# Patient Record
Sex: FEMALE | Race: WHITE | NOT HISPANIC OR LATINO | Employment: FULL TIME | URBAN - METROPOLITAN AREA
[De-identification: names, ages, dates, MRNs, and addresses within clinical notes are randomized per-mention and may not be internally consistent; named-entity substitution may affect disease eponyms.]

---

## 2017-01-05 ENCOUNTER — ALLSCRIPTS OFFICE VISIT (OUTPATIENT)
Dept: OTHER | Facility: OTHER | Age: 42
End: 2017-01-05

## 2017-01-23 ENCOUNTER — TRANSCRIBE ORDERS (OUTPATIENT)
Dept: ADMINISTRATIVE | Facility: HOSPITAL | Age: 42
End: 2017-01-23

## 2017-01-23 DIAGNOSIS — Z09 FOLLOW-UP EXAM, 3-6 MONTHS SINCE PREVIOUS EXAM: Primary | ICD-10-CM

## 2017-02-15 ENCOUNTER — ALLSCRIPTS OFFICE VISIT (OUTPATIENT)
Dept: OTHER | Facility: OTHER | Age: 42
End: 2017-02-15

## 2017-02-23 ENCOUNTER — ALLSCRIPTS OFFICE VISIT (OUTPATIENT)
Dept: OTHER | Facility: OTHER | Age: 42
End: 2017-02-23

## 2017-03-01 ENCOUNTER — ALLSCRIPTS OFFICE VISIT (OUTPATIENT)
Dept: OTHER | Facility: OTHER | Age: 42
End: 2017-03-01

## 2017-03-03 DIAGNOSIS — R92.8 OTHER ABNORMAL AND INCONCLUSIVE FINDINGS ON DIAGNOSTIC IMAGING OF BREAST: ICD-10-CM

## 2017-03-06 ENCOUNTER — HOSPITAL ENCOUNTER (OUTPATIENT)
Dept: MAMMOGRAPHY | Facility: CLINIC | Age: 42
Discharge: HOME | End: 2017-03-06
Payer: COMMERCIAL

## 2017-03-06 DIAGNOSIS — R92.8 OTHER ABNORMAL AND INCONCLUSIVE FINDINGS ON DIAGNOSTIC IMAGING OF BREAST: ICD-10-CM

## 2017-03-06 PROCEDURE — G0204 DX MAMMO INCL CAD BI: HCPCS

## 2017-03-07 ENCOUNTER — GENERIC CONVERSION - ENCOUNTER (OUTPATIENT)
Dept: OTHER | Facility: OTHER | Age: 42
End: 2017-03-07

## 2017-04-07 DIAGNOSIS — D64.9 ANEMIA: ICD-10-CM

## 2017-06-14 ENCOUNTER — TRANSCRIBE ORDERS (OUTPATIENT)
Dept: ADMINISTRATIVE | Facility: HOSPITAL | Age: 42
End: 2017-06-14

## 2017-06-14 ENCOUNTER — APPOINTMENT (OUTPATIENT)
Dept: LAB | Facility: HOSPITAL | Age: 42
End: 2017-06-14
Attending: INTERNAL MEDICINE
Payer: COMMERCIAL

## 2017-06-14 DIAGNOSIS — D64.9 ANEMIA: ICD-10-CM

## 2017-06-14 LAB
ALBUMIN SERPL BCP-MCNC: 2.9 G/DL (ref 3.5–5)
ALP SERPL-CCNC: 82 U/L (ref 46–116)
ALT SERPL W P-5'-P-CCNC: 23 U/L (ref 12–78)
ANION GAP SERPL CALCULATED.3IONS-SCNC: 9 MMOL/L (ref 4–13)
AST SERPL W P-5'-P-CCNC: 17 U/L (ref 5–45)
BASOPHILS # BLD AUTO: 0.05 THOUSANDS/ΜL (ref 0–0.1)
BASOPHILS NFR BLD AUTO: 1 % (ref 0–1)
BILIRUB SERPL-MCNC: 0.2 MG/DL (ref 0.2–1)
BUN SERPL-MCNC: 20 MG/DL (ref 5–25)
CALCIUM SERPL-MCNC: 8.8 MG/DL (ref 8.3–10.1)
CHLORIDE SERPL-SCNC: 100 MMOL/L (ref 100–108)
CO2 SERPL-SCNC: 25 MMOL/L (ref 21–32)
CREAT SERPL-MCNC: 0.73 MG/DL (ref 0.6–1.3)
EOSINOPHIL # BLD AUTO: 0.22 THOUSAND/ΜL (ref 0–0.61)
EOSINOPHIL NFR BLD AUTO: 2 % (ref 0–6)
ERYTHROCYTE [DISTWIDTH] IN BLOOD BY AUTOMATED COUNT: 13 % (ref 11.6–15.1)
FERRITIN SERPL-MCNC: 51 NG/ML (ref 8–388)
GFR SERPL CREATININE-BSD FRML MDRD: >60 ML/MIN/1.73SQ M
GLUCOSE SERPL-MCNC: 227 MG/DL (ref 65–140)
HCT VFR BLD AUTO: 37.4 % (ref 34.8–46.1)
HGB BLD-MCNC: 12.5 G/DL (ref 11.5–15.4)
IRON SATN MFR SERPL: 17 %
IRON SERPL-MCNC: 79 UG/DL (ref 50–170)
LYMPHOCYTES # BLD AUTO: 2.9 THOUSANDS/ΜL (ref 0.6–4.47)
LYMPHOCYTES NFR BLD AUTO: 31 % (ref 14–44)
MCH RBC QN AUTO: 29.8 PG (ref 26.8–34.3)
MCHC RBC AUTO-ENTMCNC: 33.4 G/DL (ref 31.4–37.4)
MCV RBC AUTO: 89 FL (ref 82–98)
MONOCYTES # BLD AUTO: 0.65 THOUSAND/ΜL (ref 0.17–1.22)
MONOCYTES NFR BLD AUTO: 7 % (ref 4–12)
NEUTROPHILS # BLD AUTO: 5.65 THOUSANDS/ΜL (ref 1.85–7.62)
NEUTS SEG NFR BLD AUTO: 59 % (ref 43–75)
PLATELET # BLD AUTO: 426 THOUSANDS/UL (ref 149–390)
PMV BLD AUTO: 9.7 FL (ref 8.9–12.7)
POTASSIUM SERPL-SCNC: 4.3 MMOL/L (ref 3.5–5.3)
PROT SERPL-MCNC: 6.5 G/DL (ref 6.4–8.2)
RBC # BLD AUTO: 4.19 MILLION/UL (ref 3.81–5.12)
SODIUM SERPL-SCNC: 134 MMOL/L (ref 136–145)
TIBC SERPL-MCNC: 478 UG/DL (ref 250–450)
WBC # BLD AUTO: 9.47 THOUSAND/UL (ref 4.31–10.16)

## 2017-06-14 PROCEDURE — 82728 ASSAY OF FERRITIN: CPT

## 2017-06-14 PROCEDURE — 83550 IRON BINDING TEST: CPT

## 2017-06-14 PROCEDURE — 80053 COMPREHEN METABOLIC PANEL: CPT

## 2017-06-14 PROCEDURE — 83918 ORGANIC ACIDS TOTAL QUANT: CPT

## 2017-06-14 PROCEDURE — 85025 COMPLETE CBC W/AUTO DIFF WBC: CPT

## 2017-06-14 PROCEDURE — 36415 COLL VENOUS BLD VENIPUNCTURE: CPT

## 2017-06-14 PROCEDURE — 83540 ASSAY OF IRON: CPT

## 2017-06-20 LAB — METHYLMALONATE SERPL-SCNC: 110 NMOL/L (ref 0–378)

## 2017-07-03 ENCOUNTER — ALLSCRIPTS OFFICE VISIT (OUTPATIENT)
Dept: OTHER | Facility: OTHER | Age: 42
End: 2017-07-03

## 2017-07-06 ENCOUNTER — ALLSCRIPTS OFFICE VISIT (OUTPATIENT)
Dept: OTHER | Facility: OTHER | Age: 42
End: 2017-07-06

## 2017-07-07 ENCOUNTER — LAB REQUISITION (OUTPATIENT)
Dept: LAB | Facility: HOSPITAL | Age: 42
End: 2017-07-07
Payer: COMMERCIAL

## 2017-07-07 DIAGNOSIS — Z11.51 ENCOUNTER FOR SCREENING FOR HUMAN PAPILLOMAVIRUS (HPV): ICD-10-CM

## 2017-07-07 DIAGNOSIS — Z12.4 ENCOUNTER FOR SCREENING FOR MALIGNANT NEOPLASM OF CERVIX: ICD-10-CM

## 2017-07-07 PROCEDURE — G0145 SCR C/V CYTO,THINLAYER,RESCR: HCPCS | Performed by: OBSTETRICS & GYNECOLOGY

## 2017-07-07 PROCEDURE — 87624 HPV HI-RISK TYP POOLED RSLT: CPT | Performed by: OBSTETRICS & GYNECOLOGY

## 2017-07-07 RX ORDER — SODIUM CHLORIDE 9 MG/ML
20 INJECTION, SOLUTION INTRAVENOUS CONTINUOUS
Status: DISCONTINUED | OUTPATIENT
Start: 2017-07-10 | End: 2017-07-13 | Stop reason: HOSPADM

## 2017-07-10 ENCOUNTER — HOSPITAL ENCOUNTER (OUTPATIENT)
Dept: INFUSION CENTER | Facility: HOSPITAL | Age: 42
Discharge: HOME | End: 2017-07-10
Payer: COMMERCIAL

## 2017-07-10 VITALS
RESPIRATION RATE: 18 BRPM | DIASTOLIC BLOOD PRESSURE: 87 MMHG | TEMPERATURE: 96.9 F | SYSTOLIC BLOOD PRESSURE: 150 MMHG | HEART RATE: 94 BPM

## 2017-07-10 PROCEDURE — 96365 THER/PROPH/DIAG IV INF INIT: CPT

## 2017-07-10 RX ADMIN — IRON SUCROSE 200 MG: 20 INJECTION, SOLUTION INTRAVENOUS at 14:55

## 2017-07-11 LAB — HPV RRNA GENITAL QL NAA+PROBE: NORMAL

## 2017-07-14 LAB
LAB AP GYN PRIMARY INTERPRETATION: NORMAL
Lab: NORMAL

## 2017-07-20 RX ORDER — SODIUM CHLORIDE 9 MG/ML
20 INJECTION, SOLUTION INTRAVENOUS ONCE
Status: COMPLETED | OUTPATIENT
Start: 2017-07-22 | End: 2017-07-22

## 2017-07-22 ENCOUNTER — HOSPITAL ENCOUNTER (OUTPATIENT)
Dept: INFUSION CENTER | Facility: HOSPITAL | Age: 42
Discharge: HOME | End: 2017-07-22
Payer: COMMERCIAL

## 2017-07-22 VITALS
HEART RATE: 86 BPM | TEMPERATURE: 96 F | SYSTOLIC BLOOD PRESSURE: 122 MMHG | RESPIRATION RATE: 18 BRPM | DIASTOLIC BLOOD PRESSURE: 68 MMHG

## 2017-07-22 PROCEDURE — 96365 THER/PROPH/DIAG IV INF INIT: CPT

## 2017-07-22 RX ADMIN — SODIUM CHLORIDE 20 ML/HR: 0.9 INJECTION, SOLUTION INTRAVENOUS at 09:32

## 2017-07-22 RX ADMIN — IRON SUCROSE 200 MG: 20 INJECTION, SOLUTION INTRAVENOUS at 09:30

## 2017-07-27 RX ORDER — SODIUM CHLORIDE 9 MG/ML
20 INJECTION, SOLUTION INTRAVENOUS ONCE
Status: COMPLETED | OUTPATIENT
Start: 2017-07-29 | End: 2017-07-29

## 2017-07-29 ENCOUNTER — HOSPITAL ENCOUNTER (OUTPATIENT)
Dept: INFUSION CENTER | Facility: HOSPITAL | Age: 42
Discharge: HOME | End: 2017-07-29
Payer: COMMERCIAL

## 2017-07-29 VITALS
RESPIRATION RATE: 16 BRPM | TEMPERATURE: 97.8 F | HEART RATE: 90 BPM | DIASTOLIC BLOOD PRESSURE: 73 MMHG | SYSTOLIC BLOOD PRESSURE: 143 MMHG

## 2017-07-29 PROCEDURE — 96365 THER/PROPH/DIAG IV INF INIT: CPT

## 2017-07-29 RX ADMIN — SODIUM CHLORIDE 20 ML/HR: 0.9 INJECTION, SOLUTION INTRAVENOUS at 09:19

## 2017-07-29 RX ADMIN — IRON SUCROSE 200 MG: 20 INJECTION, SOLUTION INTRAVENOUS at 09:50

## 2017-07-31 ENCOUNTER — GENERIC CONVERSION - ENCOUNTER (OUTPATIENT)
Dept: OTHER | Facility: OTHER | Age: 42
End: 2017-07-31

## 2017-08-10 RX ORDER — SODIUM CHLORIDE 9 MG/ML
20 INJECTION, SOLUTION INTRAVENOUS ONCE
Status: COMPLETED | OUTPATIENT
Start: 2017-08-12 | End: 2017-08-12

## 2017-08-12 ENCOUNTER — HOSPITAL ENCOUNTER (OUTPATIENT)
Dept: INFUSION CENTER | Facility: HOSPITAL | Age: 42
Discharge: HOME | End: 2017-08-12
Payer: COMMERCIAL

## 2017-08-12 VITALS
DIASTOLIC BLOOD PRESSURE: 68 MMHG | RESPIRATION RATE: 18 BRPM | TEMPERATURE: 98.7 F | HEART RATE: 100 BPM | SYSTOLIC BLOOD PRESSURE: 121 MMHG

## 2017-08-12 PROCEDURE — 96365 THER/PROPH/DIAG IV INF INIT: CPT

## 2017-08-12 RX ADMIN — IRON SUCROSE 200 MG: 20 INJECTION, SOLUTION INTRAVENOUS at 09:32

## 2017-08-12 RX ADMIN — SODIUM CHLORIDE 20 ML/HR: 0.9 INJECTION, SOLUTION INTRAVENOUS at 09:32

## 2017-08-12 NOTE — PLAN OF CARE
Problem: SAFETY ADULT  Goal: Patient will remain free of falls  INTERVENTIONS:  - Assess patient frequently for physical needs  -  Identify cognitive and physical deficits and behaviors that affect risk of falls    -  [place] fall precautions as indicated by assessment   - Educate patient/family on patient safety including physical limitations  - Instruct patient to call for assistance with activity based on assessment  - Modify environment to reduce risk of injury  - Consider OT/PT consult to assist with strengthening/mobility  Outcome: Progressing

## 2017-08-17 RX ORDER — SODIUM CHLORIDE 9 MG/ML
20 INJECTION, SOLUTION INTRAVENOUS ONCE
Status: COMPLETED | OUTPATIENT
Start: 2017-08-19 | End: 2017-08-19

## 2017-08-19 ENCOUNTER — HOSPITAL ENCOUNTER (OUTPATIENT)
Dept: INFUSION CENTER | Facility: HOSPITAL | Age: 42
Discharge: HOME | End: 2017-08-19
Payer: COMMERCIAL

## 2017-08-19 VITALS
RESPIRATION RATE: 18 BRPM | HEART RATE: 72 BPM | SYSTOLIC BLOOD PRESSURE: 132 MMHG | DIASTOLIC BLOOD PRESSURE: 62 MMHG | TEMPERATURE: 98.4 F

## 2017-08-19 PROCEDURE — 96365 THER/PROPH/DIAG IV INF INIT: CPT

## 2017-08-19 RX ADMIN — SODIUM CHLORIDE 20 ML/HR: 0.9 INJECTION, SOLUTION INTRAVENOUS at 09:28

## 2017-08-19 RX ADMIN — IRON SUCROSE 200 MG: 20 INJECTION, SOLUTION INTRAVENOUS at 09:28

## 2017-08-19 NOTE — PLAN OF CARE
Problem: Potential for Falls  Goal: Patient will remain free of falls  INTERVENTIONS:  - Assess patient frequently for physical needs  -  Identify cognitive and physical deficits and behaviors that affect risk of falls    -  [place] fall precautions as indicated by assessment   - Educate patient/family on patient safety including physical limitations  - Instruct patient to call for assistance with activity based on assessment  - Modify environment to reduce risk of injury  - Consider OT/PT consult to assist with strengthening/mobility   Outcome: Progressing

## 2017-08-23 RX ORDER — SODIUM CHLORIDE 9 MG/ML
20 INJECTION, SOLUTION INTRAVENOUS ONCE
Status: COMPLETED | OUTPATIENT
Start: 2017-08-26 | End: 2017-08-26

## 2017-08-24 ENCOUNTER — GENERIC CONVERSION - ENCOUNTER (OUTPATIENT)
Dept: OTHER | Facility: OTHER | Age: 42
End: 2017-08-24

## 2017-08-26 ENCOUNTER — HOSPITAL ENCOUNTER (OUTPATIENT)
Dept: INFUSION CENTER | Facility: HOSPITAL | Age: 42
Discharge: HOME | End: 2017-08-26
Payer: COMMERCIAL

## 2017-08-26 VITALS
DIASTOLIC BLOOD PRESSURE: 64 MMHG | HEART RATE: 99 BPM | TEMPERATURE: 96.9 F | RESPIRATION RATE: 18 BRPM | SYSTOLIC BLOOD PRESSURE: 138 MMHG

## 2017-08-26 PROCEDURE — 96365 THER/PROPH/DIAG IV INF INIT: CPT

## 2017-08-26 RX ADMIN — IRON SUCROSE 200 MG: 20 INJECTION, SOLUTION INTRAVENOUS at 09:41

## 2017-08-26 RX ADMIN — SODIUM CHLORIDE 20 ML/HR: 0.9 INJECTION, SOLUTION INTRAVENOUS at 09:35

## 2017-08-26 NOTE — PLAN OF CARE
Problem: Potential for Falls  Goal: Patient will remain free of falls  INTERVENTIONS:  - Assess patient frequently for physical needs  -  Identify cognitive and physical deficits and behaviors that affect risk of falls    -  [place] fall precautions as indicated by assessment   - Educate patient/family on patient safety including physical limitations  - Instruct patient to call for assistance with activity based on assessment  - Modify environment to reduce risk of injury  - Consider OT/PT consult to assist with strengthening/mobility   Outcome: Progressing

## 2017-09-07 DIAGNOSIS — R92.8 OTHER ABNORMAL AND INCONCLUSIVE FINDINGS ON DIAGNOSTIC IMAGING OF BREAST: ICD-10-CM

## 2017-10-25 ENCOUNTER — ALLSCRIPTS OFFICE VISIT (OUTPATIENT)
Dept: OTHER | Facility: OTHER | Age: 42
End: 2017-10-25

## 2017-10-26 NOTE — PROGRESS NOTES
Assessment  1  Former smoker (V15 82) (M00 174)   · Quit in 1994   2  Acute gastroenteritis (558 9) (K52 9)   3  Type 1 diabetes (250 01) (E10 9)    Plan  Acute gastroenteritis    · After you have tolerated bland foods for 3 days, you may gradually go back to your  normal diet ; Status:Complete;   Done: 45MCP8265   · Avoid dairy products for 5 days ; Status:Complete;   Done: 94HOS0790   · Avoid foods and beverages that contain caffeine ; Status:Complete;   Done: 10VEJ1238   · Drink plenty of fluids ; Status:Complete;   Done: 69ILX8562    Discussion/Summary    1  Acute gastroenteritis - discussed fluids and BRAT diet for 2-3 days, then advance to regular diet as tolerated  DM type 1- check sugars 4 times a day and call if sugars < 70 or > 300, f/u with Endocrine Dr Leah Colon  in the office if symptoms persist or worsen  The treatment plan was reviewed with the patient/guardian  The patient/guardian understands and agrees with the treatment plan      Chief Complaint  Pt presents in the office today with c/o vomiting, diarrhea and sore all over times 4 days;  due 03/06/2018due 07/06/2019         History of Present Illness  HPI: Pt presents today with c/o nausea, feeling achy onset on Saturday, then on Sunday am she had an episode of vomiting and also developed watery diarrhea  She has not had any further episodes of vomiting since Monday, but still having diarrhea and decreased appetite  Pt denies fever, chills, abdominal pain, blood in her stool or feeling dizzy  She is now tolerating fluids and light diet, her sugars have been running in 200's and she reports no hypoglycemic episodes  Review of Systems    Constitutional: feeling tired, but-- no fever,-- no chills-- and-- no recent weight loss  ENT: no earache,-- no sore throat-- and-- no nasal discharge  Cardiovascular: no complaints of slow or fast heart rate, no chest pain, no palpitations, no leg claudication or lower extremity edema     Respiratory: no complaints of shortness of breath, no wheezing, no dyspnea on exertion, no orthopnea or PND  Gastrointestinal: as noted in HPI  Genitourinary: no dysuria-- and-- no pelvic pain  Neurological: no headache,-- no dizziness-- and-- no fainting  Active Problems  1  Abnormal finding on mammography (793 80) (R92 8)   2  Acute gastroenteritis (558 9) (K52 9)   3  Amenorrhea (626 0) (N91 2)   4  Anemia (285 9) (D64 9)   5  Asthma, mild persistent (493 90) (J45 30)   6  Both eyes affected 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,   associated with type 1 diabetes mellitus (250 51,362 04,362 07) (O89 8482)   7  Cervical cancer screening (V76 2) (Z12 4)   8  Diabetic retinopathy (250 50,362 01) (E11 319)   9  Elevated platelet count (381 89) (D47 3)   10  Hyperlipidemia (272 4) (E78 5)   11  Hypertension (401 9) (I10)   12  Need for prophylactic vaccination and inoculation against influenza (V04 81) (Z23)   13  Nervousness (799 21) (R45 0)   14  Normal routine physical examination (V70 0) (Z00 00)   15  Oligomenorrhea (626 1) (N91 5)   16  Polycystic ovarian syndrome (256 4) (E28 2)   17  Retinal hemorrhage (362 81) (H35 60)   18  Screening for HPV (human papillomavirus) (V73 81) (Z11 51)   19  Screening for STDs (sexually transmitted diseases) (V74 5) (Z11 3)   20  Thrombocytosis (238 71) (D47 3)   21  Type 1 diabetes (250 01) (E10 9)   22  Visit for pre-operative examination (V72 84) (Z01 818)   23  Visit for routine gyn exam (V72 31) (Z01 419)   24  Visit for screening mammogram (V76 12) (Z12 31)    Past Medical History  1  History of Asthma with acute exacerbation (493 92) (J45 901)   2  Denied: History of Birth History   3  History of  0 (V49 89)   4  History of bronchitis (V12 69) (Z87 09)   5  History of candidiasis of mouth (V12 09) (Z86 19)   6  History of cataract (V12 49) (Z86 69)   7  History of gastroenteritis (V12 79) (Z87 19)   8   History of menorrhagia (V13 29) (Z87 42)   9  History of self breast exam   10  History of sinusitis (V12 69) (Z87 09)   11  History of tonsillitis (V12 69) (Z87 09)   12  History of upper respiratory infection (V12 09) (Z87 09)   13  History of viral gastroenteritis (V12 09) (Z86 19)   14  History of Irregular menstrual cycle (626 4) (N92 6)   15  Need for prophylactic vaccination and inoculation against influenza (V04 81) (Z23)   16  History of Otitis media, unspecified laterality   17  History of Social phobia (300 23) (F40 10)   18  History of Sore throat (462) (J02 9)   19  History of Well adult on routine health check (V70 0) (Z00 00)    Family History  Mother    1  Family history of Diabetes Mellitus (V18 0)   2  Family history of Thyroid Disorder (V18 19)  Father    3  Family history of    4  Family history of Diabetes Mellitus (V18 0)   5  Family history of Hypertension (V17 49)  Paternal Grandmother    10  Family history of Breast Cancer (V16 3)  Maternal Grandfather    7  Family history of Diabetes Mellitus (V18 0)  Maternal Aunt    8  Family history of Diabetes Mellitus (V18 0)   9  Family history of Diabetes Mellitus (V18 0)  Maternal Uncle    10  Family history of Diabetes Mellitus (V18 0)   11  Family history of Diabetes Mellitus (V18 0)  Family History Reviewed: The family history was reviewed and updated today  Social History   · Alcohol Use (History)   · 1 mixed drink twice a month if that  · Always uses seat belt   · Caffeine Use   · 1-2 cups coffee daily   ·    · Denied: History of Drug Use   · Former smoker (V15 82) (A89 101)   · Quit in  [address]   · Has smoke detectors   · Oral contraceptives   · [employer] Affiliation [employer]   · Uses Safety Equipment - Seatbelts  The social history was reviewed and updated today  Surgical History  1  History of Cataract Surgery   2  History of Eye Surgery   3  History of Hand Surgery    Current Meds   1   Advair Diskus 250-50 MCG/DOSE Inhalation Aerosol Powder Breath Activated; USE 1   PUFF TWICE A DAY; Therapy: 91KKF4040 to (Evaluate:80Amw5827)  Requested for: 68WZA2926; Last   Rx:55Uhp2581 Ordered   2  Albuterol Sulfate (2 5 MG/3ML) 0 083% Inhalation Nebulization Solution; USE 1 UNIT   DOSE EVERY 4-6 HOURS AS NEEDED FOR WHEEZING   Requested for: 35Hrp3829;   Last Rx:24Fuc9586 Ordered   3  Align Oral Capsule; USE AS DIRECTED; Therapy: 76ZRG0930 to (Last Jeffrie Laser)  Requested for: 47SRU5272 Ordered   4  Biotin TABS; Take 1 tablet daily; Therapy: (Recorded:56Jlr5373) to Recorded   5  Lantus 100 UNIT/ML Subcutaneous Solution; INJECT 65 UNIT Bedtime; Therapy: (Recorded:22Nnv6325) to Recorded   6  Lisinopril 20 MG Oral Tablet; TAKE 2 TABLETS BY MOUTH EVERY DAY; Therapy: 50BPT2732 to (Evaluate:93Atm0239)  Requested for: 98Hqf0817; Last   Rx:76Khx8860 Ordered   7  Montelukast Sodium 10 MG Oral Tablet; TAKE 1 TABLET AT BEDTIME; Therapy: 61LYL4590 to (Evaluate:18Nov2017)  Requested for: 30Ysn6108; Last   Rx:70Oyo9950 Ordered   8  Norethindrone 0 35 MG Oral Tablet; Take 1 tablet daily; Therapy: 79FYK3537 to ([phone])  Requested for: 31ERX3337; Last   Rx:27Vce2497 Ordered   9  NovoLOG 100 UNIT/ML Subcutaneous Solution; INJECT SUBCUTANEOUSLY AS   DIRECTED; Therapy: (Tony Perales) to Recorded   10  Omeprazole 20 MG Oral Capsule Delayed Release; TAKE 1 CAPSULE DAILY; Therapy: 00KVD6146 to (Hermelinda Rodríguez)  Requested for: 29GCS5053; Last    Rx:47Dob2272 Ordered   11  PARoxetine HCl - 20 MG Oral Tablet; take 1 tablet every day; Therapy: 34TDO2243 to (Evaluate:17Lsc1846)  Requested for: 00Blx6281; Last    Rx:07Xsu7154 Ordered   12  Pravastatin Sodium 20 MG Oral Tablet; TAKE ONE TABLET BY MOUTH AT BEDTIME; Therapy: 46NPL2284 to (Evaluate:40Jdj7209)  Requested for: 08MZW9748; Last    Rx:40Yga3712 Ordered   13   ProAir  (90 Base) MCG/ACT Inhalation Aerosol Solution; TAKE 2 PUFFS 4    TIMES A DAY AS NEEDED FOR SHORTNESS OF BREATH; Therapy: 14HII1773 to (Evaluate:39Jvd3534)  Requested for: 06HBS8171; Last    Rx:41Hyg1033 Ordered   14  SymlinPen 120 SOLN; INJECT MG 3 times daily; Therapy: (Radha Matthews) to Recorded   15  Vitamin B-6 50 MG Oral Tablet; TAKE 1 TABLET DAILY; Therapy: (Radha Matthews) to Recorded   16  Vitamin C CAPS; TAKE 2 CAPSULE Daily; Therapy: (Recorded:27Mar2013) to Recorded    Allergies  1  No Known Drug Allergies  2  No Known Environmental Allergies   3  No Known Food Allergies    Vitals   Recorded: 25Oct2017 10:36AM   Temperature 98 5 F   Heart Rate 88   Respiration 16   Systolic 605   Diastolic 62   Height 4 ft 8 in   Weight 148 lb 1 6 oz   BMI Calculated 33 2   BSA Calculated 1 56     Physical Exam    Constitutional   General appearance: No acute distress, well appearing and well nourished  Ears, Nose, Mouth, and Throat   Otoscopic examination: Tympanic membranes translucent with normal light reflex  Canals patent without erythema  Oropharynx: Normal with no erythema, edema, exudate or lesions  Pulmonary   Respiratory effort: No increased work of breathing or signs of respiratory distress  Auscultation of lungs: Clear to auscultation  Cardiovascular   Auscultation of heart: Normal rate and rhythm, normal S1 and S2, without murmurs  Examination of extremities for edema and/or varicosities: Normal     Abdomen   Abdomen: Non-tender, no masses  Liver and spleen: No hepatomegaly or splenomegaly  Lymphatic   Palpation of lymph nodes in neck: No lymphadenopathy  Psychiatric   Orientation to person, place, and time: Normal     Mood and affect: Normal          Results/Data  PHQ-2 Adult Depression Screening 25Oct2017 10:38AM User, s     Test Name Result Flag Reference   PHQ-2 Adult Depression Score 0     Over the last two weeks, how often have you been bothered by any of the following problems?   Little interest or pleasure in doing things: Not at all - 0  Feeling down, depressed, or hopeless: Not at all - 0   PHQ-2 Adult Depression Screening Negative         Future Appointments    Date/Time Provider Specialty Site   11/15/2017 04:00 PM REENA Crenshaw   Hematology Oncology [facility]     Signatures   Electronically signed by : REENA Gustafson ; Oct 26 2017 12:24AM EST                       (Author)

## 2017-11-03 DIAGNOSIS — D64.9 ANEMIA: ICD-10-CM

## 2017-11-14 ENCOUNTER — APPOINTMENT (OUTPATIENT)
Dept: LAB | Facility: CLINIC | Age: 42
End: 2017-11-14
Payer: COMMERCIAL

## 2017-11-14 DIAGNOSIS — D64.9 ANEMIA: ICD-10-CM

## 2017-11-14 LAB
ALBUMIN SERPL BCP-MCNC: 3.4 G/DL (ref 3.5–5)
ALP SERPL-CCNC: 117 U/L (ref 46–116)
ALT SERPL W P-5'-P-CCNC: 28 U/L (ref 12–78)
ANION GAP SERPL CALCULATED.3IONS-SCNC: 6 MMOL/L (ref 4–13)
AST SERPL W P-5'-P-CCNC: 15 U/L (ref 5–45)
BASOPHILS # BLD AUTO: 0.04 THOUSANDS/ΜL (ref 0–0.1)
BASOPHILS NFR BLD AUTO: 1 % (ref 0–1)
BILIRUB SERPL-MCNC: 0.3 MG/DL (ref 0.2–1)
BUN SERPL-MCNC: 18 MG/DL (ref 5–25)
CALCIUM SERPL-MCNC: 9.2 MG/DL (ref 8.3–10.1)
CHLORIDE SERPL-SCNC: 97 MMOL/L (ref 100–108)
CO2 SERPL-SCNC: 29 MMOL/L (ref 21–32)
CREAT SERPL-MCNC: 0.55 MG/DL (ref 0.6–1.3)
EOSINOPHIL # BLD AUTO: 0.27 THOUSAND/ΜL (ref 0–0.61)
EOSINOPHIL NFR BLD AUTO: 3 % (ref 0–6)
ERYTHROCYTE [DISTWIDTH] IN BLOOD BY AUTOMATED COUNT: 12.6 % (ref 11.6–15.1)
FERRITIN SERPL-MCNC: 280 NG/ML (ref 8–388)
GFR SERPL CREATININE-BSD FRML MDRD: 116 ML/MIN/1.73SQ M
GLUCOSE P FAST SERPL-MCNC: 184 MG/DL (ref 65–99)
HCT VFR BLD AUTO: 38 % (ref 34.8–46.1)
HGB BLD-MCNC: 13.1 G/DL (ref 11.5–15.4)
IRON SATN MFR SERPL: 13 %
IRON SERPL-MCNC: 47 UG/DL (ref 50–170)
LYMPHOCYTES # BLD AUTO: 2.58 THOUSANDS/ΜL (ref 0.6–4.47)
LYMPHOCYTES NFR BLD AUTO: 30 % (ref 14–44)
MCH RBC QN AUTO: 31.6 PG (ref 26.8–34.3)
MCHC RBC AUTO-ENTMCNC: 34.5 G/DL (ref 31.4–37.4)
MCV RBC AUTO: 92 FL (ref 82–98)
MONOCYTES # BLD AUTO: 0.67 THOUSAND/ΜL (ref 0.17–1.22)
MONOCYTES NFR BLD AUTO: 8 % (ref 4–12)
NEUTROPHILS # BLD AUTO: 4.9 THOUSANDS/ΜL (ref 1.85–7.62)
NEUTS SEG NFR BLD AUTO: 58 % (ref 43–75)
NRBC BLD AUTO-RTO: 0 /100 WBCS
PLATELET # BLD AUTO: 431 THOUSANDS/UL (ref 149–390)
PMV BLD AUTO: 9.9 FL (ref 8.9–12.7)
POTASSIUM SERPL-SCNC: 4.5 MMOL/L (ref 3.5–5.3)
PROT SERPL-MCNC: 6.9 G/DL (ref 6.4–8.2)
RBC # BLD AUTO: 4.14 MILLION/UL (ref 3.81–5.12)
SODIUM SERPL-SCNC: 132 MMOL/L (ref 136–145)
TIBC SERPL-MCNC: 358 UG/DL (ref 250–450)
WBC # BLD AUTO: 8.48 THOUSAND/UL (ref 4.31–10.16)

## 2017-11-14 PROCEDURE — 85025 COMPLETE CBC W/AUTO DIFF WBC: CPT

## 2017-11-14 PROCEDURE — 83918 ORGANIC ACIDS TOTAL QUANT: CPT

## 2017-11-14 PROCEDURE — 83540 ASSAY OF IRON: CPT

## 2017-11-14 PROCEDURE — 82728 ASSAY OF FERRITIN: CPT

## 2017-11-14 PROCEDURE — 36415 COLL VENOUS BLD VENIPUNCTURE: CPT

## 2017-11-14 PROCEDURE — 83550 IRON BINDING TEST: CPT

## 2017-11-14 PROCEDURE — 80053 COMPREHEN METABOLIC PANEL: CPT

## 2017-11-15 ENCOUNTER — ALLSCRIPTS OFFICE VISIT (OUTPATIENT)
Dept: OTHER | Facility: OTHER | Age: 42
End: 2017-11-15

## 2017-11-16 NOTE — PROGRESS NOTES
Assessment  1  Thrombocytosis (238 71) (D47 3)   2  Anemia (285 9) (D64 9)   3  Chronic myeloproliferative disorder (238 79) (D47 1)    Plan  Anemia    · (1) FERRITIN; Status:Active; Requested GNM:71VXR7616; Perform:[facility]; PRX:20AAA5887;YDIXMMG; Randi Millies; Ordered By:Umer Schmitt;   · (1) IRON SATURATION %, TIBC; Status:Active; Requested ONW:06DLV8231; Perform:[facility]; DONG:47WPG7158;EDBHJDL; Randi MYTRNDs; Ordered By:Umer Schmitt;   · (1) IRON; Status:Active; Requested XAL:59LVI6118; Perform:[facility]; PFQ:14NAD5421;WNQMLQB; Randi Castillo; Ordered By:Umer Schmitt;   · (1) METHYLMALONIC ACID,BLOOD; Status:Active; Requested MMA:20IJC6721; Perform:[facility]; BKZ:80GEJ0170;SYOIWHS; Randi Castillo; Ordered By:Umer Schmitt;   · (1) TIBC; Status:Active; Requested OWZ:12TUA3399; Perform:[facility]; UCN:06JKW4377;EUBMXWQ;NVKXLEE By:Umer Schmitt;   · Follow-up visit in 4 Months Evaluation and Treatment  Follow-up  Status: Hold For -Scheduling  Requested for: 49LHB6015   Ordered; For: Anemia; Ordered By: Leeroy Patel Performed:  Due: 63CEF3664  Chronic myeloproliferative disorder    · (1) CBC/PLT/DIFF; Status:Active; Requested HXX:11QZE7275; Perform:[facility]; AOQ:43BXA6226;CECXDGS; For:Chronic myeloproliferative disorder; Ordered By:Umer Schmitt;   · (1) COMPREHENSIVE METABOLIC PANEL; Status:Active; Requested BJB:60BYX4669; Perform:[facility]; JVI:56USJ1128;EPEOSQL; For:Chronic myeloproliferative disorder; Ordered By:Umer Schmitt;   · (Q) JAK2 V617F MUTATION, QUANTITATIVE; Status:Active; Requested CVH:68ZKK8030; Perform:[facility]; RPB:53BJD8317;VIVCGLW;FTFRWGSFIKSWRARMGW disorder; Ordered By:Umer Schmitt; Discussion/Summary  Discussion Summary: This is a pleasant young female with a past medical history of diabetes hypertension hypercholesterolemia was noticed to have an elevated platelet count  She was also anemic   I put her on a therapeutic trial of iron  Her numbers are better but her platelets were still elevated  I gave her 6 doses of Venofer  Her iron is still slightly low Her hemoglobin has normalized  Her ferritin is in the 200 range  At this point I will keep her on observation  Her iron saturation was 13%  I will do a myeloproliferative workup and see her back in 4 months  Counseling Documentation With Imm: The patient was counseled regarding diagnostic results,-- instructions for management,-- prognosis,-- patient and family education  Goals and Barriers: The patient has the current Goals: Workup of thrombocytosis  The patent has the current Barriers: None  Patient's Capacity to Self-Care: Patient is able to Self-Care  Chief Complaint  Chief Complaint Free Text Note Form: Elevated platelet count over the last year  History of Present Illness  Previous Therapy:   Workup    Current Therapy: Oral iron    Interval History: Patient returns for follow-up visit  Her iron studies reveal an elevated TIBC  Her hemoglobin has improved and is now normal  She states her energy levels are slightly better  She is supposed to have a myeloproliferative workup done but never had this done  I suspect her elevated platelet count which has improved slightly is reactive to the iron deficiency  It is still however elevated  As far as symptoms are concerned she denies any nausea denies any vomiting denies any diarrhea denies any constipation  The rest of her 14 point review of systems today was negative  Review of Systems  Complete-Female: As stated in the history of present illness otherwise her 14 point review of systems was negative  Active Problems    1  Abnormal finding on mammography (793 80) (R92 8)   2  Acute gastroenteritis (558 9) (K52 9)   3  Amenorrhea (626 0) (N91 2)   4  Anemia (285 9) (D64 9)   5  Asthma, mild persistent (493 90) (J45 30)   6   Both eyes affected 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, associated with type 1 diabetes mellitus (250 51,362 04,362 07) (W47 5128)   7  Cervical cancer screening (V76 2) (Z12 4)   8  Diabetic retinopathy (250 50,362 01) (E11 319)   9  Elevated platelet count (163 21) (D47 3)   10  Hyperlipidemia (272 4) (E78 5)   11  Hypertension (401 9) (I10)   12  Need for prophylactic vaccination and inoculation against influenza (V04 81) (Z23)   13  Nervousness (799 21) (R45 0)   14  Normal routine physical examination (V70 0) (Z00 00)   15  Oligomenorrhea (626 1) (N91 5)   16  Polycystic ovarian syndrome (256 4) (E28 2)   17  Retinal hemorrhage (362 81) (H35 60)   18  Screening for HPV (human papillomavirus) (V73 81) (Z11 51)   19  Screening for STDs (sexually transmitted diseases) (V74 5) (Z11 3)   20  Thrombocytosis (238 71) (D47 3)   21  Type 1 diabetes (250 01) (E10 9)   22  Visit for pre-operative examination (V72 84) (Z01 818)   23  Visit for routine gyn exam (V72 31) (Z01 419)   24  Visit for screening mammogram (V76 12) (Z12 31)    Past Medical History  1  History of Asthma with acute exacerbation (493 92) (J45 901)   2  Denied: History of Birth History   3  History of  0 (V49 89)   4  History of bronchitis (V12 69) (Z87 09)   5  History of candidiasis of mouth (V12 09) (Z86 19)   6  History of cataract (V12 49) (Z86 69)   7  History of gastroenteritis (V12 79) (Z87 19)   8  History of menorrhagia (V13 29) (Z87 42)   9  History of self breast exam   10  History of sinusitis (V12 69) (Z87 09)   11  History of tonsillitis (V12 69) (Z87 09)   12  History of upper respiratory infection (V12 09) (Z87 09)   13  History of viral gastroenteritis (V12 09) (Z86 19)   14  History of Irregular menstrual cycle (626 4) (N92 6)   15  Need for prophylactic vaccination and inoculation against influenza (V04 81) (Z23)   16  History of Otitis media, unspecified laterality   17  History of Social phobia (300 23) (F40 10)   18  History of Sore throat (462) (J02 9)   19   History of Well adult on routine health check (V70 0) (Z00 00)  Active Problems And Past Medical History Reviewed: The active problems and past medical history were reviewed and updated today  Polycystic ovaries, hypercholesterolemia,  diabetes, hypertension      Surgical History  1  History of Cataract Surgery   2  History of Eye Surgery   3  History of Hand Surgery  Surgical History Reviewed: The surgical history was reviewed and updated today  Family History  Mother    1  Family history of Diabetes Mellitus (V18 0)   2  Family history of Thyroid Disorder (V18 19)  Father    3  Family history of    4  Family history of Diabetes Mellitus (V18 0)   5  Family history of Hypertension (V17 49)  Paternal Grandmother    10  Family history of Breast Cancer (V16 3)  Maternal Grandfather    7  Family history of Diabetes Mellitus (V18 0)  Maternal Aunt    8  Family history of Diabetes Mellitus (V18 0)   9  Family history of Diabetes Mellitus (V18 0)  Maternal Uncle    10  Family history of Diabetes Mellitus (V18 0)   11  Family history of Diabetes Mellitus (V18 0)  Family History Reviewed: The family history was reviewed and updated today  Grandmother had breast cancer      Social History     · Alcohol Use (History)   · Always uses seat belt   · Caffeine Use   ·    · Denied: History of Drug Use   · Former smoker (V15 82) (G17 091)   · Has smoke detectors   · Oral contraceptives   · [employer] Affiliation [employer]   · Uses Safety Equipment - Seatbelts  Social History Reviewed: The social history was reviewed and updated today  Current Meds   1  Advair Diskus 250-50 MCG/DOSE Inhalation Aerosol Powder Breath Activated; USE 1 PUFF TWICE A DAY; Therapy: 97IIW0798 to (Evaluate:11Brz3226)  Requested for: 58SVS4296; Last Rx:38Dzt4181 Ordered   2   Albuterol Sulfate (2 5 MG/3ML) 0 083% Inhalation Nebulization Solution; USE 1 UNIT DOSE EVERY 4-6 HOURS AS NEEDED FOR WHEEZING   Requested for: 26Clb9728; Last Rx:45Lip0866 Ordered   3  Align Oral Capsule; USE AS DIRECTED; Therapy: 54XQW4566 to (Willie Mcelroy)  Requested for: 06YYQ8548 Ordered   4  Biotin TABS; Take 1 tablet daily; Therapy: (Recorded:77Bjn2705) to Recorded   5  Lantus 100 UNIT/ML Subcutaneous Solution; INJECT 65 UNIT Bedtime; Therapy: (Recorded:84Atb3601) to Recorded   6  Lisinopril 20 MG Oral Tablet; TAKE 2 TABLETS BY MOUTH EVERY DAY; Therapy: 10USS2548 to (Evaluate:18Nov2017)  Requested for: 44Skl2889; Last Rx:50Wzv0007 Ordered   7  Montelukast Sodium 10 MG Oral Tablet; TAKE 1 TABLET AT BEDTIME; Therapy: 88TTM9355 to (Evaluate:18Nov2017)  Requested for: 23Gll2559; Last Rx:92Poi9371 Ordered   8  Norethindrone 0 35 MG Oral Tablet; Take 1 tablet daily; Therapy: 39YGK7625 to ([phone])  Requested for: 63DVT4417; Last Rx:90Pdr4547 Ordered   9  NovoLOG 100 UNIT/ML Subcutaneous Solution; INJECT SUBCUTANEOUSLY AS DIRECTED; Therapy: (Consuello Mater) to Recorded   10  Omeprazole 20 MG Oral Capsule Delayed Release; TAKE 1 CAPSULE DAILY; Therapy: 30CND4722 to (Cristino Weston)  Requested for: 70FRG8478; Last  Rx:29Jun2015 Ordered   11  PARoxetine HCl - 20 MG Oral Tablet; take 1 tablet every day; Therapy: 30TIO4918 to (Evaluate:94Ply0598)  Requested for: 44Xtp0401; Last  Rx:16Jsd9626 Ordered   12  Pravastatin Sodium 20 MG Oral Tablet; TAKE ONE TABLET BY MOUTH AT BEDTIME; Therapy: 02UNC5834 to (Evaluate:07Zko6126)  Requested for: 07MMY4631; Last  Rx:22Jgp3477 Ordered   13  ProAir  (90 Base) MCG/ACT Inhalation Aerosol Solution; TAKE 2 PUFFS 4 TIMES  A DAY AS NEEDED FOR SHORTNESS OF BREATH; Therapy: 96BAS9210 to (Evaluate:34Lsz7155)  Requested for: 68BEF1071; Last  Rx:67Xaw4212 Ordered   14  SymlinPen 120 SOLN; INJECT MG 3 times daily; Therapy: (Shabbir Mccracken) to Recorded   15  Vitamin B-6 50 MG Oral Tablet; TAKE 1 TABLET DAILY; Therapy: (Shabbir Mccracken) to Recorded   16  Vitamin C CAPS; TAKE 2 CAPSULE Daily;   Therapy: (Gerardo Flowers) to Recorded  Medication List Reviewed: The medication list was reviewed and updated today  Allergies  1  No Known Drug Allergies  2  No Known Environmental Allergies   3  No Known Food Allergies    Vitals  Vital Signs    Recorded: 61OYA2141 03:50PM   Temperature 97 1 F   Heart Rate 101   Respiration 16   Systolic 828   Diastolic 74   Height 4 ft 8 in   Weight 152 lb    BMI Calculated 34 08   BSA Calculated 1 58   O2 Saturation 98   Pain Scale 0       Physical Exam   Constitutional  General appearance: No acute distress, well appearing and well nourished  Eyes  Conjunctiva and lids: No swelling, erythema or discharge  Pupils and irises: Equal, round and reactive to light  Ears, Nose, Mouth, and Throat  External inspection of ears and nose: Normal    Nasal mucosa, septum, and turbinates: Normal without edema or erythema  Oropharynx: Normal with no erythema, edema, exudate or lesions  Pulmonary  Respiratory effort: No increased work of breathing or signs of respiratory distress  Auscultation of lungs: Clear to auscultation  Cardiovascular  Palpation of heart: Normal PMI, no thrills  Auscultation of heart: Normal rate and rhythm, normal S1 and S2, without murmurs  Examination of extremities for edema and/or varicosities: Normal    Carotid pulses: Normal    Abdomen  Abdomen: Non-tender, no masses  Liver and spleen: No hepatomegaly or splenomegaly  Lymphatic  Palpation of lymph nodes in neck: No lymphadenopathy  Musculoskeletal  Gait and station: Normal    Digits and nails: Normal without clubbing or cyanosis  Inspection/palpation of joints, bones, and muscles: Normal    Skin  Skin and subcutaneous tissue: Normal without rashes or lesions  Neurologic  Cranial nerves: Cranial nerves 2-12 intact  Sensation: No sensory loss     Psychiatric  Orientation to person, place, and time: Normal    Mood and affect: Normal         ECOG 0       Results/Data  Results Form:   Results  Lab Results Most recent blood work shows an iron of 47, TIBC 358, iron saturation 13%, sodium 132, potassium 4 5, creatinine 0 55, AST was 15, ALT 28, total bili 0 3, alkaline phosphatase 117, white count 10 1, hemoglobin 15 4, platelet count was 263  Health Management  History of Encounter for screening for malignant neoplasm of cervix   (every) THINPREP PAP RFX HR HPV DNA AND C  TRACHOMATIS AND N  GONORRHOEAE; every 2years; Last 21TFL8405; Next Due: 93TUT4295; Overdue  Screening for STDs (sexually transmitted diseases)   (every) THINPREP PAP RFX HR HPV DNA AND C  TRACHOMATIS AND N  GONORRHOEAE; every 2years; Last 87ZYV0988; Next Due: 31TPS3784;  Overdue    Signatures   Electronically signed by : REENA Medel ; Nov 15 2017  4:37PM EST                       (Author)

## 2017-11-22 LAB — METHYLMALONATE SERPL-SCNC: 112 NMOL/L (ref 0–378)

## 2018-01-10 NOTE — MISCELLANEOUS
Provider Comments  Patient a no show for 08/24/17 OV; letter sent  C Chavez        Signatures   Electronically signed by : LELAND Merlos;  Aug 25 2017  4:01PM EST                       (Author)

## 2018-01-10 NOTE — MISCELLANEOUS
Message   Date: 22 Jul 2016 1:59 PM EST, Recorded By: [facility]   Calling For: Stephania Goodell: Blossom Anthony, Self   Phone: [phone] (Home), [phone] (Work)   Patient called for BCP refill  Appt in September  Escript sent  Active Problems    1  Abnormal mammogram (793 80) (R92 8)   2  Amenorrhea (626 0) (N91 2)   3  Asthma (493 90) (J45 909)   4  Blister of finger, initial encounter (915 2) (S60 429A)   5  Diabetic retinopathy (250 50,362 01) (E11 319)   6  Encounter for routine gynecological examination (V72 31) (Z01 419)   7  Encounter for screening for malignant neoplasm of cervix (V76 2) (Z12 4)   8  Gastroenteritis (558 9) (K52 9)   9  History of self breast exam   10  Hyperlipidemia (272 4) (E78 5)   11  Hypertension (401 9) (I10)   12  Need for prophylactic vaccination and inoculation against influenza (V04 81) (Z23)   13  Nervousness (799 21) (R45 0)   14  Normal routine physical examination (V70 0) (Z00 00)   15  Oligomenorrhea (626 1) (N91 5)   16  Polycystic ovarian syndrome (256 4) (E28 2)   17  Retinal hemorrhage (362 81) (H35 60)   18  Routine Gynecological Exam With Cervical Pap Smear (V72 31)   19  Screening for STDs (sexually transmitted diseases) (V74 5) (Z11 3)   20  Type 1 diabetes (250 01) (E10 9)   21  Type 1 diabetes mellitus without complication (558 96) (F44 0)   22  Type 1 Diabetes With Ophthalmic Manifestations (250 51)   23  Visit for pre-operative examination (V72 84) (Z01 818)   24  Visit for screening mammogram (V76 12) (Z12 31)    Current Meds   1  Advair Diskus 250-50 MCG/DOSE Inhalation Aerosol Powder Breath Activated; INHALE   1 PUFF TWICE DAILY  Requested for: 11CGS7316; Last Rx:59Yjg2547 Ordered   2  Albuterol Sulfate (2 5 MG/3ML) 0 083% Inhalation Nebulization Solution; USE 1 UNIT   DOSE EVERY 4-6 HOURS AS NEEDED FOR WHEEZING   Requested for: 59Ogl0461;   Last Rx:20Apr2013 Ordered   3  Align Oral Capsule; USE AS DIRECTED;    Therapy: 70NSL6505 to (Last QE:59MDA1122)  Requested for: 71VIV1871 Ordered   4  Biotin TABS; Take 1 tablet daily; Therapy: (Recorded:63Btk9338) to Recorded   5  Lantus 100 UNIT/ML Subcutaneous Solution; INJECT 65 UNIT Bedtime; Therapy: (Recorded:76Kkc7672) to Recorded   6  Lisinopril 20 MG Oral Tablet; TAKE 2 TABLETS EVERY DAY; Therapy: 20NCV2570 to (Evaluate:05Xdg6750)  Requested for: 22Rrb9726; Last   Rx:93Eps1683 Ordered   7  Montelukast Sodium 10 MG Oral Tablet (Singulair); TAKE 1 TABLET AT BEDTIME; Therapy: 36CLI1379 to ([phone])  Requested for: 23Joe3463; Last   Rx:67Vwt0487 Ordered   8  NovoLOG 100 UNIT/ML Subcutaneous Solution; INJECT SUBCUTANEOUSLY AS   DIRECTED; Therapy: (Wendi Chaudhary) to Recorded   9  Omeprazole 20 MG Oral Capsule Delayed Release; TAKE 1 CAPSULE DAILY; Therapy: 21YVF1269 to (Hiral Morin)  Requested for: 50RQQ3195; Last   Rx:29Jun2015 Ordered   10  PARoxetine HCl - 20 MG Oral Tablet; take 1 tablet every day; Therapy: 36SZN8973 to (Evaluate:51Nfe8659)  Requested for: 0676 543 19 15; Last    Rx:71Wyd8287 Ordered   11  Pravastatin Sodium 10 MG Oral Tablet; Take 1 tablet by mouth at bedtime; Therapy: 58IUY6880 to (Evaluate:18Vrb5401)  Requested for: 60UYB9540; Last    Rx:14Mar2016 Ordered   12  ProAir  (90 Base) MCG/ACT Inhalation Aerosol Solution; TAKE 2 PUFFS 4    TIMES A DAY AS NEEDED FOR SHORTNESS OF BREATH; Therapy: 34CXH7484 to (Kinjal Garcia)  Requested for: 77RLV9296; Last    Rx:33Bkj2296 Ordered   13  Sprintec 28 0 25-35 MG-MCG Oral Tablet; TAKE 1 TABLET DAILY; Therapy: 07QBQ7459 to (Last Rx:26Jun2015)  Requested for: 35Bxp4558; Status: ACTIVE    - Renewal Denied Ordered   14  SymlinPen 120 SOLN; INJECT MG 3 times daily; Therapy: (Roseline Sales) to Recorded   15  Vitamin B-6 50 MG Oral Tablet; TAKE 1 TABLET DAILY; Therapy: (Roseline Sales) to Recorded   16  Vitamin C CAPS; TAKE 2 CAPSULE Daily;     Therapy: (Recorded:27Mar2013) to Recorded    Allergies    1  No Known Drug Allergies    2  No Known Environmental Allergies   3   No Known Food Allergies    Plan  Polycystic ovarian syndrome    · Sprintec 28 0 25-35 MG-MCG Oral Tablet; TAKE 1 TABLET DAILY    Signatures   Electronically signed by : Meg Garcia, ; Jul 22 2016  2:00PM EST                       (Author)

## 2018-01-11 NOTE — MISCELLANEOUS
Message   Recorded as Task   Date: 07/21/2017 08:20 AM, Created By: Glenn Chowdhury   Task Name: Review Document   Assigned To: Glenn Chowdhury   Regarding Patient: Simona Rater, Status: In Progress   Comment:    Glenn Chowdhury - 21 Jul 2017 8:20 AM     TASK CREATED  we received a letter from her insurance company suggesting a different type of contraception due to her diabetic eye issues  When we met, it seemed her DM was under good control and she had lost weight  Does she see an eye dr regularly? She could consider a progesterone only pill, it would still be effective but potentially better for her DM  any questions, see me   Lawson Irwin - 21 Jul 2017 10:17 AM     TASK IN PROGRESS   Angelica Carbajal - 31 Jul 2017 9:15 AM     TASK REPLIED TO: Previously Assigned To Chavo Arellano  I tasked you her new script    she does see an eye dr regularly, but she agreed to switch her pill           Active Problems    1  Abnormal finding on mammography (793 80) (R92 8)   2  Acute gastroenteritis (558 9) (K52 9)   3  Amenorrhea (626 0) (N91 2)   4  Anemia (285 9) (D64 9)   5  Asthma (493 90) (J45 909)   6  Both eyes affected 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,   associated with type 1 diabetes mellitus (250 51,362 04,362 07) (J66 6156)   7  Cervical cancer screening (V76 2) (Z12 4)   8  Diabetic retinopathy (250 50,362 01) (E11 319)   9  Elevated platelet count (355 66) (D47 3)   10  Hyperlipidemia (272 4) (E78 5)   11  Hypertension (401 9) (I10)   12  Need for prophylactic vaccination and inoculation against influenza (V04 81) (Z23)   13  Nervousness (799 21) (R45 0)   14  Normal routine physical examination (V70 0) (Z00 00)   15  Oligomenorrhea (626 1) (N91 5)   16  Polycystic ovarian syndrome (256 4) (E28 2)   17  Retinal hemorrhage (362 81) (H35 60)   18  Screening for HPV (human papillomavirus) (V73 81) (Z11 51)   19   Screening for STDs (sexually transmitted diseases) (V74 5) (Z11 3) 20  Thrombocytosis (238 71) (D47 3)   21  Type 1 diabetes (250 01) (E10 9)   22  Visit for pre-operative examination (V72 84) (Z01 818)   23  Visit for routine gyn exam (V72 31) (Z01 419)   24  Visit for screening mammogram (V76 12) (Z12 31)    Current Meds   1  Advair Diskus 250-50 MCG/DOSE Inhalation Aerosol Powder Breath Activated; USE 1   PUFF TWICE A DAY; Therapy: 28EWG2973 to (Evaluate:86Wdk1257)  Requested for: 62CDP0061; Last   Rx:48Oyu4884 Ordered   2  Albuterol Sulfate (2 5 MG/3ML) 0 083% Inhalation Nebulization Solution; USE 1 UNIT   DOSE EVERY 4-6 HOURS AS NEEDED FOR WHEEZING   Requested for: 20Apr2013;   Last Rx:19Zrj1687 Ordered   3  Align Oral Capsule; USE AS DIRECTED; Therapy: 47FGW6833 to (Willie Mcelroy)  Requested for: 25PIT5284 Ordered   4  Biotin TABS; Take 1 tablet daily; Therapy: (Recorded:44Suq2880) to Recorded   5  Iron 65 MG TABS; Therapy: (Recorded:14Oyc2225) to Recorded   6  Lantus 100 UNIT/ML Subcutaneous Solution; INJECT 65 UNIT Bedtime; Therapy: (Recorded:86Owq5097) to Recorded   7  Lisinopril 20 MG Oral Tablet; TAKE 2 TABLETS BY MOUTH EVERY DAY; Therapy: 58UYE1970 to (Evaluate:18Nov2017)  Requested for: 13Chl7195; Last   Rx:01Eqk7227 Ordered   8  Montelukast Sodium 10 MG Oral Tablet (Singulair); TAKE 1 TABLET AT BEDTIME; Therapy: 74FXB5259 to (Evaluate:18Nov2017)  Requested for: 73Qxc9813; Last   Rx:11Lst7862 Ordered   9  NovoLOG 100 UNIT/ML Subcutaneous Solution; INJECT SUBCUTANEOUSLY AS   DIRECTED; Therapy: (dL Elder) to Recorded   10  Omeprazole 20 MG Oral Capsule Delayed Release; TAKE 1 CAPSULE DAILY; Therapy: 11DQF3832 to (Eliceo Swartz)  Requested for: 60VGV8840; Last    Rx:25Xyy3195 Ordered   11  PARoxetine HCl - 20 MG Oral Tablet; take 1 tablet every day; Therapy: 59FSR3534 to (Evaluate:78Pkg0650)  Requested for: 04RNZ9859; Last    Rx:01Mar2017 Ordered   12   Pravastatin Sodium 20 MG Oral Tablet; TAKE ONE TABLET BY MOUTH AT BEDTIME; Therapy: 89FWJ5040 to (Evaluate:18Feb2017)  Requested for: 35TDK1073; Last    Rx:21Oct2016 Ordered   13  ProAir  (90 Base) MCG/ACT Inhalation Aerosol Solution; TAKE 2 PUFFS 4    TIMES A DAY AS NEEDED FOR SHORTNESS OF BREATH; Therapy: 95WJL3387 to (Evaluate:17Oiu8215)  Requested for: 66KIZ7528; Last    Rx:98Bfk3183 Ordered   14  SymlinPen 120 SOLN; INJECT MG 3 times daily; Therapy: [facility] to Recorded   15  Vitamin B-6 50 MG Oral Tablet; TAKE 1 TABLET DAILY; Therapy: [facility] to Recorded   16  Vitamin C CAPS; TAKE 2 CAPSULE Daily; Therapy: (Recorded:27Mar2013) to Recorded    Allergies    1  No Known Drug Allergies    2  No Known Environmental Allergies   3  No Known Food Allergies    Plan  Amenorrhea    · Norethindrone 0 35 MG Oral Tablet;  Take 1 tablet daily    Signatures   Electronically signed by : Bret Ybarra, ; Jul 31 2017  9:15AM EST                       (Author)

## 2018-01-12 NOTE — MISCELLANEOUS
Message   Recorded as Task   Date: 03/06/2017 09:36 PM, Created By: Esme Van   Task Name: Go to Result   Assigned To: Graciela Yu   Regarding Patient: Carmelita Riedel, Status: Active   Comment:    Esme Van - 06 Mar 2017 9:36 PM     TASK CREATED  b/l dx mammogram in 6 months   Angelica Carbajal - 07 Mar 2017 10:15 AM     TASK EDITED  mailed script to pt        Active Problems    1  Abnormal finding on mammography (793 80) (R92 8)   2  Acute gastroenteritis (558 9) (K52 9)   3  Amenorrhea (626 0) (N91 2)   4  Anemia (285 9) (D64 9)   5  Asthma (493 90) (J45 909)   6  Both eyes affected 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,   associated with type 1 diabetes mellitus (250 51,362 04,362 07) (K29 3943)   7  Diabetic retinopathy (250 50,362 01) (E11 319)   8  Elevated platelet count (041 46) (D47 3)   9  Hyperlipidemia (272 4) (E78 5)   10  Hypertension (401 9) (I10)   11  Need for prophylactic vaccination and inoculation against influenza (V04 81) (Z23)   12  Nervousness (799 21) (R45 0)   13  Normal routine physical examination (V70 0) (Z00 00)   14  Oligomenorrhea (626 1) (N91 5)   15  Polycystic ovarian syndrome (256 4) (E28 2)   16  Retinal hemorrhage (362 81) (H35 60)   17  Routine Gynecological Exam With Cervical Pap Smear (V72 31)   18  Screening for STDs (sexually transmitted diseases) (V74 5) (Z11 3)   19  Thrombocytosis (238 71) (D47 3)   20  Type 1 diabetes (250 01) (E10 9)   21  Visit for pre-operative examination (V72 84) (Z01 818)   22  Visit for screening mammogram (V76 12) (Z12 31)    Current Meds   1  Advair Diskus 250-50 MCG/DOSE Inhalation Aerosol Powder Breath Activated; INHALE   1 PUFF TWICE DAILY  Requested for: 38MJG6581; Last Rx:21Oct2016 Ordered   2  Albuterol Sulfate (2 5 MG/3ML) 0 083% Inhalation Nebulization Solution; USE 1 UNIT   DOSE EVERY 4-6 HOURS AS NEEDED FOR WHEEZING   Requested for: 20Apr2013;   Last Rx:20Apr2013 Ordered   3   Align Oral Capsule; USE AS DIRECTED; Therapy: 57YBQ9854 to (Last Londwayne Poe)  Requested for: 40MBO2196 Ordered   4  Biotin TABS; Take 1 tablet daily; Therapy: (Recorded:42Hvc3293) to Recorded   5  Iron 65 MG TABS; Therapy: (Recorded:66Ttu3431) to Recorded   6  Lantus 100 UNIT/ML Subcutaneous Solution; INJECT 65 UNIT Bedtime; Therapy: (Recorded:84Xvb2404) to Recorded   7  Lisinopril 20 MG Oral Tablet; TAKE 2 TABLETS BY MOUTH EVERY DAY; Therapy: 20ZTP6711 to (Evaluate:29Jun2017)  Requested for: 80MPI2990; Last   Rx:01Mar2017 Ordered   8  Montelukast Sodium 10 MG Oral Tablet (Singulair); TAKE 1 TABLET AT BEDTIME; Therapy: 21VAX1644 to (Evaluate:29Jun2017)  Requested for: 66JRX9379; Last   Rx:01Mar2017 Ordered   9  NovoLOG 100 UNIT/ML Subcutaneous Solution; INJECT SUBCUTANEOUSLY AS   DIRECTED; Therapy: (Pamela Rucker) to Recorded   10  Omeprazole 20 MG Oral Capsule Delayed Release; TAKE 1 CAPSULE DAILY; Therapy: 73FND4834 to (Hilda Blank)  Requested for: 49XZP4830; Last    Rx:29Jun2015 Ordered   11  PARoxetine HCl - 20 MG Oral Tablet; take 1 tablet every day; Therapy: 27AWB2532 to (Evaluate:94Nmy8729)  Requested for: 36YUD2836; Last    Rx:01Mar2017 Ordered   12  Pravastatin Sodium 20 MG Oral Tablet; TAKE ONE TABLET BY MOUTH AT BEDTIME; Therapy: 87NOU6678 to (Evaluate:18Feb2017)  Requested for: 83KYY4303; Last    Rx:21Oct2016 Ordered   13  ProAir  (90 Base) MCG/ACT Inhalation Aerosol Solution; TAKE 2 PUFFS 4    TIMES A DAY AS NEEDED FOR SHORTNESS OF BREATH; Therapy: 97DYS0379 to (Evaluate:20Apr2017)  Requested for: 10GLL8192; Last    Rx:01Mar2017 Ordered   14  Sprintec 28 0 25-35 MG-MCG Oral Tablet; TAKE 1 TABLET DAILY; Therapy: 50ZBW7128 to (Last Rx:12Jan2017)  Requested for: 12Jan2017 Ordered   15  SymlinPen 120 SOLN; INJECT MG 3 times daily; Therapy: (Evern Marinelli) to Recorded   16  Vitamin B-6 50 MG Oral Tablet; TAKE 1 TABLET DAILY;     Therapy: (Evern Marinelli) to Recorded   17  Vitamin C CAPS; TAKE 2 CAPSULE Daily; Therapy: (Recorded:27Mar2013) to Recorded    Allergies    1  No Known Drug Allergies    2  No Known Environmental Allergies   3  No Known Food Allergies    Plan  Abnormal finding on mammography    · MAMMO DIAGNOSTIC BILATERAL W CAD; Status:Hold For - Scheduling,Retrospective  Authorization;  Requested IGV:23KOD6361;     Signatures   Electronically signed by : Marjorie July, ; Mar  7 2017 10:15AM EST                       (Author)

## 2018-01-13 VITALS
HEART RATE: 88 BPM | BODY MASS INDEX: 34.24 KG/M2 | RESPIRATION RATE: 16 BRPM | HEIGHT: 56 IN | WEIGHT: 152.2 LBS | SYSTOLIC BLOOD PRESSURE: 120 MMHG | DIASTOLIC BLOOD PRESSURE: 60 MMHG | TEMPERATURE: 97.4 F

## 2018-01-13 VITALS
OXYGEN SATURATION: 98 % | WEIGHT: 152 LBS | BODY MASS INDEX: 34.19 KG/M2 | RESPIRATION RATE: 16 BRPM | HEART RATE: 101 BPM | HEIGHT: 56 IN | SYSTOLIC BLOOD PRESSURE: 134 MMHG | DIASTOLIC BLOOD PRESSURE: 74 MMHG | TEMPERATURE: 97.1 F

## 2018-01-13 VITALS
WEIGHT: 153 LBS | HEART RATE: 72 BPM | DIASTOLIC BLOOD PRESSURE: 72 MMHG | SYSTOLIC BLOOD PRESSURE: 112 MMHG | HEIGHT: 56 IN | BODY MASS INDEX: 34.42 KG/M2 | RESPIRATION RATE: 16 BRPM

## 2018-01-13 VITALS
WEIGHT: 145.25 LBS | HEIGHT: 56 IN | SYSTOLIC BLOOD PRESSURE: 118 MMHG | DIASTOLIC BLOOD PRESSURE: 78 MMHG | BODY MASS INDEX: 32.67 KG/M2

## 2018-01-13 VITALS
OXYGEN SATURATION: 97 % | RESPIRATION RATE: 16 BRPM | HEART RATE: 106 BPM | TEMPERATURE: 98.8 F | HEIGHT: 55 IN | WEIGHT: 151 LBS | SYSTOLIC BLOOD PRESSURE: 132 MMHG | DIASTOLIC BLOOD PRESSURE: 82 MMHG | BODY MASS INDEX: 34.94 KG/M2

## 2018-01-13 NOTE — MISCELLANEOUS
Message  Return to work or school:   Flex Ramirez is under my professional care  She was seen in my office on 10/25/2017   She is able to return to work on  10/26/2017      PATIENT WAS OUT OF WORK ON 1/023/2017, 10/24/2017, 10/25/2017  Jorge Payer        Signatures   Electronically signed by : Anaid Sher, ; Oct 25 2017 11:17AM EST                       (Author)

## 2018-01-14 VITALS
BODY MASS INDEX: 31.8 KG/M2 | TEMPERATURE: 97.6 F | OXYGEN SATURATION: 96 % | WEIGHT: 141.38 LBS | DIASTOLIC BLOOD PRESSURE: 65 MMHG | RESPIRATION RATE: 16 BRPM | HEART RATE: 107 BPM | SYSTOLIC BLOOD PRESSURE: 126 MMHG | HEIGHT: 56 IN

## 2018-01-14 VITALS
HEART RATE: 88 BPM | SYSTOLIC BLOOD PRESSURE: 130 MMHG | DIASTOLIC BLOOD PRESSURE: 62 MMHG | HEIGHT: 56 IN | BODY MASS INDEX: 33.32 KG/M2 | TEMPERATURE: 98.5 F | RESPIRATION RATE: 16 BRPM | WEIGHT: 148.1 LBS

## 2018-01-14 VITALS
HEIGHT: 56 IN | SYSTOLIC BLOOD PRESSURE: 122 MMHG | RESPIRATION RATE: 16 BRPM | WEIGHT: 149.8 LBS | HEART RATE: 80 BPM | BODY MASS INDEX: 33.7 KG/M2 | TEMPERATURE: 98.2 F | DIASTOLIC BLOOD PRESSURE: 88 MMHG

## 2018-01-22 ENCOUNTER — ALLSCRIPTS OFFICE VISIT (OUTPATIENT)
Dept: OTHER | Facility: OTHER | Age: 43
End: 2018-01-22

## 2018-01-24 NOTE — PROGRESS NOTES
Assessment    1  Hypertension (401 9) (I10)   2  Hyperlipidemia (272 4) (E78 5)   3  Asthma, mild persistent (493 90) (J45 30)   4  Diabetic retinopathy (250 50,362 01) (E11 319)   5  Both eyes affected 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,   associated with type 1 diabetes mellitus (250 51,362 04,362 07) (U06 0333)   6  Nervousness (799 21) (R45 0)   7  Thrombocytosis (238 71) (D47 3)   8  Type 1 diabetes (250 01) (E10 9)    Plan  PMH: History of thrombocytosis    · (1) COMPREHENSIVE METABOLIC PANEL; Status:Active; Requested for:06Vfb9297;   PMH: History of thrombocytosis, Hyperlipidemia    · (1) TSH; Status:Active; Requested for:76Jts3498;   PMH: History of thrombocytosis, Hyperlipidemia, Hypertension    · (1) LIPID PANEL, FASTING; Status:Active; Requested for:42Laf2805;                    A/P  1  HTN: your bp is good with the lisinopril    2  hyperlipidemia: you are on the pravastatin and did not get blood work for today     3  asthma: you are using your advair am and pm  you are using the rescue MDI only rarely        4  DM: you follow with Dr Nafisa Birmingham for this and are on Lantus, novolog and symilin        5  Retinal hemorrhage: needs to get a new eye specialist as her insurance has changed  6  nervousness:  to wean this please cut it in half and take 1/2 pill a day for 2 weeks  then 1/2 pill every other day for 2 weeks then stop   call me if you have any questions  7 / elevated platelets: we will have you evaluated by Hematology for this  follows in april        we will see you back in 6 months with labs prior    david due - 03/06/2018  pap due - 07/06/2019              Chief Complaint  pt  presents in the office today due to HTN recheck     david due - 03/06/2018  pap due - 07/06/2019  ekg due - 08/2017      History of Present Illness  Here today to [place] on several chronic issues  did not get labs done for today /   Does not check her BP floyd she is not here     takes lisinopril  is eating well but is not exercising  plans to start exercising at work where there is a gym  continues to follow with Dr Rachelle Kelsey for her DM  Is checking her BS at home every day and got 114 this am fasting  want to stop the paxil as she is feeling really well with this after her divorce  Review of Systems    Constitutional: No fever, no chills, feels well, no tiredness, no recent weight gain or weight loss  ENT: no complaints of earache, no loss of hearing, no nose bleeds, no nasal discharge, no sore throat, no hoarseness  Cardiovascular: No complaints of slow heart rate, no fast heart rate, no chest pain, no palpitations, no leg claudication, no lower extremity edema  Respiratory: No complaints of shortness of breath, no wheezing, no cough, no SOB on exertion, no orthopnea, no PND  Musculoskeletal: No complaints of arthralgias, no myalgias, no joint swelling or stiffness, no limb pain or swelling  Integumentary: No complaints of skin rash or lesions, no itching, no skin wounds, no breast pain or lump  Neurological: No complaints of headache, no confusion, no convulsions, no numbness, no dizziness or fainting, no tingling, no limb weakness, no difficulty walking  Psychiatric: Not suicidal, no sleep disturbance, no anxiety or depression, no change in personality, no emotional problems  Active Problems    1  Anemia (285 9) (D64 9)   2  Asthma, mild persistent (493 90) (J45 30)   3  Both eyes affected 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,   associated with type 1 diabetes mellitus (250 51,362 04,362 07) (N96 7389)   4  Chronic myeloproliferative disorder (238 79) (D47 1)   5  Diabetic retinopathy (250 50,362 01) (E11 319)   6  Hyperlipidemia (272 4) (E78 5)   7  Hypertension (401 9) (I10)   8  Nervousness (799 21) (R45 0)   9  Polycystic ovarian syndrome (256 4) (E28 2)   10  Retinal hemorrhage (362 81) (H35 60)   11   Thrombocytosis (238 71) (D47 3)   12  Type 1 diabetes (250 01) (E10 9)    Past Medical History    1  History of Asthma with acute exacerbation (493 92) (J45 901)   2  Denied: History of Birth History   3  History of  0 (V49 89)   4  History of bronchitis (V12 69) (Z87 09)   5  History of candidiasis of mouth (V12 09) (Z86 19)   6  History of cataract (V12 49) (Z86 69)   7  History of gastroenteritis (V12 79) (Z87 19)   8  History of menorrhagia (V13 29) (Z87 42)   9  History of self breast exam   10  History of sinusitis (V12 69) (Z87 09)   11  History of tonsillitis (V12 69) (Z87 09)   12  History of upper respiratory infection (V12 09) (Z87 09)   13  History of viral gastroenteritis (V12 09) (Z86 19)   14  History of Irregular menstrual cycle (626 4) (N92 6)   15  History of Otitis media, unspecified laterality   16  History of Social phobia (300 23) (F40 10)   17  History of Sore throat (462) (J02 9)   18  History of Well adult on routine health check (V70 0) (Z00 00)    Surgical History    1  History of Cataract Surgery   2  History of Eye Surgery   3  History of Hand Surgery    Family History  Mother    1  Family history of Diabetes Mellitus (V18 0)   2  Denied: Family history of drug abuse   3  Denied: Family history of Mental health problem   4  Family history of Thyroid Disorder (V18 19)  Father    5  Family history of    6  Family history of Diabetes Mellitus (V18 0)   7  Denied: Family history of drug abuse   8  Family history of Hypertension (V17 49)   9  Denied: Family history of Mental health problem  Paternal Grandmother    8  Family history of Breast Cancer (V16 3)  Maternal Grandfather    6  Family history of Diabetes Mellitus (V18 0)  Maternal Aunt    12  Family history of Diabetes Mellitus (V18 0)   13  Family history of Diabetes Mellitus (V18 0)  Maternal Uncle    14  Family history of Diabetes Mellitus (V18 0)   15   Family history of Diabetes Mellitus (V18 0)    The family history was reviewed and updated today  Social History    · Alcohol Use (History)   · Always uses seat belt   · Caffeine Use   ·    · Denied: History of Drug Use   · Former smoker (V15 82) (T01 075)   · Has smoke detectors   · Oral contraceptives   · Orthodoxy Affiliation Confucianist   · Uses Safety Equipment - Seatbelts  The social history was reviewed and updated today  The social history was reviewed and is unchanged  Current Meds   1  Advair Diskus 250-50 MCG/DOSE Inhalation Aerosol Powder Breath Activated; USE 1   PUFF TWICE A DAY; Therapy: 53OWD8706 to (Evaluate:17Zjk8954)  Requested for: 88RVP6993; Last   Rx:60Lwm0696 Ordered   2  Albuterol Sulfate (2 5 MG/3ML) 0 083% Inhalation Nebulization Solution; USE 1 UNIT   DOSE EVERY 4-6 HOURS AS NEEDED FOR WHEEZING   Requested for: 20Apr2013;   Last Rx:20Apr2013 Ordered   3  Align Oral Capsule; USE AS DIRECTED; Therapy: 52VUU0867 to (Last Jesse Stagers)  Requested for: 59PCU1714 Ordered   4  Biotin TABS; Take 1 tablet daily; Therapy: (Recorded:22Pun8935) to Recorded   5  Lantus 100 UNIT/ML Subcutaneous Solution; INJECT 65 UNIT Bedtime; Therapy: (Recorded:36Qyc2377) to Recorded   6  Lisinopril 20 MG Oral Tablet; TAKE 2 TABLETS BY MOUTH EVERY DAY; Therapy: 69YDO2647 to (Evaluate:26Jan2018)  Requested for: 48Are2963; Last   Rx:59Tmv1482 Ordered   7  Montelukast Sodium 10 MG Oral Tablet; TAKE 1 TABLET AT BEDTIME; Therapy: 21KZE2718 to (Thee Mckeon)  Requested for: 23UCR3289; Last   Rx:15Jan2018 Ordered   8  Norethindrone 0 35 MG Oral Tablet; Take 1 tablet daily; Therapy: 99DPQ9810 to (Wynema Part)  Requested for: 99ESD4740; Last   Rx:50Ano8897 Ordered   9  NovoLOG 100 UNIT/ML Subcutaneous Solution; INJECT SUBCUTANEOUSLY AS   DIRECTED; Therapy: (Viktoriya Parody) to Recorded   10  Omeprazole 20 MG Oral Capsule Delayed Release; TAKE 1 CAPSULE DAILY; Therapy: 77NSG1662 to (Kaveh Raspberry)  Requested for: 38GUV3176;  Last    Rx:29Jun2015 Ordered 11  PARoxetine HCl - 20 MG Oral Tablet; take 1 tablet every day; Therapy: 81NIV6033 to (Evaluate:21Aga7850)  Requested for: 51Evl0302; Last    Rx:83Sbe7583 Ordered   12  Pravastatin Sodium 20 MG Oral Tablet; TAKE ONE TABLET BY MOUTH AT BEDTIME; Therapy: 17BEB3809 to (Evaluate:18Feb2017)  Requested for: 33CTF6815; Last    Rx:10Hml8015 Ordered   13  ProAir  (90 Base) MCG/ACT Inhalation Aerosol Solution; USE 2 PUFFS 4 TIMES A    DAY AS NEEDED FOR SHORTNESS OF BREATH; Therapy: 48QRQ4031 to 96 546504)  Requested for: 41Nxq8477; Last    Rx:03Lue2026 Ordered   14  SymlinPen 120 SOLN; INJECT MG 3 times daily; Therapy: (Shakira Max) to Recorded   15  Vitamin B-6 50 MG Oral Tablet; TAKE 1 TABLET DAILY; Therapy: (Shakira Max) to Recorded   16  Vitamin C CAPS; TAKE 2 CAPSULE Daily; Therapy: (Recorded:27Mar2013) to Recorded    Allergies    1  No Known Drug Allergies    2  No Known Environmental Allergies   3  No Known Food Allergies    Vitals  Vital Signs    Recorded: 69EBL6261 03:49PM   Heart Rate 60   Respiration 16   Systolic 487   Diastolic 82   Height 4 ft 8 in   Weight 155 lb    BMI Calculated 34 75   BSA Calculated 1 59     Physical Exam    Constitutional   General appearance: No acute distress, well appearing and well nourished  Pulmonary   Auscultation of lungs: Clear to auscultation  Cardiovascular   Auscultation of heart: Normal rate and rhythm, normal S1 and S2, without murmurs  Carotid pulses: Normal     Lymphatic   Palpation of lymph nodes in neck: No lymphadenopathy  Skin   Skin and subcutaneous tissue: Normal without rashes or lesions  Psychiatric   Orientation to person, place, and time: Normal     Mood and affect: Normal         Socks and shoes removed, Right Foot Findings: normal foot, no swelling, no erythema  The right toes were normal    The sensory exam showed normal vibratory sensation at the level of the toes on the right   Normal tactile sensation with monofilament testing throughout the right foot  Socks and shoes removed, Left Foot Findings: normal foot, no swelling, no erythema  The left toes were normal    The sensory exam showed normal vibratory sensation at the level of the toes on the left  Normal tactile sensation with monofilament testing throughout the left foot  Pulses:   2+ in the posterior tibialis on the right   2+ in the dorsalis pedis on the right  Pulses:   2+ in the posterior tibialis on the left   2+ in the dorsalis pedis on the left  Assign Risk Category: 0: No loss of protective sensation, no deformity  No present risk  Health Management  History of Encounter for screening for malignant neoplasm of cervix   (every) THINPREP PAP RFX HR HPV DNA AND C  TRACHOMATIS AND N  GONORRHOEAE; every 2  years; Last 99DSV6921; Next Due: 91KIO5055; Overdue  History of Screening for STDs (sexually transmitted diseases)   (every) THINPREP PAP RFX HR HPV DNA AND C  TRACHOMATIS AND N  GONORRHOEAE; every 2  years; Last 51WMD0537; Next Due: 66RGE6424; Overdue    Future Appointments    Date/Time Provider Specialty Site   04/04/2018 04:00 PM REENA Batres   Hematology Oncology [facility] MEDICAL ONCOLOGY     Signatures   Electronically signed by : Anthony [facility] ; Jan 22 2018  5:01PM EST                       (Author)    Electronically signed by : Myriam Pandya DO; Jan 23 2018  9:11AM EST

## 2018-02-11 RX ORDER — PAROXETINE HYDROCHLORIDE 20 MG/1
TABLET, FILM COATED ORAL
Qty: 30 TABLET | Refills: 3 | OUTPATIENT
Start: 2018-02-11

## 2018-02-12 DIAGNOSIS — I10 ESSENTIAL HYPERTENSION: Primary | ICD-10-CM

## 2018-02-13 ENCOUNTER — TELEPHONE (OUTPATIENT)
Dept: FAMILY MEDICINE CLINIC | Facility: CLINIC | Age: 43
End: 2018-02-13

## 2018-02-13 DIAGNOSIS — F32.A DEPRESSION, UNSPECIFIED DEPRESSION TYPE: Primary | ICD-10-CM

## 2018-02-13 DIAGNOSIS — I10 ESSENTIAL HYPERTENSION: ICD-10-CM

## 2018-02-13 RX ORDER — PAROXETINE HYDROCHLORIDE 20 MG/1
20 TABLET, FILM COATED ORAL DAILY
Qty: 90 TABLET | Refills: 1 | Status: SHIPPED | OUTPATIENT
Start: 2018-02-13 | End: 2018-05-31 | Stop reason: SDUPTHER

## 2018-02-13 RX ORDER — LISINOPRIL 20 MG/1
20 TABLET ORAL DAILY
Qty: 180 TABLET | Refills: 3 | Status: SHIPPED | OUTPATIENT
Start: 2018-02-13 | End: 2018-02-13 | Stop reason: SDUPTHER

## 2018-02-13 RX ORDER — PAROXETINE HYDROCHLORIDE 20 MG/1
1 TABLET, FILM COATED ORAL DAILY
COMMUNITY
Start: 2012-11-23 | End: 2018-02-13 | Stop reason: SDUPTHER

## 2018-02-13 RX ORDER — LISINOPRIL 20 MG/1
20 TABLET ORAL DAILY
Qty: 180 TABLET | Refills: 3 | Status: SHIPPED | OUTPATIENT
Start: 2018-02-13 | End: 2018-02-15 | Stop reason: SDUPTHER

## 2018-02-13 NOTE — TELEPHONE ENCOUNTER
Pt states her lisinopril never made it to [facility]  It was printed by mistake I think   Can you please chris

## 2018-02-14 DIAGNOSIS — I10 ESSENTIAL HYPERTENSION: ICD-10-CM

## 2018-02-14 RX ORDER — LISINOPRIL 20 MG/1
TABLET ORAL
Qty: 60 TABLET | Refills: 0 | Status: CANCELLED | OUTPATIENT
Start: 2018-02-14

## 2018-02-14 NOTE — TELEPHONE ENCOUNTER
[facility] pharmacy called wondering if the Lisinopril 20 MG was to be taken once daily or twice daily  I looked in the medications shes taking but it said 1 tab for 30 days and 2 tabs once a day      [facility] number: [phone]

## 2018-02-15 DIAGNOSIS — I10 ESSENTIAL HYPERTENSION: ICD-10-CM

## 2018-02-15 RX ORDER — LISINOPRIL 20 MG/1
40 TABLET ORAL DAILY
Qty: 180 TABLET | Refills: 3 | Status: SHIPPED | OUTPATIENT
Start: 2018-02-15 | End: 2018-02-19 | Stop reason: SDUPTHER

## 2018-02-17 DIAGNOSIS — E11.40 TYPE 2 DIABETES MELLITUS WITH DIABETIC NEUROPATHY, WITH LONG-TERM CURRENT USE OF INSULIN (HCC): Primary | ICD-10-CM

## 2018-02-17 DIAGNOSIS — Z79.4 TYPE 2 DIABETES MELLITUS WITH DIABETIC NEUROPATHY, WITH LONG-TERM CURRENT USE OF INSULIN (HCC): Primary | ICD-10-CM

## 2018-02-18 RX ORDER — INSULIN GLARGINE 100 [IU]/ML
65 INJECTION, SOLUTION SUBCUTANEOUS
Qty: 19.5 ML | Refills: 0 | Status: SHIPPED | OUTPATIENT
Start: 2018-02-18 | End: 2018-02-20 | Stop reason: CLARIF

## 2018-02-19 ENCOUNTER — OFFICE VISIT (OUTPATIENT)
Dept: FAMILY MEDICINE CLINIC | Facility: CLINIC | Age: 43
End: 2018-02-19
Payer: COMMERCIAL

## 2018-02-19 ENCOUNTER — TELEPHONE (OUTPATIENT)
Dept: FAMILY MEDICINE CLINIC | Facility: CLINIC | Age: 43
End: 2018-02-19

## 2018-02-19 VITALS
DIASTOLIC BLOOD PRESSURE: 68 MMHG | RESPIRATION RATE: 16 BRPM | HEART RATE: 88 BPM | WEIGHT: 150 LBS | HEIGHT: 57 IN | SYSTOLIC BLOOD PRESSURE: 118 MMHG | BODY MASS INDEX: 32.36 KG/M2

## 2018-02-19 DIAGNOSIS — E10.3299 TYPE 1 DIABETES MELLITUS WITH MILD NONPROLIFERATIVE RETINOPATHY WITHOUT MACULAR EDEMA, UNSPECIFIED LATERALITY (HCC): Primary | Chronic | ICD-10-CM

## 2018-02-19 DIAGNOSIS — E11.3299 TYPE 2 DIABETES MELLITUS WITH MILD NONPROLIFERATIVE RETINOPATHY, WITHOUT LONG-TERM CURRENT USE OF INSULIN, MACULAR EDEMA PRESENCE UNSPECIFIED, UNSPECIFIED LATERALITY (HCC): ICD-10-CM

## 2018-02-19 PROBLEM — Z00.00 HEALTHCARE MAINTENANCE: Status: ACTIVE | Noted: 2018-02-19

## 2018-02-19 PROBLEM — D75.839 THROMBOCYTOSIS: Status: ACTIVE | Noted: 2017-02-15

## 2018-02-19 PROBLEM — J45.30 ASTHMA, MILD PERSISTENT: Status: ACTIVE | Noted: 2017-10-25

## 2018-02-19 PROBLEM — D64.9 ANEMIA: Status: ACTIVE | Noted: 2017-02-15

## 2018-02-19 PROBLEM — D47.1 CHRONIC MYELOPROLIFERATIVE DISORDER (HCC): Status: ACTIVE | Noted: 2017-11-15

## 2018-02-19 PROCEDURE — 99213 OFFICE O/P EST LOW 20 MIN: CPT | Performed by: NURSE PRACTITIONER

## 2018-02-19 RX ORDER — LISINOPRIL 10 MG/1
10 TABLET ORAL
COMMUNITY
End: 2018-02-19 | Stop reason: ALTCHOICE

## 2018-02-19 RX ORDER — BIOTIN 10 MG
1 TABLET ORAL DAILY
COMMUNITY

## 2018-02-19 RX ORDER — MULTIVIT-MIN/IRON/FOLIC ACID/K 18-600-40
2 CAPSULE ORAL DAILY
COMMUNITY

## 2018-02-19 RX ORDER — PRAVASTATIN SODIUM 20 MG
1 TABLET ORAL
COMMUNITY
Start: 2014-07-05 | End: 2018-10-06 | Stop reason: SDUPTHER

## 2018-02-19 RX ORDER — INSULIN GLARGINE 100 [IU]/ML
INJECTION, SOLUTION SUBCUTANEOUS
COMMUNITY
End: 2018-02-19 | Stop reason: ALTCHOICE

## 2018-02-19 RX ORDER — ALBUTEROL SULFATE 90 UG/1
AEROSOL, METERED RESPIRATORY (INHALATION)
COMMUNITY
Start: 2014-10-20 | End: 2018-02-19 | Stop reason: ALTCHOICE

## 2018-02-19 RX ORDER — OMEPRAZOLE 20 MG/1
1 CAPSULE, DELAYED RELEASE ORAL DAILY
COMMUNITY
Start: 2015-06-29 | End: 2018-02-19 | Stop reason: ALTCHOICE

## 2018-02-19 RX ORDER — ALBUTEROL SULFATE 2.5 MG/3ML
1 SOLUTION RESPIRATORY (INHALATION)
COMMUNITY
End: 2018-02-19 | Stop reason: ALTCHOICE

## 2018-02-19 RX ORDER — MONTELUKAST SODIUM 10 MG/1
1 TABLET ORAL
COMMUNITY
Start: 2012-04-06 | End: 2018-02-19 | Stop reason: ALTCHOICE

## 2018-02-19 RX ORDER — LANOLIN ALCOHOL/MO/W.PET/CERES
1 CREAM (GRAM) TOPICAL DAILY
COMMUNITY
End: 2018-02-19 | Stop reason: ALTCHOICE

## 2018-02-19 RX ORDER — LISINOPRIL 20 MG/1
2 TABLET ORAL DAILY
COMMUNITY
Start: 2016-03-17 | End: 2019-03-30 | Stop reason: SDUPTHER

## 2018-02-19 NOTE — TELEPHONE ENCOUNTER
Pt states the pharmacy will not fill her humolog script because there is not specific dosing instructions  Must be more precise  Other times when pt had this filled she said it read up to 120 units per day

## 2018-02-20 DIAGNOSIS — E11.9 TYPE 2 DIABETES MELLITUS WITHOUT COMPLICATION, WITHOUT LONG-TERM CURRENT USE OF INSULIN (HCC): Primary | ICD-10-CM

## 2018-02-20 DIAGNOSIS — E11.3299 TYPE 2 DIABETES MELLITUS WITH MILD NONPROLIFERATIVE RETINOPATHY, WITHOUT LONG-TERM CURRENT USE OF INSULIN, MACULAR EDEMA PRESENCE UNSPECIFIED, UNSPECIFIED LATERALITY (HCC): ICD-10-CM

## 2018-03-01 ENCOUNTER — OFFICE VISIT (OUTPATIENT)
Dept: FAMILY MEDICINE CLINIC | Facility: CLINIC | Age: 43
End: 2018-03-01
Payer: COMMERCIAL

## 2018-03-01 VITALS
SYSTOLIC BLOOD PRESSURE: 116 MMHG | BODY MASS INDEX: 33.44 KG/M2 | HEART RATE: 88 BPM | RESPIRATION RATE: 14 BRPM | TEMPERATURE: 98.3 F | WEIGHT: 155 LBS | HEIGHT: 57 IN | DIASTOLIC BLOOD PRESSURE: 70 MMHG

## 2018-03-01 DIAGNOSIS — R05.9 COUGH: ICD-10-CM

## 2018-03-01 DIAGNOSIS — J45.901 MODERATE ASTHMA WITH ACUTE EXACERBATION, UNSPECIFIED WHETHER PERSISTENT: Primary | ICD-10-CM

## 2018-03-01 PROCEDURE — 99213 OFFICE O/P EST LOW 20 MIN: CPT | Performed by: NURSE PRACTITIONER

## 2018-03-01 RX ORDER — BUDESONIDE 1 MG/2ML
1 INHALANT ORAL DAILY
Qty: 60 ML | Refills: 0 | Status: SHIPPED | OUTPATIENT
Start: 2018-03-01 | End: 2018-05-31 | Stop reason: SDUPTHER

## 2018-03-01 NOTE — PROGRESS NOTES
Chief Complaint   Patient presents with    Cough     Assessment/Plan:    1  Moderate asthma with acute exacerbation, unspecified whether persistent  Please increase fluid and the mucinex  Continue to use the albuterol nebs but add the budesinide 2 times a day  I am using this instead of the oral prednisone due to your Dm    - budesonide (PULMICORT) 1 MG/2ML nebulizer solution; Take 1 mL (1 mg total) by nebulization daily  Dispense: 60 mL; Refill: 0    Call if you are not getting better /       Subjective:      Patient ID: Robin Celeste is a 43 y o  female  Here to with complaint of  chest congestion  Started with headaches all day 5 days ago  The next day started with a lot of sinus congestion  started to take otc cold meds  yesterday started to go into her chest  Started with a lot of sob,  Is using  her nebs but is not getting relief  Is wheezing  And feels tight in her chest  Is using her neb prior to work and then again at the end of the day  No fevers   BS have been 'OK"         The following portions of the patient's history were reviewed and updated as appropriate: allergies, current medications, past family history, past medical history, past social history, past surgical history and problem list     Past Medical History:   Diagnosis Date    Anemia     Asthma     Chest pain 2/3/2016    Depression     Diabetes mellitus ([address] )     Diabetic retinopathy ([address] ) 2/3/2016    Gastroenteritis 2/3/2016    HTN (hypertension)     Hyperlipidemia 2/3/2016    Oligomenorrhea     Polycystic ovaries 2/3/2016    Retinal hemorrhage 2/3/2016    Retinopathy     Thrombocytosis ([address] )     Type 1 diabetes mellitus with ophthalmic manifestation ([address] ) 2/3/2016     Past Surgical History:   Procedure Laterality Date    RETINAL LASER PROCEDURE       Family History   Problem Relation Age of Onset    Heart murmur Mother     No Known Problems Father     Substance Abuse Neg Hx     Mental illness Neg Hx      Social History Social History     Social History    Marital status:      Spouse name: N/A    Number of children: N/A    Years of education: N/A     Occupational History    Not on file  Social History Main Topics    Smoking status: Never Smoker    Smokeless tobacco: Never Used    Alcohol use Yes      Comment: Occasional    Drug use: No    Sexual activity: Not on file     Other Topics Concern    Not on file     Social History Narrative    No narrative on file     Review of Systems   Constitutional: Negative  HENT: Negative  Respiratory: Positive for cough, chest tightness, shortness of breath and wheezing  Musculoskeletal: Negative  Skin: Negative  Psychiatric/Behavioral: Negative            Vitals:    03/01/18 1050   BP: 116/70   BP Location: Left arm   Patient Position: Sitting   Pulse: 88   Resp: 14   Temp: 98 3 °F (36 8 °C)   TempSrc: Oral   Weight: 70 3 kg (155 lb)   Height: 4' 9" (1 448 m)       Objective:  Office Visit on 03/01/2018   Component Date Value Ref Range Status    OTHER 03/01/2018    Final    280,280,270   Appointment on 11/14/2017   Component Date Value Ref Range Status    WBC 11/14/2017 8 48  4 31 - 10 16 Thousand/uL Final    RBC 11/14/2017 4 14  3 81 - 5 12 Million/uL Final    Hemoglobin 11/14/2017 13 1  11 5 - 15 4 g/dL Final    Hematocrit 11/14/2017 38 0  34 8 - 46 1 % Final    MCV 11/14/2017 92  82 - 98 fL Final    MCH 11/14/2017 31 6  26 8 - 34 3 pg Final    MCHC 11/14/2017 34 5  31 4 - 37 4 g/dL Final    RDW 11/14/2017 12 6  11 6 - 15 1 % Final    MPV 11/14/2017 9 9  8 9 - 12 7 fL Final    Platelets 61/21/0313 431* 149 - 390 Thousands/uL Final    nRBC 11/14/2017 0  /100 WBCs Final    Neutrophils Relative 11/14/2017 58  43 - 75 % Final    Lymphocytes Relative 11/14/2017 30  14 - 44 % Final    Monocytes Relative 11/14/2017 8  4 - 12 % Final    Eosinophils Relative 11/14/2017 3  0 - 6 % Final    Basophils Relative 11/14/2017 1  0 - 1 % Final    Neutrophils Absolute 11/14/2017 4 90  1 85 - 7 62 Thousands/µL Final    Lymphocytes Absolute 11/14/2017 2 58  0 60 - 4 47 Thousands/µL Final    Monocytes Absolute 11/14/2017 0 67  0 17 - 1 22 Thousand/µL Final    Eosinophils Absolute 11/14/2017 0 27  0 00 - 0 61 Thousand/µL Final    Basophils Absolute 11/14/2017 0 04  0 00 - 0 10 Thousands/µL Final    Sodium 11/14/2017 132* 136 - 145 mmol/L Final    Potassium 11/14/2017 4 5  3 5 - 5 3 mmol/L Final    Chloride 11/14/2017 97* 100 - 108 mmol/L Final    CO2 11/14/2017 29  21 - 32 mmol/L Final    Anion Gap 11/14/2017 6  4 - 13 mmol/L Final    BUN 11/14/2017 18  5 - 25 mg/dL Final    Creatinine 11/14/2017 0 55* 0 60 - 1 30 mg/dL Final    Glucose, Fasting 11/14/2017 184* 65 - 99 mg/dL Final    Calcium 11/14/2017 9 2  8 3 - 10 1 mg/dL Final    AST 11/14/2017 15  5 - 45 U/L Final    ALT 11/14/2017 28  12 - 78 U/L Final    Alkaline Phosphatase 11/14/2017 117* 46 - 116 U/L Final    Total Protein 11/14/2017 6 9  6 4 - 8 2 g/dL Final    Albumin 11/14/2017 3 4* 3 5 - 5 0 g/dL Final    Total Bilirubin 11/14/2017 0 30  0 20 - 1 00 mg/dL Final    eGFR 11/14/2017 116  ml/min/1 73sq m Final    Ferritin 11/14/2017 280  8 - 388 ng/mL Final    Iron Saturation 11/14/2017 13  % Final    TIBC 11/14/2017 358  250 - 450 ug/dL Final    Iron 11/14/2017 47* 50 - 170 ug/dL Final    Methylmalonic Acid, S 11/14/2017 112  0 - 378 nmol/L Final   Lab Requisition on 07/07/2017   Component Date Value Ref Range Status    Case Report 07/07/2017    Final                    Value:Gynecologic Cytology Report                       Case: [id number]                                  Authorizing Provider:  Alicia Harman DO            Collected:           07/07/2017                   First Screen:          BASHIR Thomas  Received:            07/10/2017 1228              Specimen:    LIQUID-BASED PAP, SCREENING, Endocervical                                                  High Risk HPV Result 07/07/2017    Final                    Value:HPV, High Risk: HPV NEG, HPV16 NEG, HPV18 NEG      Other High Risk HPV Negative, HPV 16 Negative, HPV 18 Negative  HPV types: 16,18,31,33,35,39,45,51,52,56,58,59,66 and 68 DNA are undetectable or below the pre-set threshold  [employer]s FDA approved Robin 4800 is utilized with strict adherence to the s instruction  manual to test for the presence of High-Risk HPV DNA, as well as HPV 16 and HPV 18  This instrument  has been validated by our laboratory and/or by the   A negative result does not preclude the presence of HPV infection because results depend on adequate  specimen collection, absence of inhibitors and sufficient DNA to be detected  Additionally, HPV negative  results are not intended to prevent women from proceeding to colposcopy if clinically warranted  Positive HPV test results indicate the presence of any one or more of the high risk types, but since patients  are often co-infected with low-risk types it does not rule out the presence of low-risk                           types in patients  with mixed infections   Primary Interpretation 07/07/2017 Negative for intraepithelial lesion or malignancy   Final    Specimen Adequacy 07/07/2017 Satisfactory for evaluation  Endocervical/transformation zone component present  Final    Additional Information 07/07/2017    Final                    Value: This result contains rich text formatting which cannot be displayed here      HPV, High Risk 07/07/2017 HPV NEG, HPV16 NEG, HPV18 NEG  HPV NEG, HPV16 NEG, HPV18 NEG Final   Appointment on 06/14/2017   Component Date Value Ref Range Status    WBC 06/14/2017 9 47  4 31 - 10 16 Thousand/uL Final    RBC 06/14/2017 4 19  3 81 - 5 12 Million/uL Final    Hemoglobin 06/14/2017 12 5  11 5 - 15 4 g/dL Final    Hematocrit 06/14/2017 37 4  34 8 - 46 1 % Final    MCV 06/14/2017 89  82 - 98 fL Final    MCH 06/14/2017 29 8  26 8 - 34 3 pg Final    MCHC 06/14/2017 33 4  31 4 - 37 4 g/dL Final    RDW 06/14/2017 13 0  11 6 - 15 1 % Final    MPV 06/14/2017 9 7  8 9 - 12 7 fL Final    Platelets 64/06/0905 426* 149 - 390 Thousands/uL Final    Neutrophils Relative 06/14/2017 59  43 - 75 % Final    Lymphocytes Relative 06/14/2017 31  14 - 44 % Final    Monocytes Relative 06/14/2017 7  4 - 12 % Final    Eosinophils Relative 06/14/2017 2  0 - 6 % Final    Basophils Relative 06/14/2017 1  0 - 1 % Final    Neutrophils Absolute 06/14/2017 5 65  1 85 - 7 62 Thousands/µL Final    Lymphocytes Absolute 06/14/2017 2 90  0 60 - 4 47 Thousands/µL Final    Monocytes Absolute 06/14/2017 0 65  0 17 - 1 22 Thousand/µL Final    Eosinophils Absolute 06/14/2017 0 22  0 00 - 0 61 Thousand/µL Final    Basophils Absolute 06/14/2017 0 05  0 00 - 0 10 Thousands/µL Final    Sodium 06/14/2017 134* 136 - 145 mmol/L Final    Potassium 06/14/2017 4 3  3 5 - 5 3 mmol/L Final    Chloride 06/14/2017 100  100 - 108 mmol/L Final    CO2 06/14/2017 25  21 - 32 mmol/L Final    Anion Gap 06/14/2017 9  4 - 13 mmol/L Final    BUN 06/14/2017 20  5 - 25 mg/dL Final    Creatinine 06/14/2017 0 73  0 60 - 1 30 mg/dL Final    Glucose 06/14/2017 227* 65 - 140 mg/dL Final    Calcium 06/14/2017 8 8  8 3 - 10 1 mg/dL Final    AST 06/14/2017 17  5 - 45 U/L Final    ALT 06/14/2017 23  12 - 78 U/L Final    Alkaline Phosphatase 06/14/2017 82  46 - 116 U/L Final    Total Protein 06/14/2017 6 5  6 4 - 8 2 g/dL Final    Albumin 06/14/2017 2 9* 3 5 - 5 0 g/dL Final    Total Bilirubin 06/14/2017 0 20  0 20 - 1 00 mg/dL Final    eGFR 06/14/2017 >60 0  ml/min/1 73sq m Final    Ferritin 06/14/2017 51  8 - 388 ng/mL Final    Iron Saturation 06/14/2017 17  % Final    TIBC 06/14/2017 478* 250 - 450 ug/dL Final    Iron 06/14/2017 79  50 - 170 ug/dL Final    Methylmalonic Acid, S 06/14/2017 110  0 - 378 nmol/L Final   Allscripts Office Visit on 10/21/2016   Component Date Value Ref Range Status    Glucose 01/28/2017 202* 65 - 99 mg/dL Final    BUN 01/28/2017 19  7 - 25 mg/dL Final    Creatinine 01/28/2017 0 57  0 50 - 1 10 mg/dL Final    EGFR-AMERICAN CALC 01/28/2017 115  > OR = 60 mL/min/1 73m2 Final    eGFR  01/28/2017 133  > OR = 60 mL/min/1 73m2 Final    BUN/CREA Ratio 39/85/9124 NOT APPLICABLE  6 - 22 (calc) Final    Sodium 01/28/2017 135  135 - 146 mmol/L Final    Potassium 01/28/2017 4 7  3 5 - 5 3 mmol/L Final    Chloride 01/28/2017 100  98 - 110 mmol/L Final    CO2 01/28/2017 27  20 - 31 mmol/L Final    Calcium 01/28/2017 8 9  8 6 - 10 2 mg/dL Final    Total Protein 01/28/2017 5 9* 6 1 - 8 1 g/dL Final    Albumin 01/28/2017 3 3* 3 6 - 5 1 g/dL Final    GAMMA GLOBULIN 01/28/2017 2 6  1 9 - 3 7 g/dL (calc) Final    A/G RATIO 01/28/2017 1 3  1 0 - 2 5 (calc) Final    Total Bilirubin 01/28/2017 0 3  0 2 - 1 2 mg/dL Final    Alkaline Phosphatase 01/28/2017 91  33 - 115 U/L Final    AST 01/28/2017 14  10 - 30 U/L Final    ALT 01/28/2017 14  6 - 29 U/L Final    Comment: Performed at: [id number] [facility],[address] [facility], [place], [facility]  [address] [place], [place] [address]      WBC 01/28/2017 6 4  3 8 - 10 8 Thousand/uL Final    RBC 01/28/2017 3 84  3 80 - 5 10 Million/uL Final    Hemoglobin 01/28/2017 11 3* 11 7 - 15 5 g/dL Final    Hematocrit 01/28/2017 34 8* 35 0 - 45 0 % Final    MCV 01/28/2017 90 6  80 0 - 100 0 fL Final    MCH 01/28/2017 29 3  27 0 - 33 0 pg Final    MCHC 01/28/2017 32 4  32 0 - 36 0 g/dL Final    Platelets 63/34/2696 431* 140 - 400 Thousand/uL Final    RDW 01/28/2017 14 1  11 0 - 15 0 % Final    Neutrophils Absolute 01/28/2017 3782  1500 - 7800 cells/uL Final    Lymphocytes Absolute 01/28/2017 1837  850 - 3900 cells/uL Final    Monocytes Absolute 01/28/2017 448  200 - 950 cells/uL Final    Eosinophils Absolute 01/28/2017 288  15 - 500 cells/uL Final    Basophils Absolute 01/28/2017 45  0 - 200 cells/uL Final  Neutrophils Absolute 01/28/2017 59 1  % Final    Lymphocytes Absolute 01/28/2017 28 7  % Final    MONOCYTES 01/28/2017 7 0  % Final    Eosinophils Absolute 01/28/2017 4 5  % Final    Basophils Relative 01/28/2017 0 7  % Final    MPV 01/28/2017 8 1  7 5 - 12 5 fL Final    Comment: Performed at: [id number] [facility],[address], [place], [facility]  [address] [facility] [address]  [address]       Cholesterol 01/28/2017 192  125 - 200 mg/dL Final    HDL 01/28/2017 64  > OR = 46 mg/dL Final    Triglycerides 01/28/2017 195* <150 mg/dL Final    LDL CHOLESTEROL 01/28/2017 89  <130 mg/dL (calc) Final    Comment: Result Comment: Desirable range <100 mg/dL for patients with CHD or  diabetes and <70 mg/dL for diabetic patients with  known heart disease   Chol/HDL Ratio 01/28/2017 3 0  < OR = 5 0 (calc) Final    NON-HDL-CHOL (CHOL-HDL) 01/28/2017 128  mg/dL (calc) Final    Comment: Result Comment: Target for non-HDL cholesterol is 30 mg/dL higher than   LDL cholesterol target    Performed at: [id number] [facility],[address], [place], [facility]  [address] [place], [place] [address]     Transcribe Orders on 10/19/2016   Component Date Value Ref Range Status    Cholesterol 10/19/2016 264* 50 - 200 mg/dL Final    Triglycerides 10/19/2016 312* <=150 mg/dL Final    HDL, Direct 10/19/2016 51  40 - 60 mg/dL Final    LDL Calculated 10/19/2016 151* 0 - 100 mg/dL Final    WBC 10/19/2016 7 05  4 31 - 10 16 Thousand/uL Final    RBC 10/19/2016 4 40  3 81 - 5 12 Million/uL Final    Hemoglobin 10/19/2016 12 8  11 5 - 15 4 g/dL Final    Hematocrit 10/19/2016 38 8  34 8 - 46 1 % Final    MCV 10/19/2016 88  82 - 98 fL Final    MCH 10/19/2016 29 1  26 8 - 34 3 pg Final    MCHC 10/19/2016 33 0  31 4 - 37 4 g/dL Final    RDW 10/19/2016 12 8  11 6 - 15 1 % Final    MPV 10/19/2016 10 0  8 9 - 12 7 fL Final    Platelets 36/17/3827 447* 149 - 390 Thousands/uL Final    nRBC 10/19/2016 0  /100 WBCs Final  Neutrophils Relative 10/19/2016 47  43 - 75 % Final    Lymphocytes Relative 10/19/2016 38  14 - 44 % Final    Monocytes Relative 10/19/2016 8  4 - 12 % Final    Eosinophils Relative 10/19/2016 6  0 - 6 % Final    Basophils Relative 10/19/2016 1  0 - 1 % Final    Neutrophils Absolute 10/19/2016 3 35  1 85 - 7 62 Thousands/µL Final    Lymphocytes Absolute 10/19/2016 2 66  0 60 - 4 47 Thousands/µL Final    Monocytes Absolute 10/19/2016 0 55  0 17 - 1 22 Thousand/µL Final    Eosinophils Absolute 10/19/2016 0 40  0 00 - 0 61 Thousand/µL Final    Basophils Absolute 10/19/2016 0 08  0 00 - 0 10 Thousands/µL Final    Sodium 10/19/2016 136  136 - 145 mmol/L Final    Potassium 10/19/2016 4 8  3 5 - 5 3 mmol/L Final    Chloride 10/19/2016 101  100 - 108 mmol/L Final    CO2 10/19/2016 29  21 - 32 mmol/L Final    Anion Gap 10/19/2016 6  4 - 13 mmol/L Final    BUN 10/19/2016 17  5 - 25 mg/dL Final    Creatinine 10/19/2016 0 58* 0 60 - 1 30 mg/dL Final    Glucose 10/19/2016 76  65 - 140 mg/dL Final    Calcium 10/19/2016 8 7  8 3 - 10 1 mg/dL Final    AST 10/19/2016 18  5 - 45 U/L Final    ALT 10/19/2016 25  12 - 78 U/L Final    Alkaline Phosphatase 10/19/2016 91  46 - 116 U/L Final    Total Protein 10/19/2016 6 7  6 4 - 8 2 g/dL Final    Albumin 10/19/2016 3 2* 3 5 - 5 0 g/dL Final    Total Bilirubin 10/19/2016 0 26  0 20 - 1 00 mg/dL Final    eGFR 10/19/2016 >60 0  ml/min/1 73sq m Final   Lab Conversion - Encounter on 08/08/2016   Component Date Value Ref Range Status    CREATININE, RANDOM URINE 08/05/2016 56  20 - 320 mg/dL Final    MAGNESIUM, UR 08/05/2016 2 4  mg/dL Final    Comment: Result Comment: Reference Range  Not established      MICROALBUMIN/CREATININE RATIO 08/05/2016 43* <30 mcg/mg creat Final    Comment: Result Comment: The ADA defines abnormalities in albumin  excretion as follows:     Category         Result (mcg/mg creatinine)     Normal                    <30  Microalbuminuria    Clinical albuminuria   > OR = 300     The ADA recommends that at least two of three  specimens collected within a 3-6 month period be  abnormal before considering a patient to be  within a diagnostic category  Performed at: [id number] [facility],[address], [place], [address] [place], [place] [address]      TSH 3RD [facility] 08/05/2016 3 21  mIU/L Final    Comment: Result Comment: Reference Range                         > or = 20 Years  0 40-4 50                              Pregnancy Ranges            First trimester    0 26-2 66            Second trimester   0 55-2 73            Third trimester    0 43-2 91    Performed at: [id number] [facility],[address], [place], [address] [facility] [address]      Allscripts Office Visit on 08/02/2016   Component Date Value Ref Range Status    POC Glucose 08/02/2016 287   Final    Comment:   Performed at:  In Office  ,     Generic Conversion - Encounter on 07/22/2016   Component Date Value Ref Range Status    WBC 08/05/2016 8 5  3 8 - 10 8 Thousand/uL Final    RBC 08/05/2016 4 19  3 80 - 5 10 Million/uL Final    Hemoglobin 08/05/2016 12 6  11 7 - 15 5 g/dL Final    Hematocrit 08/05/2016 38 3  35 0 - 45 0 % Final    MCV 08/05/2016 91 4  80 0 - 100 0 fL Final    MCH 08/05/2016 30 1  27 0 - 33 0 pg Final    MCHC 08/05/2016 32 9  32 0 - 36 0 g/dL Final    RDW 08/05/2016 13 7  11 0 - 15 0 % Final    Platelets 07/37/4640 412* 140 - 400 Thousand/uL Final    Neutrophils Absolute 08/05/2016 6103  1500 - 7800 cells/uL Final    Lymphocytes Absolute 08/05/2016 1471  850 - 3900 cells/uL Final    Monocytes Absolute 08/05/2016 468  200 - 950 cells/uL Final    Eosinophils Absolute 08/05/2016 434  15 - 500 cells/uL Final    Basophils Absolute 08/05/2016 26  0 - 200 cells/uL Final    Neutrophils Absolute 08/05/2016 71 8  % Final    Lymphocytes Absolute 08/05/2016 17 3  % Final    MONOCYTES 08/05/2016 5 5  % Final    Eosinophils Absolute 08/05/2016 5 1  % Final    Basophils Relative 08/05/2016 0 3  % Final    MPV 08/05/2016 8 5  7 5 - 11 5 fL Final    Comment: Performed at: [id number] [facility],[address], [place], [address] [place] [id number]            Physical Exam   Constitutional: She appears well-developed and well-nourished  HENT:   Head: Normocephalic and atraumatic  Right Ear: External ear normal    Left Ear: External ear normal    Nose: Nose normal    Mouth/Throat: Oropharynx is clear and moist    Neck: Neck supple  Cardiovascular: Normal rate, regular rhythm and normal heart sounds  Pulmonary/Chest: She has wheezes  She has no rales  Hypoaerated   Musculoskeletal: Normal range of motion  Skin: Skin is warm and dry  Psychiatric: She has a normal mood and affect   Her behavior is normal  Judgment and thought content normal

## 2018-03-05 ENCOUNTER — TELEPHONE (OUTPATIENT)
Dept: FAMILY MEDICINE CLINIC | Facility: CLINIC | Age: 43
End: 2018-03-05

## 2018-03-05 NOTE — TELEPHONE ENCOUNTER
Patient is calling stating that she is still experiencing tightiness and chest congestion  Patient states that Dr Tylor Connelly prescribed her Prednisone @ 2am this morning  She has only had 2 doses so far  She has not had the med in her system for 2 days yet  She was wondering if you would like for her to come in and be seen today  Pleases advise @ [phone]

## 2018-03-05 NOTE — TELEPHONE ENCOUNTER
Patient called back, she said she was on prednisone last week along with a nebulizer and that did not work  She is "ok" with waiting until tomorrow to be seen , however she said being on Prednisone raises her blood sugar  Do you think she should she shouls be seen today?  Also, she is asking for a work note if you do not want her in

## 2018-03-05 NOTE — TELEPHONE ENCOUNTER
She has insulin to cover he BS and she really needs to give is a day or so to work    Please give her a work note

## 2018-03-06 ENCOUNTER — OFFICE VISIT (OUTPATIENT)
Dept: FAMILY MEDICINE CLINIC | Facility: CLINIC | Age: 43
End: 2018-03-06
Payer: COMMERCIAL

## 2018-03-06 VITALS
HEIGHT: 57 IN | HEART RATE: 88 BPM | WEIGHT: 157 LBS | SYSTOLIC BLOOD PRESSURE: 120 MMHG | TEMPERATURE: 98.3 F | DIASTOLIC BLOOD PRESSURE: 68 MMHG | RESPIRATION RATE: 14 BRPM | BODY MASS INDEX: 33.87 KG/M2

## 2018-03-06 DIAGNOSIS — R05.9 COUGH: Primary | ICD-10-CM

## 2018-03-06 DIAGNOSIS — J45.901 ACUTE EXACERBATION OF ASTHMA WITH ALLERGIC RHINITIS: ICD-10-CM

## 2018-03-06 PROCEDURE — 99213 OFFICE O/P EST LOW 20 MIN: CPT | Performed by: NURSE PRACTITIONER

## 2018-03-06 RX ORDER — BENZONATATE 200 MG/1
200 CAPSULE ORAL 3 TIMES DAILY PRN
Qty: 30 CAPSULE | Refills: 0 | Status: SHIPPED | OUTPATIENT
Start: 2018-03-06 | End: 2018-07-18 | Stop reason: ALTCHOICE

## 2018-03-06 RX ORDER — PREDNISONE 10 MG/1
TABLET ORAL
COMMUNITY
Start: 2018-03-05 | End: 2018-07-18 | Stop reason: ALTCHOICE

## 2018-03-06 NOTE — PROGRESS NOTES
Chief Complaint   Patient presents with    Follow-up     still not feeling well      Assessment/Plan:          1  Acute exacerbation of asthma with allergic rhinitis  Do the nebs every 4 hours  Add the Pulmicort neb back in am and pm after th albuterol  Use the tessalon Perls for the cough  continue the pred taper and mucinex with lots of water  Call if you are not getting better  - benzonatate (TESSALON) 200 MG capsule; Take 1 capsule (200 mg total) by mouth 3 (three) times a day as needed for cough  Dispense: 30 capsule; Refill: 0    Subjective:      Patient ID: Suzy Rajan is a 43 y o  female  Here today with concern regarding asthma  Started to get tight in her chest after a head cold went to her chest   Was here last week and was started on pulmicort nebs ( has Dm) but this did not help much  Was using her nebs QID and would get only a little relief  Then was started on PO prednisone ( 4,4,3,3,2,2,1,1 ) and had 2 doses of this and and nebs and mucinex  Is coughing nad wheezing and feels tight in her chest  No fevers,  No sinus congestion  Las neb treatment was 4 hours ago   Has stopped the pulmicort nebs        The following portions of the patient's history were reviewed and updated as appropriate: allergies, current medications, past family history, past medical history, past social history, past surgical history and problem list     Past Medical History:   Diagnosis Date    Anemia     Asthma     Chest pain 2/3/2016    Depression     Diabetes mellitus ([address] )     Diabetic retinopathy ([facility] ) 2/3/2016    Gastroenteritis 2/3/2016    HTN (hypertension)     Hyperlipidemia 2/3/2016    Oligomenorrhea     Polycystic ovaries 2/3/2016    Retinal hemorrhage 2/3/2016    Retinopathy     Thrombocytosis ([address] )     Type 1 diabetes mellitus with ophthalmic manifestation ([address] ) 2/3/2016     Past Surgical History:   Procedure Laterality Date    RETINAL LASER PROCEDURE       Family History   Problem Relation Age of Onset    Heart murmur Mother     No Known Problems Father     Substance Abuse Neg Hx     Mental illness Neg Hx      Social History   Social History     Social History    Marital status:      Spouse name: N/A    Number of children: N/A    Years of education: N/A     Occupational History    Not on file  Social History Main Topics    Smoking status: Never Smoker    Smokeless tobacco: Never Used    Alcohol use Yes      Comment: Occasional    Drug use: No    Sexual activity: Not on file     Other Topics Concern    Not on file     Social History Narrative    No narrative on file     Review of Systems   Constitutional: Negative  HENT: Negative  Respiratory: Positive for cough, chest tightness and wheezing  Cardiovascular: Negative  Musculoskeletal: Negative  Skin: Negative  Psychiatric/Behavioral: Negative            Vitals:    03/06/18 0908   BP: 120/68   BP Location: Left arm   Patient Position: Sitting   Pulse: 88   Resp: 14   Temp: 98 3 °F (36 8 °C)   TempSrc: Oral   Weight: 71 2 kg (157 lb)   Height: 4' 9" (1 448 m)       Objective:  Office Visit on 03/06/2018   Component Date Value Ref Range Status    OTHER 03/06/2018    Final    259,816,401   Office Visit on 03/01/2018   Component Date Value Ref Range Status    OTHER 03/01/2018    Final    280,280,270   Appointment on 11/14/2017   Component Date Value Ref Range Status    WBC 11/14/2017 8 48  4 31 - 10 16 Thousand/uL Final    RBC 11/14/2017 4 14  3 81 - 5 12 Million/uL Final    Hemoglobin 11/14/2017 13 1  11 5 - 15 4 g/dL Final    Hematocrit 11/14/2017 38 0  34 8 - 46 1 % Final    MCV 11/14/2017 92  82 - 98 fL Final    MCH 11/14/2017 31 6  26 8 - 34 3 pg Final    MCHC 11/14/2017 34 5  31 4 - 37 4 g/dL Final    RDW 11/14/2017 12 6  11 6 - 15 1 % Final    MPV 11/14/2017 9 9  8 9 - 12 7 fL Final    Platelets 57/83/9412 431* 149 - 390 Thousands/uL Final    nRBC 11/14/2017 0  /100 WBCs Final    Neutrophils Relative 11/14/2017 58  43 - 75 % Final    Lymphocytes Relative 11/14/2017 30  14 - 44 % Final    Monocytes Relative 11/14/2017 8  4 - 12 % Final    Eosinophils Relative 11/14/2017 3  0 - 6 % Final    Basophils Relative 11/14/2017 1  0 - 1 % Final    Neutrophils Absolute 11/14/2017 4 90  1 85 - 7 62 Thousands/µL Final    Lymphocytes Absolute 11/14/2017 2 58  0 60 - 4 47 Thousands/µL Final    Monocytes Absolute 11/14/2017 0 67  0 17 - 1 22 Thousand/µL Final    Eosinophils Absolute 11/14/2017 0 27  0 00 - 0 61 Thousand/µL Final    Basophils Absolute 11/14/2017 0 04  0 00 - 0 10 Thousands/µL Final    Sodium 11/14/2017 132* 136 - 145 mmol/L Final    Potassium 11/14/2017 4 5  3 5 - 5 3 mmol/L Final    Chloride 11/14/2017 97* 100 - 108 mmol/L Final    CO2 11/14/2017 29  21 - 32 mmol/L Final    Anion Gap 11/14/2017 6  4 - 13 mmol/L Final    BUN 11/14/2017 18  5 - 25 mg/dL Final    Creatinine 11/14/2017 0 55* 0 60 - 1 30 mg/dL Final    Glucose, Fasting 11/14/2017 184* 65 - 99 mg/dL Final    Calcium 11/14/2017 9 2  8 3 - 10 1 mg/dL Final    AST 11/14/2017 15  5 - 45 U/L Final    ALT 11/14/2017 28  12 - 78 U/L Final    Alkaline Phosphatase 11/14/2017 117* 46 - 116 U/L Final    Total Protein 11/14/2017 6 9  6 4 - 8 2 g/dL Final    Albumin 11/14/2017 3 4* 3 5 - 5 0 g/dL Final    Total Bilirubin 11/14/2017 0 30  0 20 - 1 00 mg/dL Final    eGFR 11/14/2017 116  ml/min/1 73sq m Final    Ferritin 11/14/2017 280  8 - 388 ng/mL Final    Iron Saturation 11/14/2017 13  % Final    TIBC 11/14/2017 358  250 - 450 ug/dL Final    Iron 11/14/2017 47* 50 - 170 ug/dL Final    Methylmalonic Acid, S 11/14/2017 112  0 - 378 nmol/L Final   Lab Requisition on 07/07/2017   Component Date Value Ref Range Status    Case Report 07/07/2017    Final                    Value:Gynecologic Cytology Report                       Case: [id number]                                  Authorizing Provider:  Beverley Gold DO Collected:           07/07/2017                   First Screen:          Heriberto Lomeli, CT  Received:            07/10/2017 1228              Specimen:    LIQUID-BASED PAP, SCREENING, Endocervical                                                  High Risk HPV Result 07/07/2017    Final                    Value:HPV, High Risk: HPV NEG, HPV16 NEG, HPV18 NEG      Other High Risk HPV Negative, HPV 16 Negative, HPV 18 Negative  HPV types: 16,18,31,33,35,39,45,51,52,56,58,59,66 and 68 DNA are undetectable or below the pre-set threshold  [employer]s FDA approved Robin 4800 is utilized with strict adherence to the s instruction  manual to test for the presence of High-Risk HPV DNA, as well as HPV 16 and HPV 18  This instrument  has been validated by our laboratory and/or by the   A negative result does not preclude the presence of HPV infection because results depend on adequate  specimen collection, absence of inhibitors and sufficient DNA to be detected  Additionally, HPV negative  results are not intended to prevent women from proceeding to colposcopy if clinically warranted  Positive HPV test results indicate the presence of any one or more of the high risk types, but since patients  are often co-infected with low-risk types it does not rule out the presence of low-risk                           types in patients  with mixed infections   Primary Interpretation 07/07/2017 Negative for intraepithelial lesion or malignancy   Final    Specimen Adequacy 07/07/2017 Satisfactory for evaluation  Endocervical/transformation zone component present  Final    Additional Information 07/07/2017    Final                    Value: This result contains rich text formatting which cannot be displayed here      HPV, High Risk 07/07/2017 HPV NEG, HPV16 NEG, HPV18 NEG  HPV NEG, HPV16 NEG, HPV18 NEG Final   Appointment on 06/14/2017   Component Date Value Ref Range Status    WBC 06/14/2017 9 47  4 31 - 10 16 Thousand/uL Final    RBC 06/14/2017 4 19  3 81 - 5 12 Million/uL Final    Hemoglobin 06/14/2017 12 5  11 5 - 15 4 g/dL Final    Hematocrit 06/14/2017 37 4  34 8 - 46 1 % Final    MCV 06/14/2017 89  82 - 98 fL Final    MCH 06/14/2017 29 8  26 8 - 34 3 pg Final    MCHC 06/14/2017 33 4  31 4 - 37 4 g/dL Final    RDW 06/14/2017 13 0  11 6 - 15 1 % Final    MPV 06/14/2017 9 7  8 9 - 12 7 fL Final    Platelets 51/55/2632 426* 149 - 390 Thousands/uL Final    Neutrophils Relative 06/14/2017 59  43 - 75 % Final    Lymphocytes Relative 06/14/2017 31  14 - 44 % Final    Monocytes Relative 06/14/2017 7  4 - 12 % Final    Eosinophils Relative 06/14/2017 2  0 - 6 % Final    Basophils Relative 06/14/2017 1  0 - 1 % Final    Neutrophils Absolute 06/14/2017 5 65  1 85 - 7 62 Thousands/µL Final    Lymphocytes Absolute 06/14/2017 2 90  0 60 - 4 47 Thousands/µL Final    Monocytes Absolute 06/14/2017 0 65  0 17 - 1 22 Thousand/µL Final    Eosinophils Absolute 06/14/2017 0 22  0 00 - 0 61 Thousand/µL Final    Basophils Absolute 06/14/2017 0 05  0 00 - 0 10 Thousands/µL Final    Sodium 06/14/2017 134* 136 - 145 mmol/L Final    Potassium 06/14/2017 4 3  3 5 - 5 3 mmol/L Final    Chloride 06/14/2017 100  100 - 108 mmol/L Final    CO2 06/14/2017 25  21 - 32 mmol/L Final    Anion Gap 06/14/2017 9  4 - 13 mmol/L Final    BUN 06/14/2017 20  5 - 25 mg/dL Final    Creatinine 06/14/2017 0 73  0 60 - 1 30 mg/dL Final    Glucose 06/14/2017 227* 65 - 140 mg/dL Final    Calcium 06/14/2017 8 8  8 3 - 10 1 mg/dL Final    AST 06/14/2017 17  5 - 45 U/L Final    ALT 06/14/2017 23  12 - 78 U/L Final    Alkaline Phosphatase 06/14/2017 82  46 - 116 U/L Final    Total Protein 06/14/2017 6 5  6 4 - 8 2 g/dL Final    Albumin 06/14/2017 2 9* 3 5 - 5 0 g/dL Final    Total Bilirubin 06/14/2017 0 20  0 20 - 1 00 mg/dL Final    eGFR 06/14/2017 >60 0  ml/min/1 73sq m Final    Ferritin 06/14/2017 51  8 - 388 ng/mL Final    Iron Saturation 06/14/2017 17  % Final    TIBC 06/14/2017 478* 250 - 450 ug/dL Final    Iron 06/14/2017 79  50 - 170 ug/dL Final    Methylmalonic Acid, S 06/14/2017 110  0 - 378 nmol/L Final   Allscripts Office Visit on 10/21/2016   Component Date Value Ref Range Status    Glucose 01/28/2017 202* 65 - 99 mg/dL Final    BUN 01/28/2017 19  7 - 25 mg/dL Final    Creatinine 01/28/2017 0 57  0 50 - 1 10 mg/dL Final    EGFR-AMERICAN CALC 01/28/2017 115  > OR = 60 mL/min/1 73m2 Final    eGFR  01/28/2017 133  > OR = 60 mL/min/1 73m2 Final    BUN/CREA Ratio 84/81/5620 NOT APPLICABLE  6 - 22 (calc) Final    Sodium 01/28/2017 135  135 - 146 mmol/L Final    Potassium 01/28/2017 4 7  3 5 - 5 3 mmol/L Final    Chloride 01/28/2017 100  98 - 110 mmol/L Final    CO2 01/28/2017 27  20 - 31 mmol/L Final    Calcium 01/28/2017 8 9  8 6 - 10 2 mg/dL Final    Total Protein 01/28/2017 5 9* 6 1 - 8 1 g/dL Final    Albumin 01/28/2017 3 3* 3 6 - 5 1 g/dL Final    GAMMA GLOBULIN 01/28/2017 2 6  1 9 - 3 7 g/dL (calc) Final    A/G RATIO 01/28/2017 1 3  1 0 - 2 5 (calc) Final    Total Bilirubin 01/28/2017 0 3  0 2 - 1 2 mg/dL Final    Alkaline Phosphatase 01/28/2017 91  33 - 115 U/L Final    AST 01/28/2017 14  10 - 30 U/L Final    ALT 01/28/2017 14  6 - 29 U/L Final    Comment: Performed at: [id number] [facility],[address], [place], [facility]  [address] [place], [place] [address]      WBC 01/28/2017 6 4  3 8 - 10 8 Thousand/uL Final    RBC 01/28/2017 3 84  3 80 - 5 10 Million/uL Final    Hemoglobin 01/28/2017 11 3* 11 7 - 15 5 g/dL Final    Hematocrit 01/28/2017 34 8* 35 0 - 45 0 % Final    MCV 01/28/2017 90 6  80 0 - 100 0 fL Final    MCH 01/28/2017 29 3  27 0 - 33 0 pg Final    MCHC 01/28/2017 32 4  32 0 - 36 0 g/dL Final    Platelets 53/31/4369 431* 140 - 400 Thousand/uL Final    RDW 01/28/2017 14 1  11 0 - 15 0 % Final    Neutrophils Absolute 01/28/2017 3782  1500 - 7800 cells/uL Final    Lymphocytes Absolute 01/28/2017 1837  850 - 3900 cells/uL Final    Monocytes Absolute 01/28/2017 448  200 - 950 cells/uL Final    Eosinophils Absolute 01/28/2017 288  15 - 500 cells/uL Final    Basophils Absolute 01/28/2017 45  0 - 200 cells/uL Final    Neutrophils Absolute 01/28/2017 59 1  % Final    Lymphocytes Absolute 01/28/2017 28 7  % Final    MONOCYTES 01/28/2017 7 0  % Final    Eosinophils Absolute 01/28/2017 4 5  % Final    Basophils Relative 01/28/2017 0 7  % Final    MPV 01/28/2017 8 1  7 5 - 12 5 fL Final    Comment: Performed at: [id number] [place] [facility],[address], [place], [address] [facility], [facility]  16       Cholesterol 01/28/2017 192  125 - 200 mg/dL Final    HDL 01/28/2017 64  > OR = 46 mg/dL Final    Triglycerides 01/28/2017 195* <150 mg/dL Final    LDL CHOLESTEROL 01/28/2017 89  <130 mg/dL (calc) Final    Comment: Result Comment: Desirable range <100 mg/dL for patients with CHD or  diabetes and <70 mg/dL for diabetic patients with  known heart disease   Chol/HDL Ratio 01/28/2017 3 0  < OR = 5 0 (calc) Final    NON-HDL-CHOL (CHOL-HDL) 01/28/2017 128  mg/dL (calc) Final    Comment: Result Comment: Target for non-HDL cholesterol is 30 mg/dL higher than   LDL cholesterol target    Performed at: [id number] [place] [facility],[address], [place], [facility]  [address] [place], [place] [address]     Transcribe Orders on 10/19/2016   Component Date Value Ref Range Status    Cholesterol 10/19/2016 264* 50 - 200 mg/dL Final    Triglycerides 10/19/2016 312* <=150 mg/dL Final    HDL, Direct 10/19/2016 51  40 - 60 mg/dL Final    LDL Calculated 10/19/2016 151* 0 - 100 mg/dL Final    WBC 10/19/2016 7 05  4 31 - 10 16 Thousand/uL Final    RBC 10/19/2016 4 40  3 81 - 5 12 Million/uL Final    Hemoglobin 10/19/2016 12 8  11 5 - 15 4 g/dL Final    Hematocrit 10/19/2016 38 8  34 8 - 46 1 % Final    MCV 10/19/2016 88  82 - 98 fL Final    [facility] 10/19/2016 29 1  26 8 - 34 3 pg Final    MCHC 10/19/2016 33 0  31 4 - 37 4 g/dL Final    RDW 10/19/2016 12 8  11 6 - 15 1 % Final    MPV 10/19/2016 10 0  8 9 - 12 7 fL Final    Platelets 75/87/2565 447* 149 - 390 Thousands/uL Final    nRBC 10/19/2016 0  /100 WBCs Final    Neutrophils Relative 10/19/2016 47  43 - 75 % Final    Lymphocytes Relative 10/19/2016 38  14 - 44 % Final    Monocytes Relative 10/19/2016 8  4 - 12 % Final    Eosinophils Relative 10/19/2016 6  0 - 6 % Final    Basophils Relative 10/19/2016 1  0 - 1 % Final    Neutrophils Absolute 10/19/2016 3 35  1 85 - 7 62 Thousands/µL Final    Lymphocytes Absolute 10/19/2016 2 66  0 60 - 4 47 Thousands/µL Final    Monocytes Absolute 10/19/2016 0 55  0 17 - 1 22 Thousand/µL Final    Eosinophils Absolute 10/19/2016 0 40  0 00 - 0 61 Thousand/µL Final    Basophils Absolute 10/19/2016 0 08  0 00 - 0 10 Thousands/µL Final    Sodium 10/19/2016 136  136 - 145 mmol/L Final    Potassium 10/19/2016 4 8  3 5 - 5 3 mmol/L Final    Chloride 10/19/2016 101  100 - 108 mmol/L Final    CO2 10/19/2016 29  21 - 32 mmol/L Final    Anion Gap 10/19/2016 6  4 - 13 mmol/L Final    BUN 10/19/2016 17  5 - 25 mg/dL Final    Creatinine 10/19/2016 0 58* 0 60 - 1 30 mg/dL Final    Glucose 10/19/2016 76  65 - 140 mg/dL Final    Calcium 10/19/2016 8 7  8 3 - 10 1 mg/dL Final    AST 10/19/2016 18  5 - 45 U/L Final    ALT 10/19/2016 25  12 - 78 U/L Final    Alkaline Phosphatase 10/19/2016 91  46 - 116 U/L Final    Total Protein 10/19/2016 6 7  6 4 - 8 2 g/dL Final    Albumin 10/19/2016 3 2* 3 5 - 5 0 g/dL Final    Total Bilirubin 10/19/2016 0 26  0 20 - 1 00 mg/dL Final    eGFR 10/19/2016 >60 0  ml/min/1 73sq m Final   Lab Conversion - Encounter on 08/08/2016   Component Date Value Ref Range Status    CREATININE, RANDOM URINE 08/05/2016 56  20 - 320 mg/dL Final    MAGNESIUM, UR 08/05/2016 2 4  mg/dL Final    Comment: Result Comment: Reference Range  Not established      MICROALBUMIN/CREATININE RATIO 08/05/2016 43* <30 mcg/mg creat Final    Comment: Result Comment: The ADA defines abnormalities in albumin  excretion as follows:     Category         Result (mcg/mg creatinine)     Normal                    <30  Microalbuminuria            Clinical albuminuria   > OR = 300     The ADA recommends that at least two of three  specimens collected within a 3-6 month period be  abnormal before considering a patient to be  within a diagnostic category  Performed at: [id number] [facility],[address] [facility], [place], [address] [place] [id number]      TSH [address] [place] [address] 08/05/2016 3 21  mIU/L Final    Comment: Result Comment: Reference Range                         > or = 20 Years  0 40-4 50                              Pregnancy Ranges            First trimester    0 26-2 66            Second trimester   0 55-2 73            Third trimester    0 43-2 91    Performed at: [id number] [facility],[address] [place] [address], [place], [address] [place] [address] U  16      [employer] Office Visit on 08/02/2016   Component Date Value Ref Range Status    POC Glucose 08/02/2016 287   Final    Comment:   Performed at:  In Office  ,     Generic Conversion - Encounter on 07/22/2016   Component Date Value Ref Range Status    WBC 08/05/2016 8 5  3 8 - 10 8 Thousand/uL Final    RBC 08/05/2016 4 19  3 80 - 5 10 Million/uL Final    Hemoglobin 08/05/2016 12 6  11 7 - 15 5 g/dL Final    Hematocrit 08/05/2016 38 3  35 0 - 45 0 % Final    MCV 08/05/2016 91 4  80 0 - 100 0 fL Final    MCH 08/05/2016 30 1  27 0 - 33 0 pg Final    MCHC 08/05/2016 32 9  32 0 - 36 0 g/dL Final    RDW 08/05/2016 13 7  11 0 - 15 0 % Final    Platelets 62/04/8242 412* 140 - 400 Thousand/uL Final    Neutrophils Absolute 08/05/2016 6103  1500 - 7800 cells/uL Final    Lymphocytes Absolute 08/05/2016 1471  850 - 3900 cells/uL Final    Monocytes Absolute 08/05/2016 468  200 - 950 cells/uL Final    Eosinophils Absolute 08/05/2016 434  15 - 500 cells/uL Final    Basophils Absolute 08/05/2016 26  0 - 200 cells/uL Final    Neutrophils Absolute 08/05/2016 71 8  % Final    Lymphocytes Absolute 08/05/2016 17 3  % Final    MONOCYTES 08/05/2016 5 5  % Final    Eosinophils Absolute 08/05/2016 5 1  % Final    Basophils Relative 08/05/2016 0 3  % Final    MPV 08/05/2016 8 5  7 5 - 11 5 fL Final    Comment: Performed at: [id number] [facility],[address], [place], [address] [facility], [facility]  16      There may be more visits with results that are not included  Physical Exam   Constitutional: She is oriented to person, place, and time  She appears well-developed and well-nourished  HENT:   Head: Normocephalic and atraumatic  Right Ear: External ear normal    Left Ear: External ear normal    Nose: Nose normal    Mouth/Throat: Oropharynx is clear and moist    Neck: Neck supple  Cardiovascular: Normal rate, regular rhythm and normal heart sounds  Pulmonary/Chest:   hypoaeration but CTA  PFT are just a little lower than last week at 250 vs 280   Neurological: She is alert and oriented to person, place, and time  Skin: Skin is warm and dry  Psychiatric: She has a normal mood and affect   Her behavior is normal  Judgment and thought content normal

## 2018-03-06 NOTE — LETTER
March 6, 2018     Patient: Maco Ortega   YOB: 1975   Date of Visit: 3/6/2018       To Whom it May Concern:    Maco Ortega is under my professional care  She was seen in my office on 3/6/2018  She may return to work on 3/9/2018  If you have any questions or concerns, please don't hesitate to call           Sincerely,          LELAND Patton        CC: No Recipients

## 2018-03-09 DIAGNOSIS — D47.1 CHRONIC MYELOPROLIFERATIVE DISEASE (HCC): ICD-10-CM

## 2018-03-09 DIAGNOSIS — D64.9 ANEMIA: ICD-10-CM

## 2018-03-12 ENCOUNTER — TELEPHONE (OUTPATIENT)
Dept: FAMILY MEDICINE CLINIC | Facility: CLINIC | Age: 43
End: 2018-03-12

## 2018-03-12 DIAGNOSIS — J45.20 MILD INTERMITTENT ASTHMA WITHOUT COMPLICATION: Primary | ICD-10-CM

## 2018-03-12 RX ORDER — ALBUTEROL SULFATE 90 UG/1
2 AEROSOL, METERED RESPIRATORY (INHALATION) EVERY 4 HOURS PRN
Qty: 3 INHALER | Refills: 0 | Status: SHIPPED | OUTPATIENT
Start: 2018-03-12 | End: 2019-08-10 | Stop reason: SDUPTHER

## 2018-03-24 DIAGNOSIS — J45.20 MILD INTERMITTENT ASTHMA WITHOUT COMPLICATION: Primary | ICD-10-CM

## 2018-04-03 ENCOUNTER — TELEPHONE (OUTPATIENT)
Dept: HEMATOLOGY ONCOLOGY | Facility: CLINIC | Age: 43
End: 2018-04-03

## 2018-04-03 NOTE — TELEPHONE ENCOUNTER
Pt has an appt tomorrow for a 6 mth f/u and didn't have labs done and wondering if she should rs her appt

## 2018-04-12 DIAGNOSIS — Z79.4 TYPE 2 DIABETES MELLITUS WITHOUT COMPLICATION, WITH LONG-TERM CURRENT USE OF INSULIN (HCC): Primary | ICD-10-CM

## 2018-04-12 DIAGNOSIS — E11.9 TYPE 2 DIABETES MELLITUS WITHOUT COMPLICATION, WITH LONG-TERM CURRENT USE OF INSULIN (HCC): Primary | ICD-10-CM

## 2018-04-21 DIAGNOSIS — J45.901 MODERATE ASTHMA WITH ACUTE EXACERBATION, UNSPECIFIED WHETHER PERSISTENT: ICD-10-CM

## 2018-04-22 ENCOUNTER — TELEPHONE (OUTPATIENT)
Dept: HEMATOLOGY ONCOLOGY | Facility: CLINIC | Age: 43
End: 2018-04-22

## 2018-04-23 ENCOUNTER — TELEPHONE (OUTPATIENT)
Dept: HEMATOLOGY ONCOLOGY | Facility: CLINIC | Age: 43
End: 2018-04-23

## 2018-04-23 RX ORDER — BUDESONIDE 1 MG/2ML
INHALANT ORAL
Qty: 60 ML | Refills: 0 | OUTPATIENT
Start: 2018-04-23

## 2018-04-25 LAB
ALBUMIN SERPL-MCNC: 4.2 G/DL (ref 3.6–5.1)
ALBUMIN/GLOB SERPL: 1.7 (CALC) (ref 1–2.5)
ALP SERPL-CCNC: 153 U/L (ref 33–115)
ALT SERPL-CCNC: 20 U/L (ref 6–29)
AST SERPL-CCNC: 13 U/L (ref 10–30)
BASOPHILS # BLD AUTO: 86 CELLS/UL (ref 0–200)
BASOPHILS NFR BLD AUTO: 1.2 %
BILIRUB SERPL-MCNC: 0.4 MG/DL (ref 0.2–1.2)
BUN SERPL-MCNC: 24 MG/DL (ref 7–25)
BUN/CREAT SERPL: ABNORMAL (CALC) (ref 6–22)
CALCIUM SERPL-MCNC: 9.8 MG/DL (ref 8.6–10.2)
CHLORIDE SERPL-SCNC: 94 MMOL/L (ref 98–110)
CO2 SERPL-SCNC: 28 MMOL/L (ref 20–31)
CREAT SERPL-MCNC: 0.8 MG/DL (ref 0.5–1.1)
EOSINOPHIL # BLD AUTO: 259 CELLS/UL (ref 15–500)
EOSINOPHIL NFR BLD AUTO: 3.6 %
ERYTHROCYTE [DISTWIDTH] IN BLOOD BY AUTOMATED COUNT: 12.1 % (ref 11–15)
FERRITIN SERPL-MCNC: 300 NG/ML (ref 10–232)
GLOBULIN SER CALC-MCNC: 2.5 G/DL (CALC) (ref 1.9–3.7)
GLUCOSE SERPL-MCNC: 548 MG/DL (ref 65–99)
HCT VFR BLD AUTO: 41.6 % (ref 35–45)
HGB BLD-MCNC: 13.5 G/DL (ref 11.7–15.5)
IRON SATN MFR SERPL: 23 % (CALC) (ref 11–50)
IRON SERPL-MCNC: 84 MCG/DL (ref 40–190)
LYMPHOCYTES # BLD AUTO: 1886 CELLS/UL (ref 850–3900)
LYMPHOCYTES NFR BLD AUTO: 26.2 %
MCH RBC QN AUTO: 30.5 PG (ref 27–33)
MCHC RBC AUTO-ENTMCNC: 32.5 G/DL (ref 32–36)
MCV RBC AUTO: 94.1 FL (ref 80–100)
METHYLMALONATE SERPL-SCNC: 150 NMOL/L (ref 87–318)
MONOCYTES # BLD AUTO: 468 CELLS/UL (ref 200–950)
MONOCYTES NFR BLD AUTO: 6.5 %
NEUTROPHILS # BLD AUTO: 4500 CELLS/UL (ref 1500–7800)
NEUTROPHILS NFR BLD AUTO: 62.5 %
PLATELET # BLD AUTO: 471 THOUSAND/UL (ref 140–400)
PMV BLD REES-ECKER: 9.9 FL (ref 7.5–12.5)
POTASSIUM SERPL-SCNC: 5.1 MMOL/L (ref 3.5–5.3)
PROT SERPL-MCNC: 6.7 G/DL (ref 6.1–8.1)
RBC # BLD AUTO: 4.42 MILLION/UL (ref 3.8–5.1)
SL AMB EGFR AFRICAN AMERICAN: 105 ML/MIN/1.73M2
SL AMB EGFR NON AFRICAN AMERICAN: 91 ML/MIN/1.73M2
SODIUM SERPL-SCNC: 132 MMOL/L (ref 135–146)
TIBC SERPL-MCNC: 358 MCG/DL (CALC) (ref 250–450)
WBC # BLD AUTO: 7.2 THOUSAND/UL (ref 3.8–10.8)

## 2018-04-30 ENCOUNTER — OFFICE VISIT (OUTPATIENT)
Dept: HEMATOLOGY ONCOLOGY | Facility: HOSPITAL | Age: 43
End: 2018-04-30
Payer: COMMERCIAL

## 2018-04-30 VITALS
HEIGHT: 57 IN | HEART RATE: 102 BPM | BODY MASS INDEX: 34.53 KG/M2 | WEIGHT: 160.05 LBS | TEMPERATURE: 98.5 F | RESPIRATION RATE: 16 BRPM | DIASTOLIC BLOOD PRESSURE: 86 MMHG | SYSTOLIC BLOOD PRESSURE: 120 MMHG | OXYGEN SATURATION: 98 %

## 2018-04-30 DIAGNOSIS — D50.9 IRON DEFICIENCY ANEMIA, UNSPECIFIED: ICD-10-CM

## 2018-04-30 DIAGNOSIS — D75.839 THROMBOCYTOSIS: Primary | ICD-10-CM

## 2018-04-30 PROCEDURE — 99214 OFFICE O/P EST MOD 30 MIN: CPT | Performed by: INTERNAL MEDICINE

## 2018-04-30 RX ORDER — UBIDECARENONE 75 MG
CAPSULE ORAL DAILY
COMMUNITY

## 2018-04-30 NOTE — PROGRESS NOTES
Hematology/Oncology Outpatient Follow- up Note  Livier German 43 y o  female MRN: @ Encounter: [id number]        Date:  4/30/2018    Presenting Complaint/Diagnosis : Elevated platelet count over the last 2 years  Previous Hematologic/ Oncologic History:    Venofer  Oral iron    Current Hematologic/ Oncologic Treatment:    Observation    Interval History:    Patient returns for follow-up visit  Her ferritin is slightly elevated at 300  Hemoglobin is now normal  Platelet count is still slightly elevated At 471  Iron studies are normal with a saturation of 23%  MMA is normal As far as symptoms are concerned she denies any nausea denies any vomiting denies any diarrhea denies any constipation  Denies any shortness of breath  The rest of her 14 point review of systems today was negative  Test Results:    Imaging: No results found  Labs:   Lab Results   Component Value Date    WBC 8 48 11/14/2017    HGB 13 5 04/21/2018    HCT 41 6 04/21/2018    MCV 94 1 04/21/2018     (H) 04/21/2018     Lab Results   Component Value Date     (L) 11/14/2017    K 4 5 11/14/2017    CL 97 (L) 11/14/2017    CO2 29 11/14/2017    ANIONGAP 6 11/14/2017    BUN 24 04/21/2018    CREATININE 0 80 04/21/2018    GLUCOSE 227 (H) 06/14/2017    GLUF 184 (H) 11/14/2017    CALCIUM 9 8 04/21/2018    AST 15 11/14/2017    ALT 28 11/14/2017    ALKPHOS 117 (H) 11/14/2017    PROT 6 9 11/14/2017    BILITOT 0 30 11/14/2017    EGFR 116 11/14/2017           Lab Results   Component Value Date    IRON 84 04/21/2018    TIBC 358 04/21/2018    FERRITIN 300 (H) 04/21/2018         ROS: As stated in the history of present illness otherwise his 14 point review of systems today was negative        Active Problems:   Patient Active Problem List   Diagnosis    Type [facility] diabetes [facility]    Asthma    Hypertension    Depression    Diabetic retinopathy [facility]    Hyperlipidemia    Polycystic ovarian syndrome    Retinal hemorrhage    Anemia    Asthma, mild persistent    Both eyes affected 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, associated with type 1 diabetes mellitus (HCC)    Chronic myeloproliferative disorder (HCC)    Elevated platelet count    Nervousness    Thrombocytosis ([facility]    Healthcare maintenance       Past Medical History:   Past Medical History:   Diagnosis Date    Anemia     Asthma     Chest pain 2/3/2016    Depression     [facility]     Diabetic retinopathy ([facility] ) 2/3/2016    Gastroenteritis 2/3/2016    HTN (hypertension)     Hyperlipidemia 2/3/2016    Oligomenorrhea     Polycystic ovaries 2/3/2016    Retinal hemorrhage 2/3/2016    Retinopathy     Thrombocytosis (HCC)     Type 1 diabetes mellitus with ophthalmic manifestation ([facility] ) 2/3/2016       Surgical History:   Past Surgical History:   Procedure Laterality Date    RETINAL LASER PROCEDURE         Family History:    Family History   Problem Relation Age of Onset    Heart murmur Mother     No Known Problems Father     Substance Abuse Neg Hx     Mental illness Neg Hx        Cancer-related family history is not on file  Social History:   Social History     Social History    Marital status:      Spouse name: N/A    Number of children: N/A    Years of education: N/A     Occupational History    Not on file       Social History Main Topics    Smoking status: Never Smoker    Smokeless tobacco: Never Used    Alcohol use Yes      Comment: Occasional    Drug use: No    Sexual activity: Not on file     Other Topics Concern    Not on file     Social History Narrative    No narrative on file       Current Medications:   Current Outpatient Prescriptions   Medication Sig Dispense Refill    ADVAIR DISKUS 250-50 MCG/DOSE inhaler INHALE 1 PUFF TWICE A DAY 1 Inhaler 6    albuterol (PROAIR HFA) 90 mcg/act inhaler Inhale 2 puffs every 4 (four) hours as needed for wheezing or shortness of breath 3 Inhaler 0    Ascorbic Acid (VITAMIN C) 500 MG CAPS Take 2 capsules by mouth daily      Biotin 10 MG TABS Take 1 tablet by mouth daily      budesonide (PULMICORT) 1 MG/2ML nebulizer solution Take 1 mL (1 mg total) by nebulization daily 60 mL 0    cyanocobalamin (VITAMIN B-12) 100 mcg tablet Take by mouth daily      glucose blood (ONE TOUCH ULTRA TEST) test strip 1 each by Other route 3 (three) times a day before meals Use as instructed 100 each 0    HUMALOG 100 UNIT/ML injection Inject 40 Units under the skin 3 (three) times a day before meals 3600 mL 0    insulin glargine (BASAGLAR KWIKPEN) injection pen 100 units/mL Inject 0 65 mL (65 Units total) under the skin daily 19 5 mL 0    lisinopril (ZESTRIL) 20 mg tablet Take 2 tablets by mouth daily      montelukast (SINGULAIR) 10 mg tablet Take 10 mg by mouth daily at bedtime   Omega-3 Fatty Acids (OMEGA-3 FISH OIL) 500 MG CAPS Take 1,000 mg by mouth 2 (two) times a day   omeprazole (PriLOSEC) 20 mg delayed release capsule Take 20 mg by mouth daily   PARoxetine (PAXIL) 20 mg tablet Take 1 tablet (20 mg total) by mouth daily 90 tablet 1    pravastatin (PRAVACHOL) 20 mg tablet Take 1 tablet by mouth      Probiotic Product (ALIGN PO) Take by mouth      benzonatate (TESSALON) 200 MG capsule Take 1 capsule (200 mg total) by mouth 3 (three) times a day as needed for cough 30 capsule 0    predniSONE 10 mg tablet       pyridoxine (VITAMIN B6) 100 mg tablet Take 100 mg by mouth 2 (two) times a day  No current facility-administered medications for this visit  Allergies: No Known Allergies    Physical Exam:    Body surface area is 1 64 meters squared      Wt Readings from Last 3 Encounters:   04/30/18 72 6 kg (160 lb 0 9 oz)   03/06/18 71 2 kg (157 lb)   03/01/18 70 3 kg (155 lb)        Temp Readings from Last 3 Encounters:   04/30/18 98 5 °F (36 9 °C) (Tympanic)   03/06/18 98 3 °F (36 8 °C) (Oral)   03/01/18 98 3 °F (36 8 °C) (Oral)        BP Readings from Last 3 Encounters:   04/30/18 120/86   03/06/18 120/68   03/01/18 116/70         Pulse Readings from Last 3 Encounters:   04/30/18 102   03/06/18 88   03/01/18 88     @LASTSAO2(3)@      Physical Exam     Constitutional   General appearance: No acute distress, well appearing and well nourished  Eyes   Conjunctiva and lids: No swelling, erythema or discharge  Pupils and irises: Equal, round and reactive to light  Ears, Nose, Mouth, and Throat   External inspection of ears and nose: Normal     Nasal mucosa, septum, and turbinates: Normal without edema or erythema  Oropharynx: Normal with no erythema, edema, exudate or lesions  Pulmonary   Respiratory effort: No increased work of breathing or signs of respiratory distress  Auscultation of lungs: Clear to auscultation  Cardiovascular   Palpation of heart: Normal PMI, no thrills  Auscultation of heart: Normal rate and rhythm, normal S1 and S2, without murmurs  Examination of extremities for edema and/or varicosities: Normal     Carotid pulses: Normal     Abdomen   Abdomen: Non-tender, no masses  Liver and spleen: No hepatomegaly or splenomegaly  Lymphatic   Palpation of lymph nodes in neck: No lymphadenopathy  Musculoskeletal   Gait and station: Normal     Digits and nails: Normal without clubbing or cyanosis  Inspection/palpation of joints, bones, and muscles: Normal     Skin   Skin and subcutaneous tissue: Normal without rashes or lesions  Neurologic   Cranial nerves: Cranial nerves 2-12 intact  Sensation: No sensory loss  Psychiatric   Orientation to person, place, and time: Normal     Mood and affect: Normal         Assessment / Plan: This is a pleasant young female with a past medical history of diabetes hypertension hypercholesterolemia was noticed to have an elevated platelet count  She was also anemic  I put her on a therapeutic trial of iron Followed by Venofer  Her hemoglobin is normalized   Iron studies are normal  Platelets are still slightly elevated  She has not had her myeloproliferative testing but at this point her platelet count is running below 500 units twice think observation is appropriate  If her platelet count goes higher I will do the myeloproliferative testing  The patient is in agreement with the plan  I'll see her back in 6 months with repeat blood work  I have advised her to take a baby aspirin daily  Goals and Barriers:  Current Goal:  Prolong Survival from Elevated platelet count  Barriers: None  Patient's Capacity to Self Care:  Patient able to self care  Portions of the record may have been created with voice recognition software   Occasional wrong word or "sound a like" substitutions may have occurred due to the inherent limitations of voice recognition software   Read the chart carefully and recognize, using context, where substitutions have occurred

## 2018-05-06 DIAGNOSIS — J45.20 MILD INTERMITTENT ASTHMA WITHOUT COMPLICATION: Primary | ICD-10-CM

## 2018-05-07 RX ORDER — MONTELUKAST SODIUM 10 MG/1
TABLET ORAL
Qty: 30 TABLET | Refills: 3 | Status: SHIPPED | OUTPATIENT
Start: 2018-05-07 | End: 2018-07-13 | Stop reason: SDUPTHER

## 2018-05-22 DIAGNOSIS — I10 ESSENTIAL (PRIMARY) HYPERTENSION: ICD-10-CM

## 2018-05-22 DIAGNOSIS — D47.3 ESSENTIAL HEMORRHAGIC THROMBOCYTHEMIA (HCC): ICD-10-CM

## 2018-05-22 DIAGNOSIS — E78.5 HYPERLIPIDEMIA: ICD-10-CM

## 2018-05-31 DIAGNOSIS — J45.901 MODERATE ASTHMA WITH ACUTE EXACERBATION, UNSPECIFIED WHETHER PERSISTENT: ICD-10-CM

## 2018-05-31 DIAGNOSIS — F32.A DEPRESSION, UNSPECIFIED DEPRESSION TYPE: ICD-10-CM

## 2018-05-31 RX ORDER — BUDESONIDE 1 MG/2ML
1 INHALANT ORAL DAILY
Qty: 60 ML | Refills: 2 | Status: SHIPPED | OUTPATIENT
Start: 2018-05-31 | End: 2020-08-12 | Stop reason: ALTCHOICE

## 2018-05-31 RX ORDER — PAROXETINE HYDROCHLORIDE 20 MG/1
20 TABLET, FILM COATED ORAL DAILY
Qty: 90 TABLET | Refills: 3 | Status: SHIPPED | OUTPATIENT
Start: 2018-05-31 | End: 2018-07-18 | Stop reason: ALTCHOICE

## 2018-06-01 DIAGNOSIS — E11.9 TYPE 2 DIABETES MELLITUS WITHOUT COMPLICATION, WITHOUT LONG-TERM CURRENT USE OF INSULIN (HCC): ICD-10-CM

## 2018-06-01 RX ORDER — INSULIN GLARGINE 100 [IU]/ML
65 INJECTION, SOLUTION SUBCUTANEOUS DAILY
Qty: 15 PEN | Refills: 0 | Status: SHIPPED | OUTPATIENT
Start: 2018-06-01 | End: 2019-09-24 | Stop reason: SDUPTHER

## 2018-07-13 DIAGNOSIS — J45.20 MILD INTERMITTENT ASTHMA WITHOUT COMPLICATION: ICD-10-CM

## 2018-07-13 RX ORDER — MONTELUKAST SODIUM 10 MG/1
10 TABLET ORAL
Qty: 90 TABLET | Refills: 3 | Status: SHIPPED | OUTPATIENT
Start: 2018-07-13 | End: 2019-08-10 | Stop reason: SDUPTHER

## 2018-07-18 ENCOUNTER — OFFICE VISIT (OUTPATIENT)
Dept: FAMILY MEDICINE CLINIC | Facility: CLINIC | Age: 43
End: 2018-07-18
Payer: COMMERCIAL

## 2018-07-18 VITALS
HEIGHT: 57 IN | DIASTOLIC BLOOD PRESSURE: 82 MMHG | BODY MASS INDEX: 33.44 KG/M2 | TEMPERATURE: 98.1 F | SYSTOLIC BLOOD PRESSURE: 132 MMHG | WEIGHT: 155 LBS | HEART RATE: 80 BPM | RESPIRATION RATE: 16 BRPM

## 2018-07-18 DIAGNOSIS — F32.A DEPRESSION, UNSPECIFIED DEPRESSION TYPE: ICD-10-CM

## 2018-07-18 DIAGNOSIS — E10.3299 TYPE 1 DIABETES MELLITUS WITH MILD NONPROLIFERATIVE RETINOPATHY WITHOUT MACULAR EDEMA, UNSPECIFIED LATERALITY (HCC): Chronic | ICD-10-CM

## 2018-07-18 DIAGNOSIS — K52.9 ACUTE GASTROENTERITIS: Primary | ICD-10-CM

## 2018-07-18 PROCEDURE — 99213 OFFICE O/P EST LOW 20 MIN: CPT | Performed by: FAMILY MEDICINE

## 2018-07-18 RX ORDER — PAROXETINE HYDROCHLORIDE 20 MG/1
20 TABLET, FILM COATED ORAL DAILY
Qty: 90 TABLET | Refills: 0 | COMMUNITY
Start: 2018-07-18 | End: 2019-08-10 | Stop reason: SDUPTHER

## 2018-07-19 NOTE — PROGRESS NOTES
Assessment/Plan:  1  Acute gastroenteritis  - discussed fluids, BRAT diet, gradually advance to regular diet as tolerated in next 48 - 72 hours, take Omeprazole 20 mg twice a day before meals for 3 days, then continue 1 tablet daily, f/u in the office if symptoms persist or go to ER if symptoms worsen    2  Type 1 diabetes mellitus with mild nonproliferative retinopathy without macular edema, unspecified laterality (HCC)  - Hemoglobin A1C; Future  - schedule f/u with Endocrine Dr Emmanuel Louis         The treatment plan was reviewed with the patient  The patient understands and agrees with the treatment plan      Subjective:   Chief Complaint   Patient presents with    Vomiting    Diarrhea      Patient ID: Josh Fletcher is a 43 y o  female who is here today with c/o nausea, vomiting and diarrhea onset on Saturday night  She went to a family picnic Saturday afternoon, then woke up Saturday night with nausea, vomiting and then developed multiple episodes of watery diarrhea on Sunday morning  Patient denies fever, chills, abdominal pain, melena or hematochezia  She started to feel better on Tuesday, she had no further episodes of vomiting and had chicken soup for lunch and scrambled eggs for dinner  Then this am woke up with nausea and vomiting again, she had soft stool this am and now tolerating fluids and had a few crackers 2 hours ago and kept it down  Her sugars have been ranging in 200's, she is due for lab work and visit with her Endocrine Dr Emmanuel Louis  HPI      Past Medical History:   Diagnosis Date    Anemia     Asthma     w/acute exacerbation   Last assessed: 10/26/12    Chest pain 2/3/2016    Depression     Diabetes [facility]     Diabetic retinopathy ([facility] ) 2/3/2016    Gastroenteritis 02/03/2016    HTN (hypertension)     Hyperlipidemia 2/3/2016    Oligomenorrhea     Polycystic ovaries 2/3/2016    Retinal hemorrhage 2/3/2016    Retinopathy     Thrombocytosis (HCC)     [facility] 2/3/2016     Past Surgical History:   Procedure Laterality Date    CATARACT EXTRACTION      HAND SURGERY      RETINAL LASER PROCEDURE       Family History   Problem Relation Age of Onset    Heart murmur Mother     Diabetes Mother         Mellitus    Thyroid disease Mother         Disorder    Diabetes Father         Mellitus    Hypertension Father     Diabetes Maternal Grandfather         Mellitus    Breast cancer Paternal Grandmother     Diabetes Maternal Aunt         Mellitus    Diabetes Maternal Uncle         Mellitus    Substance Abuse Neg Hx     Mental illness Neg Hx      Social History     Social History    Marital status:      Spouse name: N/A    Number of children: N/A    Years of education: N/A     Occupational History    Not on file  Social History Main Topics    Smoking status: Never Smoker    Smokeless tobacco: Never Used      Comment: Per [employer]: Former smoker   Quite in 1994    Alcohol use Yes      Comment: Occasional/1 mixed drink twice a month if that    Drug use: No    Sexual activity: Not on file     Other Topics Concern    Not on file     Social History Narrative    Always uses seatbelt    Caffeine use: 1-2 cups coffee daily    Has smoke detectors    [employer] Aff[employer]ations: [employer]           Current Outpatient Prescriptions:     ADVAIR DISKUS 250-50 MCG/DOSE inhaler, INHALE 1 PUFF TWICE A DAY, Disp: 1 Inhaler, Rfl: 6    albuterol (PROAIR HFA) 90 mcg/act inhaler, Inhale 2 puffs every 4 (four) hours as needed for wheezing or shortness of breath, Disp: 3 Inhaler, Rfl: 0    Ascorbic Acid (VITAMIN C) 500 MG CAPS, Take 2 capsules by mouth daily, Disp: , Rfl:     BASAGLAR KWIKPEN 100 units/mL injection pen, Inject 65 Units under the skin daily, Disp: 15 pen, Rfl: 0    Biotin 10 MG TABS, Take 1 tablet by mouth daily, Disp: , Rfl:     budesonide (PULMICORT) 1 MG/2ML nebulizer solution, Take 1 mL (1 mg total) by nebulization daily, Disp: 60 mL, Rfl: 2    cyanocobalamin (VITAMIN B-12) 100 mcg tablet, Take by mouth daily, Disp: , Rfl:     glucose blood (ONE TOUCH ULTRA TEST) test strip, 1 each by Other route 3 (three) times a day before meals Use as instructed, Disp: 100 each, Rfl: 0    HUMALOG 100 UNIT/ML injection, Inject 40 Units under the skin 3 (three) times a day before meals, Disp: 3600 mL, Rfl: 0    lisinopril (ZESTRIL) 20 mg tablet, Take 2 tablets by mouth daily, Disp: , Rfl:     montelukast (SINGULAIR) 10 mg tablet, Take 1 tablet (10 mg total) by mouth daily at bedtime, Disp: 90 tablet, Rfl: 3    Omega-3 Fatty Acids (OMEGA-3 FISH OIL) 500 MG CAPS, Take 1,000 mg by mouth 2 (two) times a day , Disp: , Rfl:     omeprazole (PriLOSEC) 20 mg delayed release capsule, Take 20 mg by mouth daily  , Disp: , Rfl:     pravastatin (PRAVACHOL) 20 mg tablet, Take 1 tablet by mouth, Disp: , Rfl:     Probiotic Product (ALIGN PO), Take by mouth, Disp: , Rfl:     PARoxetine (PAXIL) 20 mg tablet, Take 1 tablet (20 mg total) by mouth daily, Disp: 90 tablet, Rfl: 0    Review of Systems   Constitutional: Negative for chills, fatigue, fever and unexpected weight change  HENT: Negative for congestion, ear pain, sore throat and trouble swallowing  Respiratory: Negative for cough, shortness of breath and wheezing  Cardiovascular: Negative for chest pain, palpitations and leg swelling  Gastrointestinal: Positive for diarrhea, nausea and vomiting  Negative for abdominal pain, blood in stool and constipation  Genitourinary: Negative for difficulty urinating, dysuria, flank pain, frequency, hematuria, pelvic pain and urgency  Musculoskeletal: Negative for arthralgias and myalgias  Neurological: Negative for dizziness, syncope and headaches               Objective:    Vitals:    07/18/18 0929   BP: 132/82   BP Location: Left arm   Patient Position: Sitting   Cuff Size: Standard   Pulse: 80   Resp: 16   Temp: 98 1 °F (36 7 °C)   Weight: 70 3 kg (155 lb)   Height: 4' 9" (1 448 m)        Physical Exam   Constitutional: She is oriented to person, place, and time  She appears well-developed and well-nourished  No distress  HENT:   Mouth/Throat: Oropharynx is clear and moist    Cardiovascular: Normal rate, regular rhythm and normal heart sounds  No murmur heard  Pulmonary/Chest: Effort normal  No respiratory distress  She has no wheezes  She has no rhonchi  She has no rales  Abdominal: Soft  She exhibits no distension and no mass  There is no tenderness  There is no rebound and no guarding  Musculoskeletal: She exhibits no edema  Neurological: She is alert and oriented to person, place, and time

## 2018-07-26 ENCOUNTER — OFFICE VISIT (OUTPATIENT)
Dept: ENDOCRINOLOGY | Facility: HOSPITAL | Age: 43
End: 2018-07-26
Payer: COMMERCIAL

## 2018-07-26 VITALS
SYSTOLIC BLOOD PRESSURE: 108 MMHG | HEIGHT: 57 IN | HEART RATE: 80 BPM | BODY MASS INDEX: 34.73 KG/M2 | DIASTOLIC BLOOD PRESSURE: 64 MMHG | WEIGHT: 161 LBS

## 2018-07-26 DIAGNOSIS — E10.65 TYPE 1 DIABETES MELLITUS WITH HYPERGLYCEMIA (HCC): Chronic | ICD-10-CM

## 2018-07-26 DIAGNOSIS — E28.2 POLYCYSTIC OVARIAN SYNDROME: Primary | Chronic | ICD-10-CM

## 2018-07-26 DIAGNOSIS — E11.3299 TYPE 2 DIABETES MELLITUS WITH MILD NONPROLIFERATIVE RETINOPATHY, WITHOUT LONG-TERM CURRENT USE OF INSULIN, MACULAR EDEMA PRESENCE UNSPECIFIED, UNSPECIFIED LATERALITY (HCC): ICD-10-CM

## 2018-07-26 PROCEDURE — 99204 OFFICE O/P NEW MOD 45 MIN: CPT | Performed by: INTERNAL MEDICINE

## 2018-07-26 NOTE — LETTER
July 26, 2018     Hesham Veliz [address] [address]    Patient: Swetha Pablo   YOB: 1975   Date of Visit: 7/26/2018       Dear Dr Miriam Field: Thank you for referring Swetha Pablo to me for evaluation  Below are my notes for this consultation  If you have questions, please do not hesitate to call me  I look forward to following your patient along with you  Sincerely,        Tanisha Morales MD        CC: No Recipients  Tanisha Morales MD  7/26/2018  5:00 PM  Sign at close encounter  7/26/2018    Assessment/Plan      Diagnoses and all orders for this visit:    Polycystic ovarian syndrome  -     HEMOGLOBIN A1C W/ EAG ESTIMATION Lab Collect  -     Comprehensive metabolic panel Lab Collect  -     TSH, 3rd generation Lab Collect; Future  -     Microalbumin / creatinine urine ratio Lab Collect; Future  -     Comprehensive metabolic panel Lab Collect; Future  -     HEMOGLOBIN A1C W/ EAG ESTIMATION Lab Collect; Future  -     TSH, 3rd generation Lab Collect  -     Comprehensive metabolic panel Lab Collect    Type 1 diabetes mellitus with hyperglycemia (HCC)  -     HEMOGLOBIN A1C W/ EAG ESTIMATION Lab Collect  -     Comprehensive metabolic panel Lab Collect  -     TSH, 3rd generation Lab Collect; Future  -     Microalbumin / creatinine urine ratio Lab Collect; Future  -     Comprehensive metabolic panel Lab Collect; Future  -     HEMOGLOBIN A1C W/ EAG ESTIMATION Lab Collect; Future  -     TSH, 3rd generation Lab Collect  -     Comprehensive metabolic panel Lab Collect    Type 2 diabetes mellitus with mild nonproliferative retinopathy, without long-term current use of insulin, macular edema presence unspecified, unspecified laterality (HCC)  -     HUMALOG 100 UNIT/ML injection; Inject 40 Units under the skin 3 (three) times a day before meals        Assessment/Plan:  1  Type 1 diabetes    I suspect her diabetes is uncontrolled as it  has been in the past   I have no recent hemoglobin A1c, so I have asked her to get a hemoglobin A1c and CMP now  For now, she will not adjust her Basaglar insulin or Humalog insulin  I will adjust her medications based on her hemoglobin A1c  I may consider adding metformin for the insulin resistance due to polycystic ovary syndrome hoping that will help her blood sugars  I could we at her Symlin back again  She will continue to check her blood sugars at least 2 to 4 times a day  2   Diabetic retinopathy  She is following with the ophthalmologist, but has not seen 1 in a year  I have asked her to follow up as soon as possible since it has been that long  3   Diabetic neuropathy  She has no symptoms of diabetic neuropathy, just decreased vibration sensation on exam   I will follow this over time  4   Polycystic ovary syndrome  Since the hallmark of this is insulin resistance, metformin may help improve her blood sugars by decreasing the insulin resistance and perhaps even decrease some of her insulin dosages  We will determine whether to add this based on her blood work  I have asked her to get a hemoglobin A1c and CMP now  I have also asked her to follow up in 3 months with preceding hemoglobin A1c, CMP, TSH, and urine microalbumin to creatinine ratio  CC: Diabetes Consult    History of Present Illness     HPI: Vanessa Bravo is a 43y o  year old female with type 1 diabetes for 25 years  She is on insulin at home and takes basaglar  Insulin 65 units at hs and humalog insulin about 40 units with each meal since off symlin for almost 1 year  She denies any polyuria, polydipsia, nocturia and blurry vision  She denies polyphagia  She denies numbness or tingling of the feet  She denies chest pain or shortness of breath  She denies neuropathy, nephropathy, retinopathy, heart attack, stroke and claudication but does admit to none  Hypoglycemic episodes: No never    H/o of hypoglycemia causing hospitalization or intervention such as glucagon injection  or ambulance call  No   Hypoglycemia symptoms: confusion, jitteriness, sweating and heart racing  Treatment of hypoglycemia: would eat food  Glucagon:No   Medic alert tag: recommended,Yes  The patient's last eye exam was in about 1 year ago with retinopathy  The patient's last foot exam was in not seen  Last A1C was   Lab Results   Component Value Date    HGBA1C 12 6 (H) 02/04/2016     Blood Sugar/Glucometer/Pump/CGM review: check blood sugars at least twice a day  No records brought with her today  Before breakfast: around 130  Before lunch:   Before dinner: upper 100s to low 200s  Bedtime:     Review of Systems   Constitutional: Positive for fatigue  Negative for unexpected weight change  Fatigue at times, has a very physical job  HENT: Negative for hearing loss and tinnitus  Eyes: Negative for visual disturbance  Respiratory: Negative for chest tightness and shortness of breath  Cardiovascular: Negative for chest pain, palpitations and leg swelling  Gastrointestinal: Negative for abdominal pain, constipation, diarrhea and nausea  Endocrine: Negative for polydipsia, polyphagia and polyuria  No nocturia  Genitourinary:        Menses regular on a monthly basis and only last a few days  Musculoskeletal: Negative for arthralgias and back pain  Skin: Negative for rash and wound  Neurological: Negative for dizziness, tremors, light-headedness, numbness and headaches  Psychiatric/Behavioral: Negative for dysphoric mood and sleep disturbance  The patient is not nervous/anxious  Historical Information   Past Medical History:   Diagnosis Date    Anemia     Asthma     w/acute exacerbation   Last assessed: 10/26/12    Chest pain 2/3/2016    Depression     Diabetes mellitus [facility] )     [facility] 2/3/2016    Gastroenteritis 02/03/2016    GERD (gastroesophageal reflux disease)     HTN (hypertension)     Hyperlipidemia 2/3/2016    Oligomenorrhea     Polycystic ovaries 2/3/2016    Retinal hemorrhage 2/3/2016    Retinopathy     Thrombocytosis (HCC)     Type 1 diabetes mellitus with ophthalmic manifestation ([facility] ) 2/3/2016     Past Surgical History:   Procedure Laterality Date    CATARACT EXTRACTION Bilateral     RETINAL LASER PROCEDURE       Social History   History   Alcohol Use    Yes     Comment: Occasional/1 mixed drink twice a month if that     History   Drug Use No     History   Smoking Status    Never Smoker   Smokeless Tobacco    Never Used     Comment: Per [employer]: Former smoker   Quite in 1994     Family History:   Family History   Problem Relation Age of Onset    Heart murmur Mother     Diabetes Mother         Mellitus    Thyroid disease Mother         Disorder    Diabetes Father         Mellitus    Hypertension Father     Diabetes Maternal Grandfather         Mellitus    Breast cancer Paternal Grandmother     Diabetes Maternal Aunt         Mellitus    Diabetes Maternal Uncle         Mellitus    Substance Abuse Neg Hx     Mental illness Neg Hx        Meds/Allergies   Current Outpatient Prescriptions   Medication Sig Dispense Refill    ADVAIR DISKUS 250-50 MCG/DOSE inhaler INHALE 1 PUFF TWICE A DAY 1 Inhaler 6    albuterol (PROAIR HFA) 90 mcg/act inhaler Inhale 2 puffs every 4 (four) hours as needed for wheezing or shortness of breath 3 Inhaler 0    Ascorbic Acid (VITAMIN C) 500 MG CAPS Take 2 capsules by mouth daily      BASAGLAR KWIKPEN 100 units/mL injection pen Inject 65 Units under the skin daily 15 pen 0    Biotin 10 MG TABS Take 1 tablet by mouth daily      budesonide (PULMICORT) 1 MG/2ML nebulizer solution Take 1 mL (1 mg total) by nebulization daily (Patient taking differently: Take 1 mg by nebulization daily prn ) 60 mL 2    cyanocobalamin (VITAMIN B-12) 100 mcg tablet Take by mouth daily      glucose blood (ONE TOUCH ULTRA TEST) test strip 1 each by Other route 3 (three) times a day before meals Use as instructed 100 each 0    HUMALOG 100 UNIT/ML injection Inject 40 Units under the skin 3 (three) times a day before meals 40 mL 6    lisinopril (ZESTRIL) 20 mg tablet Take 2 tablets by mouth daily      montelukast (SINGULAIR) 10 mg tablet Take 1 tablet (10 mg total) by mouth daily at bedtime 90 tablet 3    Omega-3 Fatty Acids (OMEGA-3 FISH OIL) 500 MG CAPS Take 1,000 mg by mouth 2 (two) times a day   omeprazole (PriLOSEC) 20 mg delayed release capsule Take 20 mg by mouth daily   PARoxetine (PAXIL) 20 mg tablet Take 1 tablet (20 mg total) by mouth daily 90 tablet 0    pravastatin (PRAVACHOL) 20 mg tablet Take 1 tablet by mouth      Probiotic Product (ALIGN PO) Take by mouth       No current facility-administered medications for this visit  No Known Allergies    Objective   Vitals: Blood pressure 108/64, pulse 80, height 4' 9" (1 448 m), weight 73 kg (161 lb)  Invasive Devices          No matching active lines, drains, or airways          Physical Exam   Constitutional: She is oriented to person, place, and time  She appears well-developed and well-nourished  HENT:   Head: Normocephalic and atraumatic  Eyes: Conjunctivae and EOM are normal  Pupils are equal, round, and reactive to light  No lid lag, stare, proptosis, or periorbital edema  Neck: Normal range of motion  Neck supple  No thyromegaly present  No carotid bruits  Cardiovascular: Normal rate, regular rhythm, normal heart sounds and intact distal pulses  Pulses are no weak pulses  No murmur heard  Pulses:       Dorsalis pedis pulses are 2+ on the right side, and 2+ on the left side  Posterior tibial pulses are 2+ on the right side, and 2+ on the left side  Pulmonary/Chest: Effort normal and breath sounds normal  She has no wheezes  Abdominal: Soft  Bowel sounds are normal  There is no tenderness  Musculoskeletal: Normal range of motion  She exhibits no edema or deformity     Thick skin on the heels  No ulceration of the feet  No tremor of the outstretched hands  No spinous process tenderness  No CVA tenderness  Feet:   Right Foot:   Skin Integrity: Negative for ulcer, skin breakdown, erythema, warmth, callus or dry skin  Left Foot:   Skin Integrity: Negative for ulcer, skin breakdown, erythema, warmth, callus or dry skin  Lymphadenopathy:     She has no cervical adenopathy  Neurological: She is alert and oriented to person, place, and time  She has normal reflexes  Vibration sensation slightly  diminshed to the bilateral 1st toe DIP joints  Microfilament sensation intact to the bilateral lower extremities  Achilles tendon reflex intact bilaterally  Skin: Skin is warm and dry  No rash noted  Vitals reviewed  Patient's shoes and socks removed  Right Foot/Ankle   Right Foot Inspection  Skin Exam: skin normal and skin intact no dry skin, no warmth, no callus, no erythema, no maceration, no abnormal color, no pre-ulcer, no ulcer and no callus                          Toe Exam: ROM and strength within normal limits  Sensory   Vibration: diminished  Proprioception: intact   Monofilament testing: intact  Vascular  Capillary refills: < 3 seconds  The right DP pulse is 2+  The right PT pulse is 2+  Left Foot/Ankle  Left Foot Inspection  Skin Exam: skin normal and skin intactno dry skin, no warmth, no erythema, no maceration, normal color, no pre-ulcer, no ulcer and no callus                         Toe Exam: ROM and strength within normal limits                   Sensory   Vibration: diminished  Proprioception: intact  Monofilament: intact  Vascular  Capillary refills: < 3 seconds  The left DP pulse is 2+  The left PT pulse is 2+  Assign Risk Category:  No deformity present; No loss of protective sensation; No weak pulses       Risk: 0      The history was obtained from the review of the chart and from the patient      Lab Results:    Most recent Alc is  Lab Results   Component Value Date    HGBA1C 12 6 (H) 02/04/2016               Lab Results   Component Value Date    CREATININE 0 80 04/21/2018    CREATININE 0 55 (L) 11/14/2017    CREATININE 0 73 06/14/2017    BUN 24 04/21/2018     (L) 11/14/2017    K 4 5 11/14/2017    CL 97 (L) 11/14/2017    CO2 29 11/14/2017     eGFR   Date Value Ref Range Status   11/14/2017 116 ml/min/1 73sq m Final         Lab Results   Component Value Date    CHOL 192 01/28/2017    HDL 64 01/28/2017    TRIG 195 (H) 01/28/2017       Lab Results   Component Value Date    ALT 28 11/14/2017    AST 15 11/14/2017    ALKPHOS 117 (H) 11/14/2017    BILITOT 0 30 11/14/2017       No results found for: TSH, FREET4, TSI    Recent Results (from the past 89550 hour(s))   CBC and differential    Collection Time: 06/14/17  4:06 PM   Result Value Ref Range    WBC 9 47 4 31 - 10 16 Thousand/uL    RBC 4 19 3 81 - 5 12 Million/uL    Hemoglobin 12 5 11 5 - 15 4 g/dL    Hematocrit 37 4 34 8 - 46 1 %    MCV 89 82 - 98 fL    MCH 29 8 26 8 - 34 3 pg    MCHC 33 4 31 4 - 37 4 g/dL    RDW 13 0 11 6 - 15 1 %    MPV 9 7 8 9 - 12 7 fL    Platelets 580 (H) 439 - 390 Thousands/uL    Neutrophils Relative 59 43 - 75 %    Lymphocytes Relative 31 14 - 44 %    Monocytes Relative 7 4 - 12 %    Eosinophils Relative 2 0 - 6 %    Basophils Relative 1 0 - 1 %    Neutrophils Absolute 5 65 1 85 - 7 62 Thousands/µL    Lymphocytes Absolute 2 90 0 60 - 4 47 Thousands/µL    Monocytes Absolute 0 65 0 17 - 1 22 Thousand/µL    Eosinophils Absolute 0 22 0 00 - 0 61 Thousand/µL    Basophils Absolute 0 05 0 00 - 0 10 Thousands/µL   Comprehensive metabolic panel    Collection Time: 06/14/17  4:06 PM   Result Value Ref Range    Sodium 134 (L) 136 - 145 mmol/L    Potassium 4 3 3 5 - 5 3 mmol/L    Chloride 100 100 - 108 mmol/L    CO2 25 21 - 32 mmol/L    Anion Gap 9 4 - 13 mmol/L    BUN 20 5 - 25 mg/dL    Creatinine 0 73 0 60 - 1 30 mg/dL    Glucose 227 (H) 65 - 140 mg/dL    Calcium 8 8 8 3 - 10 1 mg/dL    AST 17 5 - 45 U/L ALT 23 12 - 78 U/L    Alkaline Phosphatase 82 46 - 116 U/L    Total Protein 6 5 6 4 - 8 2 g/dL    Albumin 2 9 (L) 3 5 - 5 0 g/dL    Total Bilirubin 0 20 0 20 - 1 00 mg/dL    eGFR >60 0 ml/min/1 73sq m   Ferritin    Collection Time: 06/14/17  4:06 PM   Result Value Ref Range    Ferritin 51 8 - 388 ng/mL   Iron Saturation %    Collection Time: 06/14/17  4:06 PM   Result Value Ref Range    Iron Saturation 17 %    TIBC 478 (H) 250 - 450 ug/dL    Iron 79 50 - 170 ug/dL   Methylmalonic acid, serum    Collection Time: 06/14/17  4:06 PM   Result Value Ref Range    Methylmalonic Acid, S 110 0 - 378 nmol/L   Liquid-based pap, screening    Collection Time: 07/07/17 12:00 AM   Result Value Ref Range    Case Report       Gynecologic Cytology Report                       Case: [id number]                                  Authorizing Provider:  Clyde Sears DO            Collected:           07/07/2017                   First Screen:          BASHIR [place]  Received:            07/10/2017 1228              Specimen:    LIQUID-BASED PAP, SCREENING, Endocervical                                                  High Risk HPV Result       HPV, High Risk: HPV NEG, HPV16 NEG, HPV18 NEG      Other High Risk HPV Negative, HPV 16 Negative, HPV 18 Negative  HPV types: 16,18,31,33,35,39,45,51,52,56,58,59,66 and 68 DNA are undetectable or below the pre-set threshold  [employer]s FDA approved Robin 4800 is utilized with strict adherence to the s instruction  manual to test for the presence of High-Risk HPV DNA, as well as HPV 16 and HPV 18  This instrument  has been validated by our laboratory and/or by the   A negative result does not preclude the presence of HPV infection because results depend on adequate  specimen collection, absence of inhibitors and sufficient DNA to be detected   Additionally, HPV negative  results are not intended to prevent women from proceeding to colposcopy if clinically warranted  Positive HPV test results indicate the presence of any one or more of the high risk types, but since patients  are often co-infected with low-risk types it does not rule out the presence of low-risk  types in patients  with mixed infections  Primary Interpretation Negative for intraepithelial lesion or malignancy     Specimen Adequacy       Satisfactory for evaluation  Endocervical/transformation zone component present  Additional Information       [employer]'s FDA approved ,  and ThinPrep Imaging System are utilized with strict adherence to the 's instruction manual to prepare gynecologic and non-gynecologic cytology specimens for the production of ThinPrep slides as well as for gynecologic ThinPrep imaging  These processes have been validated by our laboratory and/or by the   The Pap test is not a diagnostic procedure and should not be used as the sole means to detect cervical cancer  It is only a screening procedure to aid in the detection of cervical cancer and its precursors  Both false-negative and false-positive results have been experienced  Your patient's test result should be interpreted in this context together with the history and clinical findings       HPV High Risk    Collection Time: 07/07/17 12:00 AM   Result Value Ref Range    HPV, High Risk HPV NEG, HPV16 NEG, HPV18 NEG HPV NEG, HPV16 NEG, HPV18 NEG   CBC and differential    Collection Time: 11/14/17 11:08 AM   Result Value Ref Range    WBC 8 48 4 31 - 10 16 Thousand/uL    RBC 4 14 3 81 - 5 12 Million/uL    Hemoglobin 13 1 11 5 - 15 4 g/dL    Hematocrit 38 0 34 8 - 46 1 %    MCV 92 82 - 98 fL    MCH 31 6 26 8 - 34 3 pg    MCHC 34 5 31 4 - 37 4 g/dL    RDW 12 6 11 6 - 15 1 %    MPV 9 9 8 9 - 12 7 fL    Platelets 509 (H) 529 - 390 Thousands/uL    nRBC 0 /100 WBCs    Neutrophils Relative 58 43 - 75 %    Lymphocytes Relative 30 14 - 44 %    Monocytes Relative 8 4 - 12 %    Eosinophils Relative 3 0 - 6 % Basophils Relative 1 0 - 1 %    Neutrophils Absolute 4 90 1 85 - 7 62 Thousands/µL    Lymphocytes Absolute 2 58 0 60 - 4 47 Thousands/µL    Monocytes Absolute 0 67 0 17 - 1 22 Thousand/µL    Eosinophils Absolute 0 27 0 00 - 0 61 Thousand/µL    Basophils Absolute 0 04 0 00 - 0 10 Thousands/µL   Comprehensive metabolic panel    Collection Time: 11/14/17 11:08 AM   Result Value Ref Range    Sodium 132 (L) 136 - 145 mmol/L    Potassium 4 5 3 5 - 5 3 mmol/L    Chloride 97 (L) 100 - 108 mmol/L    CO2 29 21 - 32 mmol/L    Anion Gap 6 4 - 13 mmol/L    BUN 18 5 - 25 mg/dL    Creatinine 0 55 (L) 0 60 - 1 30 mg/dL    Glucose, Fasting 184 (H) 65 - 99 mg/dL    Calcium 9 2 8 3 - 10 1 mg/dL    AST 15 5 - 45 U/L    ALT 28 12 - 78 U/L    Alkaline Phosphatase 117 (H) 46 - 116 U/L    Total Protein 6 9 6 4 - 8 2 g/dL    Albumin 3 4 (L) 3 5 - 5 0 g/dL    Total Bilirubin 0 30 0 20 - 1 00 mg/dL    eGFR 116 ml/min/1 73sq m   Ferritin    Collection Time: 11/14/17 11:08 AM   Result Value Ref Range    Ferritin 280 8 - 388 ng/mL   Iron Saturation %    Collection Time: 11/14/17 11:08 AM   Result Value Ref Range    Iron Saturation 13 %    TIBC 358 250 - 450 ug/dL    Iron 47 (L) 50 - 170 ug/dL   Methylmalonic acid, serum    Collection Time: 11/14/17 11:08 AM   Result Value Ref Range    Methylmalonic Acid, S 112 0 - 378 nmol/L   Peak flow    Collection Time: 03/01/18 10:52 AM   Result Value Ref Range    OTHER     POCT peak flow    Collection Time: 03/06/18  9:13 AM   Result Value Ref Range    OTHER     TIBC    Collection Time: 04/21/18  8:55 AM   Result Value Ref Range    Iron, Serum 84 40 - 190 mcg/dL    Total Iron Binding Capacity (TIBC) 358 250 - 450 mcg/dL (calc)    Iron Saturation 23 11 - 50 % (calc)   Comprehensive metabolic panel    Collection Time: 04/21/18  8:55 AM   Result Value Ref Range    SL AMB GLUCOSE 548 (H) 65 - 99 mg/dL    BUN 24 7 - 25 mg/dL    Creatinine, Serum 0 80 0 50 - 1 10 mg/dL    eGFR Non  91 > OR = 60 mL/min/1 73m2    SL AMB EGFR  105 > OR = 60 mL/min/1 73m2    SL AMB BUN/CREATININE RATIO NOT APPLICABLE 6 - 22 (calc)    SL AMB SODIUM 132 (L) 135 - 146 mmol/L    SL AMB POTASSIUM 5 1 3 5 - 5 3 mmol/L    SL AMB CHLORIDE 94 (L) 98 - 110 mmol/L    SL AMB CARBON DIOXIDE 28 20 - 31 mmol/L    SL AMB CALCIUM 9 8 8 6 - 10 2 mg/dL    SL AMB PROTEIN, TOTAL 6 7 6 1 - 8 1 g/dL    Serum Albumin 4 2 3 6 - 5 1 g/dL    SL AMB GLOBULIN 2 5 1 9 - 3 7 g/dL (calc)    SL AMB ALBUMIN/GLOBULIN RATIO 1 7 1 0 - 2 5 (calc)    SL AMB BILIRUBIN, TOTAL 0 4 0 2 - 1 2 mg/dL    SL AMB ALKALINE PHOSPHATASE 153 (H) 33 - 115 U/L    SL AMB AST 13 10 - 30 U/L    SL AMB ALT 20 6 - 29 U/L   Methylmalonic acid, serum    Collection Time: 04/21/18  8:55 AM   Result Value Ref Range    Methylmalonic Acid, S 150 87 - 318 nmol/L   CBC and differential    Collection Time: 04/21/18  8:55 AM   Result Value Ref Range    SL AMB LAB WHITE BLOOD CELL COUNT 7 2 3 8 - 10 8 Thousand/uL    SL AMB LAB RED BLOOD CELLS 4 42 3 80 - 5 10 Million/uL    Hemoglobin 13 5 11 7 - 15 5 g/dL    Hematocrit 41 6 35 0 - 45 0 %    MCV 94 1 80 0 - 100 0 fL    MCH 30 5 27 0 - 33 0 pg    MCHC 32 5 32 0 - 36 0 g/dL    RDW 12 1 11 0 - 15 0 %    Platelet Count 091 (H) 140 - 400 Thousand/uL    SL AMB MPV 9 9 7 5 - 12 5 fL    Neutrophils (Absolute) 4,500 1,500 - 7,800 cells/uL    Lymphocytes (Absolute) 1,886 850 - 3,900 cells/uL    Monocytes (Absolute) 468 200 - 950 cells/uL    Eosinophils (Absolute) 259 15 - 500 cells/uL    Basophils (Absolute) 86 0 - 200 cells/uL    Neutrophils 62 5 %    Lymphocytes 26 2 %    Monocytes 6 5 %    Eosinophils 3 6 %    Basophils 1 2 %   Ferritin    Collection Time: 04/21/18  8:55 AM   Result Value Ref Range    Ferritin 300 (H) 10 - 232 ng/mL         Future Appointments  Date Time Provider Kennedy Momin   11/5/2018 3:40 PM Umer Hair MD [facility]   11/6/2018 3:45 PM LELAND Delgado [facility]   2/6/2019 4:00 PM Stacia Deepika Garces, [address] [facility]

## 2018-07-26 NOTE — PATIENT INSTRUCTIONS
Let's do diabetes blood work now  Then we can consider trying metformin for the insulin resistance to see if it helps your blood sugars instead of symlin  Continue the current insulin dosages until we get the new blood work  Follow up in 3 months with blood work

## 2018-07-26 NOTE — PROGRESS NOTES
7/26/2018    Assessment/Plan      Diagnoses and all orders for this visit:    Polycystic ovarian syndrome  -     HEMOGLOBIN A1C W/ EAG ESTIMATION Lab Collect  -     Comprehensive metabolic panel Lab Collect  -     TSH, 3rd generation Lab Collect; Future  -     Microalbumin / creatinine urine ratio Lab Collect; Future  -     Comprehensive metabolic panel Lab Collect; Future  -     HEMOGLOBIN A1C W/ EAG ESTIMATION Lab Collect; Future  -     TSH, 3rd generation Lab Collect  -     Comprehensive metabolic panel Lab Collect    Type 1 diabetes mellitus with hyperglycemia (HCC)  -     HEMOGLOBIN A1C W/ EAG ESTIMATION Lab Collect  -     Comprehensive metabolic panel Lab Collect  -     TSH, 3rd generation Lab Collect; Future  -     Microalbumin / creatinine urine ratio Lab Collect; Future  -     Comprehensive metabolic panel Lab Collect; Future  -     HEMOGLOBIN A1C W/ EAG ESTIMATION Lab Collect; Future  -     TSH, 3rd generation Lab Collect  -     Comprehensive metabolic panel Lab Collect    Type 2 diabetes mellitus with mild nonproliferative retinopathy, without long-term current use of insulin, macular edema presence unspecified, unspecified laterality (HCC)  -     HUMALOG 100 UNIT/ML injection; Inject 40 Units under the skin 3 (three) times a day before meals        Assessment/Plan:  1  Type 1 diabetes  I suspect her diabetes is uncontrolled as it  has been in the past   I have no recent hemoglobin A1c, so I have asked her to get a hemoglobin A1c and CMP now  For now, she will not adjust her Basaglar insulin or Humalog insulin  I will adjust her medications based on her hemoglobin A1c  I may consider adding metformin for the insulin resistance due to polycystic ovary syndrome hoping that will help her blood sugars  I could we at her Symlin back again  She will continue to check her blood sugars at least 2 to 4 times a day  2   Diabetic retinopathy    She is following with the ophthalmologist, but has not seen 1 in a year  I have asked her to follow up as soon as possible since it has been that long  3   Diabetic neuropathy  She has no symptoms of diabetic neuropathy, just decreased vibration sensation on exam   I will follow this over time  4   Polycystic ovary syndrome  Since the hallmark of this is insulin resistance, metformin may help improve her blood sugars by decreasing the insulin resistance and perhaps even decrease some of her insulin dosages  We will determine whether to add this based on her blood work  I have asked her to get a hemoglobin A1c and CMP now  I have also asked her to follow up in 3 months with preceding hemoglobin A1c, CMP, TSH, and urine microalbumin to creatinine ratio  CC: Diabetes Consult    History of Present Illness     HPI: Myah Phillips is a 43y o  year old female with type 1 diabetes for 25 years  She is on insulin at home and takes basaglar  Insulin 65 units at hs and humalog insulin about 40 units with each meal since off symlin for almost 1 year  She denies any polyuria, polydipsia, nocturia and blurry vision  She denies polyphagia  She denies numbness or tingling of the feet  She denies chest pain or shortness of breath  She denies neuropathy, nephropathy, retinopathy, heart attack, stroke and claudication but does admit to none  Hypoglycemic episodes: No never  H/o of hypoglycemia causing hospitalization or intervention such as glucagon injection  or ambulance call  No   Hypoglycemia symptoms: confusion, jitteriness, sweating and heart racing  Treatment of hypoglycemia: would eat food  Glucagon:No   Medic alert tag: recommended,Yes  The patient's last eye exam was in about 1 year ago with retinopathy  The patient's last foot exam was in not seen  Last A1C was   Lab Results   Component Value Date    HGBA1C 12 6 (H) 02/04/2016     Blood Sugar/Glucometer/Pump/CGM review: check blood sugars at least twice a day  No records brought with her today     Before breakfast: around 130  Before lunch:   Before dinner: upper 100s to low 200s  Bedtime:     Review of Systems   Constitutional: Positive for fatigue  Negative for unexpected weight change  Fatigue at times, has a very physical job  HENT: Negative for hearing loss and tinnitus  Eyes: Negative for visual disturbance  Respiratory: Negative for chest tightness and shortness of breath  Cardiovascular: Negative for chest pain, palpitations and leg swelling  Gastrointestinal: Negative for abdominal pain, constipation, diarrhea and nausea  Endocrine: Negative for polydipsia, polyphagia and polyuria  No nocturia  Genitourinary:        Menses regular on a monthly basis and only last a few days  Musculoskeletal: Negative for arthralgias and back pain  Skin: Negative for rash and wound  Neurological: Negative for dizziness, tremors, light-headedness, numbness and headaches  Psychiatric/Behavioral: Negative for dysphoric mood and sleep disturbance  The patient is not nervous/anxious  Historical Information   Past Medical History:   Diagnosis Date    Anemia     Asthma     w/acute exacerbation   Last assessed: 10/26/12    Chest pain 2/3/2016    Depression     Diabetes mellitus ([facility] )     [facility] 2/3/2016    Gastroenteritis 02/03/2016    GERD (gastroesophageal reflux disease)     HTN (hypertension)     Hyperlipidemia 2/3/2016    Oligomenorrhea     Polycystic ovaries 2/3/2016    Retinal hemorrhage 2/3/2016    Retinopathy     Thrombocytosis (HCC)     Type 1 diabetes mellitus with ophthalmic manifestation ([facility] ) 2/3/2016     Past Surgical History:   Procedure Laterality Date    CATARACT EXTRACTION Bilateral     RETINAL LASER PROCEDURE       Social History   History   Alcohol Use    Yes     Comment: Occasional/1 mixed drink twice a month if that     History   Drug Use No     History   Smoking Status    Never Smoker   Smokeless Tobacco    Never Used Comment: Per [employer]: Former smoker  Quite in 1994     Family History:   Family History   Problem Relation Age of Onset    Heart murmur Mother     Diabetes Mother         Mellitus    Thyroid disease Mother         Disorder    Diabetes Father         Mellitus    Hypertension Father     Diabetes Maternal Grandfather         Mellitus    Breast cancer Paternal Grandmother     Diabetes Maternal Aunt         Mellitus    Diabetes Maternal Uncle         Mellitus    Substance Abuse Neg Hx     Mental illness Neg Hx        Meds/Allergies   Current Outpatient Prescriptions   Medication Sig Dispense Refill    ADVAIR DISKUS 250-50 MCG/DOSE inhaler INHALE 1 PUFF TWICE A DAY 1 Inhaler 6    albuterol (PROAIR HFA) 90 mcg/act inhaler Inhale 2 puffs every 4 (four) hours as needed for wheezing or shortness of breath 3 Inhaler 0    Ascorbic Acid (VITAMIN C) 500 MG CAPS Take 2 capsules by mouth daily      BASAGLAR KWIKPEN 100 units/mL injection pen Inject 65 Units under the skin daily 15 pen 0    Biotin 10 MG TABS Take 1 tablet by mouth daily      budesonide (PULMICORT) 1 MG/2ML nebulizer solution Take 1 mL (1 mg total) by nebulization daily (Patient taking differently: Take 1 mg by nebulization daily prn ) 60 mL 2    cyanocobalamin (VITAMIN B-12) 100 mcg tablet Take by mouth daily      glucose blood (ONE TOUCH ULTRA TEST) test strip 1 each by Other route 3 (three) times a day before meals Use as instructed 100 each 0    HUMALOG 100 UNIT/ML injection Inject 40 Units under the skin 3 (three) times a day before meals 40 mL 6    lisinopril (ZESTRIL) 20 mg tablet Take 2 tablets by mouth daily      montelukast (SINGULAIR) 10 mg tablet Take 1 tablet (10 mg total) by mouth daily at bedtime 90 tablet 3    Omega-3 Fatty Acids (OMEGA-3 FISH OIL) 500 MG CAPS Take 1,000 mg by mouth 2 (two) times a day   omeprazole (PriLOSEC) 20 mg delayed release capsule Take 20 mg by mouth daily        PARoxetine (PAXIL) 20 mg tablet Take 1 tablet (20 mg total) by mouth daily 90 tablet 0    pravastatin (PRAVACHOL) 20 mg tablet Take 1 tablet by mouth      Probiotic Product (ALIGN PO) Take by mouth       No current facility-administered medications for this visit  No Known Allergies    Objective   Vitals: Blood pressure 108/64, pulse 80, height 4' 9" (1 448 m), weight 73 kg (161 lb)  Invasive Devices          No matching active lines, drains, or airways          Physical Exam   Constitutional: She is oriented to person, place, and time  She appears well-developed and well-nourished  HENT:   Head: Normocephalic and atraumatic  Eyes: Conjunctivae and EOM are normal  Pupils are equal, round, and reactive to light  No lid lag, stare, proptosis, or periorbital edema  Neck: Normal range of motion  Neck supple  No thyromegaly present  No carotid bruits  Cardiovascular: Normal rate, regular rhythm, normal heart sounds and intact distal pulses  Pulses are no weak pulses  No murmur heard  Pulses:       Dorsalis pedis pulses are 2+ on the right side, and 2+ on the left side  Posterior tibial pulses are 2+ on the right side, and 2+ on the left side  Pulmonary/Chest: Effort normal and breath sounds normal  She has no wheezes  Abdominal: Soft  Bowel sounds are normal  There is no tenderness  Musculoskeletal: Normal range of motion  She exhibits no edema or deformity  Thick skin on the heels  No ulceration of the feet  No tremor of the outstretched hands  No spinous process tenderness  No CVA tenderness  Feet:   Right Foot:   Skin Integrity: Negative for ulcer, skin breakdown, erythema, warmth, callus or dry skin  Left Foot:   Skin Integrity: Negative for ulcer, skin breakdown, erythema, warmth, callus or dry skin  Lymphadenopathy:     She has no cervical adenopathy  Neurological: She is alert and oriented to person, place, and time  She has normal reflexes     Vibration sensation slightly  diminshed to the bilateral 1st toe DIP joints  Microfilament sensation intact to the bilateral lower extremities  Achilles tendon reflex intact bilaterally  Skin: Skin is warm and dry  No rash noted  Vitals reviewed  Patient's shoes and socks removed  Right Foot/Ankle   Right Foot Inspection  Skin Exam: skin normal and skin intact no dry skin, no warmth, no callus, no erythema, no maceration, no abnormal color, no pre-ulcer, no ulcer and no callus                          Toe Exam: ROM and strength within normal limits  Sensory   Vibration: diminished  Proprioception: intact   Monofilament testing: intact  Vascular  Capillary refills: < 3 seconds  The right DP pulse is 2+  The right PT pulse is 2+  Left Foot/Ankle  Left Foot Inspection  Skin Exam: skin normal and skin intactno dry skin, no warmth, no erythema, no maceration, normal color, no pre-ulcer, no ulcer and no callus                         Toe Exam: ROM and strength within normal limits                   Sensory   Vibration: diminished  Proprioception: intact  Monofilament: intact  Vascular  Capillary refills: < 3 seconds  The left DP pulse is 2+  The left PT pulse is 2+  Assign Risk Category:  No deformity present; No loss of protective sensation; No weak pulses       Risk: 0      The history was obtained from the review of the chart and from the patient      Lab Results:    Most recent Alc is  Lab Results   Component Value Date    HGBA1C 12 6 (H) 02/04/2016               Lab Results   Component Value Date    CREATININE 0 80 04/21/2018    CREATININE 0 55 (L) 11/14/2017    CREATININE 0 73 06/14/2017    BUN 24 04/21/2018     (L) 11/14/2017    K 4 5 11/14/2017    CL 97 (L) 11/14/2017    CO2 29 11/14/2017     eGFR   Date Value Ref Range Status   11/14/2017 116 ml/min/1 73sq m Final         Lab Results   Component Value Date    CHOL 192 01/28/2017    HDL 64 01/28/2017    TRIG 195 (H) 01/28/2017       Lab Results   Component Value Date    ALT 28 11/14/2017    AST 15 11/14/2017    ALKPHOS 117 (H) 11/14/2017    BILITOT 0 30 11/14/2017       No results found for: TSH, FREET4, TSI    Recent Results (from the past 68446 hour(s))   CBC and differential    Collection Time: 06/14/17  4:06 PM   Result Value Ref Range    WBC 9 47 4 31 - 10 16 Thousand/uL    RBC 4 19 3 81 - 5 12 Million/uL    Hemoglobin 12 5 11 5 - 15 4 g/dL    Hematocrit 37 4 34 8 - 46 1 %    MCV 89 82 - 98 fL    MCH 29 8 26 8 - 34 3 pg    MCHC 33 4 31 4 - 37 4 g/dL    RDW 13 0 11 6 - 15 1 %    MPV 9 7 8 9 - 12 7 fL    Platelets 161 (H) 877 - 390 Thousands/uL    Neutrophils Relative 59 43 - 75 %    Lymphocytes Relative 31 14 - 44 %    Monocytes Relative 7 4 - 12 %    Eosinophils Relative 2 0 - 6 %    Basophils Relative 1 0 - 1 %    Neutrophils Absolute 5 65 1 85 - 7 62 Thousands/µL    Lymphocytes Absolute 2 90 0 60 - 4 47 Thousands/µL    Monocytes Absolute 0 65 0 17 - 1 22 Thousand/µL    Eosinophils Absolute 0 22 0 00 - 0 61 Thousand/µL    Basophils Absolute 0 05 0 00 - 0 10 Thousands/µL   Comprehensive metabolic panel    Collection Time: 06/14/17  4:06 PM   Result Value Ref Range    Sodium 134 (L) 136 - 145 mmol/L    Potassium 4 3 3 5 - 5 3 mmol/L    Chloride 100 100 - 108 mmol/L    CO2 25 21 - 32 mmol/L    Anion Gap 9 4 - 13 mmol/L    BUN 20 5 - 25 mg/dL    Creatinine 0 73 0 60 - 1 30 mg/dL    Glucose 227 (H) 65 - 140 mg/dL    Calcium 8 8 8 3 - 10 1 mg/dL    AST 17 5 - 45 U/L    ALT 23 12 - 78 U/L    Alkaline Phosphatase 82 46 - 116 U/L    Total Protein 6 5 6 4 - 8 2 g/dL    Albumin 2 9 (L) 3 5 - 5 0 g/dL    Total Bilirubin 0 20 0 20 - 1 00 mg/dL    eGFR >60 0 ml/min/1 73sq m   Ferritin    Collection Time: 06/14/17  4:06 PM   Result Value Ref Range    Ferritin 51 8 - 388 ng/mL   Iron Saturation %    Collection Time: 06/14/17  4:06 PM   Result Value Ref Range    Iron Saturation 17 %    TIBC 478 (H) 250 - 450 ug/dL    Iron 79 50 - 170 ug/dL   Methylmalonic acid, serum    Collection Time: 06/14/17  4:06 PM   Result Value Ref Range    Methylmalonic Acid, S 110 0 - 378 nmol/L   Liquid-based pap, screening    Collection Time: 07/07/17 12:00 AM   Result Value Ref Range    Case Report       Gynecologic Cytology Report                       Case: [id number]                                  Authorizing Provider:  Sendy Beatty DO            Collected:           07/07/2017                   First Screen:          Shashi Bolanos, CT  Received:            07/10/2017 1228              Specimen:    LIQUID-BASED PAP, SCREENING, Endocervical                                                  High Risk HPV Result       HPV, High Risk: HPV NEG, HPV16 NEG, HPV18 NEG      Other High Risk HPV Negative, HPV 16 Negative, HPV 18 Negative  HPV types: 16,18,31,33,35,39,45,51,52,56,58,59,66 and 68 DNA are undetectable or below the pre-set threshold  [employer]s FDA approved Robin 4800 is utilized with strict adherence to the s instruction  manual to test for the presence of High-Risk HPV DNA, as well as HPV 16 and HPV 18  This instrument  has been validated by our laboratory and/or by the   A negative result does not preclude the presence of HPV infection because results depend on adequate  specimen collection, absence of inhibitors and sufficient DNA to be detected  Additionally, HPV negative  results are not intended to prevent women from proceeding to colposcopy if clinically warranted  Positive HPV test results indicate the presence of any one or more of the high risk types, but since patients  are often co-infected with low-risk types it does not rule out the presence of low-risk  types in patients  with mixed infections  Primary Interpretation Negative for intraepithelial lesion or malignancy     Specimen Adequacy       Satisfactory for evaluation  Endocervical/transformation zone component present      Additional Information       [employer]'s FDA approved ,  and ThinPrep Imaging System are utilized with strict adherence to the 's instruction manual to prepare gynecologic and non-gynecologic cytology specimens for the production of ThinPrep slides as well as for gynecologic ThinPrep imaging  These processes have been validated by our laboratory and/or by the   The Pap test is not a diagnostic procedure and should not be used as the sole means to detect cervical cancer  It is only a screening procedure to aid in the detection of cervical cancer and its precursors  Both false-negative and false-positive results have been experienced  Your patient's test result should be interpreted in this context together with the history and clinical findings       HPV High Risk    Collection Time: 07/07/17 12:00 AM   Result Value Ref Range    HPV, High Risk HPV NEG, HPV16 NEG, HPV18 NEG HPV NEG, HPV16 NEG, HPV18 NEG   CBC and differential    Collection Time: 11/14/17 11:08 AM   Result Value Ref Range    WBC 8 48 4 31 - 10 16 Thousand/uL    RBC 4 14 3 81 - 5 12 Million/uL    Hemoglobin 13 1 11 5 - 15 4 g/dL    Hematocrit 38 0 34 8 - 46 1 %    MCV 92 82 - 98 fL    MCH 31 6 26 8 - 34 3 pg    MCHC 34 5 31 4 - 37 4 g/dL    RDW 12 6 11 6 - 15 1 %    MPV 9 9 8 9 - 12 7 fL    Platelets 960 (H) 131 - 390 Thousands/uL    nRBC 0 /100 WBCs    Neutrophils Relative 58 43 - 75 %    Lymphocytes Relative 30 14 - 44 %    Monocytes Relative 8 4 - 12 %    Eosinophils Relative 3 0 - 6 %    Basophils Relative 1 0 - 1 %    Neutrophils Absolute 4 90 1 85 - 7 62 Thousands/µL    Lymphocytes Absolute 2 58 0 60 - 4 47 Thousands/µL    Monocytes Absolute 0 67 0 17 - 1 22 Thousand/µL    Eosinophils Absolute 0 27 0 00 - 0 61 Thousand/µL    Basophils Absolute 0 04 0 00 - 0 10 Thousands/µL   Comprehensive metabolic panel    Collection Time: 11/14/17 11:08 AM   Result Value Ref Range    Sodium 132 (L) 136 - 145 mmol/L    Potassium 4 5 3 5 - 5 3 mmol/L    Chloride 97 (L) 100 - 108 mmol/L    CO2 29 21 - 32 mmol/L    Anion Gap 6 4 - 13 mmol/L BUN 18 5 - 25 mg/dL    Creatinine 0 55 (L) 0 60 - 1 30 mg/dL    Glucose, Fasting 184 (H) 65 - 99 mg/dL    Calcium 9 2 8 3 - 10 1 mg/dL    AST 15 5 - 45 U/L    ALT 28 12 - 78 U/L    Alkaline Phosphatase 117 (H) 46 - 116 U/L    Total Protein 6 9 6 4 - 8 2 g/dL    Albumin 3 4 (L) 3 5 - 5 0 g/dL    Total Bilirubin 0 30 0 20 - 1 00 mg/dL    eGFR 116 ml/min/1 73sq m   Ferritin    Collection Time: 11/14/17 11:08 AM   Result Value Ref Range    Ferritin 280 8 - 388 ng/mL   Iron Saturation %    Collection Time: 11/14/17 11:08 AM   Result Value Ref Range    Iron Saturation 13 %    TIBC 358 250 - 450 ug/dL    Iron 47 (L) 50 - 170 ug/dL   Methylmalonic acid, serum    Collection Time: 11/14/17 11:08 AM   Result Value Ref Range    Methylmalonic Acid, S 112 0 - 378 nmol/L   Peak flow    Collection Time: 03/01/18 10:52 AM   Result Value Ref Range    OTHER     POCT peak flow    Collection Time: 03/06/18  9:13 AM   Result Value Ref Range    OTHER     TIBC    Collection Time: 04/21/18  8:55 AM   Result Value Ref Range    Iron, Serum 84 40 - 190 mcg/dL    Total Iron Binding Capacity (TIBC) 358 250 - 450 mcg/dL (calc)    Iron Saturation 23 11 - 50 % (calc)   Comprehensive metabolic panel    Collection Time: 04/21/18  8:55 AM   Result Value Ref Range    SL AMB GLUCOSE 548 (H) 65 - 99 mg/dL    BUN 24 7 - 25 mg/dL    Creatinine, Serum 0 80 0 50 - 1 10 mg/dL    eGFR Non  91 > OR = 60 mL/min/1 73m2    SL AMB EGFR  105 > OR = 60 mL/min/1 73m2    SL AMB BUN/CREATININE RATIO NOT APPLICABLE 6 - 22 (calc)    SL AMB SODIUM 132 (L) 135 - 146 mmol/L    SL AMB POTASSIUM 5 1 3 5 - 5 3 mmol/L    SL AMB CHLORIDE 94 (L) 98 - 110 mmol/L    SL AMB CARBON DIOXIDE 28 20 - 31 mmol/L    SL AMB CALCIUM 9 8 8 6 - 10 2 mg/dL    SL AMB PROTEIN, TOTAL 6 7 6 1 - 8 1 g/dL    Serum Albumin 4 2 3 6 - 5 1 g/dL    SL AMB GLOBULIN 2 5 1 9 - 3 7 g/dL (calc)    SL AMB ALBUMIN/GLOBULIN RATIO 1 7 1 0 - 2 5 (calc)    SL AMB BILIRUBIN, TOTAL 0 4 0 2 - 1 2 mg/dL    SL AMB ALKALINE PHOSPHATASE 153 (H) 33 - 115 U/L    SL AMB AST 13 10 - 30 U/L    SL AMB ALT 20 6 - 29 U/L   Methylmalonic acid, serum    Collection Time: 04/21/18  8:55 AM   Result Value Ref Range    Methylmalonic Acid, S 150 87 - 318 nmol/L   CBC and differential    Collection Time: 04/21/18  8:55 AM   Result Value Ref Range    SL AMB LAB WHITE BLOOD CELL COUNT 7 2 3 8 - 10 8 Thousand/uL    SL AMB LAB RED BLOOD CELLS 4 42 3 80 - 5 10 Million/uL    Hemoglobin 13 5 11 7 - 15 5 g/dL    Hematocrit 41 6 35 0 - 45 0 %    MCV 94 1 80 0 - 100 0 fL    MCH 30 5 27 0 - 33 0 pg    MCHC 32 5 32 0 - 36 0 g/dL    RDW 12 1 11 0 - 15 0 %    Platelet Count 234 (H) 140 - 400 Thousand/uL    SL AMB MPV 9 9 7 5 - 12 5 fL    Neutrophils (Absolute) 4,500 1,500 - 7,800 cells/uL    Lymphocytes (Absolute) 1,886 850 - 3,900 cells/uL    Monocytes (Absolute) 468 200 - 950 cells/uL    Eosinophils (Absolute) 259 15 - 500 cells/uL    Basophils (Absolute) 86 0 - 200 cells/uL    Neutrophils 62 5 %    Lymphocytes 26 2 %    Monocytes 6 5 %    Eosinophils 3 6 %    Basophils 1 2 %   Ferritin    Collection Time: 04/21/18  8:55 AM   Result Value Ref Range    Ferritin 300 (H) 10 - 232 ng/mL         Future Appointments  Date Time Provider [facility]   11/5/2018 3:40 PM Umer Orozco MD [facility]   11/6/2018 3:45 PM LELAND Dorado [facility]   2/6/2019 4:00 PM Ky Bailey MD [facility]

## 2018-08-09 DIAGNOSIS — E11.9 TYPE 2 DIABETES MELLITUS WITHOUT COMPLICATION, WITH LONG-TERM CURRENT USE OF INSULIN (HCC): ICD-10-CM

## 2018-08-09 DIAGNOSIS — Z79.4 TYPE 2 DIABETES MELLITUS WITHOUT COMPLICATION, WITH LONG-TERM CURRENT USE OF INSULIN (HCC): ICD-10-CM

## 2018-09-12 ENCOUNTER — OFFICE VISIT (OUTPATIENT)
Dept: FAMILY MEDICINE CLINIC | Facility: CLINIC | Age: 43
End: 2018-09-12
Payer: COMMERCIAL

## 2018-09-12 VITALS
BODY MASS INDEX: 35.5 KG/M2 | DIASTOLIC BLOOD PRESSURE: 72 MMHG | WEIGHT: 157.8 LBS | TEMPERATURE: 98.4 F | SYSTOLIC BLOOD PRESSURE: 110 MMHG | HEART RATE: 78 BPM | RESPIRATION RATE: 16 BRPM | HEIGHT: 56 IN

## 2018-09-12 DIAGNOSIS — A08.4 VIRAL GASTROENTERITIS: Primary | ICD-10-CM

## 2018-09-12 PROCEDURE — 99213 OFFICE O/P EST LOW 20 MIN: CPT | Performed by: PHYSICIAN ASSISTANT

## 2018-09-12 PROCEDURE — 3008F BODY MASS INDEX DOCD: CPT | Performed by: PHYSICIAN ASSISTANT

## 2018-09-12 RX ORDER — ONDANSETRON 4 MG/1
4 TABLET, ORALLY DISINTEGRATING ORAL EVERY 8 HOURS PRN
Qty: 10 TABLET | Refills: 0 | Status: SHIPPED | OUTPATIENT
Start: 2018-09-12 | End: 2019-06-05 | Stop reason: ALTCHOICE

## 2018-09-12 NOTE — LETTER
September 12, 2018     Patient: Cheng Pires   YOB: 1975   Date of Visit: 9/12/2018       To Whom it May Concern:    Cheng Pires is under my professional care  She was seen in my office on 9/12/2018  She was unable to work from 9/10 to 9/13/2018  Can return Friday 9/14/2018  If you have any questions or concerns, please don't hesitate to call           Sincerely,          Regan Maier PA-C        CC: No Recipients

## 2018-09-12 NOTE — PROGRESS NOTES
Assessment/Plan:    1  Viral gastroenteritis    - viral in nature, encouraged BRAT diet, hydration, fluids, rest, try Zofran 1 tab 3 x a day as needed doubtful she will need more than 1-2 doses  Call if no improvement  - ondansetron (ZOFRAN-ODT) 4 mg disintegrating tablet; Take 1 tablet (4 mg total) by mouth every 8 (eight) hours as needed for nausea or vomiting  Dispense: 10 tablet; Refill: 0    F/u as scheduled  F/u as needed    Subjective:   Chief Complaint   Patient presents with    Diarrhea    Fatigue    Vomiting      Patient ID: Trae Calles is a 37 y o  female  Saturday woke up feeling achy and tired  Sunday tired and nauseated  Sunday vomited late evening, Monday woke up nauseated and vomited at work so left and went home and the symptoms continued  Vomited a few more time, previously ingested food  Nauseated  Yesterday started with diarrhea  Still nauseated and wants to vomit but nothing to come up  Has moved bowels 4-5 times yesterday, brown and watery  This morning had diarrhea again but only once  No blood in diarrhea  Denies fever, chills  Urinating normally  Able to drink, but not eating  Denies recent travel, antibiotics  The following portions of the patient's history were reviewed and updated as appropriate: allergies, current medications, past family history, past medical history, past social history, past surgical history and problem list     Past Medical History:   Diagnosis Date    Anemia     Asthma     w/acute exacerbation   Last assessed: 10/26/12    Chest pain 2/3/2016    Depression     [facility]     [facility] 2/3/2016    Gastroenteritis 02/03/2016    GERD (gastroesophageal reflux disease)     HTN (hypertension)     Hyperlipidemia 2/3/2016    Oligomenorrhea     Polycystic ovaries 2/3/2016    Retinal hemorrhage 2/3/2016    Retinopathy     Thrombocytosis (HCC)     Type 1 diabetes mellitus with ophthalmic manifestation ([facility] ) 2/3/2016     Past Surgical History:   Procedure Laterality Date    CATARACT EXTRACTION Bilateral     RETINAL LASER PROCEDURE       Family History   Problem Relation Age of Onset    Heart murmur Mother     Diabetes Mother         Mellitus    Thyroid disease Mother         Disorder    Diabetes Father         Mellitus    Hypertension Father     Diabetes Maternal Grandfather         Mellitus    Breast cancer Paternal Grandmother     Diabetes Maternal Aunt         Mellitus    Diabetes Maternal Uncle         Mellitus    Substance Abuse Neg Hx     Mental illness Neg Hx      Social History     Social History    Marital status:      Spouse name: N/A    Number of children: N/A    Years of education: N/A     Occupational History    Not on file  Social History Main Topics    Smoking status: Never Smoker    Smokeless tobacco: Never Used      Comment: Per Allscripts: Former smoker   Quite in 1994    Alcohol use Yes      Comment: Occasional/1 mixed drink twice a month if that    Drug use: No    Sexual activity: Not on file     Other Topics Concern    Not on file     Social History Narrative    Always uses seatbelt    Caffeine use: 1-2 cups coffee daily    Has smoke detectors    [employer] Affiliations: [employer]           Current Outpatient Prescriptions:     ADVAIR DISKUS 250-50 MCG/DOSE inhaler, INHALE 1 PUFF TWICE A DAY, Disp: 1 Inhaler, Rfl: 6    albuterol (PROAIR HFA) 90 mcg/act inhaler, Inhale 2 puffs every 4 (four) hours as needed for wheezing or shortness of breath, Disp: 3 Inhaler, Rfl: 0    Ascorbic Acid (VITAMIN C) 500 MG CAPS, Take 2 capsules by mouth daily, Disp: , Rfl:     BASAGLAR KWIKPEN 100 units/mL injection pen, Inject 65 Units under the skin daily, Disp: 15 pen, Rfl: 0    Biotin 10 MG TABS, Take 1 tablet by mouth daily, Disp: , Rfl:     budesonide (PULMICORT) 1 MG/2ML nebulizer solution, Take 1 mL (1 mg total) by nebulization daily (Patient taking differently: Take 1 mg by nebulization daily prn ), Disp: 60 mL, Rfl: 2    cyanocobalamin (VITAMIN B-12) 100 mcg tablet, Take by mouth daily, Disp: , Rfl:     HUMALOG 100 UNIT/ML injection, Inject 40 Units under the skin 3 (three) times a day before meals, Disp: 40 mL, Rfl: 6    lisinopril (ZESTRIL) 20 mg tablet, Take 2 tablets by mouth daily, Disp: , Rfl:     montelukast (SINGULAIR) 10 mg tablet, Take 1 tablet (10 mg total) by mouth daily at bedtime, Disp: 90 tablet, Rfl: 3    Omega-3 Fatty Acids (OMEGA-3 FISH OIL) 500 MG CAPS, Take 1,000 mg by mouth 2 (two) times a day , Disp: , Rfl:     omeprazole (PriLOSEC) 20 mg delayed release capsule, Take 20 mg by mouth daily  , Disp: , Rfl:     ONE TOUCH ULTRA TEST test strip, 1 each by Other route 3 (three) times a day before meals Use as instructed, Disp: 300 each, Rfl: 3    PARoxetine (PAXIL) 20 mg tablet, Take 1 tablet (20 mg total) by mouth daily, Disp: 90 tablet, Rfl: 0    pravastatin (PRAVACHOL) 20 mg tablet, Take 1 tablet by mouth, Disp: , Rfl:     Probiotic Product (ALIGN PO), Take by mouth, Disp: , Rfl:     Review of Systems          Objective:    Vitals:    09/12/18 0938   BP: 110/72   BP Location: Left arm   Patient Position: Sitting   Cuff Size: Standard   Pulse: 78   Resp: 16   Temp: 98 4 °F (36 9 °C)   TempSrc: Oral   Weight: 71 6 kg (157 lb 12 8 oz)   Height: 4' 8" (1 422 m)        Physical Exam   Constitutional: She is oriented to person, place, and time  She appears well-developed and well-nourished  HENT:   Mouth/Throat: Oropharynx is clear and moist    Cardiovascular: Normal rate, regular rhythm and normal heart sounds  Pulmonary/Chest: Effort normal and breath sounds normal    Abdominal: Soft  Bowel sounds are normal  She exhibits no distension and no mass  There is no tenderness  There is no rebound and no guarding  Neurological: She is alert and oriented to person, place, and time  Skin: Skin is warm  Psychiatric: She has a normal mood and affect

## 2018-10-06 DIAGNOSIS — E78.5 HYPERLIPIDEMIA, UNSPECIFIED HYPERLIPIDEMIA TYPE: Primary | ICD-10-CM

## 2018-10-08 RX ORDER — PRAVASTATIN SODIUM 20 MG
TABLET ORAL
Qty: 30 TABLET | Refills: 5 | Status: SHIPPED | OUTPATIENT
Start: 2018-10-08 | End: 2019-08-10 | Stop reason: SDUPTHER

## 2018-11-02 ENCOUNTER — TELEPHONE (OUTPATIENT)
Dept: HEMATOLOGY ONCOLOGY | Facility: CLINIC | Age: 43
End: 2018-11-02

## 2018-11-02 NOTE — TELEPHONE ENCOUNTER
Called pt and LMOM  Pt is to have labs drawn prior to her appt with Dr Debbie Mcfarland on Monday 11/5 or she will have to be rescheduled

## 2018-11-29 ENCOUNTER — OFFICE VISIT (OUTPATIENT)
Dept: FAMILY MEDICINE CLINIC | Facility: CLINIC | Age: 43
End: 2018-11-29
Payer: COMMERCIAL

## 2018-11-29 VITALS
RESPIRATION RATE: 12 BRPM | HEART RATE: 74 BPM | HEIGHT: 57 IN | SYSTOLIC BLOOD PRESSURE: 120 MMHG | DIASTOLIC BLOOD PRESSURE: 60 MMHG | BODY MASS INDEX: 35.17 KG/M2 | WEIGHT: 163 LBS | TEMPERATURE: 98.9 F

## 2018-11-29 DIAGNOSIS — R19.7 DIARRHEA, UNSPECIFIED TYPE: Primary | ICD-10-CM

## 2018-11-29 PROCEDURE — 99213 OFFICE O/P EST LOW 20 MIN: CPT | Performed by: NURSE PRACTITIONER

## 2018-11-29 PROCEDURE — 3008F BODY MASS INDEX DOCD: CPT | Performed by: NURSE PRACTITIONER

## 2018-11-29 PROCEDURE — 1036F TOBACCO NON-USER: CPT | Performed by: NURSE PRACTITIONER

## 2018-11-29 NOTE — LETTER
November 29, 2018     Patient: Andres Horvath   YOB: 1975   Date of Visit: 11/29/2018       To Whom it May Concern:    Andres Horvath is under my professional care  She was seen in my office on 11/29/2018  She may return to work on 11/30/2018   Pt was out of work 11/27,11/28 and 11/29  If you have any questions or concerns, please don't hesitate to call           Sincerely,          Luis Goodell, CRNP        CC: No Recipients

## 2018-11-29 NOTE — PROGRESS NOTES
Assessment/Plan:    1  Diarrhea, unspecified type  You are currently starting to feel better and are drinking plenty of water  Please continue to drink water and eat bland foods as tolerated  You can go back to work tomorrow  We have written a note for you regarding your absence from work for three days  Let us know if this does not resolve  RTO/PRN        Subjective:      Patient ID: Cristine Wood is a 37 y o  female  Patient presents to the office today with a three day history of diarrhea and vomiting  She reports starting these episodes three nights ago and was worse in the morning two days ago  She states that her stools were liquid  Reports stomach pain and discomfort as well as nausea two days ago during her episodes of diarrhea  She reports her vomiting has stopped and her stools have become more formed but still "soft" and that she is starting to feel better  She states she had two BMs this morning  Currently denies pain  Reports eating a bland diet of jello, chicken broth, rice  Has been drinking plenty of water  She has been out of work for three days so far  Denies fever or chills  OBJECTIVE  Past Medical History:   Diagnosis Date    Anemia     Asthma     w/acute exacerbation  Last assessed: 10/26/12    Chest pain 2/3/2016    Depression     Diabetes mellitus ([facility]     Diabetic retinopathy ([facility] ) 2/3/2016    Gastroenteritis 02/03/2016    GERD (gastroesophageal reflux disease)     HTN (hypertension)     Hyperlipidemia 2/3/2016    Oligomenorrhea     Polycystic ovaries 2/3/2016    Retinal hemorrhage 2/3/2016    Retinopathy     Thrombocytosis (HCC)     Type 1 diabetes mellitus with ophthalmic manifestation ([facility] ) 2/3/2016     @[facility]      Wt Readings from Last 3 Encounters:   11/29/18 73 9 kg (163 lb)   09/12/18 71 6 kg (157 lb 12 8 oz)   07/26/18 73 kg (161 lb)     BP Readings from Last 3 Encounters:   11/29/18 120/60   09/12/18 110/72   07/26/18 108/64     Pulse Readings from Last 3 Encounters:   11/29/18 74   09/12/18 78   07/26/18 80     BMI Readings from Last 3 Encounters:   11/29/18 35 27 kg/m²   09/12/18 35 38 kg/m²   07/26/18 34 84 kg/m²        Physical Exam   Constitutional: She is oriented to person, place, and time  She appears well-developed and well-nourished  HENT:   Head: Normocephalic and atraumatic  Right Ear: External ear normal    Left Ear: External ear normal    Nose: Nose normal    Mouth/Throat: Oropharynx is clear and moist    Cardiovascular: Normal rate, regular rhythm and normal heart sounds  Pulmonary/Chest: Effort normal and breath sounds normal    Abdominal: Soft  Bowel sounds are normal  She exhibits no distension  There is no tenderness  There is no rebound and no guarding  Bowel sounds WNL in all quadrants  No tenderness to palpation (light or deep)   Neurological: She is alert and oriented to person, place, and time

## 2018-12-05 DIAGNOSIS — J45.20 MILD INTERMITTENT ASTHMA WITHOUT COMPLICATION: ICD-10-CM

## 2019-03-30 DIAGNOSIS — I10 ESSENTIAL HYPERTENSION: Primary | Chronic | ICD-10-CM

## 2019-04-01 DIAGNOSIS — I10 ESSENTIAL HYPERTENSION: Primary | Chronic | ICD-10-CM

## 2019-04-01 DIAGNOSIS — E78.2 MIXED HYPERLIPIDEMIA: Chronic | ICD-10-CM

## 2019-04-01 RX ORDER — LISINOPRIL 20 MG/1
TABLET ORAL
Qty: 180 TABLET | Refills: 0 | Status: SHIPPED | OUTPATIENT
Start: 2019-04-01 | End: 2019-08-02 | Stop reason: SDUPTHER

## 2019-06-05 ENCOUNTER — OFFICE VISIT (OUTPATIENT)
Dept: FAMILY MEDICINE CLINIC | Facility: CLINIC | Age: 44
End: 2019-06-05
Payer: COMMERCIAL

## 2019-06-05 VITALS
BODY MASS INDEX: 36.67 KG/M2 | DIASTOLIC BLOOD PRESSURE: 82 MMHG | SYSTOLIC BLOOD PRESSURE: 130 MMHG | HEIGHT: 56 IN | TEMPERATURE: 98.4 F | WEIGHT: 163 LBS | RESPIRATION RATE: 14 BRPM | HEART RATE: 80 BPM

## 2019-06-05 DIAGNOSIS — Z12.39 SCREENING FOR MALIGNANT NEOPLASM OF BREAST: ICD-10-CM

## 2019-06-05 DIAGNOSIS — E10.3299 TYPE 1 DIABETES MELLITUS WITH MILD NONPROLIFERATIVE RETINOPATHY WITHOUT MACULAR EDEMA, UNSPECIFIED LATERALITY (HCC): ICD-10-CM

## 2019-06-05 DIAGNOSIS — K52.9 ACUTE GASTROENTERITIS: Primary | ICD-10-CM

## 2019-06-05 PROCEDURE — 1036F TOBACCO NON-USER: CPT | Performed by: FAMILY MEDICINE

## 2019-06-05 PROCEDURE — 99213 OFFICE O/P EST LOW 20 MIN: CPT | Performed by: FAMILY MEDICINE

## 2019-06-05 PROCEDURE — 3008F BODY MASS INDEX DOCD: CPT | Performed by: FAMILY MEDICINE

## 2019-08-02 ENCOUNTER — TELEPHONE (OUTPATIENT)
Dept: FAMILY MEDICINE CLINIC | Facility: CLINIC | Age: 44
End: 2019-08-02

## 2019-08-02 DIAGNOSIS — I10 ESSENTIAL HYPERTENSION: Chronic | ICD-10-CM

## 2019-08-02 RX ORDER — LISINOPRIL 20 MG/1
40 TABLET ORAL DAILY
Qty: 20 TABLET | Refills: 0 | Status: SHIPPED | OUTPATIENT
Start: 2019-08-02 | End: 2019-08-10 | Stop reason: SDUPTHER

## 2019-08-02 NOTE — TELEPHONE ENCOUNTER
Talked to patient's   He made an appointment for his wife 7/7   was instructed to have wife get lab work done  LB sent in medication for 10 days

## 2019-08-02 NOTE — TELEPHONE ENCOUNTER
We did not fill it because she is over due to lab work and an ov  If she makes an appt and gets the blodd work we will send in enough to get her to the ov

## 2019-08-02 NOTE — TELEPHONE ENCOUNTER
According to patient's , [facility] sent over a request for a refill for LISINOPRIL for his wife  However, request was sent for patient with  name Claudia Key)  We did not fill as we did not recognize name  Patient  5/19/19  Patient is out of medication  [facility]

## 2019-08-04 ENCOUNTER — TELEPHONE (OUTPATIENT)
Dept: ENDOCRINOLOGY | Facility: CLINIC | Age: 44
End: 2019-08-04

## 2019-08-04 DIAGNOSIS — E11.3299 TYPE 2 DIABETES MELLITUS WITH MILD NONPROLIFERATIVE RETINOPATHY, WITHOUT LONG-TERM CURRENT USE OF INSULIN, MACULAR EDEMA PRESENCE UNSPECIFIED, UNSPECIFIED LATERALITY (HCC): ICD-10-CM

## 2019-08-04 NOTE — TELEPHONE ENCOUNTER
Patient called, needed refill of humalog  Sent to pharmacy  She has enough basaglar at home       Heriberto VICTORIA

## 2019-08-07 LAB
ALBUMIN SERPL-MCNC: 3.8 G/DL (ref 3.6–5.1)
ALBUMIN/GLOB SERPL: 1.4 (CALC) (ref 1–2.5)
ALP SERPL-CCNC: 105 U/L (ref 33–115)
ALT SERPL-CCNC: 15 U/L (ref 6–29)
AST SERPL-CCNC: 15 U/L (ref 10–30)
BASOPHILS # BLD AUTO: 91 CELLS/UL (ref 0–200)
BASOPHILS NFR BLD AUTO: 0.9 %
BILIRUB SERPL-MCNC: 0.3 MG/DL (ref 0.2–1.2)
BUN SERPL-MCNC: 24 MG/DL (ref 7–25)
BUN/CREAT SERPL: ABNORMAL (CALC) (ref 6–22)
CALCIUM SERPL-MCNC: 9.1 MG/DL (ref 8.6–10.2)
CHLORIDE SERPL-SCNC: 102 MMOL/L (ref 98–110)
CHOLEST SERPL-MCNC: 252 MG/DL
CHOLEST/HDLC SERPL: 5.3 (CALC)
CO2 SERPL-SCNC: 25 MMOL/L (ref 20–32)
CREAT SERPL-MCNC: 0.67 MG/DL (ref 0.5–1.1)
EOSINOPHIL # BLD AUTO: 455 CELLS/UL (ref 15–500)
EOSINOPHIL NFR BLD AUTO: 4.5 %
ERYTHROCYTE [DISTWIDTH] IN BLOOD BY AUTOMATED COUNT: 12.9 % (ref 11–15)
GLOBULIN SER CALC-MCNC: 2.7 G/DL (CALC) (ref 1.9–3.7)
GLUCOSE SERPL-MCNC: 58 MG/DL (ref 65–99)
HCT VFR BLD AUTO: 36.7 % (ref 35–45)
HDLC SERPL-MCNC: 48 MG/DL
HGB BLD-MCNC: 12.2 G/DL (ref 11.7–15.5)
LDLC SERPL CALC-MCNC: 159 MG/DL (CALC)
LYMPHOCYTES # BLD AUTO: 2485 CELLS/UL (ref 850–3900)
LYMPHOCYTES NFR BLD AUTO: 24.6 %
MCH RBC QN AUTO: 30.7 PG (ref 27–33)
MCHC RBC AUTO-ENTMCNC: 33.2 G/DL (ref 32–36)
MCV RBC AUTO: 92.4 FL (ref 80–100)
MONOCYTES # BLD AUTO: 747 CELLS/UL (ref 200–950)
MONOCYTES NFR BLD AUTO: 7.4 %
NEUTROPHILS # BLD AUTO: 6323 CELLS/UL (ref 1500–7800)
NEUTROPHILS NFR BLD AUTO: 62.6 %
NONHDLC SERPL-MCNC: 204 MG/DL (CALC)
PLATELET # BLD AUTO: 420 THOUSAND/UL (ref 140–400)
PMV BLD REES-ECKER: 9.8 FL (ref 7.5–12.5)
POTASSIUM SERPL-SCNC: 4.5 MMOL/L (ref 3.5–5.3)
PROT SERPL-MCNC: 6.5 G/DL (ref 6.1–8.1)
RBC # BLD AUTO: 3.97 MILLION/UL (ref 3.8–5.1)
SL AMB EGFR AFRICAN AMERICAN: 125 ML/MIN/1.73M2
SL AMB EGFR NON AFRICAN AMERICAN: 108 ML/MIN/1.73M2
SODIUM SERPL-SCNC: 134 MMOL/L (ref 135–146)
TRIGL SERPL-MCNC: 274 MG/DL
TSH SERPL-ACNC: 3.45 MIU/L
WBC # BLD AUTO: 10.1 THOUSAND/UL (ref 3.8–10.8)

## 2019-08-09 DIAGNOSIS — E10.65 TYPE 1 DIABETES MELLITUS WITH HYPERGLYCEMIA (HCC): Primary | Chronic | ICD-10-CM

## 2019-08-10 ENCOUNTER — OFFICE VISIT (OUTPATIENT)
Dept: FAMILY MEDICINE CLINIC | Facility: CLINIC | Age: 44
End: 2019-08-10
Payer: COMMERCIAL

## 2019-08-10 VITALS
BODY MASS INDEX: 37.66 KG/M2 | HEIGHT: 56 IN | DIASTOLIC BLOOD PRESSURE: 80 MMHG | WEIGHT: 167.4 LBS | HEART RATE: 76 BPM | RESPIRATION RATE: 16 BRPM | SYSTOLIC BLOOD PRESSURE: 124 MMHG

## 2019-08-10 DIAGNOSIS — D75.839 THROMBOCYTOSIS: ICD-10-CM

## 2019-08-10 DIAGNOSIS — E10.65 TYPE 1 DIABETES MELLITUS WITH HYPERGLYCEMIA (HCC): Primary | Chronic | ICD-10-CM

## 2019-08-10 DIAGNOSIS — E66.01 SEVERE OBESITY (BMI 35.0-39.9) WITH COMORBIDITY (HCC): ICD-10-CM

## 2019-08-10 DIAGNOSIS — F32.A DEPRESSION, UNSPECIFIED DEPRESSION TYPE: ICD-10-CM

## 2019-08-10 DIAGNOSIS — E10.3213 BOTH EYES AFFECTED BY MILD NONPROLIFERATIVE DIABETIC RETINOPATHY WITH MACULAR EDEMA, ASSOCIATED WITH TYPE 1 DIABETES MELLITUS (HCC): ICD-10-CM

## 2019-08-10 DIAGNOSIS — E78.5 HYPERLIPIDEMIA, UNSPECIFIED HYPERLIPIDEMIA TYPE: ICD-10-CM

## 2019-08-10 DIAGNOSIS — F32.0 CURRENT MILD EPISODE OF MAJOR DEPRESSIVE DISORDER, UNSPECIFIED WHETHER RECURRENT (HCC): ICD-10-CM

## 2019-08-10 DIAGNOSIS — J45.20 MILD INTERMITTENT ASTHMA WITHOUT COMPLICATION: ICD-10-CM

## 2019-08-10 DIAGNOSIS — I10 ESSENTIAL HYPERTENSION: Chronic | ICD-10-CM

## 2019-08-10 DIAGNOSIS — E10.319 DIABETIC RETINOPATHY ASSOCIATED WITH TYPE 1 DIABETES MELLITUS, MACULAR EDEMA PRESENCE UNSPECIFIED, UNSPECIFIED LATERALITY, UNSPECIFIED RETINOPATHY SEVERITY (HCC): Chronic | ICD-10-CM

## 2019-08-10 PROBLEM — E66.9 OBESITY (BMI 35.0-39.9 WITHOUT COMORBIDITY): Status: RESOLVED | Noted: 2019-08-10 | Resolved: 2019-08-10

## 2019-08-10 PROBLEM — E66.9 OBESITY (BMI 35.0-39.9 WITHOUT COMORBIDITY): Status: ACTIVE | Noted: 2019-08-10

## 2019-08-10 PROBLEM — K21.9 GASTROESOPHAGEAL REFLUX DISEASE WITHOUT ESOPHAGITIS: Status: ACTIVE | Noted: 2019-08-10

## 2019-08-10 PROBLEM — Z00.00 HEALTHCARE MAINTENANCE: Status: RESOLVED | Noted: 2018-02-19 | Resolved: 2019-08-10

## 2019-08-10 LAB — SL AMB POCT HEMOGLOBIN AIC: 10 (ref ?–6.5)

## 2019-08-10 PROCEDURE — 83036 HEMOGLOBIN GLYCOSYLATED A1C: CPT | Performed by: PHYSICIAN ASSISTANT

## 2019-08-10 PROCEDURE — 3046F HEMOGLOBIN A1C LEVEL >9.0%: CPT | Performed by: PHYSICIAN ASSISTANT

## 2019-08-10 PROCEDURE — 3008F BODY MASS INDEX DOCD: CPT | Performed by: PHYSICIAN ASSISTANT

## 2019-08-10 PROCEDURE — 3074F SYST BP LT 130 MM HG: CPT | Performed by: PHYSICIAN ASSISTANT

## 2019-08-10 PROCEDURE — 4010F ACE/ARB THERAPY RXD/TAKEN: CPT | Performed by: FAMILY MEDICINE

## 2019-08-10 PROCEDURE — 1036F TOBACCO NON-USER: CPT | Performed by: PHYSICIAN ASSISTANT

## 2019-08-10 PROCEDURE — 99214 OFFICE O/P EST MOD 30 MIN: CPT | Performed by: PHYSICIAN ASSISTANT

## 2019-08-10 RX ORDER — PRAVASTATIN SODIUM 40 MG
20 TABLET ORAL DAILY
Qty: 90 TABLET | Refills: 1 | Status: SHIPPED | OUTPATIENT
Start: 2019-08-10 | End: 2019-09-19 | Stop reason: SDUPTHER

## 2019-08-10 RX ORDER — MONTELUKAST SODIUM 10 MG/1
10 TABLET ORAL
Qty: 90 TABLET | Refills: 3 | Status: SHIPPED | OUTPATIENT
Start: 2019-08-10 | End: 2019-10-29 | Stop reason: SDUPTHER

## 2019-08-10 RX ORDER — PAROXETINE HYDROCHLORIDE 20 MG/1
20 TABLET, FILM COATED ORAL DAILY
Qty: 90 TABLET | Refills: 1 | Status: SHIPPED | OUTPATIENT
Start: 2019-08-10 | End: 2020-03-04 | Stop reason: SDUPTHER

## 2019-08-10 RX ORDER — LISINOPRIL 20 MG/1
40 TABLET ORAL DAILY
Qty: 180 TABLET | Refills: 1 | Status: SHIPPED | OUTPATIENT
Start: 2019-08-10 | End: 2020-03-02

## 2019-08-10 RX ORDER — ALBUTEROL SULFATE 90 UG/1
2 AEROSOL, METERED RESPIRATORY (INHALATION) EVERY 4 HOURS PRN
Qty: 3 INHALER | Refills: 0 | Status: SHIPPED | OUTPATIENT
Start: 2019-08-10 | End: 2019-11-04 | Stop reason: SDUPTHER

## 2019-08-10 NOTE — PROGRESS NOTES
Assessment/Plan:    1  Type 1 diabetes mellitus with hyperglycemia (HCC)    - was none compliant with [place] just restarted two weeks ago, still on Humalog tid  A1C 10 today but had been none compliant the entire 3 month period except 2 weeks  Has an appt with Dr Dc Harris on 9/2019  Advised to continue with BOTH insulins, watch diet closer, log FBS and PPBS for Dc Harris to review at appointment  Foot exam done today, opthal to be schedule per patient  Micoalbumin collected  Will leave further labs up to Dr Dc Harris   - POCT hemoglobin A1c  - Microalbumin, Random Urine (W/Creatinine)    2  Hyperlipidemia, unspecified hyperlipidemia type    -  goal <100, discussed with patient stressed diet, offered nutrition eval but refusing at this time, says "she knows what to do just not doing it" will increase Pravastatin to 40 mg once daily and repeat lipids and lft in 3 months  - pravastatin (PRAVACHOL) 40 mg tablet; Take 0 5 tablets (20 mg total) by mouth daily  Dispense: 90 tablet; Refill: 1  - Lipid panel; Future  - Hepatic function panel; Future  - Lipid panel  - Hepatic function panel    3  Current mild episode of major depressive disorder, unspecified whether recurrent ([address] )    - well controlled on Paxil once daily, recheck 1 year  - PARoxetine (PAXIL) 20 mg tablet; Take 1 tablet (20 mg total) by mouth daily  Dispense: 90 tablet; Refill: 1    4  Essential hypertension    - well controlled on Lisinopril 20 mg 2 tabs daily, was due for EKG but did not do today will do at follow up 12/2019, continue to stay active and watch diet better  - lisinopril (ZESTRIL) 20 mg tablet; Take 2 tablets (40 mg total) by mouth daily  Dispense: 180 tablet; Refill: 1    5  Mild intermittent asthma without complication    - well controlled on Advair 250/50 twice daily, Singulair at bed time and Proair as needed rare  - fluticasone-salmeterol (ADVAIR DISKUS) 250-50 mcg/dose inhaler; Inhale 1 puff 2 (two) times a day Rinse mouth after use  Dispense: 3 Inhaler; Refill: 3  - montelukast (SINGULAIR) 10 mg tablet; Take 1 tablet (10 mg total) by mouth daily at bedtime  Dispense: 90 tablet; Refill: 3  - albuterol (PROAIR HFA) 90 mcg/act inhaler; Inhale 2 puffs every 4 (four) hours as needed for wheezing or shortness of breath  Dispense: 3 Inhaler; Refill: 0    6  Diabetic retinopathy associated with type 1 diabetes mellitus, macular edema presence unspecified, unspecified laterality, unspecified retinopathy severity ([facility] )    - patient old Opthal back in her insurance will make an appt this month    7  Thrombocytosis ([facility] )    - improved from 471 to 420, never saw heme/onc, encouraged to do so, continue to montior here also    8  Morbid obesity ([facility] )    - encouraged patient to work on [employer], exercise  and adequate sleep for weight loss  Follow up if more help is needed    9  GERD    - discussed switch to Zantac 75 mg twice daily and attempting to get off Omeprazole as it should not be taken daily, patient will to try and if she cannot make the switch can consider GI eval    mammo - slip given  Pap - advised to schedule    F/u 3 months with EKG, recheck labs  F/u as needed    Subjective:   Chief Complaint   Patient presents with    Review labs    Medication Refill      Patient ID: Arabella Mora is a 37 y o  female  Patient here for follow up on diabetes  Was off [facility] and just restarted 2 weeks ago  Now since starting sugars   Random sugars 134  Running around at [employer] for work 50+ hours a week  Has an appointment with Dr Tiffany Lr  Making an appt to see Dr Paulina Tompkins for retinopathy, making an appointment ASAP  Compliant with all other oral medications  Depression well controlled on Paxil  Taking Omeprazole daily, when patient stops within 2 days heart burn returns  With it no heart burn  Moving bowels normally         The following portions of the patient's history were reviewed and updated as appropriate: allergies, current medications, past family history, past medical history, past social history, past surgical history and problem list     Past Medical History:   Diagnosis Date    Anemia     Asthma     w/acute exacerbation  Last assessed: 10/26/12    Chest pain 2/3/2016    Depression     Diabetes mellitus ([place] [address] )     Diabetic retinopathy ([place] [address] ) 2/3/2016    Gastroenteritis 02/03/2016    GERD (gastroesophageal reflux disease)     HTN (hypertension)     Hyperlipidemia 2/3/2016    Oligomenorrhea     Polycystic ovaries 2/3/2016    Retinal hemorrhage 2/3/2016    Retinopathy     Thrombocytosis (HCC)     Type 1 diabetes mellitus with ophthalmic manifestation ([place] [address] ) 2/3/2016     Past Surgical History:   Procedure Laterality Date    CATARACT EXTRACTION Bilateral     RETINAL LASER PROCEDURE       Family History   Problem Relation Age of Onset    Heart murmur Mother     Diabetes Mother         Mellitus    Thyroid disease Mother         Disorder    Diabetes Father         Mellitus    Hypertension Father     Diabetes Maternal Grandfather         Mellitus    Breast cancer Paternal Grandmother     Diabetes Maternal Aunt         Mellitus    Diabetes Maternal Uncle         Mellitus    Substance Abuse Neg Hx     Mental illness Neg Hx      Social History     Socioeconomic History    Marital status:      Spouse name: Not on file    Number of children: Not on file    Years of education: Not on file    Highest education level: Not on file   Occupational History    Not on file   Social Needs    Financial resource strain: Not on file    Food insecurity:     Worry: Not on file     Inability: Not on file    Transportation needs:     Medical: Not on file     Non-medical: Not on file   Tobacco Use    Smoking status: Never Smoker    Smokeless tobacco: Never Used    Tobacco comment: Per Allscripts: Former smoker   Quite in 1994   Substance and Sexual Activity    Alcohol use: Yes     Comment: Occasional/1 mixed drink twice a month if that    Drug use: No    Sexual activity: Not on file   Lifestyle    Physical activity:     Days per week: Not on file     Minutes per session: Not on file    Stress: Not on file   Relationships    Social connections:     Talks on phone: Not on file     Gets together: Not on file     Attends [employer] service: Not on file     Active member of club or organization: Not on file     Attends meetings of clubs or organizations: Not on file     Relationship status: Not on file    Intimate partner violence:     Fear of current or ex partner: Not on file     Emotionally abused: Not on file     Physically abused: Not on file     Forced sexual activity: Not on file   Other Topics Concern    Not on file   Social History Narrative    Always uses seatbelt    Caffeine use: 1-2 cups coffee daily    Has smoke detectors    [employer] Aff[employer]ations: [employer]       Current Outpatient Medications:     ADVAIR DISKUS 250-50 MCG/DOSE inhaler, INHALE 1 PUFF TWICE A DAY, Disp: 3 Inhaler, Rfl: 3    albuterol (PROAIR HFA) 90 mcg/act inhaler, Inhale 2 puffs every 4 (four) hours as needed for wheezing or shortness of breath, Disp: 3 Inhaler, Rfl: 0    Ascorbic Acid (VITAMIN C) 500 MG CAPS, Take 2 capsules by mouth daily, Disp: , Rfl:     BASAGLAR KWIKPEN 100 units/mL injection pen, Inject 65 Units under the skin daily, Disp: 15 pen, Rfl: 0    Biotin 10 MG TABS, Take 1 tablet by mouth daily, Disp: , Rfl:     budesonide (PULMICORT) 1 MG/2ML nebulizer solution, Take 1 mL (1 mg total) by nebulization daily (Patient taking differently: Take 1 mg by nebulization daily prn ), Disp: 60 mL, Rfl: 2    cyanocobalamin (VITAMIN B-12) 100 mcg tablet, Take by mouth daily, Disp: , Rfl:     HUMALOG 100 UNIT/ML injection, Inject 40 Units under the skin 3 (three) times a day before meals, Disp: 40 mL, Rfl: 6    lisinopril (ZESTRIL) 20 mg tablet, Take 2 tablets (40 mg total) by mouth daily, Disp: 20 tablet, Rfl: 0    montelukast (SINGULAIR) 10 mg tablet, Take 1 tablet (10 mg total) by mouth daily at bedtime, Disp: 90 tablet, Rfl: 3    omeprazole (PriLOSEC) 20 mg delayed release capsule, Take 20 mg by mouth daily  , Disp: , Rfl:     ONE TOUCH ULTRA TEST test strip, 1 each by Other route 3 (three) times a day before meals Use as instructed, Disp: 300 each, Rfl: 3    PARoxetine (PAXIL) 20 mg tablet, Take 1 tablet (20 mg total) by mouth daily, Disp: 90 tablet, Rfl: 0    pravastatin (PRAVACHOL) 20 mg tablet, TAKE 1 TABLET BY MOUTH DAILY, Disp: 30 tablet, Rfl: 5    Probiotic Product (ALIGN PO), Take by mouth, Disp: , Rfl:     Review of Systems   Constitutional: Negative  Eyes: Negative  Respiratory: Negative  Cardiovascular: Negative  Neurological: Negative  Objective:    Vitals:    08/10/19 1029   BP: 124/80   BP Location: Left arm   Patient Position: Sitting   Cuff Size: Standard   Pulse: 76   Resp: 16   Weight: 75 9 kg (167 lb 6 4 oz)   Height: 4' 7 5" (1 41 m)        Physical Exam   Constitutional: She is oriented to person, place, and time  She appears well-developed and well-nourished  Cardiovascular: Normal rate, regular rhythm and normal heart sounds  Pulses are no weak pulses  Pulses:       Dorsalis pedis pulses are 2+ on the right side, and 2+ on the left side  Posterior tibial pulses are 2+ on the right side, and 2+ on the left side  Pulmonary/Chest: Effort normal and breath sounds normal    Musculoskeletal: She exhibits no edema  Feet:   Right Foot:   Skin Integrity: Negative for ulcer, skin breakdown, erythema, warmth, callus or dry skin  Left Foot:   Skin Integrity: Negative for ulcer, skin breakdown, erythema, warmth, callus or dry skin  Neurological: She is alert and oriented to person, place, and time  Skin: Skin is warm  Psychiatric: She has a normal mood and affect  Judgment normal        Patient's shoes and socks removed  Right Foot/Ankle   Right Foot Inspection  Skin Exam: skin normal and skin intact no dry skin, no warmth, no callus, no erythema, no maceration, no abnormal color, no pre-ulcer, no ulcer and no callus                          Toe Exam: ROM and strength within normal limits  Sensory   Vibration: intact  Proprioception: intact   Monofilament testing: intact  Vascular  Capillary refills: < 3 seconds  The right DP pulse is 2+  The right PT pulse is 2+  Left Foot/Ankle  Left Foot Inspection  Skin Exam: skin normal and skin intactno dry skin, no warmth, no erythema, no maceration, normal color, no pre-ulcer, no ulcer and no callus                         Toe Exam: ROM and strength within normal limits                   Sensory   Vibration: intact  Proprioception: intact  Monofilament: intact  Vascular  Capillary refills: < 3 seconds  The left DP pulse is 2+  The left PT pulse is 2+  Assign Risk Category:  No deformity present; No loss of protective sensation; No weak pulses       Risk: 0      BMI Counseling: Body mass index is 38 21 kg/m²  Discussed the patient's BMI with her  The BMI is above average  BMI counseling and education was provided to the patient  Nutrition recommendations include reducing portion sizes, decreasing overall calorie intake and 3-5 servings of fruits/vegetables daily  Exercise recommendations include moderate aerobic physical activity for 150 minutes/week

## 2019-08-12 LAB
ALBUMIN/CREAT UR: 554 MCG/MG CREAT
CREAT UR-MCNC: 70 MG/DL (ref 20–275)
MICROALBUMIN UR-MCNC: 38.8 MG/DL

## 2019-08-12 RX ORDER — PAROXETINE HYDROCHLORIDE 20 MG/1
TABLET, FILM COATED ORAL
Qty: 90 TABLET | Refills: 3 | Status: SHIPPED | OUTPATIENT
Start: 2019-08-12 | End: 2020-04-13

## 2019-08-15 ENCOUNTER — CLINICAL SUPPORT (OUTPATIENT)
Dept: FAMILY MEDICINE CLINIC | Facility: CLINIC | Age: 44
End: 2019-08-15
Payer: COMMERCIAL

## 2019-08-15 ENCOUNTER — TELEPHONE (OUTPATIENT)
Dept: FAMILY MEDICINE CLINIC | Facility: CLINIC | Age: 44
End: 2019-08-15

## 2019-08-15 DIAGNOSIS — Z11.1 ENCOUNTER FOR PPD TEST: Primary | ICD-10-CM

## 2019-08-15 PROCEDURE — 86580 TB INTRADERMAL TEST: CPT

## 2019-08-15 NOTE — TELEPHONE ENCOUNTER
Libby Berrios called and needs a PPD for a job    Can I schedule her for this AM so she can come back on Saturday AM?

## 2019-08-17 ENCOUNTER — CLINICAL SUPPORT (OUTPATIENT)
Dept: FAMILY MEDICINE CLINIC | Facility: CLINIC | Age: 44
End: 2019-08-17

## 2019-08-17 DIAGNOSIS — Z11.1 ENCOUNTER FOR PPD SKIN TEST READING: Primary | ICD-10-CM

## 2019-08-17 LAB
INDURATION: 0 MM
TB SKIN TEST: NEGATIVE

## 2019-09-18 ENCOUNTER — TELEPHONE (OUTPATIENT)
Dept: FAMILY MEDICINE CLINIC | Facility: CLINIC | Age: 44
End: 2019-09-18

## 2019-09-18 NOTE — TELEPHONE ENCOUNTER
Patient thought Nicki Nuñez was going to increase her pravastatin to 40 mg but the prescription read take a half tablet which would only give patient 20 mg  What is correct? If a new script needs to be sent patient now has to use [facility]

## 2019-09-19 DIAGNOSIS — E78.5 HYPERLIPIDEMIA, UNSPECIFIED HYPERLIPIDEMIA TYPE: ICD-10-CM

## 2019-09-19 RX ORDER — PRAVASTATIN SODIUM 40 MG
40 TABLET ORAL DAILY
Qty: 90 TABLET | Refills: 1 | Status: SHIPPED | OUTPATIENT
Start: 2019-09-19 | End: 2020-03-14

## 2019-09-23 ENCOUNTER — TELEPHONE (OUTPATIENT)
Dept: ENDOCRINOLOGY | Facility: CLINIC | Age: 44
End: 2019-09-23

## 2019-09-23 NOTE — TELEPHONE ENCOUNTER
Patient called Humalog rejected at pharmacy  Waiting to find out which pharmacy so I can call and see what is covered

## 2019-09-24 ENCOUNTER — OFFICE VISIT (OUTPATIENT)
Dept: ENDOCRINOLOGY | Facility: HOSPITAL | Age: 44
End: 2019-09-24
Payer: COMMERCIAL

## 2019-09-24 VITALS
WEIGHT: 173.4 LBS | HEIGHT: 56 IN | HEART RATE: 92 BPM | DIASTOLIC BLOOD PRESSURE: 82 MMHG | SYSTOLIC BLOOD PRESSURE: 134 MMHG | BODY MASS INDEX: 39.01 KG/M2

## 2019-09-24 DIAGNOSIS — E11.9 TYPE 2 DIABETES MELLITUS WITHOUT COMPLICATION, WITHOUT LONG-TERM CURRENT USE OF INSULIN (HCC): ICD-10-CM

## 2019-09-24 DIAGNOSIS — E78.5 HYPERLIPIDEMIA, UNSPECIFIED HYPERLIPIDEMIA TYPE: ICD-10-CM

## 2019-09-24 DIAGNOSIS — E10.65 TYPE 1 DIABETES MELLITUS WITH HYPERGLYCEMIA (HCC): Primary | ICD-10-CM

## 2019-09-24 DIAGNOSIS — E10.3213 BOTH EYES AFFECTED BY MILD NONPROLIFERATIVE DIABETIC RETINOPATHY WITH MACULAR EDEMA, ASSOCIATED WITH TYPE 1 DIABETES MELLITUS (HCC): ICD-10-CM

## 2019-09-24 PROCEDURE — 99214 OFFICE O/P EST MOD 30 MIN: CPT | Performed by: NURSE PRACTITIONER

## 2019-09-24 RX ORDER — INSULIN GLARGINE 100 [IU]/ML
65 INJECTION, SOLUTION SUBCUTANEOUS DAILY
Qty: 15 PEN | Refills: 2 | Status: SHIPPED | OUTPATIENT
Start: 2019-09-24 | End: 2019-09-25 | Stop reason: CLARIF

## 2019-09-24 NOTE — PROGRESS NOTES
Darcy Quinteros 40 y o  female MRN: [id number]    Encounter: [id number]      Assessment/Plan     Assessment: This is a 40y o -year-old female with type 1 diabetes with diabetic retinopathy and neuropathy, PCOS and hyperlipidemia  Plan:  1  Type 1 diabetes  Her most recent hemoglobin A1c is 10 0  For now, I have asked her to restart Basaglar at 65 units at bedtime and adjust her NovoLog insulin to carbohydrate ratio to 1:3  She will check her blood sugars 3-4 times daily and send a record to the office in 1 week for review so that further adjustments can be made, if necessary  She was referred to medical nutrition therapy for help with her diet and diabetes education for review of carbohydrate counting  We had an introductory discussion regarding a dex com continuous glucose monitor  Supplementary educational materials provided at the time of the visit  Check hemoglobin A1c prior to next visit  2   Diabetic retinopathy  I have encouraged her to have a diabetic eye exam completed  3   Diabetic neuropathy  Diabetic foot exam performed in the office today reveals good monofilament sensation  4   Polycystic ovary syndrome  She states that her menstrual cycles are occurring regularly  5   Hypertension:  Continue lisinopril  Check comprehensive metabolic panel and urine microalbumin prior to next visit  6   Hyperlipidemia:  Continue pravastatin  Check fasting lipid panel prior to next visit  CC:  Type 1 Diabetes follow-up    History of Present Illness     HPI:  40y o  year old female with type 1 diabetes for approximately 26 years  She is on insulin at home and takes Novolog insulin with 1:1 5 insulin to carbohydrate ratio with each meal   Her most recent hemoglobin A1c from August 10, 2019 is 10 0  She has been without Basaglar for several months and treating with only short acting insulin  She denies any polyuria, polydipsia, nocturia and blurry vision  She denies polyphagia    She denies numbness or tingling of the feet  She denies chest pain or shortness of breath  She denies neuropathy, nephropathy, retinopathy, heart attack, stroke and claudication but does admit to none        Hypoglycemic episodes: None recently  Her most recent diabetic eye exam was in about 2 years ago with retinopathy  she has no complaints about her feet and does not follow Podiatry for regular diabetic foot care      Blood Sugar/Glucometer/Pump/CGM review:  She checks her blood sugars twice daily recently  She presents with no blood sugar records or meter for download in the office today  For her hyperlipidemia, she is treated with pravastatin 40 mg daily  For her hypertension/microalbuminuria, she is treated with lisinopril 40 mg daily  Review of Systems   Constitutional: Positive for fatigue  Negative for chills and fever  HENT: Negative  Negative for trouble swallowing and voice change  Eyes: Negative  Respiratory: Negative  Negative for chest tightness and shortness of breath  Cardiovascular: Negative  Negative for chest pain and palpitations  Gastrointestinal: Negative  Negative for abdominal pain, constipation, diarrhea and vomiting  Endocrine: Negative for cold intolerance, heat intolerance, polydipsia, polyphagia and polyuria  Genitourinary: Negative  Negative for menstrual problem (regulalrly occuring every 25-30 days)  Musculoskeletal: Negative  Skin: Negative  Allergic/Immunologic: Negative  Neurological: Negative  Negative for dizziness, syncope, light-headedness and headaches  Hematological: Negative  Psychiatric/Behavioral: Negative  All other systems reviewed and are negative  Historical Information   Past Medical History:   Diagnosis Date    Anemia     Asthma     w/acute exacerbation   Last assessed: 10/26/12    Chest pain 2/3/2016    Depression     [facility] ([facility] )     Diabetic retinopathy ([facility] ) 2/3/2016    Gastroenteritis 02/03/2016    GERD (gastroesophageal reflux disease)     HTN (hypertension)     Hyperlipidemia 2/3/2016    Oligomenorrhea     Polycystic ovaries 2/3/2016    Retinal hemorrhage 2/3/2016    Retinopathy     Thrombocytosis (HCC)     Type 1 diabetes mellitus with ophthalmic manifestation [facility] ) 2/3/2016     Past Surgical History:   Procedure Laterality Date    CATARACT EXTRACTION Bilateral     RETINAL LASER PROCEDURE       Social History   Social History     Substance and Sexual Activity   Alcohol Use Yes    Comment: Occasional/1 mixed drink twice a month if that     Social History     Substance and Sexual Activity   Drug Use No     Social History     Tobacco Use   Smoking Status Never Smoker   Smokeless Tobacco Never Used   Tobacco Comment    Per [employer]: Former smoker   Quite in 1994     Family History:   Family History   Problem Relation Age of Onset    Heart murmur Mother     Diabetes Mother         Mellitus    Thyroid disease Mother         Disorder    Diabetes Father         Mellitus    Hypertension Father     Diabetes Maternal Grandfather         Mellitus    Breast cancer Paternal Grandmother     Diabetes Maternal Aunt         Mellitus    Diabetes Maternal Uncle         Mellitus    Substance Abuse Neg Hx     Mental illness Neg Hx        Meds/Allergies   Current Outpatient Medications   Medication Sig Dispense Refill    ONE TOUCH ULTRA TEST test strip 1 each by Other route 3 (three) times a day before meals Use as instructed 300 each 3    albuterol (PROAIR HFA) 90 mcg/act inhaler Inhale 2 puffs every 4 (four) hours as needed for wheezing or shortness of breath 3 Inhaler 0    Ascorbic Acid (VITAMIN C) 500 MG CAPS Take 2 capsules by mouth daily      BASAGLAR KWIKPEN 100 units/mL injection pen Inject 65 Units under the skin daily 15 pen 0    Biotin 10 MG TABS Take 1 tablet by mouth daily      budesonide (PULMICORT) 1 MG/2ML nebulizer solution Take 1 mL (1 mg total) by nebulization daily (Patient taking differently: Take 1 mg by nebulization daily prn ) 60 mL 2    cyanocobalamin (VITAMIN B-12) 100 mcg tablet Take by mouth daily      fluticasone-salmeterol (ADVAIR DISKUS) 250-50 mcg/dose inhaler Inhale 1 puff 2 (two) times a day Rinse mouth after use  3 Inhaler 3    HUMALOG 100 UNIT/ML injection Inject 40 Units under the skin 3 (three) times a day before meals 40 mL 6    lisinopril (ZESTRIL) 20 mg tablet Take 2 tablets (40 mg total) by mouth daily 180 tablet 1    montelukast (SINGULAIR) 10 mg tablet Take 1 tablet (10 mg total) by mouth daily at bedtime 90 tablet 3    omeprazole (PriLOSEC) 20 mg delayed release capsule Take 20 mg by mouth daily   PARoxetine (PAXIL) 20 mg tablet TAKE 1 TABLET BY MOUTH EVERY DAY 90 tablet 3    PARoxetine (PAXIL) 20 mg tablet Take 1 tablet (20 mg total) by mouth daily 90 tablet 1    pravastatin (PRAVACHOL) 40 mg tablet Take 1 tablet (40 mg total) by mouth daily 90 tablet 1    Probiotic Product (ALIGN PO) Take by mouth       No current facility-administered medications for this visit  No Known Allergies    Objective   Vitals: Height 4' 7 5" (1 41 m), weight 78 7 kg (173 lb 6 4 oz)  Physical Exam   Constitutional: She is oriented to person, place, and time  She appears well-developed and well-nourished  Obese   HENT:   Head: Normocephalic and atraumatic  Mouth/Throat: Oropharynx is clear and moist    Eyes: Pupils are equal, round, and reactive to light  Conjunctivae and EOM are normal    Neck: Normal range of motion  Neck supple  Cardiovascular: Normal rate, regular rhythm, normal heart sounds and intact distal pulses  Pulses are no weak pulses  Pulses:       Dorsalis pedis pulses are 1+ on the right side, and 1+ on the left side  Posterior tibial pulses are 1+ on the right side, and 1+ on the left side  Pulmonary/Chest: Effort normal and breath sounds normal    Abdominal: Soft   Bowel sounds are normal    Musculoskeletal: Normal range of motion  Feet:   Right Foot:   Skin Integrity: Negative for ulcer, skin breakdown, erythema, warmth, callus or dry skin  Left Foot:   Skin Integrity: Negative for ulcer, skin breakdown, erythema, warmth, callus or dry skin  Neurological: She is alert and oriented to person, place, and time  Skin: Skin is warm and dry  Dry skin   Psychiatric: She has a normal mood and affect  Her behavior is normal  Judgment and thought content normal    Vitals reviewed  Patient's shoes and socks removed  Right Foot/Ankle   Right Foot Inspection  Skin Exam: skin normal and skin intact no dry skin, no warmth, no callus, no erythema, no maceration, no abnormal color, no pre-ulcer, no ulcer and no callus                          Toe Exam: ROM and strength within normal limits  Sensory       Monofilament testing: intact  Vascular  Capillary refills: < 3 seconds  The right DP pulse is 1+  The right PT pulse is 1+  Left Foot/Ankle  Left Foot Inspection  Skin Exam: skin normal and skin intactno dry skin, no warmth, no erythema, no maceration, normal color, no pre-ulcer, no ulcer and no callus                         Toe Exam: ROM and strength within normal limits                   Sensory       Monofilament: intact  Vascular  Capillary refills: < 3 seconds  The left DP pulse is 1+  The left PT pulse is 1+  Assign Risk Category:  No deformity present; No loss of protective sensation;  No weak pulses       Risk: 0        Lab Results:   Lab Results   Component Value Date/Time    Hemoglobin A1C 10 0 (A) 08/10/2019 10:35 AM    White Blood Cell Count 10 1 08/06/2019 07:02 AM    Hemoglobin 12 2 08/06/2019 07:02 AM    HCT 36 7 08/06/2019 07:02 AM    MCV 92 4 08/06/2019 07:02 AM    Platelet Count 572 (H) 08/06/2019 07:02 AM    BUN 24 08/06/2019 07:02 AM    Potassium 4 5 08/06/2019 07:02 AM    Chloride 102 08/06/2019 07:02 AM    CO2 25 08/06/2019 07:02 AM    Creatinine 0 67 08/06/2019 07:02 AM    AST 15 08/06/2019 07:02 AM    ALT 15 08/06/2019 07:02 AM    Albumin 3 8 08/06/2019 07:02 AM    Globulin 2 7 08/06/2019 07:02 AM    HDL 48 (L) 08/06/2019 07:02 AM    Triglycerides 274 (H) 08/06/2019 07:02 AM     Portions of the record may have been created with voice recognition software  Occasional wrong word or "sound a like" substitutions may have occurred due to the inherent limitations of voice recognition software  Read the chart carefully and recognize, using context, where substitutions have occurred

## 2019-09-24 NOTE — PATIENT INSTRUCTIONS
Be mindful of diet  Stay active and stay hydrated  Restart Basaglar at 65 units at bedtime  Adjust insulin to carbohydrate ratio to 1:3 at meals  Check your blood sugars regularly and send a record to the office in 2 weeks for review so that changes can be made to your insulin regimen  Continue lisinopril and pravastatin  Obtain lab work as prescribed  Obtain a diabetic eye exam     Follow up with medical nutrition therapy and diabetes education for a review of carbohydrate counting  Consider a DEX COM continues glucose monitor for personal use

## 2019-09-25 ENCOUNTER — TELEPHONE (OUTPATIENT)
Dept: ENDOCRINOLOGY | Facility: HOSPITAL | Age: 44
End: 2019-09-25

## 2019-09-25 DIAGNOSIS — E10.65 TYPE 1 DIABETES MELLITUS WITH HYPERGLYCEMIA (HCC): Primary | ICD-10-CM

## 2019-09-25 NOTE — TELEPHONE ENCOUNTER
I do believe that was part of her conversation    If it was not covered to find out what is    Let's do that

## 2019-09-25 NOTE — TELEPHONE ENCOUNTER
Pt also called back with same info, that Lantus and Toujeo are covered   She would prefer lantus which she was on previously

## 2019-09-25 NOTE — TELEPHONE ENCOUNTER
The Basaglar that you sent for pt yesterday is not covered by insurance  Do you want me to try the prior auth or advise pt to call and see what is covered?

## 2019-09-27 ENCOUNTER — OFFICE VISIT (OUTPATIENT)
Dept: FAMILY MEDICINE CLINIC | Facility: CLINIC | Age: 44
End: 2019-09-27
Payer: COMMERCIAL

## 2019-09-27 VITALS
HEIGHT: 56 IN | RESPIRATION RATE: 16 BRPM | SYSTOLIC BLOOD PRESSURE: 140 MMHG | WEIGHT: 173 LBS | HEART RATE: 84 BPM | DIASTOLIC BLOOD PRESSURE: 72 MMHG | BODY MASS INDEX: 38.92 KG/M2

## 2019-09-27 DIAGNOSIS — S46.811A TRAPEZIUS MUSCLE STRAIN, RIGHT, INITIAL ENCOUNTER: ICD-10-CM

## 2019-09-27 DIAGNOSIS — M54.2 NECK PAIN ON RIGHT SIDE: Primary | ICD-10-CM

## 2019-09-27 DIAGNOSIS — E10.65 TYPE 1 DIABETES MELLITUS WITH HYPERGLYCEMIA (HCC): ICD-10-CM

## 2019-09-27 PROCEDURE — 3008F BODY MASS INDEX DOCD: CPT | Performed by: FAMILY MEDICINE

## 2019-09-27 PROCEDURE — 99213 OFFICE O/P EST LOW 20 MIN: CPT | Performed by: FAMILY MEDICINE

## 2019-09-27 RX ORDER — ACETAMINOPHEN 500 MG
1000 TABLET ORAL EVERY 6 HOURS PRN
Qty: 30 TABLET | Refills: 0 | COMMUNITY
Start: 2019-09-27

## 2019-09-27 RX ORDER — CYCLOBENZAPRINE HCL 10 MG
10 TABLET ORAL
Qty: 15 TABLET | Refills: 0 | Status: SHIPPED | OUTPATIENT
Start: 2019-09-27 | End: 2020-03-04 | Stop reason: ALTCHOICE

## 2019-09-27 NOTE — PROGRESS NOTES
Assessment/Plan:     Diagnosis ICD-10-CM Associated Orders   1  Neck pain on right side M54 2 cyclobenzaprine (FLEXERIL) 10 mg tablet     acetaminophen (TYLENOL) 500 mg tablet     Ambulatory referral to Physical Therapy   2  Trapezius muscle strain, right, initial encounter S46 811A cyclobenzaprine (FLEXERIL) 10 mg tablet     acetaminophen (TYLENOL) 500 mg tablet     Ambulatory referral to Physical Therapy   Plan:  - discussed rest, importance of good posture especially when driving or sitting, use heat to right side neck 2 times a day, take Flexeril 10 mg at bedtime and Tylenol as needed, discussed stretching exercises and hand out given, discussed Physical therapy if symptoms not better by next week  Possible side effects of new medications were reviewed with the patient today  The treatment plan was reviewed with the patient  The patient understands and agrees with the treatment plan        Subjective:   Chief Complaint   Patient presents with    Neck Pain     R sides towards the back       Patient ID: Zandra Lopez is a 40 y o  female presents for evaluation of right sided neck pain onset about 2 weeks ago, her pain is now radiating to the back of her head and down her upper back  Patient denies any neck injury or previous problems with her neck, no radiation of pain into her right arm, no arm weakness or paresthesias  The following portions of the patient's history were reviewed and updated as appropriate: allergies, current medications, past family history, past medical history, past social history, past surgical history and problem list     Past Medical History:   Diagnosis Date    Anemia     Asthma     w/acute exacerbation   Last assessed: 10/26/12    Chest pain 2/3/2016    Depression     [facility] mellitus [facility]     Diabetic retinopathy ([facility] ) 2/3/2016    Gastroenteritis 02/03/2016    GERD (gastroesophageal reflux disease)     HTN (hypertension)     Hyperlipidemia 2/3/2016    Oligomenorrhea     Polycystic ovaries 2/3/2016    Retinal hemorrhage 2/3/2016    Retinopathy     Thrombocytosis (HCC)     Type 1 diabetes mellitus with ophthalmic manifestation ([facility] [address] ) 2/3/2016     Past Surgical History:   Procedure Laterality Date    CATARACT EXTRACTION Bilateral     RETINAL LASER PROCEDURE       Family History   Problem Relation Age of Onset    Heart murmur Mother     Diabetes Mother         Mellitus    Thyroid disease Mother         Disorder    Diabetes Father         Mellitus    Hypertension Father     Diabetes Maternal Grandfather         Mellitus    Breast cancer Paternal Grandmother     Diabetes Maternal Aunt         Mellitus    Diabetes Maternal Uncle         Mellitus    Substance Abuse Neg Hx     Mental illness Neg Hx      Social History     Socioeconomic History    Marital status: /Civil Union     Spouse name: Not on file    Number of children: Not on file    Years of education: Not on file    Highest education level: Not on file   Occupational History    Not on file   Social Needs    Financial resource strain: Not on file    Food insecurity:     Worry: Not on file     Inability: Not on file    Transportation needs:     Medical: Not on file     Non-medical: Not on file   Tobacco Use    Smoking status: Never Smoker    Smokeless tobacco: Never Used    Tobacco comment: Per [employer]: Former smoker   Quite in 1994   Substance and Sexual Activity    Alcohol use: Yes     Comment: Occasional/1 mixed drink twice a month if that    Drug use: No    Sexual activity: Not on file   Lifestyle    Physical activity:     Days per week: Not on file     Minutes per session: Not on file    Stress: Not on file   Relationships    Social connections:     Talks on phone: Not on file     Gets together: Not on file     Attends Yazidism service: Not on file     Active member of club or organization: Not on file     Attends meetings of clubs or organizations: Not on file Relationship status: Not on file    Intimate partner violence:     Fear of current or ex partner: Not on file     Emotionally abused: Not on file     Physically abused: Not on file     Forced sexual activity: Not on file   Other Topics Concern    Not on file   Social History Narrative    Always uses seatbelt    Caffeine use: 1-2 cups coffee daily    Has smoke detectors    Jainism Affiliations: [employer]       Current Outpatient Medications:     albuterol (PROAIR HFA) 90 mcg/act inhaler, Inhale 2 puffs every 4 (four) hours as needed for wheezing or shortness of breath, Disp: 3 Inhaler, Rfl: 0    Ascorbic Acid (VITAMIN C) 500 MG CAPS, Take 2 capsules by mouth daily, Disp: , Rfl:     Biotin 10 MG TABS, Take 1 tablet by mouth daily, Disp: , Rfl:     budesonide (PULMICORT) 1 MG/2ML nebulizer solution, Take 1 mL (1 mg total) by nebulization daily (Patient taking differently: Take 1 mg by nebulization daily prn ), Disp: 60 mL, Rfl: 2    cyanocobalamin (VITAMIN B-12) 100 mcg tablet, Take by mouth daily, Disp: , Rfl:     fluticasone-salmeterol (ADVAIR DISKUS) 250-50 mcg/dose inhaler, Inhale 1 puff 2 (two) times a day Rinse mouth after use , Disp: 3 Inhaler, Rfl: 3    HUMALOG 100 UNIT/ML injection, Inject 40 Units under the skin 3 (three) times a day before meals, Disp: 40 mL, Rfl: 6    insulin aspart (NovoLOG) 100 units/mL injection, Utilize 1:3 Insulin to Carb ratio, Disp: 30 mL, Rfl: 3    insulin glargine (LANTUS SOLOSTAR) 100 units/mL injection pen, Inject 65 Units under the skin daily, Disp: 10 pen, Rfl: 3    lisinopril (ZESTRIL) 20 mg tablet, Take 2 tablets (40 mg total) by mouth daily, Disp: 180 tablet, Rfl: 1    montelukast (SINGULAIR) 10 mg tablet, Take 1 tablet (10 mg total) by mouth daily at bedtime, Disp: 90 tablet, Rfl: 3    omeprazole (PriLOSEC) 20 mg delayed release capsule, Take 20 mg by mouth daily  , Disp: , Rfl:     ONE TOUCH ULTRA TEST test strip, 1 each by Other route 3 (three) times a day before meals Use as instructed, Disp: 300 each, Rfl: 3    PARoxetine (PAXIL) 20 mg tablet, TAKE 1 TABLET BY MOUTH EVERY DAY, Disp: 90 tablet, Rfl: 3    PARoxetine (PAXIL) 20 mg tablet, Take 1 tablet (20 mg total) by mouth daily, Disp: 90 tablet, Rfl: 1    pravastatin (PRAVACHOL) 40 mg tablet, Take 1 tablet (40 mg total) by mouth daily, Disp: 90 tablet, Rfl: 1    Probiotic Product (ALIGN PO), Take by mouth, Disp: , Rfl:     acetaminophen (TYLENOL) 500 mg tablet, Take 2 tablets (1,000 mg total) by mouth every 6 (six) hours as needed for mild pain or moderate pain, Disp: 30 tablet, Rfl: 0    cyclobenzaprine (FLEXERIL) 10 mg tablet, Take 1 tablet (10 mg total) by mouth daily at bedtime, Disp: 15 tablet, Rfl: 0    Review of Systems   Constitutional: Negative for chills, fatigue and fever  Respiratory: Negative for cough, shortness of breath and wheezing  Cardiovascular: Negative for chest pain and palpitations  Gastrointestinal: Negative for abdominal pain, blood in stool, diarrhea, nausea and vomiting  Musculoskeletal: Positive for neck pain  Neurological: Negative for dizziness, weakness, numbness and headaches  Hematological: Negative for adenopathy  Objective:    Vitals:    09/27/19 1058   BP: 140/72   BP Location: Left arm   Patient Position: Sitting   Cuff Size: Standard   Pulse: 84   Resp: 16   Weight: 78 5 kg (173 lb)   Height: 4' 8" (1 422 m)        Physical Exam   Constitutional: She is oriented to person, place, and time  She appears well-developed and well-nourished  No distress  Cardiovascular: Normal rate, regular rhythm and normal heart sounds  No murmur heard  Pulmonary/Chest: Effort normal  No respiratory distress  She has no wheezes  She has no rhonchi  She has no rales  Musculoskeletal: She exhibits no edema     Neck with good ROM with pain on right lateral rotation, no C-spine tenderness, + tenderness and tightness over right trapezius, right shoulder with good ROM without pain, muscle strength 5/5 in upper extremities   Neurological: She is alert and oriented to person, place, and time

## 2019-09-27 NOTE — TELEPHONE ENCOUNTER
Received call from pharmacy, they need a max daily dose for the patient's Novolog insulin prescription  Please advise with amount and we will send a new order

## 2019-09-27 NOTE — PATIENT INSTRUCTIONS
Neck Exercises   AMBULATORY CARE:   Neck exercises  help reduce neck pain, and improve neck movement and strength  Neck exercises also help prevent long-term neck problems  What you need to know about neck exercises:   · Do the exercises every day,  or as often as directed by your healthcare provider  · Move slowly, gently, and smoothly  Avoid fast or jerky motions  · Stand and sit the way your healthcare provider shows you  Good posture may reduce your neck pain  Check your posture often, even when you are not doing your neck exercises  How to perform neck exercises safely:   · Exercise position:  You may sit or stand while you do neck exercises  Face forward  Your shoulders should be straight and relaxed, with a good posture  · Head tilts, forward and back:  Gently bow your head and try to touch your chin to your chest  Your healthcare provider may tell you to push on the back of your neck to help bow your head  Raise your chin back to the starting position  Tilt your head back as far as possible so you are looking up at the ceiling  Your healthcare provider may tell you to lift your chin to help tilt your head back  Return your head to the starting position  · Head tilts, side to side:  Tilt your head, bringing your ear toward your shoulder  Then tilt your head toward the other shoulder  · Head turns:  Turn your head to look over your shoulder  Tilt your chin down and try to touch it to your shoulder  Do not raise your shoulder to your chin  Face forward again  Do the same on the other side  · Head rolls:  Slowly bring your chin toward your chest  Next, roll your head to the right  Your ear should be positioned over your shoulder  Hold this position for 5 seconds  Roll your head back toward your chest and to the left into the same position  Hold for 5 seconds  Gently roll your head back and around in a clockwise [place] 3 times   Next, move your head in the reverse direction (counterclockwise) in a [place] 3 times  Do not shrug your shoulders upwards while you do this exercise  Contact your healthcare provider if:   · Your pain does not get better, or gets worse  · You have questions or concerns about your condition, care, or exercise program   © 2017 [employer] Information is for End User's use only and may not be sold, redistributed or otherwise used for commercial purposes  All illustrations and images included in CareNotes® are the copyrighted property of [employer]  or [employer]  The above information is an  only  It is not intended as medical advice for individual conditions or treatments  Talk to your doctor, nurse or pharmacist before following any medical regimen to see if it is safe and effective for you

## 2019-10-29 ENCOUNTER — TELEPHONE (OUTPATIENT)
Dept: FAMILY MEDICINE CLINIC | Facility: CLINIC | Age: 44
End: 2019-10-29

## 2019-10-29 DIAGNOSIS — J45.20 MILD INTERMITTENT ASTHMA WITHOUT COMPLICATION: ICD-10-CM

## 2019-10-29 RX ORDER — MONTELUKAST SODIUM 10 MG/1
10 TABLET ORAL
Qty: 90 TABLET | Refills: 0 | Status: SHIPPED | OUTPATIENT
Start: 2019-10-29 | End: 2020-02-07

## 2019-10-30 ENCOUNTER — TELEPHONE (OUTPATIENT)
Dept: FAMILY MEDICINE CLINIC | Facility: CLINIC | Age: 44
End: 2019-10-30

## 2019-11-04 DIAGNOSIS — J45.20 MILD INTERMITTENT ASTHMA WITHOUT COMPLICATION: ICD-10-CM

## 2019-11-04 RX ORDER — ALBUTEROL SULFATE 90 UG/1
2 AEROSOL, METERED RESPIRATORY (INHALATION) EVERY 4 HOURS PRN
Qty: 25.5 INHALER | Refills: 0 | Status: SHIPPED | OUTPATIENT
Start: 2019-11-04 | End: 2019-12-31 | Stop reason: SDUPTHER

## 2019-12-02 ENCOUNTER — OFFICE VISIT (OUTPATIENT)
Dept: URGENT CARE | Facility: CLINIC | Age: 44
End: 2019-12-02
Payer: COMMERCIAL

## 2019-12-02 ENCOUNTER — APPOINTMENT (OUTPATIENT)
Dept: RADIOLOGY | Facility: CLINIC | Age: 44
End: 2019-12-02
Payer: COMMERCIAL

## 2019-12-02 VITALS
OXYGEN SATURATION: 94 % | SYSTOLIC BLOOD PRESSURE: 128 MMHG | HEART RATE: 95 BPM | DIASTOLIC BLOOD PRESSURE: 60 MMHG | HEIGHT: 58 IN | WEIGHT: 150 LBS | BODY MASS INDEX: 31.49 KG/M2 | TEMPERATURE: 98.2 F | RESPIRATION RATE: 19 BRPM

## 2019-12-02 DIAGNOSIS — R10.32 LEFT LOWER QUADRANT ABDOMINAL PAIN: ICD-10-CM

## 2019-12-02 DIAGNOSIS — M25.512 ACUTE PAIN OF LEFT SHOULDER: ICD-10-CM
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
## 2019-12-02 DIAGNOSIS — M25.571 ACUTE RIGHT ANKLE PAIN: ICD-10-CM

## 2019-12-02 DIAGNOSIS — S82.891A AVULSION FRACTURE OF ANKLE, RIGHT, CLOSED, INITIAL ENCOUNTER: ICD-10-CM

## 2019-12-02 PROCEDURE — 73030 X-RAY EXAM OF SHOULDER: CPT

## 2019-12-02 PROCEDURE — G0383 LEV 4 HOSP TYPE B ED VISIT: HCPCS | Performed by: PHYSICIAN ASSISTANT

## 2019-12-02 PROCEDURE — 73610 X-RAY EXAM OF ANKLE: CPT

## 2019-12-02 PROCEDURE — 29515 APPLICATION SHORT LEG SPLINT: CPT | Performed by: PHYSICIAN ASSISTANT
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
## 2019-12-02 NOTE — LETTER
December 2, 2019     Patient: Kelley Cota   YOB: 1975   Date of Visit: 12/2/2019       To Whom it May Concern:    Kelley Cota was seen in my clinic on 12/2/2019  She may return to work on 12/6/2019  If you have any questions or concerns, please don't hesitate to call           Sincerely,          Tamera Niño PA-C        CC: No Recipients

## 2019-12-06 ENCOUNTER — OFFICE VISIT (OUTPATIENT)
Dept: OBGYN CLINIC | Facility: CLINIC | Age: 44
End: 2019-12-06
Payer: COMMERCIAL

## 2019-12-06 ENCOUNTER — APPOINTMENT (OUTPATIENT)
Dept: RADIOLOGY | Facility: CLINIC | Age: 44
End: 2019-12-06
Payer: COMMERCIAL

## 2019-12-06 VITALS
WEIGHT: 150 LBS | SYSTOLIC BLOOD PRESSURE: 130 MMHG | HEART RATE: 80 BPM | HEIGHT: 58 IN | BODY MASS INDEX: 31.49 KG/M2 | DIASTOLIC BLOOD PRESSURE: 70 MMHG

## 2019-12-06 DIAGNOSIS — S92.024A CLOSED NONDISPLACED FRACTURE OF ANTERIOR PROCESS OF RIGHT CALCANEUS, INITIAL ENCOUNTER: Primary | ICD-10-CM

## 2019-12-06 DIAGNOSIS — M79.671 PAIN IN RIGHT FOOT: ICD-10-CM

## 2019-12-06 PROCEDURE — 73630 X-RAY EXAM OF FOOT: CPT

## 2019-12-06 PROCEDURE — 99203 OFFICE O/P NEW LOW 30 MIN: CPT | Performed by: ORTHOPAEDIC SURGERY
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
## 2019-12-06 NOTE — ASSESSMENT & PLAN NOTE
Findings consistent with right avulsion fracture of the anterior process of the calcaneus  Findings and treatment options were discussed with the patient  X-rays reviewed with her  Recommend use of postop shoe or Cam walker when ambulating  She was given prescriptions for both of them  Discussed importance of icing, elevating and NSAID use as needed  She was given a note to remain out of work at this time  Follow-up in 6 weeks for re-evaluation  All questions were answered to patient's satisfaction

## 2019-12-06 NOTE — LETTER
December 6, 2019     Patient: Ladarius Pereyra   YOB: 1975   Date of Visit: 12/6/2019       To Whom it May Concern:    Ladarius Pereyra is under my professional care  She was seen in my office on 12/6/2019  She cannot return to work at this time due to right foot injury  She will be reevaluated in 6 weeks  If you have any questions or concerns, please don't hesitate to call           Sincerely,          Juan Manuel Desai MD        CC: No Recipients

## 2019-12-31 ENCOUNTER — TELEPHONE (OUTPATIENT)
Dept: FAMILY MEDICINE CLINIC | Facility: CLINIC | Age: 44
End: 2019-12-31

## 2019-12-31 DIAGNOSIS — J45.20 MILD INTERMITTENT ASTHMA WITHOUT COMPLICATION: ICD-10-CM

## 2019-12-31 RX ORDER — ALBUTEROL SULFATE 90 UG/1
2 AEROSOL, METERED RESPIRATORY (INHALATION) EVERY 4 HOURS PRN
Qty: 25.5 INHALER | Refills: 0 | Status: SHIPPED | OUTPATIENT
Start: 2019-12-31 | End: 2020-06-05

## 2019-12-31 NOTE — TELEPHONE ENCOUNTER
Pt's pharmacy called stating the pt switched pharmacies and would need you to send over a new Rx for the proair       Call back number: [phone]

## 2020-01-17 ENCOUNTER — OFFICE VISIT (OUTPATIENT)
Dept: OBGYN CLINIC | Facility: CLINIC | Age: 45
End: 2020-01-17
Payer: COMMERCIAL

## 2020-01-17 VITALS
DIASTOLIC BLOOD PRESSURE: 82 MMHG | WEIGHT: 160 LBS | HEART RATE: 80 BPM | SYSTOLIC BLOOD PRESSURE: 126 MMHG | HEIGHT: 58 IN | BODY MASS INDEX: 33.58 KG/M2

## 2020-01-17 DIAGNOSIS — S92.024D CLOSED NONDISPLACED FRACTURE OF ANTERIOR PROCESS OF RIGHT CALCANEUS WITH ROUTINE HEALING, SUBSEQUENT ENCOUNTER: Primary | ICD-10-CM

## 2020-01-17 PROCEDURE — 99213 OFFICE O/P EST LOW 20 MIN: CPT | Performed by: ORTHOPAEDIC SURGERY

## 2020-01-17 NOTE — ASSESSMENT & PLAN NOTE
Findings consistent with findings consistent with right foot right calcaneus anterior process fracture, healing  Discussed findings and treatment options with the patient  We will allow patient to return to work as tolerated  Patient may ambulate wearing regular shoes  All patient's questions were answered to her satisfaction  This note is created using dictation transcription  It may contain typographical errors, grammatical errors, improperly dictated words, background noise and other errors

## 2020-01-17 NOTE — LETTER
January 17, 2020     Patient: Montana Lund   YOB: 1975   Date of Visit: 1/17/2020       To Whom it May Concern:    Montana Lund is under my professional care  She was seen in my office on 1/17/2020  She may return to work on 1/17/2020, no restrictions  If you have any questions or concerns, please don't hesitate to call           Sincerely,          Maryuri Salinas MD        CC: No Recipients

## 2020-01-17 NOTE — PROGRESS NOTES
Assessment:     1  Closed nondisplaced fracture of anterior process of right calcaneus with routine healing, subsequent encounter        Plan:     Problem List Items Addressed This Visit        Musculoskeletal and Integument    Closed nondisplaced fracture of anterior process of right calcaneus - Primary     Findings consistent with findings consistent with right foot right calcaneus anterior process fracture, healing  Discussed findings and treatment options with the patient  We will allow patient to return to work as tolerated  Patient may ambulate wearing regular shoes  All patient's questions were answered to her satisfaction  This note is created using dictation transcription  It may contain typographical errors, grammatical errors, improperly dictated words, background noise and other errors  Subjective:     Patient ID: Ranulfo Sanchez is a 40 y o  female  Chief Complaint:  40-year-old female follow-up right calcaneus anterior process fracture  Patient has been wearing regular shoes for the past couple weeks  She has minimal pain in her foot  She does have discomfort with hyper plantar flexion maneuver  She is ambulating without use of any assistive device  Allergy:  No Known Allergies  Medications:  all current active meds have been reviewed  Past Medical History:  Past Medical History:   Diagnosis Date    Anemia     Asthma     w/acute exacerbation   Last assessed: 10/26/12    Chest pain 2/3/2016    Depression     Diabetes mellitus ([address] )     Diabetic retinopathy ([address] ) 2/3/2016    Gastroenteritis 02/03/2016    GERD (gastroesophageal reflux disease)     HTN (hypertension)     Hyperlipidemia 2/3/2016    Oligomenorrhea     Polycystic ovaries 2/3/2016    Retinal hemorrhage 2/3/2016    Retinopathy     Thrombocytosis (HCC)     Type 1 diabetes mellitus with ophthalmic manifestation ([address] ) 2/3/2016     Past Surgical History:  Past Surgical History:   Procedure Laterality Date    CATARACT EXTRACTION Bilateral     RETINAL LASER PROCEDURE       Family History:  Family History   Problem Relation Age of Onset    Heart murmur Mother     Diabetes Mother         Mellitus    Thyroid disease Mother         Disorder    Diabetes Father         Mellitus    Hypertension Father     Diabetes Maternal Grandfather         Mellitus    Breast cancer Paternal Grandmother     Diabetes Maternal Aunt         Mellitus    Diabetes Maternal Uncle         Mellitus    Substance Abuse Neg Hx     Mental illness Neg Hx      Social History:  Social History     Substance and Sexual Activity   Alcohol Use Yes    Comment: Occasional/1 mixed drink twice a month if that     Social History     Substance and Sexual Activity   Drug Use No     Social History     Tobacco Use   Smoking Status Never Smoker   Smokeless Tobacco Never Used   Tobacco Comment    Per [employer]: Former smoker  Quite in 1994     Review of Systems   Constitutional: Negative  HENT: Negative  Eyes: Negative  Respiratory: Negative  Cardiovascular: Negative  Gastrointestinal: Negative  Endocrine: Negative  Genitourinary: Negative  Musculoskeletal: Negative for arthralgias, gait problem and joint swelling  Skin: Negative  Allergic/Immunologic: Negative  Neurological: Negative  Hematological: Negative  Psychiatric/Behavioral: Negative  Objective:  BP Readings from Last 1 Encounters:   01/17/20 126/82      Wt Readings from Last 1 Encounters:   01/17/20 72 6 kg (160 lb)      BMI:   Estimated body mass index is 33 44 kg/m² as calculated from the following:    Height as of this encounter: 4' 10" (1 473 m)  Weight as of this encounter: 72 6 kg (160 lb)  BSA:   Estimated body surface area is 1 66 meters squared as calculated from the following:    Height as of this encounter: 4' 10" (1 473 m)  Weight as of this encounter: 72 6 kg (160 lb)     Physical Exam   Constitutional: She is oriented to person, place, and time  She appears well-developed  HENT:   Head: Normocephalic and atraumatic  Eyes: Conjunctivae and EOM are normal    Neck: Neck supple  Pulmonary/Chest: Effort normal    Neurological: She is alert and oriented to person, place, and time  Skin: Skin is warm  Psychiatric: She has a normal mood and affect  Nursing note and vitals reviewed  Right Ankle Exam     Tenderness   The patient is experiencing no tenderness  Swelling: none    Range of Motion   The patient has normal right ankle ROM  Other   Erythema: absent  Scars: absent  Sensation: normal  Pulse: present     Comments:  No localized tenderness with palpation over right foot              No new images for review

## 2020-02-06 DIAGNOSIS — J45.20 MILD INTERMITTENT ASTHMA WITHOUT COMPLICATION: ICD-10-CM

## 2020-02-07 RX ORDER — MONTELUKAST SODIUM 10 MG/1
TABLET ORAL
Qty: 90 TABLET | Refills: 0 | Status: SHIPPED | OUTPATIENT
Start: 2020-02-07 | End: 2020-05-11

## 2020-03-02 ENCOUNTER — TELEPHONE (OUTPATIENT)
Dept: ENDOCRINOLOGY | Facility: HOSPITAL | Age: 45
End: 2020-03-02

## 2020-03-02 DIAGNOSIS — E10.65 TYPE 1 DIABETES MELLITUS WITH HYPERGLYCEMIA (HCC): ICD-10-CM

## 2020-03-02 DIAGNOSIS — I10 ESSENTIAL HYPERTENSION: Chronic | ICD-10-CM

## 2020-03-02 RX ORDER — LISINOPRIL 20 MG/1
TABLET ORAL
Qty: 60 TABLET | Refills: 0 | Status: SHIPPED | OUTPATIENT
Start: 2020-03-02 | End: 2020-04-06

## 2020-03-02 NOTE — TELEPHONE ENCOUNTER
Yasmine Ramos called for a refill of pt's Novolog  Pt has not been seen since September and she never got the blood work that was ordered at that time  I returned the pharmacy's call and advised them that the patient must contact our office before we can refill anything

## 2020-03-02 NOTE — TELEPHONE ENCOUNTER
Pt needs a refill of her Novolog please  She was last seen in September and has a follow up April 29th with you  She had an appointment with us in January and cancelled that  Are you ok with filling just enough to get her till her appointment with you next month? I also advised her she must get the labs done that you ordered in September before her appointment  Is there anything you want added to the previous script?

## 2020-03-04 ENCOUNTER — HOSPITAL ENCOUNTER (OUTPATIENT)
Dept: MAMMOGRAPHY | Facility: CLINIC | Age: 45
Discharge: HOME | End: 2020-03-04
Payer: COMMERCIAL

## 2020-03-04 ENCOUNTER — TRANSCRIBE ORDERS (OUTPATIENT)
Dept: MAMMOGRAPHY | Facility: CLINIC | Age: 45
End: 2020-03-04

## 2020-03-04 ENCOUNTER — OFFICE VISIT (OUTPATIENT)
Dept: FAMILY MEDICINE CLINIC | Facility: CLINIC | Age: 45
End: 2020-03-04
Payer: COMMERCIAL

## 2020-03-04 VITALS
DIASTOLIC BLOOD PRESSURE: 74 MMHG | HEART RATE: 100 BPM | RESPIRATION RATE: 16 BRPM | BODY MASS INDEX: 40.51 KG/M2 | WEIGHT: 180.1 LBS | SYSTOLIC BLOOD PRESSURE: 132 MMHG | HEIGHT: 56 IN

## 2020-03-04 VITALS — WEIGHT: 180 LBS | HEIGHT: 56 IN | BODY MASS INDEX: 40.49 KG/M2

## 2020-03-04 DIAGNOSIS — E10.65 TYPE 1 DIABETES MELLITUS WITH HYPERGLYCEMIA (HCC): Chronic | ICD-10-CM

## 2020-03-04 DIAGNOSIS — D75.839 THROMBOCYTOSIS: ICD-10-CM

## 2020-03-04 DIAGNOSIS — E78.5 HYPERLIPIDEMIA, UNSPECIFIED HYPERLIPIDEMIA TYPE: Chronic | ICD-10-CM

## 2020-03-04 DIAGNOSIS — Z12.31 ENCOUNTER FOR SCREENING MAMMOGRAM FOR BREAST CANCER: ICD-10-CM

## 2020-03-04 DIAGNOSIS — J45.30 MILD PERSISTENT ASTHMA, UNSPECIFIED WHETHER COMPLICATED: Primary | ICD-10-CM

## 2020-03-04 DIAGNOSIS — I10 ESSENTIAL HYPERTENSION: Chronic | ICD-10-CM

## 2020-03-04 DIAGNOSIS — F33.1 MODERATE EPISODE OF RECURRENT MAJOR DEPRESSIVE DISORDER (HCC): ICD-10-CM

## 2020-03-04 DIAGNOSIS — E66.01 SEVERE OBESITY (BMI 35.0-39.9) WITH COMORBIDITY (HCC): ICD-10-CM

## 2020-03-04 DIAGNOSIS — E10.3213 BOTH EYES AFFECTED BY MILD NONPROLIFERATIVE DIABETIC RETINOPATHY WITH MACULAR EDEMA, ASSOCIATED WITH TYPE 1 DIABETES MELLITUS (HCC): ICD-10-CM

## 2020-03-04 PROBLEM — S92.024A CLOSED NONDISPLACED FRACTURE OF ANTERIOR PROCESS OF RIGHT CALCANEUS: Status: RESOLVED | Noted: 2019-12-06 | Resolved: 2020-03-04

## 2020-03-04 LAB — SL AMB POCT HEMOGLOBIN AIC: 10.7 (ref ?–6.5)

## 2020-03-04 PROCEDURE — 3046F HEMOGLOBIN A1C LEVEL >9.0%: CPT | Performed by: PHYSICIAN ASSISTANT

## 2020-03-04 PROCEDURE — 3008F BODY MASS INDEX DOCD: CPT | Performed by: PHYSICIAN ASSISTANT

## 2020-03-04 PROCEDURE — 3078F DIAST BP <80 MM HG: CPT | Performed by: PHYSICIAN ASSISTANT

## 2020-03-04 PROCEDURE — 83036 HEMOGLOBIN GLYCOSYLATED A1C: CPT | Performed by: PHYSICIAN ASSISTANT

## 2020-03-04 PROCEDURE — 3075F SYST BP GE 130 - 139MM HG: CPT | Performed by: PHYSICIAN ASSISTANT

## 2020-03-04 PROCEDURE — 99214 OFFICE O/P EST MOD 30 MIN: CPT | Performed by: PHYSICIAN ASSISTANT

## 2020-03-04 PROCEDURE — 77063 BREAST TOMOSYNTHESIS BI: CPT

## 2020-03-04 PROCEDURE — 93000 ELECTROCARDIOGRAM COMPLETE: CPT | Performed by: PHYSICIAN ASSISTANT

## 2020-03-04 PROCEDURE — 1036F TOBACCO NON-USER: CPT | Performed by: PHYSICIAN ASSISTANT

## 2020-03-04 PROCEDURE — 77067 SCR MAMMO BI INCL CAD: CPT

## 2020-03-04 NOTE — PROGRESS NOTES
Assessment/Plan:  1  Type 1 diabetes mellitus with hyperglycemia (HCC)     - compliant with Balsalgar, still on Humalog tid  A1C 10 7 today but had been none compliant the entire 3 month period   Cancelled appt with Dr Farida Palacios on 9/2019, now has appt 4/29 with Nicholas [facility]  Advised to continue with BOTH insulins, watch diet closer, log FBS and PPBS for endo to review at appointment  Foot exam done today, opthal to be schedule per patient  Stressed importance of compliance, diet, exercise, weight loss, getting opthal exam done  - POCT hemoglobin A1c     2  Hyperlipidemia, unspecified hyperlipidemia type     -  goal <100, discussed with patient stressed diet, offered nutrition eval but refusing at this time, says "she knows what to do just not doing it" will increase Pravastatin to 40 mg once daily and repeat lipids and lft in 3 months, again stressed importance of following instructions and getting labs done  Lab slips repritned  - pravastatin (PRAVACHOL) 40 mg tablet; Take 0 5 tablets (20 mg total) by mouth daily  Dispense: 90 tablet; Refill: 1  - Lipid panel; Future  - Hepatic function panel; Future  - Lipid panel  - Hepatic function panel     3  Current mild episode of major depressive disorder, unspecified whether recurrent (HCC)     - well controlled on Paxil once daily, recheck 1 year  - PARoxetine (PAXIL) 20 mg tablet; Take 1 tablet (20 mg total) by mouth daily  Dispense: 90 tablet; Refill: 1     4  Essential hypertension     - well controlled on Lisinopril 20 mg 2 tabs daily, EKG - NSR, continue to stay active and watch diet better       5  Mild intermittent asthma without complication     - well controlled on Advair 250/50 twice daily, Singulair at bed time and Proair as needed rare  - fluticasone-salmeterol (ADVAIR DISKUS) 250-50 mcg/dose inhaler; Inhale 1 puff 2 (two) times a day Rinse mouth after use  Dispense: 3 Inhaler; Refill: 3  - montelukast (SINGULAIR) 10 mg tablet;  Take 1 tablet (10 mg total) by mouth daily at bedtime  Dispense: 90 tablet; Refill: 3  - albuterol (PROAIR HFA) 90 mcg/act inhaler; Inhale 2 puffs every 4 (four) hours as needed for wheezing or shortness of breath  Dispense: 3 Inhaler; Refill: 0     6  Diabetic retinopathy associated with type 1 diabetes mellitus, macular edema presence unspecified, unspecified laterality, unspecified retinopathy severity ([facility] )     - again stressed importance of seeing ophthalmology and getting A1C under control     7  Thrombocytosis (HCC)     - improved from 471 to 420, never saw heme/onc, encouraged to do so, continue to montior here also     8  Morbid obesity ([facility] )     - encouraged patient to work on [employer], exercise  and adequate sleep for weight loss  Follow up if more help is needed     9  GERD     - patietn switched to Zantac which didn't work, switched back to Omeprazole and now GERD resolved, no break through, will continue omeprazole for now but patient will need to see GI but need patient to focus on sugars first     mammo - slip given  Pap - advised to schedule     F/u 6 months with EKG, recheck labs  F/u as needed      Subjective:   Chief Complaint   Patient presents with    Diabetes    GERD    Asthma    Hyperlipidemia      Patient ID: Sugar Amaya is a 40 y o  female  Patient here for 6 month follow up  Continues two insulins, but cancelled 9/2019 appt with endo so never started the third medication as she was suppose to, not checking sugars, not exercising, not watching diet, gained weight  Did not see opthal as advised  Denies change in vision, states feet feel great  Compliant with lisinopril no complaints  Compliant with pravachol but never got labs done  No watching diet  Tried Zantac but did not control her heart burn at all, went back to Omeprazole with great control  Denies dysphagia, melena, hematochezia  Never saw heme        The following portions of the patient's history were reviewed and updated as appropriate: current medications, past family history, past medical history, past social history, past surgical history and problem list     Past Medical History:   Diagnosis Date    Anemia     Asthma     w/acute exacerbation  Last assessed: 10/26/12    Chest pain 2/3/2016    Depression     Diabetes mellitus ([place] [address] )     Diabetic retinopathy ([place] [address] ) 2/3/2016    Gastroenteritis 02/03/2016    GERD (gastroesophageal reflux disease)     HTN (hypertension)     Hyperlipidemia 2/3/2016    Oligomenorrhea     Polycystic ovaries 2/3/2016    Retinal hemorrhage 2/3/2016    Retinopathy     Thrombocytosis (HCC)     Type 1 diabetes mellitus with ophthalmic manifestation ([place] [address] ) 2/3/2016     Past Surgical History:   Procedure Laterality Date    CATARACT EXTRACTION Bilateral     RETINAL LASER PROCEDURE       Family History   Problem Relation Age of Onset    Heart murmur Mother     Diabetes Mother         Mellitus    Thyroid disease Mother         Disorder    Diabetes Father         Mellitus    Hypertension Father     Diabetes Maternal Grandfather         Mellitus    Breast cancer Paternal Grandmother     Diabetes Maternal Aunt         Mellitus    Diabetes Maternal Uncle         Mellitus    Substance Abuse Neg Hx     Mental illness Neg Hx     Alcohol abuse Neg Hx      Social History     Socioeconomic History    Marital status: /Civil Union     Spouse name: Not on file    Number of children: Not on file    Years of education: Not on file    Highest education level: Not on file   Occupational History    Not on file   Social Needs    Financial resource strain: Not on file    Food insecurity:     Worry: Not on file     Inability: Not on file    Transportation needs:     Medical: Not on file     Non-medical: Not on file   Tobacco Use    Smoking status: Never Smoker    Smokeless tobacco: Never Used    Tobacco comment: Per [employer]scripts: Former smoker   Quite in 1994   Substance and Sexual Activity    Alcohol use: Yes     Comment: Occasional/1 mixed drink twice a month if that    Drug use: No    Sexual activity: Yes     Partners: Male     Birth control/protection: Pill   Lifestyle    Physical activity:     Days per week: Not on file     Minutes per session: Not on file    Stress: Not on file   Relationships    Social connections:     Talks on phone: Not on file     Gets together: Not on file     Attends Temple service: Not on file     Active member of club or organization: Not on file     Attends meetings of clubs or organizations: Not on file     Relationship status: Not on file    Intimate partner violence:     Fear of current or ex partner: Not on file     Emotionally abused: Not on file     Physically abused: Not on file     Forced sexual activity: Not on file   Other Topics Concern    Not on file   Social History Narrative    Always uses seatbelt    Caffeine use: 1-2 cups coffee daily    Has smoke detectors    S[employer] Affiliations: Juan       Current Outpatient Medications:     acetaminophen (TYLENOL) 500 mg tablet, Take 2 tablets (1,000 mg total) by mouth every 6 (six) hours as needed for mild pain or moderate pain, Disp: 30 tablet, Rfl: 0    albuterol (PROVENTIL HFA,VENTOLIN HFA) 90 mcg/act inhaler, Inhale 2 puffs every 4 (four) hours as needed for wheezing or shortness of breath, Disp: 25 5 Inhaler, Rfl: 0    Ascorbic Acid (VITAMIN C) 500 MG CAPS, Take 2 capsules by mouth daily, Disp: , Rfl:     Biotin 10 MG TABS, Take 1 tablet by mouth daily, Disp: , Rfl:     budesonide (PULMICORT) 1 MG/2ML nebulizer solution, Take 1 mL (1 mg total) by nebulization daily (Patient taking differently: Take 1 mg by nebulization daily prn ), Disp: 60 mL, Rfl: 2    cyanocobalamin (VITAMIN B-12) 100 mcg tablet, Take by mouth daily, Disp: , Rfl:     fluticasone-salmeterol (ADVAIR DISKUS) 250-50 mcg/dose inhaler, Inhale 1 puff 2 (two) times a day Rinse mouth after use , Disp: 3 Inhaler, Rfl: 3    insulin aspart (NovoLOG) 100 units/mL injection, Use up to 100 units daily  , Disp: 30 mL, Rfl: 1    insulin glargine (LANTUS SOLOSTAR) 100 units/mL injection pen, Inject 65 Units under the skin daily, Disp: 10 pen, Rfl: 3    lisinopril (ZESTRIL) 20 mg tablet, TAKE 2 TABLETS BY MOUTH EVERY DAY, Disp: 60 tablet, Rfl: 0    montelukast (SINGULAIR) 10 mg tablet, TAKE 1 TABLET BY MOUTH AT BEDTIME, Disp: 90 tablet, Rfl: 0    omeprazole (PriLOSEC) 20 mg delayed release capsule, Take 20 mg by mouth daily  , Disp: , Rfl:     ONE TOUCH ULTRA TEST test strip, 1 each by Other route 3 (three) times a day before meals Use as instructed, Disp: 300 each, Rfl: 3    PARoxetine (PAXIL) 20 mg tablet, TAKE 1 TABLET BY MOUTH EVERY DAY, Disp: 90 tablet, Rfl: 3    pravastatin (PRAVACHOL) 40 mg tablet, Take 1 tablet (40 mg total) by mouth daily, Disp: 90 tablet, Rfl: 1    Probiotic Product (ALIGN PO), Take by mouth, Disp: , Rfl:     Review of Systems   Constitutional: Negative  Eyes: Negative  Respiratory: Negative  Cardiovascular: Negative  Neurological: Negative  Objective:    Vitals:    03/04/20 1118   BP: 138/70   BP Location: Left arm   Patient Position: Sitting   Cuff Size: Standard   Pulse: 100   Resp: 16   Weight: 81 7 kg (180 lb 1 6 oz)   Height: 4' 8" (1 422 m)        Physical Exam   Constitutional: She is oriented to person, place, and time  She appears well-developed and well-nourished  Cardiovascular: Normal rate, regular rhythm and normal heart sounds  Pulses are no weak pulses  Pulses:       Dorsalis pedis pulses are 2+ on the right side, and 2+ on the left side  Posterior tibial pulses are 2+ on the right side, and 2+ on the left side  Pulmonary/Chest: Effort normal and breath sounds normal    Feet:   Right Foot:   Skin Integrity: Negative for ulcer, skin breakdown, erythema, warmth, callus or dry skin     Left Foot:   Skin Integrity: Negative for ulcer, skin breakdown, erythema, warmth, callus or dry skin  Neurological: She is alert and oriented to person, place, and time  Skin: Skin is warm  Psychiatric: She has a normal mood and affect  Patient's shoes and socks removed  Right Foot/Ankle   Right Foot Inspection  Skin Exam: skin normal and skin intact no dry skin, no warmth, no callus, no erythema, no maceration, no abnormal color, no pre-ulcer, no ulcer and no callus                          Toe Exam: ROM and strength within normal limits  Sensory   Vibration: intact  Proprioception: intact   Monofilament testing: intact  Vascular  Capillary refills: < 3 seconds  The right DP pulse is 2+  The right PT pulse is 2+  Left Foot/Ankle  Left Foot Inspection  Skin Exam: skin normal and skin intactno dry skin, no warmth, no erythema, no maceration, normal color, no pre-ulcer, no ulcer and no callus                         Toe Exam: ROM and strength within normal limits                   Sensory   Vibration: intact  Proprioception: intact  Monofilament: intact  Vascular  Capillary refills: < 3 seconds  The left DP pulse is 2+  The left PT pulse is 2+  Assign Risk Category:  No deformity present; No loss of protective sensation; No weak pulses       Risk: 0      BMI Counseling: Body mass index is 40 38 kg/m²  The BMI is above normal  Nutrition recommendations include reducing portion sizes and decreasing overall calorie intake  Exercise recommendations include moderate aerobic physical activity for 150 minutes/week

## 2020-03-14 DIAGNOSIS — E78.5 HYPERLIPIDEMIA, UNSPECIFIED HYPERLIPIDEMIA TYPE: ICD-10-CM

## 2020-03-14 RX ORDER — PRAVASTATIN SODIUM 40 MG
TABLET ORAL
Qty: 90 TABLET | Refills: 1 | Status: SHIPPED | OUTPATIENT
Start: 2020-03-14 | End: 2020-08-18 | Stop reason: ALTCHOICE

## 2020-03-27 ENCOUNTER — TELEPHONE (OUTPATIENT)
Dept: FAMILY MEDICINE CLINIC | Facility: CLINIC | Age: 45
End: 2020-03-27

## 2020-03-27 DIAGNOSIS — J45.20 MILD INTERMITTENT ASTHMA WITHOUT COMPLICATION: ICD-10-CM

## 2020-03-27 NOTE — TELEPHONE ENCOUNTER
[facility]  is calling asking for a script for Advair to be sent to them  Any questions call [phone]

## 2020-04-05 DIAGNOSIS — I10 ESSENTIAL HYPERTENSION: Chronic | ICD-10-CM

## 2020-04-06 RX ORDER — LISINOPRIL 20 MG/1
TABLET ORAL
Qty: 60 TABLET | Refills: 0 | Status: SHIPPED | OUTPATIENT
Start: 2020-04-06 | End: 2020-05-11

## 2020-04-11 DIAGNOSIS — E10.65 TYPE 1 DIABETES MELLITUS WITH HYPERGLYCEMIA (HCC): ICD-10-CM

## 2020-04-13 DIAGNOSIS — F32.A DEPRESSION, UNSPECIFIED DEPRESSION TYPE: ICD-10-CM

## 2020-04-13 DIAGNOSIS — E10.65 TYPE 1 DIABETES MELLITUS WITH HYPERGLYCEMIA (HCC): ICD-10-CM

## 2020-04-13 RX ORDER — PAROXETINE HYDROCHLORIDE 20 MG/1
TABLET, FILM COATED ORAL
Qty: 90 TABLET | Refills: 0 | Status: SHIPPED | OUTPATIENT
Start: 2020-04-13 | End: 2020-07-21

## 2020-04-23 LAB
ALBUMIN SERPL-MCNC: 3.9 G/DL (ref 3.6–5.1)
ALBUMIN/CREAT UR: 1857 MCG/MG CREAT
ALBUMIN/GLOB SERPL: 1.5 (CALC) (ref 1–2.5)
ALP SERPL-CCNC: 116 U/L (ref 31–125)
ALT SERPL-CCNC: 15 U/L (ref 6–29)
AST SERPL-CCNC: 12 U/L (ref 10–30)
BILIRUB SERPL-MCNC: 0.3 MG/DL (ref 0.2–1.2)
BUN SERPL-MCNC: 23 MG/DL (ref 7–25)
BUN/CREAT SERPL: ABNORMAL (CALC) (ref 6–22)
CALCIUM SERPL-MCNC: 9.4 MG/DL (ref 8.6–10.2)
CHLORIDE SERPL-SCNC: 99 MMOL/L (ref 98–110)
CHOLEST SERPL-MCNC: 244 MG/DL
CHOLEST/HDLC SERPL: 5.3 (CALC)
CO2 SERPL-SCNC: 28 MMOL/L (ref 20–32)
CREAT SERPL-MCNC: 0.64 MG/DL (ref 0.5–1.1)
CREAT UR-MCNC: 35 MG/DL (ref 20–275)
EST. AVERAGE GLUCOSE BLD GHB EST-MCNC: 258 (CALC)
EST. AVERAGE GLUCOSE BLD GHB EST-SCNC: 14.3 (CALC)
GLOBULIN SER CALC-MCNC: 2.6 G/DL (CALC) (ref 1.9–3.7)
GLUCOSE SERPL-MCNC: 359 MG/DL (ref 65–99)
HBA1C MFR BLD: 10.6 % OF TOTAL HGB
HDLC SERPL-MCNC: 46 MG/DL
LDLC SERPL CALC-MCNC: 155 MG/DL (CALC)
MICROALBUMIN UR-MCNC: 65 MG/DL
NONHDLC SERPL-MCNC: 198 MG/DL (CALC)
POTASSIUM SERPL-SCNC: 5.1 MMOL/L (ref 3.5–5.3)
PROT SERPL-MCNC: 6.5 G/DL (ref 6.1–8.1)
SL AMB EGFR AFRICAN AMERICAN: 126 ML/MIN/1.73M2
SL AMB EGFR NON AFRICAN AMERICAN: 109 ML/MIN/1.73M2
SODIUM SERPL-SCNC: 136 MMOL/L (ref 135–146)
TRIGL SERPL-MCNC: 263 MG/DL
TSH SERPL-ACNC: 1.59 MIU/L

## 2020-04-23 PROCEDURE — 3062F POS MACROALBUMINURIA REV: CPT | Performed by: NURSE PRACTITIONER

## 2020-04-29 ENCOUNTER — TELEMEDICINE (OUTPATIENT)
Dept: ENDOCRINOLOGY | Facility: HOSPITAL | Age: 45
End: 2020-04-29
Payer: COMMERCIAL

## 2020-04-29 DIAGNOSIS — E10.3213 BOTH EYES AFFECTED BY MILD NONPROLIFERATIVE DIABETIC RETINOPATHY WITH MACULAR EDEMA, ASSOCIATED WITH TYPE 1 DIABETES MELLITUS (HCC): ICD-10-CM

## 2020-04-29 DIAGNOSIS — E78.5 HYPERLIPIDEMIA, UNSPECIFIED HYPERLIPIDEMIA TYPE: ICD-10-CM

## 2020-04-29 DIAGNOSIS — R80.9 MICROALBUMINURIA DUE TO TYPE 1 DIABETES MELLITUS (HCC): ICD-10-CM

## 2020-04-29 DIAGNOSIS — I10 ESSENTIAL HYPERTENSION: ICD-10-CM

## 2020-04-29 DIAGNOSIS — E10.29 MICROALBUMINURIA DUE TO TYPE 1 DIABETES MELLITUS (HCC): ICD-10-CM

## 2020-04-29 DIAGNOSIS — E10.65 TYPE 1 DIABETES MELLITUS WITH HYPERGLYCEMIA (HCC): Primary | ICD-10-CM

## 2020-04-29 PROCEDURE — 3066F NEPHROPATHY DOC TX: CPT | Performed by: NURSE PRACTITIONER

## 2020-04-29 PROCEDURE — 99214 OFFICE O/P EST MOD 30 MIN: CPT | Performed by: NURSE PRACTITIONER

## 2020-05-11 DIAGNOSIS — J45.20 MILD INTERMITTENT ASTHMA WITHOUT COMPLICATION: ICD-10-CM

## 2020-05-11 DIAGNOSIS — I10 ESSENTIAL HYPERTENSION: Chronic | ICD-10-CM

## 2020-05-11 RX ORDER — LISINOPRIL 20 MG/1
TABLET ORAL
Qty: 60 TABLET | Refills: 0 | Status: SHIPPED | OUTPATIENT
Start: 2020-05-11 | End: 2020-06-09

## 2020-05-11 RX ORDER — MONTELUKAST SODIUM 10 MG/1
TABLET ORAL
Qty: 90 TABLET | Refills: 0 | Status: ON HOLD | OUTPATIENT
Start: 2020-05-11 | End: 2020-08-07

## 2020-05-13 ENCOUNTER — TELEMEDICINE (OUTPATIENT)
Dept: DIABETES SERVICES | Facility: HOSPITAL | Age: 45
End: 2020-05-13
Payer: COMMERCIAL

## 2020-05-13 VITALS — BODY MASS INDEX: 40.49 KG/M2 | HEIGHT: 56 IN | WEIGHT: 180 LBS

## 2020-05-13 DIAGNOSIS — E10.65 TYPE 1 DIABETES MELLITUS WITH HYPERGLYCEMIA (HCC): Primary | ICD-10-CM

## 2020-05-13 PROCEDURE — 97802 MEDICAL NUTRITION INDIV IN: CPT | Performed by: DIETITIAN, REGISTERED

## 2020-05-24 DIAGNOSIS — E10.65 TYPE 1 DIABETES MELLITUS WITH HYPERGLYCEMIA (HCC): ICD-10-CM

## 2020-05-24 DIAGNOSIS — E10.3213 BOTH EYES AFFECTED BY MILD NONPROLIFERATIVE DIABETIC RETINOPATHY WITH MACULAR EDEMA, ASSOCIATED WITH TYPE 1 DIABETES MELLITUS (HCC): Primary | ICD-10-CM

## 2020-05-26 ENCOUNTER — TELEPHONE (OUTPATIENT)
Dept: ENDOCRINOLOGY | Facility: CLINIC | Age: 45
End: 2020-05-26

## 2020-05-28 ENCOUNTER — TELEMEDICINE (OUTPATIENT)
Dept: FAMILY MEDICINE CLINIC | Facility: CLINIC | Age: 45
End: 2020-05-28
Payer: COMMERCIAL

## 2020-05-28 VITALS — WEIGHT: 175 LBS | TEMPERATURE: 98 F | BODY MASS INDEX: 38.89 KG/M2

## 2020-05-28 DIAGNOSIS — Z20.828 EXPOSURE TO SARS-ASSOCIATED CORONAVIRUS: ICD-10-CM

## 2020-05-28 DIAGNOSIS — K13.0 ANGULAR CHEILITIS: Primary | ICD-10-CM

## 2020-05-28 PROCEDURE — U0003 INFECTIOUS AGENT DETECTION BY NUCLEIC ACID (DNA OR RNA); SEVERE ACUTE RESPIRATORY SYNDROME CORONAVIRUS 2 (SARS-COV-2) (CORONAVIRUS DISEASE [COVID-19]), AMPLIFIED PROBE TECHNIQUE, MAKING USE OF HIGH THROUGHPUT TECHNOLOGIES AS DESCRIBED BY CMS-2020-01-R: HCPCS

## 2020-05-28 PROCEDURE — 99213 OFFICE O/P EST LOW 20 MIN: CPT | Performed by: PHYSICIAN ASSISTANT

## 2020-05-30 LAB — SARS-COV-2 RNA SPEC QL NAA+PROBE: NOT DETECTED

## 2020-06-04 DIAGNOSIS — J45.20 MILD INTERMITTENT ASTHMA WITHOUT COMPLICATION: ICD-10-CM

## 2020-06-05 RX ORDER — ALBUTEROL SULFATE 90 UG/1
AEROSOL, METERED RESPIRATORY (INHALATION)
Qty: 25.5 G | Refills: 0 | Status: SHIPPED | OUTPATIENT
Start: 2020-06-05 | End: 2020-07-27

## 2020-06-09 DIAGNOSIS — I10 ESSENTIAL HYPERTENSION: Chronic | ICD-10-CM

## 2020-06-09 RX ORDER — LISINOPRIL 20 MG/1
TABLET ORAL
Qty: 60 TABLET | Refills: 0 | Status: SHIPPED | OUTPATIENT
Start: 2020-06-09 | End: 2020-07-14

## 2020-06-26 LAB
LEFT EYE DIABETIC RETINOPATHY: NORMAL
RIGHT EYE DIABETIC RETINOPATHY: NORMAL

## 2020-06-26 PROCEDURE — 2022F DILAT RTA XM EVC RTNOPTHY: CPT | Performed by: NURSE PRACTITIONER

## 2020-07-06 ENCOUNTER — OFFICE VISIT (OUTPATIENT)
Dept: FAMILY MEDICINE CLINIC | Facility: CLINIC | Age: 45
End: 2020-07-06
Payer: COMMERCIAL

## 2020-07-06 ENCOUNTER — TELEPHONE (OUTPATIENT)
Dept: FAMILY MEDICINE CLINIC | Facility: CLINIC | Age: 45
End: 2020-07-06

## 2020-07-06 VITALS
RESPIRATION RATE: 16 BRPM | HEART RATE: 72 BPM | BODY MASS INDEX: 40.26 KG/M2 | HEIGHT: 56 IN | TEMPERATURE: 98.7 F | SYSTOLIC BLOOD PRESSURE: 138 MMHG | DIASTOLIC BLOOD PRESSURE: 80 MMHG | WEIGHT: 179 LBS

## 2020-07-06 DIAGNOSIS — E10.65 TYPE 1 DIABETES MELLITUS WITH HYPERGLYCEMIA (HCC): Chronic | ICD-10-CM

## 2020-07-06 DIAGNOSIS — G43.909 MIGRAINE WITHOUT STATUS MIGRAINOSUS, NOT INTRACTABLE, UNSPECIFIED MIGRAINE TYPE: Primary | ICD-10-CM

## 2020-07-06 PROCEDURE — 3046F HEMOGLOBIN A1C LEVEL >9.0%: CPT | Performed by: PHYSICIAN ASSISTANT

## 2020-07-06 PROCEDURE — 99214 OFFICE O/P EST MOD 30 MIN: CPT | Performed by: PHYSICIAN ASSISTANT

## 2020-07-06 PROCEDURE — 3066F NEPHROPATHY DOC TX: CPT | Performed by: PHYSICIAN ASSISTANT

## 2020-07-06 PROCEDURE — 3079F DIAST BP 80-89 MM HG: CPT | Performed by: PHYSICIAN ASSISTANT

## 2020-07-06 PROCEDURE — 3008F BODY MASS INDEX DOCD: CPT | Performed by: PHYSICIAN ASSISTANT

## 2020-07-06 PROCEDURE — 1036F TOBACCO NON-USER: CPT | Performed by: PHYSICIAN ASSISTANT

## 2020-07-06 PROCEDURE — 3075F SYST BP GE 130 - 139MM HG: CPT | Performed by: PHYSICIAN ASSISTANT

## 2020-07-06 RX ORDER — BUTALBITAL, ACETAMINOPHEN AND CAFFEINE 300; 40; 50 MG/1; MG/1; MG/1
1 CAPSULE ORAL 4 TIMES DAILY PRN
Qty: 30 CAPSULE | Refills: 0 | Status: SHIPPED | OUTPATIENT
Start: 2020-07-06 | End: 2021-12-30 | Stop reason: SDUPTHER

## 2020-07-06 NOTE — TELEPHONE ENCOUNTER
Pt called from [facility] and is asking about her medication for migraines  Is it possible to put in the order ASAP so that she can pick it up while she's there?

## 2020-07-06 NOTE — PROGRESS NOTES
Assessment/Plan:    1  Migraine without status migrainosus, not intractable, unspecified migraine type    - try Fioricet 1-2 tabs every 4 hours as needed, rest, hydration  If no improvement can consider prednisone taper although avoiding this due to diabetes  Call if no better by end of week  Have a feeling the alcohol and dehydration from 7/4 played a large role in this also  - Butalbital-APAP-Caffeine (Fioricet) -40 MG CAPS; Take 1 tablet by mouth 4 (four) times a day as needed ([facility])  Dispense: 30 capsule; Refill: 0    2  Type 1 DM     - c/w insulin as prescribed and Endo  Compliance stressed!!!    F/u as needed  F/u as needed      Subjective:   Chief Complaint   Patient presents with    Headache     Migraine      Patient ID: Kari Mahmood is a 40 y o  female  Woke up Sunday morning with a migraine  Night before had a few alcoholic beverages, was outside  Woke up Sunday with pounding headache, in temples, slightly dizzy, slightly nauseated  Headache 9:10, constant  Tried ice, heat, massage of neck, advil without relief  Denies photophobia, phonophobia  Yesterday with waking sugar 300s, increased insulin yesterday and this morning 133  Gets migraines but not regularly  Usually down for 24 hours then resolves  The following portions of the patient's history were reviewed and updated as appropriate: allergies, current medications, past family history, past medical history, past social history, past surgical history and problem list     Past Medical History:   Diagnosis Date    Anemia     Asthma     w/acute exacerbation   Last assessed: 10/26/12    Chest pain 2/3/2016    Depression     Diabetes mellitus ([facility] )     Diabetic retinopathy ([facility] ) 2/3/2016    Gastroenteritis 02/03/2016    GERD (gastroesophageal reflux disease)     HTN (hypertension)     Hyperlipidemia 2/3/2016    Oligomenorrhea     Polycystic ovaries 2/3/2016    Retinal hemorrhage 2/3/2016    Retinopathy     Thrombocytosis ([facility] )     Type 1 diabetes mellitus with ophthalmic manifestation ([facility] ) 2/3/2016     Past Surgical History:   Procedure Laterality Date    CATARACT EXTRACTION Bilateral     RETINAL LASER PROCEDURE       Family History   Problem Relation Age of Onset    Heart murmur Mother     Diabetes Mother         Mellitus    Thyroid disease Mother         Disorder    Diabetes Father         Mellitus    Hypertension Father     Diabetes Maternal Grandfather         Mellitus    Breast cancer Paternal Grandmother     Diabetes Maternal Aunt         Mellitus    Diabetes Maternal Uncle         Mellitus    No Known Problems Maternal Grandmother     Leukemia Paternal Grandfather     Substance Abuse Neg Hx     Mental illness Neg Hx     Alcohol abuse Neg Hx      Social History     Socioeconomic History    Marital status: /Civil Union     Spouse name: Not on file    Number of children: Not on file    Years of education: Not on file    Highest education level: Not on file   Occupational History    Not on file   Social Needs    Financial resource strain: Not on file    Food insecurity:     Worry: Not on file     Inability: Not on file    Transportation needs:     Medical: Not on file     Non-medical: Not on file   Tobacco Use    Smoking status: Never Smoker    Smokeless tobacco: Never Used    Tobacco comment: Per [employer]scripts: Former smoker   Quite in 1994   Substance and Sexual Activity    Alcohol use: Yes     Comment: Occasional/1 mixed drink twice a month if that    Drug use: No    Sexual activity: Yes     Partners: Male     Birth control/protection: Pill   Lifestyle    Physical activity:     Days per week: Not on file     Minutes per session: Not on file    Stress: Not on file   Relationships    Social connections:     Talks on phone: Not on file     Gets together: Not on file     Attends Pentecostalism service: Not on file     Active member of club or organization: Not on file     Attends meetings of clubs or organizations: Not on file     Relationship status: Not on file    Intimate partner violence:     Fear of current or ex partner: Not on file     Emotionally abused: Not on file     Physically abused: Not on file     Forced sexual activity: Not on file   Other Topics Concern    Not on file   Social History Narrative    Always uses seatbelt    Caffeine use: 1-2 cups coffee daily    Has smoke detectors    [employer] Affiliations: Juan       Current Outpatient Medications:     acetaminophen (TYLENOL) 500 mg tablet, Take 2 tablets (1,000 mg total) by mouth every 6 (six) hours as needed for mild pain or moderate pain, Disp: 30 tablet, Rfl: 0    albuterol (PROVENTIL HFA,VENTOLIN HFA) 90 mcg/act inhaler, INHALE 2 PUFFS BY MOUTH EVERY 4 HOURS AS NEEDED FOR WHEEZING OR SHORTNESS OF BREATH, Disp: 25 5 g, Rfl: 0    Ascorbic Acid (VITAMIN C) 500 MG CAPS, Take 2 capsules by mouth daily, Disp: , Rfl:     Biotin 10 MG TABS, Take 1 tablet by mouth daily, Disp: , Rfl:     budesonide (PULMICORT) 1 MG/2ML nebulizer solution, Take 1 mL (1 mg total) by nebulization daily (Patient taking differently: Take 1 mg by nebulization daily prn ), Disp: 60 mL, Rfl: 2    cyanocobalamin (VITAMIN B-12) 100 mcg tablet, Take by mouth daily, Disp: , Rfl:     fluticasone-salmeterol (Advair Diskus) 250-50 mcg/dose inhaler, Inhale 1 puff 2 (two) times a day Rinse mouth after use , Disp: 3 Inhaler, Rfl: 3    insulin aspart (NovoLOG) 100 units/mL injection, Take 2 unit of novolog for every 1 g of carb, Disp: 30 mL, Rfl: 1    insulin glargine (Lantus SoloStar) 100 units/mL injection pen, Inject 65 Units under the skin daily (Patient taking differently: Inject 65 Units under the skin daily ), Disp: 10 pen, Rfl: 0    lisinopril (ZESTRIL) 20 mg tablet, TAKE 2 TABLETS BY MOUTH EVERY DAY, Disp: 60 tablet, Rfl: 0    montelukast (SINGULAIR) 10 mg tablet, TAKE 1 TABLET BY MOUTH EVERY DAY AT BEDTIME, Disp: 90 tablet, Rfl: 0   omeprazole (PriLOSEC) 20 mg delayed release capsule, Take 20 mg by mouth daily  , Disp: , Rfl:     ONE TOUCH ULTRA TEST test strip, 1 each by Other route 3 (three) times a day before meals Use as instructed, Disp: 300 each, Rfl: 3    PARoxetine (PAXIL) 20 mg tablet, TAKE 1 TABLET BY MOUTH EVERY DAY, Disp: 90 tablet, Rfl: 0    pravastatin (PRAVACHOL) 40 mg tablet, TAKE 1 TABLET BY MOUTH EVERY DAY, Disp: 90 tablet, Rfl: 1    Probiotic Product (ALIGN PO), Take by mouth, Disp: , Rfl:     insulin aspart (NovoLOG) 100 units/mL injection, Use up to 100 units daily  (Patient not taking: Reported on 4/29/2020), Disp: 30 mL, Rfl: 1    Review of Systems          Objective:    Vitals:    07/06/20 0944   BP: 138/80   BP Location: Left arm   Patient Position: Sitting   Cuff Size: Standard   Pulse: 72   Resp: 16   Temp: 98 7 °F (37 1 °C)   TempSrc: Temporal   Weight: 81 2 kg (179 lb)   Height: 4' 8" (1 422 m)        Physical Exam   Constitutional: She is oriented to person, place, and time  She appears well-developed and well-nourished  HENT:   Head: Normocephalic and atraumatic  Right Ear: Hearing, tympanic membrane, external ear and ear canal normal    Left Ear: Hearing, tympanic membrane, external ear and ear canal normal    Nose: Nose normal    Mouth/Throat: Uvula is midline, oropharynx is clear and moist and mucous membranes are normal    Eyes: Pupils are equal, round, and reactive to light  Conjunctivae and EOM are normal    Neck: Normal range of motion  Neck supple  No thyromegaly present  Cardiovascular: Normal rate, regular rhythm and normal heart sounds  Pulmonary/Chest: Effort normal and breath sounds normal    Neurological: She is alert and oriented to person, place, and time  She displays normal reflexes  No cranial nerve deficit or sensory deficit  She exhibits normal muscle tone  Coordination normal    Skin: Skin is warm  Psychiatric: She has a normal mood and affect

## 2020-07-07 ENCOUNTER — TELEPHONE (OUTPATIENT)
Dept: FAMILY MEDICINE CLINIC | Facility: CLINIC | Age: 45
End: 2020-07-07

## 2020-07-07 NOTE — TELEPHONE ENCOUNTER
If that the headache is that bad she should go to the ER, they can do a CT scan and labs STAT to make sure everything else is ok

## 2020-07-07 NOTE — TELEPHONE ENCOUNTER
Wanda Diallo called, said she took 3 of the pills you rx'd her yesterday and she still has a " really bad migraine"  She can not go to work today    She wants to know what else she should do she can not " take the headaches it any more"      [facility] [place] is the pharmacy      Her # [phone]

## 2020-07-09 DIAGNOSIS — E10.65 TYPE 1 DIABETES MELLITUS WITH HYPERGLYCEMIA (HCC): ICD-10-CM

## 2020-07-14 DIAGNOSIS — I10 ESSENTIAL HYPERTENSION: Chronic | ICD-10-CM

## 2020-07-14 RX ORDER — LISINOPRIL 20 MG/1
TABLET ORAL
Qty: 60 TABLET | Refills: 0 | Status: SHIPPED | OUTPATIENT
Start: 2020-07-14 | End: 2020-08-25

## 2020-07-20 ENCOUNTER — TELEPHONE (OUTPATIENT)
Dept: NEPHROLOGY | Facility: CLINIC | Age: 45
End: 2020-07-20

## 2020-07-20 DIAGNOSIS — F32.A DEPRESSION, UNSPECIFIED DEPRESSION TYPE: ICD-10-CM

## 2020-07-21 ENCOUNTER — TELEMEDICINE (OUTPATIENT)
Dept: NEPHROLOGY | Facility: CLINIC | Age: 45
End: 2020-07-21
Payer: COMMERCIAL

## 2020-07-21 VITALS — WEIGHT: 175 LBS | HEIGHT: 58 IN | BODY MASS INDEX: 36.73 KG/M2 | RESPIRATION RATE: 12 BRPM

## 2020-07-21 DIAGNOSIS — E10.21 TYPE 1 DIABETES MELLITUS WITH NEPHROPATHY (HCC): Primary | Chronic | ICD-10-CM

## 2020-07-21 DIAGNOSIS — E10.65 TYPE 1 DIABETES MELLITUS WITH HYPERGLYCEMIA (HCC): ICD-10-CM

## 2020-07-21 DIAGNOSIS — I10 ESSENTIAL HYPERTENSION: Chronic | ICD-10-CM

## 2020-07-21 PROBLEM — R80.8 OTHER PROTEINURIA: Status: ACTIVE | Noted: 2020-07-21

## 2020-07-21 PROCEDURE — 3046F HEMOGLOBIN A1C LEVEL >9.0%: CPT | Performed by: INTERNAL MEDICINE

## 2020-07-21 PROCEDURE — 3008F BODY MASS INDEX DOCD: CPT | Performed by: INTERNAL MEDICINE

## 2020-07-21 PROCEDURE — 3079F DIAST BP 80-89 MM HG: CPT | Performed by: INTERNAL MEDICINE

## 2020-07-21 PROCEDURE — 99203 OFFICE O/P NEW LOW 30 MIN: CPT | Performed by: INTERNAL MEDICINE

## 2020-07-21 PROCEDURE — 1036F TOBACCO NON-USER: CPT | Performed by: INTERNAL MEDICINE

## 2020-07-21 PROCEDURE — 3066F NEPHROPATHY DOC TX: CPT | Performed by: INTERNAL MEDICINE

## 2020-07-21 PROCEDURE — 3075F SYST BP GE 130 - 139MM HG: CPT | Performed by: INTERNAL MEDICINE

## 2020-07-21 RX ORDER — PAROXETINE HYDROCHLORIDE 20 MG/1
TABLET, FILM COATED ORAL
Qty: 90 TABLET | Refills: 0 | Status: SHIPPED | OUTPATIENT
Start: 2020-07-21 | End: 2020-11-02

## 2020-07-21 NOTE — LETTER
July 21, 2020     Lucy Ojeda PA-C  [address] [address] [address] [place] [address]    Patient: Jaz Mcelroy   YOB: 1975   Date of Visit: 7/21/2020       Dear Dr Melvina Cheadle: Thank you for referring Jaz Mcelroy to me for evaluation  Below are my notes for this consultation  This patient's A1C remains uncontrolled and seems to have been this way for at least 4 years  Her proteinuria is definitely a sign of early diabetic nephropathy  She is already on max dose lisinopril  I highly recommend insulin pump vs other mode of improved glycemic control for this patient as soon as possible as the risk of CKD is much higher in those with diabetes type 1, particularly when eye involvement is already present  I hope endocrinology can help with possible insulin pump - the patient is interested in this  Thanks  If you have questions, please do not hesitate to call me  I look forward to following your patient along with you  Sincerely,        Charmaine Way DO        CC: LELAND Prajapati, [place]  7/21/2020  8:57 AM  Sign at close encounter  CONSULTATION NOTE  Virtual Regular Visit      Assessment/Plan:    Problem List Items Addressed This Visit        Endocrine    Type 1 diabetes ([address] ) - Primary (Chronic)    Relevant Medications    insulin glargine (Lantus SoloStar) 100 units/mL injection pen    Other Relevant Orders    Comprehensive metabolic panel    Urinalysis with microscopic    CBC and differential    Microalbumin, Random Urine (W/Creatinine)    Protein, Total w/Creat, Random Urine       Cardiovascular and Mediastinum    Hypertension (Chronic)      1  Proteinuria in setting of DM type 1, concern for diabetic nephropathy in setting of uncontrolled blood sugars    -of note, normal renal function with last sCr 0 64 as of 4/22/20  -last microalb:Cr has risen to 1857 mg/g  -already on total 40mg lisinopril begun 7/10/17  -avoid nonsteroidals including ibuprofen, aleve, advil, motrin, naproxen, indomethacin, celebrex, toradol as these medications can lead to increased protein in the urine  -Will check CMP, UA with microscopy as well as Urine protein:Cr and microalb:Cr     2  Hypertension - BP well controlled on lisinopril 20mg twice daily - continue this  -avoid high salt/sodium containing diet  -avoid beverages containing caffeine    3  Anemia - Hgb normal range as of Nov 2017  Repeat CBC  4  High normal potassium - in setting of ACEi use  Limit high potassium foods  [address] low potassium diet for more information  5  Uncontrolled DM type 1 with retinopathy - last A1C 10 6, needs improved glycemic control to improve proteinuria and decrease risk of CKD developing  Would benefit from insulin pump  Will d/w endocrinology  6  GERD - on omeprazole 20mg daily    RTC in 6 months  Reason for visit is   Chief Complaint   Patient presents with    Virtual Regular Visit    Proteinuria        Encounter provider Timur Malin DO    Provider located at [address] [id number]      Recent Visits  Date Type Provider Dept   07/20/20 Telephone Timur Malin, [address] [facility] [address] recent visits within past 7 days and meeting all other requirements     Today's Visits  Date Type Provider Dept   07/21/20 Telemedicine Moni Camejo, [address] [facility] [address] today's visits and meeting all other requirements     Future Appointments  No visits were found meeting these conditions  Showing future appointments within next 150 days and meeting all other requirements        The patient was identified by name and date of birth  Ludmila Mejía was informed that this is a telemedicine visit and that the visit is being conducted through [facility] and patient was informed that this is a secure, HIPAA-compliant platform  She agrees to proceed     My office door was closed  No one else was in the room  She acknowledged consent and understanding of privacy and security of the video platform  The patient has agreed to participate and understands they can discontinue the visit at any time  Patient is aware this is a billable service  Subjective  Palmira Soto is a 40 y o  female presents in consultation for proteinuria   Diagnosed with DM type at age 16  Has had trouble controlling sugars  Lately, her blood glucose has been mostly in the 200-300s  She is interested in an insulin pump  Follows with endo, Dr Yi Cain  Has DM retinopathy without neuropathy  HTN - BP controlled with lisinopril  Never uncontrolled in the past     Does take aleve periodically  Had lost 50lbs in the past but has gained it back  She loves food  She joined [employer]  She wants to do dionicio  Past Medical History:   Diagnosis Date    Anemia     Asthma     w/acute exacerbation   Last assessed: 10/26/12    Chest pain 2/3/2016    Depression     Diabetes mellitus [facility] )     Diabetic nephropathy ([facility] )     Diabetic retinopathy ([facility] ) 2/3/2016    Gastroenteritis 02/03/2016    GERD (gastroesophageal reflux disease)     HTN (hypertension)     Hyperlipidemia 2/3/2016    Oligomenorrhea     Polycystic ovaries 2/3/2016    Retinal hemorrhage 2/3/2016    Retinopathy     Thrombocytosis (HCC)     Type 1 diabetes mellitus with ophthalmic manifestation ([facility] ) 2/3/2016       Past Surgical History:   Procedure Laterality Date    CATARACT EXTRACTION Bilateral     RETINAL LASER PROCEDURE         Current Outpatient Medications   Medication Sig Dispense Refill    albuterol (PROVENTIL HFA,VENTOLIN HFA) 90 mcg/act inhaler INHALE 2 PUFFS BY MOUTH EVERY 4 HOURS AS NEEDED FOR WHEEZING OR SHORTNESS OF BREATH 25 5 g 0    Ascorbic Acid (VITAMIN C) 500 MG CAPS Take 2 capsules by mouth daily      Biotin 10 MG TABS Take 1 tablet by mouth daily      budesonide (PULMICORT) 1 MG/2ML nebulizer solution Take 1 mL (1 mg total) by nebulization daily (Patient taking differently: Take 1 mg by nebulization daily prn ) 60 mL 2    Butalbital-APAP-Caffeine (Fioricet) -40 MG CAPS Take 1 tablet by mouth 4 (four) times a day as needed (headahce) 30 capsule 0    cyanocobalamin (VITAMIN B-12) 100 mcg tablet Take by mouth daily      fluticasone-salmeterol (Advair Diskus) 250-50 mcg/dose inhaler Inhale 1 puff 2 (two) times a day Rinse mouth after use  3 Inhaler 3    insulin aspart (NovoLOG) 100 units/mL injection Take 2 unit of novolog for every 1 g of carb 30 mL 0    insulin glargine (Lantus SoloStar) 100 units/mL injection pen Inject 75 Units under the skin daily 10 pen 0    lisinopril (ZESTRIL) 20 mg tablet TAKE 2 TABLETS BY MOUTH EVERY DAY 60 tablet 0    montelukast (SINGULAIR) 10 mg tablet TAKE 1 TABLET BY MOUTH EVERY DAY AT BEDTIME 90 tablet 0    omeprazole (PriLOSEC) 20 mg delayed release capsule Take 20 mg by mouth daily   ONE TOUCH ULTRA TEST test strip 1 each by Other route 3 (three) times a day before meals Use as instructed 300 each 3    PARoxetine (PAXIL) 20 mg tablet TAKE 1 TABLET BY MOUTH EVERY DAY 90 tablet 0    pravastatin (PRAVACHOL) 40 mg tablet TAKE 1 TABLET BY MOUTH EVERY DAY 90 tablet 1    Probiotic Product (ALIGN PO) Take by mouth      acetaminophen (TYLENOL) 500 mg tablet Take 2 tablets (1,000 mg total) by mouth every 6 (six) hours as needed for mild pain or moderate pain 30 tablet 0    insulin aspart (NovoLOG) 100 units/mL injection Use up to 100 units daily  (Patient not taking: Reported on 4/29/2020) 30 mL 1     No current facility-administered medications for this visit  No Known Allergies   No new allergies    Social History has been reviewed  Denies tobacco/admits to occasional alcohol use  Family History has been reviewed  Denies any changes  Review of Systems   Constitutional: Negative for chills and fever  HENT: Negative for sore throat      Eyes: Positive for visual disturbance (left eye blurry - cataract)  Respiratory: Negative for cough and shortness of breath  Cardiovascular: Negative for chest pain and leg swelling  Gastrointestinal: Negative for abdominal pain, constipation, diarrhea, nausea and vomiting  Genitourinary: Negative for difficulty urinating, dysuria and hematuria  Musculoskeletal: Negative for back pain and neck pain  Skin: Positive for rash (on her arms with redness and blotches)  Neurological: Negative for dizziness, light-headedness and numbness  Hematological: Negative for adenopathy  Does not bruise/bleed easily  Psychiatric/Behavioral: Negative for confusion  Video Exam  Does not have BP cuff at home to determine BP/HR  Vitals:    07/21/20 0846   Resp: 12   Weight: 79 4 kg (175 lb)   Height: 4' 10" (1 473 m)       Physical Exam   Constitutional: She is oriented to person, place, and time  She appears well-developed and well-nourished  No distress  obese   HENT:   Head: Normocephalic and atraumatic  Mouth/Throat: No oropharyngeal exudate  Eyes: EOM are normal  Right eye exhibits no discharge  Left eye exhibits no discharge  No scleral icterus  Neck: Normal range of motion  Neck supple  No thyromegaly present  Pulmonary/Chest: Effort normal  No respiratory distress  She has no wheezes  Abdominal: Soft  She exhibits no distension  There is no tenderness  Musculoskeletal: Normal range of motion  She exhibits no edema  Lymphadenopathy:     She has no cervical adenopathy  Neurological: She is alert and oriented to person, place, and time  Skin: No rash noted  She is not diaphoretic  No erythema  No pallor  Psychiatric: She has a normal mood and affect  Her behavior is normal    Vitals reviewed  I spent 40 minutes directly with the patient during this visit      VIRTUAL VISIT DISCLAIMER    Aleks Pelayo acknowledges that she has consented to an online visit or consultation   She understands that the online visit is based solely on information provided by her, and that, in the absence of a face-to-face physical evaluation by the physician, the diagnosis she receives is both limited and provisional in terms of accuracy and completeness  This is not intended to replace a full medical face-to-face evaluation by the physician  Shi Louis understands and accepts these terms

## 2020-07-21 NOTE — PROGRESS NOTES
CONSULTATION NOTE  Virtual Regular Visit      Assessment/Plan:    Problem List Items Addressed This Visit        Endocrine    Type 1 diabetes (HCC) - Primary (Chronic)    Relevant Medications    insulin glargine (Lantus SoloStar) 100 units/mL injection pen    Other Relevant Orders    Comprehensive metabolic panel    Urinalysis with microscopic    CBC and differential    Microalbumin, Random Urine (W/Creatinine)    Protein, Total w/Creat, Random Urine       Cardiovascular and Mediastinum    Hypertension (Chronic)      1  Proteinuria in setting of DM type 1, concern for diabetic nephropathy in setting of uncontrolled blood sugars  -of note, normal renal function with last sCr 0 64 as of 4/22/20  -last microalb:Cr has risen to 1857 mg/g  -already on total 40mg lisinopril begun 7/10/17  -avoid nonsteroidals including ibuprofen, aleve, advil, motrin, naproxen, indomethacin, celebrex, toradol as these medications can lead to increased protein in the urine  -Will check CMP, UA with microscopy as well as Urine protein:Cr and microalb:Cr     2  Hypertension - BP well controlled on lisinopril 20mg twice daily - continue this  -avoid high salt/sodium containing diet  -avoid beverages containing caffeine    3  Anemia - Hgb normal range as of Nov 2017  Repeat CBC  4  High normal potassium - in setting of ACEi use  Limit high potassium foods  [address] low potassium diet for more information  5  Uncontrolled DM type 1 with retinopathy - last A1C 10 6, needs improved glycemic control to improve proteinuria and decrease risk of CKD developing  Would benefit from insulin pump  Will d/w endocrinology  6  GERD - on omeprazole 20mg daily    RTC in 6 months         Reason for visit is   Chief Complaint   Patient presents with    Virtual Regular Visit    Proteinuria        Encounter provider Joe Burroughs DO    Provider located at [address] [facility] [address]      Recent Visits  Date Type Provider Dept   07/20/20 Telephone Moni Jackson, [address]  recent visits within past 7 days and meeting all other requirements     Today's Visits  Date Type Provider Dept   07/21/20 Telemedicine Moni Jackson, [address]  today's visits and meeting all other requirements     Future Appointments  No visits were found meeting these conditions  Showing future appointments within next 150 days and meeting all other requirements        The patient was identified by name and date of birth  Heidi Deal was informed that this is a telemedicine visit and that the visit is being conducted through [facility] and patient was informed that this is a secure, HIPAA-compliant platform  She agrees to proceed     My office door was closed  No one else was in the room  She acknowledged consent and understanding of privacy and security of the video platform  The patient has agreed to participate and understands they can discontinue the visit at any time  Patient is aware this is a billable service  Subjective  Heidi Deal is a 40 y o  female presents in consultation for proteinuria   Diagnosed with DM type at age 16  Has had trouble controlling sugars  Lately, her blood glucose has been mostly in the 200-300s  She is interested in an insulin pump  Follows with endo, Dr Junior Rubi  Has DM retinopathy without neuropathy  HTN - BP controlled with lisinopril  Never uncontrolled in the past     Does take aleve periodically  Had lost 50lbs in the past but has gained it back  She loves food  She joined [employer]  She wants to do dionicio  Past Medical History:   Diagnosis Date    Anemia     Asthma     w/acute exacerbation   Last assessed: 10/26/12    Chest pain 2/3/2016    Depression     [facility] [address] [facility] )     Diabetic nephropathy ([facility] [address] )     Diabetic retinopathy ([facility] [address] ) 2/3/2016    Gastroenteritis 02/03/2016    GERD (gastroesophageal reflux disease)     HTN (hypertension)     Hyperlipidemia 2/3/2016    Oligomenorrhea     Polycystic ovaries 2/3/2016    Retinal hemorrhage 2/3/2016    Retinopathy     Thrombocytosis (HCC)     Type 1 diabetes mellitus with ophthalmic manifestation ([facility] [address] [address] ) 2/3/2016       Past Surgical History:   Procedure Laterality Date    CATARACT EXTRACTION Bilateral     RETINAL LASER PROCEDURE         Current Outpatient Medications   Medication Sig Dispense Refill    albuterol (PROVENTIL HFA,VENTOLIN HFA) 90 mcg/act inhaler INHALE 2 PUFFS BY MOUTH EVERY 4 HOURS AS NEEDED FOR WHEEZING OR SHORTNESS OF BREATH 25 5 g 0    Ascorbic Acid (VITAMIN C) 500 MG CAPS Take 2 capsules by mouth daily      Biotin 10 MG TABS Take 1 tablet by mouth daily      budesonide (PULMICORT) 1 MG/2ML nebulizer solution Take 1 mL (1 mg total) by nebulization daily (Patient taking differently: Take 1 mg by nebulization daily prn ) 60 mL 2    Butalbital-APAP-Caffeine (Fioricet) -40 MG CAPS Take 1 tablet by mouth 4 (four) times a day as needed (headahce) 30 capsule 0    cyanocobalamin (VITAMIN B-12) 100 mcg tablet Take by mouth daily      fluticasone-salmeterol (Advair Diskus) 250-50 mcg/dose inhaler Inhale 1 puff 2 (two) times a day Rinse mouth after use  3 Inhaler 3    insulin aspart (NovoLOG) 100 units/mL injection Take 2 unit of novolog for every 1 g of carb 30 mL 0    insulin glargine (Lantus SoloStar) 100 units/mL injection pen Inject 75 Units under the skin daily 10 pen 0    lisinopril (ZESTRIL) 20 mg tablet TAKE 2 TABLETS BY MOUTH EVERY DAY 60 tablet 0    montelukast (SINGULAIR) 10 mg tablet TAKE 1 TABLET BY MOUTH EVERY DAY AT BEDTIME 90 tablet 0    omeprazole (PriLOSEC) 20 mg delayed release capsule Take 20 mg by mouth daily        ONE TOUCH ULTRA TEST test strip 1 each by Other route 3 (three) times a day before meals Use as instructed 300 each 3    PARoxetine (PAXIL) 20 mg tablet TAKE 1 TABLET BY MOUTH EVERY DAY 90 tablet 0    pravastatin (PRAVACHOL) 40 mg tablet TAKE 1 TABLET BY MOUTH EVERY DAY 90 tablet 1    Probiotic Product (ALIGN PO) Take by mouth      acetaminophen (TYLENOL) 500 mg tablet Take 2 tablets (1,000 mg total) by mouth every 6 (six) hours as needed for mild pain or moderate pain 30 tablet 0    insulin aspart (NovoLOG) 100 units/mL injection Use up to 100 units daily  (Patient not taking: Reported on 4/29/2020) 30 mL 1     No current facility-administered medications for this visit  No Known Allergies   No new allergies    Social History has been reviewed  Denies tobacco/admits to occasional alcohol use  Family History has been reviewed  Denies any changes  Review of Systems   Constitutional: Negative for chills and fever  HENT: Negative for sore throat  Eyes: Positive for visual disturbance (left eye blurry - cataract)  Respiratory: Negative for cough and shortness of breath  Cardiovascular: Negative for chest pain and leg swelling  Gastrointestinal: Negative for abdominal pain, constipation, diarrhea, nausea and vomiting  Genitourinary: Negative for difficulty urinating, dysuria and hematuria  Musculoskeletal: Negative for back pain and neck pain  Skin: Positive for rash (on her arms with redness and blotches)  Neurological: Negative for dizziness, light-headedness and numbness  Hematological: Negative for adenopathy  Does not bruise/bleed easily  Psychiatric/Behavioral: Negative for confusion  Video Exam  Does not have BP cuff at home to determine BP/HR  Vitals:    07/21/20 0846   Resp: 12   Weight: 79 4 kg (175 lb)   Height: 4' 10" (1 473 m)       Physical Exam   Constitutional: She is oriented to person, place, and time  She appears well-developed and well-nourished  No distress  obese   HENT:   Head: Normocephalic and atraumatic  Mouth/Throat: No oropharyngeal exudate     Eyes: EOM are normal  Right eye exhibits no discharge  Left eye exhibits no discharge  No scleral icterus  Neck: Normal range of motion  Neck supple  No thyromegaly present  Pulmonary/Chest: Effort normal  No respiratory distress  She has no wheezes  Abdominal: Soft  She exhibits no distension  There is no tenderness  Musculoskeletal: Normal range of motion  She exhibits no edema  Lymphadenopathy:     She has no cervical adenopathy  Neurological: She is alert and oriented to person, place, and time  Skin: No rash noted  She is not diaphoretic  No erythema  No pallor  Psychiatric: She has a normal mood and affect  Her behavior is normal    Vitals reviewed  I spent 40 minutes directly with the patient during this visit      VIRTUAL VISIT DISCLAIMER    Aleks Pierce acknowledges that she has consented to an online visit or consultation  She understands that the online visit is based solely on information provided by her, and that, in the absence of a face-to-face physical evaluation by the physician, the diagnosis she receives is both limited and provisional in terms of accuracy and completeness  This is not intended to replace a full medical face-to-face evaluation by the physician  Aleks Pelayo understands and accepts these terms

## 2020-07-21 NOTE — PATIENT INSTRUCTIONS
1  Proteinuria in setting of DM type 1, concern for diabetic nephropathy in setting of uncontrolled blood sugars  -of note, normal renal function with last sCr 0 64 as of 4/22/20  -last microalb:Cr has risen to 1857 mg/g  -already on total 40mg lisinopril begun 7/10/17  -avoid nonsteroidals including ibuprofen, aleve, advil, motrin, naproxen, indomethacin, celebrex, toradol as these medications can lead to increased protein in the urine  -Will check CMP, UA with microscopy as well as Urine protein:Cr and microalb:Cr     2  Hypertension - BP well controlled on lisinopril 20mg twice daily - continue this  -avoid high salt/sodium containing diet  -avoid beverages containing caffeine    3  Anemia - Hgb normal range as of Nov 2017  Repeat CBC  4  High normal potassium - in setting of ACEi use  Limit high potassium foods  [address] low potassium diet for more information  5  Uncontrolled DM type 1 with retinopathy - last A1C 10 6, needs improved glycemic control to improve proteinuria and decrease risk of CKD developing  Would benefit from insulin pump  Will d/w endocrinology  6  GERD - on omeprazole 20mg daily    RTC in 6 months  Obtain blood and urine testing at your earliest convenience

## 2020-07-26 DIAGNOSIS — J45.20 MILD INTERMITTENT ASTHMA WITHOUT COMPLICATION: ICD-10-CM

## 2020-07-27 DIAGNOSIS — E10.65 TYPE 1 DIABETES MELLITUS WITH HYPERGLYCEMIA (HCC): Primary | ICD-10-CM

## 2020-07-27 DIAGNOSIS — E10.65 TYPE 1 DIABETES MELLITUS WITH HYPERGLYCEMIA (HCC): ICD-10-CM

## 2020-07-27 RX ORDER — ALBUTEROL SULFATE 90 UG/1
AEROSOL, METERED RESPIRATORY (INHALATION)
Qty: 25.5 G | Refills: 0 | Status: SHIPPED | OUTPATIENT
Start: 2020-07-27 | End: 2021-02-26 | Stop reason: SDUPTHER

## 2020-08-06 ENCOUNTER — OFFICE VISIT (OUTPATIENT)
Dept: URGENT CARE | Facility: CLINIC | Age: 45
End: 2020-08-06
Payer: COMMERCIAL

## 2020-08-06 ENCOUNTER — APPOINTMENT (EMERGENCY)
Dept: RADIOLOGY | Facility: HOSPITAL | Age: 45
DRG: 291 | End: 2020-08-06
Payer: COMMERCIAL

## 2020-08-06 ENCOUNTER — HOSPITAL ENCOUNTER (EMERGENCY)
Facility: HOSPITAL | Age: 45
Discharge: HOME | DRG: 291 | End: 2020-08-06
Attending: EMERGENCY MEDICINE | Admitting: EMERGENCY MEDICINE
Payer: COMMERCIAL

## 2020-08-06 ENCOUNTER — HOSPITAL ENCOUNTER (INPATIENT)
Facility: HOSPITAL | Age: 45
LOS: 2 days | Discharge: HOME | DRG: 291 | End: 2020-08-08
Attending: EMERGENCY MEDICINE | Admitting: INTERNAL MEDICINE
Payer: COMMERCIAL

## 2020-08-06 VITALS
BODY MASS INDEX: 42.07 KG/M2 | HEART RATE: 98 BPM | RESPIRATION RATE: 24 BRPM | HEIGHT: 56 IN | DIASTOLIC BLOOD PRESSURE: 89 MMHG | WEIGHT: 187 LBS | OXYGEN SATURATION: 96 % | SYSTOLIC BLOOD PRESSURE: 157 MMHG

## 2020-08-06 VITALS
WEIGHT: 187.2 LBS | RESPIRATION RATE: 22 BRPM | HEIGHT: 58 IN | DIASTOLIC BLOOD PRESSURE: 86 MMHG | HEART RATE: 90 BPM | BODY MASS INDEX: 39.29 KG/M2 | SYSTOLIC BLOOD PRESSURE: 152 MMHG | OXYGEN SATURATION: 94 % | TEMPERATURE: 98.9 F

## 2020-08-06 DIAGNOSIS — R60.0 BILATERAL LEG EDEMA: Primary | ICD-10-CM

## 2020-08-06 DIAGNOSIS — I50.9 ACUTE CHF (HCC): Primary | ICD-10-CM

## 2020-08-06 DIAGNOSIS — I50.9 ACUTE CONGESTIVE HEART FAILURE, UNSPECIFIED HEART FAILURE TYPE (HCC): ICD-10-CM

## 2020-08-06 DIAGNOSIS — R06.02 SOB (SHORTNESS OF BREATH): ICD-10-CM

## 2020-08-06 DIAGNOSIS — R07.89 CHEST TIGHTNESS: ICD-10-CM

## 2020-08-06 PROBLEM — F32.A DEPRESSION: Status: ACTIVE | Noted: 2020-08-06

## 2020-08-06 LAB
ALBUMIN SERPL BCP-MCNC: 3 G/DL (ref 3.5–5)
ALP SERPL-CCNC: 166 U/L (ref 46–116)
ALT SERPL W P-5'-P-CCNC: 28 U/L (ref 12–78)
ANION GAP SERPL CALCULATED.3IONS-SCNC: 7 MMOL/L (ref 4–13)
AST SERPL W P-5'-P-CCNC: 17 U/L (ref 5–45)
ATRIAL RATE: 90 BPM
BASOPHILS # BLD AUTO: 0.07 THOUSANDS/ΜL (ref 0–0.1)
BASOPHILS NFR BLD AUTO: 1 % (ref 0–1)
BILIRUB SERPL-MCNC: 0.2 MG/DL (ref 0.2–1)
BUN SERPL-MCNC: 12 MG/DL (ref 5–25)
CALCIUM SERPL-MCNC: 9 MG/DL (ref 8.3–10.1)
CHLORIDE SERPL-SCNC: 104 MMOL/L (ref 100–108)
CO2 SERPL-SCNC: 27 MMOL/L (ref 21–32)
CREAT SERPL-MCNC: 0.61 MG/DL (ref 0.6–1.3)
EOSINOPHIL # BLD AUTO: 0.43 THOUSAND/ΜL (ref 0–0.61)
EOSINOPHIL NFR BLD AUTO: 4 % (ref 0–6)
ERYTHROCYTE [DISTWIDTH] IN BLOOD BY AUTOMATED COUNT: 12.8 % (ref 11.6–15.1)
GFR SERPL CREATININE-BSD FRML MDRD: 111 ML/MIN/1.73SQ M
GLUCOSE SERPL-MCNC: 130 MG/DL (ref 65–140)
GLUCOSE SERPL-MCNC: 139 MG/DL (ref 65–140)
GLUCOSE SERPL-MCNC: 275 MG/DL (ref 65–140)
HCT VFR BLD AUTO: 33.1 % (ref 34.8–46.1)
HGB BLD-MCNC: 11 G/DL (ref 11.5–15.4)
IMM GRANULOCYTES # BLD AUTO: 0.08 THOUSAND/UL (ref 0–0.2)
IMM GRANULOCYTES NFR BLD AUTO: 1 % (ref 0–2)
LYMPHOCYTES # BLD AUTO: 1.41 THOUSANDS/ΜL (ref 0.6–4.47)
LYMPHOCYTES NFR BLD AUTO: 12 % (ref 14–44)
MCH RBC QN AUTO: 30.6 PG (ref 26.8–34.3)
MCHC RBC AUTO-ENTMCNC: 33.2 G/DL (ref 31.4–37.4)
MCV RBC AUTO: 92 FL (ref 82–98)
MONOCYTES # BLD AUTO: 0.67 THOUSAND/ΜL (ref 0.17–1.22)
MONOCYTES NFR BLD AUTO: 6 % (ref 4–12)
NEUTROPHILS # BLD AUTO: 9.16 THOUSANDS/ΜL (ref 1.85–7.62)
NEUTS SEG NFR BLD AUTO: 76 % (ref 43–75)
NRBC BLD AUTO-RTO: 0 /100 WBCS
NT-PROBNP SERPL-MCNC: 388 PG/ML
P AXIS: 69 DEGREES
PLATELET # BLD AUTO: 428 THOUSANDS/UL (ref 149–390)
PMV BLD AUTO: 9.3 FL (ref 8.9–12.7)
POTASSIUM SERPL-SCNC: 4.2 MMOL/L (ref 3.5–5.3)
PR INTERVAL: 140 MS
PROT SERPL-MCNC: 7.2 G/DL (ref 6.4–8.2)
QRS AXIS: 84 DEGREES
QRSD INTERVAL: 82 MS
QT INTERVAL: 360 MS
QTC INTERVAL: 440 MS
RBC # BLD AUTO: 3.59 MILLION/UL (ref 3.81–5.12)
SODIUM SERPL-SCNC: 138 MMOL/L (ref 136–145)
T WAVE AXIS: 29 DEGREES
TROPONIN I SERPL-MCNC: <0.02 NG/ML
TSH SERPL DL<=0.05 MIU/L-ACNC: 3.79 UIU/ML (ref 0.36–3.74)
VENTRICULAR RATE: 90 BPM
WBC # BLD AUTO: 11.82 THOUSAND/UL (ref 4.31–10.16)

## 2020-08-06 PROCEDURE — 99285 EMERGENCY DEPT VISIT HI MDM: CPT | Performed by: PHYSICIAN ASSISTANT

## 2020-08-06 PROCEDURE — 99213 OFFICE O/P EST LOW 20 MIN: CPT | Performed by: PHYSICIAN ASSISTANT

## 2020-08-06 PROCEDURE — 36415 COLL VENOUS BLD VENIPUNCTURE: CPT | Performed by: EMERGENCY MEDICINE

## 2020-08-06 PROCEDURE — 80053 COMPREHEN METABOLIC PANEL: CPT | Performed by: EMERGENCY MEDICINE

## 2020-08-06 PROCEDURE — 93005 ELECTROCARDIOGRAM TRACING: CPT

## 2020-08-06 PROCEDURE — 84484 ASSAY OF TROPONIN QUANT: CPT | Performed by: INTERNAL MEDICINE

## 2020-08-06 PROCEDURE — 84484 ASSAY OF TROPONIN QUANT: CPT | Performed by: EMERGENCY MEDICINE

## 2020-08-06 PROCEDURE — 82948 REAGENT STRIP/BLOOD GLUCOSE: CPT

## 2020-08-06 PROCEDURE — 99285 EMERGENCY DEPT VISIT HI MDM: CPT

## 2020-08-06 PROCEDURE — 99223 1ST HOSP IP/OBS HIGH 75: CPT | Performed by: INTERNAL MEDICINE

## 2020-08-06 PROCEDURE — 83880 ASSAY OF NATRIURETIC PEPTIDE: CPT | Performed by: PHYSICIAN ASSISTANT

## 2020-08-06 PROCEDURE — 93010 ELECTROCARDIOGRAM REPORT: CPT | Performed by: INTERNAL MEDICINE

## 2020-08-06 PROCEDURE — 71045 X-RAY EXAM CHEST 1 VIEW: CPT

## 2020-08-06 PROCEDURE — 85025 COMPLETE CBC W/AUTO DIFF WBC: CPT | Performed by: EMERGENCY MEDICINE

## 2020-08-06 PROCEDURE — 84443 ASSAY THYROID STIM HORMONE: CPT | Performed by: INTERNAL MEDICINE

## 2020-08-06 RX ORDER — ALBUTEROL SULFATE 90 UG/1
2 AEROSOL, METERED RESPIRATORY (INHALATION) EVERY 4 HOURS PRN
Status: DISCONTINUED | OUTPATIENT
Start: 2020-08-06 | End: 2020-08-08 | Stop reason: HOSPADM

## 2020-08-06 RX ORDER — ACETAMINOPHEN 325 MG/1
650 TABLET ORAL EVERY 6 HOURS PRN
Status: DISCONTINUED | OUTPATIENT
Start: 2020-08-06 | End: 2020-08-08 | Stop reason: HOSPADM

## 2020-08-06 RX ORDER — BUDESONIDE 0.5 MG/2ML
1 INHALANT ORAL DAILY
Status: DISCONTINUED | OUTPATIENT
Start: 2020-08-07 | End: 2020-08-08 | Stop reason: HOSPADM

## 2020-08-06 RX ORDER — FUROSEMIDE 10 MG/ML
40 INJECTION INTRAMUSCULAR; INTRAVENOUS ONCE
Status: COMPLETED | OUTPATIENT
Start: 2020-08-06 | End: 2020-08-06

## 2020-08-06 RX ORDER — PRAVASTATIN SODIUM 40 MG
40 TABLET ORAL DAILY
Status: DISCONTINUED | OUTPATIENT
Start: 2020-08-06 | End: 2020-08-08 | Stop reason: HOSPADM

## 2020-08-06 RX ORDER — MONTELUKAST SODIUM 10 MG/1
10 TABLET ORAL
Status: DISCONTINUED | OUTPATIENT
Start: 2020-08-06 | End: 2020-08-08 | Stop reason: HOSPADM

## 2020-08-06 RX ORDER — BUTALBITAL, ACETAMINOPHEN AND CAFFEINE 50; 325; 40 MG/1; MG/1; MG/1
1 TABLET ORAL EVERY 6 HOURS PRN
Status: DISCONTINUED | OUTPATIENT
Start: 2020-08-06 | End: 2020-08-08 | Stop reason: HOSPADM

## 2020-08-06 RX ORDER — FLUTICASONE FUROATE AND VILANTEROL 200; 25 UG/1; UG/1
1 POWDER RESPIRATORY (INHALATION)
Status: DISCONTINUED | OUTPATIENT
Start: 2020-08-06 | End: 2020-08-08 | Stop reason: HOSPADM

## 2020-08-06 RX ORDER — ASPIRIN 81 MG/1
81 TABLET ORAL DAILY
Status: DISCONTINUED | OUTPATIENT
Start: 2020-08-07 | End: 2020-08-08 | Stop reason: HOSPADM

## 2020-08-06 RX ORDER — BUDESONIDE 0.5 MG/2ML
1 INHALANT ORAL DAILY
Status: DISCONTINUED | OUTPATIENT
Start: 2020-08-06 | End: 2020-08-06

## 2020-08-06 RX ORDER — FUROSEMIDE 10 MG/ML
40 INJECTION INTRAMUSCULAR; INTRAVENOUS
Status: DISCONTINUED | OUTPATIENT
Start: 2020-08-06 | End: 2020-08-07

## 2020-08-06 RX ORDER — PANTOPRAZOLE SODIUM 40 MG/1
40 TABLET, DELAYED RELEASE ORAL
Status: DISCONTINUED | OUTPATIENT
Start: 2020-08-07 | End: 2020-08-08 | Stop reason: HOSPADM

## 2020-08-06 RX ORDER — LISINOPRIL 20 MG/1
40 TABLET ORAL DAILY
Status: DISCONTINUED | OUTPATIENT
Start: 2020-08-06 | End: 2020-08-06

## 2020-08-06 RX ORDER — FUROSEMIDE 40 MG/1
40 TABLET ORAL DAILY
Qty: 30 TABLET | Refills: 0 | Status: SHIPPED | OUTPATIENT
Start: 2020-08-06 | End: 2020-08-08 | Stop reason: HOSPADM

## 2020-08-06 RX ORDER — ONDANSETRON 2 MG/ML
4 INJECTION INTRAMUSCULAR; INTRAVENOUS EVERY 6 HOURS PRN
Status: DISCONTINUED | OUTPATIENT
Start: 2020-08-06 | End: 2020-08-08 | Stop reason: HOSPADM

## 2020-08-06 RX ORDER — ASCORBIC ACID 500 MG
500 TABLET ORAL DAILY
Status: DISCONTINUED | OUTPATIENT
Start: 2020-08-06 | End: 2020-08-08 | Stop reason: HOSPADM

## 2020-08-06 RX ORDER — INSULIN GLARGINE 100 [IU]/ML
75 INJECTION, SOLUTION SUBCUTANEOUS
Status: DISCONTINUED | OUTPATIENT
Start: 2020-08-06 | End: 2020-08-08 | Stop reason: HOSPADM

## 2020-08-06 RX ORDER — ACETAMINOPHEN 325 MG/1
650 TABLET ORAL EVERY 6 HOURS PRN
Status: DISCONTINUED | OUTPATIENT
Start: 2020-08-06 | End: 2020-08-06

## 2020-08-06 RX ORDER — LISINOPRIL 20 MG/1
20 TABLET ORAL DAILY
Status: DISCONTINUED | OUTPATIENT
Start: 2020-08-07 | End: 2020-08-08 | Stop reason: HOSPADM

## 2020-08-06 RX ORDER — PAROXETINE HYDROCHLORIDE 20 MG/1
20 TABLET, FILM COATED ORAL DAILY
Status: DISCONTINUED | OUTPATIENT
Start: 2020-08-06 | End: 2020-08-08 | Stop reason: HOSPADM

## 2020-08-06 RX ADMIN — ALBUTEROL SULFATE 2 PUFF: 90 AEROSOL, METERED RESPIRATORY (INHALATION) at 23:19

## 2020-08-06 RX ADMIN — ACETAMINOPHEN 650 MG: 325 TABLET, FILM COATED ORAL at 16:41

## 2020-08-06 RX ADMIN — MONTELUKAST 10 MG: 10 TABLET, FILM COATED ORAL at 23:13

## 2020-08-06 RX ADMIN — FLUTICASONE FUROATE AND VILANTEROL TRIFENATATE 1 PUFF: 200; 25 POWDER RESPIRATORY (INHALATION) at 16:25

## 2020-08-06 RX ADMIN — PRAVASTATIN SODIUM 40 MG: 40 TABLET ORAL at 16:24

## 2020-08-06 RX ADMIN — INSULIN GLARGINE 75 UNITS: 100 INJECTION, SOLUTION SUBCUTANEOUS at 23:13

## 2020-08-06 RX ADMIN — PAROXETINE HYDROCHLORIDE 20 MG: 20 TABLET, FILM COATED ORAL at 16:24

## 2020-08-06 RX ADMIN — FUROSEMIDE 40 MG: 10 INJECTION, SOLUTION INTRAMUSCULAR; INTRAVENOUS at 16:25

## 2020-08-06 RX ADMIN — INSULIN LISPRO 6 UNITS: 100 INJECTION, SOLUTION INTRAVENOUS; SUBCUTANEOUS at 23:25

## 2020-08-06 RX ADMIN — FUROSEMIDE 40 MG: 10 INJECTION, SOLUTION INTRAMUSCULAR; INTRAVENOUS at 13:14

## 2020-08-06 RX ADMIN — ACETAMINOPHEN 650 MG: 325 TABLET, FILM COATED ORAL at 23:13

## 2020-08-06 RX ADMIN — ENOXAPARIN SODIUM 40 MG: 40 INJECTION SUBCUTANEOUS at 16:24

## 2020-08-06 RX ADMIN — OXYCODONE HYDROCHLORIDE AND ACETAMINOPHEN 500 MG: 500 TABLET ORAL at 16:23

## 2020-08-06 RX ADMIN — VITAM B12 100 MCG: 100 TAB at 16:24

## 2020-08-06 NOTE — H&P
H&P- Jovita Reza 1975, 40 y o  female MRN: [id number]    Unit/Bed#: -01 Encounter: [id number]    Primary Care Provider: Harshad Sellers PA-C   Date and time admitted to hospital: 8/6/2020  1:02 PM        * Acute CHF (congestive heart failure) [facility])  Assessment & Plan  Wt Readings from Last 3 Encounters:   08/06/20 81 7 kg (180 lb 1 9 oz)   08/06/20 84 8 kg (187 lb)   08/06/20 84 9 kg (187 lb 3 2 oz)     Will start on Lasix 40 mg IV twice daily  Monitor on telemetry  Serial troponin  Will await echocardiogram to assess diastolic versus systolic  Daily weight and I&Os  Monitor renal function while diuresing  Cardiology consult  Oxygen supplementation and bronchodilators      Depression  Assessment & Plan  Continue on Paxil  Denies suicidal or homicidal ideations  Gastroesophageal reflux disease without esophagitis  Assessment & Plan  On Protonix    Severe obesity (BMI 35 0-39  9) with comorbidity ([facility] )  Assessment & Plan  Encourage weight loss and lifestyle modifications  Hyperlipidemia  Assessment & Plan  On statin    Hypertension  Assessment & Plan  Continue on Zestril and Lasix  Monitor vitals as per protocol    Asthma  Assessment & Plan  No acute exacerbation  No wheezing  Continue home medications  On Breo and budesonide neb, and albuterol inhaler p r n  Continue on Singulair    Type 1 diabetes ([facility] )  Assessment & Plan  Lab Results   Component Value Date    HGBA1C 10 6 (H) 04/22/2020       No results for input(s): POCGLU in the last 72 hours  Continue on Lantus insulin  Accu-Chek a c  HS with Humalog sliding scale  Blood Sugar Average: Last 72 hrs:    Anticipated length of stay greater than 2 midnights      Chief Complaint   Patient presents with    Shortness of Breath     pt dx with mild chf opted to try home treatment and OP follow up but returned a few minutes later sayiong she didnt want to go home and wanted to be admited for testing        HPI:  Jovita Reza is a 40 y o  female with past medical history of type 1 diabetes mellitus, asthma, hypertension, hyperlipidemia presented to emergency department for evaluation of worsening shortness of breath, chest tightness and peripheral edema for past week  She also states that she has gained about 20 lbs in past couple of months  Patient states that she noticed over the past 4 days her lower extremity swelling is worse and she is getting shortness of breath with minimal exertion  She called her PCP who recommended her go to urgent care  Patient was referred to ER from urgent care  Patient states that her shortness of breath is exertional but over the past day she was feeling some orthopnea as well  Denies any associated cough, chest pain, exertional chest pain, fever, chills, nausea, vomiting, diarrhea or abdominal pain  Patient is not on any diuretics at home  In ER patient was recommended the option to stay or to Outpatient echocardiogram   The patient initially decided to go but on her way home got anxious and came back for admission  Patient was discharged on Lasix 40 mg daily and a script for outpatient echocardiogram     Historical Information   Past Medical History:   Diagnosis Date    Anemia     Asthma     w/acute exacerbation   Last assessed: 10/26/12    Chest pain 2/3/2016    Depression     [facility] mellitus [facility] )     Diabetic nephropathy [facility] )     [facility] ) 2/3/2016    Gastroenteritis 02/03/2016    GERD (gastroesophageal reflux disease)     HTN (hypertension)     Hyperlipidemia 2/3/2016    Oligomenorrhea     Polycystic ovaries 2/3/2016    Retinal hemorrhage 2/3/2016    Retinopathy     Thrombocytosis (HCC)     Type 1 diabetes mellitus with ophthalmic manifestation ([facility] ) 2/3/2016     Past Surgical History:   Procedure Laterality Date    CATARACT EXTRACTION Bilateral     RETINAL LASER PROCEDURE       Social History   Social History     Substance and Sexual Activity   Alcohol Use Not Currently    Frequency: Monthly or less    Comment: Occasional/1 mixed drink twice a month if that     Social History     Substance and Sexual Activity   Drug Use No     Social History     Tobacco Use   Smoking Status Never Smoker   Smokeless Tobacco Never Used   Tobacco Comment    Per [employer]: Former smoker   Quit in 1994     Family History   Problem Relation Age of Onset    Heart murmur Mother     Diabetes Mother         Mellitus    Thyroid disease Mother         Disorder    Diabetes Father         Mellitus    Hypertension Father     Diabetes Maternal Grandfather         Mellitus    Breast cancer Paternal Grandmother     Diabetes Maternal Aunt         Mellitus    Diabetes Maternal Uncle         Mellitus    No Known Problems Maternal Grandmother     Leukemia Paternal Grandfather     Substance Abuse Neg Hx     Mental illness Neg Hx     Alcohol abuse Neg Hx        Meds/Allergies   No Known Allergies    Meds:    Current Facility-Administered Medications:     acetaminophen (TYLENOL) tablet 650 mg, 650 mg, Oral, Q6H PRN, Hardy Finch MD    albuterol (PROVENTIL HFA,VENTOLIN HFA) inhaler 2 puff, 2 puff, Inhalation, Q4H PRN, Hardy Finch MD    ascorbic acid (VITAMIN C) tablet 500 mg, 500 mg, Oral, Daily, Hardy Finch MD    [START ON 8/7/2020] aspirin (ECOTRIN LOW STRENGTH) EC tablet 81 mg, 81 mg, Oral, Daily, Hardy Finch MD    [START ON 8/7/2020] budesonide (PULMICORT) inhalation solution 1 mg, 1 mg, Nebulization, Daily, Hardy Finch MD    butalbital-acetaminophen-caffeine (FIORICET,ESGIC) -40 mg per tablet 1 tablet, 1 tablet, Oral, Q6H PRN, Hardy Finch MD    cyanocobalamin (VITAMIN B-12) tablet 100 mcg, 100 mcg, Oral, Daily, Hardy Finch MD    enoxaparin (LOVENOX) subcutaneous injection 40 mg, 40 mg, Subcutaneous, Daily, Hardy Finch MD    fluticasone-vilanterol (BREO ELLIPTA) 200-25 MCG/INH inhaler 1 puff, 1 puff, Inhalation, Daily, MD Zelda Jean furosemide (LASIX) injection 40 mg, 40 mg, Intravenous, BID (diuretic), Daniela Toledo MD    insulin glargine (LANTUS) subcutaneous injection 75 Units 0 75 mL, 75 Units, Subcutaneous, HS, Daniela Toledo MD    insulin lispro (HumaLOG) 100 units/mL subcutaneous injection 2-12 Units, 2-12 Units, Subcutaneous, TID AC **AND** Fingerstick Glucose (POCT), , , TID AC, Mandeep Cassondra Prader, MD    insulin lispro (HumaLOG) 100 units/mL subcutaneous injection 2-12 Units, 2-12 Units, Subcutaneous, HS, Daniela Toledo MD    [START ON 8/7/2020] lisinopril (ZESTRIL) tablet 20 mg, 20 mg, Oral, Daily, Daniela Toledo MD    montelukast (SINGULAIR) tablet 10 mg, 10 mg, Oral, HS, Daniela Toledo MD    ondansetron (ZOFRAN) injection 4 mg, 4 mg, Intravenous, Q6H PRN, Daniela Toledo MD  [facility]  [START ON 8/7/2020] pantoprazole (PROTONIX) EC tablet 40 mg, 40 mg, Oral, Early Morning, Daniela Toledo MD    PARoxetine (PAXIL) tablet 20 mg, 20 mg, Oral, Daily, Daniela Toledo MD    pravastatin (PRAVACHOL) tablet 40 mg, 40 mg, Oral, Daily, Daniela Toledo MD    Medications Prior to Admission   Medication    acetaminophen (TYLENOL) 500 mg tablet    albuterol (PROVENTIL HFA,VENTOLIN HFA) 90 mcg/act inhaler    Ascorbic Acid (VITAMIN C) 500 MG CAPS    Biotin 10 MG TABS    budesonide (PULMICORT) 1 MG/2ML nebulizer solution    cyanocobalamin (VITAMIN B-12) 100 mcg tablet    fluticasone-salmeterol (Advair Diskus) 250-50 mcg/dose inhaler    furosemide (LASIX) 40 mg tablet    insulin aspart (NovoLOG) 100 units/mL injection    insulin aspart (NovoLOG) 100 units/mL injection    insulin glargine (Lantus SoloStar) 100 units/mL injection pen    lisinopril (ZESTRIL) 20 mg tablet    montelukast (SINGULAIR) 10 mg tablet    omeprazole (PriLOSEC) 20 mg delayed release capsule    ONE TOUCH ULTRA TEST test strip    PARoxetine (PAXIL) 20 mg tablet    pravastatin (PRAVACHOL) 40 mg tablet    Probiotic Product (ALIGN PO)    Butalbital-APAP-Caffeine (Fioricet) -40 MG CAPS         Review of Systems   Constitutional: Positive for activity change and fatigue  HENT: Negative  Eyes: Negative  Respiratory: Positive for chest tightness and shortness of breath  Cardiovascular: Positive for leg swelling  Gastrointestinal: Negative  Endocrine: Negative  Genitourinary: Negative  Musculoskeletal: Negative  Skin: Negative  Allergic/Immunologic: Negative  Neurological: Negative  Hematological: Negative  Psychiatric/Behavioral: The patient is nervous/anxious  Current Vitals:   Blood Pressure: 146/98 (08/06/20 1513)  Pulse: 92 (08/06/20 1513)  Temperature: 98 1 °F (36 7 °C) (08/06/20 1513)  Respirations: 16 (08/06/20 1605)  Height: 4' 8" (142 2 cm) (08/06/20 1532)  Weight - Scale: 81 7 kg (180 lb 1 9 oz) (08/06/20 1532)  SpO2: 95 % (08/06/20 1513)  SPO2 RA Rest      ED to Hosp-Admission (Current) from 8/6/2020 in [address] Med Surg Unit   SpO2  95 %   SpO2 Activity     O2 Device     O2 Flow Rate          No intake or output data in the 24 hours ending 08/06/20 1608  Body mass index is 40 38 kg/m²  Physical Exam   Constitutional: She is oriented to person, place, and time  She appears well-developed  No distress  HENT:   Head: Normocephalic and atraumatic  Nose: Nose normal    Eyes: Pupils are equal, round, and reactive to light  Conjunctivae are normal  No scleral icterus  Neck: Normal range of motion  Neck supple  No JVD present  No tracheal deviation present  No thyromegaly present  Cardiovascular: Normal rate, regular rhythm and normal heart sounds  Pulmonary/Chest: Effort normal  No respiratory distress  She has no wheezes  She has rales  She exhibits no tenderness  Abdominal: Soft  Bowel sounds are normal  She exhibits no mass  There is no abdominal tenderness  There is no rebound and no guarding  Musculoskeletal: Normal range of motion           General: No tenderness or deformity  Right lower leg: Edema present  Left lower leg: Edema present  Comments: 2+ pedal edema   Lymphadenopathy:     She has no cervical adenopathy  Neurological: She is alert and oriented to person, place, and time  No cranial nerve deficit  Coordination normal    No focal deficits   Skin: Skin is warm  No rash noted  No erythema  No pallor  Psychiatric: Her behavior is normal  Judgment and thought content normal    Nursing note and vitals reviewed  Lab Results:   CBC:   Lab Results   Component Value Date    WBC 11 82 (H) 08/06/2020    HGB 11 0 (L) 08/06/2020    HCT 33 1 (L) 08/06/2020    MCV 92 08/06/2020     (H) 08/06/2020    MCH 30 6 08/06/2020    MCHC 33 2 08/06/2020    RDW 12 8 08/06/2020    MPV 9 3 08/06/2020    NRBC 0 08/06/2020     CMP:  Lab Results   Component Value Date     01/28/2017     08/06/2020    CL 99 04/22/2020    CO2 27 08/06/2020    CO2 28 04/22/2020    BUN 12 08/06/2020    BUN 23 04/22/2020    CREATININE 0 61 08/06/2020    CREATININE 0 57 01/28/2017    CALCIUM 9 0 08/06/2020    CALCIUM 9 4 04/22/2020    AST 17 08/06/2020    AST 12 04/22/2020    ALT 28 08/06/2020    ALT 15 04/22/2020    ALKPHOS 166 (H) 08/06/2020    ALKPHOS 116 04/22/2020    PROT 5 9 (L) 01/28/2017    BILITOT 0 3 01/28/2017    EGFR 111 08/06/2020     Lab Results   Component Value Date    TROPONINI <0 02 08/06/2020     Coagulation: No results found for: PT, INR, APTT Urinalysis:No results found for: COLORU, CLARITYU, SPECGRAV, PHUR, LEUKOCYTESUR, NITRITE, PROTEINUA, GLUCOSEU, KETONESU, BILIRUBINUR, BLOODU   Amylase: No results found for: AMYLASE  Lipase: No results found for: LIPASE     Imaging: Xr Chest 1 View Portable    Result Date: 8/6/2020  Narrative: CHEST INDICATION:   SOB  COMPARISON:  Chest radiograph from 2/3/2016  EXAM PERFORMED/VIEWS:  XR CHEST PORTABLE FINDINGS: Cardiomediastinal silhouette appears unremarkable  The lungs are clear  Trace left effusion  No pneumothorax  Osseous structures appear within normal limits for patient age  Impression: Blunting of the left costophrenic angle due to trace left effusion  Workstation performed: [id number]     EKG, Pathology, and Other Studies: I have personally reviewed the results  VTE Pharmacologic Prophylaxis: Enoxaparin (Lovenox)  VTE Mechanical Prophylaxis: sequential compression device    Code Status: Level 1 - Full Code    Counseling / Coordination of Care  Total floor / unit time spent today 75 minutes  Greater than 50% of total time was spent with the patient and / or family counseling and / or coordination of care       "This note has been constructed using a voice recognition system"      Roberto Hong MD  8/6/2020, 4:08 PM

## 2020-08-06 NOTE — PATIENT INSTRUCTIONS
Go to the ER for further evaluation of shortness of breath, chest tightness, bilateral lower leg swelling

## 2020-08-06 NOTE — ED NOTES
ambulating spo2 held at 96 for about 20 feet then started to drop as heart rate increased spo2 became steady at 89 with HR in the 110's   Had pt stop walking to ensure values were real and not motion causing poor signal  Reading were unchanged     Jimmie Meadosw RN  08/06/20 0406

## 2020-08-06 NOTE — Clinical Note
Marianne Valdes was seen and treated in our emergency department on 8/6/2020  Diagnosis:     Vern[place]    She may return on 08/13/2020  If you have any questions or concerns, please don't hesitate to call        Wamsuttersidney Richards PA-C    ______________________________           _______________          _______________  Hospital Representative                              Date                                Time

## 2020-08-06 NOTE — PROGRESS NOTES
[facility]        NAME: Jaz Mcelroy is a 40 y o  female  : 1975    MRN: [id number]  DATE: 2020  TIME: 10:14 AM    Assessment and Plan   Bilateral leg edema [R60 0]  1  Bilateral leg edema  Transfer to other facility   2  SOB (shortness of breath)  Transfer to other facility   3  Chest tightness  Transfer to other facility     Patient will go to the ER for further evaluation of symptoms  Discussed with patient that with her elevated blood pressure, bilateral lower extremity edema and sudden weight gain, a course of steroids for her chest tightness and shortness of breath is not advisable without further evaluation  Patient understood instructions and is going to the ER with her significant other  Patient Instructions       Follow up with PCP in 3-5 days  Proceed to  ER if symptoms worsen  Chief Complaint     Chief Complaint   Patient presents with    Foot Swelling     Patient states that she has swelling in her feet, chest tightness, and shortness of breath x 3 to 4 days  Also states that she has gained about 20 pounds in about a 2 week period   Shortness of Breath     This RN assessed pt per PA request  Lungs essentially clear although pt does not possess a large inspiratory capacity  Denies air hunger, none observed  SaO2 measured (and correlated with pt HR to ensure accuracy): 93-94% once pt calmed and all procedures explained  History of Present Illness       [de-identified] year old female presents to the clinic with bilateral lower leg swelling, shortness of breath and chest tightness that started approximately 3-4 days ago  Patient states that she has a Hx of asthma, DMII, HTN  Patient has been using advair and nebulizers without improvement, takes lisinopril, and insulin dependent with sugars usually in the 100s but it was 292 this am and she used 20 units of insulin today   Pt states that she has been having increased SOB and chest tightness with daily activities including showering, walking and slightly less tightness at rest but noticeable with deep breaths  Patient noted that she had a sudden weight gain of 20 lbs in a 2 week time period  Pt notes that she had episodes of leg swelling in the past ( a couple of months ago) without SOB or chest tightness and states that swelling would resolve with elevation of legs and resolved after weight loss  Patient states that her chest tightness shortness of breath feels like previous asthma exacerbations where her inhalers did not help much and she would get steroids which would improve her symptoms  Patient had 1st BP reading at 190/85 and repeat BP reading was 152/86  Patient denied any chest pain, palpitations, dizziness, lightheadedness, headaches, fevers, chills, nausea, vomiting, abdominal pain  Review of Systems   Review of Systems   Constitutional: Negative for chills and fever  Respiratory: Positive for chest tightness and shortness of breath  Negative for wheezing  Cardiovascular: Positive for leg swelling  Elevated heart rate   Neurological: Negative for dizziness, syncope, weakness, light-headedness and headaches           Current Medications       Current Outpatient Medications:     acetaminophen (TYLENOL) 500 mg tablet, Take 2 tablets (1,000 mg total) by mouth every 6 (six) hours as needed for mild pain or moderate pain, Disp: 30 tablet, Rfl: 0    albuterol (PROVENTIL HFA,VENTOLIN HFA) 90 mcg/act inhaler, INHALE 2 PUFFS BY MOUTH EVERY 4 HOURS AS NEEDED FOR WHEEZING OR SHORTNESS OF BREATH, Disp: 25 5 g, Rfl: 0    Ascorbic Acid (VITAMIN C) 500 MG CAPS, Take 2 capsules by mouth daily, Disp: , Rfl:     Biotin 10 MG TABS, Take 1 tablet by mouth daily, Disp: , Rfl:     budesonide (PULMICORT) 1 MG/2ML nebulizer solution, Take 1 mL (1 mg total) by nebulization daily (Patient taking differently: Take 1 mg by nebulization daily prn ), Disp: 60 mL, Rfl: 2    cyanocobalamin (VITAMIN B-12) 100 mcg tablet, Take by mouth daily, Disp: , Rfl:     fluticasone-salmeterol (Advair Diskus) 250-50 mcg/dose inhaler, Inhale 1 puff 2 (two) times a day Rinse mouth after use , Disp: 3 Inhaler, Rfl: 3    insulin aspart (NovoLOG) 100 units/mL injection, Take 2 unit of novolog for every 1 g of carb, Disp: 30 mL, Rfl: 0    insulin aspart (NovoLOG) 100 units/mL injection, Use up to 100 units daily  , Disp: 30 mL, Rfl: 5    insulin glargine (Lantus SoloStar) 100 units/mL injection pen, Inject 75 Units under the skin daily, Disp: 10 pen, Rfl: 0    lisinopril (ZESTRIL) 20 mg tablet, TAKE 2 TABLETS BY MOUTH EVERY DAY, Disp: 60 tablet, Rfl: 0    montelukast (SINGULAIR) 10 mg tablet, TAKE 1 TABLET BY MOUTH EVERY DAY AT BEDTIME, Disp: 90 tablet, Rfl: 0    omeprazole (PriLOSEC) 20 mg delayed release capsule, Take 20 mg by mouth daily  , Disp: , Rfl:     ONE TOUCH ULTRA TEST test strip, 1 each by Other route 3 (three) times a day before meals Use as instructed, Disp: 300 each, Rfl: 3    PARoxetine (PAXIL) 20 mg tablet, TAKE 1 TABLET BY MOUTH EVERY DAY, Disp: 90 tablet, Rfl: 0    pravastatin (PRAVACHOL) 40 mg tablet, TAKE 1 TABLET BY MOUTH EVERY DAY, Disp: 90 tablet, Rfl: 1    Probiotic Product (ALIGN PO), Take by mouth, Disp: , Rfl:     Butalbital-APAP-Caffeine (Fioricet) -40 MG CAPS, Take 1 tablet by mouth 4 (four) times a day as needed ([facility]) (Patient not taking: Reported on 8/6/2020), Disp: 30 capsule, Rfl: 0    Current Allergies     Allergies as of 08/06/2020    (No Known Allergies)            The following portions of the patient's history were reviewed and updated as appropriate: allergies, current medications, past family history, past medical history, past social history, past surgical history and problem list      Past Medical History:   Diagnosis Date    Anemia     Asthma     w/acute exacerbation   Last assessed: 10/26/12    Chest pain 2/3/2016    Depression     [facility]     Diabetic nephropathy ([facility] )     Diabetic retinopathy ([place] [address] ) 2/3/2016    Gastroenteritis 02/03/2016    GERD (gastroesophageal reflux disease)     HTN (hypertension)     Hyperlipidemia 2/3/2016    Oligomenorrhea     Polycystic ovaries 2/3/2016    Retinal hemorrhage 2/3/2016    Retinopathy     Thrombocytosis (HCC)     Type 1 diabetes mellitus with ophthalmic manifestation ([place] [address] ) 2/3/2016       Past Surgical History:   Procedure Laterality Date    CATARACT EXTRACTION Bilateral     RETINAL LASER PROCEDURE         Family History   Problem Relation Age of Onset    Heart murmur Mother     Diabetes Mother         Mellitus    Thyroid disease Mother         Disorder    Diabetes Father         Mellitus    Hypertension Father     Diabetes Maternal Grandfather         Mellitus    Breast cancer Paternal Grandmother     Diabetes Maternal Aunt         Mellitus    Diabetes Maternal Uncle         Mellitus    No Known Problems Maternal Grandmother     Leukemia Paternal Grandfather     Substance Abuse Neg Hx     Mental illness Neg Hx     Alcohol abuse Neg Hx          Medications have been verified  Objective   /86 (BP Location: Left arm, Patient Position: Sitting, Cuff Size: Large)   Pulse 90   Temp 98 9 °F (37 2 °C) (Tympanic)   Resp 22   Ht 4' 10" (1 473 m)   Wt 84 9 kg (187 lb 3 2 oz)   LMP 07/06/2020   SpO2 94%   BMI 39 12 kg/m²        Physical Exam     Physical Exam   Constitutional: She is oriented to person, place, and time  She appears well-developed  No distress  HENT:   Head: Normocephalic and atraumatic  Cardiovascular: Normal rate, regular rhythm, normal heart sounds and normal pulses  O2 sat fluctuated between 92-94% in exam room  2+ LE edema noted on exam, no pitting noted  Pulmonary/Chest: Effort normal and breath sounds normal  Air movement is not decreased  She has no wheezes (Reproducible chest tightness with deep breaths)  She has no rhonchi  She has no rales     Musculoskeletal: Normal range of motion  Neurological: She is alert and oriented to person, place, and time  Skin: Skin is warm and dry  Capillary refill takes less than 2 seconds  She is not diaphoretic  Nursing note and vitals reviewed

## 2020-08-06 NOTE — ASSESSMENT & PLAN NOTE
No acute exacerbation  No wheezing  Continue home medications  On Breo and budesonide neb, and albuterol inhaler p r n    Continue on Singulair

## 2020-08-06 NOTE — ED PROVIDER NOTES
History  Chief Complaint   Patient presents with    Shortness of Breath     pt dx with mild chf opted to try home treatment and OP follow up but returned a few minutes later sayiong she didnt want to go home and wanted to be admited for testing     40-year-old female returns emergency department due to exertional shortness of breath  I saw this patient within the past hour at which time she was diagnosed with likely acute CHF discharged home with prescription for Lasix an outpatient echocardiogram order  Patient states she began to feel increasingly anxious about outpatient workup and decided to return to the hospital for admission  Prior to Admission Medications   Prescriptions Last Dose Informant Patient Reported? Taking?    Ascorbic Acid (VITAMIN C) 500 MG CAPS  Self Yes No   Sig: Take 2 capsules by mouth daily   Biotin 10 MG TABS  Self Yes No   Sig: Take 1 tablet by mouth daily   Butalbital-APAP-Caffeine (Fioricet) -40 MG CAPS   No No   Sig: Take 1 tablet by mouth 4 (four) times a day as needed (headahce)   Patient not taking: Reported on 2020   ONE TOUCH ULTRA TEST test strip  Self No No   Si each by Other route 3 (three) times a day before meals Use as instructed   PARoxetine (PAXIL) 20 mg tablet   No No   Sig: TAKE 1 TABLET BY MOUTH EVERY DAY   Probiotic Product (ALIGN PO)  Self Yes No   Sig: Take by mouth   acetaminophen (TYLENOL) 500 mg tablet  Self No No   Sig: Take 2 tablets (1,000 mg total) by mouth every 6 (six) hours as needed for mild pain or moderate pain   albuterol (PROVENTIL HFA,VENTOLIN HFA) 90 mcg/act inhaler   No No   Sig: INHALE 2 PUFFS BY MOUTH EVERY 4 HOURS AS NEEDED FOR WHEEZING OR SHORTNESS OF BREATH   budesonide (PULMICORT) 1 MG/2ML nebulizer solution  Self No No   Sig: Take 1 mL (1 mg total) by nebulization daily   Patient taking differently: Take 1 mg by nebulization daily prn    cyanocobalamin (VITAMIN B-12) 100 mcg tablet  Self Yes No   Sig: Take by mouth daily   fluticasone-salmeterol (Advair Diskus) 250-50 mcg/dose inhaler  Self No No   Sig: Inhale 1 puff 2 (two) times a day Rinse mouth after use  furosemide (LASIX) 40 mg tablet   No No   Sig: Take 1 tablet (40 mg total) by mouth daily   insulin aspart (NovoLOG) 100 units/mL injection   No No   Sig: Take 2 unit of novolog for every 1 g of carb   insulin aspart (NovoLOG) 100 units/mL injection   No No   Sig: Use up to 100 units daily  insulin glargine (Lantus SoloStar) 100 units/mL injection pen   No No   Sig: Inject 75 Units under the skin daily   lisinopril (ZESTRIL) 20 mg tablet   No No   Sig: TAKE 2 TABLETS BY MOUTH EVERY DAY   montelukast (SINGULAIR) 10 mg tablet   No No   Sig: TAKE 1 TABLET BY MOUTH EVERY DAY AT BEDTIME   omeprazole (PriLOSEC) 20 mg delayed release capsule  Self Yes No   Sig: Take 20 mg by mouth daily  pravastatin (PRAVACHOL) 40 mg tablet  Self No No   Sig: TAKE 1 TABLET BY MOUTH EVERY DAY      Facility-Administered Medications: None       Past Medical History:   Diagnosis Date    Anemia     Asthma     w/acute exacerbation   Last assessed: 10/26/12    Chest pain 2/3/2016    Depression     [facility] mellitus [facility] [address] [facility]     Diabetic nephropathy [facility] [address] [facility]     [facility] 2/3/2016    Gastroenteritis 02/03/2016    GERD (gastroesophageal reflux disease)     HTN (hypertension)     Hyperlipidemia 2/3/2016    Oligomenorrhea     Polycystic ovaries 2/3/2016    Retinal hemorrhage 2/3/2016    Retinopathy     Thrombocytosis (HCC)     Type 1 diabetes mellitus with ophthalmic manifestation ([facility] [address] ) 2/3/2016       Past Surgical History:   Procedure Laterality Date    CATARACT EXTRACTION Bilateral     RETINAL LASER PROCEDURE         Family History   Problem Relation Age of Onset    Heart murmur Mother     Diabetes Mother         Mellitus    Thyroid disease Mother         Disorder    Diabetes Father         Mellitus    Hypertension Father     Diabetes Maternal Grandfather Mellitus    Breast cancer Paternal Grandmother     Diabetes Maternal Aunt         Mellitus    Diabetes Maternal Uncle         Mellitus    No Known Problems Maternal Grandmother     Leukemia Paternal Grandfather     Substance Abuse Neg Hx     Mental illness Neg Hx     Alcohol abuse Neg Hx      I have reviewed and agree with the history as documented  E-Cigarette/Vaping    E-Cigarette Use Never User      E-Cigarette/Vaping Substances     Social History     Tobacco Use    Smoking status: Never Smoker    Smokeless tobacco: Never Used    Tobacco comment: Per [employer]: Former smoker  Quit in 1994   Substance Use Topics    Alcohol use: Not Currently     Frequency: Monthly or less     Comment: Occasional/1 mixed drink twice a month if that    Drug use: No       Review of Systems   Constitutional: Positive for unexpected weight change  Negative for chills, diaphoresis and fever  Eyes: Negative for visual disturbance  Respiratory: Positive for shortness of breath  Negative for cough  Cardiovascular: Positive for leg swelling  Negative for chest pain and palpitations  Gastrointestinal: Negative for abdominal pain, diarrhea, nausea and vomiting  Genitourinary: Negative for dysuria, flank pain and frequency  Musculoskeletal: Negative for arthralgias and myalgias  Skin: Negative for color change, rash and wound  Allergic/Immunologic: Negative for immunocompromised state  Neurological: Negative for dizziness and light-headedness  Hematological: Does not bruise/bleed easily  Psychiatric/Behavioral: Negative for confusion  The patient is not nervous/anxious  Physical Exam  Physical Exam  Vitals signs reviewed  Constitutional:       General: She is not in acute distress  Appearance: She is well-developed  She is not diaphoretic  HENT:      Head: Normocephalic and atraumatic  Mouth/Throat:      Mouth: Mucous membranes are moist    Eyes:      General: No scleral icterus  Pupils: Pupils are equal, round, and reactive to light  Neck:      Vascular: No JVD  Cardiovascular:      Rate and Rhythm: Normal rate and regular rhythm  Heart sounds: No murmur  No friction rub  No gallop  Pulmonary:      Effort: No respiratory distress  Breath sounds: No decreased breath sounds, wheezing, rhonchi or rales  Abdominal:      General: Bowel sounds are normal  There is no distension  Palpations: Abdomen is soft  There is no mass  Tenderness: There is no abdominal tenderness  There is no guarding or rebound  Musculoskeletal:      Right lower leg: Edema present  Left lower leg: Edema present  Skin:     General: Skin is warm and dry  Capillary Refill: Capillary refill takes less than 2 seconds  Coloration: Skin is not pale  Neurological:      Mental Status: She is alert and oriented to person, place, and time     Psychiatric:         Behavior: Behavior normal          Vital Signs  ED Triage Vitals   Temp Pulse Respirations Blood Pressure SpO2   -- 08/06/20 1307 08/06/20 1308 08/06/20 1307 08/06/20 1307    102 (!) 25 (!) 222/100 96 %      Temp src Heart Rate Source Patient Position - Orthostatic VS BP Location FiO2 (%)   -- -- -- -- --             Pain Score       --                  Vitals:    08/06/20 1307 08/06/20 1308   BP: (!) 222/100    Pulse: 102 100         Visual Acuity      ED Medications  Medications   furosemide (LASIX) injection 40 mg (40 mg Intravenous Given 8/6/20 1314)       Diagnostic Studies  Results Reviewed     None                 No orders to display              Procedures  Procedures         ED Course                                             MDM  Number of Diagnoses or Management Options  Acute CHF [facility]): established and worsening  Diagnosis management comments: Patient will be admitted to medicine for further management       Amount and/or Complexity of Data Reviewed  Review and summarize past medical records: yes  Discuss the patient with other providers: yes          Disposition  Final diagnoses:   Acute CHF ([address] )     Time reflects when diagnosis was documented in both MDM as applicable and the Disposition within this note     Time User Action Codes Description Comment    8/6/2020  1:00 PM Kim Smoker Add [I50 9] Acute CHF [facility])       ED Disposition     ED Disposition Condition Date/Time Comment    Admit Stable Thu Aug 6, 2020  1:00 PM Case was discussed with Dr Ria Jolly and the patient's admission status was agreed to be Admission Status: inpatient status to the service of Dr Ria Jolly   Follow-up Information    None         Patient's Medications   Discharge Prescriptions    No medications on file     No discharge procedures on file      PDMP Review     None          ED Provider  Electronically Signed by           Aleisha Avalos PA-C  08/06/20 1857

## 2020-08-06 NOTE — ASSESSMENT & PLAN NOTE
Wt Readings from Last 3 Encounters:   08/06/20 81 7 kg (180 lb 1 9 oz)   08/06/20 84 8 kg (187 lb)   08/06/20 84 9 kg (187 lb 3 2 oz)     Will start on Lasix 40 mg IV twice daily  Monitor on telemetry  Serial troponin    Will await echocardiogram to assess diastolic versus systolic  Daily weight and I&Os  Monitor renal function while diuresing  Cardiology consult  Oxygen supplementation and bronchodilators

## 2020-08-06 NOTE — PLAN OF CARE
Problem: PAIN - ADULT  Goal: Verbalizes/displays adequate comfort level or baseline comfort level  Description: Interventions:  - Encourage patient to monitor pain and request assistance  - Assess pain using appropriate pain scale  - Administer analgesics based on type and severity of pain and evaluate response  - Implement non-pharmacological measures as appropriate and evaluate response  - Consider cultural and social influences on pain and pain management  - Notify physician/advanced practitioner if interventions unsuccessful or patient reports new pain  Outcome: Progressing     Problem: INFECTION - ADULT  Goal: Absence or prevention of progression during hospitalization  Description: INTERVENTIONS:  - Assess and monitor for signs and symptoms of infection  - Monitor lab/diagnostic results  - Monitor all insertion sites, i e  indwelling lines, tubes, and drains  - Monitor endotracheal if appropriate and nasal secretions for changes in amount and color  - [place] appropriate cooling/warming therapies per order  - Administer medications as ordered  - Instruct and encourage patient and family to use good hand hygiene technique  - Identify and instruct in appropriate isolation precautions for identified infection/condition  Outcome: Progressing  Goal: Absence of fever/infection during neutropenic period  Description: INTERVENTIONS:  - Monitor WBC    Outcome: Progressing     Problem: SAFETY ADULT  Goal: Patient will remain free of falls  Description: INTERVENTIONS:  - Assess patient frequently for physical needs  -  Identify cognitive and physical deficits and behaviors that affect risk of falls    -  [place] fall precautions as indicated by assessment   - Educate patient/family on patient safety including physical limitations  - Instruct patient to call for assistance with activity based on assessment  - Modify environment to reduce risk of injury  - Consider OT/PT consult to assist with strengthening/mobility  Outcome: Progressing  Goal: Maintain or return to baseline ADL function  Description: INTERVENTIONS:  -  Assess patient's ability to carry out ADLs; assess patient's baseline for ADL function and identify physical deficits which impact ability to perform ADLs (bathing, care of mouth/teeth, toileting, grooming, dressing, etc )  - Assess/evaluate cause of self-care deficits   - Assess range of motion  - Assess patient's mobility; develop plan if impaired  - Assess patient's need for assistive devices and provide as appropriate  - Encourage maximum independence but intervene and supervise when necessary  - Involve family in performance of ADLs  - Assess for home care needs following discharge   - Consider OT consult to assist with ADL evaluation and planning for discharge  - Provide patient education as appropriate  Outcome: Progressing  Goal: Maintain or return mobility status to optimal level  Description: INTERVENTIONS:  - Assess patient's baseline mobility status (ambulation, transfers, stairs, etc )    - Identify cognitive and physical deficits and behaviors that affect mobility  - Identify mobility aids required to assist with transfers and/or ambulation (gait belt, sit-to-stand, lift, walker, cane, etc )  - [place] fall precautions as indicated by assessment  - Record patient progress and toleration of activity level on Mobility SBAR; progress patient to next Phase/Stage  - Instruct patient to call for assistance with activity based on assessment  - Consider rehabilitation consult to assist with strengthening/weightbearing, etc   Outcome: Progressing     Problem: DISCHARGE PLANNING  Goal: Discharge to home or other facility with appropriate resources  Description: INTERVENTIONS:  - Identify barriers to discharge w/patient and caregiver  - Arrange for needed discharge resources and transportation as appropriate  - Identify discharge learning needs (meds, wound care, etc )  - Arrange for interpretive services to assist at discharge as needed  - Refer to Case Management Department for coordinating discharge planning if the patient needs post-hospital services based on physician/advanced practitioner order or complex needs related to functional status, cognitive ability, or social support system  Outcome: Progressing     Problem: Knowledge Deficit  Goal: Patient/family/caregiver demonstrates understanding of disease process, treatment plan, medications, and discharge instructions  Description: Complete learning assessment and assess knowledge base    Interventions:  - Provide teaching at level of understanding  - Provide teaching via preferred learning methods  Outcome: Progressing     Problem: CARDIOVASCULAR - ADULT  Goal: Maintains optimal cardiac output and hemodynamic stability  Description: INTERVENTIONS:  - Monitor I/O, vital signs and rhythm  - Monitor for S/S and trends of decreased cardiac output  - Administer and titrate ordered vasoactive medications to optimize hemodynamic stability  - Assess quality of pulses, skin color and temperature  - Assess for signs of decreased coronary artery perfusion  - Instruct patient to report change in severity of symptoms  Outcome: Progressing  Goal: Absence of cardiac dysrhythmias or at baseline rhythm  Description: INTERVENTIONS:  - Continuous cardiac monitoring, vital signs, obtain 12 lead EKG if ordered  - Administer antiarrhythmic and heart rate control medications as ordered  - Monitor electrolytes and administer replacement therapy as ordered  Outcome: Progressing     Problem: RESPIRATORY - ADULT  Goal: Achieves optimal ventilation and oxygenation  Description: INTERVENTIONS:  - Assess for changes in respiratory status  - Assess for changes in mentation and behavior  - Position to facilitate oxygenation and minimize respiratory effort  - Oxygen administered by appropriate delivery if ordered  - Initiate smoking cessation education as indicated  - Encourage broncho-pulmonary hygiene including cough, deep breathe, Incentive Spirometry  - Assess the need for suctioning and aspirate as needed  - Assess and instruct to report SOB or any respiratory difficulty  - Respiratory Therapy support as indicated  Outcome: Progressing

## 2020-08-06 NOTE — ED PROVIDER NOTES
History  Chief Complaint   Patient presents with    Shortness of Breath     pt presents with SOB, swelling in ankles, increased HR x 3k     80-year-old female with history of COPD and type 1 insulin-dependent diabetes presents emergency department for evaluation of worsening shortness of breath and peripheral edema x2 weeks, with an estimated 20 lb weight gain in the past 2 months  She was evaluated earlier today at urgent care and referred emergency department for further evaluation  She states that her shortness of breath is primarily exertional but does report some orthopnea as well  No history of peripheral edema  Denies any exertional chest pain  Denies fevers, chills, sweats, nausea, vomiting, or diarrhea  She does not take any diuretic pills  Glucose control has been normal for her recently  On exam, patient is well-appearing, afebrile, in no distress  Vital signs are unremarkable  She is not tachypneic at rest however becomes tachypneic with exertion, no accessory muscle use  Cardiopulmonary exam is benign with clear lungs, abdomen is soft and nontender  She does have 2+ pitting edema to the knees, symmetric bilaterally  Peripheral pulses are intact in all fields  A/P: Fluid retention  Concern for acute CHF  Check cardiac workup including BNP  Prior to Admission Medications   Prescriptions Last Dose Informant Patient Reported? Taking?    Ascorbic Acid (VITAMIN C) 500 MG CAPS  Self Yes No   Sig: Take 2 capsules by mouth daily   Biotin 10 MG TABS  Self Yes No   Sig: Take 1 tablet by mouth daily   Butalbital-APAP-Caffeine (Fioricet) -40 MG CAPS   No No   Sig: Take 1 tablet by mouth 4 (four) times a day as needed (headce)   Patient not taking: Reported on 2020   ONE TOUCH ULTRA TEST test strip  Self No No   Si each by Other route 3 (three) times a day before meals Use as instructed   PARoxetine (PAXIL) 20 mg tablet   No No   Sig: TAKE 1 TABLET BY MOUTH EVERY DAY Probiotic Product (ALIGN PO)  Self Yes No   Sig: Take by mouth   acetaminophen (TYLENOL) 500 mg tablet  Self No No   Sig: Take 2 tablets (1,000 mg total) by mouth every 6 (six) hours as needed for mild pain or moderate pain   albuterol (PROVENTIL HFA,VENTOLIN HFA) 90 mcg/act inhaler   No No   Sig: INHALE 2 PUFFS BY MOUTH EVERY 4 HOURS AS NEEDED FOR WHEEZING OR SHORTNESS OF BREATH   budesonide (PULMICORT) 1 MG/2ML nebulizer solution  Self No No   Sig: Take 1 mL (1 mg total) by nebulization daily   Patient taking differently: Take 1 mg by nebulization daily prn    cyanocobalamin (VITAMIN B-12) 100 mcg tablet  Self Yes No   Sig: Take by mouth daily   fluticasone-salmeterol (Advair Diskus) 250-50 mcg/dose inhaler  Self No No   Sig: Inhale 1 puff 2 (two) times a day Rinse mouth after use  insulin aspart (NovoLOG) 100 units/mL injection   No No   Sig: Take 2 unit of novolog for every 1 g of carb   insulin aspart (NovoLOG) 100 units/mL injection   No No   Sig: Use up to 100 units daily  insulin glargine (Lantus SoloStar) 100 units/mL injection pen   No No   Sig: Inject 75 Units under the skin daily   lisinopril (ZESTRIL) 20 mg tablet   No No   Sig: TAKE 2 TABLETS BY MOUTH EVERY DAY   montelukast (SINGULAIR) 10 mg tablet   No No   Sig: TAKE 1 TABLET BY MOUTH EVERY DAY AT BEDTIME   omeprazole (PriLOSEC) 20 mg delayed release capsule  Self Yes No   Sig: Take 20 mg by mouth daily  pravastatin (PRAVACHOL) 40 mg tablet  Self No No   Sig: TAKE 1 TABLET BY MOUTH EVERY DAY      Facility-Administered Medications: None       Past Medical History:   Diagnosis Date    Anemia     Asthma     w/acute exacerbation   Last assessed: 10/26/12    Chest pain 2/3/2016    Depression     Diabetes mellitus [facility]     Diabetic nephropathy ([facility]     Diabetic retinopathy ([facility] ) 2/3/2016    Gastroenteritis 02/03/2016    GERD (gastroesophageal reflux disease)     HTN (hypertension)     Hyperlipidemia 2/3/2016    Oligomenorrhea     Polycystic ovaries 2/3/2016    Retinal hemorrhage 2/3/2016    Retinopathy     Thrombocytosis (HCC)     Type 1 diabetes mellitus with ophthalmic manifestation ([facility] [address] ) 2/3/2016       Past Surgical History:   Procedure Laterality Date    CATARACT EXTRACTION Bilateral     RETINAL LASER PROCEDURE         Family History   Problem Relation Age of Onset    Heart murmur Mother     Diabetes Mother         Mellitus    Thyroid disease Mother         Disorder    Diabetes Father         Mellitus    Hypertension Father     Diabetes Maternal Grandfather         Mellitus    Breast cancer Paternal Grandmother     Diabetes Maternal Aunt         Mellitus    Diabetes Maternal Uncle         Mellitus    No Known Problems Maternal Grandmother     Leukemia Paternal Grandfather     Substance Abuse Neg Hx     Mental illness Neg Hx     Alcohol abuse Neg Hx      I have reviewed and agree with the history as documented  E-Cigarette/Vaping    E-Cigarette Use Never User      E-Cigarette/Vaping Substances     Social History     Tobacco Use    Smoking status: Never Smoker    Smokeless tobacco: Never Used    Tobacco comment: Per [employer]: Former smoker  Quit in 1994   Substance Use Topics    Alcohol use: Not Currently     Frequency: Monthly or less     Comment: Occasional/1 mixed drink twice a month if that    Drug use: No       Review of Systems   Constitutional: Positive for unexpected weight change  Negative for chills, diaphoresis and fever  Eyes: Negative for visual disturbance  Respiratory: Positive for shortness of breath  Negative for cough  Cardiovascular: Positive for leg swelling  Negative for chest pain and palpitations  Gastrointestinal: Negative for abdominal pain, diarrhea, nausea and vomiting  Genitourinary: Negative for dysuria, flank pain and frequency  Musculoskeletal: Negative for arthralgias and myalgias  Skin: Negative for color change, rash and wound     Allergic/Immunologic: Negative for immunocompromised state  Neurological: Negative for dizziness and light-headedness  Hematological: Does not bruise/bleed easily  Psychiatric/Behavioral: Negative for confusion  The patient is not nervous/anxious  Physical Exam  Physical Exam  Vitals signs reviewed  Constitutional:       General: She is not in acute distress  Appearance: She is well-developed  She is not diaphoretic  HENT:      Head: Normocephalic and atraumatic  Mouth/Throat:      Mouth: Mucous membranes are moist    Eyes:      General: No scleral icterus  Pupils: Pupils are equal, round, and reactive to light  Neck:      Vascular: No JVD  Cardiovascular:      Rate and Rhythm: Normal rate and regular rhythm  Heart sounds: No murmur  No friction rub  No gallop  Pulmonary:      Effort: No accessory muscle usage or respiratory distress  Breath sounds: No decreased breath sounds, wheezing, rhonchi or rales  Abdominal:      General: Bowel sounds are normal  There is no distension  Palpations: Abdomen is soft  There is no mass  Tenderness: There is no abdominal tenderness  There is no guarding or rebound  Musculoskeletal:      Right lower leg: Edema present  Left lower leg: Edema present  Skin:     General: Skin is warm and dry  Capillary Refill: Capillary refill takes less than 2 seconds  Coloration: Skin is not pale  Neurological:      General: No focal deficit present  Mental Status: She is alert and oriented to person, place, and time     Psychiatric:         Behavior: Behavior normal          Vital Signs  ED Triage Vitals [08/06/20 1023]   Temp Pulse Respirations Blood Pressure SpO2   -- 98 (!) 24 157/89 96 %      Temp src Heart Rate Source Patient Position - Orthostatic VS BP Location FiO2 (%)   -- -- -- -- --      Pain Score       --           Vitals:    08/06/20 1023   BP: 157/89   Pulse: 98         Visual Acuity      ED Medications  Medications - No data to display    Diagnostic Studies  Results Reviewed     Procedure Component Value Units Date/Time    NT-BNP PRO [[id number]]  (Abnormal) Collected:  08/06/20 1037    Lab Status:  Final result Specimen:  Blood from Arm, Right Updated:  08/06/20 1157     NT-proBNP 388 pg/mL     Troponin I [[id number]]  (Normal) Collected:  08/06/20 1037    Lab Status:  Final result Specimen:  Blood from Arm, Right Updated:  08/06/20 1100     Troponin I <0 02 ng/mL     Comprehensive metabolic panel [[id number]]  (Abnormal) Collected:  08/06/20 1037    Lab Status:  Final result Specimen:  Blood from Arm, Right Updated:  08/06/20 1058     Sodium 138 mmol/L      Potassium 4 2 mmol/L      Chloride 104 mmol/L      CO2 27 mmol/L      ANION GAP 7 mmol/L      BUN 12 mg/dL      Creatinine 0 61 mg/dL      Glucose 130 mg/dL      Calcium 9 0 mg/dL      AST 17 U/L      ALT 28 U/L      Alkaline Phosphatase 166 U/L      Total Protein 7 2 g/dL      Albumin 3 0 g/dL      Total Bilirubin 0 20 mg/dL      eGFR 111 ml/min/1 73sq m     Narrative:       [facility] guidelines for Chronic Kidney Disease (CKD):     Stage 1 with normal or high GFR (GFR > 90 mL/min/1 73 square meters)    Stage 2 Mild CKD (GFR = 60-89 mL/min/1 73 square meters)    Stage 3A Moderate CKD (GFR = 45-59 mL/min/1 73 square meters)    Stage 3B Moderate CKD (GFR = 30-44 mL/min/1 73 square meters)    Stage 4 Severe CKD (GFR = 15-29 mL/min/1 73 square meters)    Stage 5 End Stage CKD (GFR <15 mL/min/1 73 square meters)  Note: GFR calculation is accurate only with a steady state creatinine    CBC and differential [[id number]]  (Abnormal) Collected:  08/06/20 1037    Lab Status:  Final result Specimen:  Blood from Arm, Right Updated:  08/06/20 1042     WBC 11 82 Thousand/uL      RBC 3 59 Million/uL      Hemoglobin 11 0 g/dL      Hematocrit 33 1 %      MCV 92 fL      MCH 30 6 pg      MCHC 33 2 g/dL      RDW 12 8 %      MPV 9 3 fL      Platelets 630 Thousands/uL      nRBC 0 /100 WBCs      Neutrophils Relative 76 %      Immat GRANS % 1 %      Lymphocytes Relative 12 %      Monocytes Relative 6 %      Eosinophils Relative 4 %      Basophils Relative 1 %      Neutrophils Absolute 9 16 Thousands/µL      Immature Grans Absolute 0 08 Thousand/uL      Lymphocytes Absolute 1 41 Thousands/µL      Monocytes Absolute 0 67 Thousand/µL      Eosinophils Absolute 0 43 Thousand/µL      Basophils Absolute 0 07 Thousands/µL                  XR chest 1 view portable   Final Result by David Reed MD (08/06 1049)      Blunting of the left costophrenic angle due to trace left effusion  Workstation performed: [id number]                    Procedures  ECG 12 Lead Documentation Only    Date/Time: 8/6/2020 10:55 AM  Performed by: Natan Nieto PA-C  Authorized by: Natan Nieto PA-C     Indications / Diagnosis:  CHF  ECG reviewed by me, the ED Provider: yes    Patient location:  ED  Previous ECG:     Previous ECG:  Compared to current    Similarity:  No change  Interpretation:     Interpretation: normal    Rate:     ECG rate:  90    ECG rate assessment: normal    Rhythm:     Rhythm: sinus rhythm    Ectopy:     Ectopy: none    QRS:     QRS axis:  Normal    QRS intervals:  Normal  Conduction:     Conduction: normal    ST segments:     ST segments:  Normal  T waves:     T waves: normal    Comments:      QTc 440             ED Course       US AUDIT      Most Recent Value   Initial Alcohol Screen: US AUDIT-C    1  How often do you have a drink containing alcohol?  0 Filed at: 08/06/2020 1026   2  How many drinks containing alcohol do you have on a typical day you are drinking? 0 Filed at: 08/06/2020 1026   3b  FEMALE Any Age, or MALE 65+: How often do you have 4 or more drinks on one occassion?   0 Filed at: 08/06/2020 1026   Audit-C Score  0 Filed at: 08/06/2020 1026                                            MDM  Number of Diagnoses or Management Options  Acute CHF [facility]): new and requires workup  Diagnosis management comments: 42-year-old female presents with acute peripheral edema and exertional dyspnea  Her oxygen saturation dropped as low as 89% with exertion however returns promptly at rest to 97-98%  Chest x-ray shows a trace effusion however no vascular congestion and lungs are grossly clear  EKG is nonischemic and labs are reassuring with only minimal elevation of BNP  I discussed the findings with the patient and gave her the option of hospitalization versus discharge to home, which I find to be reasonable given the fact that she is only mildly symptomatic with exertion  Patient would prefer discharge to home, I will start her on 40 mg of Lasix daily and order an outpatient echocardiogram to be completed  Patient is educated on ED return parameters encouraged follow-up for hospitalization if symptoms worsen despite treatment  Amount and/or Complexity of Data Reviewed  Clinical lab tests: ordered and reviewed  Tests in the radiology section of CPT®: ordered and reviewed  Tests in the medicine section of CPT®: ordered and reviewed  Review and summarize past medical records: yes  Independent visualization of images, tracings, or specimens: yes          Disposition  Final diagnoses:   Acute CHF ([address] )     Time reflects when diagnosis was documented in both MDM as applicable and the Disposition within this note     Time User Action Codes Description Comment    8/6/2020 12:17 PM Tricia Score Add [I50 9] Acute CHF [facility])       ED Disposition     ED Disposition Condition Date/Time Comment    Discharge Stable u Aug 6, 2020 12:17 PM Paulina Faster discharge to home/self care              Follow-up Information     Follow up With Specialties Details Why Contact Info Additional [facility] Schedule an appointment as soon as possible for a visit   [address] [address] [address]ll[address] [facility] [address]  [address]  [facility], [address] [address] [address], [place], [place], [address] [phone]          Discharge Medication List as of 8/6/2020 12:19 PM      START taking these medications    Details   furosemide (LASIX) 40 mg tablet Take 1 tablet (40 mg total) by mouth daily, Starting Thu 8/6/2020, Normal         CONTINUE these medications which have NOT CHANGED    Details   acetaminophen (TYLENOL) 500 mg tablet Take 2 tablets (1,000 mg total) by mouth every 6 (six) hours as needed for mild pain or moderate pain, Starting Fri 9/27/2019, OTC      albuterol (PROVENTIL HFA,VENTOLIN HFA) 90 mcg/act inhaler INHALE 2 PUFFS BY MOUTH EVERY 4 HOURS AS NEEDED FOR WHEEZING OR SHORTNESS OF BREATH, Normal      Ascorbic Acid (VITAMIN C) 500 MG CAPS Take 2 capsules by mouth daily, Historical Med      Biotin 10 MG TABS Take 1 tablet by mouth daily, Historical Med      budesonide (PULMICORT) 1 MG/2ML nebulizer solution Take 1 mL (1 mg total) by nebulization daily, Starting Thu 5/31/2018, Normal      Butalbital-APAP-Caffeine (Fioricet) -40 MG CAPS Take 1 tablet by mouth 4 (four) times a day as needed (headahce), Starting Mon 7/6/2020, Normal      cyanocobalamin (VITAMIN B-12) 100 mcg tablet Take by mouth daily, Historical Med      fluticasone-salmeterol (Advair Diskus) 250-50 mcg/dose inhaler Inhale 1 puff 2 (two) times a day Rinse mouth after use , Starting Fri 3/27/2020, Normal      !! insulin aspart (NovoLOG) 100 units/mL injection Take 2 unit of novolog for every 1 g of carb, Normal      !! insulin aspart (NovoLOG) 100 units/mL injection Use up to 100 units daily  , Normal      insulin glargine (Lantus SoloStar) 100 units/mL injection pen Inject 75 Units under the skin daily, Starting Tue 7/21/2020, No Print      lisinopril (ZESTRIL) 20 mg tablet TAKE 2 TABLETS BY MOUTH EVERY DAY, Normal      montelukast (SINGULAIR) 10 mg tablet TAKE 1 TABLET BY MOUTH EVERY DAY AT BEDTIME, Normal      omeprazole (PriLOSEC) 20 mg delayed release capsule Take 20 mg by mouth daily  , Historical Med      ONE TOUCH ULTRA TEST test strip 1 each by Other route 3 (three) times a day before meals Use as instructed, Starting Thu 8/9/2018, Normal      PARoxetine (PAXIL) 20 mg tablet TAKE 1 TABLET BY MOUTH EVERY DAY, Normal      pravastatin (PRAVACHOL) 40 mg tablet TAKE 1 TABLET BY MOUTH EVERY DAY, Normal      Probiotic Product (ALIGN PO) Take by mouth, Starting Mon 6/29/2015, Historical Med       !! - Potential duplicate medications found  Please discuss with provider  Outpatient Discharge Orders   Echo stress test w contrast if indicated   Standing Status: Future Standing Exp   Date: 08/06/24       PDMP Review     None          ED Provider  Electronically Signed by           Paulina Mondragon PA-C  08/06/20 4937

## 2020-08-06 NOTE — ASSESSMENT & PLAN NOTE
Lab Results   Component Value Date    HGBA1C 10 6 (H) 04/22/2020       No results for input(s): POCGLU in the last 72 hours    Continue on Lantus insulin  Accu-Chek a c  HS with Humalog sliding scale  Blood Sugar Average: Last 72 hrs:

## 2020-08-07 ENCOUNTER — APPOINTMENT (INPATIENT)
Dept: NON INVASIVE DIAGNOSTICS | Facility: HOSPITAL | Age: 45
DRG: 291 | End: 2020-08-07
Payer: COMMERCIAL

## 2020-08-07 DIAGNOSIS — J45.20 MILD INTERMITTENT ASTHMA WITHOUT COMPLICATION: ICD-10-CM

## 2020-08-07 LAB
ALBUMIN SERPL BCP-MCNC: 2.7 G/DL (ref 3.5–5)
ALP SERPL-CCNC: 164 U/L (ref 46–116)
ALT SERPL W P-5'-P-CCNC: 25 U/L (ref 12–78)
ANION GAP SERPL CALCULATED.3IONS-SCNC: 7 MMOL/L (ref 4–13)
AST SERPL W P-5'-P-CCNC: 14 U/L (ref 5–45)
BACTERIA UR QL AUTO: ABNORMAL /HPF
BASOPHILS # BLD AUTO: 0.07 THOUSANDS/ΜL (ref 0–0.1)
BASOPHILS NFR BLD AUTO: 1 % (ref 0–1)
BILIRUB SERPL-MCNC: 0.3 MG/DL (ref 0.2–1)
BILIRUB UR QL STRIP: NEGATIVE
BUN SERPL-MCNC: 22 MG/DL (ref 5–25)
CALCIUM SERPL-MCNC: 8.9 MG/DL (ref 8.3–10.1)
CHLORIDE SERPL-SCNC: 99 MMOL/L (ref 100–108)
CLARITY UR: CLEAR
CO2 SERPL-SCNC: 28 MMOL/L (ref 21–32)
COLOR UR: ABNORMAL
CREAT SERPL-MCNC: 0.82 MG/DL (ref 0.6–1.3)
D DIMER PPP FEU-MCNC: 1.06 UG/ML FEU
EOSINOPHIL # BLD AUTO: 0.52 THOUSAND/ΜL (ref 0–0.61)
EOSINOPHIL NFR BLD AUTO: 4 % (ref 0–6)
ERYTHROCYTE [DISTWIDTH] IN BLOOD BY AUTOMATED COUNT: 12.6 % (ref 11.6–15.1)
GFR SERPL CREATININE-BSD FRML MDRD: 87 ML/MIN/1.73SQ M
GLUCOSE SERPL-MCNC: 335 MG/DL (ref 65–140)
GLUCOSE SERPL-MCNC: 339 MG/DL (ref 65–140)
GLUCOSE SERPL-MCNC: 348 MG/DL (ref 65–140)
GLUCOSE SERPL-MCNC: 391 MG/DL (ref 65–140)
GLUCOSE SERPL-MCNC: 484 MG/DL (ref 65–140)
GLUCOSE UR STRIP-MCNC: ABNORMAL MG/DL
HCT VFR BLD AUTO: 33.1 % (ref 34.8–46.1)
HGB BLD-MCNC: 10.9 G/DL (ref 11.5–15.4)
HGB UR QL STRIP.AUTO: NEGATIVE
IMM GRANULOCYTES # BLD AUTO: 0.07 THOUSAND/UL (ref 0–0.2)
IMM GRANULOCYTES NFR BLD AUTO: 1 % (ref 0–2)
INR PPP: 0.98 (ref 0.84–1.19)
KETONES UR STRIP-MCNC: NEGATIVE MG/DL
LEUKOCYTE ESTERASE UR QL STRIP: ABNORMAL
LYMPHOCYTES # BLD AUTO: 2.2 THOUSANDS/ΜL (ref 0.6–4.47)
LYMPHOCYTES NFR BLD AUTO: 19 % (ref 14–44)
MAGNESIUM SERPL-MCNC: 1.6 MG/DL (ref 1.6–2.6)
MCH RBC QN AUTO: 29.9 PG (ref 26.8–34.3)
MCHC RBC AUTO-ENTMCNC: 32.9 G/DL (ref 31.4–37.4)
MCV RBC AUTO: 91 FL (ref 82–98)
MONOCYTES # BLD AUTO: 0.66 THOUSAND/ΜL (ref 0.17–1.22)
MONOCYTES NFR BLD AUTO: 6 % (ref 4–12)
NEUTROPHILS # BLD AUTO: 8.34 THOUSANDS/ΜL (ref 1.85–7.62)
NEUTS SEG NFR BLD AUTO: 69 % (ref 43–75)
NITRITE UR QL STRIP: NEGATIVE
NON-SQ EPI CELLS URNS QL MICRO: ABNORMAL /HPF
NRBC BLD AUTO-RTO: 0 /100 WBCS
PH UR STRIP.AUTO: 6.5 [PH]
PHOSPHATE SERPL-MCNC: 4.8 MG/DL (ref 2.7–4.5)
PLATELET # BLD AUTO: 448 THOUSANDS/UL (ref 149–390)
PMV BLD AUTO: 9.7 FL (ref 8.9–12.7)
POTASSIUM SERPL-SCNC: 4.2 MMOL/L (ref 3.5–5.3)
PROCALCITONIN SERPL-MCNC: <0.05 NG/ML
PROT SERPL-MCNC: 6.8 G/DL (ref 6.4–8.2)
PROT UR STRIP-MCNC: ABNORMAL MG/DL
PROTHROMBIN TIME: 12.9 SECONDS (ref 11.6–14.5)
RBC # BLD AUTO: 3.64 MILLION/UL (ref 3.81–5.12)
RBC #/AREA URNS AUTO: ABNORMAL /HPF
SARS-COV-2 RNA RESP QL NAA+PROBE: NEGATIVE
SODIUM SERPL-SCNC: 134 MMOL/L (ref 136–145)
SP GR UR STRIP.AUTO: 1.02 (ref 1–1.03)
UROBILINOGEN UR QL STRIP.AUTO: 0.2 E.U./DL
WBC # BLD AUTO: 11.86 THOUSAND/UL (ref 4.31–10.16)
WBC #/AREA URNS AUTO: ABNORMAL /HPF

## 2020-08-07 PROCEDURE — 87635 SARS-COV-2 COVID-19 AMP PRB: CPT | Performed by: INTERNAL MEDICINE

## 2020-08-07 PROCEDURE — 93306 TTE W/DOPPLER COMPLETE: CPT

## 2020-08-07 PROCEDURE — 82948 REAGENT STRIP/BLOOD GLUCOSE: CPT

## 2020-08-07 PROCEDURE — 84100 ASSAY OF PHOSPHORUS: CPT | Performed by: INTERNAL MEDICINE

## 2020-08-07 PROCEDURE — 85610 PROTHROMBIN TIME: CPT | Performed by: INTERNAL MEDICINE

## 2020-08-07 PROCEDURE — 81001 URINALYSIS AUTO W/SCOPE: CPT | Performed by: INTERNAL MEDICINE

## 2020-08-07 PROCEDURE — 99254 IP/OBS CNSLTJ NEW/EST MOD 60: CPT | Performed by: INTERNAL MEDICINE

## 2020-08-07 PROCEDURE — 99232 SBSQ HOSP IP/OBS MODERATE 35: CPT | Performed by: INTERNAL MEDICINE

## 2020-08-07 PROCEDURE — 83735 ASSAY OF MAGNESIUM: CPT | Performed by: INTERNAL MEDICINE

## 2020-08-07 PROCEDURE — 85379 FIBRIN DEGRADATION QUANT: CPT | Performed by: INTERNAL MEDICINE

## 2020-08-07 PROCEDURE — 80053 COMPREHEN METABOLIC PANEL: CPT | Performed by: INTERNAL MEDICINE

## 2020-08-07 PROCEDURE — 85025 COMPLETE CBC W/AUTO DIFF WBC: CPT | Performed by: INTERNAL MEDICINE

## 2020-08-07 PROCEDURE — 84145 PROCALCITONIN (PCT): CPT | Performed by: INTERNAL MEDICINE

## 2020-08-07 PROCEDURE — 93306 TTE W/DOPPLER COMPLETE: CPT | Performed by: INTERNAL MEDICINE

## 2020-08-07 RX ORDER — FUROSEMIDE 40 MG/1
40 TABLET ORAL DAILY
Status: DISCONTINUED | OUTPATIENT
Start: 2020-08-08 | End: 2020-08-08 | Stop reason: HOSPADM

## 2020-08-07 RX ORDER — FUROSEMIDE 40 MG/1
40 TABLET ORAL ONCE
Status: COMPLETED | OUTPATIENT
Start: 2020-08-07 | End: 2020-08-07

## 2020-08-07 RX ORDER — MONTELUKAST SODIUM 10 MG/1
TABLET ORAL
Qty: 90 TABLET | Refills: 0 | Status: SHIPPED | OUTPATIENT
Start: 2020-08-07 | End: 2020-11-25

## 2020-08-07 RX ADMIN — INSULIN LISPRO 8 UNITS: 100 INJECTION, SOLUTION INTRAVENOUS; SUBCUTANEOUS at 08:50

## 2020-08-07 RX ADMIN — VITAM B12 100 MCG: 100 TAB at 08:50

## 2020-08-07 RX ADMIN — INSULIN LISPRO 10 UNITS: 100 INJECTION, SOLUTION INTRAVENOUS; SUBCUTANEOUS at 22:16

## 2020-08-07 RX ADMIN — INSULIN LISPRO 16 UNITS: 100 INJECTION, SOLUTION INTRAVENOUS; SUBCUTANEOUS at 16:58

## 2020-08-07 RX ADMIN — PRAVASTATIN SODIUM 40 MG: 40 TABLET ORAL at 08:50

## 2020-08-07 RX ADMIN — INSULIN LISPRO 12 UNITS: 100 INJECTION, SOLUTION INTRAVENOUS; SUBCUTANEOUS at 12:10

## 2020-08-07 RX ADMIN — INSULIN GLARGINE 75 UNITS: 100 INJECTION, SOLUTION SUBCUTANEOUS at 22:16

## 2020-08-07 RX ADMIN — ENOXAPARIN SODIUM 40 MG: 40 INJECTION SUBCUTANEOUS at 16:40

## 2020-08-07 RX ADMIN — FUROSEMIDE 40 MG: 40 TABLET ORAL at 17:00

## 2020-08-07 RX ADMIN — PAROXETINE HYDROCHLORIDE 20 MG: 20 TABLET, FILM COATED ORAL at 08:50

## 2020-08-07 RX ADMIN — OXYCODONE HYDROCHLORIDE AND ACETAMINOPHEN 500 MG: 500 TABLET ORAL at 08:50

## 2020-08-07 RX ADMIN — PANTOPRAZOLE SODIUM 40 MG: 40 TABLET, DELAYED RELEASE ORAL at 06:14

## 2020-08-07 RX ADMIN — LISINOPRIL 20 MG: 20 TABLET ORAL at 08:50

## 2020-08-07 RX ADMIN — FUROSEMIDE 40 MG: 10 INJECTION, SOLUTION INTRAMUSCULAR; INTRAVENOUS at 08:49

## 2020-08-07 RX ADMIN — MONTELUKAST 10 MG: 10 TABLET, FILM COATED ORAL at 22:16

## 2020-08-07 RX ADMIN — ASPIRIN 81 MG: 81 TABLET, COATED ORAL at 08:50

## 2020-08-07 RX ADMIN — FLUTICASONE FUROATE AND VILANTEROL TRIFENATATE 1 PUFF: 200; 25 POWDER RESPIRATORY (INHALATION) at 08:55

## 2020-08-07 NOTE — SOCIAL WORK
LOS: 1  Patient is not a 30 day readmission or a Medicare Bundled patient  Patient's risk for readmission is 16, FAISAL  Met with patient and reviewed the discharge planning process including identifying help at home and patient preference for discharge  Patient reports residing with her spouse in a ranch home with [address]  She reports being independent of ADL's and using no assistive device  She is employed full time, prepares meal and drives  Patient reports she use [facility] pharmacy in [place] for re med and has no barriers in obtaining current medication  She does not have a POA/LW  information was provided to her  She denies SNF/BHU/D&A rehab admission  Her spouse is her help at home when needed  Her PCP is Ashwini Goode PA-C  She plans to return home and anticipates no discharge needs

## 2020-08-07 NOTE — CONSULTS
Consultation - Cardiology Team One  Adelia Saavedra 40 y o  female MRN: [id number]  Unit/Bed#: -01 Encounter: [id number]    Inpatient consult to Cardiology  Consult performed by: LELAND Campbell  Consult ordered by: Marco Richards MD          Physician Requesting Consult: Marco Richards MD  Reason for Consult / Principal Problem: acute CHF      Assessment/ Plan     1  Acute diastolic CHF (HFpEF):  · New diagnosis for this patient  · Lasix 40 mg IV BID; I/O net - 2,269 ml since admit; based on physical exam could consider switching to PO lasix tomorrow  · Daily weights with standing scale; strict I/0's; based on prior documentation patient's average weight over the past 7 months appears to be 179-180 pounds which is what she weighed on admission  · troponins negative  · Chest xray on admission does not reveal evidence of vascular congestion; trace left effusion noted  · Low sodium diet education  · Patient scheduled for follow up in our office    · Results of echo performed today are as follows:  LEFT VENTRICLE:  Systolic function was normal  Ejection fraction was estimated to be 65 %  Although no diagnostic regional wall motion abnormality was identified, this possibility cannot be completely excluded on the basis of this study  Features were consistent with a pseudonormal left ventricular filling pattern, with concomitant abnormal relaxation and increased filling pressure (grade 2 diastolic dysfunction), mild MR    2  HTN:  · SBP averaging 140's  · Continue lisinopril 20 mg QD  · Continue to monitor    3  Hyperlipidemia:  · Lipid panel from 4/22/2020; total 244, trig 263, HDL 46, LDL calc 155  · Continue pravastatin 40 mg QD    4  DM type 1:   · HgbA1c from 4/22/2020 10 6  · Management per primary team    5  Asthma:  · Singulair, Breo, budesonide neb and albuterol PRN    6   GERD:  · Continue protonix        History of Present Illness   HPI: Adelia Saavedra is a 40y o  year old female who has a history of hypertension, hyperlipidemia, asthma, acid reflux, and diabetes type 1  Patient presents to the emergency room yesterday after visiting an urgent care center secondary to increasing shortness of breath and pedal edema  The patient notes that this shortness of breath on exertion and pedal edema has been increasing over the past 5 days  She states yesterday she felt more short of breath with exertion than she had during any day prior and felt some centralized chest tightness as well  She states this SOB and chest pressure is similar to what she has felt in the past when "my asthma medicine stops working and I need steroids"  She called her pulmonology office in an attempt to be seen and evaluated for possible steroid prescription  She notes that her pulmonology office did not have an appointment available for her yesterday and she was instructed to go to the urgent care center  After evaluation at the urgent care center it was felt that she should be evaluated in the ED secondary to the symptoms  The patient tells me that up until a month ago she was attempting to increase her exercise by hiking outdoors  She tells me she knows she needs to lose weight and this was an attempt to do so  She had been able to hike and walk outdoors with no cardiac complaints and tells me she had lost over 20 lb with this exercise regimen  She does endorse some shortness of breath at the end of her hike, however she tells me that she always attributed this to her weight and asthma and does not feel it was out of proportion to the amount of exertion she had completed  With the increasing heat and humidity over the past month she has no longer been performing these outdoor activities and has been more sedentary in her home  She does state that over the past 2-3 weeks she feels she has gained almost all of these 20 lb back but does admit that she and her  are not following a healthy diet during this time  She does state that over the past 5 days she has been noticing increasing dyspnea on exertion and pedal edema however yesterday this dyspnea on exertion has gotten worse and was associated with some chest tightness leading her to be evaluated in the ED  When asked if she ever had these symptoms before she tells me she did have some pedal edema roughly 6 months ago that was short lived and she treated it on her own by elevating her legs and has not noticed it since  The patient denies any regular episodes of shortness of breath at rest, chest pain, palpitations, or diaphoresis  She does states she has been noticing palpitations but only during this hospital admission  The patient is a nonsmoker, admits to occasional alcohol use, denies any illicit drug use, and states that she has no family history of cardiac disease  EKG reviewed personally:  EKG from 8/6/2020 reveals normal sinus rhythm      Telemetry reviewed personally: sinus rhythm in the 90's        Review of Systems   Constitution: Positive for weight gain  Cardiovascular: Positive for dyspnea on exertion (mild PRICE, improved since admission) and palpitations  Negative for chest pain and orthopnea  Respiratory: Negative for shortness of breath  Gastrointestinal: Negative for bloating, abdominal pain, nausea and vomiting  Neurological: Negative for dizziness and light-headedness  Historical Information   Past Medical History:   Diagnosis Date    Anemia     Asthma     w/acute exacerbation   Last assessed: 10/26/12    Chest pain 2/3/2016    Depression     Diabetes mellitus [facility] [address] [facility]     Diabetic nephropathy ([facility] [address] )     Di[facility] retinopathy ([facility] ) 2/3/2016    Gastroenteritis 02/03/2016    GERD (gastroesophageal reflux disease)     HTN (hypertension)     Hyperlipidemia 2/3/2016    Oligomenorrhea     Polycystic ovaries 2/3/2016    Retinal hemorrhage 2/3/2016    Retinopathy     Thrombocytosis (HCC)     Type 1 diabetes mellitus with ophthalmic manifestation ([facility] [address] ) 2/3/2016     Past Surgical History:   Procedure Laterality Date    CATARACT EXTRACTION Bilateral     RETINAL LASER PROCEDURE       Social History     Substance and Sexual Activity   Alcohol Use Not Currently    Frequency: Monthly or less    Comment: Occasional/1 mixed drink twice a month if that     Social History     Substance and Sexual Activity   Drug Use No     Social History     Tobacco Use   Smoking Status Never Smoker   Smokeless Tobacco Never Used   Tobacco Comment    Per [employer]: Former smoker   Quit in 1994     Family History:   Family History   Problem Relation Age of Onset    Heart murmur Mother     Diabetes Mother         Mellitus    Thyroid disease Mother         Disorder    Diabetes Father         Mellitus    Hypertension Father     Diabetes Maternal Grandfather         Mellitus    Breast cancer Paternal Grandmother     Diabetes Maternal Aunt         Mellitus    Diabetes Maternal Uncle         Mellitus    No Known Problems Maternal Grandmother     Leukemia Paternal Grandfather     Substance Abuse Neg Hx     Mental illness Neg Hx     Alcohol abuse Neg Hx        Meds/Allergies   current meds:   Current Facility-Administered Medications   Medication Dose Route Frequency    acetaminophen (TYLENOL) tablet 650 mg  650 mg Oral Q6H PRN    acetaminophen (TYLENOL) tablet 650 mg  650 mg Oral Q6H PRN    albuterol (PROVENTIL HFA,VENTOLIN HFA) inhaler 2 puff  2 puff Inhalation Q4H PRN    ascorbic acid (VITAMIN C) tablet 500 mg  500 mg Oral Daily    aspirin (ECOTRIN LOW STRENGTH) EC tablet 81 mg  81 mg Oral Daily    budesonide (PULMICORT) inhalation solution 1 mg  1 mg Nebulization Daily    butalbital-acetaminophen-caffeine (FIORICET,ESGIC) -40 mg per tablet 1 tablet  1 tablet Oral Q6H PRN    cyanocobalamin (VITAMIN B-12) tablet 100 mcg  100 mcg Oral Daily    enoxaparin (LOVENOX) subcutaneous injection 40 mg  40 mg Subcutaneous Daily    fluticasone-vilanterol (BREO ELLIPTA) 200-25 MCG/INH inhaler 1 puff  1 puff Inhalation Daily    furosemide (LASIX) injection 40 mg  40 mg Intravenous BID (diuretic)    insulin glargine (LANTUS) subcutaneous injection 75 Units 0 75 mL  75 Units Subcutaneous HS    insulin lispro (HumaLOG) 100 units/mL subcutaneous injection 2-12 Units  2-12 Units Subcutaneous TID AC    insulin lispro (HumaLOG) 100 units/mL subcutaneous injection 2-12 Units  2-12 Units Subcutaneous HS    lisinopril (ZESTRIL) tablet 20 mg  20 mg Oral Daily    montelukast (SINGULAIR) tablet 10 mg  10 mg Oral HS    ondansetron (ZOFRAN) injection 4 mg  4 mg Intravenous Q6H PRN    pantoprazole (PROTONIX) EC tablet 40 mg  40 mg Oral Early Morning    PARoxetine (PAXIL) tablet 20 mg  20 mg Oral Daily    pravastatin (PRAVACHOL) tablet 40 mg  40 mg Oral Daily    and PTA meds:   Prior to Admission Medications   Prescriptions Last Dose Informant Patient Reported? Taking?    Ascorbic Acid (VITAMIN C) 500 MG CAPS 2020 at Unknown time Self Yes Yes   Sig: Take 2 capsules by mouth daily   Biotin 10 MG TABS 2020 at Unknown time Self Yes Yes   Sig: Take 1 tablet by mouth daily   Butalbital-APAP-Caffeine (Fioricet) -40 MG CAPS Not Taking at Unknown time  No No   Sig: Take 1 tablet by mouth 4 (four) times a day as needed (headahce)   Patient not taking: Reported on 2020   ONE TOUCH ULTRA TEST test strip 2020 at Unknown time Self No Yes   Si each by Other route 3 (three) times a day before meals Use as instructed   PARoxetine (PAXIL) 20 mg tablet 2020 at Unknown time  No Yes   Sig: TAKE 1 TABLET BY MOUTH EVERY DAY   Probiotic Product (ALIGN PO) 2020 at Unknown time Self Yes Yes   Sig: Take by mouth   acetaminophen (TYLENOL) 500 mg tablet 2020 at Unknown time Self No Yes   Sig: Take 2 tablets (1,000 mg total) by mouth every 6 (six) hours as needed for mild pain or moderate pain   albuterol (PROVENTIL HFA,VENTOLIN HFA) 90 mcg/act inhaler 8/6/2020 at Unknown time  No Yes   Sig: INHALE 2 PUFFS BY MOUTH EVERY 4 HOURS AS NEEDED FOR WHEEZING OR SHORTNESS OF BREATH   budesonide (PULMICORT) 1 MG/2ML nebulizer solution 8/6/2020 at Unknown time Self No Yes   Sig: Take 1 mL (1 mg total) by nebulization daily   Patient taking differently: Take 1 mg by nebulization daily prn    cyanocobalamin (VITAMIN B-12) 100 mcg tablet 8/5/2020 at Unknown time Self Yes Yes   Sig: Take by mouth daily   fluticasone-salmeterol (Advair Diskus) 250-50 mcg/dose inhaler 8/6/2020 at Unknown time Self No Yes   Sig: Inhale 1 puff 2 (two) times a day Rinse mouth after use  furosemide (LASIX) 40 mg tablet 8/6/2020 at Unknown time  No Yes   Sig: Take 1 tablet (40 mg total) by mouth daily   insulin aspart (NovoLOG) 100 units/mL injection 8/6/2020 at Unknown time  No Yes   Sig: Take 2 unit of novolog for every 1 g of carb   insulin aspart (NovoLOG) 100 units/mL injection 8/6/2020 at Unknown time  No Yes   Sig: Use up to 100 units daily  insulin glargine (Lantus SoloStar) 100 units/mL injection pen 8/5/2020 at Unknown time  No Yes   Sig: Inject 75 Units under the skin daily   Patient taking differently: Inject 75 Units under the skin daily at bedtime    lisinopril (ZESTRIL) 20 mg tablet 8/6/2020 at Unknown time  No Yes   Sig: TAKE 2 TABLETS BY MOUTH EVERY DAY   Patient taking differently: Take 20 mg by mouth 2 (two) times a day    montelukast (SINGULAIR) 10 mg tablet   No No   Sig: TAKE 1 TABLET BY MOUTH AT BEDTIME   omeprazole (PriLOSEC) 20 mg delayed release capsule 8/6/2020 at Unknown time Self Yes Yes   Sig: Take 20 mg by mouth daily  pravastatin (PRAVACHOL) 40 mg tablet 8/5/2020 at Unknown time Self No Yes   Sig: TAKE 1 TABLET BY MOUTH EVERY DAY      Facility-Administered Medications: None          No Known Allergies    Objective   Vitals: Blood pressure 149/80, pulse 91, temperature 98 4 °F (36 9 °C), temperature source Oral, resp   rate 18, height 4' 8" (1 422 m), weight 81 3 kg (179 lb 3 7 oz), SpO2 94 %, not currently breastfeeding ,     Body mass index is 40 18 kg/m²  ,     Systolic (06MKL), PDY:318 , Min:146 , RAA:869     Diastolic (76ERZ), GYD:66, Min:79, Max:100            Intake/Output Summary (Last 24 hours) at 8/7/2020 1120  Last data filed at 8/7/2020 0923  Gross per 24 hour   Intake 506 ml   Output 2775 ml   Net -2269 ml     Weight (last 2 days)     Date/Time   Weight    08/07/20 0946   81 3 (179 23)    08/07/20 0600   81 3 (179 3)    08/06/20 1532   81 7 (180 12)            Invasive Devices     Peripheral Intravenous Line            Peripheral IV 08/06/20 Right;Upper Arm less than 1 day                  Physical Exam  Vitals signs reviewed  Cardiovascular:      Rate and Rhythm: Normal rate and regular rhythm  Pulses:           Dorsalis pedis pulses are 2+ on the right side and 2+ on the left side  Heart sounds: Normal heart sounds, S1 normal and S2 normal  No murmur  Comments: +1 pedal edema on the left, trace pedal edema on the right    No JVD  Pulmonary:      Effort: Pulmonary effort is normal  No respiratory distress  Breath sounds: Normal breath sounds  Abdominal:      General: Bowel sounds are normal  There is no distension  Palpations: Abdomen is soft  Musculoskeletal: Normal range of motion  Skin:     General: Skin is warm and dry  Neurological:      Mental Status: She is alert and oriented to person, place, and time     Psychiatric:         Mood and Affect: Mood normal            LABORATORY RESULTS:  Results from last 7 days   Lab Units 08/06/20  1849 08/06/20  1540 08/06/20  1037   TROPONIN I ng/mL <0 02 <0 02 <0 02     CBC with diff:   Results from last 7 days   Lab Units 08/07/20  0450 08/06/20  1037   WBC Thousand/uL 11 86* 11 82*   HEMOGLOBIN g/dL 10 9* 11 0*   HEMATOCRIT % 33 1* 33 1*   MCV fL 91 92   PLATELETS Thousands/uL 448* 428*   MCH pg 29 9 30 6   MCHC g/dL 32 9 33 2   RDW % 12 6 12 8   MPV fL 9 7 9 3   NRBC AUTO /100 WBCs 0 0       CMP:  Results from last 7 days   Lab Units 08/07/20  0450 08/06/20  1037   POTASSIUM mmol/L 4 2 4 2   CHLORIDE mmol/L 99* 104   CO2 mmol/L 28 27   BUN mg/dL 22 12   CREATININE mg/dL 0 82 0 61   CALCIUM mg/dL 8 9 9 0   AST U/L 14 17   ALT U/L 25 28   ALK PHOS U/L 164* 166*   EGFR ml/min/1 73sq m 87 111       BMP:  Results from last 7 days   Lab Units 08/07/20  0450 08/06/20  1037   POTASSIUM mmol/L 4 2 4 2   CHLORIDE mmol/L 99* 104   CO2 mmol/L 28 27   BUN mg/dL 22 12   CREATININE mg/dL 0 82 0 61   CALCIUM mg/dL 8 9 9 0          Lab Results   Component Value Date    NTBNP 388 (H) 08/06/2020            Results from last 7 days   Lab Units 08/07/20  0450   MAGNESIUM mg/dL 1 6                 Results from last 7 days   Lab Units 08/06/20  1037   TSH 3RD GENERATON uIU/mL 3 794*       Results from last 7 days   Lab Units 08/07/20  0450   INR  0 98     Lipid Profile:   Lab Results   Component Value Date    CHOL 192 01/28/2017     Lab Results   Component Value Date    HDL 46 (L) 04/22/2020    HDL 48 (L) 08/06/2019    HDL 64 01/28/2017     Lab Results   Component Value Date    LDLCALC 155 (H) 04/22/2020    LDLCALC 159 (H) 08/06/2019    LDLCALC 151 (H) 10/19/2016     Lab Results   Component Value Date    TRIG 263 (H) 04/22/2020    TRIG 274 (H) 08/06/2019    TRIG 195 (H) 01/28/2017         Cardiac testing:   No results found for this or any previous visit  No results found for this or any previous visit  No procedure found  No results found for this or any previous visit  Imaging: I have personally reviewed pertinent reports  Xr Chest 1 View Portable    Result Date: 8/6/2020  Narrative: CHEST INDICATION:   SOB  COMPARISON:  Chest radiograph from 2/3/2016  EXAM PERFORMED/VIEWS:  XR CHEST PORTABLE FINDINGS: Cardiomediastinal silhouette appears unremarkable  The lungs are clear  Trace left effusion  No pneumothorax  Osseous structures appear within normal limits for patient age       Impression: Blunting of the left costophrenic angle due to trace left effusion  Workstation performed: [id number]         Assessment  Principal Problem:    Acute CHF (congestive heart failure) ([facility])  Active Problems:    Type 1 diabetes (HCC)    Asthma    Hypertension    Hyperlipidemia    Severe obesity (BMI 35 0-39  9) with comorbidity ([address] )    Gastroesophageal reflux disease without esophagitis    Depression                   Thank you for allowing us to participate in this patient's care  Counseling / Coordination of Care  Total floor / unit time spent today 45 minutes  Greater than 50% of total time was spent with the patient and / or family counseling and / or coordination of care  A description of the counseling / coordination of care: Review of history, current assessment, development of a plan  Code Status: Level 1 - Full Code    ** Please Note: Dragon 360 Dictation voice to text software may have been used in the creation of this document   **

## 2020-08-07 NOTE — PROGRESS NOTES
Pt receptive to   Shared, with emotion, her growing concerns for her overall health  New dx of CHF is adding to her fear and anxiety  Her spouse was at  bedside and was supportive  08/07/20 [id number]  Affiliation Non-Gnosticist   Stress Factors   Patient Stress Factors Health changes; Loss of control; Other (Comment)  (Living w/several chronic illnessess is very stressful for pt)   Coping Responses   Patient Coping Anxiety; Fearful; Other (Comment)   Patient Spiritual Assessment   Feelings of Well Being   (Expressed concern about her overall health)   Plan of Care   Assessment Completed by: Unit visit

## 2020-08-07 NOTE — UTILIZATION REVIEW
Initial Clinical Review    Admission: Date/Time/Statement:   Admission Orders (From admission, onward)     08/06/20 1300  Inpatient Admission (expected length of stay for this patient Order details is greater than two midnights)  Once                   Orders Placed This Encounter   Procedures    Inpatient Admission (expected length of stay for this patient Order details is greater than two midnights)     Standing Status:   Standing     Number of Occurrences:   1     Order Specific Question:   Admitting Physician     Answer:   Suleman Lincoln [[id number]]     Order Specific Question:   Level of Care     Answer:   Med Surg [[id number]]     Order Specific Question:   Estimated length of stay     Answer:   More than 2 Midnights     Order Specific Question:   Certification     Answer:   I certify that inpatient services are medically necessary for this patient for a duration of greater than two midnights  See H&P and MD Progress Notes for additional information about the patient's course of treatment  ED Arrival Information     Expected Arrival Acuity Means of Arrival Escorted By Service Admission Type    - 8/6/2020 12:55 Urgent Walk-In Spouse General Medicine Urgent    Arrival Complaint    CHF        Chief Complaint   Patient presents with    Shortness of Breath     pt dx with mild chf opted to try home treatment and OP follow up but returned a few minutes later sayiong she didnt want to go home and wanted to be admited for testing     Assessment/Plan:  39 y/o female with PMhx of T1DM, asthma, HTN, HLD who presents to ED with worsening sob, chest tightness and peripheral edema for past week  States she has gained 20 lbs in past couple of months and over past 4 days her lower extremity is worse and getting sob with minimal exertion  She called her PCP who recommended she go to urgent care and was sent to ED from urgent care  In ED on exam pt with +2 pedal edema b/l  (+) rales  /100   CXR showed Blunting of the left costophrenic angle due to trace left effusion  Admit inpatient to M/S/Tele unit with Acute CHF  Tele monitoring  Start lasix 40 IV BID, serial troponin  Await echo to assess diastolic vs systolic  Daily wts, I/O's , monitor renal function while diuresing  O2 and udns prn  Continue on home po meds  Fingerstick glucose checks ac & hs  Cardiology consulted  8/7 --   Pt states she still has exertional dyspnea and palpitations  Had temp 100 6 during the night  Encourage incentive spirometry  Covid-19 ordered  Trend procal, WBC's, fever curve  8/7 cardiology note:  Acute CHF (HFpEF): New diagnosis for this patient  Continue diuresis with lasix IV -- 2,269 mil since admit  Continue po meds, I/O's, daily wts   Cardiac diet      ED Triage Vitals   Temperature Pulse Respirations Blood Pressure SpO2   08/06/20 1308 08/06/20 1307 08/06/20 1308 08/06/20 1307 08/06/20 1307   98 4 °F (36 9 °C) 102 (!) 25 (!) 222/100 96 %      Temp Source Heart Rate Source Patient Position - Orthostatic VS BP Location FiO2 (%)   08/07/20 0032 -- 08/07/20 0744 08/07/20 0744 --   Oral  Lying Left arm       Pain Score       08/06/20 1501       No Pain          Wt Readings from Last 1 Encounters:   08/07/20 81 3 kg (179 lb 4 8 oz)     Additional Vital Signs:   Date/Time   Temp   Pulse   Resp   BP   MAP (mmHg)   SpO2   O2 Device    08/07/20 15:43:14   98 1 °F (36 7 °C)   86   20   143/81   102   94 %       08/07/20 07:44:48   98 4 °F (36 9 °C)   91   18   149/80   103   94 %   None (Room air)    08/07/20 0032   99 5 °F (37 5 °C)                      08/06/20 2313                  93 %   None (Room air)    08/06/20 23:01:56   100 6 °F (38 1 °C)Abnormal     91      148/83   105   94 %       08/06/20 1605         16                08/06/20 15:13:31   98 1 °F (36 7 °C)   92   16   146/98   114   95 %       08/06/20 1308   98 4 °F (36 9 °C)   100   25Abnormal     162/79      99 %       08/06/20 1307      102    222/100Abnormal     143   96 %           Pertinent Labs/Diagnostic Test Results:   EKG -- NSR    Echo -- Systolic function was normal  Ejection fraction was estimated to be 65 %    Although no diagnostic regional wall motion abnormality was identified, this possibility cannot be completely excluded on the basis of this study; mild MR, wall thickness was normal     Results from last 7 days   Lab Units 08/07/20  0918   SARS-COV-2  Negative     Results from last 7 days   Lab Units 08/07/20  0450 08/06/20  1037   WBC Thousand/uL 11 86* 11 82*   HEMOGLOBIN g/dL 10 9* 11 0*   HEMATOCRIT % 33 1* 33 1*   PLATELETS Thousands/uL 448* 428*   NEUTROS ABS Thousands/µL 8 34* 9 16*     Results from last 7 days   Lab Units 08/07/20  0450 08/06/20  1037   SODIUM mmol/L 134* 138   POTASSIUM mmol/L 4 2 4 2   CHLORIDE mmol/L 99* 104   CO2 mmol/L 28 27   ANION GAP mmol/L 7 7   BUN mg/dL 22 12   CREATININE mg/dL 0 82 0 61   EGFR ml/min/1 73sq m 87 111   CALCIUM mg/dL 8 9 9 0   MAGNESIUM mg/dL 1 6  --    PHOSPHORUS mg/dL 4 8*  --      Results from last 7 days   Lab Units 08/07/20  0450 08/06/20  1037   AST U/L 14 17   ALT U/L 25 28   ALK PHOS U/L 164* 166*   TOTAL PROTEIN g/dL 6 8 7 2   ALBUMIN g/dL 2 7* 3 0*   TOTAL BILIRUBIN mg/dL 0 30 0 20     Results from last 7 days   Lab Units 08/07/20  1115 08/07/20  0744 08/06/20  2118 08/06/20  1607   POC GLUCOSE mg/dl 484* 335* 275* 139     Results from last 7 days   Lab Units 08/07/20  0450 08/06/20  1037   GLUCOSE RANDOM mg/dL 348* 130     Results from last 7 days   Lab Units 08/06/20  1849 08/06/20  1540 08/06/20  1037   TROPONIN I ng/mL <0 02 <0 02 <0 02     Results from last 7 days   Lab Units 08/07/20  1106   D-DIMER QUANTITATIVE ug/ml FEU 1 06*     Results from last 7 days   Lab Units 08/07/20  0450   PROTIME seconds 12 9   INR  0 98     Results from last 7 days   Lab Units 08/06/20  1037   TSH 3RD GENERATON uIU/mL 3 794*     Results from last 7 days   Lab Units 08/06/20  1037   NT-PRO BNP pg/mL 388*     Results from last 7 days   Lab Units 08/07/20  0920   CLARITY UA  Clear   COLOR UA  Light Yellow   SPEC GRAV UA  1 020   PH UA  6 5   GLUCOSE UA mg/dl >=1000 (1%)*   KETONES UA mg/dl Negative   BLOOD UA  Negative   PROTEIN UA mg/dl 30 (1+)*   NITRITE UA  Negative   BILIRUBIN UA  Negative   UROBILINOGEN UA E U /dl 0 2   LEUKOCYTES UA  Elevated glucose may cause decreased leukocyte values  See urine microscopic for [place] [facility] result/*   WBC UA /hpf 0-1*   RBC UA /hpf None Seen   BACTERIA UA /hpf None Seen   EPITHELIAL CELLS WET PREP /hpf Occasional     ED Treatment:   Medication Administration from 08/06/2020 1255 to 08/06/2020 1446       Date/Time Order Dose Route Action     08/06/2020 1314 furosemide (LASIX) injection 40 mg 40 mg Intravenous Given     Past Medical History:   Diagnosis Date    Anemia     Asthma     w/acute exacerbation  Last assessed: 10/26/12    Chest pain 2/3/2016    Depression     Diabetes mellitus ([place] [address] )     Diabetic nephropathy ([place] [address] )     Diabetic retinopathy ([place] [address] ) 2/3/2016    Gastroenteritis 02/03/2016    GERD (gastroesophageal reflux disease)     HTN (hypertension)     Hyperlipidemia 2/3/2016    Oligomenorrhea     Polycystic ovaries 2/3/2016    Retinal hemorrhage 2/3/2016    Retinopathy     Thrombocytosis (HCC)     Type 1 diabetes mellitus with ophthalmic manifestation ([place] [address] ) 2/3/2016     Present on Admission:   Type 1 diabetes ([place] [address] )   Hypertension   Hyperlipidemia   Severe obesity (BMI 35 0-39  9) with comorbidity ([place] [address] )   Gastroesophageal reflux disease without esophagitis   Asthma      Admitting Diagnosis: CHF (congestive heart failure) ([facility]) [I50 9]  Acute CHF ([place] [address] ) [I50 9]  Age/Sex: 40 y o  female  Admission Orders:  Scheduled Medications:  ascorbic acid, 500 mg, Oral, Daily  aspirin, 81 mg, Oral, Daily  budesonide, 1 mg, Nebulization, Daily  cyanocobalamin, 100 mcg, Oral, Daily  enoxaparin, 40 mg, Subcutaneous, Daily  fluticasone-vilanterol, 1 puff, Inhalation, Daily  furosemide, 40 mg, Intravenous, BID (diuretic)  insulin glargine, 75 Units, Subcutaneous, HS  insulin lispro, 2-12 Units, Subcutaneous, TID AC  insulin lispro, 2-12 Units, Subcutaneous, HS  lisinopril, 20 mg, Oral, Daily  montelukast, 10 mg, Oral, HS  pantoprazole, 40 mg, Oral, Early Morning  PARoxetine, 20 mg, Oral, Daily  pravastatin, 40 mg, Oral, Daily       PRN Meds:  acetaminophen, 650 mg, Oral, Q6H PRN  8/6 x2  acetaminophen, 650 mg, Oral, Q6H PRN  albuterol, 2 puff, Inhalation, Q4H PRN  8/6 x1  butalbital-acetaminophen-caffeine, 1 tablet, Oral, Q6H PRN  ondansetron, 4 mg, Intravenous, Q6H PRN        Network Utilization Review Department  [email or web address]  ATTENTION: Please call with any questions or concerns to [phone] and carefully listen to the prompts so that you are directed to the right person  All voicemails are confidential   Candia Siemens all requests for admission clinical reviews, approved or denied determinations and any other requests to dedicated fax number below belonging to the campus where the patient is receiving treatment   List of dedicated fax numbers for the Facilities:  [address] [place] [address] DENIALS (Administrative/Medical Necessity) [phone]   [address] (Maternity/NICU/Pediatrics) [phone]   Jose Juan Ceja [phone]   Shasta Ray [phone]   [facility] [phone]   Cheryl Mcdaniel [phone]   [address] [place] [address] [phone]   [facility]  [phone]   [address] [facility]  [address] [facility] [address] [facility] [phone]

## 2020-08-07 NOTE — MALNUTRITION/BMI
This medical record reflects one or more clinical indicators suggestive of malnutrition and/or morbid obesity  BMI Findings:  BMI Classifications: Morbid Obesity 40-44 9     Body mass index is 40 2 kg/m²  Treat with CCD level 2 diet and diet education  See Nutrition note dated 8/7/2020 for additional details  Completed nutrition assessment is viewable in the nutrition documentation

## 2020-08-07 NOTE — MALNUTRITION/BMI
This medical record reflects one or more clinical indicators suggestive of malnutrition and/or morbid obesity  BMI Findings:  BMI Classifications: Morbid Obesity 40-44 9     Body mass index is 40 18 kg/m²  Treat with CCD 2 and diet education  See Nutrition note dated 8/7/2020 for additional details  Completed nutrition assessment is viewable in the nutrition documentation

## 2020-08-07 NOTE — PROGRESS NOTES
Progress Note - Kelvin Blackwell 1975, 40 y o  female MRN: [id number]    Unit/Bed#: -01 Encounter: [id number]    Primary Care Provider: Joana Harris PA-C   Date and time admitted to hospital: 8/6/2020  1:02 PM        * Acute CHF (congestive heart failure) ([facility] )  Assessment & Plan  Wt Readings from Last 3 Encounters:   08/07/20 81 3 kg (179 lb 4 8 oz)   08/06/20 84 8 kg (187 lb)   08/06/20 84 9 kg (187 lb 3 2 oz)     Continue on Lasix 40 mg IV twice daily  Monitor on telemetry  Serial troponin are negative  Will await echocardiogram to assess diastolic versus systolic  Daily weight and I&Os  Monitor renal function while diuresing  Cardiology consult  Oxygen supplementation and bronchodilators  Patient had low-grade fever overnight  Will order COVID-19 test   Trend procalcitonin  Trend WBC and fever curve  Will order UA    Depression  Assessment & Plan  Continue on Paxil  Denies suicidal or homicidal ideations  Gastroesophageal reflux disease without esophagitis  Assessment & Plan  On Protonix    Severe obesity (BMI 35 0-39  9) with comorbidity ([facility] )  Assessment & Plan  Encourage weight loss and lifestyle modifications  Hyperlipidemia  Assessment & Plan  On statin    Hypertension  Assessment & Plan  Continue on Zestril and Lasix  Monitor vitals as per protocol    Asthma  Assessment & Plan  No acute exacerbation  No wheezing  Continue home medications  On Breo and budesonide neb, and albuterol inhaler p r n  Continue on Singulair    Type 1 diabetes [facility])  Assessment & Plan  Lab Results   Component Value Date    HGBA1C 10 6 (H) 04/22/2020       Recent Labs     08/06/20  1607 08/06/20  2118 08/07/20  0744   POCGLU 139 275* 335*     Blood Sugar Average: Last 72 hrs:  (P) 040 [id number]     Continue on Lantus insulin  Accu-Chek a c  HS with Humalog sliding scale      Labs & Imaging: I have personally reviewed pertinent reports        VTE Pharmacologic Prophylaxis: Enoxaparin (Lovenox)  VTE Mechanical Prophylaxis: sequential compression device    Code Status:   Level 1 - Full Code    Patient Centered Rounds: I have performed bedside rounds with nursing staff today  Discussions with Specialists or Other Care Team Provider:  Cardiology    Education and Discussions with Family / Patient:  Left voicemail for  Krishna    Current Length of Stay: 1 day(s)    Current Patient Status: Inpatient   Certification Statement: The patient will continue to require additional inpatient hospital stay due to see my assessment and plan  Subjective:   Patient is seen and examined at bedside  States she feels a little better  Still has exertional dyspnea and palpitations  Had low-grade fever with T-max of 100 6°  Will encourage incentive spirometer  Denies any cough, nausea, vomiting, abdominal pain, urinary complaints  All other ROS are negative  Objective:    Vitals: Blood pressure 149/80, pulse 91, temperature 98 4 °F (36 9 °C), temperature source Oral, resp  rate 18, height 4' 8" (1 422 m), weight 81 3 kg (179 lb 4 8 oz), SpO2 94 %, not currently breastfeeding  ,Body mass index is 40 2 kg/m²  SPO2 RA Rest      ED to Hosp-Admission (Current) from 8/6/2020 in [address] Med Surg Unit   SpO2  94 %   SpO2 Activity  At Rest   O2 Device  None (Room air)   O2 Flow Rate          I&O:     Intake/Output Summary (Last 24 hours) at 8/7/2020 0814  Last data filed at 8/7/2020 0744  Gross per 24 hour   Intake 266 ml   Output 2175 ml   Net -1909 ml       Physical Exam:    General- Alert, lying comfortably in bed  Not in any acute distress  HEENT- ASTRID, EOM intact  Neck- Supple, No JVD  CVS- regular, S1 and S2 normal  Chest- Bilateral Air entry, decreased at bases  No wheezing  Abdomen- soft, nontender, obese, not distended, no guarding or rigidity, BS+  Extremities-  has pedal edema, No calf tenderness  CNS-   Alert, awake and orientedx3  No focal deficits present      Invasive Devices Peripheral Intravenous Line            Peripheral IV 08/06/20 Right;Upper Arm less than 1 day                      Social History  reviewed  Family History   Problem Relation Age of Onset    Heart murmur Mother     Diabetes Mother         Mellitus    Thyroid disease Mother         Disorder    Diabetes Father         Mellitus    Hypertension Father     Diabetes Maternal Grandfather         Mellitus    Breast cancer Paternal Grandmother     Diabetes Maternal Aunt         Mellitus    Diabetes Maternal Uncle         Mellitus    No Known Problems Maternal Grandmother     Leukemia Paternal Grandfather     Substance Abuse Neg Hx     Mental illness Neg Hx     Alcohol abuse Neg Hx     reviewed    Meds:  Current Facility-Administered Medications   Medication Dose Route Frequency Provider Last Rate Last Dose    acetaminophen (TYLENOL) tablet 650 mg  650 mg Oral Q6H PRN Janice Tariq MD   650 mg at 08/06/20 2313    acetaminophen (TYLENOL) tablet 650 mg  650 mg Oral Q6H PRN Janice Tariq MD   650 mg at 08/06/20 1641    albuterol (PROVENTIL HFA,VENTOLIN HFA) inhaler 2 puff  2 puff Inhalation Q4H PRN Janice Tariq MD   2 puff at 08/06/20 2319    ascorbic acid (VITAMIN C) tablet 500 mg  500 mg Oral Daily Janice Tariq MD   500 mg at 08/06/20 1623    aspirin (ECOTRIN LOW STRENGTH) EC tablet 81 mg  81 mg Oral Daily Janice Tariq MD        budesonide (PULMICORT) inhalation solution 1 mg  1 mg Nebulization Daily Janice Tariq MD        butalbital-acetaminophen-caffeine (FIORICET,ESGIC) -40 mg per tablet 1 tablet  1 tablet Oral Q6H PRN Janice Tariq MD        cyanocobalamin (VITAMIN B-12) tablet 100 mcg  100 mcg Oral Daily Janice Tariq MD   100 mcg at 08/06/20 1624    enoxaparin (LOVENOX) subcutaneous injection 40 mg  40 mg Subcutaneous Daily Janice Tariq MD   40 mg at 08/06/20 1624    fluticasone-vilanterol (BREO ELLIPTA) 200-25 MCG/INH inhaler 1 puff  1 puff Inhalation Daily Bettye Louis MD   1 puff at 08/06/20 1625    furosemide (LASIX) injection 40 mg  40 mg Intravenous BID (diuretic) Bettye Louis MD   40 mg at 08/06/20 1625    insulin glargine (LANTUS) subcutaneous injection 75 Units 0 75 mL  75 Units Subcutaneous HS Bettye Louis MD   75 Units at 08/06/20 2313    insulin lispro (HumaLOG) 100 units/mL subcutaneous injection 2-12 Units  2-12 Units Subcutaneous TID AC Bettye Louis MD        insulin lispro (HumaLOG) 100 units/mL subcutaneous injection 2-12 Units  2-12 Units Subcutaneous HS Bettye Louis MD   6 Units at 08/06/20 2325    lisinopril (ZESTRIL) tablet 20 mg  20 mg Oral Daily Bettye Louis MD        montelukast (SINGULAIR) tablet 10 mg  10 mg Oral HS Bettye Louis MD   10 mg at 08/06/20 2313    ondansetron (ZOFRAN) injection 4 mg  4 mg Intravenous Q6H PRN Bettye Louis MD        pantoprazole (PROTONIX) EC tablet 40 mg  40 mg Oral Early Morning Bettye Louis MD   40 mg at 08/07/20 8047    PARoxetine (PAXIL) tablet 20 mg  20 mg Oral Daily Bettye Louis MD   20 mg at 08/06/20 1624    pravastatin (PRAVACHOL) tablet 40 mg  40 mg Oral Daily Bettye Louis MD   40 mg at 08/06/20 1624      Medications Prior to Admission   Medication    acetaminophen (TYLENOL) 500 mg tablet    albuterol (PROVENTIL HFA,VENTOLIN HFA) 90 mcg/act inhaler    Ascorbic Acid (VITAMIN C) 500 MG CAPS    Biotin 10 MG TABS    budesonide (PULMICORT) 1 MG/2ML nebulizer solution    cyanocobalamin (VITAMIN B-12) 100 mcg tablet    fluticasone-salmeterol (Advair Diskus) 250-50 mcg/dose inhaler    furosemide (LASIX) 40 mg tablet    insulin aspart (NovoLOG) 100 units/mL injection    insulin aspart (NovoLOG) 100 units/mL injection    insulin glargine (Lantus SoloStar) 100 units/mL injection pen    lisinopril (ZESTRIL) 20 mg tablet    montelukast (SINGULAIR) 10 mg tablet    omeprazole (PriLOSEC) 20 mg delayed release capsule    ONE TOUCH ULTRA TEST test strip    PARoxetine (PAXIL) 20 mg tablet    pravastatin (PRAVACHOL) 40 mg tablet    Probiotic Product (ALIGN PO)    Butalbital-APAP-Caffeine (Fioricet) -40 MG CAPS       Labs:  Results from last 7 days   Lab Units 08/07/20  0450 08/06/20  1037   WBC Thousand/uL 11 86* 11 82*   HEMOGLOBIN g/dL 10 9* 11 0*   HEMATOCRIT % 33 1* 33 1*   PLATELETS Thousands/uL 448* 428*   NEUTROS PCT % 69 76*   LYMPHS PCT % 19 12*   MONOS PCT % 6 6   EOS PCT % 4 4     Results from last 7 days   Lab Units 08/07/20  0450 08/06/20  1037   POTASSIUM mmol/L 4 2 4 2   CHLORIDE mmol/L 99* 104   CO2 mmol/L 28 27   BUN mg/dL 22 12   CREATININE mg/dL 0 82 0 61   CALCIUM mg/dL 8 9 9 0   ALK PHOS U/L 164* 166*   ALT U/L 25 28   AST U/L 14 17     Lab Results   Component Value Date    TROPONINI <0 02 08/06/2020    TROPONINI <0 02 08/06/2020    TROPONINI <0 02 08/06/2020     Results from last 7 days   Lab Units 08/07/20  0450   INR  0 98     No results found for: Yesi Randi, SPUTUMCULTUR      Imaging:  Results for orders placed during the hospital encounter of 08/06/20   XR chest 1 view portable    Narrative CHEST     INDICATION:   SOB     COMPARISON:  Chest radiograph from 2/3/2016  EXAM PERFORMED/VIEWS:  XR CHEST PORTABLE      FINDINGS:    Cardiomediastinal silhouette appears unremarkable  The lungs are clear  Trace left effusion  No pneumothorax  Osseous structures appear within normal limits for patient age  Impression Blunting of the left costophrenic angle due to trace left effusion  Workstation performed: [id number]       Results for orders placed during the hospital encounter of 02/03/16   X-ray chest 2 views    Narrative CHEST     INDICATION:  Left-sided chest pain, history of asthma  COMPARISON:  None    VIEWS:  Frontal and lateral projections; 2 images    FINDINGS:         Cardiomediastinal silhouette appears unremarkable  The lungs are clear    No pneumothorax or pleural effusion  Visualized osseous structures appear within normal limits for the patient's age  Impression No active pulmonary disease  Last 24 Hours Medication List:   acetaminophen, 650 mg, Oral, Q6H PRN, Alex Angulo MD  acetaminophen, 650 mg, Oral, Q6H PRN, Alex Angulo MD  albuterol, 2 puff, Inhalation, Q4H PRN, Alex Angulo MD  ascorbic acid, 500 mg, Oral, Daily, Alex Angulo MD  aspirin, 81 mg, Oral, Daily, Alex Angulo MD  budesonide, 1 mg, Nebulization, Daily, Alex Angulo MD  butalbital-acetaminophen-caffeine, 1 tablet, Oral, Q6H PRN, Alex Angulo MD  cyanocobalamin, 100 mcg, Oral, Daily, Alex Angulo MD  enoxaparin, 40 mg, Subcutaneous, Daily, Alex Anguol MD  fluticasone-vilanterol, 1 puff, Inhalation, Daily, Alex Angulo MD  furosemide, 40 mg, Intravenous, BID (diuretic), Alex Angulo MD  insulin glargine, 75 Units, Subcutaneous, HS, Alex Angulo MD  insulin lispro, 2-12 Units, Subcutaneous, TID AC, Alex Angulo MD  insulin lispro, 2-12 Units, Subcutaneous, HS, Alex Angulo MD  lisinopril, 20 mg, Oral, Daily, Alex Angulo MD  montelukast, 10 mg, Oral, HS, Alex Angulo MD  ondansetron, 4 mg, Intravenous, Q6H PRN, Alex Angulo MD  pantoprazole, 40 mg, Oral, Early Morning, Alex Angulo MD  PARoxetine, 20 mg, Oral, Daily, Alex Angulo MD  pravastatin, 40 mg, Oral, Daily, Alex Angulo MD         Today, Patient Was Seen By: Alex Angulo MD    ** Please Note: Dictation voice to text software may have been used in the creation of this document   **

## 2020-08-07 NOTE — PROGRESS NOTES
Pastoral Care Progress Note    2020  Patient: Heidi Deal : 1975  Admission Date & Time: 2020 1302  MRN: [id number] [facility]: [id number]                     Chaplaincy Interventions Utilized:   Empowerment: Clarified, confirmed, or reviewed information from treatment team , Encouraged self-care, Normalized experience of patient/family, Provided anxiety containment and Provided chaplaincy education    Exploration: Explored emotional needs & resources, Explored relational needs & resources, Explored spiritual needs & resources and Facilitated story telling    Collaboration: Encouraged adherence to treatment plan    Relationship Building: Cultivated a relationship of care and support and Listened empathically              Chaplaincy Outcomes Achieved:  Distress reduced, Expressed gratitude, Identified meaningful connections and Progressed toward understanding          Spiritual Coping Strategies Utilized:   Connectedness       [facility]   Stress Factors   Patient Stress Factors Health changes; Loss of control; Other (Comment)  (Living w/several chronic illnessess is very stressful for pt)   Coping Responses   Patient Coping Anxiety; Fearful; Other (Comment)   Patient Spiritual Assessment   Feelings of Well Being   (Expressed concern about her overall health)   Plan of Care   Assessment Completed by: Unit visit

## 2020-08-07 NOTE — ASSESSMENT & PLAN NOTE
Wt Readings from Last 3 Encounters:   08/07/20 81 3 kg (179 lb 4 8 oz)   08/06/20 84 8 kg (187 lb)   08/06/20 84 9 kg (187 lb 3 2 oz)     Continue on Lasix 40 mg IV twice daily  Monitor on telemetry  Serial troponin are negative  Will await echocardiogram to assess diastolic versus systolic  Daily weight and I&Os  Monitor renal function while diuresing  Cardiology consult  Oxygen supplementation and bronchodilators  Patient had low-grade fever overnight  Will order COVID-19 test   Trend procalcitonin  Trend WBC and fever curve    Will order UA

## 2020-08-07 NOTE — ASSESSMENT & PLAN NOTE
Lab Results   Component Value Date    HGBA1C 10 6 (H) 04/22/2020       Recent Labs     08/06/20  1607 08/06/20  2118 08/07/20  0744   POCGLU 139 275* 335*     Blood Sugar Average: Last 72 hrs:  (P) 523 [id number]     Continue on Lantus insulin  Accu-Chek a c  HS with Humalog sliding scale

## 2020-08-07 NOTE — PLAN OF CARE
Problem: PAIN - ADULT  Goal: Verbalizes/displays adequate comfort level or baseline comfort level  Description: Interventions:  - Encourage patient to monitor pain and request assistance  - Assess pain using appropriate pain scale  - Administer analgesics based on type and severity of pain and evaluate response  - Implement non-pharmacological measures as appropriate and evaluate response  - Consider cultural and social influences on pain and pain management  - Notify physician/advanced practitioner if interventions unsuccessful or patient reports new pain  Outcome: Progressing     Problem: INFECTION - ADULT  Goal: Absence or prevention of progression during hospitalization  Description: INTERVENTIONS:  - Assess and monitor for signs and symptoms of infection  - Monitor lab/diagnostic results  - Monitor all insertion sites, i e  indwelling lines, tubes, and drains  - Monitor endotracheal if appropriate and nasal secretions for changes in amount and color  - [place] appropriate cooling/warming therapies per order  - Administer medications as ordered  - Instruct and encourage patient and family to use good hand hygiene technique  - Identify and instruct in appropriate isolation precautions for identified infection/condition  Outcome: Progressing  Goal: Absence of fever/infection during neutropenic period  Description: INTERVENTIONS:  - Monitor WBC    Outcome: Progressing     Problem: SAFETY ADULT  Goal: Patient will remain free of falls  Description: INTERVENTIONS:  - Assess patient frequently for physical needs  -  Identify cognitive and physical deficits and behaviors that affect risk of falls    -  [place] fall precautions as indicated by assessment   - Educate patient/family on patient safety including physical limitations  - Instruct patient to call for assistance with activity based on assessment  - Modify environment to reduce risk of injury  - Consider OT/PT consult to assist with strengthening/mobility  Outcome: Progressing  Goal: Maintain or return to baseline ADL function  Description: INTERVENTIONS:  -  Assess patient's ability to carry out ADLs; assess patient's baseline for ADL function and identify physical deficits which impact ability to perform ADLs (bathing, care of mouth/teeth, toileting, grooming, dressing, etc )  - Assess/evaluate cause of self-care deficits   - Assess range of motion  - Assess patient's mobility; develop plan if impaired  - Assess patient's need for assistive devices and provide as appropriate  - Encourage maximum independence but intervene and supervise when necessary  - Involve family in performance of ADLs  - Assess for home care needs following discharge   - Consider OT consult to assist with ADL evaluation and planning for discharge  - Provide patient education as appropriate  Outcome: Progressing  Goal: Maintain or return mobility status to optimal level  Description: INTERVENTIONS:  - Assess patient's baseline mobility status (ambulation, transfers, stairs, etc )    - Identify cognitive and physical deficits and behaviors that affect mobility  - Identify mobility aids required to assist with transfers and/or ambulation (gait belt, sit-to-stand, lift, walker, cane, etc )  - [place] fall precautions as indicated by assessment  - Record patient progress and toleration of activity level on Mobility SBAR; progress patient to next Phase/Stage  - Instruct patient to call for assistance with activity based on assessment  - Consider rehabilitation consult to assist with strengthening/weightbearing, etc   Outcome: Progressing     Problem: DISCHARGE PLANNING  Goal: Discharge to home or other facility with appropriate resources  Description: INTERVENTIONS:  - Identify barriers to discharge w/patient and caregiver  - Arrange for needed discharge resources and transportation as appropriate  - Identify discharge learning needs (meds, wound care, etc )  - Arrange for interpretive services to assist at discharge as needed  - Refer to Case Management Department for coordinating discharge planning if the patient needs post-hospital services based on physician/advanced practitioner order or complex needs related to functional status, cognitive ability, or social support system  Outcome: Progressing     Problem: Knowledge Deficit  Goal: Patient/family/caregiver demonstrates understanding of disease process, treatment plan, medications, and discharge instructions  Description: Complete learning assessment and assess knowledge base    Interventions:  - Provide teaching at level of understanding  - Provide teaching via preferred learning methods  Outcome: Progressing     Problem: CARDIOVASCULAR - ADULT  Goal: Maintains optimal cardiac output and hemodynamic stability  Description: INTERVENTIONS:  - Monitor I/O, vital signs and rhythm  - Monitor for S/S and trends of decreased cardiac output  - Administer and titrate ordered vasoactive medications to optimize hemodynamic stability  - Assess quality of pulses, skin color and temperature  - Assess for signs of decreased coronary artery perfusion  - Instruct patient to report change in severity of symptoms  Outcome: Progressing  Goal: Absence of cardiac dysrhythmias or at baseline rhythm  Description: INTERVENTIONS:  - Continuous cardiac monitoring, vital signs, obtain 12 lead EKG if ordered  - Administer antiarrhythmic and heart rate control medications as ordered  - Monitor electrolytes and administer replacement therapy as ordered  Outcome: Progressing     Problem: RESPIRATORY - ADULT  Goal: Achieves optimal ventilation and oxygenation  Description: INTERVENTIONS:  - Assess for changes in respiratory status  - Assess for changes in mentation and behavior  - Position to facilitate oxygenation and minimize respiratory effort  - Oxygen administered by appropriate delivery if ordered  - Initiate smoking cessation education as indicated  - Encourage broncho-pulmonary hygiene including cough, deep breathe, Incentive Spirometry  - Assess the need for suctioning and aspirate as needed  - Assess and instruct to report SOB or any respiratory difficulty  - Respiratory Therapy support as indicated  Outcome: Progressing     Problem: Potential for Falls  Goal: Patient will remain free of falls  Description: INTERVENTIONS:  - Assess patient frequently for physical needs  -  Identify cognitive and physical deficits and behaviors that affect risk of falls    -  [place] fall precautions as indicated by assessment   - Educate patient/family on patient safety including physical limitations  - Instruct patient to call for assistance with activity based on assessment  - Modify environment to reduce risk of injury  - Consider OT/PT consult to assist with strengthening/mobility  Outcome: Progressing

## 2020-08-07 NOTE — PROGRESS NOTES
At 2300, pt reported palpations and shortness of breath  Pt had a temperature of 100 6 F and headache  Assessed patient and administered PRN tylenol and albuterol and cold wash cloth on forehead and elevation of HOB  Reassessed patient temperature decreased to 99 5 F  Pt reports improvement of SOB and palpitations stating 'I feel better"  Will continue to monitor   Griffin Yusuf RN

## 2020-08-08 VITALS
DIASTOLIC BLOOD PRESSURE: 78 MMHG | HEIGHT: 56 IN | OXYGEN SATURATION: 96 % | TEMPERATURE: 98.3 F | SYSTOLIC BLOOD PRESSURE: 144 MMHG | RESPIRATION RATE: 18 BRPM | BODY MASS INDEX: 39.43 KG/M2 | WEIGHT: 175.27 LBS | HEART RATE: 86 BPM

## 2020-08-08 LAB
ANION GAP SERPL CALCULATED.3IONS-SCNC: 7 MMOL/L (ref 4–13)
BASOPHILS # BLD AUTO: 0.08 THOUSANDS/ΜL (ref 0–0.1)
BASOPHILS NFR BLD AUTO: 1 % (ref 0–1)
BUN SERPL-MCNC: 34 MG/DL (ref 5–25)
CALCIUM SERPL-MCNC: 8.9 MG/DL (ref 8.3–10.1)
CHLORIDE SERPL-SCNC: 98 MMOL/L (ref 100–108)
CO2 SERPL-SCNC: 29 MMOL/L (ref 21–32)
CREAT SERPL-MCNC: 0.85 MG/DL (ref 0.6–1.3)
EOSINOPHIL # BLD AUTO: 0.68 THOUSAND/ΜL (ref 0–0.61)
EOSINOPHIL NFR BLD AUTO: 6 % (ref 0–6)
ERYTHROCYTE [DISTWIDTH] IN BLOOD BY AUTOMATED COUNT: 12.4 % (ref 11.6–15.1)
GFR SERPL CREATININE-BSD FRML MDRD: 84 ML/MIN/1.73SQ M
GLUCOSE SERPL-MCNC: 223 MG/DL (ref 65–140)
GLUCOSE SERPL-MCNC: 237 MG/DL (ref 65–140)
HCT VFR BLD AUTO: 34.6 % (ref 34.8–46.1)
HGB BLD-MCNC: 11.6 G/DL (ref 11.5–15.4)
IMM GRANULOCYTES # BLD AUTO: 0.06 THOUSAND/UL (ref 0–0.2)
IMM GRANULOCYTES NFR BLD AUTO: 1 % (ref 0–2)
LYMPHOCYTES # BLD AUTO: 2.3 THOUSANDS/ΜL (ref 0.6–4.47)
LYMPHOCYTES NFR BLD AUTO: 21 % (ref 14–44)
MCH RBC QN AUTO: 30.2 PG (ref 26.8–34.3)
MCHC RBC AUTO-ENTMCNC: 33.5 G/DL (ref 31.4–37.4)
MCV RBC AUTO: 90 FL (ref 82–98)
MONOCYTES # BLD AUTO: 0.78 THOUSAND/ΜL (ref 0.17–1.22)
MONOCYTES NFR BLD AUTO: 7 % (ref 4–12)
NEUTROPHILS # BLD AUTO: 6.91 THOUSANDS/ΜL (ref 1.85–7.62)
NEUTS SEG NFR BLD AUTO: 64 % (ref 43–75)
NRBC BLD AUTO-RTO: 0 /100 WBCS
PLATELET # BLD AUTO: 477 THOUSANDS/UL (ref 149–390)
PMV BLD AUTO: 9.4 FL (ref 8.9–12.7)
POTASSIUM SERPL-SCNC: 3.8 MMOL/L (ref 3.5–5.3)
PROCALCITONIN SERPL-MCNC: <0.05 NG/ML
RBC # BLD AUTO: 3.84 MILLION/UL (ref 3.81–5.12)
SODIUM SERPL-SCNC: 134 MMOL/L (ref 136–145)
WBC # BLD AUTO: 10.81 THOUSAND/UL (ref 4.31–10.16)

## 2020-08-08 PROCEDURE — 80048 BASIC METABOLIC PNL TOTAL CA: CPT | Performed by: INTERNAL MEDICINE

## 2020-08-08 PROCEDURE — 85025 COMPLETE CBC W/AUTO DIFF WBC: CPT | Performed by: INTERNAL MEDICINE

## 2020-08-08 PROCEDURE — 84145 PROCALCITONIN (PCT): CPT | Performed by: INTERNAL MEDICINE

## 2020-08-08 PROCEDURE — 99239 HOSP IP/OBS DSCHRG MGMT >30: CPT | Performed by: INTERNAL MEDICINE

## 2020-08-08 PROCEDURE — 82948 REAGENT STRIP/BLOOD GLUCOSE: CPT

## 2020-08-08 RX ORDER — ASPIRIN 81 MG/1
81 TABLET ORAL DAILY
Qty: 30 TABLET | Refills: 0 | Status: SHIPPED | OUTPATIENT
Start: 2020-08-09 | End: 2020-08-25 | Stop reason: SDUPTHER

## 2020-08-08 RX ORDER — FUROSEMIDE 40 MG/1
40 TABLET ORAL DAILY
Qty: 30 TABLET | Refills: 0 | Status: SHIPPED | OUTPATIENT
Start: 2020-08-08 | End: 2020-08-18 | Stop reason: SDUPTHER

## 2020-08-08 RX ORDER — FUROSEMIDE 40 MG/1
40 TABLET ORAL DAILY
Qty: 30 TABLET | Refills: 0 | Status: SHIPPED | OUTPATIENT
Start: 2020-08-08 | End: 2020-08-08 | Stop reason: SDUPTHER

## 2020-08-08 RX ADMIN — PRAVASTATIN SODIUM 40 MG: 40 TABLET ORAL at 08:27

## 2020-08-08 RX ADMIN — FUROSEMIDE 40 MG: 40 TABLET ORAL at 08:27

## 2020-08-08 RX ADMIN — OXYCODONE HYDROCHLORIDE AND ACETAMINOPHEN 500 MG: 500 TABLET ORAL at 08:27

## 2020-08-08 RX ADMIN — FLUTICASONE FUROATE AND VILANTEROL TRIFENATATE 1 PUFF: 200; 25 POWDER RESPIRATORY (INHALATION) at 08:27

## 2020-08-08 RX ADMIN — PANTOPRAZOLE SODIUM 40 MG: 40 TABLET, DELAYED RELEASE ORAL at 05:43

## 2020-08-08 RX ADMIN — PAROXETINE HYDROCHLORIDE 20 MG: 20 TABLET, FILM COATED ORAL at 08:27

## 2020-08-08 RX ADMIN — VITAM B12 100 MCG: 100 TAB at 08:27

## 2020-08-08 RX ADMIN — INSULIN LISPRO 8 UNITS: 100 INJECTION, SOLUTION INTRAVENOUS; SUBCUTANEOUS at 08:28

## 2020-08-08 RX ADMIN — LISINOPRIL 20 MG: 20 TABLET ORAL at 08:27

## 2020-08-08 RX ADMIN — ASPIRIN 81 MG: 81 TABLET, COATED ORAL at 08:27

## 2020-08-08 NOTE — UTILIZATION REVIEW
Notification of Inpatient Admission/Inpatient Authorization Request   This is a Notification of Inpatient Admission for [address]  Be advised that this patient was admitted to our facility under Inpatient Status  Contact Hollie Oakes at [phone] for additional admission information  [facility] DEPT  DEDICATED -[phone]  Patient Name:   Shi Louis   YOB: 1975       [address]   [address]:   [address] [facility] [place] [facility]  Tax ID: [id number]  NPI: [id number] Attending Provider/NPI:  Phone:  Address: [facility] [[id number]]  [phone]  [facility] FACILITY   Place of Service Code: 24 Place of Service Name:  [address] [place] [address]   Start Date: 8/6/20 1414 Discharge Date & Time:    Type of Admission: Inpatient Status Discharge Disposition (if discharged):    Patient Diagnoses: CHF (congestive heart failure) ([facility] ) [I50 9]  Acute CHF ([facility] [address] ) [I50 9]     Orders: Admission Orders (From admission, onward)     Ordered        08/06/20 1414  Inpatient Admission (expected length of stay for this patient Order details is greater than two midnights)  Once         08/06/20 1300  Inpatient Admission (expected length of stay for this patient Order details is greater than two midnights)  Once                    Assigned Utilization Review Contact: Rosey Oakes  Utilization   Network Utilization Review Department  Phone: [phone]; Fax [phone]  Email: [email or web address]   ATTENTION PAYERS: Please call the assigned Utilization  directly with any questions or concerns ALL voicemails in the department are confidential  Send all requests for admission clinical reviews, approved or denied determinations and any other requests to dedicated fax number belonging to the campus where the patient is receiving treatment

## 2020-08-08 NOTE — NURSING NOTE
Ambulated patient 200 feet  Pulse ox between 92-96%  Patient with no complaints of shortness of breath

## 2020-08-08 NOTE — PLAN OF CARE
Problem: PAIN - ADULT  Goal: Verbalizes/displays adequate comfort level or baseline comfort level  Description: Interventions:  - Encourage patient to monitor pain and request assistance  - Assess pain using appropriate pain scale  - Administer analgesics based on type and severity of pain and evaluate response  - Implement non-pharmacological measures as appropriate and evaluate response  - Consider cultural and social influences on pain and pain management  - Notify physician/advanced practitioner if interventions unsuccessful or patient reports new pain  Outcome: Adequate for Discharge     Problem: INFECTION - ADULT  Goal: Absence or prevention of progression during hospitalization  Description: INTERVENTIONS:  - Assess and monitor for signs and symptoms of infection  - Monitor lab/diagnostic results  - Monitor all insertion sites, i e  indwelling lines, tubes, and drains  - Monitor endotracheal if appropriate and nasal secretions for changes in amount and color  - [place] appropriate cooling/warming therapies per order  - Administer medications as ordered  - Instruct and encourage patient and family to use good hand hygiene technique  - Identify and instruct in appropriate isolation precautions for identified infection/condition  Outcome: Adequate for Discharge  Goal: Absence of fever/infection during neutropenic period  Description: INTERVENTIONS:  - Monitor WBC    Outcome: Adequate for Discharge     Problem: SAFETY ADULT  Goal: Patient will remain free of falls  Description: INTERVENTIONS:  - Assess patient frequently for physical needs  -  Identify cognitive and physical deficits and behaviors that affect risk of falls    -  [place] fall precautions as indicated by assessment   - Educate patient/family on patient safety including physical limitations  - Instruct patient to call for assistance with activity based on assessment  - Modify environment to reduce risk of injury  - Consider OT/PT consult to assist with strengthening/mobility  Outcome: Adequate for Discharge  Goal: Maintain or return to baseline ADL function  Description: INTERVENTIONS:  -  Assess patient's ability to carry out ADLs; assess patient's baseline for ADL function and identify physical deficits which impact ability to perform ADLs (bathing, care of mouth/teeth, toileting, grooming, dressing, etc )  - Assess/evaluate cause of self-care deficits   - Assess range of motion  - Assess patient's mobility; develop plan if impaired  - Assess patient's need for assistive devices and provide as appropriate  - Encourage maximum independence but intervene and supervise when necessary  - Involve family in performance of ADLs  - Assess for home care needs following discharge   - Consider OT consult to assist with ADL evaluation and planning for discharge  - Provide patient education as appropriate  Outcome: Adequate for Discharge  Goal: Maintain or return mobility status to optimal level  Description: INTERVENTIONS:  - Assess patient's baseline mobility status (ambulation, transfers, stairs, etc )    - Identify cognitive and physical deficits and behaviors that affect mobility  - Identify mobility aids required to assist with transfers and/or ambulation (gait belt, sit-to-stand, lift, walker, cane, etc )  - [place] fall precautions as indicated by assessment  - Record patient progress and toleration of activity level on Mobility SBAR; progress patient to next Phase/Stage  - Instruct patient to call for assistance with activity based on assessment  - Consider rehabilitation consult to assist with strengthening/weightbearing, etc   Outcome: Adequate for Discharge     Problem: DISCHARGE PLANNING  Goal: Discharge to home or other facility with appropriate resources  Description: INTERVENTIONS:  - Identify barriers to discharge w/patient and caregiver  - Arrange for needed discharge resources and transportation as appropriate  - Identify discharge learning needs (meds, wound care, etc )  - Arrange for interpretive services to assist at discharge as needed  - Refer to Case Management Department for coordinating discharge planning if the patient needs post-hospital services based on physician/advanced practitioner order or complex needs related to functional status, cognitive ability, or social support system  Outcome: Adequate for Discharge     Problem: Knowledge Deficit  Goal: Patient/family/caregiver demonstrates understanding of disease process, treatment plan, medications, and discharge instructions  Description: Complete learning assessment and assess knowledge base    Interventions:  - Provide teaching at level of understanding  - Provide teaching via preferred learning methods  Outcome: Adequate for Discharge     Problem: CARDIOVASCULAR - ADULT  Goal: Maintains optimal cardiac output and hemodynamic stability  Description: INTERVENTIONS:  - Monitor I/O, vital signs and rhythm  - Monitor for S/S and trends of decreased cardiac output  - Administer and titrate ordered vasoactive medications to optimize hemodynamic stability  - Assess quality of pulses, skin color and temperature  - Assess for signs of decreased coronary artery perfusion  - Instruct patient to report change in severity of symptoms  Outcome: Adequate for Discharge  Goal: Absence of cardiac dysrhythmias or at baseline rhythm  Description: INTERVENTIONS:  - Continuous cardiac monitoring, vital signs, obtain 12 lead EKG if ordered  - Administer antiarrhythmic and heart rate control medications as ordered  - Monitor electrolytes and administer replacement therapy as ordered  Outcome: Adequate for Discharge     Problem: RESPIRATORY - ADULT  Goal: Achieves optimal ventilation and oxygenation  Description: INTERVENTIONS:  - Assess for changes in respiratory status  - Assess for changes in mentation and behavior  - Position to facilitate oxygenation and minimize respiratory effort  - Oxygen administered by appropriate delivery if ordered  - Initiate smoking cessation education as indicated  - Encourage broncho-pulmonary hygiene including cough, deep breathe, Incentive Spirometry  - Assess the need for suctioning and aspirate as needed  - Assess and instruct to report SOB or any respiratory difficulty  - Respiratory Therapy support as indicated  Outcome: Adequate for Discharge     Problem: Potential for Falls  Goal: Patient will remain free of falls  Description: INTERVENTIONS:  - Assess patient frequently for physical needs  -  Identify cognitive and physical deficits and behaviors that affect risk of falls  -  [place] fall precautions as indicated by assessment   - Educate patient/family on patient safety including physical limitations  - Instruct patient to call for assistance with activity based on assessment  - Modify environment to reduce risk of injury  - Consider OT/PT consult to assist with strengthening/mobility  Outcome: Adequate for Discharge     Problem: Nutrition/Hydration-ADULT  Goal: Nutrient/Hydration intake appropriate for improving, restoring or maintaining nutritional needs  Description: Monitor and assess patient's nutrition/hydration status for malnutrition  Collaborate with interdisciplinary team and initiate plan and interventions as ordered  Monitor patient's weight and dietary intake as ordered or per policy  Utilize nutrition screening tool and intervene as necessary  Determine patient's food preferences and provide high-protein, high-caloric foods as appropriate       INTERVENTIONS:  - Monitor oral intake, urinary output, labs, and treatment plans  - Assess nutrition and hydration status and recommend course of action  - Evaluate amount of meals eaten  - Assist patient with eating if necessary   - Allow adequate time for meals  - Recommend/ encourage appropriate diets, oral nutritional supplements, and vitamin/mineral supplements  - Order, calculate, and assess calorie counts as needed  - Recommend, monitor, and adjust tube feedings and TPN/PPN based on assessed needs  - Assess need for intravenous fluids  - Provide specific nutrition/hydration education as appropriate  - Include patient/family/caregiver in decisions related to nutrition  Outcome: Adequate for Discharge

## 2020-08-08 NOTE — ASSESSMENT & PLAN NOTE
Wt Readings from Last 3 Encounters:   08/08/20 79 5 kg (175 lb 4 3 oz)   08/06/20 84 8 kg (187 lb)   08/06/20 84 9 kg (187 lb 3 2 oz)     Patient admitted with acute diastolic heart failure  Patient was started on Lasix 40 mg IV twice daily  Telemetry was unremarkable  Serial troponin are negative  Echocardiogram showed ejection fraction of 65% with grade 2 diastolic dysfunction  Daily weight and I&Os  Monitor renal function while diuresing  Cardiology consult appreciated  Patient is saturating well on room air  UA and COVID-19 were negative  Patient D-dimer is elevated but is not tachycardic and saturations are well  She is ambulating without any discomfort or need for any supplemental oxygen  Cardiology recommended patient can be discharged on oral Lasix and outpatient ischemic workup  If patient continues to be hypoxic will recommend outpatient CT chest rule out PE protocol  Discussed with patient and  at bedside are in agreement with the discharge plan  They have follow-up on August 18 with cardiologist at 3:20 p m

## 2020-08-08 NOTE — ASSESSMENT & PLAN NOTE
Lab Results   Component Value Date    HGBA1C 10 6 (H) 04/22/2020       Recent Labs     08/07/20  1115 08/07/20  1622 08/07/20  2135 08/08/20  0732   POCGLU 484* 339* 391* 237*     Blood Sugar Average: Last 72 hrs:  (P) 314 [id number]     Continue on Lantus insulin  Accu-Chek a c  HS with Humalog sliding scale

## 2020-08-08 NOTE — DISCHARGE INSTRUCTIONS
Low-Sodium Diet   WHAT YOU NEED TO KNOW:   A low-sodium diet limits foods that are high in sodium (salt)  You will need to follow a low-sodium diet if you have high blood pressure, kidney disease, or heart failure  You may also need to follow this diet if you have a condition that is causing your body to retain (hold) extra fluid  You may need to limit the amount of sodium you eat to 1,500 mg  Ask your healthcare provider how much sodium you can have each day  DISCHARGE INSTRUCTIONS:   How to use food labels to choose foods that are low in sodium:  Read food labels to find the amount of sodium they contain  The amount of sodium is listed in milligrams (mg)  The % Daily Value (DV) column tells you how much of your daily needs are met by 1 serving of the food for each nutrient listed  Choose foods that have less than 5% of the DV of sodium  These foods are considered low in sodium  Foods that have 20% or more of the DV of sodium are considered high in sodium  Some food labels may also list any of the following terms that tell you about the sodium content in the food:  · Sodium-free:  Less than 5 mg in each serving    · Very low sodium:  35 mg of sodium or less in each serving    · Low sodium:  140 mg of sodium or less in each serving    · Reduced sodium: At least 25% less sodium in each serving than the regular type    · Light in sodium:  50% less sodium in each serving    · Unsalted or no added salt:  No extra salt is added during processing (the food may still contain sodium)  Foods to avoid:  Salty foods are high in sodium   You should avoid the following:  · Processed foods:      ¨ Mixes for cornbread, biscuits, cake, and pudding     ¨ Instant foods, such as potatoes, cereals, noodles, and rice     ¨ Packaged foods, such as bread stuffing, rice and pasta mixes, snack dip mixes, and macaroni and cheese     ¨ Canned foods, such as canned vegetables, soups, broths, sauces, and vegetable or tomato juice    ¨ Snack foods, such as salted chips, popcorn, pretzels, pork rinds, salted crackers, and salted nuts    ¨ Frozen foods, such as dinners, entrees, vegetables with sauces, and breaded meats    ¨ Sauerkraut, pickled vegetables, and other foods prepared in brine    · Meats and cheeses:      ¨ Smoked or cured meat, such as corned beef, grewal, ham, hot dogs, and sausage    ¨ Canned meats or spreads, such as potted meats, sardines, anchovies, and imitation seafood    ¨ Deli or lunch meats, such as bologna, ham, turkey, and roast beef    ¨ Processed cheese, such as American cheese and cheese spreads    · Condiments, sauces, and seasonings:      ¨ Salt (¼ teaspoon of salt contains 575 mg of sodium)    ¨ Seasonings made with salt, such as garlic salt, celery salt, onion salt, and seasoned salt    ¨ Regular soy sauce, barbecue sauce, teriyaki sauce, steak sauce, Worcestershire sauce, and most flavored vinegars    ¨ Canned gravy and mixes     ¨ Regular condiments, such as mustard, ketchup, and salad dressings    ¨ Pickles and olives    ¨ Meat tenderizers and monosodium glutamate (MSG)  Foods to include:  Read the food label to find the amount of sodium in each serving  · Bread and cereal:  Try to choose breads with less than 80 mg of sodium per serving  ¨ Bread, roll, ping, tortilla, or unsalted crackers  ¨ Ready-to-eat cereals with less than 5% DV of sodium (examples include shredded wheat and puffed rice)    ¨ Pasta    · Vegetables and fruits:      ¨ Unsalted fresh, frozen, or canned vegetables    ¨ Fresh, frozen, or canned fruits    ¨ Fruit juice    · Dairy:  One serving has about 150 mg of sodium  ¨ Milk, all types    ¨ Yogurt    ¨ Hard cheese, such as cheddar, Swiss, [employer], or mozzarella    · Meat and other protein foods:  Some raw meats may have added sodium       ¨ Plain meats, fish, and poultry     ¨ Egg    · Other foods:      ¨ Homemade pudding    ¨ Unsalted nuts, popcorn, or pretzels    ¨ Unsalted butter or margarine  Ways to decrease sodium:   · Add spices and herbs to foods instead of salt during cooking  Use salt-free seasonings to add flavor to foods  Examples include onion powder, garlic powder, basil, greene powder, paprika, and parsley  Try lemon or lime juice or vinegar to give foods a tart flavor  Use hot peppers, pepper, or cayenne pepper to add a spicy flavor to foods  · Do not keep a salt shaker at your kitchen table  This may help keep you from adding salt to food at the table  It may take time to get used to enjoying the natural flavor of food instead of adding salt  Talk to your healthcare provider before you use salt substitutes  Some salt substitutes have a high amount of potassium and need to be avoided if you have kidney disease  · Choose low-sodium foods at restaurants  Meals from restaurants are often high in sodium  Some restaurants have nutrition information on the menu that tells you the amount of sodium in their foods  If possible, ask for your food to be prepared with less, or no salt  · Shop for unsalted or low-sodium foods and snacks at the grocery store  Examples include unsalted or low-sodium broths, soups, and canned vegetables  Choose fresh or frozen vegetables instead  Choose unsalted nuts or seeds or fresh fruits or vegetables as snacks  Read food labels and choose salt-free, very low-sodium, or low-sodium foods  © 2017 [employer] Information is for End User's use only and may not be sold, redistributed or otherwise used for commercial purposes  All illustrations and images included in CareNotes® are the copyrighted property of A D A M [employer] Inc  or Christoph Ram  The above information is an  only  It is not intended as medical advice for individual conditions or treatments  Talk to your doctor, nurse or pharmacist before following any medical regimen to see if it is safe and effective for you    Heart Failure   WHAT YOU NEED TO KNOW:   Heart failure (HF) is a condition that does not allow your heart to fill or pump properly  Not enough oxygen in your blood gets to your organs and tissues  HF can occur in the right side, the left side, or both lower chambers of your heart  HF is often caused by damage or injury to your heart  The damage may be caused by heart attack, other heart conditions, or high blood pressure  HF is a long-term condition that tends to get worse over time  It is important to manage your health to improve your quality of life  HF can be worsened by heavy alcohol use, smoking, diabetes that is not controlled, or obesity  DISCHARGE INSTRUCTIONS:   Call 911 if:   · You have any of the following signs of a heart attack:      ¨ Squeezing, pressure, or pain in your chest that lasts longer than 5 minutes or returns    ¨ Discomfort or pain in your back, neck, jaw, stomach, or arm     ¨ Trouble breathing    ¨ Nausea or vomiting    ¨ Lightheadedness or a sudden cold sweat, especially with chest pain or trouble breathing    Return to the emergency department if:   · You gain 3 or more pounds (1 4 kg) in a day, or more than your healthcare provider says you should  · Your heartbeat is fast, slow, or uneven all the time  Contact your healthcare provider if:   · You have symptoms of worsening HF:      ¨ Shortness of breath at rest, at night, or that is getting worse in any way     ¨ Weight gain of 5 or more pounds (2 2 kg) in a week     ¨ More swelling in your legs or ankles     ¨ Abdominal pain or swelling     ¨ More coughing     ¨ Loss of appetite     ¨ Feeling tired all the time    · You feel hopeless or depressed, or you have lost interest in things you used to enjoy  · You often feel worried or afraid  · You have questions or concerns about your condition or care  Medicines: You may  need any of the following:  · Medicines  may be needed to help regulate your heart rhythm   You may also need medicine to lower your blood pressure, and to get rid of extra fluids  · Take your medicine as directed  Contact your healthcare provider if you think your medicine is not helping or if you have side effects  Tell him of her if you are allergic to any medicine  Keep a list of the medicines, vitamins, and herbs you take  Include the amounts, and when and why you take them  Bring the list or the pill bottles to follow-up visits  Carry your medicine list with you in case of an emergency  Follow up with your healthcare provider or cardiologist within 2 weeks or as directed: You may need to return for other tests  You may need home health care  A healthcare provider will monitor your vital signs, weight, and make sure your medicines are working  Write down your questions so you remember to ask them during your visits  Go to cardiac rehab as directed:  Cardiac rehab is a program run by specialists who will help you safely strengthen your heart  The program includes exercise, relaxation, stress management, and heart-healthy nutrition  Healthcare providers will also make sure your medicines are helping to reduce your symptoms  Manage HF:   · Do not smoke  Nicotine and other chemicals in cigarettes and cigars can cause lung damage and make HF difficult to manage  Ask your healthcare provider for information if you currently smoke and need help to quit  E-cigarettes or smokeless tobacco still contain nicotine  Talk to your healthcare provider before you use these products  · Do not drink alcohol or take illegal drugs  Alcohol and drugs can worsen your symptoms quickly  · Weigh yourself every morning  Use the same scale, in the same spot  Do this after you use the bathroom, but before you eat or drink anything  Wear the same type of clothing  Do not wear shoes  Record your weight each day so you will notice any sudden weight gain  Swelling and weight gain are signs of fluid retention   If you are overweight, ask how to lose weight safely  · Check your blood pressure and heart rate every day  Ask for more information about how to measure your blood pressure and heart rate correctly  Ask what these numbers should be for you  · Manage any chronic health conditions you have  These include high blood pressure, diabetes, obesity, high cholesterol, metabolic syndrome, and COPD  You will have fewer symptoms if you manage these health conditions  Follow your healthcare provider's recommendations and follow up with him or her regularly  · Eat heart-healthy foods and limit sodium (salt)  An easy way to do this is to eat more fresh fruits and vegetables and fewer canned and processed foods  Replace butter and margarine with heart-healthy oils such as olive oil and canola oil  Other heart-healthy foods include walnuts, whole-grain breads, low-fat dairy products, beans, and lean meats  Fatty fish such as salmon and tuna are also heart healthy  Ask how much salt you can eat each day  Do not use salt substitutes  · Drink liquids as directed  You may need to limit the amount of liquids you drink if you retain fluid  Ask how much liquid to drink each day and which liquids are best for you  · Stay active  If you are not active, your symptoms are likely to worsen quickly  Walking, bicycling, and other types of physical activity help maintain your strength and improve your mood  Physical activity also helps you manage your weight  Work with your healthcare provider to create an exercise plan that is right for you  · Get vaccines as directed  Get a flu shot every year  You may also need the pneumonia vaccine  The flu and pneumonia can be severe for a person who has HF  Vaccines protect you from these infections  Join a support group:  Living with HF can be difficult  It may be helpful to talk with others who have HF  You may learn how to better manage your condition or get emotional support   For more information: · [address]  [place] , [place]   Phone: 4- 564 - [phone]  Web Address: https://www strong com/  org   © 2017 [employer] Information is for End User's use only and may not be sold, redistributed or otherwise used for commercial purposes  All illustrations and images included in CareNotes® are the copyrighted property of A [employer] A M , Inc  or Christoph Ram  The above information is an  only  It is not intended as medical advice for individual conditions or treatments  Talk to your doctor, nurse or pharmacist before following any medical regimen to see if it is safe and effective for you  Follow-up with the PCP as outpatient  Follow-up with cardiology on August 18 at 3:20 p m  Repeat BMP in a week  If patient continues to be dyspneic-will recommend CT chest PE protocol for completion with PCP/Cardiology  Return to ER with any worsening shortness of breath, lower extremity edema, chest pain, palpitation or any other alarming symptoms

## 2020-08-08 NOTE — DISCHARGE SUMMARY
Discharge- Kari Overall 1975, 40 y o  female MRN: [id number]    Unit/Bed#: -01 Encounter: [id number]    Primary Care Provider: Alfornia Dancer, PA-C   Date and time admitted to hospital: 8/6/2020  1:02 PM        * Acute CHF (congestive heart failure) ([facility] )  Assessment & Plan  Wt Readings from Last 3 Encounters:   08/08/20 79 5 kg (175 lb 4 3 oz)   08/06/20 84 8 kg (187 lb)   08/06/20 84 9 kg (187 lb 3 2 oz)     Patient admitted with acute diastolic heart failure  Patient was started on Lasix 40 mg IV twice daily  Telemetry was unremarkable  Serial troponin are negative  Echocardiogram showed ejection fraction of 65% with grade 2 diastolic dysfunction  Daily weight and I&Os  Monitor renal function while diuresing  Cardiology consult appreciated  Patient is saturating well on room air  UA and COVID-19 were negative  Patient D-dimer is elevated but is not tachycardic and saturations are well  She is ambulating without any discomfort or need for any supplemental oxygen  Cardiology recommended patient can be discharged on oral Lasix and outpatient ischemic workup  If patient continues to be hypoxic will recommend outpatient CT chest rule out PE protocol  Discussed with patient and  at bedside are in agreement with the discharge plan  They have follow-up on August 18 with cardiologist at 3:20 p m  Depression  Assessment & Plan  Continue on Paxil  Denies suicidal or homicidal ideations  Gastroesophageal reflux disease without esophagitis  Assessment & Plan  On Protonix    Severe obesity (BMI 35 0-39  9) with comorbidity ([place] [address] )  Assessment & Plan  Encourage weight loss and lifestyle modifications  Hyperlipidemia  Assessment & Plan  On statin    Hypertension  Assessment & Plan  Continue on Zestril and Lasix  Monitor vitals as per protocol    Asthma  Assessment & Plan  No acute exacerbation    No wheezing  Continue home medications  On Breo and budesonide neb, and albuterol inhaler p r n  Continue on Singulair    Type 1 diabetes [facility])  Assessment & Plan  Lab Results   Component Value Date    HGBA1C 10 6 (H) 04/22/2020       Recent Labs     08/07/20  1115 08/07/20  1622 08/07/20  2135 08/08/20  0732   POCGLU 484* 339* 391* 237*     Blood Sugar Average: Last 72 hrs:  (P) 314 [id number]     Continue on Lantus insulin  Accu-Chek a c  HS with Humalog sliding scale      Hospital Course:     Viv Apple is a 40 y o  female patient who originally presented to the hospital on   Admission Orders (From admission, onward)     Ordered        08/06/20 1414  Inpatient Admission (expected length of stay for this patient Order details is greater than two midnights)  Once         08/06/20 1300  Inpatient Admission (expected length of stay for this patient Order details is greater than two midnights)  Once                  due to shortness of breath and chest tightness and peripheral edema  She also states that she has gained about 20 lbs in past couple of months  Patient states that she noticed over the past 4 days her lower extremity swelling is worse and she is getting shortness of breath with minimal exertion  Patient was admitted with acute CHF and was started on IV Lasix  Patient had echocardiogram done which showed ejection fraction of 65% with grade 2 diastolic dysfunction  Patient was diagnosed with acute diastolic heart failure  Patient was seen by Cardiology and recommended switching to Lasix 40 mg daily and follow up with them as outpatient  Patient also had low-grade fever  UA and procalcitonin were normal   Patient remained afebrile this morning  COVID-19 was negative  Patient is hemodynamically stable for discharge    Discussed with patient and  at bedside at length regarding Cardiology follow-up and if persistent dyspnea and then recommended outpatient CT chest to rule out PE for completion with PCP or cardiologist   They verbalized the understanding of the instructions  Patient had pulse ox with ambulation done by RN and did not require any home oxygen  Patient is hemodynamically stable for discharge  On exam  Chest-bilateral air entry, no wheezing or crackles  Abdomen-soft, nontender  Neuro-alert awake oriented x3  No focal deficits  Extremity-trace pedal edema  Patient is encouraged weight loss and lifestyle modification  Please see above list of diagnoses and related plan for additional information  Follow-up with the PCP as outpatient  Follow-up with cardiology on August 18 at 3:20 p m  If patient continues to be dyspneic-will recommend CT chest PE protocol for completion with PCP/Cardiology  Return to ER with any worsening shortness of breath, lower extremity edema, chest pain, palpitation or any other alarming symptoms  Condition at Discharge:  good      Discharge instructions/Information to patient and family:   See after visit summary for information provided to patient and family  Provisions for Follow-Up Care:  See after visit summary for information related to follow-up care and any pertinent home health orders  Disposition:     Home       Discharge Statement:  I spent 45 minutes discharging the patient  This time was spent on the day of discharge  I had direct contact with the patient on the day of discharge  Greater than 50% of the total time was spent examining patient, answering all patient questions, arranging and discussing plan of care with patient as well as directly providing post-discharge instructions  Additional time then spent on discharge activities  Discharge Medications:  See after visit summary for reconciled discharge medications provided to patient and family        ** Please Note: This note has been constructed using a voice recognition system **

## 2020-08-08 NOTE — DISCHARGE INSTR - AVS FIRST PAGE
Follow-up with the PCP as outpatient  Follow-up with cardiology on August 18 at 3:20 p m  Repeat BMP in a week  If patient continues to be dyspneic-will recommend CT chest PE protocol for completion with PCP/Cardiology  Return to ER with any worsening shortness of breath, lower extremity edema, chest pain, palpitation or any other alarming symptoms

## 2020-08-10 ENCOUNTER — TRANSITIONAL CARE MANAGEMENT (OUTPATIENT)
Dept: FAMILY MEDICINE CLINIC | Facility: CLINIC | Age: 45
End: 2020-08-10

## 2020-08-10 DIAGNOSIS — E10.21 TYPE 1 DIABETES MELLITUS WITH NEPHROPATHY (HCC): Primary | ICD-10-CM

## 2020-08-10 NOTE — UTILIZATION REVIEW
Notification of Discharge  This is a Notification of Discharge from our facility [address]  Please be advised that this patient has been discharge from our facility  Below you will find the admission and discharge date and time including the patients disposition  PRESENTATION DATE: 8/6/2020  1:02 PM  OBS ADMISSION DATE:   IP ADMISSION DATE: 8/6/20 1414   DISCHARGE DATE: 8/8/2020  9:15 AM  DISPOSITION: Home/Self Care Home/Self Care   Admission Orders listed below:  Admission Orders (From admission, onward)     Ordered        08/06/20 1414  Inpatient Admission (expected length of stay for this patient Order details is greater than two midnights)  Once         08/06/20 1300  Inpatient Admission (expected length of stay for this patient Order details is greater than two midnights)  Once                   Please contact the UR Department if additional information is required to close this patient's authorization/case  [facility] Utilization Review Department  Main: [phone] x carefully listen to the prompts  All voicemails are confidential   [email or web address]  Send all requests for admission clinical reviews, approved or denied determinations and any other requests to dedicated fax number below belonging to the campus where the patient is receiving treatment   List of dedicated fax numbers:  [address] [facility] [address] DENIALS (Administrative/Medical Necessity) [phone]   [address] (Maternity/NICU/Pediatrics) [phone]   Akanksha Clemente [phone]   Lien Hampton [phone]   Cullen Opitz [phone]   [address] [facility] [address] [facility] [phone]   [facility]  [phone]   [address] [facility] [address]   [address] [facility] [phone]   Yenifer Pepper Mely [place] [phone]

## 2020-08-11 RX ORDER — BLOOD SUGAR DIAGNOSTIC
STRIP MISCELLANEOUS
Qty: 300 EACH | Refills: 3 | Status: SHIPPED | OUTPATIENT
Start: 2020-08-11

## 2020-08-12 ENCOUNTER — OFFICE VISIT (OUTPATIENT)
Dept: ENDOCRINOLOGY | Facility: HOSPITAL | Age: 45
End: 2020-08-12
Payer: COMMERCIAL

## 2020-08-12 ENCOUNTER — TELEPHONE (OUTPATIENT)
Dept: FAMILY MEDICINE CLINIC | Facility: CLINIC | Age: 45
End: 2020-08-12

## 2020-08-12 ENCOUNTER — OFFICE VISIT (OUTPATIENT)
Dept: FAMILY MEDICINE CLINIC | Facility: CLINIC | Age: 45
End: 2020-08-12
Payer: COMMERCIAL

## 2020-08-12 VITALS
DIASTOLIC BLOOD PRESSURE: 60 MMHG | HEART RATE: 98 BPM | BODY MASS INDEX: 39.37 KG/M2 | SYSTOLIC BLOOD PRESSURE: 118 MMHG | WEIGHT: 175 LBS | HEIGHT: 56 IN

## 2020-08-12 VITALS
RESPIRATION RATE: 15 BRPM | HEIGHT: 56 IN | HEART RATE: 80 BPM | WEIGHT: 175.7 LBS | SYSTOLIC BLOOD PRESSURE: 124 MMHG | TEMPERATURE: 99.1 F | DIASTOLIC BLOOD PRESSURE: 80 MMHG | BODY MASS INDEX: 39.53 KG/M2

## 2020-08-12 DIAGNOSIS — E10.21 TYPE 1 DIABETES MELLITUS WITH NEPHROPATHY (HCC): Primary | ICD-10-CM

## 2020-08-12 DIAGNOSIS — I50.9 ACUTE CONGESTIVE HEART FAILURE, UNSPECIFIED HEART FAILURE TYPE (HCC): ICD-10-CM

## 2020-08-12 DIAGNOSIS — E78.2 MIXED HYPERLIPIDEMIA: ICD-10-CM

## 2020-08-12 DIAGNOSIS — F33.1 MODERATE EPISODE OF RECURRENT MAJOR DEPRESSIVE DISORDER (HCC): ICD-10-CM

## 2020-08-12 DIAGNOSIS — E10.3213 BOTH EYES AFFECTED BY MILD NONPROLIFERATIVE DIABETIC RETINOPATHY WITH MACULAR EDEMA, ASSOCIATED WITH TYPE 1 DIABETES MELLITUS (HCC): ICD-10-CM

## 2020-08-12 DIAGNOSIS — E10.42 DIABETIC POLYNEUROPATHY ASSOCIATED WITH TYPE 1 DIABETES MELLITUS (HCC): ICD-10-CM

## 2020-08-12 DIAGNOSIS — E28.2 POLYCYSTIC OVARIAN SYNDROME: Chronic | ICD-10-CM

## 2020-08-12 DIAGNOSIS — E66.01 SEVERE OBESITY (BMI 35.0-39.9) WITH COMORBIDITY (HCC): ICD-10-CM

## 2020-08-12 DIAGNOSIS — E10.21 DIABETIC NEPHROPATHY ASSOCIATED WITH TYPE 1 DIABETES MELLITUS (HCC): ICD-10-CM

## 2020-08-12 DIAGNOSIS — R79.89 ELEVATED TSH: ICD-10-CM

## 2020-08-12 DIAGNOSIS — E10.65 TYPE 1 DIABETES MELLITUS WITH HYPERGLYCEMIA (HCC): Primary | ICD-10-CM

## 2020-08-12 DIAGNOSIS — I10 ESSENTIAL HYPERTENSION: ICD-10-CM

## 2020-08-12 PROBLEM — F32.A DEPRESSION: Status: RESOLVED | Noted: 2020-08-06 | Resolved: 2020-08-12

## 2020-08-12 LAB — SL AMB POCT HEMOGLOBIN AIC: 9.3 (ref ?–6.5)

## 2020-08-12 PROCEDURE — 3008F BODY MASS INDEX DOCD: CPT | Performed by: INTERNAL MEDICINE

## 2020-08-12 PROCEDURE — 3066F NEPHROPATHY DOC TX: CPT | Performed by: INTERNAL MEDICINE

## 2020-08-12 PROCEDURE — 1111F DSCHRG MED/CURRENT MED MERGE: CPT | Performed by: INTERNAL MEDICINE

## 2020-08-12 PROCEDURE — 1111F DSCHRG MED/CURRENT MED MERGE: CPT | Performed by: PHYSICIAN ASSISTANT

## 2020-08-12 PROCEDURE — 99215 OFFICE O/P EST HI 40 MIN: CPT | Performed by: INTERNAL MEDICINE

## 2020-08-12 PROCEDURE — 3074F SYST BP LT 130 MM HG: CPT | Performed by: INTERNAL MEDICINE

## 2020-08-12 PROCEDURE — 3078F DIAST BP <80 MM HG: CPT | Performed by: INTERNAL MEDICINE

## 2020-08-12 PROCEDURE — 99496 TRANSJ CARE MGMT HIGH F2F 7D: CPT | Performed by: PHYSICIAN ASSISTANT

## 2020-08-12 PROCEDURE — 3046F HEMOGLOBIN A1C LEVEL >9.0%: CPT | Performed by: INTERNAL MEDICINE

## 2020-08-12 PROCEDURE — 3046F HEMOGLOBIN A1C LEVEL >9.0%: CPT | Performed by: NURSE PRACTITIONER

## 2020-08-12 PROCEDURE — 83036 HEMOGLOBIN GLYCOSYLATED A1C: CPT | Performed by: PHYSICIAN ASSISTANT

## 2020-08-12 PROCEDURE — 1036F TOBACCO NON-USER: CPT | Performed by: INTERNAL MEDICINE

## 2020-08-12 NOTE — PATIENT INSTRUCTIONS
hgba1c is 9 3%  This is still high but improved  Let's increase the novolog insulin to 1 unit per 1 gram carb  Continue the same lantus insulin 75 units daily  Call if low blood sugars  Continue to test blood sugars 3-4 times a day  Call in blood sugars in 2 week  Follow up in 3 months with blood work

## 2020-08-12 NOTE — PROGRESS NOTES
TCM Call (since 7/12/2020)     Date and time call was made  8/10/2020  7:36 AM    Hospital care reviewed  Records reviewed    Patient was hospitialized at  [address] ([facility])  [facility]    Date of Admission  08/06/20    Date of discharge  08/08/20    Diagnosis  Acute CHF (congestive heart failure    Disposition  Home    Were the patients medications reviewed and updated  Yes    Current Symptoms  None      TCM Call (since 7/12/2020)     Post hospital issues  None    Should patient be enrolled in anticoag monitoring? No    Scheduled for follow up? Yes    Did you obtain your prescribed medications  Yes    Do you need help managing your prescriptions or medications  No    Is transportation to your appointment needed  No    I have advised the patient to call PCP with any new or worsening symptoms  Stiven Singletary MA, 08/10/2020        Assessment/Plan:    1  Type 1 diabetes mellitus with nephropathy (HCC)    - A1C down to 9 6%, discussed at length she needs to get her diabetes controlled, seeing Dr Karey Francisco today  Will obtain eye exam, Dr Nolan Thorpe  - POCT hemoglobin A1c    2  Diabetic polyneuropathy associated with type 1 diabetes mellitus (HCC)    - c/w Dr Karey Francisco    3  Diabetic nephropathy associated with type 1 diabetes mellitus (HCC)    - c/w Dr Karey Francisco    4  Both eyes affected 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, associated with type 1 diabetes mellitus ([facility] )    - c/w opthal    5  Acute congestive heart failure, unspecified heart failure type [facility])    - has appointment with cardiology 8/18, continue with lisinopril, lasix, aspirin  - advised to continue to monitor weights if greater than 5 lb change to call  - discussed cutting back on salt to less than 2 gr, discussed food options and alternatives  - resume healthy walking to lose weight    6  Severe obesity (BMI 35 0-39  9) with comorbidity ([facility] )    - stressed the importance of losing weight and taking the extra stress off her heart    7  Moderate episode of recurrent major depressive disorder (HCC)    - on paxil, continue as is    8  Mixed hyperlipidemia    - on pravastatin, due for labs now            Subjective:   Chief Complaint   Patient presents with    Transition of Care Management      Patient ID: Staci Key is a 40 y o  female  Patient here for [facility] visit from CHF diagnosis  Patient noted she had gained 20 lbs over the past two weeks, figured I was from the salty food they had in their house after she has been watching her diet and exercising  Then on 8/6 developed SOB, figured it was her asthma and called her, was told to go to Urgent Care who sent her to ER  Was told she was in CHF and put in IV lasix, advised she could be worked up out patient but patient was too scared to go home  Was admitted, kept on IV lasix and put on aspirin Had echo that showed 65% ejection and diastolic dysfunction  Was discharged home and advised to follow up with endo and cardio  Has appointment for both  Is back to diet and exercise  Cut out salt  Checking weight notes it continues to go down, down another 3 lbs  The following portions of the patient's history were reviewed and updated as appropriate: allergies, current medications, past family history, past medical history, past social history, past surgical history and problem list     Past Medical History:   Diagnosis Date    Anemia     Asthma     w/acute exacerbation   Last assessed: 10/26/12    Chest pain 2/3/2016    Depression     Diabetes mellitus ([address] )     Diabetic nephropathy ([address] )     Diabetic retinopathy ([facility] ) 2/3/2016    Gastroenteritis 02/03/2016    GERD (gastroesophageal reflux disease)     HTN (hypertension)     Hyperlipidemia 2/3/2016    Oligomenorrhea     Polycystic ovaries 2/3/2016    Retinal hemorrhage 2/3/2016    Retinopathy     Thrombocytosis (HCC)     Type 1 diabetes mellitus with ophthalmic manifestation ([address] ) 2/3/2016     Past Surgical History:   Procedure Laterality Date    CATARACT EXTRACTION Bilateral     RETINAL LASER PROCEDURE       Family History   Problem Relation Age of Onset    Heart murmur Mother     Diabetes Mother         Mellitus    Thyroid disease Mother         Disorder    Diabetes Father         Mellitus    Hypertension Father     Diabetes Maternal Grandfather         Mellitus    Breast cancer Paternal Grandmother     Diabetes Maternal Aunt         Mellitus    Diabetes Maternal Uncle         Mellitus    No Known Problems Maternal Grandmother     Leukemia Paternal Grandfather     Substance Abuse Neg Hx     Mental illness Neg Hx     Alcohol abuse Neg Hx      Social History     Socioeconomic History    Marital status: /Civil Union     Spouse name: Not on file    Number of children: Not on file    Years of education: Not on file    Highest education level: Not on file   Occupational History    Not on file   Social Needs    Financial resource strain: Not on file    Food insecurity     Worry: Not on file     Inability: Not on file   [employer] needs     Medical: Not on file     Non-medical: Not on file   Tobacco Use    Smoking status: Never Smoker    Smokeless tobacco: Never Used    Tobacco comment: Per [employer]: Former smoker   Quit in 1994   Substance and Sexual Activity    Alcohol use: Not Currently     Frequency: Monthly or less     Comment: Occasional/1 mixed drink twice a month if that    Drug use: No    Sexual activity: Yes     Partners: Male     Birth control/protection: Pill   Lifestyle    Physical activity     Days per week: Not on file     Minutes per session: Not on file    Stress: Not on file   Relationships    Social connections     Talks on phone: Not on file     Gets together: Not on file     Attends Hindu service: Not on file     Active member of club or organization: Not on file     Attends meetings of clubs or organizations: Not on file     Relationship status: Not on file    Intimate partner violence     Fear of current or ex partner: Not on file     Emotionally abused: Not on file     Physically abused: Not on file     Forced sexual activity: Not on file   Other Topics Concern    Not on file   Social History Narrative    Always uses seatbelt    Caffeine use: 1-2 cups coffee daily    Has smoke detectors    [employer] Aff[employer]ations: [employer]       Current Outpatient Medications:     acetaminophen (TYLENOL) 500 mg tablet, Take 2 tablets (1,000 mg total) by mouth every 6 (six) hours as needed for mild pain or moderate pain, Disp: 30 tablet, Rfl: 0    albuterol (PROVENTIL HFA,VENTOLIN HFA) 90 mcg/act inhaler, INHALE 2 PUFFS BY MOUTH EVERY 4 HOURS AS NEEDED FOR WHEEZING OR SHORTNESS OF BREATH, Disp: 25 5 g, Rfl: 0    Ascorbic Acid (VITAMIN C) 500 MG CAPS, Take 2 capsules by mouth daily, Disp: , Rfl:     aspirin (ECOTRIN LOW STRENGTH) 81 mg EC tablet, Take 1 tablet (81 mg total) by mouth daily, Disp: 30 tablet, Rfl: 0    Biotin 10 MG TABS, Take 1 tablet by mouth daily, Disp: , Rfl:     Butalbital-APAP-Caffeine (Fioricet) -40 MG CAPS, Take 1 tablet by mouth 4 (four) times a day as needed (headahce), Disp: 30 capsule, Rfl: 0    cyanocobalamin (VITAMIN B-12) 100 mcg tablet, Take by mouth daily, Disp: , Rfl:     fluticasone-salmeterol (Advair Diskus) 250-50 mcg/dose inhaler, Inhale 1 puff 2 (two) times a day Rinse mouth after use , Disp: 3 Inhaler, Rfl: 3    furosemide (LASIX) 40 mg tablet, Take 1 tablet (40 mg total) by mouth daily, Disp: 30 tablet, Rfl: 0    insulin aspart (NovoLOG) 100 units/mL injection, Take 2 unit of novolog for every 1 g of carb, Disp: 30 mL, Rfl: 0    insulin aspart (NovoLOG) 100 units/mL injection, Use up to 100 units daily  , Disp: 30 mL, Rfl: 5    insulin glargine (Lantus SoloStar) 100 units/mL injection pen, Inject 75 Units under the skin daily (Patient taking differently: Inject 75 Units under the skin daily at bedtime ), Disp: 10 pen, Rfl: 0    lisinopril (ZESTRIL) 20 mg tablet, TAKE 2 TABLETS BY MOUTH EVERY DAY (Patient taking differently: Take 20 mg by mouth 2 (two) times a day ), Disp: 60 tablet, Rfl: 0    montelukast (SINGULAIR) 10 mg tablet, TAKE 1 TABLET BY MOUTH AT BEDTIME, Disp: 90 tablet, Rfl: 0    omeprazole (PriLOSEC) 20 mg delayed release capsule, Take 20 mg by mouth daily  , Disp: , Rfl:     OneTouch Ultra test strip, Test 3 times daily, Disp: 300 each, Rfl: 3    PARoxetine (PAXIL) 20 mg tablet, TAKE 1 TABLET BY MOUTH EVERY DAY, Disp: 90 tablet, Rfl: 0    pravastatin (PRAVACHOL) 40 mg tablet, TAKE 1 TABLET BY MOUTH EVERY DAY, Disp: 90 tablet, Rfl: 1    Probiotic Product (ALIGN PO), Take by mouth, Disp: , Rfl:     Review of Systems   Constitutional: Negative  Eyes: Negative  Respiratory: Negative  Cardiovascular: Negative  Neurological: Negative  Objective:    Vitals:    08/12/20 1228   BP: 124/80   BP Location: Left arm   Patient Position: Sitting   Cuff Size: Large   Pulse: 80   Resp: 15   Temp: 99 1 °F (37 3 °C)   TempSrc: Temporal   Weight: 79 7 kg (175 lb 11 2 oz)   Height: 4' 8" (1 422 m)        Physical Exam  Constitutional:       Appearance: She is well-developed  Cardiovascular:      Rate and Rhythm: Normal rate and regular rhythm  Pulses: Normal pulses  Heart sounds: Normal heart sounds  Pulmonary:      Effort: Pulmonary effort is normal       Breath sounds: Normal breath sounds  Musculoskeletal:      Right lower leg: No edema  Left lower leg: No edema  Skin:     General: Skin is warm  Neurological:      Mental Status: She is alert and oriented to person, place, and time  BMI Counseling: Body mass index is 39 39 kg/m²  The BMI is above normal  Nutrition recommendations include reducing portion sizes and decreasing overall calorie intake  Exercise recommendations include moderate aerobic physical activity for 150 minutes/week

## 2020-08-12 NOTE — PROGRESS NOTES
8/12/2020    Assessment/Plan      Diagnoses and all orders for this visit:    Type 1 diabetes mellitus with hyperglycemia (HCC)  -     insulin aspart (NovoLOG) 100 units/mL injection; Take 1 unit of novolog for every 1 g of carb, up to 150 units daily  -     HEMOGLOBIN A1C W/ EAG ESTIMATION Lab Collect; Future  -     Comprehensive metabolic panel Lab Collect; Future  -     Microalbumin / creatinine urine ratio Lab Collect; Future  -     Lipid Panel with Direct LDL reflex Lab Collect; Future  -     TSH, 3rd generation Lab Collect; Future  -     T4, free Lab Collect; Future  -     Comprehensive metabolic panel Lab Collect  -     Lipid Panel with Direct LDL reflex Lab Collect  -     TSH, 3rd generation Lab Collect  -     T4, free Lab Collect    Diabetic polyneuropathy associated with type 1 diabetes mellitus (HCC)  -     HEMOGLOBIN A1C W/ EAG ESTIMATION Lab Collect; Future  -     Comprehensive metabolic panel Lab Collect; Future  -     Microalbumin / creatinine urine ratio Lab Collect; Future  -     Lipid Panel with Direct LDL reflex Lab Collect; Future  -     TSH, 3rd generation Lab Collect; Future  -     T4, free Lab Collect; Future  -     Comprehensive metabolic panel Lab Collect  -     Lipid Panel with Direct LDL reflex Lab Collect  -     TSH, 3rd generation Lab Collect  -     T4, free Lab Collect    Diabetic nephropathy associated with type 1 diabetes mellitus (HCC)  -     HEMOGLOBIN A1C W/ EAG ESTIMATION Lab Collect; Future  -     Comprehensive metabolic panel Lab Collect; Future  -     Microalbumin / creatinine urine ratio Lab Collect; Future  -     Lipid Panel with Direct LDL reflex Lab Collect; Future  -     TSH, 3rd generation Lab Collect; Future  -     T4, free Lab Collect;  Future  -     Comprehensive metabolic panel Lab Collect  -     Lipid Panel with Direct LDL reflex Lab Collect  -     TSH, 3rd generation Lab Collect  -     T4, free Lab Collect    Polycystic ovarian syndrome  -     HEMOGLOBIN A1C W/ EAG ESTIMATION Lab Collect; Future  -     Comprehensive metabolic panel Lab Collect; Future  -     Microalbumin / creatinine urine ratio Lab Collect; Future  -     Lipid Panel with Direct LDL reflex Lab Collect; Future  -     TSH, 3rd generation Lab Collect; Future  -     T4, free Lab Collect; Future  -     Comprehensive metabolic panel Lab Collect  -     Lipid Panel with Direct LDL reflex Lab Collect  -     TSH, 3rd generation Lab Collect  -     T4, free Lab Collect    Essential hypertension  -     HEMOGLOBIN A1C W/ EAG ESTIMATION Lab Collect; Future  -     Comprehensive metabolic panel Lab Collect; Future  -     Microalbumin / creatinine urine ratio Lab Collect; Future  -     Lipid Panel with Direct LDL reflex Lab Collect; Future  -     TSH, 3rd generation Lab Collect; Future  -     T4, free Lab Collect; Future  -     Comprehensive metabolic panel Lab Collect  -     Lipid Panel with Direct LDL reflex Lab Collect  -     TSH, 3rd generation Lab Collect  -     T4, free Lab Collect    Mixed hyperlipidemia  -     HEMOGLOBIN A1C W/ EAG ESTIMATION Lab Collect; Future  -     Comprehensive metabolic panel Lab Collect; Future  -     Microalbumin / creatinine urine ratio Lab Collect; Future  -     Lipid Panel with Direct LDL reflex Lab Collect; Future  -     TSH, 3rd generation Lab Collect; Future  -     T4, free Lab Collect; Future  -     Comprehensive metabolic panel Lab Collect  -     Lipid Panel with Direct LDL reflex Lab Collect  -     TSH, 3rd generation Lab Collect  -     T4, free Lab Collect    Elevated TSH        Assessment/Plan:  1  Type 1 diabetes  Her most recent hemoglobin A1c is 9 3% which is improved but still significantly elevated  I will increase her NovoLog insulin to 1 unit per 1 g carbohydrate as she was accidentally we utilizing 1 unit per 2 g carbohydrate  She will continue the same Lantus insulin 75 units daily    I have asked her to call in her blood sugars in 2 weeks and continue to test her blood sugars at least 3 to 4 times a day  She will call if her blood sugars drop  2  Diabetic neuropathy  She denies neuropathic symptoms  Diabetic foot exam was performed in the office today  3  Diabetic nephropathy  She will continue the same lisinopril 20 mg twice a day  She does see Nephrology on a regular basis  4  Hypertension  Blood pressure is under good control on her current dose of lisinopril and furosemide  5  Hyperlipidemia  She will continue the same pravastatin 40 mg daily  I will repeat a lipid panel with her next visit  6  Elevated TSH  She had a recent elevation in TSH while in the hospital   I will repeat a TSH with free T4 with her next visit  I have asked her to follow up in 3 months with preceding hemoglobin A1c, TSH, free T4, lipid panel, urine microalbumin to creatinine ratio, and CMP  CC: Diabetes type 1, PCOS, blood pressure, lipid follow-up    History of Present Illness     HPI: Scotty Cody is a 40y o  year old female with type 1 diabetes with neuropathy, nephropathy, and retinopathy for 27 years, polycystic ovary syndrome, hyperlipidemia, hypertension for follow-up visit  She is on insulin at home and takes Lantus insulin 75 units at bedtime and NovoLog insulin 1 units for every 2 g of carbohydrate with each meal  She denies any polyphagia he, polydipsia, nocturia and blurry vision  She has some polyuria  She denies chest pain or shortness of breath  She denies numbness or tingling of the feet  She denies heart attack, stroke and claudication but does admit to neuropathy, nephropathy and retinopathy  Had been using Noom and exercising and lost 20 lbs prior to recent hospital visit with fluid retention  Was eating crappy again and less exercise also  She was in hospital last week with lower extremity edema and diastolic heart failure  She is on diuretics and  Baby aspirin  Hypoglycemic episodes: Yes rare      The patient's last eye exam was july 2020 with retinal specialist and has stable retinopathy  Has cataract in right eye and for surgery in September 2020  The patient's last foot exam was in September 2019 at endocrine office visit  She does not see Podiatry on a regular basis  Last A1C was   Lab Results   Component Value Date    HGBA1C 9 3 (A) 08/12/2020     Blood Sugar/Glucometer/Pump/CGM review: checks blood sugars 3-4 times a day  No record brought with her today  Blood sugars were high in the hospital and into the 300-400s  Before breakfast:   Before lunch: 130s  Before dinner: 130-200s    She has hyperlipidemia and takes pravastatin 40 mg daily  She denies chest pain or shortness of breath  She has hypertension with microalbuminuria and takes lisinopril 20 mg twice daily and furosemide 40 mg daily  She denies headache or stroke-like symptoms  She has PCOS  She is currently treating this with diet exercise  Increased TSH slightly in hospital recently  She is currently on no medications  Review of Systems   Constitutional: Positive for fatigue  Negative for unexpected weight change  Some increase in fatigue recently with stress and depression  HENT: Negative for trouble swallowing  Eyes: Negative for visual disturbance  Respiratory: Negative for chest tightness and shortness of breath  Cardiovascular: Negative for chest pain and leg swelling  Gastrointestinal: Negative for abdominal pain, constipation, diarrhea and nausea  Endocrine: Positive for polyuria  Negative for polydipsia and polyphagia  No nocturia    Skin: Negative for wound  Neurological: Negative for dizziness, weakness, light-headedness, numbness and headaches  Psychiatric/Behavioral: Negative for sleep disturbance  Historical Information   Past Medical History:   Diagnosis Date    Anemia     Asthma     w/acute exacerbation   Last assessed: 10/26/12    Chest pain 2/3/2016    Depression     Diabetes mellitus ([facility] [address] )     [facility]ropathy ([facility] )     Diabetic nephropathy associated with type 1 diabetes mellitus ([facility] ) 8/12/2020    Diabetic retinopathy ([place] [id number] ) 2/3/2016    Gastroenteritis 02/03/2016    GERD (gastroesophageal reflux disease)     HTN (hypertension)     Hyperlipidemia 2/3/2016    Oligomenorrhea     Polycystic ovaries 2/3/2016    Retinal hemorrhage 2/3/2016    Retinopathy     Thrombocytosis (HCC)     Type 1 diabetes mellitus with ophthalmic manifestation ([place] [address] ) 2/3/2016     Past Surgical History:   Procedure Laterality Date    CATARACT EXTRACTION Bilateral     RETINAL LASER PROCEDURE       Social History   Social History     Substance and Sexual Activity   Alcohol Use Not Currently    Frequency: Monthly or less    Comment: Occasional/1 mixed drink twice a month if that     Social History     Substance and Sexual Activity   Drug Use No     Social History     Tobacco Use   Smoking Status Never Smoker   Smokeless Tobacco Never Used   Tobacco Comment    Per [employer]: Former smoker   Quit in 1994     Family History:   Family History   Problem Relation Age of Onset    Heart murmur Mother     Diabetes Mother         Mellitus    Thyroid disease Mother         Disorder    Diabetes Father         Mellitus    Hypertension Father     Diabetes Maternal Grandfather         Mellitus    Breast cancer Paternal Grandmother     Diabetes Maternal Aunt         Mellitus    Diabetes Maternal Uncle         Mellitus    No Known Problems Maternal Grandmother     Leukemia Paternal Grandfather     Substance Abuse Neg Hx     Mental illness Neg Hx     Alcohol abuse Neg Hx        Meds/Allergies   Current Outpatient Medications   Medication Sig Dispense Refill    acetaminophen (TYLENOL) 500 mg tablet Take 2 tablets (1,000 mg total) by mouth every 6 (six) hours as needed for mild pain or moderate pain 30 tablet 0    albuterol (PROVENTIL HFA,VENTOLIN HFA) 90 mcg/act inhaler INHALE 2 PUFFS BY MOUTH EVERY 4 HOURS AS NEEDED FOR WHEEZING OR SHORTNESS OF BREATH 25 5 g 0    Ascorbic Acid (VITAMIN C) 500 MG CAPS Take 2 capsules by mouth daily      aspirin (ECOTRIN LOW STRENGTH) 81 mg EC tablet Take 1 tablet (81 mg total) by mouth daily 30 tablet 0    Biotin 10 MG TABS Take 1 tablet by mouth daily      Butalbital-APAP-Caffeine (Fioricet) -40 MG CAPS Take 1 tablet by mouth 4 (four) times a day as needed (headahce) 30 capsule 0    cyanocobalamin (VITAMIN B-12) 100 mcg tablet Take by mouth daily      fluticasone-salmeterol (Advair Diskus) 250-50 mcg/dose inhaler Inhale 1 puff 2 (two) times a day Rinse mouth after use  3 Inhaler 3    furosemide (LASIX) 40 mg tablet Take 1 tablet (40 mg total) by mouth daily 30 tablet 0    insulin aspart (NovoLOG) 100 units/mL injection Take 1 unit of novolog for every 1 g of carb, up to 150 units daily 50 mL 6    insulin glargine (Lantus SoloStar) 100 units/mL injection pen Inject 75 Units under the skin daily (Patient taking differently: Inject 75 Units under the skin daily at bedtime ) 10 pen 0    lisinopril (ZESTRIL) 20 mg tablet TAKE 2 TABLETS BY MOUTH EVERY DAY (Patient taking differently: Take 20 mg by mouth 2 (two) times a day ) 60 tablet 0    montelukast (SINGULAIR) 10 mg tablet TAKE 1 TABLET BY MOUTH AT BEDTIME 90 tablet 0    omeprazole (PriLOSEC) 20 mg delayed release capsule Take 20 mg by mouth daily   OneTouch Ultra test strip Test 3 times daily 300 each 3    PARoxetine (PAXIL) 20 mg tablet TAKE 1 TABLET BY MOUTH EVERY DAY 90 tablet 0    pravastatin (PRAVACHOL) 40 mg tablet TAKE 1 TABLET BY MOUTH EVERY DAY 90 tablet 1    Probiotic Product (ALIGN PO) Take by mouth       No current facility-administered medications for this visit  No Known Allergies    Objective   Vitals: Blood pressure 118/60, pulse 98, height 4' 8" (1 422 m), weight 79 4 kg (175 lb), not currently breastfeeding  Invasive Devices     None                 Physical Exam  Vitals signs reviewed     Constitutional: Appearance: She is well-developed  HENT:      Head: Normocephalic and atraumatic  Eyes:      Extraocular Movements: Extraocular movements intact  Conjunctiva/sclera: Conjunctivae normal    Neck:      Musculoskeletal: Normal range of motion and neck supple  Thyroid: No thyromegaly  Vascular: No carotid bruit  Comments: No thyroid enlargement  Cardiovascular:      Rate and Rhythm: Normal rate and regular rhythm  Pulses: Normal pulses  no weak pulses          Dorsalis pedis pulses are 2+ on the right side and 2+ on the left side  Posterior tibial pulses are 2+ on the right side and 2+ on the left side  Heart sounds: Normal heart sounds  No murmur  Pulmonary:      Effort: Pulmonary effort is normal       Breath sounds: Normal breath sounds  No wheezing  Abdominal:      Palpations: Abdomen is soft  Musculoskeletal: Normal range of motion  General: No deformity  Right lower leg: No edema  Left lower leg: No edema  Comments: Callus on the bilateral heels  No ulcerations of the feet  Feet:      Right foot:      Skin integrity: Callus present  No ulcer, skin breakdown, erythema, warmth or dry skin  Left foot:      Skin integrity: Callus present  No ulcer, skin breakdown, erythema, warmth or dry skin  Lymphadenopathy:      Cervical: No cervical adenopathy  Skin:     General: Skin is warm and dry  Findings: No rash  Neurological:      Mental Status: She is alert and oriented to person, place, and time  Deep Tendon Reflexes: Reflexes are normal and symmetric  Comments: Vibration sensation diminished to the 1st toe DIP joint bilaterally  Microfilament sensation intact bilaterally  Patient's shoes and socks removed  Right Foot/Ankle   Right Foot Inspection  Skin Exam: skin normal, skin intact, callus and callus no dry skin, no warmth, no erythema, no maceration, no abnormal color, no pre-ulcer and no ulcer Toe Exam: ROM and strength within normal limits  Sensory   Vibration: diminished    Monofilament testing: intact  Vascular  Capillary refills: < 3 seconds  The right DP pulse is 2+  The right PT pulse is 2+  Left Foot/Ankle  Left Foot Inspection  Skin Exam: skin normal, skin intact and callusno dry skin, no warmth, no erythema, no maceration, normal color, no pre-ulcer and no ulcer                         Toe Exam: ROM and strength within normal limits                   Sensory   Vibration: diminished    Monofilament: intact  Vascular  Capillary refills: < 3 seconds  The left DP pulse is 2+  The left PT pulse is 2+  Assign Risk Category:  Deformity present; Loss of protective sensation; No weak pulses       Risk: 1        The history was obtained from the review of the chart and from the patient and boyfreind  Lab Results:    Most recent Alc is  Lab Results   Component Value Date    HGBA1C 9 3 (A) 08/12/2020             Lab Results   Component Value Date    CREATININE 0 85 08/08/2020    CREATININE 0 82 08/07/2020    CREATININE 0 61 08/06/2020    BUN 34 (H) 08/08/2020     01/28/2017    K 3 8 08/08/2020    CL 98 (L) 08/08/2020    CO2 29 08/08/2020     eGFR   Date Value Ref Range Status   08/08/2020 84 ml/min/1 73sq m Final       Lab Results   Component Value Date    CHOL 192 01/28/2017    HDL 46 (L) 04/22/2020    TRIG 263 (H) 04/22/2020       Lab Results   Component Value Date    ALT 25 08/07/2020    AST 14 08/07/2020    ALKPHOS 164 (H) 08/07/2020    BILITOT 0 3 01/28/2017     TSH is 3 7 and 9 4 on 08/06/2020      Future Appointments   Date Time Provider Kennedy Momin   8/18/2020  3:20 PM LELAND Rivers [facility]   11/11/2020  9:15 AM Yanira Nye PA-C [facility]   11/18/2020 11:20 AM Kev Kuhn MD [facility]

## 2020-08-14 ENCOUNTER — APPOINTMENT (EMERGENCY)
Dept: RADIOLOGY | Facility: HOSPITAL | Age: 45
End: 2020-08-14
Payer: COMMERCIAL

## 2020-08-14 ENCOUNTER — APPOINTMENT (EMERGENCY)
Dept: CT IMAGING | Facility: HOSPITAL | Age: 45
End: 2020-08-14
Payer: COMMERCIAL

## 2020-08-14 ENCOUNTER — HOSPITAL ENCOUNTER (EMERGENCY)
Facility: HOSPITAL | Age: 45
Discharge: HOME | End: 2020-08-14
Attending: EMERGENCY MEDICINE | Admitting: EMERGENCY MEDICINE
Payer: COMMERCIAL

## 2020-08-14 VITALS
TEMPERATURE: 97.4 F | OXYGEN SATURATION: 100 % | DIASTOLIC BLOOD PRESSURE: 60 MMHG | WEIGHT: 172 LBS | BODY MASS INDEX: 38.56 KG/M2 | RESPIRATION RATE: 18 BRPM | SYSTOLIC BLOOD PRESSURE: 138 MMHG | HEART RATE: 93 BPM

## 2020-08-14 DIAGNOSIS — R06.00 DYSPNEA: Primary | ICD-10-CM

## 2020-08-14 LAB
ALBUMIN SERPL BCP-MCNC: 3.5 G/DL (ref 3.5–5)
ALP SERPL-CCNC: 129 U/L (ref 46–116)
ALT SERPL W P-5'-P-CCNC: 27 U/L (ref 12–78)
ANION GAP SERPL CALCULATED.3IONS-SCNC: 10 MMOL/L (ref 4–13)
AST SERPL W P-5'-P-CCNC: 13 U/L (ref 5–45)
BASOPHILS # BLD AUTO: 0.08 THOUSANDS/ΜL (ref 0–0.1)
BASOPHILS NFR BLD AUTO: 1 % (ref 0–1)
BILIRUB SERPL-MCNC: 0.2 MG/DL (ref 0.2–1)
BUN SERPL-MCNC: 55 MG/DL (ref 5–25)
CALCIUM SERPL-MCNC: 9.2 MG/DL (ref 8.3–10.1)
CHLORIDE SERPL-SCNC: 101 MMOL/L (ref 100–108)
CO2 SERPL-SCNC: 25 MMOL/L (ref 21–32)
CREAT SERPL-MCNC: 1.2 MG/DL (ref 0.6–1.3)
EOSINOPHIL # BLD AUTO: 0.47 THOUSAND/ΜL (ref 0–0.61)
EOSINOPHIL NFR BLD AUTO: 4 % (ref 0–6)
ERYTHROCYTE [DISTWIDTH] IN BLOOD BY AUTOMATED COUNT: 12.8 % (ref 11.6–15.1)
GFR SERPL CREATININE-BSD FRML MDRD: 55 ML/MIN/1.73SQ M
GLUCOSE SERPL-MCNC: 136 MG/DL (ref 65–140)
HCT VFR BLD AUTO: 37.1 % (ref 34.8–46.1)
HGB BLD-MCNC: 12.1 G/DL (ref 11.5–15.4)
IMM GRANULOCYTES # BLD AUTO: 0.05 THOUSAND/UL (ref 0–0.2)
IMM GRANULOCYTES NFR BLD AUTO: 0 % (ref 0–2)
LYMPHOCYTES # BLD AUTO: 2.67 THOUSANDS/ΜL (ref 0.6–4.47)
LYMPHOCYTES NFR BLD AUTO: 23 % (ref 14–44)
MCH RBC QN AUTO: 30 PG (ref 26.8–34.3)
MCHC RBC AUTO-ENTMCNC: 32.6 G/DL (ref 31.4–37.4)
MCV RBC AUTO: 92 FL (ref 82–98)
MONOCYTES # BLD AUTO: 0.72 THOUSAND/ΜL (ref 0.17–1.22)
MONOCYTES NFR BLD AUTO: 6 % (ref 4–12)
NEUTROPHILS # BLD AUTO: 7.75 THOUSANDS/ΜL (ref 1.85–7.62)
NEUTS SEG NFR BLD AUTO: 66 % (ref 43–75)
NRBC BLD AUTO-RTO: 0 /100 WBCS
NT-PROBNP SERPL-MCNC: 50 PG/ML
PLATELET # BLD AUTO: 548 THOUSANDS/UL (ref 149–390)
PMV BLD AUTO: 9.9 FL (ref 8.9–12.7)
POTASSIUM SERPL-SCNC: 5.4 MMOL/L (ref 3.5–5.3)
PROT SERPL-MCNC: 7.9 G/DL (ref 6.4–8.2)
RBC # BLD AUTO: 4.03 MILLION/UL (ref 3.81–5.12)
SODIUM SERPL-SCNC: 136 MMOL/L (ref 136–145)
TROPONIN I SERPL-MCNC: <0.02 NG/ML
WBC # BLD AUTO: 11.74 THOUSAND/UL (ref 4.31–10.16)

## 2020-08-14 PROCEDURE — G1004 CDSM NDSC: HCPCS

## 2020-08-14 PROCEDURE — 36415 COLL VENOUS BLD VENIPUNCTURE: CPT | Performed by: EMERGENCY MEDICINE

## 2020-08-14 PROCEDURE — 99285 EMERGENCY DEPT VISIT HI MDM: CPT

## 2020-08-14 PROCEDURE — 71275 CT ANGIOGRAPHY CHEST: CPT

## 2020-08-14 PROCEDURE — 85025 COMPLETE CBC W/AUTO DIFF WBC: CPT | Performed by: EMERGENCY MEDICINE

## 2020-08-14 PROCEDURE — 99285 EMERGENCY DEPT VISIT HI MDM: CPT | Performed by: EMERGENCY MEDICINE

## 2020-08-14 PROCEDURE — 84484 ASSAY OF TROPONIN QUANT: CPT | Performed by: EMERGENCY MEDICINE

## 2020-08-14 PROCEDURE — 83880 ASSAY OF NATRIURETIC PEPTIDE: CPT | Performed by: EMERGENCY MEDICINE

## 2020-08-14 PROCEDURE — 80053 COMPREHEN METABOLIC PANEL: CPT | Performed by: EMERGENCY MEDICINE

## 2020-08-14 PROCEDURE — 71045 X-RAY EXAM CHEST 1 VIEW: CPT

## 2020-08-14 RX ADMIN — IOHEXOL 85 ML: 350 INJECTION, SOLUTION INTRAVENOUS at 21:11

## 2020-08-14 NOTE — ED NOTES
Pt states she does have asthma   Pt used inhalers today and took Mucinex for her asthma     Aissatou Dawkins RN  08/14/20 1927

## 2020-08-16 NOTE — PROGRESS NOTES
Heart Failure   Follow Up Office Visit Note -     Aria Pearson   40 y o    female   MRN: [id number]  [facility]  [address] [facility]  [place] [address] [facility] [address]  [phone]    PCP: Williams Lopez PA-C  Cardiologist :   Will be with Dr Meryl Sabillon            Summary of Recommendations  Heart healthy diet  Education provided  Low-sodium diet, Heart failure education as below  Stress echocardiogram:  Pending  Has been ordered  Aggressive risk factor control  Recent Hemoglobin A1c 9 3, prior 10 6  She may benefit from Jardiance from a CV standpoint  Following with endo  Stop pravastatin  Start atorvastatin 40 mg daily  Repeat lipids ordered  Reduce lasix to 20 mg/d  BMP 1 week  Follow up will be scheduled with Dr Meryl Sabillon 4-6 weeks, after ST        Impression/plan  PRICE- likely multifactorial, due to obesity, recent heart failure exacerbation, deconditioning  Stress test has been requested  She is on a baby aspirin, given her multiple CV risk factors  Chronic diastolic heart failure, adm 8/6-8/8/2020  & ED 8/13 SOB  Wt Readings from Last 3 Encounters:   08/18/20 78 5 kg (173 lb)   08/14/20 78 kg (172 lb)   08/12/20 79 4 kg (175 lb)      Discharge creatinine:  0 85, BUN 34 potassium 3 8   Last labs: August 14  Creatinine 1 2, BUN 55, potassium 5 4   Beta-Blocker:  None   ACEi, ARB or ARNi:  Lisinopril 20 mg b i d    Diuretic:   Lasix 40 mg daily  Reduce to 20 mg given Cr of 1 2   Educated regarding adherence to a 1 5g -2 gram sodium diet/ 2000 ml fluid restriction, daily weights and to call us if  she gains  2-3 lbs in 1 day or 5 lbs/ 1 week  Hypertension, essential  /54  On lisinopril, loop diuretic  Hyperlipidemia  On pravastatin 40 mg daily--> stop and start atorvastatin 40 mg/d   · Lipids 4/20:   [id number] Non [id number]  TSH  Slightly up, 3 794  · Lipids 8/19:  [id number]  Non   · Switch to atorvastatin 40 mg/d   Repeat lipids 8 weeks-already ordered  Diabetes mellitus type 1, with hyperglycemia  on insulin  Hemoglobin A1c 8/20: 9 3, prior 10 6   Morbid obesity  BMI 38  Former tobacco use quitting in 1994  Depression, on Paxil  Asthma mild persistent  Cardiac testing   TTE 8/7/2020  EF 65%  Grade 2 DD  Mild MR  · Stress echocardiogram:  Pending        HPI:   Kelvin Blackwell is a 41 yo female with obesity (BMI 38),  type 1 diabetes, PCOS, hypertension and hyperlipidemia  She has no known history of CAD  Recently, she presented to [place] [address] with shortness of breath, chest tightness and peripheral edema  She admitted to exertional shortness of breath and orthopnea  Reportedly gained 20 lb in the last few months  Testing for COVID-19 was negative  An echocardiogram demonstrated an ejection fraction of 65%, no regional motion abnormality and grade 2 diastolic dysfunction  She was diuresed, net -2 3 L and followed by Cardiology  She transitioned to Lasix 40 mg daily, not previously on diuretics     An outpatient stress test was recommended  If she continued to have marked dyspnea on exertion, a CTA was recommended to rule out PE  After being discharged she return to the ED August 14 complaining of shortness of breath and pleuritic pain  A CTA demonstrated no evidence of PE     8/18/2020  Hospital follow-up  She is doing well  No chest pain or shortness of breath  Blood pressure is satisfactory  Her stress test has been ordered yet not scheduled  Is active  Is a caretaker  Does not regularly exercise, but is trying to reduce calories to reduce weight  Recent labs showed a Cr of 1 2  Is diuretic naive, now on Lasix 40 mg/d  Will reduce diuretic and reassess  Discussed working on risk factors  Will intensify statin therapy  Will help her schedule the stress test  Return after ST  Discussed  Heart alexis, Low na diet  Provided education on reading food labels          Assessment  Diagnoses and all orders for this visit:    PEDRO MENDOSA discharge follow-up    Chronic diastolic HF (heart failure) ([facility])    Essential hypertension    Mixed hyperlipidemia    Type 1 diabetes mellitus with hyperglycemia ([facility])    Severe obesity (BMI 35 0-39  9) with comorbidity ([place] [address] )    Mild persistent asthma, unspecified whether complicated        Past Medical History:   Diagnosis Date    Anemia     Asthma     w/acute exacerbation  Last assessed: 10/26/12    Chest pain 2/3/2016    CHF (congestive heart failure) ([place] [address] )     Depression     Diabetes mellitus ([place] [address] )     Diabetic nephropathy ([place] [address] )     Diabetic nephropathy associated with type 1 diabetes mellitus ([place] [address] ) 8/12/2020    Diabetic retinopathy ([place] [address] ) 2/3/2016    Gastroenteritis 02/03/2016    GERD (gastroesophageal reflux disease)     HTN (hypertension)     Hyperlipidemia 2/3/2016    Oligomenorrhea     Polycystic ovaries 2/3/2016    Retinal hemorrhage 2/3/2016    Retinopathy     Thrombocytosis ([facility])     Type 1 diabetes mellitus with ophthalmic manifestation ([place] [address] ) 2/3/2016       Review of Systems   Constitution: Negative for chills  Cardiovascular: Positive for dyspnea on exertion  Negative for chest pain, claudication, cyanosis, irregular heartbeat, leg swelling, near-syncope, orthopnea, palpitations, paroxysmal nocturnal dyspnea and syncope  Respiratory: Positive for shortness of breath  Negative for cough  Gastrointestinal: Negative for heartburn and nausea  Neurological: Negative for dizziness, focal weakness, headaches, light-headedness and weakness  All other systems reviewed and are negative  No Known Allergies        Current Outpatient Medications:     acetaminophen (TYLENOL) 500 mg tablet, Take 2 tablets (1,000 mg total) by mouth every 6 (six) hours as needed for mild pain or moderate pain, Disp: 30 tablet, Rfl: 0    albuterol (PROVENTIL HFA,VENTOLIN HFA) 90 mcg/act inhaler, INHALE 2 PUFFS BY MOUTH EVERY 4 HOURS AS NEEDED FOR WHEEZING OR SHORTNESS OF BREATH, Disp: 25 5 g, Rfl: 0   Ascorbic Acid (VITAMIN C) 500 MG CAPS, Take 2 capsules by mouth daily, Disp: , Rfl:     aspirin (ECOTRIN LOW STRENGTH) 81 mg EC tablet, Take 1 tablet (81 mg total) by mouth daily, Disp: 30 tablet, Rfl: 0    Biotin 10 MG TABS, Take 1 tablet by mouth daily, Disp: , Rfl:     Butalbital-APAP-Caffeine (Fioricet) -40 MG CAPS, Take 1 tablet by mouth 4 (four) times a day as needed ([facility]), Disp: 30 capsule, Rfl: 0    cyanocobalamin (VITAMIN B-12) 100 mcg tablet, Take by mouth daily, Disp: , Rfl:     fluticasone-salmeterol (Advair Diskus) 250-50 mcg/dose inhaler, Inhale 1 puff 2 (two) times a day Rinse mouth after use , Disp: 3 Inhaler, Rfl: 3    furosemide (LASIX) 40 mg tablet, Take 1 tablet (40 mg total) by mouth daily, Disp: 30 tablet, Rfl: 0    insulin aspart (NovoLOG) 100 units/mL injection, Take 1 unit of novolog for every 1 g of carb, up to 150 units daily, Disp: 50 mL, Rfl: 6    insulin glargine (Lantus SoloStar) 100 units/mL injection pen, Inject 75 Units under the skin daily (Patient taking differently: Inject 75 Units under the skin daily at bedtime ), Disp: 10 pen, Rfl: 0    lisinopril (ZESTRIL) 20 mg tablet, TAKE 2 TABLETS BY MOUTH EVERY DAY (Patient taking differently: Take 20 mg by mouth 2 (two) times a day ), Disp: 60 tablet, Rfl: 0    montelukast (SINGULAIR) 10 mg tablet, TAKE 1 TABLET BY MOUTH AT BEDTIME, Disp: 90 tablet, Rfl: 0    omeprazole (PriLOSEC) 20 mg delayed release capsule, Take 20 mg by mouth daily  , Disp: , Rfl:     OneTouch Ultra test strip, Test 3 times daily, Disp: 300 each, Rfl: 3    PARoxetine (PAXIL) 20 mg tablet, TAKE 1 TABLET BY MOUTH EVERY DAY, Disp: 90 tablet, Rfl: 0    pravastatin (PRAVACHOL) 40 mg tablet, TAKE 1 TABLET BY MOUTH EVERY DAY, Disp: 90 tablet, Rfl: 1    Probiotic Product (ALIGN PO), Take by mouth, Disp: , Rfl:     Social History     Socioeconomic History    Marital status: /Civil Union     Spouse name: Not on file    Number of children: Not on file    Years of education: Not on file    Highest education level: Not on file   Occupational History    Not on file   Social Needs    Financial resource strain: Not on file    Food insecurity     Worry: Not on file     Inability: Not on file    Transportation needs     Medical: Not on file     Non-medical: Not on file   Tobacco Use    Smoking status: Never Smoker    Smokeless tobacco: Never Used    Tobacco comment: Per [employer]: Former smoker   Quit in 1994   Substance and Sexual Activity    Alcohol use: Not Currently     Frequency: Monthly or less     Comment: Occasional/1 mixed drink twice a month if that    Drug use: No    Sexual activity: Yes     Partners: Male     Birth control/protection: Pill   Lifestyle    Physical activity     Days per week: Not on file     Minutes per session: Not on file    Stress: Not on file   Relationships    Social connections     Talks on phone: Not on file     Gets together: Not on file     Attends Temple service: Not on file     Active member of club or organization: Not on file     Attends meetings of clubs or organizations: Not on file     Relationship status: Not on file    Intimate partner violence     Fear of current or ex partner: Not on file     Emotionally abused: Not on file     Physically abused: Not on file     Forced sexual activity: Not on file   Other Topics Concern    Not on file   Social History Narrative    Always uses seatbelt    Caffeine use: 1-2 cups coffee daily    Has smoke detectors    Rastafarian Affiliations: Nondenominational       Family History   Problem Relation Age of Onset    Heart murmur Mother     Diabetes Mother         Mellitus    Thyroid disease Mother         Disorder    Diabetes Father         Mellitus    Hypertension Father     Diabetes Maternal Grandfather         Mellitus    Breast cancer Paternal Grandmother     Diabetes Maternal Aunt         Mellitus    Diabetes Maternal Uncle         Mellitus    No Known Problems Maternal Grandmother     Leukemia Paternal Grandfather     Substance Abuse Neg Hx     Mental illness Neg Hx     Alcohol abuse Neg Hx        Physical Exam   Constitutional: She is oriented to person, place, and time  No distress  HENT:   Head: Normocephalic and atraumatic  Eyes: Conjunctivae and EOM are normal    Neck: Normal range of motion  Neck supple  Cardiovascular: Normal rate, regular rhythm, normal heart sounds and intact distal pulses  Pulmonary/Chest: Effort normal and breath sounds normal    Abdominal: Soft  Bowel sounds are normal    Musculoskeletal: Normal range of motion  Neurological: She is alert and oriented to person, place, and time  Skin: Skin is warm and dry  She is not diaphoretic  Psychiatric: She has a normal mood and affect  Nursing note and vitals reviewed  Vitals: not currently breastfeeding     Wt Readings from Last 3 Encounters:   20 78 kg (172 lb)   20 79 4 kg (175 lb)   20 79 7 kg (175 lb 11 2 oz)         Labs & Results:  Lab Results   Component Value Date    WBC 11 74 (H) 2020    HGB 12 1 2020    HCT 37 1 2020    MCV 92 2020     (H) 2020     No results found for: BNP  No components found for: CHEM    Results for orders placed during the hospital encounter of 20   Echo complete with contrast if indicated    Narrative [address] [place] [address]    Transthoracic Echocardiogram  2D, M-mode, Doppler, and Color Doppler    Study date:  07-Aug-2020    Patient: Bartolo Case  MR number: [id number]  Account number: [de-identified]  : 1975  Age: 40 years  Gender: Female  Status: Inpatient  Location: [address] [place] [address]  Height: 56 in  Weight: 178 6 lb  BP: 143/ 83 mmHg    Indications: Dyspnea, Shortness of Breath, Wheezing    Diagnoses: R06 00 - Dyspnea, unspecified, R06 02 - Shortness of breath, R06 2 - Wheezing    Sonographer:  Chris Davis [facility]  Referring Physician:  Jan Ahn MD  Group:  [facility]  Interpreting Physician:  Jak Bernard MD    SUMMARY    LEFT VENTRICLE:  Systolic function was normal  Ejection fraction was estimated to be 65 %  Although no diagnostic regional wall motion abnormality was identified, this possibility cannot be completely excluded on the basis of this study  Features were consistent with a pseudonormal left ventricular filling pattern, with concomitant abnormal relaxation and increased filling pressure (grade 2 diastolic dysfunction)  MITRAL VALVE:  There was mild annular calcification  There was mild regurgitation  AORTIC VALVE:  There was trace regurgitation  TRICUSPID VALVE:  There was trace regurgitation  PROCEDURE: The study was performed in the [address] [place] [address]  This was a routine study  The transthoracic approach was used  The study included complete 2D imaging, M-mode, complete spectral Doppler, and color Doppler  The heart rate was  90 bpm, at the start of the study  Images were obtained from the parasternal, apical, subcostal, and suprasternal notch acoustic windows  Image quality was adequate  LEFT VENTRICLE: Size was normal  Systolic function was normal  Ejection fraction was estimated to be 65 %  Although no diagnostic regional wall motion abnormality was identified, this possibility cannot be completely excluded on the basis  of this study  Wall thickness was normal  DOPPLER: Features were consistent with a pseudonormal left ventricular filling pattern, with concomitant abnormal relaxation and increased filling pressure (grade 2 diastolic dysfunction)  RIGHT VENTRICLE: The size was normal  Systolic function was normal  Wall thickness was normal     LEFT ATRIUM: Size was normal     RIGHT ATRIUM: Size was normal     MITRAL VALVE: There was mild annular calcification  Valve structure was normal  There was normal leaflet separation   DOPPLER: The transmitral velocity was within the normal range  There was no evidence for stenosis  There was mild  regurgitation  AORTIC VALVE: The valve was trileaflet  Leaflets exhibited normal thickness, normal cuspal separation, and sclerosis  DOPPLER: Transaortic velocity was within the normal range  There was no evidence for stenosis  There was trace  regurgitation  TRICUSPID VALVE: The valve structure was normal  There was normal leaflet separation  DOPPLER: The transtricuspid velocity was within the normal range  There was no evidence for stenosis  There was trace regurgitation  PULMONIC VALVE: Not well visualized  DOPPLER: The transpulmonic velocity was within the normal range  There was no significant regurgitation  PERICARDIUM: There was no pericardial effusion  The pericardium was normal in appearance  AORTA: The root exhibited normal size  SYSTEMIC VEINS: IVC: The inferior vena cava was normal in size  PULMONARY VEINS: DOPPLER: There was systolic blunting in the pulmonary vein(s)  SYSTEM MEASUREMENT TABLES    2D mode  AoR Diam (2D): 27 mm  AoR Diam; Mean (2D): 27 mm  Asc Aorta Diam (2D): 27 mm  Asc Aorta Diam; Mean (2D): 27 mm  Inferior Vena Cava Diameter; 2D mode;: 16 8 mm  Inferior Vena Cava Diameter; Mean; Mean value chosen; 2D mode;: 16 8 mm  Area; 2D mode;: 1070 mm2  Area; Mean; Mean value chosen; 2D mode;: 1070 mm2  LA Dimension (2D): 33 mm  LA Dimension; Mean (2D): 33 mm  LA/Ao (2D): 1 22  EDV (2D-Cubed): 64325 mm3  EF (2D-Cubed): 82 2 %  ESV (2D-Cubed): 9530 mm3  FS (2D-Cubed): 43 8 %  FS (2D-Teich): 43 8 %  IVS/LVPW (2D): 1 09  IVSd (2D): 9 8 mm  IVSd (2D): 11 1 mm  IVSd; Mean chosen (2D): 10 5 mm  LVIDd (2D): 36 6 mm  LVIDd (2D): 38 7 mm  LVIDd; Mean (2D): 37 7 mm  LVIDs (2D): 21 2 mm  LVIDs; Mean (2D): 21 2 mm  LVOT Area (2D): 254 mm2  LVPWd (2D): 8 87 mm  LVPWd (2D): 10 4 mm  LVPWd; Mean (2D): 9 64 mm  Left Ventricular Ejection Fraction;  Teichholz; 2D mode;: 75 7 %  Left Ventricular End Diastolic Volume; Teichholz; 2D mode;: [id number] mm3  Left Ventricular End Systolic Volume; Teichholz; 2D mode;: 86046 mm3  SI (2D-Cubed): 26 1 ml/m2  SV (2D-Cubed): 48174 mm3  Stroke Index; Teichholz; 2D mode;: 27 2 ml/m2  Stroke Volume; Teichholz; 2D mode;: 11368 mm3  RVIDd (2D): 35 5 mm  RVIDd; Mean (2D): 35 5 mm  Right and Left Ventricular End Diastolic Diameter Ratio; 2D mode;: 0 94    Apical four chamber  EF (A4C): 71 4 %  LV MOD Diam; Recent value; End Diastole (A4C): 34 3 mm  LV MOD Diam; Recent value; End Diastole (A4C): 33 2 mm  LV MOD Diam; Recent value; End Diastole (A4C): 31 5 mm  LV MOD Diam; Recent value; End Diastole (A4C): 29 4 mm  LV MOD Diam; Recent value; End Diastole (A4C): 27 2 mm  LV MOD Diam; Recent value; End Diastole (A4C): 23 8 mm  LV MOD Diam; Recent value; End Diastole (A4C): 19 9 mm  LV MOD Diam; Recent value; End Diastole (A4C): 14 3 mm  LV MOD Diam; Recent value; End Diastole (A4C): 35 5 mm  LV MOD Diam; Recent value; End Diastole (A4C): 19 9 mm  LV MOD Diam; Recent value; End Diastole (A4C): 35 3 mm  LV MOD Diam; Recent value; End Diastole (A4C): 36 mm  LV MOD Diam; Recent value; End Diastole (A4C): 37 2 mm  LV MOD Diam; Recent value; End Diastole (A4C): 38 3 mm  LV MOD Diam; Recent value; End Diastole (A4C): 38 9 mm  LV MOD Diam; Recent value; End Diastole (A4C): 39 8 mm  LV MOD Diam; Recent value; End Diastole (A4C): 40 2 mm  LV MOD Diam; Recent value; End Diastole (A4C): 38 9 mm  LV MOD Diam; Recent value; End Diastole (A4C): 40 mm  LV MOD Diam; Recent value; End Diastole (A4C): 40 2 mm  LV MOD Diam; Recent value; End Systole (A4C): 24 4 mm  LV MOD Diam; Recent value; End Systole (A4C): 23 3 mm  LV MOD Diam; Recent value; End Systole (A4C): 21 8 mm  LV MOD Diam; Recent value; End Systole (A4C): 20 9 mm  LV MOD Diam; Recent value; End Systole (A4C): 20 3 mm  LV MOD Diam; Recent value; End Systole (A4C): 19 7 mm  LV MOD Diam; Recent value;  End Systole (A4C): 19 2 mm  LV MOD Diam; Recent value; End Systole (A4C): 18 6 mm  LV MOD Diam; Recent value; End Systole (A4C): 17 5 mm  LV MOD Diam; Recent value; End Systole (A4C): 16 2 mm  LV MOD Diam; Recent value; End Systole (A4C): 14 9 mm  LV MOD Diam; Recent value; End Systole (A4C): 13 6 mm  LV MOD Diam; Recent value; End Systole (A4C): 11 5 mm  LV MOD Diam; Recent value; End Systole (A4C): 9 68 mm  LV MOD Diam; Recent value; End Systole (A4C): 6 72 mm  LV MOD Diam; Recent value; End Systole (A4C): 12 7 mm  LV MOD Diam; Recent value; End Systole (A4C): 24 1 mm  LV MOD Diam; Recent value; End Systole (A4C): 24 6 mm  LV MOD Diam; Recent value; End Systole (A4C): 24 8 mm  LV MOD Diam; Recent value; End Systole (A4C): 24 6 mm  Left Ventricle diastolic major axis; Most recent value chosen; Method of Disks, Single Plane; 2D mode; Apical four chamber;: 58 4 mm  Left Ventricle systolic major axis; Most recent value chosen; Method of Disks, Single Plane; 2D mode; Apical four chamber;: 50 9 mm  Left Ventricular Diastolic Area; Most recent value chosen; Method of Disks, Single Plane; 2D mode; Apical four chamber;: 1930 mm2  Left Ventricular End Diastolic Volume; Most recent value chosen; Method of Disks, Single Plane; 2D mode; Apical four chamber;: 89262 mm3  Left Ventricular End Systolic Volume; Most recent value chosen; Method of Disks, Single Plane; 2D mode; Apical four chamber;: 54894 mm3  Left Ventricular Systolic Area; Most recent value chosen; Method of Disks, Single Plane; 2D mode; Apical four chamber;: 949 mm2  SI (A4C): 21 8 ml/m2  SV (A4C): 62712 mm3  Right Atrium Systolic Area; End Systole; 2D mode; Apical four chamber;: 1080 mm2  Right Atrium Systolic Area; Mean; Mean value chosen; End Systole; 2D mode;  Apical four chamber;: 1080 mm2    M mode  Tricuspid Annular Plane Systolic Excursion; Mean; Mean value chosen; Tricuspid Annulus; M mode;: 17 4 mm  Tricuspid Annular Plane Systolic Excursion; Tricuspid Annulus; M mode;: 17 4 mm    Tissue Doppler Imaging  LV Peak [place] Tissue Samuel; Mean; Medial MA (TDI): 77 2 mm/s  LV Peak [place] Tissue Samuel; Medial MA (TDI): 77 2 mm/s    Unspecified Scan Mode  LVOT CV Orifice Diam; Mean: 18 mm  LVOT Diam: 18 mm  MV Peak Samuel/LV Peak Tissue Samuel E-Wave; Medial MA: 11 8  DT; Antegrade Flow: 229 ms  DT; Mean; Antegrade Flow: 229 ms  Dec [place]; Antegrade Flow: 3990 mm/s2  Dec [place]; Mean; Antegrade Flow: 3990 mm/s2  MV E/A: 1 3  MV Peak A Samuel: 720 mm/s  MV Peak A Samuel; Mean: 720 mm/s  MV Peak E Samuel; Antegrade Flow: 913 mm/s  MV Peak E Samuel; Mean; Antegrade Flow: 913 mm/s  MVA (PHT): 328 mm2  PHT: 67 ms  PHT; Mean: 67 ms    [address] Accredited Echocardiography [facility]    Prepared and electronically signed by    Bayron Alatorre MD  Signed 07-Aug-2020 16:26:36       No results found for this or any previous visit  This note was completed in part utilizing [employer] direct voice recognition software  Grammatical errors, random word insertion, spelling mistakes, and incomplete sentences may be an occasional consequence of the system secondary to software limitations, ambient noise and hardware issues  At the time of dictation, efforts were made to edit, clarify and /or correct errors  Please read the chart carefully and recognize, using context, where substitutions have occurred    If you have any questions or concerns about the context, text or information contained within the body of this dictation, please contact myself, the provider, for further clarification

## 2020-08-17 ENCOUNTER — TELEPHONE (OUTPATIENT)
Dept: FAMILY MEDICINE CLINIC | Facility: CLINIC | Age: 45
End: 2020-08-17

## 2020-08-17 NOTE — TELEPHONE ENCOUNTER
----- Message from Hussein Farfan PA-C sent at 8/12/2020  1:01 PM EDT -----  Can you obtain patients last DM eye exam - Dr Christelle Johnson [facility]) with Dr Joann De Leon  Had her diabetic eye exam in the past few months  Thanks

## 2020-08-18 ENCOUNTER — OFFICE VISIT (OUTPATIENT)
Dept: CARDIOLOGY CLINIC | Facility: CLINIC | Age: 45
End: 2020-08-18
Payer: COMMERCIAL

## 2020-08-18 VITALS
BODY MASS INDEX: 38.92 KG/M2 | HEIGHT: 56 IN | WEIGHT: 173 LBS | DIASTOLIC BLOOD PRESSURE: 54 MMHG | SYSTOLIC BLOOD PRESSURE: 114 MMHG | TEMPERATURE: 97.8 F | HEART RATE: 60 BPM

## 2020-08-18 DIAGNOSIS — E78.2 MIXED HYPERLIPIDEMIA: ICD-10-CM

## 2020-08-18 DIAGNOSIS — Z09 HOSPITAL DISCHARGE FOLLOW-UP: Primary | ICD-10-CM

## 2020-08-18 DIAGNOSIS — I50.9 ACUTE CONGESTIVE HEART FAILURE, UNSPECIFIED HEART FAILURE TYPE (HCC): ICD-10-CM

## 2020-08-18 DIAGNOSIS — I50.32 CHRONIC DIASTOLIC HF (HEART FAILURE) (HCC): ICD-10-CM

## 2020-08-18 DIAGNOSIS — J45.30 MILD PERSISTENT ASTHMA, UNSPECIFIED WHETHER COMPLICATED: ICD-10-CM

## 2020-08-18 DIAGNOSIS — E66.01 SEVERE OBESITY (BMI 35.0-39.9) WITH COMORBIDITY (HCC): ICD-10-CM

## 2020-08-18 DIAGNOSIS — E10.65 TYPE 1 DIABETES MELLITUS WITH HYPERGLYCEMIA (HCC): ICD-10-CM

## 2020-08-18 DIAGNOSIS — I10 ESSENTIAL HYPERTENSION: ICD-10-CM

## 2020-08-18 PROCEDURE — 3046F HEMOGLOBIN A1C LEVEL >9.0%: CPT | Performed by: NURSE PRACTITIONER

## 2020-08-18 PROCEDURE — 3078F DIAST BP <80 MM HG: CPT | Performed by: NURSE PRACTITIONER

## 2020-08-18 PROCEDURE — 3074F SYST BP LT 130 MM HG: CPT | Performed by: NURSE PRACTITIONER

## 2020-08-18 PROCEDURE — 3008F BODY MASS INDEX DOCD: CPT | Performed by: NURSE PRACTITIONER

## 2020-08-18 PROCEDURE — 3066F NEPHROPATHY DOC TX: CPT | Performed by: NURSE PRACTITIONER

## 2020-08-18 PROCEDURE — 1036F TOBACCO NON-USER: CPT | Performed by: NURSE PRACTITIONER

## 2020-08-18 PROCEDURE — 2026F EYE IMG VALID EVC RTNOPTHY: CPT | Performed by: NURSE PRACTITIONER

## 2020-08-18 PROCEDURE — 1111F DSCHRG MED/CURRENT MED MERGE: CPT | Performed by: NURSE PRACTITIONER

## 2020-08-18 PROCEDURE — 99215 OFFICE O/P EST HI 40 MIN: CPT | Performed by: NURSE PRACTITIONER

## 2020-08-18 RX ORDER — ATORVASTATIN CALCIUM 40 MG/1
40 TABLET, FILM COATED ORAL DAILY
Qty: 30 TABLET | Refills: 5 | Status: SHIPPED | OUTPATIENT
Start: 2020-08-18 | End: 2021-02-23 | Stop reason: SDUPTHER

## 2020-08-18 RX ORDER — FUROSEMIDE 40 MG/1
40 TABLET ORAL DAILY
Qty: 30 TABLET | Refills: 3 | Status: SHIPPED | OUTPATIENT
Start: 2020-08-18 | End: 2020-08-18 | Stop reason: SDUPTHER

## 2020-08-18 RX ORDER — FUROSEMIDE 40 MG/1
40 TABLET ORAL DAILY
Qty: 30 TABLET | Refills: 3 | Status: SHIPPED | OUTPATIENT
Start: 2020-08-18 | End: 2021-09-29 | Stop reason: SDUPTHER

## 2020-08-18 NOTE — LETTER
August 18, 2020     Harshad Sellers PA-C  [address] [place] [address], [place] [address] [address] [place] [address]    Patient: Jovita Reza   YOB: 1975   Date of Visit: 8/18/2020       Dear Dr Brenden Shi: Thank you for referring Jovita Reza to me for evaluation  Below are my notes for this consultation  If you have questions, please do not hesitate to call me  I look forward to following your patient along with you  Sincerely,        LELAND Pulido        CC: MD Sharath Lema CRNP  8/18/2020  3:41 PM  Sign when Signing Visit  Heart Failure   Follow Up Office Visit Note -     Jovita Reza   40 y o    female   MRN: [id number]  [place] [id number] [facility] [address] [place] [address],[address]  [facility] [address] [facility] [address]  [facility] [phone]    PCP: Harshad Sellers PA-C  Cardiologist :   Will be with Dr Devora Mcfarland            Summary of Recommendations  Heart healthy diet  Education provided  Low-sodium diet, Heart failure education as below  Stress echocardiogram:  Pending  Has been ordered  Aggressive risk factor control  Recent Hemoglobin A1c 9 3, prior 10 6  She may benefit from Jardiance from a CV standpoint  Following with endo  Stop pravastatin  Start atorvastatin 40 mg daily  Repeat lipids ordered  Reduce lasix to 20 mg/d  BMP 1 week  Follow up will be scheduled with Dr Devora Mcfarland 4-6 weeks, after ST        Impression/plan  PRICE- likely multifactorial, due to obesity, recent heart failure exacerbation, deconditioning  Stress test has been requested  She is on a baby aspirin, given her multiple CV risk factors  Chronic diastolic heart failure, adm 8/6-8/8/2020  & ED 8/13 SOB  Wt Readings from Last 3 Encounters:   08/18/20 78 5 kg (173 lb)   08/14/20 78 kg (172 lb)   08/12/20 79 4 kg (175 lb)      Discharge creatinine:  0 85, BUN 34 potassium 3 8   Last labs: August 14    Creatinine 1 2, BUN 55, potassium 5 4   Beta-Blocker:  None   ACEi, ARB or ARNi:  Lisinopril 20 mg b i d    Diuretic:   Lasix 40 mg daily  Reduce to 20 mg given Cr of 1 2   Educated regarding adherence to a 1 5g -2 gram sodium diet/ 2000 ml fluid restriction, daily weights and to call us if  she gains  2-3 lbs in 1 day or 5 lbs/ 1 week  Hypertension, essential  /54  On lisinopril, loop diuretic  Hyperlipidemia  On pravastatin 40 mg daily--> stop and start atorvastatin 40 mg/d   · Lipids 4/20:   [id number] Non [id number]  TSH  Slightly up, 3 794  · Lipids 8/19:  [id number]  Non   · Switch to atorvastatin 40 mg/d  Repeat lipids 8 weeks-already ordered  Diabetes mellitus type 1, with hyperglycemia  on insulin  Hemoglobin A1c 8/20: 9 3, prior 10 6   Morbid obesity  BMI 38  Former tobacco use quitting in 1994  Depression, on Paxil  Asthma mild persistent  Cardiac testing   TTE 8/7/2020  EF 65%  Grade 2 DD  Mild MR  · Stress echocardiogram:  Pending        HPI:   Antonino Jiang is a 41 yo female with obesity (BMI 38),  type 1 diabetes, PCOS, hypertension and hyperlipidemia  She has no known history of CAD  Recently, she presented to [place] [address] with shortness of breath, chest tightness and peripheral edema  She admitted to exertional shortness of breath and orthopnea  Reportedly gained 20 lb in the last few months  Testing for COVID-19 was negative  An echocardiogram demonstrated an ejection fraction of 65%, no regional motion abnormality and grade 2 diastolic dysfunction  She was diuresed, net -2 3 L and followed by Cardiology  She transitioned to Lasix 40 mg daily, not previously on diuretics     An outpatient stress test was recommended  If she continued to have marked dyspnea on exertion, a CTA was recommended to rule out PE  After being discharged she return to the ED August 14 complaining of shortness of breath and pleuritic pain  A CTA demonstrated no evidence of PE     8/18/2020  Hospital follow-up  She is doing well  No chest pain or shortness of breath    Blood pressure is satisfactory  Her stress test has been ordered yet not scheduled  Is active  Is a caretaker  Does not regularly exercise, but is trying to reduce calories to reduce weight  Recent labs showed a Cr of 1 2  Is diuretic naive, now on Lasix 40 mg/d  Will reduce diuretic and reassess  Discussed working on risk factors  Will intensify statin therapy  Will help her schedule the stress test  Return after ST  Discussed  Heart healhty, Low na diet  Provided education on reading food labels  Assessment  Diagnoses and all orders for this visit:    Hospital discharge follow-up    Chronic diastolic HF (heart failure) ([facility] )    Essential hypertension    Mixed hyperlipidemia    Type 1 diabetes mellitus with hyperglycemia (HCC)    Severe obesity (BMI 35 0-39  9) with comorbidity ([facility] )    Mild persistent asthma, unspecified whether complicated        Past Medical History:   Diagnosis Date    Anemia     Asthma     w/acute exacerbation  Last assessed: 10/26/12    Chest pain 2/3/2016    CHF (congestive heart failure) ([facility] )     Depression     Diabetes mellitus ([facility] )     Diabetic nephropathy ([facility] )     Diabetic nephropathy associated with type 1 diabetes mellitus ([facility] ) 8/12/2020    Diabetic retinopathy ([facility] ) 2/3/2016    Gastroenteritis 02/03/2016    GERD (gastroesophageal reflux disease)     HTN (hypertension)     Hyperlipidemia 2/3/2016    Oligomenorrhea     Polycystic ovaries 2/3/2016    Retinal hemorrhage 2/3/2016    Retinopathy     Thrombocytosis (HCC)     Type 1 diabetes mellitus with ophthalmic manifestation ([facility] ) 2/3/2016       Review of Systems   Constitution: Negative for chills  Cardiovascular: Positive for dyspnea on exertion  Negative for chest pain, claudication, cyanosis, irregular heartbeat, leg swelling, near-syncope, orthopnea, palpitations, paroxysmal nocturnal dyspnea and syncope  Respiratory: Positive for shortness of breath  Negative for cough      Gastrointestinal: Negative for heartburn and nausea  Neurological: Negative for dizziness, focal weakness, headaches, light-headedness and weakness  All other systems reviewed and are negative  No Known Allergies        Current Outpatient Medications:     acetaminophen (TYLENOL) 500 mg tablet, Take 2 tablets (1,000 mg total) by mouth every 6 (six) hours as needed for mild pain or moderate pain, Disp: 30 tablet, Rfl: 0    albuterol (PROVENTIL HFA,VENTOLIN HFA) 90 mcg/act inhaler, INHALE 2 PUFFS BY MOUTH EVERY 4 HOURS AS NEEDED FOR WHEEZING OR SHORTNESS OF BREATH, Disp: 25 5 g, Rfl: 0    Ascorbic Acid (VITAMIN C) 500 MG CAPS, Take 2 capsules by mouth daily, Disp: , Rfl:     aspirin (ECOTRIN LOW STRENGTH) 81 mg EC tablet, Take 1 tablet (81 mg total) by mouth daily, Disp: 30 tablet, Rfl: 0    Biotin 10 MG TABS, Take 1 tablet by mouth daily, Disp: , Rfl:     Butalbital-APAP-Caffeine (Fioricet) -40 MG CAPS, Take 1 tablet by mouth 4 (four) times a day as needed ([facility]), Disp: 30 capsule, Rfl: 0    cyanocobalamin (VITAMIN B-12) 100 mcg tablet, Take by mouth daily, Disp: , Rfl:     fluticasone-salmeterol (Advair Diskus) 250-50 mcg/dose inhaler, Inhale 1 puff 2 (two) times a day Rinse mouth after use , Disp: 3 Inhaler, Rfl: 3    furosemide (LASIX) 40 mg tablet, Take 1 tablet (40 mg total) by mouth daily, Disp: 30 tablet, Rfl: 0    insulin aspart (NovoLOG) 100 units/mL injection, Take 1 unit of novolog for every 1 g of carb, up to 150 units daily, Disp: 50 mL, Rfl: 6    insulin glargine (Lantus SoloStar) 100 units/mL injection pen, Inject 75 Units under the skin daily (Patient taking differently: Inject 75 Units under the skin daily at bedtime ), Disp: 10 pen, Rfl: 0    lisinopril (ZESTRIL) 20 mg tablet, TAKE 2 TABLETS BY MOUTH EVERY DAY (Patient taking differently: Take 20 mg by mouth 2 (two) times a day ), Disp: 60 tablet, Rfl: 0    montelukast (SINGULAIR) 10 mg tablet, TAKE 1 TABLET BY MOUTH AT BEDTIME, Disp: 90 tablet, Rfl: 0   omeprazole (PriLOSEC) 20 mg delayed release capsule, Take 20 mg by mouth daily  , Disp: , Rfl:     OneTouch Ultra test strip, Test 3 times daily, Disp: 300 each, Rfl: 3    PARoxetine (PAXIL) 20 mg tablet, TAKE 1 TABLET BY MOUTH EVERY DAY, Disp: 90 tablet, Rfl: 0    pravastatin (PRAVACHOL) 40 mg tablet, TAKE 1 TABLET BY MOUTH EVERY DAY, Disp: 90 tablet, Rfl: 1    Probiotic Product (ALIGN PO), Take by mouth, Disp: , Rfl:     Social History     Socioeconomic History    Marital status: /Civil Union     Spouse name: Not on file    Number of children: Not on file    Years of education: Not on file    Highest education level: Not on file   Occupational History    Not on file   Social Needs    Financial resource strain: Not on file    Food insecurity     Worry: Not on file     Inability: Not on file   [employer] needs     Medical: Not on file     Non-medical: Not on file   Tobacco Use    Smoking status: Never Smoker    Smokeless tobacco: Never Used    Tobacco comment: Per [employer]: Former smoker   Quit in 1994   Substance and Sexual Activity    Alcohol use: Not Currently     Frequency: Monthly or less     Comment: Occasional/1 mixed drink twice a month if that    Drug use: No    Sexual activity: Yes     Partners: Male     Birth control/protection: Pill   Lifestyle    Physical activity     Days per week: Not on file     Minutes per session: Not on file    Stress: Not on file   Relationships    Social connections     Talks on phone: Not on file     Gets together: Not on file     Attends Jehovah's witness service: Not on file     Active member of club or organization: Not on file     Attends meetings of clubs or organizations: Not on file     Relationship status: Not on file    Intimate partner violence     Fear of current or ex partner: Not on file     Emotionally abused: Not on file     Physically abused: Not on file     Forced sexual activity: Not on file   Other Topics Concern    Not on file Social History Narrative    Always uses seatbelt    Caffeine use: 1-2 cups coffee daily    Has smoke detectors    Zoroastrianism Affiliations: Jehovah's witness       Family History   Problem Relation Age of Onset    Heart murmur Mother     Diabetes Mother         Mellitus    Thyroid disease Mother         Disorder    Diabetes Father         Mellitus    Hypertension Father     Diabetes Maternal Grandfather         Mellitus    Breast cancer Paternal Grandmother     Diabetes Maternal Aunt         Mellitus    Diabetes Maternal Uncle         Mellitus    No Known Problems Maternal Grandmother     Leukemia Paternal Grandfather     Substance Abuse Neg Hx     Mental illness Neg Hx     Alcohol abuse Neg Hx        Physical Exam   Constitutional: She is oriented to person, place, and time  No distress  HENT:   Head: Normocephalic and atraumatic  Eyes: Conjunctivae and EOM are normal    Neck: Normal range of motion  Neck supple  Cardiovascular: Normal rate, regular rhythm, normal heart sounds and intact distal pulses  Pulmonary/Chest: Effort normal and breath sounds normal    Abdominal: Soft  Bowel sounds are normal    Musculoskeletal: Normal range of motion  Neurological: She is alert and oriented to person, place, and time  Skin: Skin is warm and dry  She is not diaphoretic  Psychiatric: She has a normal mood and affect  Nursing note and vitals reviewed  Vitals: not currently breastfeeding     Wt Readings from Last 3 Encounters:   08/14/20 78 kg (172 lb)   08/12/20 79 4 kg (175 lb)   08/12/20 79 7 kg (175 lb 11 2 oz)         Labs & Results:  Lab Results   Component Value Date    WBC 11 74 (H) 08/14/2020    HGB 12 1 08/14/2020    HCT 37 1 08/14/2020    MCV 92 08/14/2020     (H) 08/14/2020     No results found for: BNP  No components found for: CHEM    Results for orders placed during the hospital encounter of 08/06/20   Echo complete with contrast if indicated    Narrative [facility] [address]    Transthoracic Echocardiogram  2D, M-mode, Doppler, and Color Doppler    Study date:  07-Aug-2020    Patient: Mahogany Jara  MR number: [id number]  Account number: [de-identified]  : 1975  Age: 40 years  Gender: Female  Status: Inpatient  Location: [facility]  Height: 56 in  Weight: 178 6 lb  BP: 143/ 83 mmHg    Indications: Dyspnea, Shortness of Breath, Wheezing    Diagnoses: R06 00 - Dyspnea, unspecified, R06 02 - Shortness of breath, R06 2 - Wheezing    Sonographer:  Clare House RDCS  Referring Physician:  Allen Mclain MD  Group:  [facility]  Interpreting Physician:  Chelly Mayorga MD    SUMMARY    LEFT VENTRICLE:  Systolic function was normal  Ejection fraction was estimated to be 65 %  Although no diagnostic regional wall motion abnormality was identified, this possibility cannot be completely excluded on the basis of this study  Features were consistent with a pseudonormal left ventricular filling pattern, with concomitant abnormal relaxation and increased filling pressure (grade 2 diastolic dysfunction)  MITRAL VALVE:  There was mild annular calcification  There was mild regurgitation  AORTIC VALVE:  There was trace regurgitation  TRICUSPID VALVE:  There was trace regurgitation  PROCEDURE: The study was performed in the [facility]  This was a routine study  The transthoracic approach was used  The study included complete 2D imaging, M-mode, complete spectral Doppler, and color Doppler  The heart rate was  90 bpm, at the start of the study  Images were obtained from the parasternal, apical, subcostal, and suprasternal notch acoustic windows  Image quality was adequate  LEFT VENTRICLE: Size was normal  Systolic function was normal  Ejection fraction was estimated to be 65 %   Although no diagnostic regional wall motion abnormality was identified, this possibility cannot be completely excluded on the basis  of this study  Wall thickness was normal  DOPPLER: Features were consistent with a pseudonormal left ventricular filling pattern, with concomitant abnormal relaxation and increased filling pressure (grade 2 diastolic dysfunction)  RIGHT VENTRICLE: The size was normal  Systolic function was normal  Wall thickness was normal     LEFT ATRIUM: Size was normal     RIGHT ATRIUM: Size was normal     MITRAL VALVE: There was mild annular calcification  Valve structure was normal  There was normal leaflet separation  DOPPLER: The transmitral velocity was within the normal range  There was no evidence for stenosis  There was mild  regurgitation  AORTIC VALVE: The valve was trileaflet  Leaflets exhibited normal thickness, normal cuspal separation, and sclerosis  DOPPLER: Transaortic velocity was within the normal range  There was no evidence for stenosis  There was trace  regurgitation  TRICUSPID VALVE: The valve structure was normal  There was normal leaflet separation  DOPPLER: The transtricuspid velocity was within the normal range  There was no evidence for stenosis  There was trace regurgitation  PULMONIC VALVE: Not well visualized  DOPPLER: The transpulmonic velocity was within the normal range  There was no significant regurgitation  PERICARDIUM: There was no pericardial effusion  The pericardium was normal in appearance  AORTA: The root exhibited normal size  SYSTEMIC VEINS: IVC: The inferior vena cava was normal in size  PULMONARY VEINS: DOPPLER: There was systolic blunting in the pulmonary vein(s)      SYSTEM MEASUREMENT TABLES    2D mode  AoR Diam (2D): 27 mm  AoR Diam; Mean (2D): 27 mm  Asc Aorta Diam (2D): 27 mm  Asc Aorta Diam; Mean (2D): 27 mm  Inferior Vena Cava Diameter; 2D mode;: 16 8 mm  Inferior Vena Cava Diameter; Mean; Mean value chosen; 2D mode;: 16 8 mm  Area; 2D mode;: 1070 mm2  Area; Mean; Mean value chosen; 2D mode;: 1070 mm2  LA Dimension (2D): 33 mm  LA Dimension; Mean (2D): 33 mm  LA/Ao (2D): 1 22  EDV (2D-Cubed): 25426 mm3  EF (2D-Cubed): 82 2 %  ESV (2D-Cubed): 9530 mm3  FS (2D-Cubed): 43 8 %  FS (2D-Teich): 43 8 %  IVS/LVPW (2D): 1 09  IVSd (2D): 9 8 mm  IVSd (2D): 11 1 mm  IVSd; Mean chosen (2D): 10 5 mm  LVIDd (2D): 36 6 mm  LVIDd (2D): 38 7 mm  LVIDd; Mean (2D): 37 7 mm  LVIDs (2D): 21 2 mm  LVIDs; Mean (2D): 21 2 mm  LVOT Area (2D): 254 mm2  LVPWd (2D): 8 87 mm  LVPWd (2D): 10 4 mm  LVPWd; Mean (2D): 9 64 mm  Left Ventricular Ejection Fraction; Teichholz; 2D mode;: 75 7 %  Left Ventricular End Diastolic Volume; Teichholz; 2D mode;: [id number] mm3  Left Ventricular End Systolic Volume; Teichholz; 2D mode;: 74745 mm3  SI (2D-Cubed): 26 1 ml/m2  SV (2D-Cubed): 76530 mm3  Stroke Index; Teichholz; 2D mode;: 27 2 ml/m2  Stroke Volume; Teichholz; 2D mode;: 36876 mm3  RVIDd (2D): 35 5 mm  RVIDd; Mean (2D): 35 5 mm  Right and Left Ventricular End Diastolic Diameter Ratio; 2D mode;: 0 94    Apical four chamber  EF (A4C): 71 4 %  LV MOD Diam; Recent value; End Diastole (A4C): 34 3 mm  LV MOD Diam; Recent value; End Diastole (A4C): 33 2 mm  LV MOD Diam; Recent value; End Diastole (A4C): 31 5 mm  LV MOD Diam; Recent value; End Diastole (A4C): 29 4 mm  LV MOD Diam; Recent value; End Diastole (A4C): 27 2 mm  LV MOD Diam; Recent value; End Diastole (A4C): 23 8 mm  LV MOD Diam; Recent value; End Diastole (A4C): 19 9 mm  LV MOD Diam; Recent value; End Diastole (A4C): 14 3 mm  LV MOD Diam; Recent value; End Diastole (A4C): 35 5 mm  LV MOD Diam; Recent value; End Diastole (A4C): 19 9 mm  LV MOD Diam; Recent value; End Diastole (A4C): 35 3 mm  LV MOD Diam; Recent value; End Diastole (A4C): 36 mm  LV MOD Diam; Recent value; End Diastole (A4C): 37 2 mm  LV MOD Diam; Recent value; End Diastole (A4C): 38 3 mm  LV MOD Diam; Recent value; End Diastole (A4C): 38 9 mm  LV MOD Diam; Recent value; End Diastole (A4C): 39 8 mm  LV MOD Diam; Recent value;  End Diastole (A4C): 40 2 mm  LV MOD Diam; Recent value; End Diastole (A4C): 38 9 mm  LV MOD Diam; Recent value; End Diastole (A4C): 40 mm  LV MOD Diam; Recent value; End Diastole (A4C): 40 2 mm  LV MOD Diam; Recent value; End Systole (A4C): 24 4 mm  LV MOD Diam; Recent value; End Systole (A4C): 23 3 mm  LV MOD Diam; Recent value; End Systole (A4C): 21 8 mm  LV MOD Diam; Recent value; End Systole (A4C): 20 9 mm  LV MOD Diam; Recent value; End Systole (A4C): 20 3 mm  LV MOD Diam; Recent value; End Systole (A4C): 19 7 mm  LV MOD Diam; Recent value; End Systole (A4C): 19 2 mm  LV MOD Diam; Recent value; End Systole (A4C): 18 6 mm  LV MOD Diam; Recent value; End Systole (A4C): 17 5 mm  LV MOD Diam; Recent value; End Systole (A4C): 16 2 mm  LV MOD Diam; Recent value; End Systole (A4C): 14 9 mm  LV MOD Diam; Recent value; End Systole (A4C): 13 6 mm  LV MOD Diam; Recent value; End Systole (A4C): 11 5 mm  LV MOD Diam; Recent value; End Systole (A4C): 9 68 mm  LV MOD Diam; Recent value; End Systole (A4C): 6 72 mm  LV MOD Diam; Recent value; End Systole (A4C): 12 7 mm  LV MOD Diam; Recent value; End Systole (A4C): 24 1 mm  LV MOD Diam; Recent value; End Systole (A4C): 24 6 mm  LV MOD Diam; Recent value; End Systole (A4C): 24 8 mm  LV MOD Diam; Recent value; End Systole (A4C): 24 6 mm  Left Ventricle diastolic major axis; Most recent value chosen; Method of Disks, Single Plane; 2D mode; Apical four chamber;: 58 4 mm  Left Ventricle systolic major axis; Most recent value chosen; Method of Disks, Single Plane; 2D mode; Apical four chamber;: 50 9 mm  Left Ventricular Diastolic Area; Most recent value chosen; Method of Disks, Single Plane; 2D mode; Apical four chamber;: 1930 mm2  Left Ventricular End Diastolic Volume; Most recent value chosen; Method of Disks, Single Plane; 2D mode; Apical four chamber;: 29282 mm3  Left Ventricular End Systolic Volume; Most recent value chosen; Method of Disks, Single Plane; 2D mode;  Apical four chamber;: 48293 mm3  Left Ventricular Systolic Area; Most recent value chosen; Method of Disks, Single Plane; 2D mode; Apical four chamber;: 949 mm2  SI (A4C): 21 8 ml/m2  SV (A4C): 53298 mm3  Right Atrium Systolic Area; End Systole; 2D mode; Apical four chamber;: 1080 mm2  Right Atrium Systolic Area; Mean; Mean value chosen; End Systole; 2D mode; Apical four chamber;: 1080 mm2    M mode  Tricuspid Annular Plane Systolic Excursion; Mean; Mean value chosen; Tricuspid Annulus; M mode;: 17 4 mm  Tricuspid Annular Plane Systolic Excursion; Tricuspid Annulus; M mode;: 17 4 mm    Tissue Doppler Imaging  LV [place] Tissue Samuel; Mean; Medial MA (TDI): 77 2 mm/s  LV [place] Tissue Samuel; Medial MA (TDI): 77 2 mm/s    Unspecified Scan Mode  LVOT CV Orifice Diam; Mean: 18 mm  LVOT Diam: 18 mm  MV Peak Samuel/LV Peak Tissue Samuel E-Wave; Medial MA: 11 8  DT; Antegrade Flow: 229 ms  DT; Mean; Antegrade Flow: 229 ms  Dec [place]; Antegrade Flow: 3990 mm/s2  Dec [place]; Mean; Antegrade Flow: 3990 mm/s2  MV E/A: 1 3  MV Peak A Samuel: 720 mm/s  MV Peak A Samuel; Mean: 720 mm/s  MV Peak E Samuel; Antegrade Flow: 913 mm/s  MV Peak E Samuel; Mean; Antegrade Flow: 913 mm/s  MVA (PHT): 328 mm2  PHT: 67 ms  PHT; Mean: 67 ms    [address] [facility]redited Echocardi[facility]    Prepared and electronically signed by    Angela Adams MD  Signed 07-Aug-2020 16:26:36       No results found for this or any previous visit  This note was completed in part utilizing [employer] direct voice recognition software  Grammatical errors, random word insertion, spelling mistakes, and incomplete sentences may be an occasional consequence of the system secondary to software limitations, ambient noise and hardware issues  At the time of dictation, efforts were made to edit, clarify and /or correct errors  Please read the chart carefully and recognize, using context, where substitutions have occurred    If you have any questions or concerns about the context, text or information contained within the body of this dictation, please contact myself, the provider, for further clarification

## 2020-08-18 NOTE — PATIENT INSTRUCTIONS
DASH Eating Plan   WHAT YOU NEED TO KNOW:   The DASH (Dietary Approaches to Stop Hypertension) Eating Plan is designed to help prevent or lower high blood pressure  It can also help to lower LDL (bad) cholesterol and decrease your risk of heart disease  The plan is low in sodium, sugar, unhealthy fats, and total fat  It is high in potassium, calcium, magnesium, and fiber  These nutrients are added when you eat more fruits, vegetables, and whole grains  DISCHARGE INSTRUCTIONS:   Your sodium limit each day: Your dietitian will tell you how much sodium is safe for you to have each day  People with high blood pressure should have no more than 1,500 to 2,300 mg of sodium in a day  A teaspoon (tsp) of salt has 2,300 mg of sodium  This may seem like a difficult goal, but small changes to the foods you eat can make a big difference  Your healthcare provider or dietitian can help you create a meal plan that follows your sodium limit  How to limit sodium:   · Read food labels  Food labels can help you choose foods that are low in sodium  The amount of sodium is listed in milligrams (mg)  The % Daily Value (DV) column tells you how much of your daily needs are met by 1 serving of the food for each nutrient listed  Choose foods that have less than 5% of the DV of sodium  These foods are considered low in sodium  Foods that have 20% or more of the DV of sodium are considered high in sodium  Avoid foods that have more than 300 mg of sodium in each serving  Choose foods that say low-sodium, reduced-sodium, or no salt added on the food label  · Avoid salt  Do not salt food at the table, and add very little salt to foods during cooking  Use herbs and spices, such as onions, garlic, and salt-free seasonings to add flavor to foods  Try lemon or lime juice or vinegar to give foods a tart flavor  Use hot peppers or a small amount of hot pepper sauce to add a spicy flavor to foods  · Ask about salt substitutes    Ask your healthcare provider if you may use salt substitutes  Some salt substitutes have ingredients that can be harmful if you have certain health conditions  · Choose foods carefully at restaurants  Meals from restaurants, especially fast food restaurants, are often high in sodium  Some restaurants have nutrition information that tells you the amount of sodium in their foods  Ask to have your food prepared with less, or no salt  What you need to know about fats:   · Include healthy fats  Examples are unsaturated fats and omega-3 fatty acids  Unsaturated fats are found in soybean, canola, olive, or sunflower oil, and liquid and soft tub margarines  Omega-3 fatty acids are found in fatty fish, such as salmon, tuna, mackerel, and sardines  It is also found in flaxseed oil and ground flaxseed  · Avoid unhealthy fats  Do not eat unhealthy fats, such as saturated fats and trans fats  Saturated fats are found in foods that contain fat from animals  Examples are fatty meats, whole milk, butter, cream, and other dairy foods  It is also found in shortening, stick margarine, palm oil, and coconut oil  Trans fats are found in fried foods, crackers, chips, and baked goods made with margarine or shortening  Foods to include: With the DASH eating plan, you need to eat a certain number of servings from each food group  This will help you get enough of certain nutrients and limit others  The amount of servings you should eat depends on how many calories you need  Your dietitian can tell you how many calories you need  The number of servings listed next to the food groups below are for people who need about 2,000 calories each day    · Grains:  6 to 8 servings (3 of these servings should be whole-grain foods)    ¨ 1 slice of whole-grain bread     ¨ 1 ounce of dry cereal    ¨ ½ cup of cooked cereal, pasta, or brown rice    · Vegetables and fruits:  4 to 5 servings of fruits and 4 to 5 servings of vegetables    ¨ 1 medium fruit    ¨ ½ cup of frozen, canned (no added salt), or chopped fresh vegetables     ¨ ½ cup of fresh, frozen, dried, or canned fruit (canned in light syrup or fruit juice)    ¨ ½ cup of vegetable or fruit juice    · Dairy:  2 to 3 servings    ¨ 1 cup of nonfat (skim) or 1% milk    ¨ 1½ ounces of fat-free or low-fat cheese    ¨ 6 ounces of nonfat or low-fat yogurt    · Lean meat, poultry, and fish:  6 ounces or less    [employer] (chicken, turkey) with no skin    ¨ Fish (especially fatty fish, such as salmon, fresh tuna, or mackerel)    ¨ Lean beef and pork (loin, round, extra lean hamburger)    ¨ Egg whites and egg substitutes    · Nuts, seeds, and legumes:  4 to 5 servings each week    ¨ ½ cup of cooked beans and peas    ¨ 1½ ounces of unsalted nuts    ¨ 2 tablespoons of peanut butter or seeds    · Sweets and added sugars:  5 or less each week    ¨ 1 tablespoon of sugar, jelly, or jam    ¨ ½ cup of sorbet or gelatin    ¨ 1 cup of lemonade    · Fats:  2 to 3 servings each week    ¨ 1 teaspoon of soft margarine or vegetable oil    ¨ 1 tablespoon of mayonnaise    ¨ 2 tablespoons of salad dressing  Foods to avoid:   · Grains:      [employer], such as doughnuts, pastries, cookies, and biscuits (high in fat and sugar)    ¨ Mixes for cornbread and biscuits, packaged foods, such as bread stuffing, rice and pasta mixes, macaroni and cheese, and instant cereals (high in sodium)    · Fruits and vegetables:      ¨ Regular, canned vegetables (high in sodium)    ¨ Sauerkraut, pickled vegetables, and other foods prepared in brine (high in sodium)    ¨ Fried vegetables or vegetables in butter or high-fat sauces    ¨ Fruit in cream or butter sauce (high in fat)    · Dairy:      ¨ Whole milk, 2% milk, and cream (high in fat)    ¨ Regular cheese and processed cheese (high in fat and sodium)    · Meats and protein foods:      ¨ Smoked or cured meat, such as corned beef, grewal, ham, hot dogs, and sausage (high in fat and sodium)    ¨ Canned beans and canned meats or spreads, such as potted meats, sardines, anchovies, and imitation seafood (high in sodium)    ¨ Deli or lunch meats, such as bologna, ham, turkey, and roast beef (high in sodium)    ¨ High-fat meat (T-bone steak, regular hamburger, and ribs)    ¨ Whole eggs and egg yolks (high in fat)    · Other:      ¨ Seasonings made with salt, such as garlic salt, celery salt, onion salt, seasoned salt, meat tenderizers, and monosodium glutamate (MSG)    ¨ Miso soup and canned or dried soup mixes (high in sodium)    ¨ Regular soy sauce, barbecue sauce, teriyaki sauce, steak sauce, Worcestershire sauce, and most flavored vinegars (high in sodium)    ¨ Regular condiments, such as mustard, ketchup, and salad dressings (high in sodium)    ¨ Gravy and sauces, such as Anthony or cheese sauces (high in sodium and fat)    ¨ Drinks high in sugar, such as soda or fruit drinks    [employer] foods, such as salted chips, popcorn, pretzels, pork rinds, salted crackers, and salted nuts    ¨ Frozen foods, such as dinners, entrees, vegetables with sauces, and breaded meats (high in sodium)  Other guidelines to follow:   · Maintain a healthy weight  Your risk for heart disease is higher if you are overweight  Your healthcare provider may suggest that you lose weight if you are overweight  You can lose weight by eating fewer calories and foods that have added sugars and fat  The DASH meal plan can help you do this  Decrease calories by eating smaller portions at each meal and fewer snacks  Ask your healthcare provider for more information about how to lose weight  · Exercise regularly  Regular exercise can help you reach or maintain a healthy weight  Regular exercise can also help decrease your blood pressure and improve your cholesterol levels  Get 30 minutes or more of moderate exercise each day of the week  To lose weight, get at least 60 minutes of exercise   Talk to your healthcare provider about the best exercise program for you  · Limit alcohol  Women should limit alcohol to 1 drink a day  Men should limit alcohol to 2 drinks a day  A drink of alcohol is 12 ounces of beer, 5 ounces of wine, or 1½ ounces of liquor  © 2017 [facility] Information is for End User's use only and may not be sold, redistributed or otherwise used for commercial purposes  All illustrations and images included in CareNotes® are the copyrighted property of A D A M , Inc  or Christoph Ram  The above information is an  only  It is not intended as medical advice for individual conditions or treatments  Talk to your doctor, nurse or pharmacist before following any medical regimen to see if it is safe and effective for you

## 2020-08-19 NOTE — ED PROVIDER NOTES
History  Chief Complaint   Patient presents with    Shortness of Breath     pt recently diagnosed with CHF and this am began to feel SOB again     HPI     40 yof with hx of grade 2 diastolic chf, CKD, type 1 DM, recently discharged from this hospital two weeks prior for volume overload, started on lasix  Reports for the past few days dyspnea, intermittent, worse with exertion  mild chest pain when breathing  No weight gain  No LE edema  Denies cough f/c abd pain diaphoresis  No a/e or modfying factors otherwise  Exam reveals slight tachycardia, appears sinus, normal vitals otherwise  Clear lungs  No LE edema  A/P: dyspnea, on chart review the patient had an elevated ddimer recently but no CTA recently  Plan cta pe if cxr is clear, basic labs, bnp, reassess for dispo  Prior to Admission Medications   Prescriptions Last Dose Informant Patient Reported? Taking?    Ascorbic Acid (VITAMIN C) 500 MG CAPS  Self Yes No   Sig: Take 2 capsules by mouth daily   Biotin 10 MG TABS  Self Yes No   Sig: Take 1 tablet by mouth daily   Butalbital-APAP-Caffeine (Fioricet) -40 MG CAPS  Self No No   Sig: Take 1 tablet by mouth 4 (four) times a day as needed (headahce)   OneTouch Ultra test strip  Self No No   Sig: Test 3 times daily   PARoxetine (PAXIL) 20 mg tablet  Self No No   Sig: TAKE 1 TABLET BY MOUTH EVERY DAY   Probiotic Product (ALIGN PO)  Self Yes No   Sig: Take by mouth   acetaminophen (TYLENOL) 500 mg tablet  Self No No   Sig: Take 2 tablets (1,000 mg total) by mouth every 6 (six) hours as needed for mild pain or moderate pain   albuterol (PROVENTIL HFA,VENTOLIN HFA) 90 mcg/act inhaler  Self No No   Sig: INHALE 2 PUFFS BY MOUTH EVERY 4 HOURS AS NEEDED FOR WHEEZING OR SHORTNESS OF BREATH   aspirin (ECOTRIN LOW STRENGTH) 81 mg EC tablet  Self No No   Sig: Take 1 tablet (81 mg total) by mouth daily   cyanocobalamin (VITAMIN B-12) 100 mcg tablet  Self Yes No   Sig: Take by mouth daily   fluticasone-salmeterol (Advair Diskus) 250-50 mcg/dose inhaler  Self No No   Sig: Inhale 1 puff 2 (two) times a day Rinse mouth after use  insulin aspart (NovoLOG) 100 units/mL injection   No No   Sig: Take 1 unit of novolog for every 1 g of carb, up to 150 units daily   insulin glargine (Lantus SoloStar) 100 units/mL injection pen  Self No No   Sig: Inject 75 Units under the skin daily   Patient taking differently: Inject 75 Units under the skin daily at bedtime    lisinopril (ZESTRIL) 20 mg tablet  Self No No   Sig: TAKE 2 TABLETS BY MOUTH EVERY DAY   Patient taking differently: Take 20 mg by mouth 2 (two) times a day    montelukast (SINGULAIR) 10 mg tablet  Self No No   Sig: TAKE 1 TABLET BY MOUTH AT BEDTIME   omeprazole (PriLOSEC) 20 mg delayed release capsule  Self Yes No   Sig: Take 20 mg by mouth daily  Facility-Administered Medications: None       Past Medical History:   Diagnosis Date    Anemia     Asthma     w/acute exacerbation   Last assessed: 10/26/12    Chest pain 2/3/2016    [facility]     Depression     Diabetes mellitus [facility]     Diabetic nephropathy [facility]     Diabetic nephropathy associated with type 1 diabetes mellitus ([facility] ) 8/12/2020    Diabetic retinopathy ([facility] ) 2/3/2016    Gastroenteritis 02/03/2016    GERD (gastroesophageal reflux disease)     HTN (hypertension)     Hyperlipidemia 2/3/2016    Oligomenorrhea     Polycystic ovaries 2/3/2016    Retinal hemorrhage 2/3/2016    Retinopathy     Thrombocytosis (HCC)     Type [facility] 2/3/2016       Past Surgical History:   Procedure Laterality Date    CATARACT EXTRACTION Bilateral     RETINAL LASER PROCEDURE         Family History   Problem Relation Age of Onset    Heart murmur Mother     Diabetes Mother         Mellitus    Thyroid disease Mother         Disorder    Diabetes Father         Mellitus    Hypertension Father     Diabetes Maternal Grandfather Mellitus    Breast cancer Paternal Grandmother     Diabetes Maternal Aunt         Mellitus    Diabetes Maternal Uncle         Mellitus    No Known Problems Maternal Grandmother     Leukemia Paternal Grandfather     Substance Abuse Neg Hx     Mental illness Neg Hx     Alcohol abuse Neg Hx      I have reviewed and agree with the history as documented  E-Cigarette/Vaping    E-Cigarette Use Never User      E-Cigarette/Vaping Substances     Social History     Tobacco Use    Smoking status: Never Smoker    Smokeless tobacco: Never Used    Tobacco comment: Per [employer]: Former smoker  Quit in 1994   Substance Use Topics    Alcohol use: Not Currently     Frequency: Monthly or less     Comment: Occasional/1 mixed drink twice a month if that    Drug use: No       Review of Systems   Constitutional: Negative for chills, fatigue and fever  Eyes: Negative for photophobia and visual disturbance  Respiratory: Positive for shortness of breath  Negative for cough  Cardiovascular: Negative for chest pain, palpitations and leg swelling  Gastrointestinal: Negative for diarrhea, nausea and vomiting  Endocrine: Negative for polydipsia and polyuria  Genitourinary: Negative for decreased urine volume, difficulty urinating, dysuria and frequency  Musculoskeletal: Negative for back pain, neck pain and neck stiffness  Skin: Negative for color change and rash  Allergic/Immunologic: Negative for environmental allergies and immunocompromised state  Neurological: Negative for dizziness and headaches  Hematological: Negative for adenopathy  Does not bruise/bleed easily  Psychiatric/Behavioral: Negative for dysphoric mood  The patient is not nervous/anxious  Physical Exam  Physical Exam  Vitals signs and nursing note reviewed  Constitutional:       General: She is not in acute distress  Appearance: She is well-developed  She is not diaphoretic  HENT:      Head: Normocephalic and atraumatic  Nose: Nose normal    Eyes:      General: No scleral icterus  Conjunctiva/sclera: Conjunctivae normal       Pupils: Pupils are equal, round, and reactive to light  Neck:      Musculoskeletal: Normal range of motion and neck supple  Thyroid: No thyromegaly  Vascular: No JVD  Trachea: No tracheal deviation  Cardiovascular:      Rate and Rhythm: Normal rate and regular rhythm  Heart sounds: Normal heart sounds  No friction rub  No gallop  Pulmonary:      Effort: Pulmonary effort is normal  No respiratory distress  Breath sounds: Normal breath sounds  No wheezing or rales  Chest:      Chest wall: No tenderness  Abdominal:      General: Bowel sounds are normal  There is no distension  Palpations: Abdomen is soft  There is no mass  Tenderness: There is no abdominal tenderness  There is no guarding or rebound  Hernia: No hernia is present  Musculoskeletal: Normal range of motion  General: No tenderness or deformity  Skin:     General: Skin is warm and dry  Findings: No erythema  Neurological:      Mental Status: She is alert and oriented to person, place, and time  Cranial Nerves: No cranial nerve deficit  Coordination: Coordination normal       Deep Tendon Reflexes: Reflexes are normal and symmetric     Psychiatric:         Behavior: Behavior normal          Vital Signs  ED Triage Vitals [08/14/20 1923]   Temperature Pulse Respirations Blood Pressure SpO2   (!) 97 4 °F (36 3 °C) (!) 107 20 141/63 100 %      Temp Source Heart Rate Source Patient Position - Orthostatic VS BP Location FiO2 (%)   Temporal Monitor Sitting Right arm --      Pain Score       --           Vitals:    08/14/20 1923 08/14/20 2000 08/14/20 2100 08/14/20 2200   BP: 141/63 142/60 138/64 138/60   Pulse: (!) 107 95 92 93   Patient Position - Orthostatic VS: Sitting            Visual Acuity      ED Medications  Medications   iohexol (OMNIPAQUE) 350 MG/ML injection (MULTI-DOSE) 100 mL (85 mL Intravenous Given 8/14/20 2111)       Diagnostic Studies  Results Reviewed     Procedure Component Value Units Date/Time    CBC and differential [[id number]]  (Abnormal) Collected:  08/14/20 1944    Lab Status:  Final result Specimen:  Blood from Arm, Right Updated:  08/14/20 2121     WBC 11 74 Thousand/uL      RBC 4 03 Million/uL      Hemoglobin 12 1 g/dL      Hematocrit 37 1 %      MCV 92 fL      MCH 30 0 pg      MCHC 32 6 g/dL      RDW 12 8 %      MPV 9 9 fL      Platelets 788 Thousands/uL      nRBC 0 /100 WBCs      Neutrophils Relative 66 %      Immat GRANS % 0 %      Lymphocytes Relative 23 %      Monocytes Relative 6 %      Eosinophils Relative 4 %      Basophils Relative 1 %      Neutrophils Absolute 7 75 Thousands/µL      Immature Grans Absolute 0 05 Thousand/uL      Lymphocytes Absolute 2 67 Thousands/µL      Monocytes Absolute 0 72 Thousand/µL      Eosinophils Absolute 0 47 Thousand/µL      Basophils Absolute 0 08 Thousands/µL     NT-BNP PRO [[id number]]  (Normal) Collected:  08/14/20 1944    Lab Status:  Final result Specimen:  Blood from Arm, Right Updated:  08/14/20 2017     NT-proBNP 50 pg/mL     Troponin I [[id number]]  (Normal) Collected:  08/14/20 1944    Lab Status:  Final result Specimen:  Blood from Arm, Right Updated:  08/14/20 2012     Troponin I <0 02 ng/mL     Comprehensive metabolic panel [[id number]]  (Abnormal) Collected:  08/14/20 1944    Lab Status:  Final result Specimen:  Blood from Arm, Right Updated:  08/14/20 2010     Sodium 136 mmol/L      Potassium 5 4 mmol/L      Chloride 101 mmol/L      CO2 25 mmol/L      ANION GAP 10 mmol/L      BUN 55 mg/dL      Creatinine 1 20 mg/dL      Glucose 136 mg/dL      Calcium 9 2 mg/dL      AST 13 U/L      ALT 27 U/L      Alkaline Phosphatase 129 U/L      Total Protein 7 9 g/dL      Albumin 3 5 g/dL      Total Bilirubin 0 20 mg/dL      eGFR 55 ml/min/1 73sq m     Narrative:       [place] guidelines for Chronic Kidney Disease (CKD):     Stage 1 with normal or high GFR (GFR > 90 mL/min/1 73 square meters)    Stage 2 Mild CKD (GFR = 60-89 mL/min/1 73 square meters)    Stage 3A Moderate CKD (GFR = 45-59 mL/min/1 73 square meters)    Stage 3B Moderate CKD (GFR = 30-44 mL/min/1 73 square meters)    Stage 4 Severe CKD (GFR = 15-29 mL/min/1 73 square meters)    Stage 5 End Stage CKD (GFR <15 mL/min/1 73 square meters)  Note: GFR calculation is accurate only with a steady state creatinine                 CTA ED chest PE Study   Final Result by Mellissa Jacobs MD (08/14 2201)      No findings to suggest acute pulmonary embolus, thoracic aortic aneurysm or dissection  No acute cardiopulmonary process  Workstation performed: [id number]         XR chest 1 view portable   ED Interpretation by Yanely Ramirez DO (08/14 2041)   Interpreted by myself, no acute cardiopulmonary abnormality noted  Final Result by Cheyenne Cummings MD (50/89 6812)      No acute cardiopulmonary disease  Workstation performed: [id number]                    Procedures  Procedures         ED Course       US AUDIT      Most Recent Value   Initial Alcohol Screen: US AUDIT-C    1  How often do you have a drink containing alcohol?  0 Filed at: 08/14/2020 1949   2  How many drinks containing alcohol do you have on a typical day you are drinking? 0 Filed at: 08/14/2020 1949   3a  Male UNDER 65: How often do you have five or more drinks on one occasion? 0 Filed at: 08/14/2020 1949   3b  FEMALE Any Age, or MALE 65+: How often do you have 4 or more drinks on one occassion? 0 Filed at: 08/14/2020 1949   Audit-C Score  0 Filed at: 08/14/2020 1949                  MITALI/DAST-10      Most Recent Value   How many times in the past year have you    Used an illegal drug or used a prescription medication for non-medical reasons?   Never Filed at: 08/14/2020 1949                                MDM  Number of Diagnoses or Management Options  Dyspnea: new and requires workup  Diagnosis management comments: CTA PE negative  No evidence of volume overload  Reassruance, follow up with cards       Amount and/or Complexity of Data Reviewed  Clinical lab tests: reviewed and ordered  Tests in the radiology section of CPT®: reviewed and ordered  Tests in the medicine section of CPT®: reviewed and ordered  Independent visualization of images, tracings, or specimens: yes    Patient Progress  Patient progress: stable        Disposition  Final diagnoses:   Dyspnea     Time reflects when diagnosis was documented in both MDM as applicable and the Disposition within this note     Time User Action Codes Description Comment    8/14/2020 10:04 PM Woody Greenberg Add [R06 00] Dyspnea       ED Disposition     ED Disposition Condition Date/Time Comment    Discharge Stable Fri Aug 14, 2020 10:03 PM Marianne Valdes discharge to home/self care              Follow-up Information     Follow up With Specialties Details Why Contact Info    Maribel Malik PA-C Family Medicine, Physician Assistant Schedule an appointment as soon as possible for a visit  As needed [address] [place] [address]  [phone]            Discharge Medication List as of 8/14/2020 10:05 PM      CONTINUE these medications which have NOT CHANGED    Details   acetaminophen (TYLENOL) 500 mg tablet Take 2 tablets (1,000 mg total) by mouth every 6 (six) hours as needed for mild pain or moderate pain, Starting Fri 9/27/2019, OTC      albuterol (PROVENTIL HFA,VENTOLIN HFA) 90 mcg/act inhaler INHALE 2 PUFFS BY MOUTH EVERY 4 HOURS AS NEEDED FOR WHEEZING OR SHORTNESS OF BREATH, Normal      Ascorbic Acid (VITAMIN C) 500 MG CAPS Take 2 capsules by mouth daily, Historical Med      aspirin (ECOTRIN LOW STRENGTH) 81 mg EC tablet Take 1 tablet (81 mg total) by mouth daily, Starting Sun 8/9/2020, Until Tue 9/8/2020, Normal      Biotin 10 MG TABS Take 1 tablet by mouth daily, Historical Med      Butalbital-APAP-Caffeine (Fioricet) -40 MG CAPS Take 1 tablet by mouth 4 (four) times a day as needed (headahce), Starting Mon 7/6/2020, Normal      cyanocobalamin (VITAMIN B-12) 100 mcg tablet Take by mouth daily, Historical Med      fluticasone-salmeterol (Advair Diskus) 250-50 mcg/dose inhaler Inhale 1 puff 2 (two) times a day Rinse mouth after use , Starting Fri 3/27/2020, Normal      insulin aspart (NovoLOG) 100 units/mL injection Take 1 unit of novolog for every 1 g of carb, up to 150 units daily, Normal      insulin glargine (Lantus SoloStar) 100 units/mL injection pen Inject 75 Units under the skin daily, Starting Tue 7/21/2020, No Print      lisinopril (ZESTRIL) 20 mg tablet TAKE 2 TABLETS BY MOUTH EVERY DAY, Normal      montelukast (SINGULAIR) 10 mg tablet TAKE 1 TABLET BY MOUTH AT BEDTIME, Normal      omeprazole (PriLOSEC) 20 mg delayed release capsule Take 20 mg by mouth daily  , Historical Med      OneTouch Ultra test strip Test 3 times daily, Normal      PARoxetine (PAXIL) 20 mg tablet TAKE 1 TABLET BY MOUTH EVERY DAY, Normal      Probiotic Product (ALIGN PO) Take by mouth, Starting Mon 6/29/2015, Historical Med      furosemide (LASIX) 40 mg tablet Take 1 tablet (40 mg total) by mouth daily, Starting Sat 8/8/2020, Until Mon 9/7/2020, Normal      pravastatin (PRAVACHOL) 40 mg tablet TAKE 1 TABLET BY MOUTH EVERY DAY, Normal           No discharge procedures on file      PDMP Review       Value Time User    PDMP Reviewed  Yes 8/8/2020  8:38 AM Herlinda Sosa MD          ED Provider  Electronically Signed by           Mattie Marr DO  08/18/20 1455

## 2020-08-23 DIAGNOSIS — I10 ESSENTIAL HYPERTENSION: Chronic | ICD-10-CM

## 2020-08-25 DIAGNOSIS — I50.9 ACUTE CONGESTIVE HEART FAILURE, UNSPECIFIED HEART FAILURE TYPE (HCC): ICD-10-CM

## 2020-08-25 PROCEDURE — 4010F ACE/ARB THERAPY RXD/TAKEN: CPT | Performed by: NURSE PRACTITIONER

## 2020-08-25 RX ORDER — ASPIRIN 81 MG/1
81 TABLET ORAL DAILY
Qty: 30 TABLET | Refills: 6 | Status: SHIPPED | OUTPATIENT
Start: 2020-08-25 | End: 2021-08-16

## 2020-08-25 RX ORDER — LISINOPRIL 20 MG/1
TABLET ORAL
Qty: 60 TABLET | Refills: 0 | Status: SHIPPED | OUTPATIENT
Start: 2020-08-25 | End: 2020-09-21

## 2020-09-21 DIAGNOSIS — I10 ESSENTIAL HYPERTENSION: Chronic | ICD-10-CM

## 2020-09-21 RX ORDER — LISINOPRIL 20 MG/1
TABLET ORAL
Qty: 60 TABLET | Refills: 0 | Status: SHIPPED | OUTPATIENT
Start: 2020-09-21 | End: 2020-10-22

## 2020-10-07 ENCOUNTER — OFFICE VISIT (OUTPATIENT)
Dept: FAMILY MEDICINE CLINIC | Facility: CLINIC | Age: 45
End: 2020-10-07
Payer: COMMERCIAL

## 2020-10-07 VITALS
HEART RATE: 98 BPM | DIASTOLIC BLOOD PRESSURE: 70 MMHG | HEIGHT: 56 IN | RESPIRATION RATE: 16 BRPM | TEMPERATURE: 97.4 F | BODY MASS INDEX: 39.3 KG/M2 | WEIGHT: 174.7 LBS | SYSTOLIC BLOOD PRESSURE: 128 MMHG

## 2020-10-07 DIAGNOSIS — E78.2 MIXED HYPERLIPIDEMIA: ICD-10-CM

## 2020-10-07 DIAGNOSIS — E10.42 DIABETIC POLYNEUROPATHY ASSOCIATED WITH TYPE 1 DIABETES MELLITUS (HCC): ICD-10-CM

## 2020-10-07 DIAGNOSIS — E10.65 TYPE 1 DIABETES MELLITUS WITH HYPERGLYCEMIA (HCC): ICD-10-CM

## 2020-10-07 DIAGNOSIS — I50.9 ACUTE CONGESTIVE HEART FAILURE, UNSPECIFIED HEART FAILURE TYPE (HCC): ICD-10-CM

## 2020-10-07 DIAGNOSIS — E10.3213 BOTH EYES AFFECTED BY MILD NONPROLIFERATIVE DIABETIC RETINOPATHY WITH MACULAR EDEMA, ASSOCIATED WITH TYPE 1 DIABETES MELLITUS (HCC): ICD-10-CM

## 2020-10-07 DIAGNOSIS — H25.12 NUCLEAR SCLEROTIC CATARACT OF LEFT EYE: Primary | ICD-10-CM

## 2020-10-07 DIAGNOSIS — F33.1 MODERATE EPISODE OF RECURRENT MAJOR DEPRESSIVE DISORDER (HCC): ICD-10-CM

## 2020-10-07 DIAGNOSIS — E10.21 DIABETIC NEPHROPATHY ASSOCIATED WITH TYPE 1 DIABETES MELLITUS (HCC): ICD-10-CM

## 2020-10-07 PROBLEM — R79.89 ELEVATED TSH: Status: RESOLVED | Noted: 2020-08-12 | Resolved: 2020-10-07

## 2020-10-07 PROBLEM — D47.1 CHRONIC MYELOPROLIFERATIVE DISORDER (HCC): Status: RESOLVED | Noted: 2017-11-15 | Resolved: 2020-10-07

## 2020-10-07 PROCEDURE — 3078F DIAST BP <80 MM HG: CPT | Performed by: PHYSICIAN ASSISTANT

## 2020-10-07 PROCEDURE — 99214 OFFICE O/P EST MOD 30 MIN: CPT | Performed by: PHYSICIAN ASSISTANT

## 2020-10-07 PROCEDURE — 1036F TOBACCO NON-USER: CPT | Performed by: PHYSICIAN ASSISTANT

## 2020-10-07 PROCEDURE — 3074F SYST BP LT 130 MM HG: CPT | Performed by: PHYSICIAN ASSISTANT

## 2020-10-07 PROCEDURE — 3066F NEPHROPATHY DOC TX: CPT | Performed by: PHYSICIAN ASSISTANT

## 2020-10-07 RX ORDER — POLYMYXIN B SULFATE AND TRIMETHOPRIM 1; 10000 MG/ML; [USP'U]/ML
SOLUTION OPHTHALMIC
COMMUNITY
Start: 2020-09-28 | End: 2020-11-11 | Stop reason: ALTCHOICE

## 2020-10-07 RX ORDER — PREDNISOLONE ACETATE 10 MG/ML
SUSPENSION/ DROPS OPHTHALMIC
COMMUNITY
Start: 2020-09-28 | End: 2020-11-18 | Stop reason: ALTCHOICE

## 2020-10-07 RX ORDER — KETOROLAC TROMETHAMINE 5 MG/ML
SOLUTION OPHTHALMIC
COMMUNITY
Start: 2020-09-28 | End: 2020-11-18 | Stop reason: ALTCHOICE

## 2020-10-12 ENCOUNTER — TELEPHONE (OUTPATIENT)
Dept: ENDOCRINOLOGY | Facility: HOSPITAL | Age: 45
End: 2020-10-12

## 2020-10-22 DIAGNOSIS — I10 ESSENTIAL HYPERTENSION: Chronic | ICD-10-CM

## 2020-10-22 PROCEDURE — 4010F ACE/ARB THERAPY RXD/TAKEN: CPT | Performed by: PHYSICIAN ASSISTANT

## 2020-10-22 RX ORDER — LISINOPRIL 20 MG/1
TABLET ORAL
Qty: 60 TABLET | Refills: 0 | Status: SHIPPED | OUTPATIENT
Start: 2020-10-22 | End: 2021-01-04

## 2020-10-29 ENCOUNTER — TELEPHONE (OUTPATIENT)
Dept: FAMILY MEDICINE CLINIC | Facility: CLINIC | Age: 45
End: 2020-10-29

## 2020-10-29 LAB
ALBUMIN SERPL-MCNC: 3.8 G/DL (ref 3.6–5.1)
ALBUMIN SERPL-MCNC: 3.9 G/DL (ref 3.6–5.1)
ALBUMIN/CREAT UR: 363 MCG/MG CREAT
ALBUMIN/GLOB SERPL: 1.4 (CALC) (ref 1–2.5)
ALBUMIN/GLOB SERPL: 1.6 (CALC) (ref 1–2.5)
ALP SERPL-CCNC: 109 U/L (ref 31–125)
ALP SERPL-CCNC: 115 U/L (ref 31–125)
ALT SERPL-CCNC: 14 U/L (ref 6–29)
ALT SERPL-CCNC: 14 U/L (ref 6–29)
APPEARANCE UR: CLEAR
AST SERPL-CCNC: 11 U/L (ref 10–35)
AST SERPL-CCNC: 11 U/L (ref 10–35)
BACTERIA UR QL AUTO: NORMAL /HPF
BASOPHILS # BLD AUTO: 53 CELLS/UL (ref 0–200)
BASOPHILS NFR BLD AUTO: 0.5 %
BILIRUB DIRECT SERPL-MCNC: 0.1 MG/DL
BILIRUB INDIRECT SERPL-MCNC: 0.2 MG/DL (CALC) (ref 0.2–1.2)
BILIRUB SERPL-MCNC: 0.3 MG/DL (ref 0.2–1.2)
BILIRUB SERPL-MCNC: 0.3 MG/DL (ref 0.2–1.2)
BILIRUB UR QL STRIP: NEGATIVE
BUN SERPL-MCNC: 33 MG/DL (ref 7–25)
BUN/CREAT SERPL: 43 (CALC) (ref 6–22)
CALCIUM SERPL-MCNC: 9 MG/DL (ref 8.6–10.2)
CHLORIDE SERPL-SCNC: 100 MMOL/L (ref 98–110)
CHOLEST SERPL-MCNC: 159 MG/DL
CHOLEST/HDLC SERPL: 3.5 (CALC)
CO2 SERPL-SCNC: 29 MMOL/L (ref 20–32)
COLOR UR: YELLOW
CREAT SERPL-MCNC: 0.76 MG/DL (ref 0.5–1.1)
CREAT UR-MCNC: 19 MG/DL (ref 20–275)
EOSINOPHIL # BLD AUTO: 378 CELLS/UL (ref 15–500)
EOSINOPHIL NFR BLD AUTO: 3.6 %
ERYTHROCYTE [DISTWIDTH] IN BLOOD BY AUTOMATED COUNT: 13.1 % (ref 11–15)
GLOBULIN SER CALC-MCNC: 2.5 G/DL (CALC) (ref 1.9–3.7)
GLOBULIN SER CALC-MCNC: 2.7 G/DL (CALC) (ref 1.9–3.7)
GLUCOSE SERPL-MCNC: 206 MG/DL (ref 65–99)
GLUCOSE UR QL STRIP: NEGATIVE
HCT VFR BLD AUTO: 30.1 % (ref 35–45)
HDLC SERPL-MCNC: 45 MG/DL
HGB BLD-MCNC: 9.8 G/DL (ref 11.7–15.5)
HGB UR QL STRIP: NEGATIVE
HYALINE CASTS #/AREA URNS LPF: NORMAL /LPF
KETONES UR QL STRIP: NEGATIVE
LDLC SERPL CALC-MCNC: 92 MG/DL (CALC)
LEUKOCYTE ESTERASE UR QL STRIP: NEGATIVE
LYMPHOCYTES # BLD AUTO: 1460 CELLS/UL (ref 850–3900)
LYMPHOCYTES NFR BLD AUTO: 13.9 %
MCH RBC QN AUTO: 29.8 PG (ref 27–33)
MCHC RBC AUTO-ENTMCNC: 32.6 G/DL (ref 32–36)
MCV RBC AUTO: 91.5 FL (ref 80–100)
MICROALBUMIN UR-MCNC: 6.9 MG/DL
MONOCYTES # BLD AUTO: 546 CELLS/UL (ref 200–950)
MONOCYTES NFR BLD AUTO: 5.2 %
NEUTROPHILS # BLD AUTO: 8064 CELLS/UL (ref 1500–7800)
NEUTROPHILS NFR BLD AUTO: 76.8 %
NITRITE UR QL STRIP: NEGATIVE
NONHDLC SERPL-MCNC: 114 MG/DL (CALC)
PH UR STRIP: 6 [PH] (ref 5–8)
PLATELET # BLD AUTO: 416 THOUSAND/UL (ref 140–400)
PMV BLD REES-ECKER: 10.2 FL (ref 7.5–12.5)
POTASSIUM SERPL-SCNC: 4.8 MMOL/L (ref 3.5–5.3)
PROT SERPL-MCNC: 6.4 G/DL (ref 6.1–8.1)
PROT SERPL-MCNC: 6.5 G/DL (ref 6.1–8.1)
PROT UR QL STRIP: NEGATIVE
RBC # BLD AUTO: 3.29 MILLION/UL (ref 3.8–5.1)
RBC #/AREA URNS HPF: NORMAL /HPF
SL AMB EGFR AFRICAN AMERICAN: 110 ML/MIN/1.73M2
SL AMB EGFR NON AFRICAN AMERICAN: 95 ML/MIN/1.73M2
SODIUM SERPL-SCNC: 139 MMOL/L (ref 135–146)
SP GR UR STRIP: 1.01 (ref 1–1.03)
SQUAMOUS #/AREA URNS HPF: NORMAL /HPF
TRIGL SERPL-MCNC: 128 MG/DL
WBC # BLD AUTO: 10.5 THOUSAND/UL (ref 3.8–10.8)
WBC #/AREA URNS HPF: NORMAL /HPF

## 2020-10-29 PROCEDURE — 3061F NEG MICROALBUMINURIA REV: CPT | Performed by: INTERNAL MEDICINE

## 2020-10-30 DIAGNOSIS — F32.A DEPRESSION, UNSPECIFIED DEPRESSION TYPE: ICD-10-CM

## 2020-11-02 RX ORDER — PAROXETINE HYDROCHLORIDE 20 MG/1
TABLET, FILM COATED ORAL
Qty: 90 TABLET | Refills: 0 | Status: SHIPPED | OUTPATIENT
Start: 2020-11-02 | End: 2021-02-01

## 2020-11-05 ENCOUNTER — TELEPHONE (OUTPATIENT)
Dept: FAMILY MEDICINE CLINIC | Facility: CLINIC | Age: 45
End: 2020-11-05

## 2020-11-11 ENCOUNTER — OFFICE VISIT (OUTPATIENT)
Dept: FAMILY MEDICINE CLINIC | Facility: CLINIC | Age: 45
End: 2020-11-11
Payer: COMMERCIAL

## 2020-11-11 VITALS
WEIGHT: 180.7 LBS | HEIGHT: 56 IN | BODY MASS INDEX: 40.65 KG/M2 | HEART RATE: 96 BPM | RESPIRATION RATE: 16 BRPM | TEMPERATURE: 97.6 F | DIASTOLIC BLOOD PRESSURE: 70 MMHG | SYSTOLIC BLOOD PRESSURE: 134 MMHG

## 2020-11-11 DIAGNOSIS — I50.9 ACUTE CONGESTIVE HEART FAILURE, UNSPECIFIED HEART FAILURE TYPE (HCC): ICD-10-CM

## 2020-11-11 DIAGNOSIS — I10 ESSENTIAL HYPERTENSION: ICD-10-CM

## 2020-11-11 DIAGNOSIS — E10.21 DIABETIC NEPHROPATHY ASSOCIATED WITH TYPE 1 DIABETES MELLITUS (HCC): ICD-10-CM

## 2020-11-11 DIAGNOSIS — E10.3213 BOTH EYES AFFECTED BY MILD NONPROLIFERATIVE DIABETIC RETINOPATHY WITH MACULAR EDEMA, ASSOCIATED WITH TYPE 1 DIABETES MELLITUS (HCC): ICD-10-CM

## 2020-11-11 DIAGNOSIS — E78.2 MIXED HYPERLIPIDEMIA: ICD-10-CM

## 2020-11-11 DIAGNOSIS — E66.01 SEVERE OBESITY (BMI 35.0-39.9) WITH COMORBIDITY (HCC): ICD-10-CM

## 2020-11-11 DIAGNOSIS — D64.9 ANEMIA, UNSPECIFIED TYPE: ICD-10-CM

## 2020-11-11 DIAGNOSIS — E10.42 DIABETIC POLYNEUROPATHY ASSOCIATED WITH TYPE 1 DIABETES MELLITUS (HCC): ICD-10-CM

## 2020-11-11 DIAGNOSIS — E10.65 TYPE 1 DIABETES MELLITUS WITH HYPERGLYCEMIA (HCC): Primary | ICD-10-CM

## 2020-11-11 DIAGNOSIS — F33.1 MODERATE EPISODE OF RECURRENT MAJOR DEPRESSIVE DISORDER (HCC): ICD-10-CM

## 2020-11-11 LAB — SL AMB POCT HEMOGLOBIN AIC: 9.7 (ref ?–6.5)

## 2020-11-11 PROCEDURE — 99214 OFFICE O/P EST MOD 30 MIN: CPT | Performed by: PHYSICIAN ASSISTANT

## 2020-11-11 PROCEDURE — 3725F SCREEN DEPRESSION PERFORMED: CPT | Performed by: PHYSICIAN ASSISTANT

## 2020-11-11 PROCEDURE — 1036F TOBACCO NON-USER: CPT | Performed by: PHYSICIAN ASSISTANT

## 2020-11-11 PROCEDURE — 83036 HEMOGLOBIN GLYCOSYLATED A1C: CPT | Performed by: PHYSICIAN ASSISTANT

## 2020-11-11 PROCEDURE — 3066F NEPHROPATHY DOC TX: CPT | Performed by: INTERNAL MEDICINE

## 2020-11-11 PROCEDURE — 3046F HEMOGLOBIN A1C LEVEL >9.0%: CPT | Performed by: INTERNAL MEDICINE

## 2020-11-18 ENCOUNTER — OFFICE VISIT (OUTPATIENT)
Dept: ENDOCRINOLOGY | Facility: HOSPITAL | Age: 45
End: 2020-11-18
Payer: COMMERCIAL

## 2020-11-18 VITALS
HEART RATE: 100 BPM | HEIGHT: 56 IN | TEMPERATURE: 98 F | BODY MASS INDEX: 40.72 KG/M2 | DIASTOLIC BLOOD PRESSURE: 80 MMHG | WEIGHT: 181 LBS | SYSTOLIC BLOOD PRESSURE: 154 MMHG

## 2020-11-18 DIAGNOSIS — I10 ESSENTIAL HYPERTENSION: ICD-10-CM

## 2020-11-18 DIAGNOSIS — E10.21 DIABETIC NEPHROPATHY ASSOCIATED WITH TYPE 1 DIABETES MELLITUS (HCC): ICD-10-CM

## 2020-11-18 DIAGNOSIS — E10.65 TYPE 1 DIABETES MELLITUS WITH HYPERGLYCEMIA (HCC): Primary | ICD-10-CM

## 2020-11-18 DIAGNOSIS — E10.42 DIABETIC POLYNEUROPATHY ASSOCIATED WITH TYPE 1 DIABETES MELLITUS (HCC): ICD-10-CM

## 2020-11-18 DIAGNOSIS — E78.2 MIXED HYPERLIPIDEMIA: ICD-10-CM

## 2020-11-18 DIAGNOSIS — E28.2 POLYCYSTIC OVARIAN SYNDROME: Chronic | ICD-10-CM

## 2020-11-18 PROCEDURE — 99215 OFFICE O/P EST HI 40 MIN: CPT | Performed by: INTERNAL MEDICINE

## 2020-11-18 PROCEDURE — 3077F SYST BP >= 140 MM HG: CPT | Performed by: INTERNAL MEDICINE

## 2020-11-18 PROCEDURE — 1036F TOBACCO NON-USER: CPT | Performed by: INTERNAL MEDICINE

## 2020-11-18 PROCEDURE — 3079F DIAST BP 80-89 MM HG: CPT | Performed by: INTERNAL MEDICINE

## 2020-11-18 PROCEDURE — 3008F BODY MASS INDEX DOCD: CPT | Performed by: INTERNAL MEDICINE

## 2020-11-18 RX ORDER — INSULIN GLARGINE 100 [IU]/ML
78 INJECTION, SOLUTION SUBCUTANEOUS
Qty: 10 PEN | Refills: 3 | Status: SHIPPED | OUTPATIENT
Start: 2020-11-18 | End: 2021-11-05

## 2020-11-24 DIAGNOSIS — J45.20 MILD INTERMITTENT ASTHMA WITHOUT COMPLICATION: ICD-10-CM

## 2020-11-25 ENCOUNTER — HOSPITAL ENCOUNTER (OUTPATIENT)
Dept: NON INVASIVE DIAGNOSTICS | Age: 45
Discharge: HOME | End: 2020-11-25
Payer: COMMERCIAL

## 2020-11-25 DIAGNOSIS — I50.9 ACUTE CHF (HCC): ICD-10-CM

## 2020-11-25 LAB
CHEST PAIN STATEMENT: NORMAL
MAX DIASTOLIC BP: 80 MMHG
MAX HEART RATE: 155 BPM
MAX PREDICTED HEART RATE: 175 BPM
MAX. SYSTOLIC BP: 180 MMHG
PROTOCOL NAME: NORMAL
REASON FOR TERMINATION: NORMAL
TARGET HR FORMULA: NORMAL
TEST INDICATION: NORMAL
TIME IN EXERCISE PHASE: NORMAL

## 2020-11-25 PROCEDURE — 93350 STRESS TTE ONLY: CPT

## 2020-11-25 PROCEDURE — 93351 STRESS TTE COMPLETE: CPT | Performed by: INTERNAL MEDICINE

## 2020-11-25 RX ORDER — MONTELUKAST SODIUM 10 MG/1
TABLET ORAL
Qty: 90 TABLET | Refills: 0 | Status: SHIPPED | OUTPATIENT
Start: 2020-11-25 | End: 2021-02-22

## 2020-11-27 LAB
BASOPHILS # BLD AUTO: 82 CELLS/UL (ref 0–200)
BASOPHILS NFR BLD AUTO: 0.9 %
BUN SERPL-MCNC: 52 MG/DL (ref 7–25)
BUN/CREAT SERPL: 58 (CALC) (ref 6–22)
CALCIUM SERPL-MCNC: 9.6 MG/DL (ref 8.6–10.2)
CHLORIDE SERPL-SCNC: 101 MMOL/L (ref 98–110)
CO2 SERPL-SCNC: 27 MMOL/L (ref 20–32)
CREAT SERPL-MCNC: 0.89 MG/DL (ref 0.5–1.1)
EOSINOPHIL # BLD AUTO: 282 CELLS/UL (ref 15–500)
EOSINOPHIL NFR BLD AUTO: 3.1 %
ERYTHROCYTE [DISTWIDTH] IN BLOOD BY AUTOMATED COUNT: 12.9 % (ref 11–15)
FERRITIN SERPL-MCNC: 109 NG/ML (ref 16–232)
FOLATE SERPL-MCNC: 14.1 NG/ML
GLUCOSE SERPL-MCNC: 161 MG/DL (ref 65–139)
HCT VFR BLD AUTO: 32.6 % (ref 35–45)
HGB BLD-MCNC: 10.8 G/DL (ref 11.7–15.5)
IRON SERPL-MCNC: 78 MCG/DL (ref 40–190)
LYMPHOCYTES # BLD AUTO: 1629 CELLS/UL (ref 850–3900)
LYMPHOCYTES NFR BLD AUTO: 17.9 %
MCH RBC QN AUTO: 29.9 PG (ref 27–33)
MCHC RBC AUTO-ENTMCNC: 33.1 G/DL (ref 32–36)
MCV RBC AUTO: 90.3 FL (ref 80–100)
MONOCYTES # BLD AUTO: 573 CELLS/UL (ref 200–950)
MONOCYTES NFR BLD AUTO: 6.3 %
NEUTROPHILS # BLD AUTO: 6534 CELLS/UL (ref 1500–7800)
NEUTROPHILS NFR BLD AUTO: 71.8 %
PLATELET # BLD AUTO: 414 THOUSAND/UL (ref 140–400)
PMV BLD REES-ECKER: 10.3 FL (ref 7.5–12.5)
POTASSIUM SERPL-SCNC: 5.7 MMOL/L (ref 3.5–5.3)
RBC # BLD AUTO: 3.61 MILLION/UL (ref 3.8–5.1)
SL AMB EGFR AFRICAN AMERICAN: 91 ML/MIN/1.73M2
SL AMB EGFR NON AFRICAN AMERICAN: 78 ML/MIN/1.73M2
SODIUM SERPL-SCNC: 137 MMOL/L (ref 135–146)
VIT B12 SERPL-MCNC: 805 PG/ML (ref 200–1100)
WBC # BLD AUTO: 9.1 THOUSAND/UL (ref 3.8–10.8)

## 2020-11-30 ENCOUNTER — TELEPHONE (OUTPATIENT)
Dept: ENDOCRINOLOGY | Facility: CLINIC | Age: 45
End: 2020-11-30

## 2020-11-30 ENCOUNTER — TELEPHONE (OUTPATIENT)
Dept: FAMILY MEDICINE CLINIC | Facility: CLINIC | Age: 45
End: 2020-11-30

## 2020-12-09 ENCOUNTER — OFFICE VISIT (OUTPATIENT)
Dept: DIABETES SERVICES | Facility: CLINIC | Age: 45
End: 2020-12-09
Payer: COMMERCIAL

## 2020-12-09 ENCOUNTER — TELEPHONE (OUTPATIENT)
Dept: ENDOCRINOLOGY | Facility: HOSPITAL | Age: 45
End: 2020-12-09

## 2020-12-09 ENCOUNTER — TELEPHONE (OUTPATIENT)
Dept: DIABETES SERVICES | Facility: CLINIC | Age: 45
End: 2020-12-09

## 2020-12-09 DIAGNOSIS — E28.2 POLYCYSTIC OVARIAN SYNDROME: Primary | Chronic | ICD-10-CM

## 2020-12-09 DIAGNOSIS — E10.65 TYPE 1 DIABETES MELLITUS WITH HYPERGLYCEMIA (HCC): ICD-10-CM

## 2020-12-09 PROCEDURE — G0108 DIAB MANAGE TRN  PER INDIV: HCPCS

## 2020-12-09 RX ORDER — GLUCAGON INJECTION, SOLUTION 1 MG/.2ML
INJECTION, SOLUTION SUBCUTANEOUS
Qty: 1 SYRINGE | Refills: 6 | Status: SHIPPED | OUTPATIENT
Start: 2020-12-09

## 2020-12-16 ENCOUNTER — TELEPHONE (OUTPATIENT)
Dept: ENDOCRINOLOGY | Facility: HOSPITAL | Age: 45
End: 2020-12-16

## 2020-12-16 DIAGNOSIS — E28.2 POLYCYSTIC OVARIAN SYNDROME: Primary | Chronic | ICD-10-CM

## 2020-12-16 DIAGNOSIS — E10.65 TYPE 1 DIABETES MELLITUS WITH HYPERGLYCEMIA (HCC): ICD-10-CM

## 2020-12-16 RX ORDER — IBUPROFEN 600 MG/1
1 TABLET ORAL ONCE AS NEEDED
Qty: 1 KIT | Refills: 3 | Status: SHIPPED | OUTPATIENT
Start: 2020-12-16

## 2020-12-18 ENCOUNTER — TELEPHONE (OUTPATIENT)
Dept: NEPHROLOGY | Facility: CLINIC | Age: 45
End: 2020-12-18

## 2020-12-21 ENCOUNTER — TELEPHONE (OUTPATIENT)
Dept: ENDOCRINOLOGY | Facility: HOSPITAL | Age: 45
End: 2020-12-21

## 2020-12-22 ENCOUNTER — TELEPHONE (OUTPATIENT)
Dept: SURGICAL ONCOLOGY | Facility: CLINIC | Age: 45
End: 2020-12-22

## 2020-12-22 ENCOUNTER — TELEPHONE (OUTPATIENT)
Dept: DIABETES SERVICES | Facility: HOSPITAL | Age: 45
End: 2020-12-22

## 2020-12-22 ENCOUNTER — TELEPHONE (OUTPATIENT)
Dept: FAMILY MEDICINE CLINIC | Facility: CLINIC | Age: 45
End: 2020-12-22

## 2020-12-24 ENCOUNTER — TELEPHONE (OUTPATIENT)
Dept: HEMATOLOGY ONCOLOGY | Facility: CLINIC | Age: 45
End: 2020-12-24

## 2021-01-03 DIAGNOSIS — I10 ESSENTIAL HYPERTENSION: Chronic | ICD-10-CM

## 2021-01-04 PROCEDURE — 4010F ACE/ARB THERAPY RXD/TAKEN: CPT | Performed by: INTERNAL MEDICINE

## 2021-01-04 RX ORDER — LISINOPRIL 20 MG/1
TABLET ORAL
Qty: 60 TABLET | Refills: 0 | Status: SHIPPED | OUTPATIENT
Start: 2021-01-04 | End: 2021-02-08

## 2021-01-11 ENCOUNTER — TELEPHONE (OUTPATIENT)
Dept: ENDOCRINOLOGY | Facility: HOSPITAL | Age: 46
End: 2021-01-11

## 2021-01-13 ENCOUNTER — OFFICE VISIT (OUTPATIENT)
Dept: HEMATOLOGY ONCOLOGY | Facility: HOSPITAL | Age: 46
End: 2021-01-13
Payer: COMMERCIAL

## 2021-01-13 ENCOUNTER — TELEPHONE (OUTPATIENT)
Dept: HEMATOLOGY ONCOLOGY | Facility: CLINIC | Age: 46
End: 2021-01-13

## 2021-01-13 VITALS
OXYGEN SATURATION: 91 % | HEART RATE: 117 BPM | HEIGHT: 56 IN | BODY MASS INDEX: 40.94 KG/M2 | SYSTOLIC BLOOD PRESSURE: 166 MMHG | DIASTOLIC BLOOD PRESSURE: 62 MMHG | RESPIRATION RATE: 16 BRPM | TEMPERATURE: 99.1 F | WEIGHT: 182 LBS

## 2021-01-13 DIAGNOSIS — D75.839 THROMBOCYTOSIS: Primary | ICD-10-CM

## 2021-01-13 DIAGNOSIS — E53.8 B12 DEFICIENCY: ICD-10-CM

## 2021-01-13 DIAGNOSIS — D64.9 ANEMIA, UNSPECIFIED TYPE: ICD-10-CM

## 2021-01-13 PROCEDURE — 99214 OFFICE O/P EST MOD 30 MIN: CPT | Performed by: INTERNAL MEDICINE

## 2021-01-13 PROCEDURE — 3078F DIAST BP <80 MM HG: CPT | Performed by: INTERNAL MEDICINE

## 2021-01-13 PROCEDURE — 3077F SYST BP >= 140 MM HG: CPT | Performed by: INTERNAL MEDICINE

## 2021-01-13 RX ORDER — SODIUM CHLORIDE 9 MG/ML
20 INJECTION, SOLUTION INTRAVENOUS ONCE
Status: CANCELLED | OUTPATIENT
Start: 2021-01-20

## 2021-01-13 RX ORDER — CYANOCOBALAMIN 1000 UG/ML
1000 INJECTION INTRAMUSCULAR; SUBCUTANEOUS ONCE
Status: CANCELLED | OUTPATIENT
Start: 2021-01-20

## 2021-01-13 NOTE — PROGRESS NOTES
Hematology/Oncology Outpatient Follow- up Note  Rory Villarreal 39 y o  female MRN: @ Encounter: [id number]        Date:  1/13/2021    Presenting Complaint/Diagnosis :  Elevated platelet count for at least 4 years with fluctuating hemoglobin and low iron        Previous Hematologic/ Oncologic History:    Venofer  Oral iron    Current Hematologic/ Oncologic Treatment:    Observation/noncompliance    Interval History:    The patient returns for follow-up visit  She was last seen in April 2018 but then never came back after her numbers corrected and she started feeling better after we replaced her iron  More recently she was noticed to be anemic again with normochromic normocytic indices  She states she does feel fatigued  Iron studies showed iron on the low side of normal   Creatinine on her last blood work was normal   Potassium was slightly elevated  She is going to see a nephrologist   Denies any nausea denies any vomiting  Does have heartburn and I suspect she does need a GI workup so we will arrange this  The rest of her 14 point review of systems today was negative  Test Results:    Imaging: No results found      Labs:   Lab Results   Component Value Date    WBC 9 1 11/25/2020    HGB 10 8 (L) 11/25/2020    HCT 32 6 (L) 11/25/2020    MCV 90 3 11/25/2020     (H) 11/25/2020     Lab Results   Component Value Date     01/28/2017    K 5 7 (H) 11/25/2020     11/25/2020    CO2 27 11/25/2020    BUN 52 (H) 11/25/2020    CREATININE 0 89 11/25/2020    GLUF 184 (H) 11/14/2017    CALCIUM 9 6 11/25/2020    AST 11 10/28/2020    ALT 14 10/28/2020    ALKPHOS 109 10/28/2020    PROT 5 9 (L) 01/28/2017    BILITOT 0 3 01/28/2017    EGFR 55 08/14/2020       Lab Results   Component Value Date    IRON 78 11/25/2020    TIBC 358 04/21/2018    FERRITIN 109 11/25/2020       Lab Results   Component Value Date    [id number] 805 11/25/2020         ROS: As stated in the history of present illness otherwise his 15 point review of systems today was negative  Active Problems:   Patient Active Problem List   Diagnosis    Type 1 diabetes mellitus with hyperglycemia (HCC)    Asthma    Hypertension    Moderate episode of recurrent major depressive disorder (HCC)    Hyperlipidemia    Polycystic ovarian syndrome    Retinal hemorrhage    Anemia    Asthma, mild persistent    Both eyes affected 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, associated with type 1 diabetes mellitus (HCC)    Nervousness    Thrombocytosis (HCC)    Severe obesity (BMI 35 0-39  9) with comorbidity ([facility] [address] )    Gastroesophageal reflux disease without esophagitis    Other proteinuria    Acute CH[facility] (congestive heart failure) [facility])    Diabetic polyneuropathy associated with type 1 diabetes mellitus ([address] )    Diabetic nephropathy associated with type 1 diabetes mellitus ([address] )    Nuclear sclerotic cataract of left eye       Past Medical History:   Past Medical History:   Diagnosis Date    Anemia     Asthma     w/acute exacerbation   Last assessed: 10/26/12    Chest pain 2/3/2016    CHF (congestive heart failure) ([address] )     Depression     Diabetes mellitus ([address] )     Diabetic nephropathy ([facility] [address] )     Diabetic nephropathy associated with type 1 diabetes mellitus ([facility] [address] ) 8/12/2020    Diabetic retinopathy ([facility] [address] ) 2/3/2016    Gastroenteritis 02/03/2016    GERD (gastroesophageal reflux disease)     HTN (hypertension)     Hyperlipidemia 2/3/2016    Oligomenorrhea     Polycystic ovaries 2/3/2016    Retinal hemorrhage 2/3/2016    Retinopathy     Thrombocytosis ([facility])     Type 1 diabetes mellitus with ophthalmic manifestation ([facility] [address] ) 2/3/2016       Surgical History:   Past Surgical History:   Procedure Laterality Date    CATARACT EXTRACTION Left 10/2020    CATARACT EXTRACTION Right     RETINAL LASER PROCEDURE         Family History:    Family History   Problem Relation Age of Onset    Heart murmur Mother     Diabetes Mother Mellitus    Thyroid disease Mother         Disorder    Diabetes Father         Mellitus    Hypertension Father     Diabetes Maternal Grandfather         Mellitus    Breast cancer Paternal Grandmother     Diabetes Maternal Aunt         Mellitus    Diabetes Maternal Uncle         Mellitus    No Known Problems Maternal Grandmother     Leukemia Paternal Grandfather     Substance Abuse Neg Hx     Mental illness Neg Hx     Alcohol abuse Neg Hx        Cancer-related family history includes Breast cancer in her paternal grandmother  Social History:   Social History     Socioeconomic History    Marital status: /Civil Union     Spouse name: Not on file    Number of children: Not on file    Years of education: Not on file    Highest education level: Not on file   Occupational History    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   Social Needs    Financial resource strain: Not on file    Food insecurity     Worry: Not on file     Inability: Not on file   [employer] needs     Medical: Not on file     Non-medical: Not on file   Tobacco Use    Smoking status: Never Smoker    Smokeless tobacco: Never Used    Tobacco comment: Per [employer]: Former smoker   Quit in 1994   Substance and Sexual Activity    Alcohol use: Not Currently     Frequency: Monthly or less     Comment: Occasional/1 mixed drink twice a month if that    Drug use: No    Sexual activity: Yes     Partners: Male     Birth control/protection: Pill   Lifestyle    Physical activity     Days per week: Not on file     Minutes per session: Not on file    Stress: Not on file   Relationships    Social connections     Talks on phone: Not on file     Gets together: Not on file     Attends [employer] service: Not on file     Active member of club or organization: Not on file     Attends meetings of clubs or organizations: Not on file     Relationship status: Not on file    Intimate partner violence     Fear of current or ex partner: Not on file     Emotionally abused: Not on file     Physically abused: Not on file     Forced sexual activity: Not on file   Other Topics Concern    Not on file   Social History Narrative    Always uses seatbelt    Caffeine use: 1-2 cups coffee daily    Has smoke detectors    [employer]: [employer]       Current Medications:   Current Outpatient Medications   Medication Sig Dispense Refill    acetaminophen (TYLENOL) 500 mg tablet Take 2 tablets (1,000 mg total) by mouth every 6 (six) hours as needed for mild pain or moderate pain 30 tablet 0    acetone, urine, test strip Use to test urine ketones for high blood sugars  25 each 6    albuterol (PROVENTIL HFA,VENTOLIN HFA) 90 mcg/act inhaler INHALE 2 PUFFS BY MOUTH EVERY 4 HOURS AS NEEDED FOR WHEEZING OR SHORTNESS OF BREATH 25 5 g 0    Ascorbic Acid (VITAMIN C) 500 MG CAPS Take 2 capsules by mouth daily      atorvastatin (LIPITOR) 40 mg tablet Take 1 tablet (40 mg total) by mouth daily 30 tablet 5    Biotin 10 MG TABS Take 1 tablet by mouth daily      Butalbital-APAP-Caffeine (Fioricet) -40 MG CAPS Take 1 tablet by mouth 4 (four) times a day as needed (headahce) 30 capsule 0    cyanocobalamin (VITAMIN B-12) 100 mcg tablet Take by mouth daily      fluticasone-salmeterol (Advair Diskus) 250-50 mcg/dose inhaler Inhale 1 puff 2 (two) times a day Rinse mouth after use   3 Inhaler 3    glucagon (Glucagon Emergency) 1 MG injection Inject 1 mg under the skin once as needed for low blood sugar for up to 1 dose 1 kit 3    Glucagon (Gvoke HypoPen 2-Pack) 1 MG/0 2ML SOAJ Use if hypoglycemia prn 1 Syringe 6    insulin aspart (NovoLOG) 100 units/mL injection Take 1 unit of novolog for every 1 g of carb, up to 150 units daily 50 mL 6    insulin glargine (Lantus SoloStar) 100 units/mL injection pen Inject 78 Units under the skin daily at bedtime 10 pen 3    lisinopril (ZESTRIL) 20 mg tablet TAKE 2 TABLETS BY MOUTH EVERY DAY 60 tablet 0    montelukast (SINGULAIR) 10 mg tablet TAKE 1 TABLET BY MOUTH AT BEDTIME 90 tablet 0    omeprazole (PriLOSEC) 20 mg delayed release capsule Take 20 mg by mouth daily   OneTouch Ultra test strip Test 3 times daily 300 each 3    PARoxetine (PAXIL) 20 mg tablet TAKE 1 TABLET BY MOUTH EVERY DAY 90 tablet 0    Probiotic Product (ALIGN PO) Take by mouth      aspirin (ECOTRIN LOW STRENGTH) 81 mg EC tablet Take 1 tablet (81 mg total) by mouth daily 30 tablet 6    furosemide (LASIX) 40 mg tablet Take 1 tablet (40 mg total) by mouth daily Take 1/2 tablet daily (Patient taking differently: Take 40 mg by mouth daily ) 30 tablet 3     No current facility-administered medications for this visit  Allergies: No Known Allergies    Physical Exam:    Body surface area is 1 7 meters squared  Wt Readings from Last 3 Encounters:   01/13/21 82 6 kg (182 lb)   11/18/20 82 1 kg (181 lb)   11/11/20 82 kg (180 lb 11 2 oz)        Temp Readings from Last 3 Encounters:   01/13/21 99 1 °F (37 3 °C) (Temporal)   11/18/20 98 °F (36 7 °C)   11/11/20 97 6 °F (36 4 °C) (Temporal)        BP Readings from Last 3 Encounters:   01/13/21 166/62   11/18/20 154/80   11/11/20 134/70         Pulse Readings from Last 3 Encounters:   01/13/21 (!) 117   11/18/20 100   11/11/20 96        Physical Exam     Constitutional   General appearance: No acute distress, well appearing and well nourished  Eyes   Conjunctiva and lids: No swelling, erythema or discharge  Pupils and irises: Equal, round and reactive to light  Ears, Nose, Mouth, and Throat   External inspection of ears and nose: Normal     Nasal mucosa, septum, and turbinates: Normal without edema or erythema  Oropharynx: Normal with no erythema, edema, exudate or lesions  Pulmonary   Respiratory effort: No increased work of breathing or signs of respiratory distress  Auscultation of lungs: Clear to auscultation  Cardiovascular   Palpation of heart: Normal PMI, no thrills  Auscultation of heart: Normal rate and rhythm, normal S1 and S2, without murmurs  Examination of extremities for edema and/or varicosities: Normal     Carotid pulses: Normal     Abdomen   Abdomen: Non-tender, no masses  Liver and spleen: No hepatomegaly or splenomegaly  Lymphatic   Palpation of lymph nodes in neck: No lymphadenopathy  Musculoskeletal   Gait and station: Normal     Digits and nails: Normal without clubbing or cyanosis  Inspection/palpation of joints, bones, and muscles: Normal     Skin   Skin and subcutaneous tissue: Normal without rashes or lesions  Neurologic   Cranial nerves: Cranial nerves 2-12 intact  Sensation: No sensory loss  Psychiatric   Orientation to person, place, and time: Normal     Mood and affect: Normal         Assessment / Plan: This is a pleasant young female with a past medical history of diabetes hypertension hypercholesterolemia was noticed to have an elevated platelet count  She was also anemic  I put her on a therapeutic trial of iron Followed by Venofer  Her numbers corrected but then she was lost to follow-up  She now comes back with a slightly elevated platelet count and anemia once again  I will give her another therapeutic trial of Venofer along with B12  I will see her back in 3 months with results of blood work  She will have blood work drawn now as last blood work I have is from November  I will set her up to see our colleagues in GI to have a GI workup  She is already seeing Nephrology  If she does not have a good response to the iron and B12 we will consider a bone marrow biopsy  The patient and her  are in agreement with the plan  I will set her up to receive 6 doses of Venofer and 4 doses of B12  Goals and Barriers:  Current Goal:  Prolong Survival from anemia and mild thrombocytosis  Barriers: None  Patient's Capacity to Self Care:  Patient able to self care      Portions of the record may have been created with voice recognition software   Occasional wrong word or "sound a like" substitutions may have occurred due to the inherent limitations of voice recognition software   Read the chart carefully and recognize, using context, where substitutions have occurred

## 2021-01-13 NOTE — TELEPHONE ENCOUNTER
Patient is calling in requesting for her infusion appt that is scheduled for 01/20 be rescheduled for a different time since she has another Dr's appointment, she can be reached back at [phone]

## 2021-01-14 ENCOUNTER — TELEPHONE (OUTPATIENT)
Dept: HEMATOLOGY ONCOLOGY | Facility: CLINIC | Age: 46
End: 2021-01-14

## 2021-01-14 NOTE — TELEPHONE ENCOUNTER
I called and left a voicemail for the patient asking them to call us and give us information as to which day and time

## 2021-01-15 ENCOUNTER — TELEPHONE (OUTPATIENT)
Dept: HEMATOLOGY ONCOLOGY | Facility: CLINIC | Age: 46
End: 2021-01-15

## 2021-01-15 NOTE — TELEPHONE ENCOUNTER
Patient advised of 1/13/21 lab results  Advised Methylmalonic lab still pending  Patient will keep her weekly Venofer infusion appointments  Patient reports moderate fatigue  Denies PRICE     1/13/21 office note:  I will set her up to receive 6 doses of Venofer and 4 doses of B12  Patient also states she was referred to GI by Dr Sylvie Patel to GI and is scheduled  2/23 for a appt  Will notify Dr Ezio Vergara RN

## 2021-01-16 LAB
ALBUMIN SERPL-MCNC: 4.1 G/DL (ref 3.6–5.1)
ALBUMIN/GLOB SERPL: 1.4 (CALC) (ref 1–2.5)
ALP SERPL-CCNC: 109 U/L (ref 31–125)
ALT SERPL-CCNC: 16 U/L (ref 6–29)
AST SERPL-CCNC: 15 U/L (ref 10–35)
BASOPHILS # BLD AUTO: 90 CELLS/UL (ref 0–200)
BASOPHILS NFR BLD AUTO: 0.8 %
BILIRUB SERPL-MCNC: 0.2 MG/DL (ref 0.2–1.2)
BUN SERPL-MCNC: 56 MG/DL (ref 7–25)
BUN/CREAT SERPL: 60 (CALC) (ref 6–22)
CALCIUM SERPL-MCNC: 9.4 MG/DL (ref 8.6–10.2)
CHLORIDE SERPL-SCNC: 103 MMOL/L (ref 98–110)
CO2 SERPL-SCNC: 26 MMOL/L (ref 20–32)
CREAT SERPL-MCNC: 0.93 MG/DL (ref 0.5–1.1)
EOSINOPHIL # BLD AUTO: 370 CELLS/UL (ref 15–500)
EOSINOPHIL NFR BLD AUTO: 3.3 %
ERYTHROCYTE [DISTWIDTH] IN BLOOD BY AUTOMATED COUNT: 13.2 % (ref 11–15)
FERRITIN SERPL-MCNC: 106 NG/ML (ref 16–232)
GLOBULIN SER CALC-MCNC: 3 G/DL (CALC) (ref 1.9–3.7)
GLUCOSE SERPL-MCNC: 92 MG/DL (ref 65–139)
HCT VFR BLD AUTO: 34.3 % (ref 35–45)
HGB BLD-MCNC: 11.5 G/DL (ref 11.7–15.5)
IRON SATN MFR SERPL: 15 % (CALC) (ref 16–45)
IRON SERPL-MCNC: 56 MCG/DL (ref 40–190)
LYMPHOCYTES # BLD AUTO: 1949 CELLS/UL (ref 850–3900)
LYMPHOCYTES NFR BLD AUTO: 17.4 %
MCH RBC QN AUTO: 30 PG (ref 27–33)
MCHC RBC AUTO-ENTMCNC: 33.5 G/DL (ref 32–36)
MCV RBC AUTO: 89.6 FL (ref 80–100)
METHYLMALONATE SERPL-SCNC: 139 NMOL/L (ref 87–318)
MONOCYTES # BLD AUTO: 616 CELLS/UL (ref 200–950)
MONOCYTES NFR BLD AUTO: 5.5 %
NEUTROPHILS # BLD AUTO: 8176 CELLS/UL (ref 1500–7800)
NEUTROPHILS NFR BLD AUTO: 73 %
PLATELET # BLD AUTO: 482 THOUSAND/UL (ref 140–400)
PMV BLD REES-ECKER: 10.4 FL (ref 7.5–12.5)
POTASSIUM SERPL-SCNC: 4.9 MMOL/L (ref 3.5–5.3)
PROT SERPL-MCNC: 7.1 G/DL (ref 6.1–8.1)
RBC # BLD AUTO: 3.83 MILLION/UL (ref 3.8–5.1)
SL AMB EGFR AFRICAN AMERICAN: 86 ML/MIN/1.73M2
SL AMB EGFR NON AFRICAN AMERICAN: 74 ML/MIN/1.73M2
SODIUM SERPL-SCNC: 141 MMOL/L (ref 135–146)
TIBC SERPL-MCNC: 366 MCG/DL (CALC) (ref 250–450)
WBC # BLD AUTO: 11.2 THOUSAND/UL (ref 3.8–10.8)

## 2021-01-20 ENCOUNTER — OFFICE VISIT (OUTPATIENT)
Dept: NEPHROLOGY | Facility: CLINIC | Age: 46
End: 2021-01-20
Payer: COMMERCIAL

## 2021-01-20 ENCOUNTER — HOSPITAL ENCOUNTER (OUTPATIENT)
Dept: INFUSION CENTER | Facility: HOSPITAL | Age: 46
Discharge: HOME | End: 2021-01-20
Attending: INTERNAL MEDICINE
Payer: COMMERCIAL

## 2021-01-20 VITALS
OXYGEN SATURATION: 99 % | HEART RATE: 100 BPM | TEMPERATURE: 96.9 F | RESPIRATION RATE: 12 BRPM | DIASTOLIC BLOOD PRESSURE: 60 MMHG | SYSTOLIC BLOOD PRESSURE: 133 MMHG

## 2021-01-20 VITALS
SYSTOLIC BLOOD PRESSURE: 142 MMHG | DIASTOLIC BLOOD PRESSURE: 62 MMHG | HEART RATE: 104 BPM | BODY MASS INDEX: 41.66 KG/M2 | HEIGHT: 56 IN | WEIGHT: 185.2 LBS | OXYGEN SATURATION: 96 % | TEMPERATURE: 98.7 F

## 2021-01-20 DIAGNOSIS — I10 ESSENTIAL HYPERTENSION: ICD-10-CM

## 2021-01-20 DIAGNOSIS — E10.21 DIABETIC NEPHROPATHY ASSOCIATED WITH TYPE 1 DIABETES MELLITUS (HCC): ICD-10-CM

## 2021-01-20 DIAGNOSIS — E53.8 B12 DEFICIENCY: ICD-10-CM

## 2021-01-20 DIAGNOSIS — D75.839 THROMBOCYTOSIS: Primary | ICD-10-CM

## 2021-01-20 DIAGNOSIS — D64.9 ANEMIA, UNSPECIFIED TYPE: ICD-10-CM

## 2021-01-20 DIAGNOSIS — R80.8 OTHER PROTEINURIA: Primary | ICD-10-CM

## 2021-01-20 DIAGNOSIS — I50.9 ACUTE CONGESTIVE HEART FAILURE, UNSPECIFIED HEART FAILURE TYPE (HCC): ICD-10-CM

## 2021-01-20 DIAGNOSIS — E66.01 SEVERE OBESITY (BMI 35.0-39.9) WITH COMORBIDITY (HCC): ICD-10-CM

## 2021-01-20 PROCEDURE — 1036F TOBACCO NON-USER: CPT | Performed by: INTERNAL MEDICINE

## 2021-01-20 PROCEDURE — 96372 THER/PROPH/DIAG INJ SC/IM: CPT

## 2021-01-20 PROCEDURE — 96365 THER/PROPH/DIAG IV INF INIT: CPT

## 2021-01-20 PROCEDURE — 99214 OFFICE O/P EST MOD 30 MIN: CPT | Performed by: INTERNAL MEDICINE

## 2021-01-20 PROCEDURE — 3066F NEPHROPATHY DOC TX: CPT | Performed by: INTERNAL MEDICINE

## 2021-01-20 RX ORDER — CYANOCOBALAMIN 1000 UG/ML
1000 INJECTION INTRAMUSCULAR; SUBCUTANEOUS ONCE
Status: COMPLETED | OUTPATIENT
Start: 2021-01-20 | End: 2021-01-20

## 2021-01-20 RX ORDER — SODIUM CHLORIDE 9 MG/ML
20 INJECTION, SOLUTION INTRAVENOUS ONCE
Status: COMPLETED | OUTPATIENT
Start: 2021-01-20 | End: 2021-01-20

## 2021-01-20 RX ORDER — CYANOCOBALAMIN 1000 UG/ML
1000 INJECTION INTRAMUSCULAR; SUBCUTANEOUS ONCE
Status: CANCELLED | OUTPATIENT
Start: 2021-01-27

## 2021-01-20 RX ORDER — SODIUM CHLORIDE 9 MG/ML
20 INJECTION, SOLUTION INTRAVENOUS ONCE
Status: CANCELLED | OUTPATIENT
Start: 2021-01-27

## 2021-01-20 RX ADMIN — CYANOCOBALAMIN 1000 MCG: 1000 INJECTION, SOLUTION INTRAMUSCULAR at 13:17

## 2021-01-20 RX ADMIN — SODIUM CHLORIDE 20 ML/HR: 9 INJECTION, SOLUTION INTRAVENOUS at 13:15

## 2021-01-20 RX ADMIN — IRON SUCROSE 200 MG: 20 INJECTION, SOLUTION INTRAVENOUS at 13:15

## 2021-01-20 NOTE — PROGRESS NOTES
Pt tolerated tx well with no reactions  Pt received B12 injection into left deltoid  Next appt's scheduled  Pt ambulated off unit with steady gait for d/c

## 2021-01-20 NOTE — PROGRESS NOTES
NEPHROLOGY OUTPATIENT PROGRESS NOTE   Yesenia Underwood 39 y o  female MRN: [id number]  DATE: 1/20/2021  Reason for visit:   Chief Complaint   Patient presents with    Follow-up    Proteinuria        Patient Instructions   1  Proteinuria in setting of DM type 1, concern for diabetic nephropathy in setting of uncontrolled blood sugars  -of note, normal renal function with last sCr 0 93 as of 1/13/21  -last microalb:Cr has risen to 1857 mg/g but improved back down to 363 mg/g as of Oct  2020  Repeat microalb:Cr  -already on total 40mg lisinopril daily begun 7/10/17  -avoid nonsteroidals including ibuprofen, aleve, advil, motrin, naproxen, indomethacin, celebrex, toradol as these medications can lead to increased protein in the urine     2  Hypertension - BP a bit elevated in office but will check BP at home  -BP cuff prescribed  -continue on lisinopril 20mg twice daily, lasix 40mg daily - continue this  -avoid high salt/sodium containing diet  -avoid beverages containing caffeine     3  Anemia - Hgb normal range as of Nov 2017 but has low iron saturation, follows with hematology  Monitor CBC      4  High normal potassium - in setting of ACEi use  Limit high potassium foods  [address] low potassium diet for more information  Last K 4 9 as of 1/13/21     5  Uncontrolled DM type 1 with retinopathy - last A1C 10 6-->9 7, needs improved glycemic control to improve proteinuria and decrease risk of CKD developing  Would benefit from insulin pump  Will d/w endocrinology      6  GERD - on omeprazole 20mg daily     RTC in 6 months  Aleks García was seen today for follow-up and proteinuria  Diagnoses and all orders for this visit:    Other proteinuria  -     Basic metabolic panel; Future  -     Urinalysis with microscopic; Future  -     Microalbumin, Random Urine (W/Creatinine);  Future  -     Basic metabolic panel  -     Urinalysis with microscopic    Diabetic nephropathy associated with type 1 diabetes mellitus ([facility] [address] )    Essential hypertension  -     Blood Pressure Monitoring [place]; Use 2 (two) times a day Monitor BP twice daily  Call office if BP > 140/90 persistently  Acute congestive heart failure, unspecified heart failure type (HCC)    Severe obesity (BMI 35 0-39  9) with comorbidity (HCC)        Assessment/Plan:  1  Proteinuria in setting of DM type 1, concern for diabetic nephropathy in setting of uncontrolled blood sugars  -of note, normal renal function with last sCr 0 93 as of 1/13/21  -last microalb:Cr has risen to 1857 mg/g but improved back down to 363 mg/g as of Oct  2020  Repeat microalb:Cr  -already on total 40mg lisinopril daily begun 7/10/17  -avoid nonsteroidals including ibuprofen, aleve, advil, motrin, naproxen, indomethacin, celebrex, toradol as these medications can lead to increased protein in the urine     2  Hypertension - BP a bit elevated in office but will check BP at home  -BP cuff prescribed  -continue on lisinopril 20mg twice daily, lasix 40mg daily - continue this  -avoid high salt/sodium containing diet  -avoid beverages containing caffeine     3  Anemia - Hgb normal range as of Nov 2017 but has low iron saturation, follows with hematology  Monitor CBC      4  High normal potassium - in setting of ACEi use  Limit high potassium foods  [address] low potassium diet for more information  Last K 4 9 as of 1/13/21     5  Uncontrolled DM type 1 with retinopathy - last A1C 10 6-->9 7, needs improved glycemic control to improve proteinuria and decrease risk of CKD developing  Would benefit from insulin pump  Will d/w endocrinology      6  GERD - on omeprazole 20mg daily     RTC in 6 months  SUBJECTIVE / INTERVAL HISTORY:  39 y o  female presents in follow up of proteinuria  Sukumar Duffy was admitted for SOB due to [facility] in early Aug  2020 and is now on aspirin 81mg and lasix  Denies NSAID use  DM1 - sugars still very elevated   Has been to DM education  She wants to use dexcom G6 sensor and then use the pump  Having to take a lot of extra insulin  HTN - does not have cuff at home to monitor at home    Review of Systems   Constitutional: Negative for chills and fever  HENT: Negative for sore throat and trouble swallowing  Eyes: Negative for visual disturbance  Respiratory: Negative for cough and shortness of breath  Cardiovascular: Negative for chest pain and leg swelling  Gastrointestinal: Negative for diarrhea, nausea and vomiting  Endocrine: Negative for polyuria  Genitourinary: Negative for difficulty urinating, dysuria and hematuria  Musculoskeletal: Positive for back pain (chronic)  Negative for neck pain  Skin: Negative for rash  Neurological: Negative for dizziness, light-headedness and numbness  Hematological: Negative for adenopathy  Does not bruise/bleed easily  Psychiatric/Behavioral: The patient is not nervous/anxious  OBJECTIVE:  /62 (BP Location: Left arm, Patient Position: Sitting, Cuff Size: Large)   Pulse 104   Temp 98 7 °F (37 1 °C) (Tympanic)   Ht 4' 7 75" (1 416 m)   Wt 84 kg (185 lb 3 2 oz)   SpO2 96%   BMI 41 89 kg/m²  Body mass index is 41 89 kg/m²  Physical exam:  Physical Exam  Vitals signs and nursing note reviewed  Constitutional:       General: She is not in acute distress  Appearance: Normal appearance  She is well-developed  She is obese  She is not diaphoretic  HENT:      Head: Normocephalic and atraumatic  Mouth/Throat:      Mouth: Mucous membranes are moist       Pharynx: No oropharyngeal exudate  Eyes:      General: No scleral icterus  Right eye: No discharge  Left eye: No discharge  Neck:      Musculoskeletal: Normal range of motion and neck supple  Thyroid: No thyromegaly  Cardiovascular:      Rate and Rhythm: Normal rate and regular rhythm  Heart sounds: No murmur     Pulmonary:      Effort: Pulmonary effort is normal  No respiratory distress  Breath sounds: Normal breath sounds  No wheezing  Abdominal:      General: Bowel sounds are normal  There is no distension  Palpations: Abdomen is soft  Musculoskeletal: Normal range of motion  General: No swelling  Skin:     General: Skin is warm and dry  Findings: No rash  Neurological:      General: No focal deficit present  Mental Status: She is alert  Motor: No abnormal muscle tone  Comments: awake   Psychiatric:         Mood and Affect: Mood normal          Behavior: Behavior normal          Medications:    Current Outpatient Medications:     acetaminophen (TYLENOL) 500 mg tablet, Take 2 tablets (1,000 mg total) by mouth every 6 (six) hours as needed for mild pain or moderate pain, Disp: 30 tablet, Rfl: 0    acetone, urine, test strip, Use to test urine ketones for high blood sugars  , Disp: 25 each, Rfl: 6    albuterol (PROVENTIL HFA,VENTOLIN HFA) 90 mcg/act inhaler, INHALE 2 PUFFS BY MOUTH EVERY 4 HOURS AS NEEDED FOR WHEEZING OR SHORTNESS OF BREATH, Disp: 25 5 g, Rfl: 0    Ascorbic Acid (VITAMIN C) 500 MG CAPS, Take 2 capsules by mouth daily, Disp: , Rfl:     aspirin (ECOTRIN LOW STRENGTH) 81 mg EC tablet, Take 1 tablet (81 mg total) by mouth daily, Disp: 30 tablet, Rfl: 6    atorvastatin (LIPITOR) 40 mg tablet, Take 1 tablet (40 mg total) by mouth daily, Disp: 30 tablet, Rfl: 5    Biotin 10 MG TABS, Take 1 tablet by mouth daily, Disp: , Rfl:     Butalbital-APAP-Caffeine (Fioricet) -40 MG CAPS, Take 1 tablet by mouth 4 (four) times a day as needed ([facility]), Disp: 30 capsule, Rfl: 0    fluticasone-salmeterol (Advair Diskus) 250-50 mcg/dose inhaler, Inhale 1 puff 2 (two) times a day Rinse mouth after use , Disp: 3 Inhaler, Rfl: 3    furosemide (LASIX) 40 mg tablet, Take 1 tablet (40 mg total) by mouth daily Take 1/2 tablet daily (Patient taking differently: Take 40 mg by mouth daily ), Disp: 30 tablet, Rfl: 3    glucagon (Glucagon Emergency) 1 MG injection, Inject 1 mg under the skin once as needed for low blood sugar for up to 1 dose, Disp: 1 kit, Rfl: 3    Glucagon (Gvoke HypoPen 2-Pack) 1 MG/0 2ML SOAJ, Use if hypoglycemia prn, Disp: 1 Syringe, Rfl: 6    insulin aspart (NovoLOG) 100 units/mL injection, Take 1 unit of novolog for every 1 g of carb, up to 150 units daily, Disp: 50 mL, Rfl: 6    insulin glargine (Lantus SoloStar) 100 units/mL injection pen, Inject 78 Units under the skin daily at bedtime, Disp: 10 pen, Rfl: 3    lisinopril (ZESTRIL) 20 mg tablet, TAKE 2 TABLETS BY MOUTH EVERY DAY, Disp: 60 tablet, Rfl: 0    montelukast (SINGULAIR) 10 mg tablet, TAKE 1 TABLET BY MOUTH AT BEDTIME, Disp: 90 tablet, Rfl: 0    omeprazole (PriLOSEC) 20 mg delayed release capsule, Take 20 mg by mouth daily  , Disp: , Rfl:     OneTouch Ultra test strip, Test 3 times daily, Disp: 300 each, Rfl: 3    PARoxetine (PAXIL) 20 mg tablet, TAKE 1 TABLET BY MOUTH EVERY DAY, Disp: 90 tablet, Rfl: 0    Probiotic Product (ALIGN PO), Take by mouth, Disp: , Rfl:     Blood Pressure Monitoring [place], Use 2 (two) times a day Monitor BP twice daily  Call office if BP > 140/90 persistently  , Disp: 1 Device, Rfl: 0    cyanocobalamin (VITAMIN B-12) 100 mcg tablet, Take by mouth daily, Disp: , Rfl:     Allergies:   Allergies as of 01/20/2021    (No Known Allergies)       The following portions of the patient's history were reviewed and updated as appropriate: past family history, past surgical history and problem list     Laboratory Results:  Lab Results   Component Value Date    SODIUM 141 01/13/2021    K 4 9 01/13/2021     01/13/2021    CO2 26 01/13/2021    BUN 56 (H) 01/13/2021    CREATININE 0 93 01/13/2021    GLUC 92 01/13/2021    CALCIUM 9 4 01/13/2021        Lab Results   Component Value Date    CALCIUM 9 4 01/13/2021    PHOS 4 8 (H) 08/07/2020       Portions of the record may have been created with voice recognition software   Occasional wrong word or "sound a like" substitutions may have occurred due to the inherent limitations of voice recognition software   Read the chart carefully and recognize, using context, where substitutions have occurred

## 2021-01-20 NOTE — PATIENT INSTRUCTIONS
1  Proteinuria in setting of DM type 1, concern for diabetic nephropathy in setting of uncontrolled blood sugars  -of note, normal renal function with last sCr 0 93 as of 1/13/21  -last microalb:Cr has risen to 1857 mg/g but improved back down to 363 mg/g as of Oct  2020  Repeat microalb:Cr  -already on total 40mg lisinopril daily begun 7/10/17  -avoid nonsteroidals including ibuprofen, aleve, advil, motrin, naproxen, indomethacin, celebrex, toradol as these medications can lead to increased protein in the urine     2  Hypertension - BP a bit elevated in office but will check BP at home  -BP cuff prescribed  -continue on lisinopril 20mg twice daily, lasix 40mg daily - continue this  -avoid high salt/sodium containing diet  -avoid beverages containing caffeine     3  Anemia - Hgb normal range as of Nov 2017 but has low iron saturation, follows with hematology  Monitor CBC      4  High normal potassium - in setting of ACEi use  Limit high potassium foods  [address] low potassium diet for more information  Last K 4 9 as of 1/13/21     5  Uncontrolled DM type 1 with retinopathy - last A1C 10 6-->9 7, needs improved glycemic control to improve proteinuria and decrease risk of CKD developing  Would benefit from insulin pump  Will d/w endocrinology      6  GERD - on omeprazole 20mg daily     RTC in 6 months

## 2021-01-20 NOTE — PLAN OF CARE
Problem: PAIN - ADULT  Goal: Verbalizes/displays adequate comfort level or baseline comfort level  Description: Interventions:  - Encourage patient to monitor pain and request assistance  - Assess pain using appropriate pain scale  - Administer analgesics based on type and severity of pain and evaluate response  - Implement non-pharmacological measures as appropriate and evaluate response  - Consider cultural and social influences on pain and pain management  - Notify physician/advanced practitioner if interventions unsuccessful or patient reports new pain  Outcome: Progressing     Problem: INFECTION - ADULT  Goal: Absence or prevention of progression during hospitalization  Description: INTERVENTIONS:  - Assess and monitor for signs and symptoms of infection  - Monitor lab/diagnostic results  - Monitor all insertion sites, i e  indwelling lines, tubes, and drains  - Monitor endotracheal if appropriate and nasal secretions for changes in amount and color  - [place] appropriate cooling/warming therapies per order  - Administer medications as ordered  - Instruct and encourage patient and family to use good hand hygiene technique  - Identify and instruct in appropriate isolation precautions for identified infection/condition  Outcome: Progressing  Goal: Absence of fever/infection during neutropenic period  Description: INTERVENTIONS:  - Monitor WBC    Outcome: Progressing     Problem: SAFETY ADULT  Goal: Patient will remain free of falls  Description: INTERVENTIONS:  - Assess patient frequently for physical needs  -  Identify cognitive and physical deficits and behaviors that affect risk of falls    -  [place] fall precautions as indicated by assessment   - Educate patient/family on patient safety including physical limitations  - Instruct patient to call for assistance with activity based on assessment  - Modify environment to reduce risk of injury  - Consider OT/PT consult to assist with strengthening/mobility  Outcome: Progressing  Goal: Maintain or return to baseline ADL function  Description: INTERVENTIONS:  -  Assess patient's ability to carry out ADLs; assess patient's baseline for ADL function and identify physical deficits which impact ability to perform ADLs (bathing, care of mouth/teeth, toileting, grooming, dressing, etc )  - Assess/evaluate cause of self-care deficits   - Assess range of motion  - Assess patient's mobility; develop plan if impaired  - Assess patient's need for assistive devices and provide as appropriate  - Encourage maximum independence but intervene and supervise when necessary  - Involve family in performance of ADLs  - Assess for home care needs following discharge   - Consider OT consult to assist with ADL evaluation and planning for discharge  - Provide patient education as appropriate  Outcome: Progressing  Goal: Maintain or return mobility status to optimal level  Description: INTERVENTIONS:  - Assess patient's baseline mobility status (ambulation, transfers, stairs, etc )    - Identify cognitive and physical deficits and behaviors that affect mobility  - Identify mobility aids required to assist with transfers and/or ambulation (gait belt, sit-to-stand, lift, walker, cane, etc )  - [place] fall precautions as indicated by assessment  - Record patient progress and toleration of activity level on Mobility SBAR; progress patient to next Phase/Stage  - Instruct patient to call for assistance with activity based on assessment  - Consider rehabilitation consult to assist with strengthening/weightbearing, etc   Outcome: Progressing     Problem: DISCHARGE PLANNING  Goal: Discharge to home or other facility with appropriate resources  Description: INTERVENTIONS:  - Identify barriers to discharge w/patient and caregiver  - Arrange for needed discharge resources and transportation as appropriate  - Identify discharge learning needs (meds, wound care, etc )  - Arrange for interpretive services to assist at discharge as needed  - Refer to Case Management Department for coordinating discharge planning if the patient needs post-hospital services based on physician/advanced practitioner order or complex needs related to functional status, cognitive ability, or social support system  Outcome: Progressing     Problem: Knowledge Deficit  Goal: Patient/family/caregiver demonstrates understanding of disease process, treatment plan, medications, and discharge instructions  Description: Complete learning assessment and assess knowledge base    Interventions:  - Provide teaching at level of understanding  - Provide teaching via preferred learning methods  Outcome: Progressing

## 2021-01-27 ENCOUNTER — HOSPITAL ENCOUNTER (OUTPATIENT)
Dept: INFUSION CENTER | Facility: HOSPITAL | Age: 46
Discharge: HOME | End: 2021-01-27
Attending: INTERNAL MEDICINE
Payer: COMMERCIAL

## 2021-01-27 ENCOUNTER — OFFICE VISIT (OUTPATIENT)
Dept: DIABETES SERVICES | Facility: HOSPITAL | Age: 46
End: 2021-01-27
Payer: COMMERCIAL

## 2021-01-27 VITALS — BODY MASS INDEX: 40.72 KG/M2 | HEIGHT: 56 IN | WEIGHT: 181 LBS

## 2021-01-27 VITALS
HEART RATE: 84 BPM | TEMPERATURE: 97.6 F | DIASTOLIC BLOOD PRESSURE: 71 MMHG | OXYGEN SATURATION: 100 % | SYSTOLIC BLOOD PRESSURE: 156 MMHG | RESPIRATION RATE: 16 BRPM

## 2021-01-27 DIAGNOSIS — D75.839 THROMBOCYTOSIS: Primary | ICD-10-CM

## 2021-01-27 DIAGNOSIS — E53.8 B12 DEFICIENCY: ICD-10-CM

## 2021-01-27 DIAGNOSIS — D64.9 ANEMIA, UNSPECIFIED TYPE: ICD-10-CM

## 2021-01-27 DIAGNOSIS — E10.65 TYPE 1 DIABETES MELLITUS WITH HYPERGLYCEMIA (HCC): Primary | ICD-10-CM

## 2021-01-27 LAB
LEFT EYE DIABETIC RETINOPATHY: NORMAL
RIGHT EYE DIABETIC RETINOPATHY: NORMAL

## 2021-01-27 PROCEDURE — 96365 THER/PROPH/DIAG IV INF INIT: CPT

## 2021-01-27 PROCEDURE — 3008F BODY MASS INDEX DOCD: CPT | Performed by: INTERNAL MEDICINE

## 2021-01-27 PROCEDURE — 96372 THER/PROPH/DIAG INJ SC/IM: CPT

## 2021-01-27 PROCEDURE — 95249 CONT GLUC MNTR PT PROV EQP: CPT | Performed by: DIETITIAN, REGISTERED

## 2021-01-27 RX ORDER — SODIUM CHLORIDE 9 MG/ML
20 INJECTION, SOLUTION INTRAVENOUS ONCE
Status: CANCELLED | OUTPATIENT
Start: 2021-02-03

## 2021-01-27 RX ORDER — SODIUM CHLORIDE 9 MG/ML
20 INJECTION, SOLUTION INTRAVENOUS ONCE
Status: COMPLETED | OUTPATIENT
Start: 2021-01-27 | End: 2021-01-27

## 2021-01-27 RX ORDER — CYANOCOBALAMIN 1000 UG/ML
1000 INJECTION INTRAMUSCULAR; SUBCUTANEOUS ONCE
Status: CANCELLED | OUTPATIENT
Start: 2021-02-03

## 2021-01-27 RX ORDER — CYANOCOBALAMIN 1000 UG/ML
1000 INJECTION INTRAMUSCULAR; SUBCUTANEOUS ONCE
Status: COMPLETED | OUTPATIENT
Start: 2021-01-27 | End: 2021-01-27

## 2021-01-27 RX ADMIN — SODIUM CHLORIDE 20 ML/HR: 9 INJECTION, SOLUTION INTRAVENOUS at 08:48

## 2021-01-27 RX ADMIN — IRON SUCROSE 200 MG: 20 INJECTION, SOLUTION INTRAVENOUS at 08:47

## 2021-01-27 RX ADMIN — CYANOCOBALAMIN 1000 MCG: 1000 INJECTION, SOLUTION INTRAMUSCULAR at 08:49

## 2021-01-27 NOTE — PROGRESS NOTES
Pt tolerated venofer and B12 with no reactions  Next appt scheduled  Pt ambulated off unit with steady gait for d/c to home

## 2021-01-27 NOTE — Clinical Note
[employer] personal completed- can you please place a referral for [employer] personal training? The one from [facility] just states pump   Thanks! -Jesse Lau

## 2021-01-28 NOTE — PROGRESS NOTES
[employer] G6 Personal Training    Met with Cornelius Montelongo for [employer] G[employer] personal training  Patient comes in today with there own unit to be trained on  Completed all aspects of training, including site selection on rotation, not infusing insulin near the sensor site, proper insertion technique, inserting codes into the , charging, waterproof sensor, range of 20ft, setting high low alarms that can be adjusted based on their preferences  I demonstrated training on our practice device, they put on their first sensor by themselves with no issue  Left my office today with sensor on and in warm up mode  She is running it through her phone  Discussed creating a [employer] account to be able to link and upload from home  [employer]'s phone number is in their paperwork, encouraged Vernell to reach out to [employer] if they have any issues after hours, 24/7  Training completed, will call with questions  She is supposed to be getting an Omnipod, she needs to complete the initial forms to start this process  States she has the forms and the contact info for the rep at home  Lab Results   Component Value Date    HGBA1C 9 7 (A) 11/11/2020       Lab Results   Component Value Date     01/28/2017    SODIUM 141 01/13/2021    K 4 9 01/13/2021     01/13/2021    CO2 26 01/13/2021    AGAP 10 08/14/2020    BUN 56 (H) 01/13/2021    CREATININE 0 93 01/13/2021    GLUC 92 01/13/2021    GLUF 184 (H) 11/14/2017    CALCIUM 9 4 01/13/2021    AST 15 01/13/2021    ALT 16 01/13/2021    ALKPHOS 109 01/13/2021    PROT 5 9 (L) 01/28/2017    TP 7 1 01/13/2021    BILITOT 0 3 01/28/2017    TBILI 0 2 01/13/2021    EGFR 55 08/14/2020         Patient response to instruction    Comprehension: good  Motivation: good  Expected Compliance: good  Response to Teachback: 100%, demonstrated understanding    Thank you for referring your patient to [facility], it was a pleasure working with them today   Please feel free to call with any questions or concerns  Tani Joya, [address]  [facility] [id number]

## 2021-01-29 ENCOUNTER — DOCUMENTATION (OUTPATIENT)
Dept: ENDOCRINOLOGY | Facility: HOSPITAL | Age: 46
End: 2021-01-29

## 2021-01-31 DIAGNOSIS — F32.A DEPRESSION, UNSPECIFIED DEPRESSION TYPE: ICD-10-CM

## 2021-02-01 RX ORDER — PAROXETINE HYDROCHLORIDE 20 MG/1
TABLET, FILM COATED ORAL
Qty: 90 TABLET | Refills: 0 | Status: SHIPPED | OUTPATIENT
Start: 2021-02-01 | End: 2021-05-04

## 2021-02-03 ENCOUNTER — HOSPITAL ENCOUNTER (OUTPATIENT)
Dept: INFUSION CENTER | Facility: HOSPITAL | Age: 46
Discharge: HOME | End: 2021-02-03
Attending: INTERNAL MEDICINE

## 2021-02-07 DIAGNOSIS — I10 ESSENTIAL HYPERTENSION: Chronic | ICD-10-CM

## 2021-02-08 RX ORDER — LISINOPRIL 20 MG/1
TABLET ORAL
Qty: 60 TABLET | Refills: 0 | Status: SHIPPED | OUTPATIENT
Start: 2021-02-08 | End: 2021-03-08

## 2021-02-10 ENCOUNTER — HOSPITAL ENCOUNTER (OUTPATIENT)
Dept: INFUSION CENTER | Facility: HOSPITAL | Age: 46
Discharge: HOME | End: 2021-02-10
Attending: INTERNAL MEDICINE
Payer: COMMERCIAL

## 2021-02-10 VITALS
SYSTOLIC BLOOD PRESSURE: 140 MMHG | OXYGEN SATURATION: 100 % | HEART RATE: 94 BPM | TEMPERATURE: 97.7 F | RESPIRATION RATE: 16 BRPM | DIASTOLIC BLOOD PRESSURE: 63 MMHG

## 2021-02-10 DIAGNOSIS — D75.839 THROMBOCYTOSIS: Primary | ICD-10-CM

## 2021-02-10 DIAGNOSIS — E53.8 B12 DEFICIENCY: ICD-10-CM

## 2021-02-10 DIAGNOSIS — D64.9 ANEMIA, UNSPECIFIED TYPE: ICD-10-CM

## 2021-02-10 PROCEDURE — 96365 THER/PROPH/DIAG IV INF INIT: CPT

## 2021-02-10 PROCEDURE — 96372 THER/PROPH/DIAG INJ SC/IM: CPT

## 2021-02-10 RX ORDER — CYANOCOBALAMIN 1000 UG/ML
1000 INJECTION INTRAMUSCULAR; SUBCUTANEOUS ONCE
Status: CANCELLED | OUTPATIENT
Start: 2021-02-17

## 2021-02-10 RX ORDER — SODIUM CHLORIDE 9 MG/ML
20 INJECTION, SOLUTION INTRAVENOUS ONCE
Status: COMPLETED | OUTPATIENT
Start: 2021-02-10 | End: 2021-02-10

## 2021-02-10 RX ORDER — SODIUM CHLORIDE 9 MG/ML
20 INJECTION, SOLUTION INTRAVENOUS ONCE
Status: CANCELLED | OUTPATIENT
Start: 2021-02-17

## 2021-02-10 RX ORDER — CYANOCOBALAMIN 1000 UG/ML
1000 INJECTION INTRAMUSCULAR; SUBCUTANEOUS ONCE
Status: COMPLETED | OUTPATIENT
Start: 2021-02-10 | End: 2021-02-10

## 2021-02-10 RX ADMIN — IRON SUCROSE 200 MG: 20 INJECTION, SOLUTION INTRAVENOUS at 09:31

## 2021-02-10 RX ADMIN — SODIUM CHLORIDE 20 ML/HR: 9 INJECTION, SOLUTION INTRAVENOUS at 09:20

## 2021-02-10 RX ADMIN — CYANOCOBALAMIN 1000 MCG: 1000 INJECTION, SOLUTION INTRAMUSCULAR at 09:51

## 2021-02-10 NOTE — PROGRESS NOTES
Venofer infusion completed with no adverse reactions  PIV site removed, patient has next appointment and refused AVS  Left unit ambulatory with steady gait

## 2021-02-10 NOTE — PROGRESS NOTES
Pt arrived amb for Venofer infusion  PIV inserted  NSS to kvo, Venofer infusing  Pt comfortable  B12 given IM Lt deltoid

## 2021-02-10 NOTE — PLAN OF CARE
Problem: Potential for Falls  Goal: Patient will remain free of falls  Description: INTERVENTIONS:  - Assess patient frequently for physical needs  -  Identify cognitive and physical deficits and behaviors that affect risk of falls  -  [place] fall precautions as indicated by assessment   - Educate patient/family on patient safety including physical limitations  - Instruct patient to call for assistance with activity based on assessment  - Modify environment to reduce risk of injury  - Consider OT/PT consult to assist with strengthening/mobility  Outcome: Progressing     Problem: Knowledge Deficit  Goal: Patient/family/caregiver demonstrates understanding of disease process, treatment plan, medications, and discharge instructions  Description: Complete learning assessment and assess knowledge base    Interventions:  - Provide teaching at level of understanding  - Provide teaching via preferred learning methods  Outcome: Progressing

## 2021-02-17 ENCOUNTER — HOSPITAL ENCOUNTER (OUTPATIENT)
Dept: INFUSION CENTER | Facility: HOSPITAL | Age: 46
Discharge: HOME | End: 2021-02-17
Attending: INTERNAL MEDICINE
Payer: COMMERCIAL

## 2021-02-17 VITALS
SYSTOLIC BLOOD PRESSURE: 139 MMHG | OXYGEN SATURATION: 98 % | TEMPERATURE: 97.7 F | HEART RATE: 97 BPM | RESPIRATION RATE: 18 BRPM | DIASTOLIC BLOOD PRESSURE: 62 MMHG

## 2021-02-17 DIAGNOSIS — D64.9 ANEMIA, UNSPECIFIED TYPE: ICD-10-CM

## 2021-02-17 DIAGNOSIS — D75.839 THROMBOCYTOSIS: Primary | ICD-10-CM

## 2021-02-17 DIAGNOSIS — E53.8 B12 DEFICIENCY: ICD-10-CM

## 2021-02-17 PROCEDURE — 96365 THER/PROPH/DIAG IV INF INIT: CPT

## 2021-02-17 PROCEDURE — 96372 THER/PROPH/DIAG INJ SC/IM: CPT

## 2021-02-17 RX ORDER — CYANOCOBALAMIN 1000 UG/ML
1000 INJECTION INTRAMUSCULAR; SUBCUTANEOUS ONCE
Status: COMPLETED | OUTPATIENT
Start: 2021-02-17 | End: 2021-02-17

## 2021-02-17 RX ORDER — SODIUM CHLORIDE 9 MG/ML
20 INJECTION, SOLUTION INTRAVENOUS ONCE
Status: CANCELLED | OUTPATIENT
Start: 2021-02-24

## 2021-02-17 RX ORDER — CYANOCOBALAMIN 1000 UG/ML
1000 INJECTION INTRAMUSCULAR; SUBCUTANEOUS ONCE
Status: CANCELLED | OUTPATIENT
Start: 2021-02-24

## 2021-02-17 RX ORDER — SODIUM CHLORIDE 9 MG/ML
20 INJECTION, SOLUTION INTRAVENOUS ONCE
Status: COMPLETED | OUTPATIENT
Start: 2021-02-17 | End: 2021-02-17

## 2021-02-17 RX ADMIN — SODIUM CHLORIDE 20 ML/HR: 9 INJECTION, SOLUTION INTRAVENOUS at 09:51

## 2021-02-17 RX ADMIN — IRON SUCROSE 200 MG: 20 INJECTION, SOLUTION INTRAVENOUS at 09:47

## 2021-02-17 RX ADMIN — CYANOCOBALAMIN 1000 MCG: 1000 INJECTION, SOLUTION INTRAMUSCULAR at 09:47

## 2021-02-17 NOTE — PROGRESS NOTES
Pt tolerated venofer infusion and B12 injection  Small infiltrate noted at the end of infusion  No harm to pt  Pt instructed to use warm compresses at home  Pt has next appts scheduled  Pt ambulated off unit with steady gait for d/c to home

## 2021-02-17 NOTE — PLAN OF CARE
Problem: PAIN - ADULT  Goal: Verbalizes/displays adequate comfort level or baseline comfort level  Description: Interventions:  - Encourage patient to monitor pain and request assistance  - Assess pain using appropriate pain scale  - Administer analgesics based on type and severity of pain and evaluate response  - Implement non-pharmacological measures as appropriate and evaluate response  - Consider cultural and social influences on pain and pain management  - Notify physician/advanced practitioner if interventions unsuccessful or patient reports new pain  Outcome: Progressing     Problem: INFECTION - ADULT  Goal: Absence or prevention of progression during hospitalization  Description: INTERVENTIONS:  - Assess and monitor for signs and symptoms of infection  - Monitor lab/diagnostic results  - Monitor all insertion sites, i e  indwelling lines, tubes, and drains  - Monitor endotracheal if appropriate and nasal secretions for changes in amount and color  - [place] appropriate cooling/warming therapies per order  - Administer medications as ordered  - Instruct and encourage patient and family to use good hand hygiene technique  - Identify and instruct in appropriate isolation precautions for identified infection/condition  Outcome: Progressing  Goal: Absence of fever/infection during neutropenic period  Description: INTERVENTIONS:  - Monitor WBC    Outcome: Progressing     Problem: SAFETY ADULT  Goal: Patient will remain free of falls  Description: INTERVENTIONS:  - Assess patient frequently for physical needs  -  Identify cognitive and physical deficits and behaviors that affect risk of falls    -  [place] fall precautions as indicated by assessment   - Educate patient/family on patient safety including physical limitations  - Instruct patient to call for assistance with activity based on assessment  - Modify environment to reduce risk of injury  - Consider OT/PT consult to assist with strengthening/mobility  Outcome: Progressing  Goal: Maintain or return to baseline ADL function  Description: INTERVENTIONS:  -  Assess patient's ability to carry out ADLs; assess patient's baseline for ADL function and identify physical deficits which impact ability to perform ADLs (bathing, care of mouth/teeth, toileting, grooming, dressing, etc )  - Assess/evaluate cause of self-care deficits   - Assess range of motion  - Assess patient's mobility; develop plan if impaired  - Assess patient's need for assistive devices and provide as appropriate  - Encourage maximum independence but intervene and supervise when necessary  - Involve family in performance of ADLs  - Assess for home care needs following discharge   - Consider OT consult to assist with ADL evaluation and planning for discharge  - Provide patient education as appropriate  Outcome: Progressing  Goal: Maintain or return mobility status to optimal level  Description: INTERVENTIONS:  - Assess patient's baseline mobility status (ambulation, transfers, stairs, etc )    - Identify cognitive and physical deficits and behaviors that affect mobility  - Identify mobility aids required to assist with transfers and/or ambulation (gait belt, sit-to-stand, lift, walker, cane, etc )  - [place] fall precautions as indicated by assessment  - Record patient progress and toleration of activity level on Mobility SBAR; progress patient to next Phase/Stage  - Instruct patient to call for assistance with activity based on assessment  - Consider rehabilitation consult to assist with strengthening/weightbearing, etc   Outcome: Progressing     Problem: DISCHARGE PLANNING  Goal: Discharge to home or other facility with appropriate resources  Description: INTERVENTIONS:  - Identify barriers to discharge w/patient and caregiver  - Arrange for needed discharge resources and transportation as appropriate  - Identify discharge learning needs (meds, wound care, etc )  - Arrange for interpretive services to assist at discharge as needed  - Refer to Case Management Department for coordinating discharge planning if the patient needs post-hospital services based on physician/advanced practitioner order or complex needs related to functional status, cognitive ability, or social support system  Outcome: Progressing     Problem: Knowledge Deficit  Goal: Patient/family/caregiver demonstrates understanding of disease process, treatment plan, medications, and discharge instructions  Description: Complete learning assessment and assess knowledge base    Interventions:  - Provide teaching at level of understanding  - Provide teaching via preferred learning methods  Outcome: Progressing

## 2021-02-18 LAB
ALBUMIN SERPL-MCNC: 3.9 G/DL (ref 3.6–5.1)
ALBUMIN/GLOB SERPL: 1.5 (CALC) (ref 1–2.5)
ALP SERPL-CCNC: 109 U/L (ref 31–125)
ALT SERPL-CCNC: 15 U/L (ref 6–29)
AST SERPL-CCNC: 13 U/L (ref 10–35)
BILIRUB SERPL-MCNC: 0.2 MG/DL (ref 0.2–1.2)
BUN SERPL-MCNC: 51 MG/DL (ref 7–25)
BUN/CREAT SERPL: 47 (CALC) (ref 6–22)
CALCIUM SERPL-MCNC: 9.1 MG/DL (ref 8.6–10.2)
CHLORIDE SERPL-SCNC: 102 MMOL/L (ref 98–110)
CO2 SERPL-SCNC: 24 MMOL/L (ref 20–32)
CREAT SERPL-MCNC: 1.08 MG/DL (ref 0.5–1.1)
EST. AVERAGE GLUCOSE BLD GHB EST-MCNC: 240 (CALC)
EST. AVERAGE GLUCOSE BLD GHB EST-SCNC: 13.3 (CALC)
GLOBULIN SER CALC-MCNC: 2.6 G/DL (CALC) (ref 1.9–3.7)
GLUCOSE SERPL-MCNC: 211 MG/DL (ref 65–99)
HBA1C MFR BLD: 10 % OF TOTAL HGB
POTASSIUM SERPL-SCNC: 5.3 MMOL/L (ref 3.5–5.3)
PROT SERPL-MCNC: 6.5 G/DL (ref 6.1–8.1)
SL AMB EGFR AFRICAN AMERICAN: 72 ML/MIN/1.73M2
SL AMB EGFR NON AFRICAN AMERICAN: 62 ML/MIN/1.73M2
SODIUM SERPL-SCNC: 135 MMOL/L (ref 135–146)
T4 FREE SERPL-MCNC: 1 NG/DL (ref 0.8–1.8)
TSH SERPL-ACNC: 1.78 MIU/L

## 2021-02-18 PROCEDURE — 3046F HEMOGLOBIN A1C LEVEL >9.0%: CPT | Performed by: NURSE PRACTITIONER

## 2021-02-22 DIAGNOSIS — J45.20 MILD INTERMITTENT ASTHMA WITHOUT COMPLICATION: ICD-10-CM

## 2021-02-22 RX ORDER — MONTELUKAST SODIUM 10 MG/1
TABLET ORAL
Qty: 90 TABLET | Refills: 0 | Status: SHIPPED | OUTPATIENT
Start: 2021-02-22 | End: 2021-05-24

## 2021-02-23 ENCOUNTER — OFFICE VISIT (OUTPATIENT)
Dept: GASTROENTEROLOGY | Facility: CLINIC | Age: 46
End: 2021-02-23
Payer: COMMERCIAL

## 2021-02-23 VITALS
BODY MASS INDEX: 42.11 KG/M2 | HEART RATE: 97 BPM | SYSTOLIC BLOOD PRESSURE: 140 MMHG | DIASTOLIC BLOOD PRESSURE: 60 MMHG | HEIGHT: 56 IN | WEIGHT: 187.2 LBS | TEMPERATURE: 98.8 F

## 2021-02-23 DIAGNOSIS — K21.9 GASTROESOPHAGEAL REFLUX DISEASE WITHOUT ESOPHAGITIS: Primary | ICD-10-CM

## 2021-02-23 DIAGNOSIS — E78.2 MIXED HYPERLIPIDEMIA: ICD-10-CM

## 2021-02-23 DIAGNOSIS — E53.8 B12 DEFICIENCY: ICD-10-CM

## 2021-02-23 DIAGNOSIS — D64.9 ANEMIA, UNSPECIFIED TYPE: ICD-10-CM

## 2021-02-23 PROCEDURE — 99244 OFF/OP CNSLTJ NEW/EST MOD 40: CPT | Performed by: INTERNAL MEDICINE

## 2021-02-23 RX ORDER — ATORVASTATIN CALCIUM 40 MG/1
40 TABLET, FILM COATED ORAL DAILY
Qty: 30 TABLET | Refills: 5 | Status: SHIPPED | OUTPATIENT
Start: 2021-02-23 | End: 2021-03-30 | Stop reason: ALTCHOICE

## 2021-02-23 RX ORDER — SODIUM, POTASSIUM,MAG SULFATES 17.5-3.13G
177 SOLUTION, RECONSTITUTED, ORAL ORAL ONCE
Qty: 2 BOTTLE | Refills: 0 | Status: SHIPPED | OUTPATIENT
Start: 2021-02-23 | End: 2021-09-13 | Stop reason: ALTCHOICE

## 2021-02-23 NOTE — PROGRESS NOTES
[facility] - Outpatient Consultation  Maddy Kwong 39 y o  female MRN: [id number]  Encounter: [id number]          ASSESSMENT AND PLAN:      1  Anemia, unspecified type  - iron-deficiency anemia without overt GI bleeding  She is currently receiving B12 and iron infusion  Will schedule her for EGD and colonoscopy for further evaluation  Differential diagnosis include peptic ulcer disease, AVMs and rule out malignancy  Colonoscopy; Future  - EGD; Future  - Na Sulfate-K Sulfate-Mg Sulf (Suprep Bowel Prep Kit) 17 5-3 13-1 6 GM/177ML SOLN; Take 177 mL by mouth once for 1 dose  Dispense: 2 Bottle; Refill: 0    2  Gastroesophageal reflux disease without esophagitis    Continue reflux precaution  Continue omeprazole 20 mg daily   EGD to assess for esophagitis and Jesus's esophagus    3  B12 deficiency   B12 infusion  Follow-up with Hematology     follow-up in 3 months     ______________________________________________________________________    HPI:   42-year-old female with iron-deficiency anemia since 2018 here for further evaluation  She was followed by hematology team and currently receiving iron infusion  In B12 injection  She reports fatigue but denies abdominal pain, diarrhea, constipation, melena or hematochezia  No prior GI workup including EGD or colonoscopy  No known family history of GI malignancy  She does not take NSAIDs   She has reflux symptoms including heartburn and regurgitation which is well controlled with omeprazole 20 mg daily  She has type 1 diabetes and recent nephrology evaluation for proteinuria noted    REVIEW OF SYSTEMS:    CONSTITUTIONAL: Denies any fever, chills, rigors, and weight loss  HEENT: No earache or tinnitus  Denies hearing loss or visual disturbances  CARDIOVASCULAR: No chest pain or palpitations  RESPIRATORY: Denies any cough, hemoptysis, shortness of breath or dyspnea on exertion    GASTROINTESTINAL: As noted in the History of Present Illness  GENITOURINARY: No problems with urination  Denies any hematuria or dysuria  NEUROLOGIC: No dizziness or vertigo, denies headaches  MUSCULOSKELETAL: Denies any muscle or joint pain  SKIN: Denies skin rashes or itching  ENDOCRINE: Denies excessive thirst  Denies intolerance to heat or cold  PSYCHOSOCIAL: Denies depression or anxiety  Denies any recent memory loss  Historical Information   Past Medical History:   Diagnosis Date    Anemia     Asthma     w/acute exacerbation  Last assessed: 10/26/12    Chest pain 2/3/2016    CHF (congestive heart failure) ([place] [address] )     Depression     Diabetes mellitus [facility] )     Diabetic nephropathy ([place] [address] )     Diabetic nephropathy associated with type 1 diabetes mellitus ([place] [address] ) 8/12/2020    Diabetic retinopathy ([place] [address] ) 2/3/2016    Gastroenteritis 02/03/2016    GERD (gastroesophageal reflux disease)     HTN (hypertension)     Hyperlipidemia 2/3/2016    Oligomenorrhea     Polycystic ovaries 2/3/2016    Retinal hemorrhage 2/3/2016    Retinopathy     Thrombocytosis (HCC)     Type 1 diabetes mellitus with ophthalmic manifestation ([place] [address] ) 2/3/2016     Past Surgical History:   Procedure Laterality Date    CATARACT EXTRACTION Left 10/2020    CATARACT EXTRACTION Right     RETINAL LASER PROCEDURE       Social History   Social History     Substance and Sexual Activity   Alcohol Use Not Currently    Frequency: Monthly or less    Comment: Occasional/1 mixed drink twice a month if that     Social History     Substance and Sexual Activity   Drug Use No     Social History     Tobacco Use   Smoking Status Never Smoker   Smokeless Tobacco Never Used   Tobacco Comment    Per [employer]: Former smoker   Quit in 1994     Family History   Problem Relation Age of Onset    Heart murmur Mother     Diabetes Mother         Mellitus    Thyroid disease Mother         Disorder    Diabetes Father         Mellitus    Hypertension Father     Diabetes Maternal Grandfather Mellitus    Breast cancer Paternal Grandmother     Diabetes Maternal Aunt         Mellitus    Diabetes Maternal Uncle         Mellitus    No Known Problems Maternal Grandmother     Leukemia Paternal Grandfather     Substance Abuse Neg Hx     Mental illness Neg Hx     Alcohol abuse Neg Hx        Meds/Allergies       Current Outpatient Medications:     acetaminophen (TYLENOL) 500 mg tablet    acetone, urine, test strip    albuterol (PROVENTIL HFA,VENTOLIN HFA) 90 mcg/act inhaler    Ascorbic Acid (VITAMIN C) 500 MG CAPS    atorvastatin (LIPITOR) 40 mg tablet    Biotin 10 MG TABS    Blood Pressure Monitoring [place]    Butalbital-APAP-Caffeine (Fioricet) -40 MG CAPS    cyanocobalamin (VITAMIN B-12) 100 mcg tablet    fluticasone-salmeterol (Advair Diskus) 250-50 mcg/dose inhaler    glucagon (Glucagon Emergency) 1 MG injection    Glucagon (Gvoke HypoPen 2-Pack) 1 MG/0 2ML SOAJ    insulin aspart (NovoLOG) 100 units/mL injection    insulin glargine (Lantus SoloStar) 100 units/mL injection pen    lisinopril (ZESTRIL) 20 mg tablet    montelukast (SINGULAIR) 10 mg tablet    omeprazole (PriLOSEC) 20 mg delayed release capsule    OneTouch Ultra test strip    PARoxetine (PAXIL) 20 mg tablet    Probiotic Product (ALIGN PO)    aspirin (ECOTRIN LOW STRENGTH) 81 mg EC tablet    furosemide (LASIX) 40 mg tablet    Na Sulfate-K Sulfate-Mg Sulf (Suprep Bowel Prep Kit) 17 5-3 13-1 6 GM/177ML SOLN    No Known Allergies        Objective     Blood pressure 140/60, pulse 97, temperature 98 8 °F (37 1 °C), temperature source Tympanic, height 4' 8" (1 422 m), weight 84 9 kg (187 lb 3 2 oz), not currently breastfeeding  Body mass index is 41 97 kg/m²          PHYSICAL EXAM:      General Appearance:   Alert, cooperative, no distress   HEENT:   Normocephalic, atraumatic, anicteric      Neck:  Supple, symmetrical, trachea midline   Lungs:   Clear to auscultation bilaterally; no rales, rhonchi or wheezing; respirations unlabored    Heart[de-identified]   Regular rate and rhythm; no murmur, rub, or gallop  Abdomen:   Soft, non-tender, non-distended; normal bowel sounds; no masses, no organomegaly    Genitalia:   Deferred    Rectal:   Deferred    Extremities:  No cyanosis, clubbing or edema    Pulses:  2+ and symmetric    Skin:  No jaundice, rashes, or lesions    Lymph nodes:  No palpable cervical lymphadenopathy        Lab Results:   No visits with results within 1 Day(s) from this visit  Latest known visit with results is:   Orders Only on 02/17/2021   Component Date Value    Glucose, Random 02/17/2021 211*    BUN 02/17/2021 51*    Creatinine 02/17/2021 1 08     eGFR Non  02/17/2021 62     eGFR  02/17/2021 72     SL AMB BUN/CREATININE RA* 02/17/2021 47*    Sodium 02/17/2021 135     Potassium 02/17/2021 5 3     Chloride 02/17/2021 102     CO2 02/17/2021 24     Calcium 02/17/2021 9 1     Protein, Total 02/17/2021 6 5     Albumin 02/17/2021 3 9     Globulin 02/17/2021 2 6     Albumin/Globulin Ratio 02/17/2021 1 5     TOTAL BILIRUBIN 02/17/2021 0 2     Alkaline Phosphatase 02/17/2021 109     AST 02/17/2021 13     ALT 02/17/2021 15     Free t4 02/17/2021 1 0     TSH 02/17/2021 1 78     Hemoglobin A1C 02/17/2021 10 0*    Estimated Average Glucose 02/17/2021 240     Estimated Average Glucos* 02/17/2021 13 3          Radiology Results:   No results found

## 2021-02-23 NOTE — PATIENT INSTRUCTIONS
Colon/egd scheduled for 04/07/21 @[facility] with Dr Viola Llanos instructions and Diabetic s instructions given by Bruce Eduardo in office

## 2021-02-24 ENCOUNTER — TELEMEDICINE (OUTPATIENT)
Dept: ENDOCRINOLOGY | Facility: HOSPITAL | Age: 46
End: 2021-02-24
Payer: COMMERCIAL

## 2021-02-24 ENCOUNTER — TELEMEDICINE (OUTPATIENT)
Dept: FAMILY MEDICINE CLINIC | Facility: CLINIC | Age: 46
End: 2021-02-24
Payer: COMMERCIAL

## 2021-02-24 ENCOUNTER — HOSPITAL ENCOUNTER (OUTPATIENT)
Dept: INFUSION CENTER | Facility: HOSPITAL | Age: 46
Discharge: HOME | End: 2021-02-24
Attending: INTERNAL MEDICINE

## 2021-02-24 VITALS — HEIGHT: 56 IN | BODY MASS INDEX: 40.31 KG/M2 | TEMPERATURE: 99.2 F | WEIGHT: 179.2 LBS

## 2021-02-24 DIAGNOSIS — Z20.822 SUSPECTED COVID-19 VIRUS INFECTION: ICD-10-CM

## 2021-02-24 DIAGNOSIS — E10.21 DIABETIC NEPHROPATHY ASSOCIATED WITH TYPE 1 DIABETES MELLITUS (HCC): ICD-10-CM

## 2021-02-24 DIAGNOSIS — E28.2 POLYCYSTIC OVARIAN SYNDROME: Chronic | ICD-10-CM

## 2021-02-24 DIAGNOSIS — I10 ESSENTIAL HYPERTENSION: ICD-10-CM

## 2021-02-24 DIAGNOSIS — E66.01 MORBID OBESITY WITH BMI OF 40.0-44.9, ADULT (HCC): ICD-10-CM

## 2021-02-24 DIAGNOSIS — E78.2 MIXED HYPERLIPIDEMIA: ICD-10-CM

## 2021-02-24 DIAGNOSIS — Z12.31 ENCOUNTER FOR SCREENING MAMMOGRAM FOR MALIGNANT NEOPLASM OF BREAST: Primary | ICD-10-CM

## 2021-02-24 DIAGNOSIS — E10.65 TYPE 1 DIABETES MELLITUS WITH HYPERGLYCEMIA (HCC): Primary | ICD-10-CM

## 2021-02-24 DIAGNOSIS — E10.42 DIABETIC POLYNEUROPATHY ASSOCIATED WITH TYPE 1 DIABETES MELLITUS (HCC): ICD-10-CM

## 2021-02-24 LAB — SARS-COV-2 RNA RESP QL NAA+PROBE: POSITIVE

## 2021-02-24 PROCEDURE — 95251 CONT GLUC MNTR ANALYSIS I&R: CPT | Performed by: INTERNAL MEDICINE

## 2021-02-24 PROCEDURE — 99215 OFFICE O/P EST HI 40 MIN: CPT | Performed by: INTERNAL MEDICINE

## 2021-02-24 PROCEDURE — 3066F NEPHROPATHY DOC TX: CPT | Performed by: NURSE PRACTITIONER

## 2021-02-24 PROCEDURE — U0005 INFEC AGEN DETEC AMPLI PROBE: HCPCS | Performed by: NURSE PRACTITIONER

## 2021-02-24 PROCEDURE — U0003 INFECTIOUS AGENT DETECTION BY NUCLEIC ACID (DNA OR RNA); SEVERE ACUTE RESPIRATORY SYNDROME CORONAVIRUS 2 (SARS-COV-2) (CORONAVIRUS DISEASE [COVID-19]), AMPLIFIED PROBE TECHNIQUE, MAKING USE OF HIGH THROUGHPUT TECHNOLOGIES AS DESCRIBED BY CMS-2020-01-R: HCPCS | Performed by: NURSE PRACTITIONER

## 2021-02-24 PROCEDURE — 99213 OFFICE O/P EST LOW 20 MIN: CPT | Performed by: NURSE PRACTITIONER

## 2021-02-24 NOTE — PATIENT INSTRUCTIONS
The most recent hemoglobin A1c has gone up again to 10%  This may reflect poor blood sugars over the last 3 months given that your DEX com shows significantly better blood sugars more recently  A lot of your blood sugars do tend to go up after meals  I would like you to follow-up with the diabetic educator for revisiting carbohydrate counting and medical nutrition therapy so that we can make sure that your accurately carb counting  This will also help if he due to side to go on the pump  Continue the same insulin dosages for now  Continue to utilize the DEX com  Increase her water intake as your blood work shows your slightly dehydrated  Follow-up in 3 months with blood work

## 2021-02-24 NOTE — PROGRESS NOTES
Virtual Regular Visit      Assessment/Plan:    Problem List Items Addressed This Visit        Endocrine    Polycystic ovarian syndrome (Chronic)    Relevant Orders    HEMOGLOBIN A1C W/ EAG ESTIMATION Lab Collect    Comprehensive metabolic panel Lab Collect    Type 1 diabetes mellitus with hyperglycemia (HCC) - Primary    Relevant Medications    insulin aspart (NovoLOG) 100 units/mL injection    Other Relevant Orders    HEMOGLOBIN A1C W/ EAG ESTIMATION Lab Collect    Comprehensive metabolic panel Lab Collect    Ambulatory referral to Diabetic Education    Diabetic polyneuropathy associated with type 1 diabetes mellitus (HCC)    Relevant Medications    insulin aspart (NovoLOG) 100 units/mL injection    Other Relevant Orders    HEMOGLOBIN A1C W/ EAG ESTIMATION Lab Collect    Comprehensive metabolic panel Lab Collect    Ambulatory referral to Diabetic Education    Diabetic nephropathy associated with type 1 diabetes mellitus (HCC)    Relevant Medications    insulin aspart (NovoLOG) 100 units/mL injection    Other Relevant Orders    HEMOGLOBIN A1C W/ EAG ESTIMATION Lab Collect    Comprehensive metabolic panel Lab Collect    Ambulatory referral to Diabetic Education       Cardiovascular and Mediastinum    Hypertension    Relevant Orders    HEMOGLOBIN A1C W/ EAG ESTIMATION Lab Collect    Comprehensive metabolic panel Lab Collect       Other    Hyperlipidemia    Relevant Orders    HEMOGLOBIN A1C W/ EAG ESTIMATION Lab Collect    Comprehensive metabolic panel Lab Collect          Assessment and plan:    1  Type 1 diabetes  Her most recent hemoglobin A1c has gone up to 10%  This demonstrates markedly uncontrolled diabetes  This may reflect poor blood sugars over the last 3 months given that her recent DEX com download shows better blood sugars then would be evident with that hemoglobin A1c  She only just got her Dexcom within the last month    She is considering insulin pump therapy but is not sure she is ready to do that as yet  At this point, I notice her blood sugars do tend to go up a lot after meals, particularly after the evening meal   I would like her to see the diabetic educator for medical nutrition therapy and carbohydrate counting to make sure she is accurately carbohydrate counting  She for now, she will continue the same Lantus insulin and NovoLog insulin dosages while she gets this Education  She does appear to be slightly dehydrated based on her increased BUN/creatinine ratio and I have asked her to make sure she drinks a bit extra water  She does give insulin 4 times a day and checked her blood sugars at least 4 times a day so is a candidate for her DEX com continuous glucose monitoring system  She will continue to utilize the [address] regularly  2  Diabetic neuropathy  She has no neuropathic symptoms  Diabetic foot exams are up-to-date  3  Diabetic nephropathy  She will continue the same lisinopril 20 mg daily for renal protection  She does see a nephrologist regularly  4  Hypertension  Blood pressure could not be evaluated today as this was a telemedicine visit  She will continue the same furosemide and lisinopril  5  Hyperlipidemia  She will continue the same atorvastatin 40 mg daily  I have asked her to follow up in 3 months with preceding hemoglobin A1c and CMP  Reason for visit is   Chief Complaint   Patient presents with    Virtual Regular Visit        Encounter provider Marissa Melton MD    Provider located at [address] [place] [address] [address] [place] [address]      Recent Visits  Date Type Provider Dept   02/24/21 Telemedicine Marissa Melton MD [facility] [place]   Showing recent visits within past 7 days and meeting all other requirements     Future Appointments  No visits were found meeting these conditions     Showing future appointments within next 150 days and meeting all other requirements        The patient was identified by name and date of birth  Derek Garay was informed that this is a telemedicine visit and that the visit is being conducted through [employer] and patient was informed that this is not a secure, HIPAA-compliant platform  She agrees to proceed     My office door was closed  No one else was in the room  She acknowledged consent and understanding of privacy and security of the video platform  The patient has agreed to participate and understands they can discontinue the visit at any time  Patient is aware this is a billable service  Subjective  Derek Garay is a 39 y o  female   With a history of type 1 diabetes with diabetic neuropathy and diabetic nephropathy, hypertension, hyperlipidemia for follow-up visit via video telemedicine   She was diagnosed with type 1 diabetes about 27 years ago  She utilizes insulin therapy and takes Lantus insulin 78 units at bedtime and NovoLog insulin 1 unit per 1 g carbohydrate with each meal     She denies polyuria, polydipsia, polyphagia, or nocturia  She denies blurry vision  She denies numbness or tingling of the feet  She denies chest pain or shortness of breath  Diabetic complications include diabetic neuropathy, diabetic nephropathy, diabetic retinopathy  She denies heart attack, stroke, or claudication  Last diabetic foot exam was performed in the endocrine office in November 2020  She reports her last eye exam was January 2021 and there was no change in her retinopathy  She says she is due for a next eye exam in 6 months  She uses a Dexcom continuous glucose monitoring system to check her blood sugars frequently throughout the day  She does receive this Dexcom within the last month  DEX com download from 02/11/2021 through 02/24/2021 was reviewed in the office at the visit  Average blood glucose is 190 mg/dL with a standard deviation of 60 mg/dL    Blood sugars are in range 44% of the time, high 40% of the time, very high 15% of the time, low less than 1% of the time, very low less than 1% of the time  She has hypertension and diabetic nephropathy and takes lisinopril 20 mg 2 tablets daily  She has occasional headaches but no stroke-like symptoms  She has hyperlipidemia and takes atorvastatin 40 mg daily  She denies chest pain or shortness of breath  Of note she reports that she has not been feeling well in general today and is congested with a headache  She just had a COVID test done  Of note she did get a COVID vaccine last week  She also has had quite a few months of fatigue and is now on iron infusions for low iron  HPI     Past Medical History:   Diagnosis Date    Anemia     Asthma     w/acute exacerbation  Last assessed: 10/26/12    Chest pain 2/3/2016    [facility]cong[facility] failure[facility]     Depression     Diabetes mellitus ([facility] )     Diabetic nephropathy ([facility] )     Diabetic nephropathy associated with type 1 diabetes mellitus ([place] [address] ) 8/12/2020    Diabetic retinopathy ([place] [address] ) 2/3/2016    Gastroenteritis 02/03/2016    GERD (gastroesophageal reflux disease)     HTN (hypertension)     Hyperlipidemia 2/3/2016    Oligomenorrhea     Polycystic ovaries 2/3/2016    Retinal hemorrhage 2/3/2016    Retinopathy     Thrombocytosis (HCC)     Type 1 diabetes mellitus with ophthalmic manifestation ([facility] ) 2/3/2016       Past Surgical History:   Procedure Laterality Date    CATARACT EXTRACTION Left 10/2020    CATARACT EXTRACTION Right     RETINAL LASER PROCEDURE         Current Outpatient Medications   Medication Sig Dispense Refill    acetaminophen (TYLENOL) 500 mg tablet Take 2 tablets (1,000 mg total) by mouth every 6 (six) hours as needed for mild pain or moderate pain 30 tablet 0    acetone, urine, test strip Use to test urine ketones for high blood sugars   25 each 6    albuterol (PROVENTIL HFA,VENTOLIN HFA) 90 mcg/act inhaler INHALE 2 PUFFS BY MOUTH EVERY 4 HOURS AS NEEDED FOR WHEEZING OR SHORTNESS OF BREATH 25 5 g 0    Ascorbic Acid (VITAMIN C) 500 MG CAPS Take 2 capsules by mouth daily      aspirin (ECOTRIN LOW STRENGTH) 81 mg EC tablet Take 1 tablet (81 mg total) by mouth daily 30 tablet 6    atorvastatin (LIPITOR) 40 mg tablet Take 1 tablet (40 mg total) by mouth daily 30 tablet 5    Biotin 10 MG TABS Take 1 tablet by mouth daily      Blood Pressure Monitoring [place] Use 2 (two) times a day Monitor BP twice daily  Call office if BP > 140/90 persistently  1 Device 0    Butalbital-APAP-Caffeine (Fioricet) -40 MG CAPS Take 1 tablet by mouth 4 (four) times a day as needed (headahce) 30 capsule 0    cyanocobalamin (VITAMIN B-12) 100 mcg tablet Take by mouth daily      fluticasone-salmeterol (Advair Diskus) 250-50 mcg/dose inhaler Inhale 1 puff 2 (two) times a day Rinse mouth after use  3 Inhaler 3    furosemide (LASIX) 40 mg tablet Take 1 tablet (40 mg total) by mouth daily Take 1/2 tablet daily (Patient taking differently: Take 40 mg by mouth daily ) 30 tablet 3    glucagon (Glucagon Emergency) 1 MG injection Inject 1 mg under the skin once as needed for low blood sugar for up to 1 dose 1 kit 3    Glucagon (Gvoke HypoPen 2-Pack) 1 MG/0 2ML SOAJ Use if hypoglycemia prn 1 Syringe 6    insulin aspart (NovoLOG) 100 units/mL injection Take 1 unit of novolog for every 1 g of carb, up to 150 units daily 50 mL 6    insulin glargine (Lantus SoloStar) 100 units/mL injection pen Inject 78 Units under the skin daily at bedtime 10 pen 3    lisinopril (ZESTRIL) 20 mg tablet TAKE 2 TABLETS BY MOUTH EVERY DAY 60 tablet 0    montelukast (SINGULAIR) 10 mg tablet TAKE 1 TABLET BY MOUTH AT BEDTIME 90 tablet 0    Na Sulfate-K Sulfate-Mg Sulf (Suprep Bowel Prep Kit) 17 5-3 13-1 6 GM/177ML SOLN Take 177 mL by mouth once for 1 dose 2 Bottle 0    omeprazole (PriLOSEC) 20 mg delayed release capsule Take 20 mg by mouth daily        OneTouch Ultra test strip Test 3 times daily 300 each 3    PARoxetine (PAXIL) 20 mg tablet TAKE 1 TABLET BY MOUTH EVERY DAY 90 tablet 0    Probiotic Product (ALIGN PO) Take by mouth       No current facility-administered medications for this visit  No Known Allergies    Review of Systems   Constitutional: Positive for fatigue  Negative for unexpected weight change  HENT: Negative for trouble swallowing  Eyes: Negative for visual disturbance  Respiratory: Negative for chest tightness and shortness of breath  Cardiovascular: Negative for chest pain and leg swelling  Gastrointestinal: Negative for abdominal pain, constipation, diarrhea and nausea  Endocrine: Negative for polydipsia, polyphagia and polyuria  No nocturia  Skin: Negative for wound  Neurological: Positive for headaches  Negative for dizziness, weakness, light-headedness and numbness  Hematological:        Has iron deficiency and now getting IV iron infusions  Psychiatric/Behavioral: Negative for sleep disturbance  Video Exam    There were no vitals filed for this visit  Physical Exam     Physical Exam   Constitutional: She is oriented to person, place, and time  She appears well-developed and well-nourished  No distress  HENT:   Head: Normocephalic and atraumatic  Neck: Normal range of motion  Pulmonary/Chest: Effort normal    No audible wheezing  Musculoskeletal: Normal range of motion  Neurological: She is alert and oriented to person, place, and time  Skin: She is not diaphoretic  Psychiatric: She has a normal mood and affect  Her behavior is normal      Blood work:     Blood work done on 02/17/2021 showed hemoglobin A1c of 10%  TSH is 1 78 with a free T4 of 1        CMP showed us glucose of 211 fasting, BUN 51 with creatinine 1 09, GFR 62, BUN /creatinine ratio 47, but was otherwise normal       I spent 15 minutes directly with the patient during this visit      VIRTUAL VISIT DISCLAIMER    Vernell HARRINGTON Carmen Shoemaker acknowledges that she has consented to an online visit or consultation  She understands that the online visit is based solely on information provided by her, and that, in the absence of a face-to-face physical evaluation by the physician, the diagnosis she receives is both limited and provisional in terms of accuracy and completeness  This is not intended to replace a full medical face-to-face evaluation by the physician  Chuy Soto understands and accepts these terms

## 2021-02-24 NOTE — PROGRESS NOTES
COVID-19 Virtual Visit     Assessment/Plan:    Problem List Items Addressed This Visit     None      Visit Diagnoses     Encounter for screening mammogram for malignant neoplasm of breast    -  Primary    Relevant Orders    Mammo screening bilateral w 3d & cad    Morbid obesity with BMI of 40 0-44 9, adult ([facility] )             Disposition:         *** DOCUMENT TIME SPENT ***       Encounter provider LELAND Ruelas    Provider located at [address] [place] [address] [address] [facility]  [phone]    Recent Visits  No visits were found meeting these conditions  Showing recent visits within past 7 days and meeting all other requirements     Today's Visits  Date Type Provider Dept   02/24/21 Telemedicine LELAND Ruelas [address] [facility]   Showing today's visits and meeting all other requirements     Future Appointments  No visits were found meeting these conditions  Showing future appointments within next 150 days and meeting all other requirements      Edu Vegas acknowledged consent and understanding of privacy and security of the telemedicine visit  I informed the patient that I have reviewed her record in Epic and presented the opportunity for her to ask any questions regarding the visit today  The patient agreed to participate  *** MUST SELECT TYPE OF VISIT AND DOCUMENT HPI ***    Lab Results   Component Value Date    SARSCOV2 Negative 08/07/2020    SARSCOV2 Not Detected 05/28/2020     Past Medical History:   Diagnosis Date    Anemia     Asthma     w/acute exacerbation   Last assessed: 10/26/12    Chest pain 2/3/2016    CHF (congestive heart failure) ([facility] )     Depression     Diabetes mellitus ([facility] )     Diabetic nephropathy ([facility] )     Diabetic nephropathy associated with type 1 diabetes mellitus ([facility] ) 8/12/2020    Diabetic retinopathy ([facility] ) 2/3/2016    Gastroenteritis 02/03/2016  GERD (gastroesophageal reflux disease)     HTN (hypertension)     Hyperlipidemia 2/3/2016    Oligomenorrhea     Polycystic ovaries 2/3/2016    Retinal hemorrhage 2/3/2016    Retinopathy     Thrombocytosis (HCC)     Type 1 diabetes mellitus with ophthalmic manifestation ([facility] ) 2/3/2016     Past Surgical History:   Procedure Laterality Date    CATARACT EXTRACTION Left 10/2020    CATARACT EXTRACTION Right     RETINAL LASER PROCEDURE       Current Outpatient Medications   Medication Sig Dispense Refill    acetaminophen (TYLENOL) 500 mg tablet Take 2 tablets (1,000 mg total) by mouth every 6 (six) hours as needed for mild pain or moderate pain 30 tablet 0    acetone, urine, test strip Use to test urine ketones for high blood sugars  25 each 6    albuterol (PROVENTIL HFA,VENTOLIN HFA) 90 mcg/act inhaler INHALE 2 PUFFS BY MOUTH EVERY 4 HOURS AS NEEDED FOR WHEEZING OR SHORTNESS OF BREATH 25 5 g 0    Ascorbic Acid (VITAMIN C) 500 MG CAPS Take 2 capsules by mouth daily      atorvastatin (LIPITOR) 40 mg tablet Take 1 tablet (40 mg total) by mouth daily 30 tablet 5    Biotin 10 MG TABS Take 1 tablet by mouth daily      Blood Pressure Monitoring [place] Use 2 (two) times a day Monitor BP twice daily  Call office if BP > 140/90 persistently  1 Device 0    Butalbital-APAP-Caffeine (Fioricet) -40 MG CAPS Take 1 tablet by mouth 4 (four) times a day as needed (headahce) 30 capsule 0    cyanocobalamin (VITAMIN B-12) 100 mcg tablet Take by mouth daily      fluticasone-salmeterol (Advair Diskus) 250-50 mcg/dose inhaler Inhale 1 puff 2 (two) times a day Rinse mouth after use   3 Inhaler 3    glucagon (Glucagon Emergency) 1 MG injection Inject 1 mg under the skin once as needed for low blood sugar for up to 1 dose 1 kit 3    Glucagon (Gvoke HypoPen 2-Pack) 1 MG/0 2ML SOAJ Use if hypoglycemia prn 1 Syringe 6    insulin aspart (NovoLOG) 100 units/mL injection Take 1 unit of novolog for every 1 g of carb, up to 150 units daily 50 mL 6    insulin glargine (Lantus SoloStar) 100 units/mL injection pen Inject 78 Units under the skin daily at bedtime 10 pen 3    lisinopril (ZESTRIL) 20 mg tablet TAKE 2 TABLETS BY MOUTH EVERY DAY 60 tablet 0    montelukast (SINGULAIR) 10 mg tablet TAKE 1 TABLET BY MOUTH AT BEDTIME 90 tablet 0    omeprazole (PriLOSEC) 20 mg delayed release capsule Take 20 mg by mouth daily   OneTouch Ultra test strip Test 3 times daily 300 each 3    PARoxetine (PAXIL) 20 mg tablet TAKE 1 TABLET BY MOUTH EVERY DAY 90 tablet 0    Probiotic Product (ALIGN PO) Take by mouth      aspirin (ECOTRIN LOW STRENGTH) 81 mg EC tablet Take 1 tablet (81 mg total) by mouth daily 30 tablet 6    furosemide (LASIX) 40 mg tablet Take 1 tablet (40 mg total) by mouth daily Take 1/2 tablet daily (Patient taking differently: Take 40 mg by mouth daily ) 30 tablet 3    Na Sulfate-K Sulfate-Mg Sulf (Suprep Bowel Prep Kit) 17 5-3 13-1 6 GM/177ML SOLN Take 177 mL by mouth once for 1 dose 2 Bottle 0     No current facility-administered medications for this visit  No Known Allergies    Review of Systems  Objective:    Vitals:    02/24/21 0914   Temp: 99 2 °F (37 3 °C)   TempSrc: Oral   Weight: 81 3 kg (179 lb 3 2 oz)   Height: 4' 8" (1 422 m)       Physical Exam  VIRTUAL VISIT DISCLAIMER    Christine Crawford acknowledges that she has consented to an online visit or consultation  She understands that the online visit is based solely on information provided by her, and that, in the absence of a face-to-face physical evaluation by the physician, the diagnosis she receives is both limited and provisional in terms of accuracy and completeness  This is not intended to replace a full medical face-to-face evaluation by the physician  Christine Crawford understands and accepts these terms

## 2021-02-24 NOTE — PROGRESS NOTES
COVID-19 Virtual Visit     Assessment/Plan:    Pt presents via televideo for symptoms of shortness of breath, headache, fever, chills, body aches, congestion & fatigue since 02/23  She denies out of state travel or covid sick contacts  Covid swab ordered  Pt ordered to self-quarantine and isolate at home until results are final  She may continue utilization of Tylenol & Mucinex PRN & was educated on maintaining adequate oral fluid intake  Pt notified that we will contact her once results are final  Pt to call office if her symptoms worsen  Problem List Items Addressed This Visit     None      Visit Diagnoses     Encounter for screening mammogram for malignant neoplasm of breast    -  Primary    Relevant Orders    Mammo screening bilateral w 3d & cad    Morbid obesity with BMI of 40 0-44 9, adult ([address] )        Suspected COVID-19 virus infection        Relevant Orders    Novel Coronavirus (COVID-19), PCR [facility] or [facility]         Disposition:     I recommended self-quarantine for 10 days and to watch for symptoms until 14 days after exposure  If patient were to develop symptoms, they should self isolate and call our office for further guidance  I referred patient to one of our centralized sites for a COVID-19 swab  I have spent 15 minutes directly with the patient  Greater than 50% of this time was spent in counseling/coordination of care regarding: patient and family education and risk factor reductions  Encounter provider LELAND Hui    Provider located at [address] [place], [place] [address] [facility]  [phone]    Recent Visits  No visits were found meeting these conditions     Showing recent visits within past 7 days and meeting all other requirements     Today's Visits  Date Type Provider Dept   02/24/21 805 [facility], [address] [place] [address] Fp   Showing today's visits and meeting all other requirements     Future Appointments  No visits were found meeting these conditions  Showing future appointments within next 150 days and meeting all other requirements      This virtual check-in was done via [employer] and patient was informed that this is not a secure, HIPAA-compliant platform  She agrees to proceed  Patient agrees to participate in a virtual check in via telephone or video visit instead of presenting to the office to address urgent/immediate medical needs  Patient is aware this is a billable service  After connecting through [facility], the patient was identified by name and date of birth  Poppy Beckett was informed that this was a telemedicine visit and that the exam was being conducted confidentially over secure lines  My office door was closed  No one else was in the room  Poppy Beckett acknowledged consent and understanding of privacy and security of the telemedicine visit  I informed the patient that I have reviewed her record in Epic and presented the opportunity for her to ask any questions regarding the visit today  The patient agreed to participate  Subjective:   Poppy Beckett is a 39 y o  female who is concerned about COVID-19  Patient's symptoms include fever (99 2 after Tylenol), chills, fatigue, malaise, nasal congestion, rhinorrhea, cough, shortness of breath, myalgias and headache  Patient denies sore throat, anosmia, loss of taste, chest tightness, abdominal pain, nausea, vomiting and diarrhea       Date of symptom onset: 2/23/2021    Exposure:   Contact with a person who is under investigation (PUI) for or who is positive for COVID-19 within the last 14 days?: No    Hospitalized recently for fever and/or lower respiratory symptoms?: No      Currently a healthcare worker that is involved in direct patient care?: No      Works in a special setting where the risk of COVID-19 transmission may be high? (this may include long-term care, correctional and long term facilities; homeless shelters; assisted-living facilities and group homes ): No      Resident in a special setting where the risk of COVID-19 transmission may be high? (this may include long-term care, correctional and long term facilities; homeless shelters; assisted-living facilities and group homes ): No      Pt presents via televideo for symptoms of shortness of breath, headache, fever, chills, body aches, congestion & fatigue since 02/23  She denies out of state travel or covid sick contacts  Lab Results   Component Value Date    SARSCOV2 Negative 08/07/2020    SARSCOV2 Not Detected 05/28/2020     Past Medical History:   Diagnosis Date    Anemia     Asthma     w/acute exacerbation  Last assessed: 10/26/12    Chest pain 2/3/2016    [facility]     Depression     Diabetes mellitus [facility]     [facility]     Diabetic nephropathy associated with type 1 diabetes mellitus ([facility] ) 8/12/2020    Diabetic retinopathy ([facility] ) 2/3/2016    Gastroenteritis 02/03/2016    GERD (gastroesophageal reflux disease)     HTN (hypertension)     Hyperlipidemia 2/3/2016    Oligomenorrhea     Polycystic ovaries 2/3/2016    Retinal hemorrhage 2/3/2016    Retinopathy     Thrombocytosis (HCC)     Type 1 diabetes mellitus with ophthalmic manifestation ([facility] ) 2/3/2016     Past Surgical History:   Procedure Laterality Date    CATARACT EXTRACTION Left 10/2020    CATARACT EXTRACTION Right     RETINAL LASER PROCEDURE       Current Outpatient Medications   Medication Sig Dispense Refill    acetaminophen (TYLENOL) 500 mg tablet Take 2 tablets (1,000 mg total) by mouth every 6 (six) hours as needed for mild pain or moderate pain 30 tablet 0    acetone, urine, test strip Use to test urine ketones for high blood sugars   25 each 6    albuterol (PROVENTIL HFA,VENTOLIN HFA) 90 mcg/act inhaler INHALE 2 PUFFS BY MOUTH EVERY 4 HOURS AS NEEDED FOR WHEEZING OR SHORTNESS OF BREATH 25 5 g 0    Ascorbic Acid (VITAMIN C) 500 MG CAPS Take 2 capsules by mouth daily      atorvastatin (LIPITOR) 40 mg tablet Take 1 tablet (40 mg total) by mouth daily 30 tablet 5    Biotin 10 MG TABS Take 1 tablet by mouth daily      Blood Pressure Monitoring [place] Use 2 (two) times a day Monitor BP twice daily  Call office if BP > 140/90 persistently  1 Device 0    Butalbital-APAP-Caffeine (Fioricet) -40 MG CAPS Take 1 tablet by mouth 4 (four) times a day as needed (headahce) 30 capsule 0    cyanocobalamin (VITAMIN B-12) 100 mcg tablet Take by mouth daily      fluticasone-salmeterol (Advair Diskus) 250-50 mcg/dose inhaler Inhale 1 puff 2 (two) times a day Rinse mouth after use  3 Inhaler 3    glucagon (Glucagon Emergency) 1 MG injection Inject 1 mg under the skin once as needed for low blood sugar for up to 1 dose 1 kit 3    Glucagon (Gvoke HypoPen 2-Pack) 1 MG/0 2ML SOAJ Use if hypoglycemia prn 1 Syringe 6    insulin aspart (NovoLOG) 100 units/mL injection Take 1 unit of novolog for every 1 g of carb, up to 150 units daily 50 mL 6    insulin glargine (Lantus SoloStar) 100 units/mL injection pen Inject 78 Units under the skin daily at bedtime 10 pen 3    lisinopril (ZESTRIL) 20 mg tablet TAKE 2 TABLETS BY MOUTH EVERY DAY 60 tablet 0    montelukast (SINGULAIR) 10 mg tablet TAKE 1 TABLET BY MOUTH AT BEDTIME 90 tablet 0    omeprazole (PriLOSEC) 20 mg delayed release capsule Take 20 mg by mouth daily        OneTouch Ultra test strip Test 3 times daily 300 each 3    PARoxetine (PAXIL) 20 mg tablet TAKE 1 TABLET BY MOUTH EVERY DAY 90 tablet 0    Probiotic Product (ALIGN PO) Take by mouth      aspirin (ECOTRIN LOW STRENGTH) 81 mg EC tablet Take 1 tablet (81 mg total) by mouth daily 30 tablet 6    furosemide (LASIX) 40 mg tablet Take 1 tablet (40 mg total) by mouth daily Take 1/2 tablet daily (Patient taking differently: Take 40 mg by mouth daily ) 30 tablet 3    Na Sulfate-K Sulfate-Mg Sulf (Suprep Bowel Prep Kit) 17 5-3 13-1 6 GM/177ML SOLN Take 177 mL by mouth once for 1 dose 2 Bottle 0     No current facility-administered medications for this visit  No Known Allergies    Review of Systems   Constitutional: Positive for chills, fatigue and fever (99 2 after Tylenol)  HENT: Positive for congestion, postnasal drip and rhinorrhea  Negative for sinus pressure, sinus pain and sore throat  Respiratory: Positive for cough and shortness of breath  Negative for chest tightness  Cardiovascular: Negative  Negative for chest pain  Gastrointestinal: Negative for abdominal pain, diarrhea, nausea and vomiting  Musculoskeletal: Positive for myalgias  Neurological: Positive for headaches  Psychiatric/Behavioral: Negative  Objective:    Vitals:    02/24/21 0914   Temp: 99 2 °F (37 3 °C)   TempSrc: Oral   Weight: 81 3 kg (179 lb 3 2 oz)   Height: 4' 8" (1 422 m)     BMI Counseling: Body mass index is 40 18 kg/m²  The BMI is above normal  Nutrition recommendations include decreasing portion sizes, encouraging healthy choices of fruits and vegetables, decreasing fast food intake, consuming healthier snacks, moderation in carbohydrate intake, increasing intake of lean protein, reducing intake of saturated and trans fat and reducing intake of cholesterol  Exercise recommendations include moderate physical activity 150 minutes/week  No pharmacotherapy was ordered  Physical Exam  Vitals signs reviewed  Constitutional:       General: She is not in acute distress  Appearance: She is well-developed  HENT:      Nose: Congestion present  Pulmonary:      Effort: Pulmonary effort is normal    Skin:     Coloration: Skin is not pale  Neurological:      General: No focal deficit present  Mental Status: She is alert and oriented to person, place, and time  Mental status is at baseline     Psychiatric:         Mood and Affect: Mood normal          Behavior: Behavior normal          Thought Content: Thought content normal          Judgment: Judgment normal        VIRTUAL VISIT DISCLAIMER    Vernell Castro acknowledges that she has consented to an online visit or consultation  She understands that the online visit is based solely on information provided by her, and that, in the absence of a face-to-face physical evaluation by the physician, the diagnosis she receives is both limited and provisional in terms of accuracy and completeness  This is not intended to replace a full medical face-to-face evaluation by the physician  Samia Guzman understands and accepts these terms

## 2021-02-25 ENCOUNTER — TELEPHONE (OUTPATIENT)
Dept: FAMILY MEDICINE CLINIC | Facility: CLINIC | Age: 46
End: 2021-02-25

## 2021-02-25 NOTE — TELEPHONE ENCOUNTER
----- Message from [address] [place] [address] sent at 2/24/2021  7:45 PM EST -----  Can you please let patient know her covid test is positive  She will need to self quarantine for 10 days after the start of her symptoms which is until Friday March 5th  As long as she is fever free & has improvement of symptoms she can end her quarantine & be out in public on Saturday March 6th  If she needs anything please let me know  Thank you! Patient was informed, she is asking for a letter for her job  She will call me back with the fax #

## 2021-02-25 NOTE — TELEPHONE ENCOUNTER
Patient  is calling patient needs a refill for her nebulizer  The Albuterol      Please send to [facility] in [facility]    Thank you

## 2021-02-25 NOTE — TELEPHONE ENCOUNTER
Vernell saw [facility] yesterday and was sent for a COVID test   Saw in My Chart that it is positive and wants to know what she needs to do?

## 2021-02-25 NOTE — TELEPHONE ENCOUNTER
Toño, according to [facility] [employer] letter should be stated that she could go back to work March 6th

## 2021-02-26 ENCOUNTER — TELEPHONE (OUTPATIENT)
Dept: FAMILY MEDICINE CLINIC | Facility: CLINIC | Age: 46
End: 2021-02-26

## 2021-02-26 DIAGNOSIS — U07.1 COVID-19: Primary | ICD-10-CM

## 2021-02-26 DIAGNOSIS — J45.20 MILD INTERMITTENT ASTHMA WITHOUT COMPLICATION: ICD-10-CM

## 2021-02-26 RX ORDER — ALBUTEROL SULFATE 90 UG/1
2 AEROSOL, METERED RESPIRATORY (INHALATION) EVERY 4 HOURS PRN
Qty: 25.5 G | Refills: 0 | Status: SHIPPED | OUTPATIENT
Start: 2021-02-26 | End: 2021-02-26 | Stop reason: CLARIF

## 2021-02-26 RX ORDER — ALBUTEROL SULFATE 90 UG/1
3 AEROSOL, METERED RESPIRATORY (INHALATION) ONCE AS NEEDED
Status: CANCELLED | OUTPATIENT
Start: 2021-02-27

## 2021-02-26 RX ORDER — SODIUM CHLORIDE 9 MG/ML
20 INJECTION, SOLUTION INTRAVENOUS ONCE
Status: CANCELLED | OUTPATIENT
Start: 2021-02-27

## 2021-02-26 RX ORDER — ACETAMINOPHEN 325 MG/1
650 TABLET ORAL ONCE AS NEEDED
Status: CANCELLED | OUTPATIENT
Start: 2021-02-27

## 2021-02-26 RX ORDER — ALBUTEROL SULFATE 2.5 MG/3ML
2.5 SOLUTION RESPIRATORY (INHALATION) EVERY 6 HOURS PRN
Qty: 25 VIAL | Refills: 4 | Status: SHIPPED | OUTPATIENT
Start: 2021-02-26 | End: 2021-09-03 | Stop reason: SDUPTHER

## 2021-02-26 NOTE — TELEPHONE ENCOUNTER
Patient said albuterol was refilled but she needs the NEBULIZER SOLUTION to be refilled to her [address]      [phone]

## 2021-02-26 NOTE — TELEPHONE ENCOUNTER
----- Message from Sarah Stark PA-C sent at 2/25/2021  9:14 AM EST -----  Regarding: FW: Test Results Question  Contact: [phone]  Please call patient and let her know she does in fact have covid-19  How is she feeling? Respiratory? Let me know if I need to call her and see how she is feeling  She would be a candidate for the antibody treatment if you think she does not sound well so please let me know how she is doing  Please do not tell her about the antibody, just access how she is doing  [place] Manuel did the visit and it does not appear she is here today  ----- Message -----  From: Yolis Lozano MA  Sent: 2/25/2021   8:22 AM EST  To: Sarah Stark PA-C  Subject: FW: Test Results Question                          ----- Message -----  From: Fely Barragan  Sent: 2/24/2021   7:42 PM EST  To: , #  Subject: Test Results Question                            I have a question about NOVEL CORONAVIRUS (COVID-19), PCR [facility] resulted on 2/24/21, 6:59 PM     Are these test results saying I'm positive for SARS Cov-2 & not Covid -19?

## 2021-02-26 NOTE — TELEPHONE ENCOUNTER
Patient states that she is extremely fatigued, feverish, chest and nasal congestion and no smell and taste  She is also asking for a script for Budesonide for the nebulizer to be sent to the local pharmacy

## 2021-02-26 NOTE — TELEPHONE ENCOUNTER
Nino Portillo will call patient and attempt to schedule her for [facility]

## 2021-02-26 NOTE — TELEPHONE ENCOUNTER
Patient missed your call this morning  She's feeling worse (diarrhea) and needs some guidance as to what to do      Best number for [place]:  [phone]

## 2021-02-26 NOTE — PROGRESS NOTES
COVID-19 Virtual Visit     Assessment/Plan:    Pt presents for follow up of Covid test   She is now agreeable to antibody treatment infusion  Bamlanivimab ordered  Pt can continue utilizing Mucinex & Albuterol nebulizer  She does not feel short of breath  Encouraged her to have family member buy a pulse oximeter to keep track of oxygen  Pt to keep O2 levels above 94%  If levels lower than this, she is to call office  Otherwise patient lost sense of tasted today & had 1 occurrence of diarrhea  She states she is able to keep up with oral fluid intake & Gatorade Zero  Continue oral hydration      Problem List Items Addressed This Visit     None      Visit Diagnoses     COVID-19    -  Primary         Disposition:     Patient is a candidate for Pod Marry 167Patricio  They were counseled in regards to risks, benefits, and side effects of this infusion  Possible side effects of bamlanivimab are: Allergic reactions   Allergic reactions can happen during and after infusion with bamlanivimab which include:    Fever, chills, nausea, headache, shortness of breath, low blood pressure, wheezing, swelling of your lips, face, or throat, rash including hives, itching, muscle aches, and dizziness  The side effects of getting any medicine by vein may include brief pain, bleeding, bruising of the skin, soreness, swelling, and possible infection at the infusion site  These are not all the possible side effects of bamlanivimab  Not a lot of people have been given bamlanivimab  Serious and unexpected side effects may happen  Pod Marry 1677 is still being studied so it is possible that all of the risks are not known at this time  Please note that this drug was approved under the Emergency Use Authorization of the FDA and has not gone through the full, formal FDA approval process    It is possible that bamlanivimab could interfere with your body's own ability to fight off a future infection of SARS-CoV-2   Similarly, bamlanivimab may reduce your bodys immune response to a vaccine for SARS-CoV-2  Specific studies have not been conducted to address these possible risks  Currently there is no data or safety or efficacy of COVID-19 vaccination in persons who received monoclonal antibodies as part of COVID-19 treatment  Treatment should be deferred for at least 90 days to avoid interference of the treatment with vaccine-induced immune responses (this is based on estimated half-life of therapies and evidence suggesting reinfection is uncommon within 90 days of initial infection)  The patient consents to proceed with bamlanivimab infusion  *[email or web address]  [email or web address]  pdf    I have spent 15 minutes directly with the patient  Greater than 50% of this time was spent in counseling/coordination of care regarding: diagnostic results, risks and benefits of treatment options, patient and family education, importance of treatment compliance and risk factor reductions  Encounter provider LELAND Lee    Provider located at [address] [place] [id number] [phone]    Recent Visits  Date Type Provider Dept   02/25/21 Telephone [facility]   02/24/21 [address] [facility]p   Showing recent visits within past 7 days and meeting all other requirements     Today's Visits  Date Type Provider Dept   02/26/21 Telephone [address] Fp   02/26/21 Telephone LELAND Lee [address] [facility]p   02/26/21 Telephone [id number]  [facility]   Showing today's visits and meeting all other requirements     Future Appointments  No visits were found meeting these conditions     Showing future appointments within next 150 days and meeting all other requirements        Patient agrees to participate in a virtual check in via telephone or video visit instead of presenting to the office to address urgent/immediate medical needs  Patient is aware this is a billable service  After connecting through Telephone, the patient was identified by name and date of birth  Jarvis Maldonado was informed that this was a telemedicine visit and that the exam was being conducted confidentially over secure lines  My office door was closed  No one else was in the room  Jarvis Maldonado acknowledged consent and understanding of privacy and security of the telemedicine visit  I informed the patient that I have reviewed her record in Epic and presented the opportunity for her to ask any questions regarding the visit today  The patient agreed to participate  It was my intent to perform this visit via video technology but the patient was not able to do a video connection so the visit was completed via audio telephone only  Subjective:   Jarvis Maldonado is a 39 y o  female who has been screened for COVID-19  Symptom change since last report: worsening  Patient's symptoms include fatigue, malaise, nasal congestion, loss of taste, cough and diarrhea  Patient denies sore throat, anosmia, abdominal pain, nausea, vomiting, myalgias and headaches  Valerie Magana has been staying home and has isolated themselves in her home  She is taking care to not share personal items and is cleaning all surfaces that are touched often, like counters, tabletops, and doorknobs using household cleaning sprays or wipes  She is wearing a mask when she leaves her room  Date of symptom onset: 2/23/2021    Pt presents for follow up of Covid test   She is now agreeable to antibody treatment infusion  Pt utilizing Mucinex & Albuterol nebulizer  Does not feel short of breath  Encouraged her to have family member buy a pulse oximeter to keep track of oxygen    Pt to keep O2 levels above 94%    Otherwise patient lost sense of tasted today & had 1 occurrence of diarrhea  She states she is able to keep up with oral fluid intake & Gatorade Zero  Lab Results   Component Value Date    SARSCOV2 Positive (A) 02/24/2021    SARSCOV2 Not Detected 05/28/2020     Past Medical History:   Diagnosis Date    Anemia     Asthma     w/acute exacerbation  Last assessed: 10/26/12    Chest pain 2/3/2016    [facility]     Depression     Diabetes mellitus ([facility] )     Diabetic nephropathy ([facility] )     Diabetic nephropathy associated with type 1 diabetes mellitus ([facility] ) 8/12/2020    Diabetic retinopathy ([place] [address] ) 2/3/2016    Gastroenteritis 02/03/2016    GERD (gastroesophageal reflux disease)     HTN (hypertension)     Hyperlipidemia 2/3/2016    Oligomenorrhea     Polycystic ovaries 2/3/2016    Retinal hemorrhage 2/3/2016    Retinopathy     Thrombocytosis (HCC)     Type 1 diabetes mellitus with ophthalmic manifestation ([facility] ) 2/3/2016     Past Surgical History:   Procedure Laterality Date    CATARACT EXTRACTION Left 10/2020    CATARACT EXTRACTION Right     RETINAL LASER PROCEDURE       Current Outpatient Medications   Medication Sig Dispense Refill    acetaminophen (TYLENOL) 500 mg tablet Take 2 tablets (1,000 mg total) by mouth every 6 (six) hours as needed for mild pain or moderate pain 30 tablet 0    acetone, urine, test strip Use to test urine ketones for high blood sugars   25 each 6    albuterol (2 5 mg/3 mL) 0 083 % nebulizer solution Take 1 vial (2 5 mg total) by nebulization every 6 (six) hours as needed for wheezing or shortness of breath 25 vial 4    Ascorbic Acid (VITAMIN C) 500 MG CAPS Take 2 capsules by mouth daily      aspirin (ECOTRIN LOW STRENGTH) 81 mg EC tablet Take 1 tablet (81 mg total) by mouth daily 30 tablet 6    atorvastatin (LIPITOR) 40 mg tablet Take 1 tablet (40 mg total) by mouth daily 30 tablet 5    Biotin 10 MG TABS Take 1 tablet by mouth daily      Blood Pressure Monitoring W[place] Use 2 (two) times a day Monitor BP twice daily  Call office if BP > 140/90 persistently  1 Device 0    Butalbital-APAP-Caffeine (Fioricet) -40 MG CAPS Take 1 tablet by mouth 4 (four) times a day as needed (headahce) 30 capsule 0    cyanocobalamin (VITAMIN B-12) 100 mcg tablet Take by mouth daily      fluticasone-salmeterol (Advair Diskus) 250-50 mcg/dose inhaler Inhale 1 puff 2 (two) times a day Rinse mouth after use  3 Inhaler 3    furosemide (LASIX) 40 mg tablet Take 1 tablet (40 mg total) by mouth daily Take 1/2 tablet daily (Patient taking differently: Take 40 mg by mouth daily ) 30 tablet 3    glucagon (Glucagon Emergency) 1 MG injection Inject 1 mg under the skin once as needed for low blood sugar for up to 1 dose 1 kit 3    Glucagon (Gvoke HypoPen 2-Pack) 1 MG/0 2ML SOAJ Use if hypoglycemia prn 1 Syringe 6    insulin aspart (NovoLOG) 100 units/mL injection Take 1 unit of novolog for every 1 g of carb, up to 150 units daily 50 mL 6    insulin glargine (Lantus SoloStar) 100 units/mL injection pen Inject 78 Units under the skin daily at bedtime 10 pen 3    lisinopril (ZESTRIL) 20 mg tablet TAKE 2 TABLETS BY MOUTH EVERY DAY 60 tablet 0    montelukast (SINGULAIR) 10 mg tablet TAKE 1 TABLET BY MOUTH AT BEDTIME 90 tablet 0    Na Sulfate-K Sulfate-Mg Sulf (Suprep Bowel Prep Kit) 17 5-3 13-1 6 GM/177ML SOLN Take 177 mL by mouth once for 1 dose 2 Bottle 0    omeprazole (PriLOSEC) 20 mg delayed release capsule Take 20 mg by mouth daily   OneTouch Ultra test strip Test 3 times daily 300 each 3    PARoxetine (PAXIL) 20 mg tablet TAKE 1 TABLET BY MOUTH EVERY DAY 90 tablet 0    Probiotic Product (ALIGN PO) Take by mouth       No current facility-administered medications for this visit  No Known Allergies    Review of Systems   Constitutional: Positive for fatigue  HENT: Positive for congestion  Negative for sore throat  Respiratory: Positive for cough  Gastrointestinal: Positive for diarrhea   Negative for abdominal pain, nausea and vomiting  Musculoskeletal: Negative for myalgias  Neurological: Negative for headaches  Psychiatric/Behavioral: Negative  Objective: There were no vitals filed for this visit  Physical Exam  Constitutional:       Appearance: She is normal weight  Neurological:      General: No focal deficit present  Mental Status: She is alert and oriented to person, place, and time  Psychiatric:         Mood and Affect: Mood normal          Behavior: Behavior normal          Thought Content: Thought content normal          Judgment: Judgment normal        VIRTUAL VISIT DISCLAIMER    Vernell Ramirez Carlitos acknowledges that she has consented to an online visit or consultation  She understands that the online visit is based solely on information provided by her, and that, in the absence of a face-to-face physical evaluation by the physician, the diagnosis she receives is both limited and provisional in terms of accuracy and completeness  This is not intended to replace a full medical face-to-face evaluation by the physician  Sukumar Duffy understands and accepts these terms

## 2021-02-27 ENCOUNTER — HOSPITAL ENCOUNTER (OUTPATIENT)
Dept: INFUSION CENTER | Facility: HOSPITAL | Age: 46
Discharge: HOME | End: 2021-02-27
Payer: COMMERCIAL

## 2021-02-27 VITALS
HEART RATE: 77 BPM | OXYGEN SATURATION: 98 % | DIASTOLIC BLOOD PRESSURE: 56 MMHG | TEMPERATURE: 97.7 F | SYSTOLIC BLOOD PRESSURE: 115 MMHG

## 2021-02-27 DIAGNOSIS — U07.1 COVID-19: Primary | ICD-10-CM

## 2021-02-27 PROCEDURE — M0239 BAMLANIVIMAB-XXXX INFUSION: HCPCS | Performed by: FAMILY MEDICINE

## 2021-02-27 RX ORDER — ACETAMINOPHEN 325 MG/1
650 TABLET ORAL ONCE AS NEEDED
Status: DISCONTINUED | OUTPATIENT
Start: 2021-02-27 | End: 2021-03-02 | Stop reason: HOSPADM

## 2021-02-27 RX ORDER — SODIUM CHLORIDE 9 MG/ML
20 INJECTION, SOLUTION INTRAVENOUS ONCE
Status: COMPLETED | OUTPATIENT
Start: 2021-02-27 | End: 2021-02-27

## 2021-02-27 RX ORDER — ALBUTEROL SULFATE 90 UG/1
3 AEROSOL, METERED RESPIRATORY (INHALATION) ONCE AS NEEDED
Status: CANCELLED | OUTPATIENT
Start: 2021-02-27

## 2021-02-27 RX ORDER — ACETAMINOPHEN 325 MG/1
650 TABLET ORAL ONCE AS NEEDED
Status: CANCELLED | OUTPATIENT
Start: 2021-02-27

## 2021-02-27 RX ORDER — ALBUTEROL SULFATE 90 UG/1
3 AEROSOL, METERED RESPIRATORY (INHALATION) ONCE AS NEEDED
Status: DISCONTINUED | OUTPATIENT
Start: 2021-02-27 | End: 2021-03-02 | Stop reason: HOSPADM

## 2021-02-27 RX ORDER — SODIUM CHLORIDE 9 MG/ML
20 INJECTION, SOLUTION INTRAVENOUS ONCE
Status: CANCELLED | OUTPATIENT
Start: 2021-02-27

## 2021-02-27 RX ADMIN — SODIUM CHLORIDE 20 ML/HR: 0.9 INJECTION, SOLUTION INTRAVENOUS at 08:19

## 2021-02-27 RX ADMIN — SODIUM CHLORIDE 700 MG: 9 INJECTION, SOLUTION INTRAVENOUS at 08:20

## 2021-02-27 NOTE — PLAN OF CARE
Problem: Potential for Falls  Goal: Patient will remain free of falls  Description: INTERVENTIONS:  - Assess patient frequently for physical needs  -  Identify cognitive and physical deficits and behaviors that affect risk of falls    -  [place] fall precautions as indicated by assessment   - Educate patient/family on patient safety including physical limitations  - Instruct patient to call for assistance with activity based on assessment  - Modify environment to reduce risk of injury  - Consider OT/PT consult to assist with strengthening/mobility  Outcome: Progressing

## 2021-02-27 NOTE — PROGRESS NOTES
Patient tolerated Bamlanivimab infusion and 1 hour post observation without incident  IV discontinued, catheter intact  Gauze applied to site  Discharge instructions reviewed with patient verbally  Patient has f/u with PCP on Monday  Copy of discharge instructions given to patient  Patient verbalized understanding of all discharge instructions  Patient discharged without incident

## 2021-02-27 NOTE — PROGRESS NOTES
Patient is here today for their Bamlanivimab infusion  Reviewed EUA with patient  Patient verbalized understanding of EUA  Copy of EUA given to patient  Verbal permission given to proceed  All questions answered to patients satisfaction  IV started, good blood return, flushes freely  Patient tolerated well

## 2021-03-01 ENCOUNTER — TELEMEDICINE (OUTPATIENT)
Dept: FAMILY MEDICINE CLINIC | Facility: CLINIC | Age: 46
End: 2021-03-01
Payer: COMMERCIAL

## 2021-03-01 VITALS — TEMPERATURE: 97.8 F | WEIGHT: 178.8 LBS | BODY MASS INDEX: 40.09 KG/M2

## 2021-03-01 DIAGNOSIS — U07.1 COVID-19: Primary | ICD-10-CM

## 2021-03-01 PROCEDURE — 99212 OFFICE O/P EST SF 10 MIN: CPT | Performed by: NURSE PRACTITIONER

## 2021-03-01 PROCEDURE — 3725F SCREEN DEPRESSION PERFORMED: CPT | Performed by: NURSE PRACTITIONER

## 2021-03-01 NOTE — PROGRESS NOTES
COVID-19 Virtual Visit     Assessment/Plan:    Pt presents via televideo for check up after receiving Bamlanivimab infusion on Saturday 2/27  Pt overall feeling much better  She still has some residual nasal congestion & fatigue but otherwise is resting at home  She has been utilizing Albuterol nebs PRN, but states she has not had shortness of breath  Home pulse ox reading %  Notified patient to call office if she has any worsening of symptoms or questions or concerns  Otherwise patient can end her quarantine & be out in public 66/96  Work excuse paperwork already faxed last week  Problem List Items Addressed This Visit        Other    COVID-19 - Primary         Disposition:     I recommended continued isolation until at least 24 hours have passed since recovery defined as resolution of fever without the use of fever-reducing medications AND improvement in COVID symptoms AND 10 days have passed since onset of symptoms (or 10 days have passed since date of first positive viral diagnostic test for asymptomatic patients)  I have spent 15 minutes directly with the patient  Greater than 50% of this time was spent in counseling/coordination of care regarding: instructions for management and patient and family education          Encounter provider LELAND Cummins    Provider located at [address] [place] [id number] [phone]    Recent Visits  Date Type Provider Dept   02/26/21 Telephone [address] [facility]p   02/26/21 Telephone LELAND Cummins [address] [facility]p   02/26/21 [facility]phone [id number]  [facility]   02/25/21 Telephone Yenifer Hirsch [address] [facility]p   02/24/21 Telemedicine LELAND Crowder [facility]   Showing recent visits within past 7 days and meeting all other requirements Today's Visits  Date Type Provider Dept   03/01/21 Telemedicine Juve Calhounnhquincyregan [address] [facility]p   Showing today's visits and meeting all other requirements     Future Appointments  No visits were found meeting these conditions  Showing future appointments within next 150 days and meeting all other requirements      This virtual check-in was done via [employer] Du[employer] and patient was informed that this is not a secure, HIPAA-compliant platform  She agrees to proceed  Patient agrees to participate in a virtual check in via telephone or video visit instead of presenting to the office to address urgent/immediate medical needs  Patient is aware this is a billable service  After connecting through [facility], the patient was identified by name and date of birth  Ghulam Michelle was informed that this was a telemedicine visit and that the exam was being conducted confidentially over secure lines  My office door was closed  No one else was in the room  Ghulam Michelle acknowledged consent and understanding of privacy and security of the telemedicine visit  I informed the patient that I have reviewed her record in Epic and presented the opportunity for her to ask any questions regarding the visit today  The patient agreed to participate  Subjective:   Ghulam Michelle is a 39 y o  female who has been screened for COVID-19  Symptom change since last report: improving  Patient's symptoms include fatigue, nasal congestion and loss of taste  Patient denies fever, chills, malaise, rhinorrhea, sore throat, anosmia, cough, shortness of breath, chest tightness, abdominal pain, nausea, vomiting, diarrhea, myalgias and headaches  Myah Avery has been staying home and has isolated themselves in her home  She is taking care to not share personal items and is cleaning all surfaces that are touched often, like counters, tabletops, and doorknobs using household cleaning sprays or wipes   She is wearing a mask when she leaves her room  Monoclonal Antibody Follow-up Symptom Questionnaire  I feel overall: much better  My breathing is: somewhat better  My fever is: better  My fatigue is: somewhat better    Pt presents via televideo after receiving Bamlanivimab infusion on Saturday 2/27  Pt overall feeling much better  She still has some residual nasal congestion & fatigue but otherwise is resting at home  She has been utilizing Albuterol nebs PRN but states she has not shortness of breath  Home pulse ox reading %  Notified patient to call office if she has any worsening of symptoms or questions or concerns  Otherwise patient can end her quarantine & be out in public 36/32  Work excuse paperwork already faxed last week  Lab Results   Component Value Date    SARSCOV2 Positive (A) 02/24/2021    SARSCOV2 Not Detected 05/28/2020     Past Medical History:   Diagnosis Date    Anemia     Asthma     w/acute exacerbation   Last assessed: 10/26/12    Chest pain 2/3/2016    [facility]est[facility]     Depression     Diabetes mellitus ([facility]     Diabetic nephropathy ([facility]     Diabetic nephropathy associated with type 1 diabetes mellitus ([facility] ) 8/12/2020    Diabetic retinopathy ([facility] ) 2/3/2016    Gastroenteritis 02/03/2016    GERD (gastroesophageal reflux disease)     HTN (hypertension)     Hyperlipidemia 2/3/2016    Oligomenorrhea     Polycystic ovaries 2/3/2016    Retinal hemorrhage 2/3/2016    Retinopathy     Thrombocytosis (HCC)     Type 1 diabetes mellitus with ophthalmic manifestation ([facility] ) 2/3/2016     Past Surgical History:   Procedure Laterality Date    CATARACT EXTRACTION Left 10/2020    CATARACT EXTRACTION Right     RETINAL LASER PROCEDURE       Current Outpatient Medications   Medication Sig Dispense Refill    acetaminophen (TYLENOL) 500 mg tablet Take 2 tablets (1,000 mg total) by mouth every 6 (six) hours as needed for mild pain or moderate pain 30 tablet 0    acetone, urine, test strip Use to test urine ketones for high blood sugars  25 each 6    albuterol (2 5 mg/3 mL) 0 083 % nebulizer solution Take 1 vial (2 5 mg total) by nebulization every 6 (six) hours as needed for wheezing or shortness of breath 25 vial 4    Ascorbic Acid (VITAMIN C) 500 MG CAPS Take 2 capsules by mouth daily      atorvastatin (LIPITOR) 40 mg tablet Take 1 tablet (40 mg total) by mouth daily 30 tablet 5    Biotin 10 MG TABS Take 1 tablet by mouth daily      Blood Pressure Monitoring [place] Use 2 (two) times a day Monitor BP twice daily  Call office if BP > 140/90 persistently  1 Device 0    Butalbital-APAP-Caffeine (Fioricet) -40 MG CAPS Take 1 tablet by mouth 4 (four) times a day as needed (headahce) 30 capsule 0    cyanocobalamin (VITAMIN B-12) 100 mcg tablet Take by mouth daily      fluticasone-salmeterol (Advair Diskus) 250-50 mcg/dose inhaler Inhale 1 puff 2 (two) times a day Rinse mouth after use  3 Inhaler 3    glucagon (Glucagon Emergency) 1 MG injection Inject 1 mg under the skin once as needed for low blood sugar for up to 1 dose 1 kit 3    Glucagon (Gvoke HypoPen 2-Pack) 1 MG/0 2ML SOAJ Use if hypoglycemia prn 1 Syringe 6    insulin aspart (NovoLOG) 100 units/mL injection Take 1 unit of novolog for every 1 g of carb, up to 150 units daily 50 mL 6    insulin glargine (Lantus SoloStar) 100 units/mL injection pen Inject 78 Units under the skin daily at bedtime 10 pen 3    lisinopril (ZESTRIL) 20 mg tablet TAKE 2 TABLETS BY MOUTH EVERY DAY 60 tablet 0    montelukast (SINGULAIR) 10 mg tablet TAKE 1 TABLET BY MOUTH AT BEDTIME 90 tablet 0    omeprazole (PriLOSEC) 20 mg delayed release capsule Take 20 mg by mouth daily        OneTouch Ultra test strip Test 3 times daily 300 each 3    PARoxetine (PAXIL) 20 mg tablet TAKE 1 TABLET BY MOUTH EVERY DAY 90 tablet 0    Probiotic Product (ALIGN PO) Take by mouth      aspirin (ECOTRIN LOW STRENGTH) 81 mg EC tablet Take 1 tablet (81 mg total) by mouth daily 30 tablet 6    furosemide (LASIX) 40 mg tablet Take 1 tablet (40 mg total) by mouth daily Take 1/2 tablet daily (Patient taking differently: Take 40 mg by mouth daily ) 30 tablet 3    Na Sulfate-K Sulfate-Mg Sulf (Suprep Bowel Prep Kit) 17 5-3 13-1 6 GM/177ML SOLN Take 177 mL by mouth once for 1 dose 2 Bottle 0     No current facility-administered medications for this visit  Facility-Administered Medications Ordered in Other Visits   Medication Dose Route Frequency Provider Last Rate Last Admin    acetaminophen (TYLENOL) tablet 650 mg  650 mg Oral Once PRN [place] Marcos, DO        albuterol (PROVENTIL HFA,VENTOLIN HFA) inhaler 3 puff  3 puff Inhalation Once PRN [place] Marcos, DO         No Known Allergies    Review of Systems   Constitutional: Positive for fatigue  Negative for chills and fever  HENT: Positive for congestion  Negative for rhinorrhea and sore throat  Respiratory: Negative for cough, chest tightness and shortness of breath  Gastrointestinal: Negative for abdominal pain, diarrhea, nausea and vomiting  Musculoskeletal: Negative for myalgias  Neurological: Negative for headaches  Objective:    Vitals:    03/01/21 1056   Temp: 97 8 °F (36 6 °C)   Weight: 81 1 kg (178 lb 12 8 oz)       Physical Exam  VIRTUAL VISIT DISCLAIMER    Vernell Bo acknowledges that she has consented to an online visit or consultation  She understands that the online visit is based solely on information provided by her, and that, in the absence of a face-to-face physical evaluation by the physician, the diagnosis she receives is both limited and provisional in terms of accuracy and completeness  This is not intended to replace a full medical face-to-face evaluation by the physician  Radha Augustine understands and accepts these terms

## 2021-03-03 ENCOUNTER — HOSPITAL ENCOUNTER (OUTPATIENT)
Dept: INFUSION CENTER | Facility: HOSPITAL | Age: 46
End: 2021-03-03
Attending: INTERNAL MEDICINE

## 2021-03-05 ENCOUNTER — TELEPHONE (OUTPATIENT)
Dept: FAMILY MEDICINE CLINIC | Facility: CLINIC | Age: 46
End: 2021-03-05

## 2021-03-05 NOTE — TELEPHONE ENCOUNTER
Spoke to patient and gave her message from [facility] Michael STERLING she did say she is seeing a slight improvement with symptoms

## 2021-03-05 NOTE — TELEPHONE ENCOUNTER
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 physically she thinks she would be ok to go back she said

## 2021-03-08 DIAGNOSIS — I10 ESSENTIAL HYPERTENSION: Chronic | ICD-10-CM

## 2021-03-08 PROCEDURE — 4010F ACE/ARB THERAPY RXD/TAKEN: CPT | Performed by: NURSE PRACTITIONER

## 2021-03-08 RX ORDER — LISINOPRIL 20 MG/1
TABLET ORAL
Qty: 60 TABLET | Refills: 0 | Status: SHIPPED | OUTPATIENT
Start: 2021-03-08 | End: 2021-04-12

## 2021-03-10 ENCOUNTER — HOSPITAL ENCOUNTER (OUTPATIENT)
Dept: INFUSION CENTER | Facility: HOSPITAL | Age: 46
Discharge: HOME | End: 2021-03-10
Attending: INTERNAL MEDICINE
Payer: COMMERCIAL

## 2021-03-10 VITALS
RESPIRATION RATE: 16 BRPM | OXYGEN SATURATION: 100 % | DIASTOLIC BLOOD PRESSURE: 69 MMHG | HEART RATE: 86 BPM | SYSTOLIC BLOOD PRESSURE: 145 MMHG | TEMPERATURE: 97.9 F

## 2021-03-10 DIAGNOSIS — D64.9 ANEMIA, UNSPECIFIED TYPE: ICD-10-CM

## 2021-03-10 DIAGNOSIS — D75.839 THROMBOCYTOSIS: Primary | ICD-10-CM

## 2021-03-10 PROCEDURE — 96365 THER/PROPH/DIAG IV INF INIT: CPT

## 2021-03-10 RX ORDER — SODIUM CHLORIDE 9 MG/ML
20 INJECTION, SOLUTION INTRAVENOUS ONCE
Status: COMPLETED | OUTPATIENT
Start: 2021-03-10 | End: 2021-03-10

## 2021-03-10 RX ORDER — SODIUM CHLORIDE 9 MG/ML
20 INJECTION, SOLUTION INTRAVENOUS ONCE
Status: CANCELLED | OUTPATIENT
Start: 2021-03-17

## 2021-03-10 RX ADMIN — SODIUM CHLORIDE 20 ML/HR: 9 INJECTION, SOLUTION INTRAVENOUS at 09:15

## 2021-03-10 RX ADMIN — IRON SUCROSE 200 MG: 20 INJECTION, SOLUTION INTRAVENOUS at 09:16

## 2021-03-10 NOTE — PLAN OF CARE
Problem: Potential for Falls  Goal: Patient will remain free of falls  Description: INTERVENTIONS:  - Assess patient frequently for physical needs  -  Identify cognitive and physical deficits and behaviors that affect risk of falls  -  [place] fall precautions as indicated by assessment   - Educate patient/family on patient safety including physical limitations  - Instruct patient to call for assistance with activity based on assessment  - Modify environment to reduce risk of injury  - Consider OT/PT consult to assist with strengthening/mobility  Outcome: Progressing     Problem: Potential for Falls  Goal: Patient will remain free of falls  Description: INTERVENTIONS:  - Assess patient frequently for physical needs  -  Identify cognitive and physical deficits and behaviors that affect risk of falls  -  [place] fall precautions as indicated by assessment   - Educate patient/family on patient safety including physical limitations  - Instruct patient to call for assistance with activity based on assessment  - Modify environment to reduce risk of injury  - Consider OT/PT consult to assist with strengthening/mobility  Outcome: Progressing     Problem: Knowledge Deficit  Goal: Patient/family/caregiver demonstrates understanding of disease process, treatment plan, medications, and discharge instructions  Description: Complete learning assessment and assess knowledge base    Interventions:  - Provide teaching at level of understanding  - Provide teaching via preferred learning methods  Outcome: Progressing

## 2021-03-10 NOTE — PROGRESS NOTES
Pt here for Venofer infusion, tolerated well  No AR seen  PIV removed intact  DSD applied  Disch amb to home, steady gait    Did not want AVS

## 2021-03-17 ENCOUNTER — HOSPITAL ENCOUNTER (OUTPATIENT)
Dept: INFUSION CENTER | Facility: HOSPITAL | Age: 46
Discharge: HOME | End: 2021-03-17
Attending: INTERNAL MEDICINE
Payer: COMMERCIAL

## 2021-03-17 VITALS
OXYGEN SATURATION: 97 % | RESPIRATION RATE: 16 BRPM | HEART RATE: 85 BPM | SYSTOLIC BLOOD PRESSURE: 142 MMHG | DIASTOLIC BLOOD PRESSURE: 67 MMHG | TEMPERATURE: 97.5 F

## 2021-03-17 DIAGNOSIS — D64.9 ANEMIA, UNSPECIFIED TYPE: ICD-10-CM

## 2021-03-17 DIAGNOSIS — D75.839 THROMBOCYTOSIS: Primary | ICD-10-CM

## 2021-03-17 PROCEDURE — 96365 THER/PROPH/DIAG IV INF INIT: CPT

## 2021-03-17 RX ORDER — SODIUM CHLORIDE 9 MG/ML
20 INJECTION, SOLUTION INTRAVENOUS ONCE
Status: COMPLETED | OUTPATIENT
Start: 2021-03-17 | End: 2021-03-17

## 2021-03-17 RX ORDER — SODIUM CHLORIDE 9 MG/ML
20 INJECTION, SOLUTION INTRAVENOUS ONCE
Status: CANCELLED | OUTPATIENT
Start: 2021-03-17

## 2021-03-17 RX ADMIN — IRON SUCROSE 200 MG: 20 INJECTION, SOLUTION INTRAVENOUS at 09:02

## 2021-03-17 RX ADMIN — SODIUM CHLORIDE 20 ML/HR: 9 INJECTION, SOLUTION INTRAVENOUS at 09:00

## 2021-03-17 NOTE — PROGRESS NOTES
Venofer infused without problems , pt offered no complaints  PIV removed  Pt then discharged to home with steady gait

## 2021-03-19 DIAGNOSIS — J45.909 UNCOMPLICATED ASTHMA, UNSPECIFIED ASTHMA SEVERITY, UNSPECIFIED WHETHER PERSISTENT: Primary | ICD-10-CM

## 2021-03-19 RX ORDER — ALBUTEROL SULFATE 90 UG/1
AEROSOL, METERED RESPIRATORY (INHALATION)
Qty: 25.5 G | Refills: 0 | Status: SHIPPED | OUTPATIENT
Start: 2021-03-19 | End: 2022-03-09

## 2021-03-24 ENCOUNTER — ANESTHESIA EVENT (OUTPATIENT)
Dept: GASTROENTEROLOGY | Facility: AMBULARY SURGERY CENTER | Age: 46
End: 2021-03-24

## 2021-03-28 PROBLEM — R00.2 PALPITATIONS: Status: ACTIVE | Noted: 2021-03-28

## 2021-03-28 NOTE — PROGRESS NOTES
Heart Failure   Follow Up Office Visit Note -     Samia Guzman   39 y o    female   MRN: [id number]  [facility]  [address] [facility]  [place] [address] [facility] [address]  [phone]    PCP: Mellissa Bazan PA-C  Cardiologist :Will be Dr Arina Martinez            Summary of Recommendations  Heart healthy diet  Education provided  Hydrate 2 L fld a day  Limit caffeine  Increase atorva to 80 mg/d  follow-up with lipid profile 8 weeks  Exercise- goal 30 min mod level activity most days of the week  Follow up will be scheduled with Dr Arina Martinez ,  2 months        Impression/plan  Brief palpitations  Recommend hydrate, limit caffeine, increase fluids  COVID 19 infection 2/24/21, S/P monocoonal Ab Rx Bamlanivimab infusion   She was treated at home  Chronic diastolic heart failure, adm 8/6-8/8/20  with decompensation  --home weight  181 at home this am      Wt Readings from Last 3 Encounters:   03/30/21 83 9 kg (185 lb)   03/01/21 81 1 kg (178 lb 12 8 oz)   02/24/21 81 3 kg (179 lb 3 2 oz)       Wt Readings from Last 3 Encounters:   03/01/21 81 1 kg (178 lb 12 8 oz)   02/24/21 81 3 kg (179 lb 3 2 oz)   02/23/21 84 9 kg (187 lb 3 2 oz)        Beta-Blocker:  None  Has depression, on Paxil   ACEi, ARB or ARNi:  Lisinopril 20 mg/d   Diuretic:   Lasix 20 mg daily   Educated regarding adherence to a 1 5g -2 gram sodium diet/ 2000 ml fluid restriction, daily weights and to call us if  she gains  2-3 lbs in 1 day or 5 lbs/ 1 week  Hypertension, essential  BP  130/60 On lisinopril, loop diuretic  Did have 2 higher readings in the 140-150s- those were before she took her antihypertensives  Hyperlipidemia  On atorvastatin 40 mg/d   Will increased 80 mg daily  Follow-up lipid profile 8 weeks  Continue heart healthy diet  · Lipids 10/20:  LDL  92 Non   · Lipids 4/20:    [id number] Non       · Lipids 8/19:    [id number]  Non   Diabetes mellitus type 1, with hyperglycemia, with neuropathy and nephropathy  on insulin  Hemoglobin A1c  2/21 10 0  prior 9 7  follows with endocrinology  Morbid obesity  BMI 38  proteinuria follows with Nephrology  chronic anemia follows with  GI  Had recent Fe infusions  Apr 7 -EGD/ C scope  Former tobacco use quitting in 1994  Depression, on Paxil  Asthma mild persistent  Cardiac testing   TTE 8/7/2020  EF 65%  Grade 2 DD  Mild MR  · Stress echocardiogram 11/25/20 There was no echocardiographic evidence for stress-induced ischemia  HPI:   Suri Matos is a 39 yo female with obesity (BMI 38),  type 1 diabetes, PCOS, hypertension and hyperlipidemia  She has no known history of CAD  Recently, she presented to [facility] 8/6/2020 with shortness of breath, chest tightness and peripheral edema  She admitted to exertional shortness of breath and orthopnea  Reportedly gained 20 lb in the last few months  Testing for COVID-19 was negative  An echocardiogram demonstrated an ejection fraction of 65%, no regional motion abnormality and grade 2 diastolic dysfunction  She was diuresed, net -2 3 L and followed by Cardiology  She transitioned to Lasix 40 mg daily, not previously on diuretics     An outpatient stress test was recommended  If she continued to have marked dyspnea on exertion, a CTA was recommended to rule out PE  After being discharged, she return to the ED August 14 complaining of shortness of breath and pleuritic pain  A CTA demonstrated no evidence of PE     8/18/2020  Hospital follow-up  She is doing well  No chest pain or shortness of breath  Blood pressure is satisfactory  Her stress test has been ordered yet not scheduled  Is active  Is a caretaker  Does not regularly exercise, but is trying to reduce calories to reduce weight  Recent labs showed a Cr of 1 2  Is diuretic naive, now on Lasix 40 mg/d  Will reduce diuretic and reassess  Discussed working on risk factors  Will intensify statin therapy   Will help her schedule the stress test  Return after ST  Discussed heart healthy die- Low Na  Provided education on reading food labels  Interval history  No f/u with cardiologist    2/24/21 + COVID 19  Rx monoclonal Ab    3/20/21  Acute OV , accompanied by her   CC palpitations-  Very brief, lasted seconds a few weeks ago  Occurred at rest  No recurrence  It has been awhile since she was back to Cardiology so she wanted to get checked out  EKG sinus rhythm 92 beats per minute fusion complexes/PVC  /60  She is sedentary  She gains weight very easily  She denies any chest pain or shortness of breath, no lower extremity edema  She sleeps on 1 pillow  Recommend adherence to low-sodium diet  She is compliant with furosemide  For for now , since her palpitations were brief and self-limiting, will make no medication changes  Today, her blood sugar was low in the office, at 60  I provided her some orange juice  Her EKG did show 2 PVCs, which may be related  We discussed her most recent lipids  We discussed alternatives for treatment:  Increasing atorvastatin, adding ezetimibe or switching to rosuvastatin  We mutually agreed to stick with atorvastatin and increased to 80 mg daily  She will take the 40 mg tablets, a double up on the until she needs a new prescription  Will determine if she is able to tolerate this  If not, we could switch her to rosuvastatin 20 mg daily which will be the equivalent of 80 mg of atorvastatin  Given her pill burden, would prefer to switch with 1 drug as opposed to adding ezetimibe  Encouraged heart healthy behaviors, exercise, and heart healthy diet  Education handouts provided          I have spent 40 minutes with Patient and family today in which greater than 50% of this time was spent in counseling/coordination of care regarding Intructions for management, Patient and family education and Risk factor reductions      Assessment  Diagnoses and all orders for this visit:    Palpitation    Essential hypertension    Mixed hyperlipidemia    Severe obesity (BMI 35 0-39  9) with comorbidity ([place] [address] )    Chronic diastolic HF (heart failure) (HCC)        Past Medical History:   Diagnosis Date    Anemia     Asthma     w/acute exacerbation  Last assessed: 10/26/12    Chest pain 2/3/2016    CHF (congestive heart failure) ([place] [address] )     Depression     Diabetes mellitus ([place] [address] )     Diabetic nephropathy ([place] [address] )     Diabetic nephropathy associated with type 1 diabetes mellitus ([place] [address] ) 8/12/2020    Diabetic retinopathy ([place] [address] ) 2/3/2016    Gastroenteritis 02/03/2016    GERD (gastroesophageal reflux disease)     HTN (hypertension)     Hyperlipidemia 2/3/2016    Oligomenorrhea     Polycystic ovaries 2/3/2016    Retinal hemorrhage 2/3/2016    Retinopathy     Thrombocytosis (HCC)     Type 1 diabetes mellitus with ophthalmic manifestation ([place] [address] ) 2/3/2016       Review of Systems   Constitution: Negative for chills  Cardiovascular: Positive for palpitations  Negative for chest pain, claudication, cyanosis, dyspnea on exertion, irregular heartbeat, leg swelling, near-syncope, orthopnea, paroxysmal nocturnal dyspnea and syncope  Respiratory: Negative for cough and shortness of breath  Gastrointestinal: Negative for heartburn and nausea  Neurological: Negative for dizziness, focal weakness, headaches, light-headedness and weakness  All other systems reviewed and are negative  No Known Allergies    Current Outpatient Medications:     acetaminophen (TYLENOL) 500 mg tablet, Take 2 tablets (1,000 mg total) by mouth every 6 (six) hours as needed for mild pain or moderate pain, Disp: 30 tablet, Rfl: 0    acetone, urine, test strip, Use to test urine ketones for high blood sugars  , Disp: 25 each, Rfl: 6    albuterol (2 5 mg/3 mL) 0 083 % nebulizer solution, Take 1 vial (2 5 mg total) by nebulization every 6 (six) hours as needed for wheezing or shortness of breath, Disp: 25 vial, Rfl: 4    albuterol (PROVENTIL HFA,VENTOLIN HFA) 90 mcg/act inhaler, INHALE 2 PUFFS BY MOUTH EVERY 4 HOURS AS NEEDED FOR WHEEZING OR FOR SHORTNESS OF BREATH, Disp: 25 5 g, Rfl: 0    Ascorbic Acid (VITAMIN C) 500 MG CAPS, Take 2 capsules by mouth daily, Disp: , Rfl:     aspirin (ECOTRIN LOW STRENGTH) 81 mg EC tablet, Take 1 tablet (81 mg total) by mouth daily, Disp: 30 tablet, Rfl: 6    atorvastatin (LIPITOR) 40 mg tablet, Take 1 tablet (40 mg total) by mouth daily, Disp: 30 tablet, Rfl: 5    Biotin 10 MG TABS, Take 1 tablet by mouth daily, Disp: , Rfl:     Blood Pressure Monitoring [place], Use 2 (two) times a day Monitor BP twice daily  Call office if BP > 140/90 persistently  , Disp: 1 Device, Rfl: 0    Butalbital-APAP-Caffeine (Fioricet) -40 MG CAPS, Take 1 tablet by mouth 4 (four) times a day as needed ([facility]), Disp: 30 capsule, Rfl: 0    cyanocobalamin (VITAMIN B-12) 100 mcg tablet, Take by mouth daily, Disp: , Rfl:     fluticasone-salmeterol (Advair Diskus) 250-50 mcg/dose inhaler, Inhale 1 puff 2 (two) times a day Rinse mouth after use , Disp: 3 Inhaler, Rfl: 3    furosemide (LASIX) 40 mg tablet, Take 1 tablet (40 mg total) by mouth daily Take 1/2 tablet daily (Patient taking differently: Take 40 mg by mouth daily ), Disp: 30 tablet, Rfl: 3    glucagon (Glucagon Emergency) 1 MG injection, Inject 1 mg under the skin once as needed for low blood sugar for up to 1 dose, Disp: 1 kit, Rfl: 3    Glucagon (Gvoke HypoPen 2-Pack) 1 MG/0 2ML SOAJ, Use if hypoglycemia prn, Disp: 1 Syringe, Rfl: 6    insulin aspart (NovoLOG) 100 units/mL injection, Take 1 unit of novolog for every 1 g of carb, up to 150 units daily, Disp: 50 mL, Rfl: 6    insulin glargine (Lantus SoloStar) 100 units/mL injection pen, Inject 78 Units under the skin daily at bedtime, Disp: 10 pen, Rfl: 3    lisinopril (ZESTRIL) 20 mg tablet, TAKE 2 TABLETS BY MOUTH EVERY DAY, Disp: 60 tablet, Rfl: 0    montelukast (SINGULAIR) 10 mg tablet, TAKE 1 TABLET BY MOUTH AT BEDTIME, Disp: 90 tablet, Rfl: 0    Na Sulfate-K Sulfate-Mg Sulf (Suprep Bowel Prep Kit) 17 5-3 13-1 6 GM/177ML SOLN, Take 177 mL by mouth once for 1 dose, Disp: 2 Bottle, Rfl: 0    omeprazole (PriLOSEC) 20 mg delayed release capsule, Take 20 mg by mouth daily  , Disp: , Rfl:     OneTouch Ultra test strip, Test 3 times daily, Disp: 300 each, Rfl: 3    PARoxetine (PAXIL) 20 mg tablet, TAKE 1 TABLET BY MOUTH EVERY DAY, Disp: 90 tablet, Rfl: 0    Probiotic Product (ALIGN PO), Take by mouth, Disp: , Rfl:     Social History     Socioeconomic History    Marital status: /Civil Union     Spouse name: Not on file    Number of children: Not on file    Years of education: Not on file    Highest education level: Not on file   Occupational History    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   Social Needs    Financial resource strain: Not on file    Food insecurity     Worry: Not on file     Inability: Not on file    Transportation needs     Medical: Not on file     Non-medical: Not on file   Tobacco Use    Smoking status: Never Smoker    Smokeless tobacco: Never Used    Tobacco comment: Per [employer]: Former smoker   Quit in 1994   Substance and Sexual Activity    Alcohol use: Not Currently     Frequency: Monthly or less     Comment: Occasional/1 mixed drink twice a month if that    Drug use: No    Sexual activity: Yes     Partners: Male     Birth control/protection: Pill   Lifestyle    Physical activity     Days per week: Not on file     Minutes per session: Not on file    Stress: Not on file   Relationships    Social connections     Talks on phone: Not on file     Gets together: Not on file     Attends Shinto service: Not on file     Active member of club or organization: Not on file     Attends meetings of clubs or organizations: Not on file     Relationship status: Not on file    Intimate partner violence     Fear of current or ex partner: Not on file     Emotionally abused: Not on file     Physically abused: Not on file     Forced sexual activity: Not on file   Other Topics Concern    Not on file   Social History Narrative    Always uses seatbelt    Caffeine use: 1-2 cups coffee daily    Has smoke detectors    Orthodoxy Affiliations: [employer]       Family History   Problem Relation Age of Onset    Heart murmur Mother     Diabetes Mother         Mellitus    Thyroid disease Mother         Disorder    Diabetes Father         Mellitus    Hypertension Father     Diabetes Maternal Grandfather         Mellitus    Breast cancer Paternal Grandmother     Diabetes Maternal Aunt         Mellitus    Diabetes Maternal Uncle         Mellitus    No Known Problems Maternal Grandmother     Leukemia Paternal Grandfather     Substance Abuse Neg Hx     Mental illness Neg Hx     Alcohol abuse Neg Hx        Physical Exam   Constitutional: She is oriented to person, place, and time  No distress  HENT:   Head: Normocephalic and atraumatic  Eyes: Conjunctivae and EOM are normal    Neck: Normal range of motion  Neck supple  Cardiovascular: Normal rate, regular rhythm, normal heart sounds and intact distal pulses  Pulmonary/Chest: Effort normal and breath sounds normal    Abdominal: Soft  Bowel sounds are normal    Musculoskeletal: Normal range of motion  Neurological: She is alert and oriented to person, place, and time  Skin: Skin is warm and dry  She is not diaphoretic  Psychiatric: She has a normal mood and affect  Nursing note and vitals reviewed  Vitals: not currently breastfeeding     Wt Readings from Last 3 Encounters:   03/01/21 81 1 kg (178 lb 12 8 oz)   02/24/21 81 3 kg (179 lb 3 2 oz)   02/23/21 84 9 kg (187 lb 3 2 oz)         Labs & Results:  Lab Results   Component Value Date    WBC 11 2 (H) 01/13/2021    HGB 11 5 (L) 01/13/2021    HCT 34 3 (L) 01/13/2021    MCV 89 6 01/13/2021     (H) 01/13/2021     No results found for: BNP  No components found for: CHEM    Results for orders placed during the hospital encounter of 20   Echo complete with contrast if indicated    Narrative Aaron Cree [address]    Transthoracic Echocardiogram  2D, M-mode, Doppler, and Color Doppler    Study date:  07-Aug-2020    Patient: Contreras Paige  MR number: [id number]  Account number: [de-identified]  : 1975  Age: 40 years  Gender: Female  Status: Inpatient  Location: [address] [place] [address]  Height: 56 in  Weight: 178 6 lb  BP: 143/ 83 mmHg    Indications: Dyspnea, Shortness of Breath, Wheezing    Diagnoses: R06 00 - Dyspnea, unspecified, R06 02 - Shortness of breath, R06 2 - Wheezing    Sonographer:  Lulu Giron RDCS  Referring Physician:  Angie Siegel MD  Group:  [facility]  Interpreting Physician:  Kian Freeman MD    SUMMARY    LEFT VENTRICLE:  Systolic function was normal  Ejection fraction was estimated to be 65 %  Although no diagnostic regional wall motion abnormality was identified, this possibility cannot be completely excluded on the basis of this study  Features were consistent with a pseudonormal left ventricular filling pattern, with concomitant abnormal relaxation and increased filling pressure (grade 2 diastolic dysfunction)  MITRAL VALVE:  There was mild annular calcification  There was mild regurgitation  AORTIC VALVE:  There was trace regurgitation  TRICUSPID VALVE:  There was trace regurgitation  PROCEDURE: The study was performed in the [address] [place] [address]  This was a routine study  The transthoracic approach was used  The study included complete 2D imaging, M-mode, complete spectral Doppler, and color Doppler  The heart rate was  90 bpm, at the start of the study  Images were obtained from the parasternal, apical, subcostal, and suprasternal notch acoustic windows  Image quality was adequate      LEFT VENTRICLE: Size was normal  Systolic function was normal  Ejection fraction was estimated to be 65 %  Although no diagnostic regional wall motion abnormality was identified, this possibility cannot be completely excluded on the basis  of this study  Wall thickness was normal  DOPPLER: Features were consistent with a pseudonormal left ventricular filling pattern, with concomitant abnormal relaxation and increased filling pressure (grade 2 diastolic dysfunction)  RIGHT VENTRICLE: The size was normal  Systolic function was normal  Wall thickness was normal     LEFT ATRIUM: Size was normal     RIGHT ATRIUM: Size was normal     MITRAL VALVE: There was mild annular calcification  Valve structure was normal  There was normal leaflet separation  DOPPLER: The transmitral velocity was within the normal range  There was no evidence for stenosis  There was mild  regurgitation  AORTIC VALVE: The valve was trileaflet  Leaflets exhibited normal thickness, normal cuspal separation, and sclerosis  DOPPLER: Transaortic velocity was within the normal range  There was no evidence for stenosis  There was trace  regurgitation  TRICUSPID VALVE: The valve structure was normal  There was normal leaflet separation  DOPPLER: The transtricuspid velocity was within the normal range  There was no evidence for stenosis  There was trace regurgitation  PULMONIC VALVE: Not well visualized  DOPPLER: The transpulmonic velocity was within the normal range  There was no significant regurgitation  PERICARDIUM: There was no pericardial effusion  The pericardium was normal in appearance  AORTA: The root exhibited normal size  SYSTEMIC VEINS: IVC: The inferior vena cava was normal in size  PULMONARY VEINS: DOPPLER: There was systolic blunting in the pulmonary vein(s)      SYSTEM MEASUREMENT TABLES    2D mode  AoR Diam (2D): 27 mm  AoR Diam; Mean (2D): 27 mm  Asc Aorta Diam (2D): 27 mm  Asc Aorta Diam; Mean (2D): 27 mm  Inferior Vena Cava Diameter; 2D mode;: 16 8 mm  Inferior Vena Cava Diameter; Mean; Mean value chosen; 2D mode;: 16 8 mm  Area; 2D mode;: 1070 mm2  Area; Mean; Mean value chosen; 2D mode;: 1070 mm2  LA Dimension (2D): 33 mm  LA Dimension; Mean (2D): 33 mm  LA/Ao (2D): 1 22  EDV (2D-Cubed): 40829 mm3  EF (2D-Cubed): 82 2 %  ESV (2D-Cubed): 9530 mm3  FS (2D-Cubed): 43 8 %  FS (2D-Teich): 43 8 %  IVS/LVPW (2D): 1 09  IVSd (2D): 9 8 mm  IVSd (2D): 11 1 mm  IVSd; Mean chosen (2D): 10 5 mm  LVIDd (2D): 36 6 mm  LVIDd (2D): 38 7 mm  LVIDd; Mean (2D): 37 7 mm  LVIDs (2D): 21 2 mm  LVIDs; Mean (2D): 21 2 mm  LVOT Area (2D): 254 mm2  LVPWd (2D): 8 87 mm  LVPWd (2D): 10 4 mm  LVPWd; Mean (2D): 9 64 mm  Left Ventricular Ejection Fraction; Teichholz; 2D mode;: 75 7 %  Left Ventricular End Diastolic Volume; Teichholz; 2D mode;: [id number] mm3  Left Ventricular End Systolic Volume; Teichholz; 2D mode;: 56618 mm3  SI (2D-Cubed): 26 1 ml/m2  SV (2D-Cubed): 02053 mm3  Stroke Index; Teichholz; 2D mode;: 27 2 ml/m2  Stroke Volume; Teichholz; 2D mode;: 97457 mm3  RVIDd (2D): 35 5 mm  RVIDd; Mean (2D): 35 5 mm  Right and Left Ventricular End Diastolic Diameter Ratio; 2D mode;: 0 94    Apical four chamber  EF (A4C): 71 4 %  LV MOD Diam; Recent value; End Diastole (A4C): 34 3 mm  LV MOD Diam; Recent value; End Diastole (A4C): 33 2 mm  LV MOD Diam; Recent value; End Diastole (A4C): 31 5 mm  LV MOD Diam; Recent value; End Diastole (A4C): 29 4 mm  LV MOD Diam; Recent value; End Diastole (A4C): 27 2 mm  LV MOD Diam; Recent value; End Diastole (A4C): 23 8 mm  LV MOD Diam; Recent value; End Diastole (A4C): 19 9 mm  LV MOD Diam; Recent value; End Diastole (A4C): 14 3 mm  LV MOD Diam; Recent value; End Diastole (A4C): 35 5 mm  LV MOD Diam; Recent value; End Diastole (A4C): 19 9 mm  LV MOD Diam; Recent value; End Diastole (A4C): 35 3 mm  LV MOD Diam; Recent value; End Diastole (A4C): 36 mm  LV MOD Diam; Recent value; End Diastole (A4C): 37 2 mm  LV MOD Diam; Recent value;  End Diastole (A4C): 38 3 mm  LV MOD Diam; Recent value; End Diastole (A4C): 38 9 mm  LV MOD Diam; Recent value; End Diastole (A4C): 39 8 mm  LV MOD Diam; Recent value; End Diastole (A4C): 40 2 mm  LV MOD Diam; Recent value; End Diastole (A4C): 38 9 mm  LV MOD Diam; Recent value; End Diastole (A4C): 40 mm  LV MOD Diam; Recent value; End Diastole (A4C): 40 2 mm  LV MOD Diam; Recent value; End Systole (A4C): 24 4 mm  LV MOD Diam; Recent value; End Systole (A4C): 23 3 mm  LV MOD Diam; Recent value; End Systole (A4C): 21 8 mm  LV MOD Diam; Recent value; End Systole (A4C): 20 9 mm  LV MOD Diam; Recent value; End Systole (A4C): 20 3 mm  LV MOD Diam; Recent value; End Systole (A4C): 19 7 mm  LV MOD Diam; Recent value; End Systole (A4C): 19 2 mm  LV MOD Diam; Recent value; End Systole (A4C): 18 6 mm  LV MOD Diam; Recent value; End Systole (A4C): 17 5 mm  LV MOD Diam; Recent value; End Systole (A4C): 16 2 mm  LV MOD Diam; Recent value; End Systole (A4C): 14 9 mm  LV MOD Diam; Recent value; End Systole (A4C): 13 6 mm  LV MOD Diam; Recent value; End Systole (A4C): 11 5 mm  LV MOD Diam; Recent value; End Systole (A4C): 9 68 mm  LV MOD Diam; Recent value; End Systole (A4C): 6 72 mm  LV MOD Diam; Recent value; End Systole (A4C): 12 7 mm  LV MOD Diam; Recent value; End Systole (A4C): 24 1 mm  LV MOD Diam; Recent value; End Systole (A4C): 24 6 mm  LV MOD Diam; Recent value; End Systole (A4C): 24 8 mm  LV MOD Diam; Recent value; End Systole (A4C): 24 6 mm  Left Ventricle diastolic major axis; Most recent value chosen; Method of Disks, Single Plane; 2D mode; Apical four chamber;: 58 4 mm  Left Ventricle systolic major axis; Most recent value chosen; Method of Disks, Single Plane; 2D mode; Apical four chamber;: 50 9 mm  Left Ventricular Diastolic Area; Most recent value chosen; Method of Disks, Single Plane; 2D mode; Apical four chamber;: 1930 mm2  Left Ventricular End Diastolic Volume; Most recent value chosen; Method of Disks, Single Plane; 2D mode;  Apical four chamber;: 72383 mm3  Left Ventricular End Systolic Volume; Most recent value chosen; Method of Disks, Single Plane; 2D mode; Apical four chamber;: 66141 mm3  Left Ventricular Systolic Area; Most recent value chosen; Method of Disks, Single Plane; 2D mode; Apical four chamber;: 949 mm2  SI (A4C): 21 8 ml/m2  SV (A4C): 97016 mm3  Right Atrium Systolic Area; End Systole; 2D mode; Apical four chamber;: 1080 mm2  Right Atrium Systolic Area; Mean; Mean value chosen; End Systole; 2D mode; Apical four chamber;: 1080 mm2    M mode  Tricuspid Annular Plane Systolic Excursion; Mean; Mean value chosen; Tricuspid Annulus; M mode;: 17 4 mm  Tricuspid Annular Plane Systolic Excursion; Tricuspid Annulus; M mode;: 17 4 mm    Tissue Doppler Imaging  LV [place] Tissue Samuel; Mean; Medial MA (TDI): 77 2 mm/s  LV [place] Tissue Samuel; Medial MA (TDI): 77 2 mm/s    Unspecified Scan Mode  LVOT CV Orifice Diam; Mean: 18 mm  LVOT Diam: 18 mm  MV Peak Samuel/LV Peak Tissue Samuel E-Wave; Medial MA: 11 8  DT; Antegrade Flow: 229 ms  DT; Mean; Antegrade Flow: 229 ms  Dec [place]; Antegrade Flow: 3990 mm/s2  Dec [place]; Mean; Antegrade Flow: 3990 mm/s2  MV E/A: 1 3  MV Peak A Samuel: 720 mm/s  MV Peak A Samuel; Mean: 720 mm/s  MV Peak E Samuel; Antegrade Flow: 913 mm/s  MV Peak E Samuel; Mean; Antegrade Flow: 913 mm/s  MVA (PHT): 328 mm2  PHT: 67 ms  PHT; Mean: 67 ms    [address] Accredited Echocardiography [facility]    Prepared and electronically signed by    Shelby Valentine MD  Signed 07-Aug-2020 16:26:36       No results found for this or any previous visit  This note was completed in part utilizing [employer] direct voice recognition software  Grammatical errors, random word insertion, spelling mistakes, and incomplete sentences may be an occasional consequence of the system secondary to software limitations, ambient noise and hardware issues  At the time of dictation, efforts were made to edit, clarify and /or correct errors    Please read the chart carefully and recognize, using context, where substitutions have occurred    If you have any questions or concerns about the context, text or information contained within the body of this dictation, please contact myself, the provider, for further clarification

## 2021-03-30 ENCOUNTER — OFFICE VISIT (OUTPATIENT)
Dept: CARDIOLOGY CLINIC | Facility: CLINIC | Age: 46
End: 2021-03-30
Payer: COMMERCIAL

## 2021-03-30 VITALS
BODY MASS INDEX: 38.83 KG/M2 | DIASTOLIC BLOOD PRESSURE: 60 MMHG | SYSTOLIC BLOOD PRESSURE: 130 MMHG | WEIGHT: 185 LBS | HEIGHT: 58 IN | HEART RATE: 92 BPM

## 2021-03-30 DIAGNOSIS — E78.2 MIXED HYPERLIPIDEMIA: ICD-10-CM

## 2021-03-30 DIAGNOSIS — R00.2 PALPITATION: Primary | ICD-10-CM

## 2021-03-30 DIAGNOSIS — I10 ESSENTIAL HYPERTENSION: ICD-10-CM

## 2021-03-30 DIAGNOSIS — I50.32 CHRONIC DIASTOLIC HF (HEART FAILURE) (HCC): ICD-10-CM

## 2021-03-30 DIAGNOSIS — E66.01 SEVERE OBESITY (BMI 35.0-39.9) WITH COMORBIDITY (HCC): ICD-10-CM

## 2021-03-30 PROCEDURE — 1036F TOBACCO NON-USER: CPT | Performed by: NURSE PRACTITIONER

## 2021-03-30 PROCEDURE — 93000 ELECTROCARDIOGRAM COMPLETE: CPT | Performed by: NURSE PRACTITIONER

## 2021-03-30 PROCEDURE — 3008F BODY MASS INDEX DOCD: CPT | Performed by: NURSE PRACTITIONER

## 2021-03-30 PROCEDURE — 3075F SYST BP GE 130 - 139MM HG: CPT | Performed by: NURSE PRACTITIONER

## 2021-03-30 PROCEDURE — 99214 OFFICE O/P EST MOD 30 MIN: CPT | Performed by: NURSE PRACTITIONER

## 2021-03-30 PROCEDURE — 3078F DIAST BP <80 MM HG: CPT | Performed by: NURSE PRACTITIONER

## 2021-03-30 RX ORDER — BLOOD-GLUCOSE SENSOR
EACH MISCELLANEOUS
COMMUNITY
Start: 2021-02-23 | End: 2021-04-26

## 2021-03-30 RX ORDER — ATORVASTATIN CALCIUM 80 MG/1
80 TABLET, FILM COATED ORAL DAILY
Start: 2021-03-30 | End: 2021-04-29 | Stop reason: SDUPTHER

## 2021-03-30 NOTE — LETTER
March 30, 2021     Mellissa Bazan [address], [address] [address] [facility]    Patient: Samia Guzman   YOB: 1975   Date of Visit: 3/30/2021       Dear Dr Gerard Hernandez: Thank you for referring Erin Durant to me for evaluation  Below are my notes for this consultation  If you have questions, please do not hesitate to call me  I look forward to following your patient along with you  Sincerely,        LELAND Salas        CC: MD Walt Guevara, 10 [facility]  3/30/2021  8:54 AM  Sign when Signing Visit  Heart Failure   Follow Up Office Visit Note -     Samia Guzman   39 y o    female   MRN: [id number]  [place] [id number] [address]  [facility] [address]  [facility] [phone]    PCP: Mellissa Bazan PA-C  Cardiologist :Will be Dr Arina Martinez            Summary of Recommendations  Heart healthy diet  Education provided  Hydrate 2 L fld a day  Limit caffeine  Increase atorva to 80 mg/d  follow-up with lipid profile 8 weeks  Exercise- goal 30 min mod level activity most days of the week  Follow up will be scheduled with Dr Arina Martinez ,  2 months        Impression/plan  Brief palpitations  Recommend hydrate, limit caffeine, increase fluids  COVID 19 infection 2/24/21, S/P monocoonal Ab Rx Bamlanivimab infusion   She was treated at home  Chronic diastolic heart failure, adm 8/6-8/8/20  with decompensation  --home weight  181 at home this am      Wt Readings from Last 3 Encounters:   03/30/21 83 9 kg (185 lb)   03/01/21 81 1 kg (178 lb 12 8 oz)   02/24/21 81 3 kg (179 lb 3 2 oz)       Wt Readings from Last 3 Encounters:   03/01/21 81 1 kg (178 lb 12 8 oz)   02/24/21 81 3 kg (179 lb 3 2 oz)   02/23/21 84 9 kg (187 lb 3 2 oz)        Beta-Blocker:  None  Has depression, on Paxil   ACEi, ARB or ARNi:  Lisinopril 20 mg/d   Diuretic:   Lasix 20 mg daily     Educated regarding adherence to a 1 5g -2 gram sodium diet/ 2000 ml fluid restriction, daily weights and to call us if  she gains  2-3 lbs in 1 day or 5 lbs/ 1 week  Hypertension, essential  BP  130/60 On lisinopril, loop diuretic  Did have 2 higher readings in the 140-150s- those were before she took her antihypertensives  Hyperlipidemia  On atorvastatin 40 mg/d   Will increased 80 mg daily  Follow-up lipid profile 8 weeks  Continue heart healthy diet  · Lipids 10/20:  LDL  92 Non   · Lipids 4/20:    [id number] Non   · Lipids 8/19:    [id number]  Non   Diabetes mellitus type 1, with hyperglycemia, with neuropathy and nephropathy  on insulin  Hemoglobin A1c  2/21 10 0  prior 9 7  follows with endocrinology  Morbid obesity  BMI 38  proteinuria follows with Nephrology  chronic anemia follows with  GI  Had recent Fe infusions  Apr 7 -EGD/ C scope  Former tobacco use quitting in 1994  Depression, on Paxil  Asthma mild persistent  Cardiac testing   TTE 8/7/2020  EF 65%  Grade 2 DD  Mild MR  · Stress echocardiogram 11/25/20 There was no echocardiographic evidence for stress-induced ischemia  HPI:   Diane Gabriel is a 39 yo female with obesity (BMI 38),  type 1 diabetes, PCOS, hypertension and hyperlipidemia  She has no known history of CAD  Recently, she presented to [facility] 8/6/2020 with shortness of breath, chest tightness and peripheral edema  She admitted to exertional shortness of breath and orthopnea  Reportedly gained 20 lb in the last few months  Testing for COVID-19 was negative  An echocardiogram demonstrated an ejection fraction of 65%, no regional motion abnormality and grade 2 diastolic dysfunction  She was diuresed, net -2 3 L and followed by Cardiology  She transitioned to Lasix 40 mg daily, not previously on diuretics     An outpatient stress test was recommended  If she continued to have marked dyspnea on exertion, a CTA was recommended to rule out PE     After being discharged, she return to the ED August 14 complaining of shortness of breath and pleuritic pain  A CTA demonstrated no evidence of PE     8/18/2020  Hospital follow-up  She is doing well  No chest pain or shortness of breath  Blood pressure is satisfactory  Her stress test has been ordered yet not scheduled  Is active  Is a caretaker  Does not regularly exercise, but is trying to reduce calories to reduce weight  Recent labs showed a Cr of 1 2  Is diuretic naive, now on Lasix 40 mg/d  Will reduce diuretic and reassess  Discussed working on risk factors  Will intensify statin therapy  Will help her schedule the stress test  Return after ST  Discussed heart healthy die- Low Na  Provided education on reading food labels  Interval history  No f/u with cardiologist    2/24/21 + COVID 19  Rx monoclonal Ab    3/20/21  Acute OV , accompanied by her   CC palpitations-  Very brief, lasted seconds a few weeks ago  Occurred at rest  No recurrence  It has been awhile since she was back to Cardiology so she wanted to get checked out  EKG sinus rhythm 92 beats per minute fusion complexes/PVC  /60  She is sedentary  She gains weight very easily  She denies any chest pain or shortness of breath, no lower extremity edema  She sleeps on 1 pillow  Recommend adherence to low-sodium diet  She is compliant with furosemide  For for now , since her palpitations were brief and self-limiting, will make no medication changes  Today, her blood sugar was low in the office, at 60  I provided her some orange juice  Her EKG did show 2 PVCs, which may be related  We discussed her most recent lipids  We discussed alternatives for treatment:  Increasing atorvastatin, adding ezetimibe or switching to rosuvastatin  We mutually agreed to stick with atorvastatin and increased to 80 mg daily  She will take the 40 mg tablets, a double up on the until she needs a new prescription  Will determine if she is able to tolerate this    If not, we could switch her to rosuvastatin 20 mg daily which will be the equivalent of 80 mg of atorvastatin  Given her pill burden, would prefer to switch with 1 drug as opposed to adding ezetimibe  Encouraged heart healthy behaviors, exercise, and heart healthy diet  Education handouts provided          I have spent 40 minutes with Patient and family today in which greater than 50% of this time was spent in counseling/coordination of care regarding Intructions for management, Patient and family education and Risk factor reductions  Assessment  Diagnoses and all orders for this visit:    Palpitation    Essential hypertension    Mixed hyperlipidemia    Severe obesity (BMI 35 0-39  9) with comorbidity ([facility] )    Chronic diastolic HF (heart failure) ([facility])        Past Medical History:   Diagnosis Date    Anemia     Asthma     w/acute exacerbation  Last assessed: 10/26/12    Chest pain 2/3/2016    CHF (congestive heart failure) ([facility]     Depression     Diabetes mellitus ([facility] )     Diabetic nephropathy ([facility] )     Diabetic nephropathy associated with type 1 diabetes mellitus ([place] [address] ) 8/12/2020    Diabetic retinopathy ([place] [address] ) 2/3/2016    Gastroenteritis 02/03/2016    GERD (gastroesophageal reflux disease)     HTN (hypertension)     Hyperlipidemia 2/3/2016    Oligomenorrhea     Polycystic ovaries 2/3/2016    Retinal hemorrhage 2/3/2016    Retinopathy     Thrombocytosis (HCC)     Type 1 diabetes mellitus with ophthalmic manifestation ([facility] ) 2/3/2016       Review of Systems   Constitution: Negative for chills  Cardiovascular: Positive for palpitations  Negative for chest pain, claudication, cyanosis, dyspnea on exertion, irregular heartbeat, leg swelling, near-syncope, orthopnea, paroxysmal nocturnal dyspnea and syncope  Respiratory: Negative for cough and shortness of breath  Gastrointestinal: Negative for heartburn and nausea     Neurological: Negative for dizziness, focal weakness, headaches, light-headedness and weakness  All other systems reviewed and are negative  No Known Allergies    Current Outpatient Medications:     acetaminophen (TYLENOL) 500 mg tablet, Take 2 tablets (1,000 mg total) by mouth every 6 (six) hours as needed for mild pain or moderate pain, Disp: 30 tablet, Rfl: 0    acetone, urine, test strip, Use to test urine ketones for high blood sugars  , Disp: 25 each, Rfl: 6    albuterol (2 5 mg/3 mL) 0 083 % nebulizer solution, Take 1 vial (2 5 mg total) by nebulization every 6 (six) hours as needed for wheezing or shortness of breath, Disp: 25 vial, Rfl: 4    albuterol (PROVENTIL HFA,VENTOLIN HFA) 90 mcg/act inhaler, INHALE 2 PUFFS BY MOUTH EVERY 4 HOURS AS NEEDED FOR WHEEZING OR FOR SHORTNESS OF BREATH, Disp: 25 5 g, Rfl: 0    Ascorbic Acid (VITAMIN C) 500 MG CAPS, Take 2 capsules by mouth daily, Disp: , Rfl:     aspirin (ECOTRIN LOW STRENGTH) 81 mg EC tablet, Take 1 tablet (81 mg total) by mouth daily, Disp: 30 tablet, Rfl: 6    atorvastatin (LIPITOR) 40 mg tablet, Take 1 tablet (40 mg total) by mouth daily, Disp: 30 tablet, Rfl: 5    Biotin 10 MG TABS, Take 1 tablet by mouth daily, Disp: , Rfl:     Blood Pressure Monitoring [place], Use 2 (two) times a day Monitor BP twice daily  Call office if BP > 140/90 persistently  , Disp: 1 Device, Rfl: 0    Butalbital-APAP-Caffeine (Fioricet) -40 MG CAPS, Take 1 tablet by mouth 4 (four) times a day as needed ([facility]), Disp: 30 capsule, Rfl: 0    cyanocobalamin (VITAMIN B-12) 100 mcg tablet, Take by mouth daily, Disp: , Rfl:     fluticasone-salmeterol (Advair Diskus) 250-50 mcg/dose inhaler, Inhale 1 puff 2 (two) times a day Rinse mouth after use , Disp: 3 Inhaler, Rfl: 3    furosemide (LASIX) 40 mg tablet, Take 1 tablet (40 mg total) by mouth daily Take 1/2 tablet daily (Patient taking differently: Take 40 mg by mouth daily ), Disp: 30 tablet, Rfl: 3    glucagon (Glucagon Emergency) 1 MG injection, Inject 1 mg under the skin once as needed for low blood sugar for up to 1 dose, Disp: 1 kit, Rfl: 3    Glucagon (Gvoke HypoPen 2-Pack) 1 MG/0 2ML SOAJ, Use if hypoglycemia prn, Disp: 1 Syringe, Rfl: 6    insulin aspart (NovoLOG) 100 units/mL injection, Take 1 unit of novolog for every 1 g of carb, up to 150 units daily, Disp: 50 mL, Rfl: 6    insulin glargine (Lantus SoloStar) 100 units/mL injection pen, Inject 78 Units under the skin daily at bedtime, Disp: 10 pen, Rfl: 3    lisinopril (ZESTRIL) 20 mg tablet, TAKE 2 TABLETS BY MOUTH EVERY DAY, Disp: 60 tablet, Rfl: 0    montelukast (SINGULAIR) 10 mg tablet, TAKE 1 TABLET BY MOUTH AT BEDTIME, Disp: 90 tablet, Rfl: 0    Na Sulfate-K Sulfate-Mg Sulf (Suprep Bowel Prep Kit) 17 5-3 13-1 6 GM/177ML SOLN, Take 177 mL by mouth once for 1 dose, Disp: 2 Bottle, Rfl: 0    omeprazole (PriLOSEC) 20 mg delayed release capsule, Take 20 mg by mouth daily  , Disp: , Rfl:     OneTouch Ultra test strip, Test 3 times daily, Disp: 300 each, Rfl: 3    PARoxetine (PAXIL) 20 mg tablet, TAKE 1 TABLET BY MOUTH EVERY DAY, Disp: 90 tablet, Rfl: 0    Probiotic Product (ALIGN PO), Take by mouth, Disp: , Rfl:     Social History     Socioeconomic History    Marital status: /Civil Union     Spouse name: Not on file    Number of children: Not on file    Years of education: Not on file    Highest education level: Not on file   Occupational History    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   Social Needs    Financial resource strain: Not on file    Food insecurity     Worry: Not on file     Inability: Not on file    Transportation needs     Medical: Not on file     Non-medical: Not on file   Tobacco Use    Smoking status: Never Smoker    Smokeless tobacco: Never Used    Tobacco comment: Per [employer]: Former smoker   Quit in 1994   Substance and Sexual Activity    Alcohol use: Not Currently     Frequency: Monthly or less     Comment: Occasional/1 mixed drink twice a month if that    Drug use: No    Sexual activity: Yes Partners: Male     Birth control/protection: Pill   Lifestyle    Physical activity     Days per week: Not on file     Minutes per session: Not on file    Stress: Not on file   Relationships    Social connections     Talks on phone: Not on file     Gets together: Not on file     Attends Hoahaoism service: Not on file     Active member of club or organization: Not on file     Attends meetings of clubs or organizations: Not on file     Relationship status: Not on file    Intimate partner violence     Fear of current or ex partner: Not on file     Emotionally abused: Not on file     Physically abused: Not on file     Forced sexual activity: Not on file   Other Topics Concern    Not on file   Social History Narrative    Always uses seatbelt    Caffeine use: 1-2 cups coffee daily    Has smoke detectors    Pentecostalism Affiliations: Buddhism       Family History   Problem Relation Age of Onset    Heart murmur Mother     Diabetes Mother         Mellitus    Thyroid disease Mother         Disorder    Diabetes Father         Mellitus    Hypertension Father     Diabetes Maternal Grandfather         Mellitus    Breast cancer Paternal Grandmother     Diabetes Maternal Aunt         Mellitus    Diabetes Maternal Uncle         Mellitus    No Known Problems Maternal Grandmother     Leukemia Paternal Grandfather     Substance Abuse Neg Hx     Mental illness Neg Hx     Alcohol abuse Neg Hx        Physical Exam   Constitutional: She is oriented to person, place, and time  No distress  HENT:   Head: Normocephalic and atraumatic  Eyes: Conjunctivae and EOM are normal    Neck: Normal range of motion  Neck supple  Cardiovascular: Normal rate, regular rhythm, normal heart sounds and intact distal pulses  Pulmonary/Chest: Effort normal and breath sounds normal    Abdominal: Soft  Bowel sounds are normal    Musculoskeletal: Normal range of motion  Neurological: She is alert and oriented to person, place, and time  Skin: Skin is warm and dry  She is not diaphoretic  Psychiatric: She has a normal mood and affect  Nursing note and vitals reviewed  Vitals: not currently breastfeeding  Wt Readings from Last 3 Encounters:   21 81 1 kg (178 lb 12 8 oz)   21 81 3 kg (179 lb 3 2 oz)   21 84 9 kg (187 lb 3 2 oz)         Labs & Results:  Lab Results   Component Value Date    WBC 11 2 (H) 2021    HGB 11 5 (L) 2021    HCT 34 3 (L) 2021    MCV 89 6 2021     (H) 2021     No results found for: BNP  No components found for: CHEM    Results for orders placed during the hospital encounter of 20   Echo complete with contrast if indicated    Narrative [facility] [address]    Transthoracic Echocardiogram  2D, M-mode, Doppler, and Color Doppler    Study date:  07-Aug-2020    Patient: Jose Armando Womack  MR number: [id number]  Account number: [de-identified]  : 1975  Age: 40 years  Gender: Female  Status: Inpatient  Location: [address] [place] [address]  Height: 56 in  Weight: 178 6 lb  BP: 143/ 83 mmHg    Indications: Dyspnea, Shortness of Breath, Wheezing    Diagnoses: R06 00 - Dyspnea, unspecified, R06 02 - Shortness of breath, R06 2 - Wheezing    Sonographer:  Esperanza Salvador RDCS  Referring Physician:  Cookie Eisenberg MD  Group:  [facility]  Interpreting Physician:  Conchita Prater MD    SUMMARY    LEFT VENTRICLE:  Systolic function was normal  Ejection fraction was estimated to be 65 %  Although no diagnostic regional wall motion abnormality was identified, this possibility cannot be completely excluded on the basis of this study  Features were consistent with a pseudonormal left ventricular filling pattern, with concomitant abnormal relaxation and increased filling pressure (grade 2 diastolic dysfunction)  MITRAL VALVE:  There was mild annular calcification    There was mild regurgitation  AORTIC VALVE:  There was trace regurgitation  TRICUSPID VALVE:  There was trace regurgitation  PROCEDURE: The study was performed in the [address] [place] [address]  This was a routine study  The transthoracic approach was used  The study included complete 2D imaging, M-mode, complete spectral Doppler, and color Doppler  The heart rate was  90 bpm, at the start of the study  Images were obtained from the parasternal, apical, subcostal, and suprasternal notch acoustic windows  Image quality was adequate  LEFT VENTRICLE: Size was normal  Systolic function was normal  Ejection fraction was estimated to be 65 %  Although no diagnostic regional wall motion abnormality was identified, this possibility cannot be completely excluded on the basis  of this study  Wall thickness was normal  DOPPLER: Features were consistent with a pseudonormal left ventricular filling pattern, with concomitant abnormal relaxation and increased filling pressure (grade 2 diastolic dysfunction)  RIGHT VENTRICLE: The size was normal  Systolic function was normal  Wall thickness was normal     LEFT ATRIUM: Size was normal     RIGHT ATRIUM: Size was normal     MITRAL VALVE: There was mild annular calcification  Valve structure was normal  There was normal leaflet separation  DOPPLER: The transmitral velocity was within the normal range  There was no evidence for stenosis  There was mild  regurgitation  AORTIC VALVE: The valve was trileaflet  Leaflets exhibited normal thickness, normal cuspal separation, and sclerosis  DOPPLER: Transaortic velocity was within the normal range  There was no evidence for stenosis  There was trace  regurgitation  TRICUSPID VALVE: The valve structure was normal  There was normal leaflet separation  DOPPLER: The transtricuspid velocity was within the normal range  There was no evidence for stenosis  There was trace regurgitation  PULMONIC VALVE: Not well visualized   DOPPLER: The transpulmonic velocity was within the normal range  There was no significant regurgitation  PERICARDIUM: There was no pericardial effusion  The pericardium was normal in appearance  AORTA: The root exhibited normal size  SYSTEMIC VEINS: IVC: The inferior vena cava was normal in size  PULMONARY VEINS: DOPPLER: There was systolic blunting in the pulmonary vein(s)  SYSTEM MEASUREMENT TABLES    2D mode  AoR Diam (2D): 27 mm  AoR Diam; Mean (2D): 27 mm  Asc Aorta Diam (2D): 27 mm  Asc Aorta Diam; Mean (2D): 27 mm  Inferior Vena Cava Diameter; 2D mode;: 16 8 mm  Inferior Vena Cava Diameter; Mean; Mean value chosen; 2D mode;: 16 8 mm  Area; 2D mode;: 1070 mm2  Area; Mean; Mean value chosen; 2D mode;: 1070 mm2  LA Dimension (2D): 33 mm  LA Dimension; Mean (2D): 33 mm  LA/Ao (2D): 1 22  EDV (2D-Cubed): 16826 mm3  EF (2D-Cubed): 82 2 %  ESV (2D-Cubed): 9530 mm3  FS (2D-Cubed): 43 8 %  FS (2D-Teich): 43 8 %  IVS/LVPW (2D): 1 09  IVSd (2D): 9 8 mm  IVSd (2D): 11 1 mm  IVSd; Mean chosen (2D): 10 5 mm  LVIDd (2D): 36 6 mm  LVIDd (2D): 38 7 mm  LVIDd; Mean (2D): 37 7 mm  LVIDs (2D): 21 2 mm  LVIDs; Mean (2D): 21 2 mm  LVOT Area (2D): 254 mm2  LVPWd (2D): 8 87 mm  LVPWd (2D): 10 4 mm  LVPWd; Mean (2D): 9 64 mm  Left Ventricular Ejection Fraction; Teichholz; 2D mode;: 75 7 %  Left Ventricular End Diastolic Volume; Teichholz; 2D mode;: [id number] mm3  Left Ventricular End Systolic Volume; Teichholz; 2D mode;: 93899 mm3  SI (2D-Cubed): 26 1 ml/m2  SV (2D-Cubed): 71665 mm3  Stroke Index; Teichholz; 2D mode;: 27 2 ml/m2  Stroke Volume; Teichholz; 2D mode;: 93234 mm3  RVIDd (2D): 35 5 mm  RVIDd; Mean (2D): 35 5 mm  Right and Left Ventricular End Diastolic Diameter Ratio; 2D mode;: 0 94    Apical four chamber  EF (A4C): 71 4 %  LV MOD Diam; Recent value; End Diastole (A4C): 34 3 mm  LV MOD Diam; Recent value; End Diastole (A4C): 33 2 mm  LV MOD Diam; Recent value; End Diastole (A4C): 31 5 mm  LV MOD Diam; Recent value;  End Diastole (A4C): 29 4 mm  LV MOD Diam; Recent value; End Diastole (A4C): 27 2 mm  LV MOD Diam; Recent value; End Diastole (A4C): 23 8 mm  LV MOD Diam; Recent value; End Diastole (A4C): 19 9 mm  LV MOD Diam; Recent value; End Diastole (A4C): 14 3 mm  LV MOD Diam; Recent value; End Diastole (A4C): 35 5 mm  LV MOD Diam; Recent value; End Diastole (A4C): 19 9 mm  LV MOD Diam; Recent value; End Diastole (A4C): 35 3 mm  LV MOD Diam; Recent value; End Diastole (A4C): 36 mm  LV MOD Diam; Recent value; End Diastole (A4C): 37 2 mm  LV MOD Diam; Recent value; End Diastole (A4C): 38 3 mm  LV MOD Diam; Recent value; End Diastole (A4C): 38 9 mm  LV MOD Diam; Recent value; End Diastole (A4C): 39 8 mm  LV MOD Diam; Recent value; End Diastole (A4C): 40 2 mm  LV MOD Diam; Recent value; End Diastole (A4C): 38 9 mm  LV MOD Diam; Recent value; End Diastole (A4C): 40 mm  LV MOD Diam; Recent value; End Diastole (A4C): 40 2 mm  LV MOD Diam; Recent value; End Systole (A4C): 24 4 mm  LV MOD Diam; Recent value; End Systole (A4C): 23 3 mm  LV MOD Diam; Recent value; End Systole (A4C): 21 8 mm  LV MOD Diam; Recent value; End Systole (A4C): 20 9 mm  LV MOD Diam; Recent value; End Systole (A4C): 20 3 mm  LV MOD Diam; Recent value; End Systole (A4C): 19 7 mm  LV MOD Diam; Recent value; End Systole (A4C): 19 2 mm  LV MOD Diam; Recent value; End Systole (A4C): 18 6 mm  LV MOD Diam; Recent value; End Systole (A4C): 17 5 mm  LV MOD Diam; Recent value; End Systole (A4C): 16 2 mm  LV MOD Diam; Recent value; End Systole (A4C): 14 9 mm  LV MOD Diam; Recent value; End Systole (A4C): 13 6 mm  LV MOD Diam; Recent value; End Systole (A4C): 11 5 mm  LV MOD Diam; Recent value; End Systole (A4C): 9 68 mm  LV MOD Diam; Recent value; End Systole (A4C): 6 72 mm  LV MOD Diam; Recent value; End Systole (A4C): 12 7 mm  LV MOD Diam; Recent value; End Systole (A4C): 24 1 mm  LV MOD Diam; Recent value; End Systole (A4C): 24 6 mm  LV MOD Diam; Recent value;  End Systole (A4C): 24 8 mm  LV MOD Diam; Recent value; End Systole (A4C): 24 6 mm  Left Ventricle diastolic major axis; Most recent value chosen; Method of Disks, Single Plane; 2D mode; Apical four chamber;: 58 4 mm  Left Ventricle systolic major axis; Most recent value chosen; Method of Disks, Single Plane; 2D mode; Apical four chamber;: 50 9 mm  Left Ventricular Diastolic Area; Most recent value chosen; Method of Disks, Single Plane; 2D mode; Apical four chamber;: 1930 mm2  Left Ventricular End Diastolic Volume; Most recent value chosen; Method of Disks, Single Plane; 2D mode; Apical four chamber;: 97687 mm3  Left Ventricular End Systolic Volume; Most recent value chosen; Method of Disks, Single Plane; 2D mode; Apical four chamber;: 28075 mm3  Left Ventricular Systolic Area; Most recent value chosen; Method of Disks, Single Plane; 2D mode; Apical four chamber;: 949 mm2  SI (A4C): 21 8 ml/m2  SV (A4C): 26224 mm3  Right Atrium Systolic Area; End Systole; 2D mode; Apical four chamber;: 1080 mm2  Right Atrium Systolic Area; Mean; Mean value chosen; End Systole; 2D mode; Apical four chamber;: 1080 mm2    M mode  Tricuspid Annular Plane Systolic Excursion; Mean; Mean value chosen; Tricuspid Annulus; M mode;: 17 4 mm  Tricuspid Annular Plane Systolic Excursion; Tricuspid Annulus; M mode;: 17 4 mm    Tissue Doppler Imaging  LV Peak [place] Tissue Samuel; Mean; Medial MA (TDI): 77 2 mm/s  LV Peak [place] Tissue Samuel; Medial MA (TDI): 77 2 mm/s    Unspecified Scan Mode  LVOT CV Orifice Diam; Mean: 18 mm  LVOT Diam: 18 mm  MV Peak Samuel/LV Peak Tissue Samuel E-Wave; Medial MA: 11 8  DT; Antegrade Flow: 229 ms  DT; Mean; Antegrade Flow: 229 ms  Dec Gosper; Antegrade Flow: 3990 mm/s2  Dec Gosper; Mean; Antegrade Flow: 3990 mm/s2  MV E/A: 1 3  MV Peak A Samuel: 720 mm/s  MV Peak A Samuel; Mean: 720 mm/s  MV Peak E Samuel; Antegrade Flow: 913 mm/s  MV Peak E Samuel; Mean; Antegrade Flow: 913 mm/s  MVA (PHT): 328 mm2  PHT: 67 ms  PHT;  Mean: 67 ms    [employer] Accredited Echocardiography Laboratory    Prepared and electronically signed by    Shasta Henderson MD  Signed 07-Aug-2020 16:26:36       No results found for this or any previous visit  This note was completed in part utilizing m-[employer] direct voice recognition software  Grammatical errors, random word insertion, spelling mistakes, and incomplete sentences may be an occasional consequence of the system secondary to software limitations, ambient noise and hardware issues  At the time of dictation, efforts were made to edit, clarify and /or correct errors  Please read the chart carefully and recognize, using context, where substitutions have occurred    If you have any questions or concerns about the context, text or information contained within the body of this dictation, please contact myself, the provider, for further clarification

## 2021-04-07 ENCOUNTER — HOSPITAL ENCOUNTER (OUTPATIENT)
Dept: GASTROENTEROLOGY | Facility: AMBULARY SURGERY CENTER | Age: 46
Setting detail: OUTPATIENT SURGERY
Discharge: HOME | End: 2021-04-07
Attending: INTERNAL MEDICINE | Admitting: INTERNAL MEDICINE
Payer: COMMERCIAL

## 2021-04-07 ENCOUNTER — ANESTHESIA (OUTPATIENT)
Dept: GASTROENTEROLOGY | Facility: AMBULARY SURGERY CENTER | Age: 46
End: 2021-04-07

## 2021-04-07 VITALS
WEIGHT: 178 LBS | TEMPERATURE: 98.1 F | BODY MASS INDEX: 40.04 KG/M2 | OXYGEN SATURATION: 97 % | SYSTOLIC BLOOD PRESSURE: 111 MMHG | RESPIRATION RATE: 20 BRPM | DIASTOLIC BLOOD PRESSURE: 59 MMHG | HEIGHT: 56 IN | HEART RATE: 83 BPM

## 2021-04-07 DIAGNOSIS — D64.9 ANEMIA, UNSPECIFIED TYPE: ICD-10-CM

## 2021-04-07 LAB
EXT PREGNANCY TEST URINE: NEGATIVE
EXT. CONTROL: NORMAL

## 2021-04-07 PROCEDURE — 88305 TISSUE EXAM BY PATHOLOGIST: CPT | Performed by: PATHOLOGY

## 2021-04-07 PROCEDURE — 45378 DIAGNOSTIC COLONOSCOPY: CPT | Performed by: INTERNAL MEDICINE

## 2021-04-07 PROCEDURE — 81025 URINE PREGNANCY TEST: CPT | Performed by: STUDENT IN AN ORGANIZED HEALTH CARE EDUCATION/TRAINING PROGRAM

## 2021-04-07 PROCEDURE — 43239 EGD BIOPSY SINGLE/MULTIPLE: CPT | Performed by: INTERNAL MEDICINE

## 2021-04-07 RX ORDER — PROPOFOL 10 MG/ML
INJECTION, EMULSION INTRAVENOUS AS NEEDED
Status: DISCONTINUED | OUTPATIENT
Start: 2021-04-07 | End: 2021-04-07

## 2021-04-07 RX ORDER — PROPOFOL 10 MG/ML
INJECTION, EMULSION INTRAVENOUS CONTINUOUS PRN
Status: DISCONTINUED | OUTPATIENT
Start: 2021-04-07 | End: 2021-04-07

## 2021-04-07 RX ORDER — SODIUM CHLORIDE, SODIUM LACTATE, POTASSIUM CHLORIDE, CALCIUM CHLORIDE 600; 310; 30; 20 MG/100ML; MG/100ML; MG/100ML; MG/100ML
125 INJECTION, SOLUTION INTRAVENOUS CONTINUOUS
Status: DISCONTINUED | OUTPATIENT
Start: 2021-04-07 | End: 2021-04-11 | Stop reason: HOSPADM

## 2021-04-07 RX ORDER — LIDOCAINE HYDROCHLORIDE 10 MG/ML
INJECTION, SOLUTION EPIDURAL; INFILTRATION; INTRACAUDAL; PERINEURAL AS NEEDED
Status: DISCONTINUED | OUTPATIENT
Start: 2021-04-07 | End: 2021-04-07

## 2021-04-07 RX ORDER — SODIUM CHLORIDE 9 MG/ML
INJECTION, SOLUTION INTRAVENOUS CONTINUOUS PRN
Status: DISCONTINUED | OUTPATIENT
Start: 2021-04-07 | End: 2021-04-07

## 2021-04-07 RX ADMIN — PROPOFOL 50 MG: 10 INJECTION, EMULSION INTRAVENOUS at 08:20

## 2021-04-07 RX ADMIN — PROPOFOL 40 MG: 10 INJECTION, EMULSION INTRAVENOUS at 08:07

## 2021-04-07 RX ADMIN — PROPOFOL 70 MG: 10 INJECTION, EMULSION INTRAVENOUS at 08:01

## 2021-04-07 RX ADMIN — SODIUM CHLORIDE: 0.9 INJECTION, SOLUTION INTRAVENOUS at 07:20

## 2021-04-07 RX ADMIN — SODIUM CHLORIDE, SODIUM LACTATE, POTASSIUM CHLORIDE, AND CALCIUM CHLORIDE 125 ML/HR: .6; .31; .03; .02 INJECTION, SOLUTION INTRAVENOUS at 07:46

## 2021-04-07 RX ADMIN — PROPOFOL 30 MG: 10 INJECTION, EMULSION INTRAVENOUS at 08:03

## 2021-04-07 RX ADMIN — PROPOFOL 40 MG: 10 INJECTION, EMULSION INTRAVENOUS at 08:05

## 2021-04-07 RX ADMIN — PROPOFOL 100 MCG/KG/MIN: 10 INJECTION, EMULSION INTRAVENOUS at 08:07

## 2021-04-07 RX ADMIN — PROPOFOL 20 MG: 10 INJECTION, EMULSION INTRAVENOUS at 08:09

## 2021-04-07 RX ADMIN — LIDOCAINE HYDROCHLORIDE 50 MG: 10 INJECTION, SOLUTION EPIDURAL; INFILTRATION; INTRACAUDAL at 08:01

## 2021-04-07 NOTE — ANESTHESIA POSTPROCEDURE EVALUATION
Post-Op Assessment Note    CV Status:  Stable  Pain Score: 0    Pain management: adequate     Mental Status:  Alert and awake   Hydration Status:  Euvolemic   PONV Controlled:  Controlled   Airway Patency:  Patent      Post Op Vitals Reviewed: Yes      Staff: CRNA         No complications documented      BP   155/88   Temp     Pulse  72   Resp   21   SpO2   99

## 2021-04-07 NOTE — H&P
History and Physical -  [facility]  Destiny Burnham 39 y o  female MRN: [id number]    HPI: Destiny Burnham is a 39y o  year old female who presents EGD and colonoscopy to evaluate for anemia and GERD  Review of Systems    Historical Information   Past Medical History:   Diagnosis Date    Anemia     Asthma     w/acute exacerbation  Last assessed: 10/26/12    Chest pain 2/3/2016    CHF (congestive heart failure) ([facility] )     Depression     Diabetes mellitus ([facility] )     Diabetic nephropathy ([place] [address] )     Diabetic nephropathy associated with type 1 diabetes mellitus ([facility] ) 8/12/2020    Diabetic retinopathy ([place] [address] ) 2/3/2016    Gastroenteritis 02/03/2016    GERD (gastroesophageal reflux disease)     HTN (hypertension)     Hyperlipidemia 2/3/2016    Oligomenorrhea     Polycystic ovaries 2/3/2016    Retinal hemorrhage 2/3/2016    Retinopathy     Thrombocytosis (HCC)     Type 1 diabetes mellitus with ophthalmic manifestation ([place] [address] ) 2/3/2016     Past Surgical History:   Procedure Laterality Date    CATARACT EXTRACTION Left 10/2020    CATARACT EXTRACTION Right     RETINAL LASER PROCEDURE       Social History   Social History     Substance and Sexual Activity   Alcohol Use Not Currently    Frequency: Monthly or less    Comment: Occasional/1 mixed drink twice a month if that     Social History     Substance and Sexual Activity   Drug Use No     Social History     Tobacco Use   Smoking Status Never Smoker   Smokeless Tobacco Never Used   Tobacco Comment    Per [employer]: Former smoker   Quit in 1994     Family History   Problem Relation Age of Onset    Heart murmur Mother     Diabetes Mother         Mellitus    Thyroid disease Mother         Disorder    Diabetes Father         Mellitus    Hypertension Father     Diabetes Maternal Grandfather         Mellitus    Breast cancer Paternal Grandmother     Diabetes Maternal Aunt         Mellitus    Diabetes Maternal Uncle         Mellitus    No Known Problems Maternal Grandmother     Leukemia Paternal Grandfather     Substance Abuse Neg Hx     Mental illness Neg Hx     Alcohol abuse Neg Hx        Meds/Allergies     (Not in a hospital admission)      No Known Allergies    Objective     /58   Pulse 96   Temp (!) 97 °F (36 1 °C) (Temporal)   Resp 18   Ht 4' 8" (1 422 m)   Wt 80 7 kg (178 lb)   SpO2 100%   BMI 39 91 kg/m²       PHYSICAL EXAM    Gen: NAD  CV: RRR  CHEST: Clear  ABD: soft, NT/ND  EXT: no edema  Neuro: AAO      ASSESSMENT/PLAN:  This is a 39y o  year old female here for EGD and colonoscopy for evaluation of GERD and anemia      PLAN:   Procedure:  EGD and colonoscopy with biopsy and possible polypectomy

## 2021-04-07 NOTE — ANESTHESIA PREPROCEDURE EVALUATION
Procedure:  COLONOSCOPY  EGD    Relevant Problems   CARDIO   (+) Acute CHF (congestive heart failure) (HCC)   (+) Hyperlipidemia   (+) Hypertension      ENDO   (+) Type 1 diabetes mellitus with hyperglycemia (HCC)      GI/HEPATIC   (+) Gastroesophageal reflux disease without esophagitis      /RENAL   (+) Diabetic nephropathy associated with type 1 diabetes mellitus (HCC)      HEMATOLOGY   (+) Anemia      NEURO/PSYCH   (+) Diabetic polyneuropathy associated with type 1 diabetes mellitus (HCC)   (+) Moderate episode of recurrent major depressive disorder (HCC)      PULMONARY   (+) Asthma   (+) Asthma, mild persistent        Physical Exam    Airway    Mallampati score: III  TM Distance: >3 FB  Neck ROM: full     Dental   No notable dental hx     Cardiovascular      Pulmonary      Other Findings        Anesthesia Plan  ASA Score- 3     Anesthesia Type- IV sedation with anesthesia with ASA Monitors  Additional Monitors:   Airway Plan:           Plan Factors-Exercise tolerance (METS): >4 METS  Chart reviewed  Patient summary reviewed  Patient is not a current smoker  There is medical exclusion for perioperative obstructive sleep apnea risk education  Induction- intravenous  Postoperative Plan-     Informed Consent- Anesthetic plan and risks discussed with patient  I personally reviewed this patient with the CRNA  Discussed and agreed on the Anesthesia Plan with the TAMICA Mazariegos

## 2021-04-07 NOTE — DISCHARGE INSTRUCTIONS
Hiatal Hernia   WHAT YOU NEED TO KNOW:   A hiatal hernia is a condition that causes part of your stomach to bulge through the hiatus (small opening) in your diaphragm  The part of the stomach may move up and down, or it may get trapped above the diaphragm  DISCHARGE INSTRUCTIONS:   Seek care immediately if:   · You have severe abdominal pain  · You try to vomit but nothing comes out (retching)  · You have severe chest pain and sudden trouble breathing  · Your bowel movements are black or bloody  · Your vomit looks like coffee grounds or has blood in it  Contact your healthcare provider if:   · Your symptoms are getting worse  · You have nausea, and you are vomiting  · You are losing weight without trying  · You have questions or concerns about your condition or care  Medicines:   · Medicines  may be given to relieve heartburn symptoms  These medicines help to decrease or block stomach acid  You may also be given medicines that help to tighten the esophageal sphincter  · Take your medicine as directed  Contact your healthcare provider if you think your medicine is not helping or if you have side effects  Tell him or her if you are allergic to any medicine  Keep a list of the medicines, vitamins, and herbs you take  Include the amounts, and when and why you take them  Bring the list or the pill bottles to follow-up visits  Carry your medicine list with you in case of an emergency  Follow up with your healthcare provider as directed:  Write down your questions so you remember to ask them during your visits  Self care:   · Avoid foods that make your symptoms worse  These may include spicy foods, fruit juices, alcohol, caffeine, chocolate, and mint  · Eat several small meals during the day  Small meals give your stomach less food to digest     · Avoid lying down and bending forward after you eat  Do not eat meals 2 to 3 hours before bedtime   This decreases your risk for reflux  · Maintain a healthy weight  If you are overweight, weight loss may help relieve your symptoms  · Sleep with your head elevated  at least 6 inches  · Do not smoke  Smoking can increase your symptoms of heartburn  © Copyright [address] Information is for End User's use only and may not be sold, redistributed or otherwise used for commercial purposes  All illustrations and images included in CareNotes® are the copyrighted property of A D A M , Inc  or [facility] Michelle Aguilar   The above information is an  only  It is not intended as medical advice for individual conditions or treatments  Talk to your doctor, nurse or pharmacist before following any medical regimen to see if it is safe and effective for you  Gastritis   WHAT YOU NEED TO KNOW:   Gastritis is inflammation or irritation of the lining of your stomach  DISCHARGE INSTRUCTIONS:   Call 911 for any of the following:   · You develop chest pain or shortness of breath  Seek care immediately if:   · You vomit blood  · You have black or bloody bowel movements  · You have severe stomach or back pain  Contact your healthcare provider if:   · You have a fever  · You have new or worsening symptoms, even after treatment  · You have questions or concerns about your condition or care  Medicines:   · Medicines  may be given to help treat a bacterial infection or decrease stomach acid  · Take your medicine as directed  Contact your healthcare provider if you think your medicine is not helping or if you have side effects  Tell him or her if you are allergic to any medicine  Keep a list of the medicines, vitamins, and herbs you take  Include the amounts, and when and why you take them  Bring the list or the pill bottles to follow-up visits  Carry your medicine list with you in case of an emergency  Manage or prevent gastritis:   · Do not smoke    Nicotine and other chemicals in cigarettes and cigars can make your symptoms worse and cause lung damage  Ask your healthcare provider for information if you currently smoke and need help to quit  E-cigarettes or smokeless tobacco still contain nicotine  Talk to your healthcare provider before you use these products  · Do not drink alcohol  Alcohol can prevent healing and make your gastritis worse  Talk to your healthcare provider if you need help to stop drinking  · Do not take NSAIDs or aspirin unless directed  These and similar medicines can cause irritation  If your healthcare provider says it is okay to take NSAIDs, take them with food  · Do not eat foods that cause irritation  Foods such as oranges and salsa can cause burning or pain  Eat a variety of healthy foods  Examples include fruits (not citrus), vegetables, low-fat dairy products, beans, whole-grain breads, and lean meats and fish  Try to eat small meals, and drink water with your meals  Do not eat for at least 3 hours before you go to bed  · Find ways to relax and decrease stress  Stress can increase stomach acid and make gastritis worse  Activities such as yoga, meditation, or listening to music can help you relax  Spend time with friends, or do things you enjoy  Follow up with your healthcare provider as directed: You may need ongoing tests or treatment, or referral to a gastroenterologist  Write down your questions so you remember to ask them during your visits  © Copyright [address] Information is for End User's use only and may not be sold, redistributed or otherwise used for commercial purposes  All illustrations and images included in CareNotes® are the copyrighted property of A D A M , Inc  or [address] [place] [address] Jeff   The above information is an  only  It is not intended as medical advice for individual conditions or treatments   Talk to your doctor, nurse or pharmacist before following any medical regimen to see if it is safe and effective for you  Upper Endoscopy   AMBULATORY CARE:   What you need to know about an upper endoscopy: An upper endoscopy is also called an upper gastrointestinal (GI) endoscopy, or an esophagogastroduodenoscopy (EGD)  A scope (thin, flexible tube with a light and camera) is used to examine the walls of your upper digestive tract  The upper digestive tract includes the esophagus, stomach, and duodenum (first part of the small intestine)  An upper endoscopy is used to look for problems, such as bleeding, polyps, ulcers, or infection  How to prepare for an upper endoscopy: Your healthcare provider will talk to you about how to prepare for your procedure  You may need to not eat or drink anything except water for 6 to 12 hours before the procedure  He will tell you what medicines to take or not take on the day of your procedure  Arrange to have someone drive you home  What will happen during an upper endoscopy:   · You will be given medicine through your IV to help you relax and make you drowsy  You will also be given medicine to numb your throat  You may need to wear a plastic mouthpiece to help hold your mouth open and protect your teeth and tongue  Your healthcare provider will gently insert the endoscope through your mouth and down into your throat  You may be asked to swallow once to help move the scope  You may feel pressure in your throat but you should not feel pain  The endoscope does not restrict your breathing  · Your healthcare provider will watch the scope on a monitor  He will take pictures with the scope  He may gently inject air so he can see your digestive tract clearly  Your healthcare provider may take tissue samples and send them to the lab for tests  He may remove foreign objects, tumors, or polyps that may be blocking your digestive tract  Your healthcare provider may also insert tools with the scope to treat bleeding or place a stent (tube)   When the procedure is finished, the endoscope will be slowly removed  What will happen after an upper endoscopy: You may feel bloated, gassy, or have some abdominal discomfort  Your throat may be sore for 24 to 36 hours after the procedure  You may burp or pass gas from air that is still inside your body after your procedure  You may need to take short walks to help move the gas out  Eat small meals, if you feel bloated  Do not drive or make important decisions until the day after your procedure  Risks of an upper endoscopy: Your esophagus, stomach, or duodenum may be punctured or torn during the procedure  This is because of increased pressure as the scope and air are passing through  You may bleed more than expected or get an infection  You may have a slow or irregular heartbeat, or low blood pressure  This can cause sweating and fainting  Fluid may enter your lungs and you may have trouble breathing  These problems can be life-threatening  Call 911 if:   · You have sudden chest pain or trouble breathing  Seek care immediately if:   · You feel dizzy or faint  · You have trouble swallowing  · You have severe throat pain  · Your bowel movements are very dark or black  · Your abdomen is hard and firm and you have severe pain  · You vomit blood  Contact your healthcare provider if:   · You feel full or bloated and cannot burp or pass gas  · You have not had a bowel movement for 3 days after your procedure  · You have neck pain  · You have a fever or chills  · You have nausea or are vomiting  · You have a rash or hives  · You have questions or concerns about your endoscopy  Relieve a sore throat:  Suck on throat lozenges or crushed ice  Gargle with a small amount of warm salt water  Mix 1 teaspoon of salt and 1 cup of warm water to make salt water  Relieve gas and discomfort from bloating:  Lie on your right side with a heating pad on your abdomen  Take short walks to help pass gas   Eat small meals until bloating is relieved  Rest after your procedure: You have been given medicine to relax you  Do not  drive or make important decisions until the day after your procedure  Return to your normal activity as directed  You can usually return to work the day after your procedure  Follow up with your healthcare provider as directed:  Write down your questions so you remember to ask them during your visits  © Copyright [address] Information is for End User's use only and may not be sold, redistributed or otherwise used for commercial purposes  All illustrations and images included in CareNotes® are the copyrighted property of A D A M , Inc  or [address] [place] [address] [facility]  The above information is an  only  It is not intended as medical advice for individual conditions or treatments  Talk to your doctor, nurse or pharmacist before following any medical regimen to see if it is safe and effective for you  Hemorrhoids   WHAT YOU NEED TO KNOW:   Hemorrhoids are swollen blood vessels inside your rectum (internal hemorrhoids) or on your anus (external hemorrhoids)  Sometimes a hemorrhoid may prolapse  This means it extends out of your anus  DISCHARGE INSTRUCTIONS:   Seek care immediately if:   · You have severe pain in your rectum or around your anus  · You have severe pain in your abdomen and you are vomiting  · You have bleeding from your anus that soaks through your underwear  Contact your healthcare provider if:   · You have frequent and painful bowel movements  · Your hemorrhoid looks or feels more swollen than usual      · You do not have a bowel movement for 2 days or more  · You see or feel tissue coming through your anus  · You have questions or concerns about your condition or care  Medicines: You may  need any of the following:  · Medicine  may be given to decrease pain, swelling, and itching  The medicine may come as a pad, cream, or ointment  · Stool softeners  help treat or prevent constipation  · NSAIDs , such as ibuprofen, help decrease swelling, pain, and fever  NSAIDs can cause stomach bleeding or kidney problems in certain people  If you take blood thinner medicine, always ask your healthcare provider if NSAIDs are safe for you  Always read the medicine label and follow directions  · Take your medicine as directed  Contact your healthcare provider if you think your medicine is not helping or if you have side effects  Tell him or her if you are allergic to any medicine  Keep a list of the medicines, vitamins, and herbs you take  Include the amounts, and when and why you take them  Bring the list or the pill bottles to follow-up visits  Carry your medicine list with you in case of an emergency  Manage your symptoms:   · Apply ice on your anus for 15 to 20 minutes every hour or as directed  Use an ice pack, or put crushed ice in a plastic bag  Cover it with a towel before you apply it to your anus  Ice helps prevent tissue damage and decreases swelling and pain  · Take a sitz bath  Fill a bathtub with 4 to 6 inches of warm water  You may also use a sitz bath pan that fits inside a toilet bowl  Sit in the sitz bath for 15 minutes  Do this 3 times a day, and after each bowel movement  The warm water can help decrease pain and swelling  · Keep your anal area clean  Gently wash the area with warm water daily  Soap may irritate the area  After a bowel movement, wipe with moist towelettes or wet toilet paper  Dry toilet paper can irritate the area  Prevent hemorrhoids:   · Do not strain to have a bowel movement  Do not sit on the toilet too long  These actions can increase pressure on the tissues in your rectum and anus  · Drink plenty of liquids  Liquids can help prevent constipation  Ask how much liquid to drink each day and which liquids are best for you  · Eat a variety of high-fiber foods    Examples include fruits, vegetables, and whole grains  Ask your healthcare provider how much fiber you need each day  You may need to take a fiber supplement  · Exercise as directed  Exercise, such as walking, may make it easier to have a bowel movement  Ask your healthcare provider to help you create an exercise plan  · Do not have anal sex  Anal sex can weaken the skin around your rectum and anus  · Avoid heavy lifting  This can cause straining and increase your risk for another hemorrhoid  Follow up with your healthcare provider as directed:  Write down your questions so you remember to ask them during your visits  © Copyright [address] Information is for End User's use only and may not be sold, redistributed or otherwise used for commercial purposes  All illustrations and images included in CareNotes® are the copyrighted property of A D A M , Inc  or [facility] Michelle Aguilar   The above information is an  only  It is not intended as medical advice for individual conditions or treatments  Talk to your doctor, nurse or pharmacist before following any medical regimen to see if it is safe and effective for you  Colonoscopy   WHAT YOU NEED TO KNOW:   A colonoscopy is a procedure to examine the inside of your colon (intestine) with a scope  Polyps or tissue growths may have been removed during your colonoscopy  It is normal to feel bloated and to have some abdominal discomfort  You should be passing gas  If you have hemorrhoids or you had polyps removed, you may have a small amount of bleeding  DISCHARGE INSTRUCTIONS:   Seek care immediately if:   · You have a large amount of bright red blood in your bowel movements  · Your abdomen is hard and firm and you have severe pain  · You have sudden trouble breathing  Contact your healthcare provider if:   · You develop a rash or hives  · You have a fever within 24 hours of your procedure        · You have not had a bowel movement for 3 days after your procedure  · You have questions or concerns about your condition or care  Activity:   · Do not lift, strain, or run  for 3 days after your procedure  · Rest after your procedure  You have been given medicine to relax you  Do not  drive or make important decisions until the day after your procedure  Return to your normal activity as directed  · Relieve gas and discomfort from bloating  by lying on your right side with a heating pad on your abdomen  You may need to take short walks to help the gas move out  Eat small meals until bloating is relieved  If you had polyps removed: For 7 days after your procedure:  · Do not  take aspirin  · Do not  go on long car rides  Follow up with your healthcare provider as directed:  Write down your questions so you remember to ask them during your visits  © 2017 [address] is for End User's use only and may not be sold, redistributed or otherwise used for commercial purposes  All illustrations and images included in CareNotes® are the copyrighted property of A D A M , Inc  or Christoph Ram  The above information is an  only  It is not intended as medical advice for individual conditions or treatments  Talk to your doctor, nurse or pharmacist before following any medical regimen to see if it is safe and effective for you

## 2021-04-11 DIAGNOSIS — I10 ESSENTIAL HYPERTENSION: Chronic | ICD-10-CM

## 2021-04-12 PROCEDURE — 4010F ACE/ARB THERAPY RXD/TAKEN: CPT | Performed by: ORTHOPAEDIC SURGERY

## 2021-04-12 RX ORDER — LISINOPRIL 20 MG/1
TABLET ORAL
Qty: 60 TABLET | Refills: 0 | Status: SHIPPED | OUTPATIENT
Start: 2021-04-12 | End: 2021-05-17

## 2021-04-14 ENCOUNTER — OFFICE VISIT (OUTPATIENT)
Dept: FAMILY MEDICINE CLINIC | Facility: CLINIC | Age: 46
End: 2021-04-14
Payer: COMMERCIAL

## 2021-04-14 VITALS
DIASTOLIC BLOOD PRESSURE: 60 MMHG | BODY MASS INDEX: 40.94 KG/M2 | SYSTOLIC BLOOD PRESSURE: 128 MMHG | HEART RATE: 100 BPM | HEIGHT: 56 IN | WEIGHT: 182 LBS | TEMPERATURE: 98.6 F | RESPIRATION RATE: 16 BRPM

## 2021-04-14 DIAGNOSIS — D64.9 ANEMIA, UNSPECIFIED TYPE: ICD-10-CM

## 2021-04-14 DIAGNOSIS — E10.42 DIABETIC POLYNEUROPATHY ASSOCIATED WITH TYPE 1 DIABETES MELLITUS (HCC): ICD-10-CM

## 2021-04-14 DIAGNOSIS — E66.01 SEVERE OBESITY (BMI 35.0-39.9) WITH COMORBIDITY (HCC): ICD-10-CM

## 2021-04-14 DIAGNOSIS — E10.21 DIABETIC NEPHROPATHY ASSOCIATED WITH TYPE 1 DIABETES MELLITUS (HCC): ICD-10-CM

## 2021-04-14 DIAGNOSIS — Z12.31 ENCOUNTER FOR SCREENING MAMMOGRAM FOR MALIGNANT NEOPLASM OF BREAST: Primary | ICD-10-CM

## 2021-04-14 DIAGNOSIS — E78.2 MIXED HYPERLIPIDEMIA: ICD-10-CM

## 2021-04-14 DIAGNOSIS — E10.3213 BOTH EYES AFFECTED BY MILD NONPROLIFERATIVE DIABETIC RETINOPATHY WITH MACULAR EDEMA, ASSOCIATED WITH TYPE 1 DIABETES MELLITUS (HCC): ICD-10-CM

## 2021-04-14 DIAGNOSIS — F33.1 MODERATE EPISODE OF RECURRENT MAJOR DEPRESSIVE DISORDER (HCC): ICD-10-CM

## 2021-04-14 DIAGNOSIS — I50.9 ACUTE CONGESTIVE HEART FAILURE, UNSPECIFIED HEART FAILURE TYPE (HCC): ICD-10-CM

## 2021-04-14 DIAGNOSIS — D75.839 THROMBOCYTOSIS: ICD-10-CM

## 2021-04-14 DIAGNOSIS — E10.65 TYPE 1 DIABETES MELLITUS WITH HYPERGLYCEMIA (HCC): ICD-10-CM

## 2021-04-14 PROCEDURE — 3066F NEPHROPATHY DOC TX: CPT | Performed by: ORTHOPAEDIC SURGERY

## 2021-04-14 PROCEDURE — 99214 OFFICE O/P EST MOD 30 MIN: CPT | Performed by: PHYSICIAN ASSISTANT

## 2021-04-14 NOTE — PROGRESS NOTES
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, associated with type 1 diabetes mellitus (HCC)     - c/w opthal     5  Acute congestive heart failure, unspecified heart failure type ([facility] )     - has appointment with Dr Robledo Marking next month, continue with lisinopril, lasix, aspirin  - advised to continue to monitor weights if greater than 5 lb change to call  - discussed cutting back on salt to less than 2 gr, discussed food options and alternatives  - resume healthy walking to lose weight  - continue to monitor weight     6  Severe obesity (BMI 35 0-39  9) with comorbidity ([facility] )     - stressed the importance of losing weight and taking the extra stress off her heart     7  Moderate episode of recurrent major depressive disorder (HCC)     - on paxil, continue as is     8  Mixed hyperlipidemia     - lipitor 80 mg now, continue with diet, cardiology     9  Anemia     - normal colon/EGD, will continue with heme onc    10   thrombocytonia    - under care heme     F/u 6 months or sooner if needed        Subjective:   Chief Complaint   Patient presents with    Follow-up      Patient ID: Madi Second is a 39 y o  female  Patient here in follow up  Seeing Dr Nafisa Birmingham for DM, now using CGM feels this is working really well, may not get pump now  Seeing heme/GI for anemia  Colon and EGD normal     Saw cardiology in March, increased lipitor to 80 mg, otherwise stable  Watching weights, watching diet  Has appt with Venkat Marking 5/2021  Paxil working well  Diagnosed with covid end of February, had monoclonal antibody treatment, did well  Was not admitted  Aware she must wait the 3 months for vaccine  The following portions of the patient's history were reviewed and updated as appropriate: allergies, current medications, past family history, past medical history, past social history, past surgical history and problem list     Past Medical History:   Diagnosis Date    Anemia     Asthma     w/acute exacerbation   Last assessed: 10/26/12    Chest pain 2/3/2016    CHF (congestive heart failure) ([facility] )     Depression     Diabetes mellitus ([facility] )     Diabetic nephropathy ([facility] )     Diabetic nephropathy associated with type 1 diabetes mellitus ([facility] ) 8/12/2020    Diabetic retinopathy ([place] [address] ) 2/3/2016    Gastroenteritis 02/03/2016    GERD (gastroesophageal reflux disease)     HTN (hypertension)     Hyperlipidemia 2/3/2016    Oligomenorrhea     Polycystic ovaries 2/3/2016    Retinal hemorrhage 2/3/2016    Retinopathy     Thrombocytosis (HCC)     Type 1 diabetes mellitus with ophthalmic manifestation ([place] [address] ) 2/3/2016     Past Surgical History:   Procedure Laterality Date    CATARACT EXTRACTION Left 10/2020    CATARACT EXTRACTION Right     RETINAL LASER PROCEDURE       Family History   Problem Relation Age of Onset    Heart murmur Mother     Diabetes Mother         Mellitus    Thyroid disease Mother         Disorder    Diabetes Father         Mellitus    Hypertension Father     Diabetes Maternal Grandfather         Mellitus    Breast cancer Paternal Grandmother     Diabetes Maternal Aunt         Mellitus    Diabetes Maternal Uncle         Mellitus    No Known Problems Maternal Grandmother     Leukemia Paternal Grandfather     Substance Abuse Neg Hx     Mental illness Neg Hx     Alcohol abuse Neg Hx      Social History     Socioeconomic History    Marital status: /Civil Union     Spouse name: Not on file    Number of children: Not on file    Years of education: Not on file    Highest education level: Not on file   Occupational History  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   Social Needs    Financial resource strain: Not on file    Food insecurity     Worry: Not on file     Inability: Not on file   [employer] needs     Medical: Not on file     Non-medical: Not on file   Tobacco Use    Smoking status: Never Smoker    Smokeless tobacco: Never Used    Tobacco comment: Per Allscripts: Former smoker  Quit in 1994   Substance and Sexual Activity    Alcohol use: Not Currently     Frequency: Monthly or less     Comment: Occasional/1 mixed drink twice a month if that    Drug use: No    Sexual activity: Yes     Partners: Male     Birth control/protection: Pill   Lifestyle    Physical activity     Days per week: Not on file     Minutes per session: Not on file    Stress: Not on file   Relationships    Social connections     Talks on phone: Not on file     Gets together: Not on file     Attends Sikh service: Not on file     Active member of club or organization: Not on file     Attends meetings of clubs or organizations: Not on file     Relationship status: Not on file    Intimate partner violence     Fear of current or ex partner: Not on file     Emotionally abused: Not on file     Physically abused: Not on file     Forced sexual activity: Not on file   Other Topics Concern    Not on file   Social History Narrative    Always uses seatbelt    Caffeine use: 1-2 cups coffee daily    Has smoke detectors    [employer] Aff[employer]ations: [employer]       Current Outpatient Medications:     acetaminophen (TYLENOL) 500 mg tablet, Take 2 tablets (1,000 mg total) by mouth every 6 (six) hours as needed for mild pain or moderate pain, Disp: 30 tablet, Rfl: 0    acetone, urine, test strip, Use to test urine ketones for high blood sugars  , Disp: 25 each, Rfl: 6    albuterol (2 5 mg/3 mL) 0 083 % nebulizer solution, Take 1 vial (2 5 mg total) by nebulization every 6 (six) hours as needed for wheezing or shortness of breath, Disp: 25 vial, Rfl: 4   albuterol (PROVENTIL HFA,VENTOLIN HFA) 90 mcg/act inhaler, INHALE 2 PUFFS BY MOUTH EVERY 4 HOURS AS NEEDED FOR WHEEZING OR FOR SHORTNESS OF BREATH, Disp: 25 5 g, Rfl: 0    Ascorbic Acid (VITAMIN C) 500 MG CAPS, Take 2 capsules by mouth daily, Disp: , Rfl:     atorvastatin (LIPITOR) 80 mg tablet, Take 1 tablet (80 mg total) by mouth daily, Disp:  , Rfl:     Biotin 10 MG TABS, Take 1 tablet by mouth daily, Disp: , Rfl:     Blood Pressure Monitoring [place], Use 2 (two) times a day Monitor BP twice daily  Call office if BP > 140/90 persistently  , Disp: 1 Device, Rfl: 0    Butalbital-APAP-Caffeine (Fioricet) -40 MG CAPS, Take 1 tablet by mouth 4 (four) times a day as needed (headahce), Disp: 30 capsule, Rfl: 0    Continuous Blood Gluc Sensor (Dexcom G6 Sensor) MISC, CHANGE SENSOR EVERY 10 DAYS , Disp: , Rfl:     cyanocobalamin (VITAMIN B-12) 100 mcg tablet, Take by mouth daily, Disp: , Rfl:     fluticasone-salmeterol (Advair Diskus) 250-50 mcg/dose inhaler, Inhale 1 puff 2 (two) times a day Rinse mouth after use , Disp: 3 Inhaler, Rfl: 3    glucagon (Glucagon Emergency) 1 MG injection, Inject 1 mg under the skin once as needed for low blood sugar for up to 1 dose, Disp: 1 kit, Rfl: 3    Glucagon (Gvoke HypoPen 2-Pack) 1 MG/0 2ML SOAJ, Use if hypoglycemia prn, Disp: 1 Syringe, Rfl: 6    insulin aspart (NovoLOG) 100 units/mL injection, Take 1 unit of novolog for every 1 g of carb, up to 150 units daily, Disp: 50 mL, Rfl: 6    insulin glargine (Lantus SoloStar) 100 units/mL injection pen, Inject 78 Units under the skin daily at bedtime, Disp: 10 pen, Rfl: 3    lisinopril (ZESTRIL) 20 mg tablet, TAKE 2 TABLETS BY MOUTH EVERY DAY, Disp: 60 tablet, Rfl: 0    montelukast (SINGULAIR) 10 mg tablet, TAKE 1 TABLET BY MOUTH AT BEDTIME, Disp: 90 tablet, Rfl: 0    omeprazole (PriLOSEC) 20 mg delayed release capsule, Take 20 mg by mouth daily  , Disp: , Rfl:     OneTouch Ultra test strip, Test 3 times daily, Disp: 300 each, Rfl: 3    PARoxetine (PAXIL) 20 mg tablet, TAKE 1 TABLET BY MOUTH EVERY DAY, Disp: 90 tablet, Rfl: 0    Probiotic Product (ALIGN PO), Take by mouth, Disp: , Rfl:     aspirin (ECOTRIN LOW STRENGTH) 81 mg EC tablet, Take 1 tablet (81 mg total) by mouth daily, Disp: 30 tablet, Rfl: 6    furosemide (LASIX) 40 mg tablet, Take 1 tablet (40 mg total) by mouth daily Take 1/2 tablet daily (Patient taking differently: Take 40 mg by mouth daily ), Disp: 30 tablet, Rfl: 3    Na Sulfate-K Sulfate-Mg Sulf (Suprep Bowel Prep Kit) 17 5-3 13-1 6 GM/177ML SOLN, Take 177 mL by mouth once for 1 dose, Disp: 2 Bottle, Rfl: 0    Review of Systems   Constitutional: Negative for chills and fever  HENT: Negative for ear pain and sore throat  Eyes: Negative for pain and visual disturbance  Respiratory: Negative for cough and shortness of breath  Cardiovascular: Negative for chest pain and palpitations  Gastrointestinal: Negative for abdominal pain and vomiting  Genitourinary: Negative for dysuria and hematuria  Musculoskeletal: Negative for arthralgias and back pain  Skin: Negative for color change and rash  Neurological: Negative for seizures and syncope  All other systems reviewed and are negative  Objective:    Vitals:    04/14/21 1333   BP: 128/60   BP Location: Left arm   Patient Position: Sitting   Cuff Size: Standard   Pulse: 100   Resp: 16   Temp: 98 6 °F (37 °C)   TempSrc: Oral   Weight: 82 6 kg (182 lb)   Height: 4' 7 75" (1 416 m)        Physical Exam  Constitutional:       Appearance: Normal appearance  She is well-developed  She is obese  Neck:      Vascular: No carotid bruit  Cardiovascular:      Rate and Rhythm: Normal rate and regular rhythm  Pulses: Normal pulses  Heart sounds: Normal heart sounds  Pulmonary:      Effort: Pulmonary effort is normal       Breath sounds: Normal breath sounds  Musculoskeletal:      Right lower leg: No edema        Left lower leg: No edema  Skin:     General: Skin is warm  Neurological:      General: No focal deficit present  Mental Status: She is alert and oriented to person, place, and time  Psychiatric:         Mood and Affect: Mood normal          Behavior: Behavior normal          Thought Content: Thought content normal          Judgment: Judgment normal          BMI Counseling: Body mass index is 41 17 kg/m²  The BMI is above normal  Nutrition recommendations include reducing portion sizes

## 2021-04-20 ENCOUNTER — TELEPHONE (OUTPATIENT)
Dept: FAMILY MEDICINE CLINIC | Facility: CLINIC | Age: 46
End: 2021-04-20

## 2021-04-20 PROBLEM — Z86.16 HISTORY OF COVID-19: Status: ACTIVE | Noted: 2021-02-26

## 2021-04-20 NOTE — TELEPHONE ENCOUNTER
There are no charges showing for Vernell's visit from 4/14  Was this no charge or can you enter them?

## 2021-04-21 ENCOUNTER — OFFICE VISIT (OUTPATIENT)
Dept: HEMATOLOGY ONCOLOGY | Facility: HOSPITAL | Age: 46
End: 2021-04-21
Payer: COMMERCIAL

## 2021-04-21 VITALS
BODY MASS INDEX: 41.39 KG/M2 | SYSTOLIC BLOOD PRESSURE: 160 MMHG | DIASTOLIC BLOOD PRESSURE: 62 MMHG | RESPIRATION RATE: 16 BRPM | TEMPERATURE: 98.5 F | HEART RATE: 108 BPM | HEIGHT: 56 IN | OXYGEN SATURATION: 93 % | WEIGHT: 184 LBS

## 2021-04-21 DIAGNOSIS — D64.9 ANEMIA, UNSPECIFIED TYPE: Primary | ICD-10-CM

## 2021-04-21 DIAGNOSIS — E53.8 B12 DEFICIENCY: ICD-10-CM

## 2021-04-21 PROCEDURE — 99214 OFFICE O/P EST MOD 30 MIN: CPT | Performed by: INTERNAL MEDICINE

## 2021-04-21 NOTE — PROGRESS NOTES
Hematology/Oncology Outpatient Follow- up Note  Karina Pittman 39 y o  female MRN: @ Encounter: [id number]        Date:  4/21/2021    Presenting Complaint/Diagnosis : Elevated platelet count for at least 4 years with fluctuating hemoglobin and low iron       Previous Hematologic/ Oncologic History:    Venofer  Oral iron    Current Hematologic/ Oncologic Treatment:     the patient just finished up  A therapeutic trial of Venofer    Interval History:     the patient returns for follow-up visit  When she last saw me in January she was referred for anemia and a slightly elevated platelet count and white count  We gave her Venofer and B12  Her hemoglobin is slightly better  Her white count is also slightly better although still elevated  Platelet count has come down although still slightly elevated  The patient herself states she feels better after the Venofer  Denies any nausea denies any vomiting denies any diarrhea  Otherwise is feeling reasonably well  The rest of her 14 point review of systems today was negative  She denies any B symptoms like drenching night sweats unexplained weight loss or any other symptoms or complaints like lymphadenopathy  Test Results:    Imaging: No results found      Labs:   Lab Results   Component Value Date    WBC 11 0 (H) 04/14/2021    HGB 11 6 (L) 04/14/2021    HCT 35 1 04/14/2021    MCV 92 9 04/14/2021     (H) 04/14/2021     Lab Results   Component Value Date     01/28/2017    K 5 4 (H) 04/14/2021     04/14/2021    CO2 29 04/14/2021    BUN 35 (H) 04/14/2021    CREATININE 0 89 04/14/2021    GLUF 184 (H) 11/14/2017    CALCIUM 9 3 04/14/2021    AST 13 04/14/2021    ALT 14 04/14/2021    ALKPHOS 118 04/14/2021    PROT 5 9 (L) 01/28/2017    BILITOT 0 3 01/28/2017    EGFR 55 08/14/2020       Lab Results   Component Value Date    IRON 84 04/14/2021    TIBC 288 04/14/2021    FERRITIN 429 (H) 04/14/2021       Lab Results   Component Value Date    VWBNAPGJ03 828 11/25/2020       ROS: As stated in the history of present illness otherwise his 14 point review of systems today was negative  Active Problems:   Patient Active Problem List   Diagnosis    Type 1 diabetes mellitus with hyperglycemia (HCC)    Asthma    Hypertension    Moderate episode of recurrent major depressive disorder (HCC)    Hyperlipidemia    Polycystic ovarian syndrome    Retinal hemorrhage    Anemia    Asthma, mild persistent    Both eyes affected 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, associated with type 1 diabetes mellitus (HCC)    Nervousness    [facility]    Severe obesity (BMI 35 0-39  9) with comorbidity ([address] )    Gastroesophageal reflux disease without esophagitis    Other proteinuria    Acute CHF (congestive heart failure) ([facility])    Diabetic polyneuropathy associated with type 1 diabetes mellitus ([address] )    Diabetic nephropathy associated with type 1 diabetes mellitus ([address] )    Nuclear sclerotic cataract of left eye    B12 deficiency    History of COVID-19    Palpitations       Past Medical History:   Past Medical History:   Diagnosis Date    Anemia     Asthma     w/acute exacerbation   Last assessed: 10/26/12    Chest pain 2/3/2016    CHF (congestive heart failure) ([address] )     Depression     Diabetes mellitus ([address] )     Diabetic nephropathy ([address] )     Diabetic nephropathy associated with type 1 diabetes mellitus ([address] ) 8/12/2020    Diabetic retinopathy ([facility] ) 2/3/2016    Gastroenteritis 02/03/2016    GERD (gastroesophageal reflux disease)     HTN (hypertension)     Hyperlipidemia 2/3/2016    Oligomenorrhea     Polycystic ovaries 2/3/2016    Retinal hemorrhage 2/3/2016    Retinopathy     Thrombocytosis (HCC)     Type 1 diabetes mellitus with ophthalmic manifestation ([address] ) 2/3/2016       Surgical History:   Past Surgical History:   Procedure Laterality Date    CATARACT EXTRACTION Left 10/2020    CATARACT EXTRACTION Right     RETINAL LASER PROCEDURE         Family History:    Family History   Problem Relation Age of Onset    Heart murmur Mother     Diabetes Mother         Mellitus    Thyroid disease Mother         Disorder    Diabetes Father         Mellitus    Hypertension Father     Diabetes Maternal Grandfather         Mellitus    Breast cancer Paternal Grandmother     Diabetes Maternal Aunt         Mellitus    Diabetes Maternal Uncle         Mellitus    No Known Problems Maternal Grandmother     Leukemia Paternal Grandfather     Substance Abuse Neg Hx     Mental illness Neg Hx     Alcohol abuse Neg Hx        Cancer-related family history includes Breast cancer in her paternal grandmother  Social History:   Social History     Socioeconomic History    Marital status: /Civil Union     Spouse name: Not on file    Number of children: Not on file    Years of education: Not on file    Highest education level: Not on file   Occupational History    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   Social Needs    Financial resource strain: Not on file    Food insecurity     Worry: Not on file     Inability: Not on file   [employer] needs     Medical: Not on file     Non-medical: Not on file   Tobacco Use    Smoking status: Never Smoker    Smokeless tobacco: Never Used    Tobacco comment: Per Allscripts: Former smoker   Quit in 1994   Substance and Sexual Activity    Alcohol use: Not Currently     Frequency: Monthly or less     Comment: Occasional/1 mixed drink twice a month if that    Drug use: No    Sexual activity: Yes     Partners: Male     Birth control/protection: Pill   Lifestyle    Physical activity     Days per week: Not on file     Minutes per session: Not on file    Stress: Not on file   Relationships    Social connections     Talks on phone: Not on file     Gets together: Not on file     Attends Christianity service: Not on file     Active member of club or organization: Not on file     Attends meetings of clubs or organizations: Not on file     Relationship status: Not on file    Intimate partner violence     Fear of current or ex partner: Not on file     Emotionally abused: Not on file     Physically abused: Not on file     Forced sexual activity: Not on file   Other Topics Concern    Not on file   Social History Narrative    Always uses seatbelt    Caffeine use: 1-2 cups coffee daily    Has smoke detectors    [employer] Aff[employer]ations: [employer]       Current Medications:   Current Outpatient Medications   Medication Sig Dispense Refill    acetaminophen (TYLENOL) 500 mg tablet Take 2 tablets (1,000 mg total) by mouth every 6 (six) hours as needed for mild pain or moderate pain 30 tablet 0    acetone, urine, test strip Use to test urine ketones for high blood sugars  25 each 6    albuterol (2 5 mg/3 mL) 0 083 % nebulizer solution Take 1 vial (2 5 mg total) by nebulization every 6 (six) hours as needed for wheezing or shortness of breath 25 vial 4    albuterol (PROVENTIL HFA,VENTOLIN HFA) 90 mcg/act inhaler INHALE 2 PUFFS BY MOUTH EVERY 4 HOURS AS NEEDED FOR WHEEZING OR FOR SHORTNESS OF BREATH 25 5 g 0    Ascorbic Acid (VITAMIN C) 500 MG CAPS Take 2 capsules by mouth daily      atorvastatin (LIPITOR) 80 mg tablet Take 1 tablet (80 mg total) by mouth daily      Biotin 10 MG TABS Take 1 tablet by mouth daily      Blood Pressure Monitoring [place] Use 2 (two) times a day Monitor BP twice daily  Call office if BP > 140/90 persistently  1 Device 0    Butalbital-APAP-Caffeine (Fioricet) -40 MG CAPS Take 1 tablet by mouth 4 (four) times a day as needed (headahce) 30 capsule 0    Continuous Blood Gluc Sensor (Dexcom G6 Sensor) MISC CHANGE SENSOR EVERY 10 DAYS   cyanocobalamin (VITAMIN B-12) 100 mcg tablet Take by mouth daily      fluticasone-salmeterol (Advair Diskus) 250-50 mcg/dose inhaler Inhale 1 puff 2 (two) times a day Rinse mouth after use   3 Inhaler 3    glucagon (Glucagon Emergency) 1 MG injection Inject 1 mg under the skin once as needed for low blood sugar for up to 1 dose 1 kit 3    Glucagon (Gvoke HypoPen 2-Pack) 1 MG/0 2ML SOAJ Use if hypoglycemia prn 1 Syringe 6    insulin aspart (NovoLOG) 100 units/mL injection Take 1 unit of novolog for every 1 g of carb, up to 150 units daily 50 mL 6    insulin glargine (Lantus SoloStar) 100 units/mL injection pen Inject 78 Units under the skin daily at bedtime 10 pen 3    lisinopril (ZESTRIL) 20 mg tablet TAKE 2 TABLETS BY MOUTH EVERY DAY 60 tablet 0    montelukast (SINGULAIR) 10 mg tablet TAKE 1 TABLET BY MOUTH AT BEDTIME 90 tablet 0    omeprazole (PriLOSEC) 20 mg delayed release capsule Take 20 mg by mouth daily   OneTouch Ultra test strip Test 3 times daily 300 each 3    PARoxetine (PAXIL) 20 mg tablet TAKE 1 TABLET BY MOUTH EVERY DAY 90 tablet 0    Probiotic Product (ALIGN PO) Take by mouth      aspirin (ECOTRIN LOW STRENGTH) 81 mg EC tablet Take 1 tablet (81 mg total) by mouth daily 30 tablet 6    furosemide (LASIX) 40 mg tablet Take 1 tablet (40 mg total) by mouth daily Take 1/2 tablet daily (Patient taking differently: Take 40 mg by mouth daily ) 30 tablet 3    Na Sulfate-K Sulfate-Mg Sulf (Suprep Bowel Prep Kit) 17 5-3 13-1 6 GM/177ML SOLN Take 177 mL by mouth once for 1 dose 2 Bottle 0     No current facility-administered medications for this visit  Allergies: No Known Allergies    Physical Exam:    Body surface area is 1 71 meters squared      Wt Readings from Last 3 Encounters:   04/21/21 83 5 kg (184 lb)   04/14/21 82 6 kg (182 lb)   04/07/21 80 7 kg (178 lb)        Temp Readings from Last 3 Encounters:   04/21/21 98 5 °F (36 9 °C) (Temporal)   04/14/21 98 6 °F (37 °C) (Oral)   04/07/21 98 1 °F (36 7 °C)        BP Readings from Last 3 Encounters:   04/21/21 160/62   04/14/21 128/60   04/07/21 111/59         Pulse Readings from Last 3 Encounters:   04/21/21 (!) 108   04/14/21 100   04/07/21 83         Physical Exam     Constitutional General appearance: No acute distress, well appearing and well nourished  Eyes   Conjunctiva and lids: No swelling, erythema or discharge  Pupils and irises: Equal, round and reactive to light  Ears, Nose, Mouth, and Throat   External inspection of ears and nose: Normal     Nasal mucosa, septum, and turbinates: Normal without edema or erythema  Oropharynx: Normal with no erythema, edema, exudate or lesions  Pulmonary   Respiratory effort: No increased work of breathing or signs of respiratory distress  Auscultation of lungs: Clear to auscultation  Cardiovascular   Palpation of heart: Normal PMI, no thrills  Auscultation of heart: Normal rate and rhythm, normal S1 and S2, without murmurs  Examination of extremities for edema and/or varicosities: Normal     Carotid pulses: Normal     Abdomen   Abdomen: Non-tender, no masses  Liver and spleen: No hepatomegaly or splenomegaly  Lymphatic   Palpation of lymph nodes in neck: No lymphadenopathy  Musculoskeletal   Gait and station: Normal     Digits and nails: Normal without clubbing or cyanosis  Inspection/palpation of joints, bones, and muscles: Normal     Skin   Skin and subcutaneous tissue: Normal without rashes or lesions  Neurologic   Cranial nerves: Cranial nerves 2-12 intact  Sensation: No sensory loss  Psychiatric   Orientation to person, place, and time: Normal     Mood and affect: Normal         Assessment / Plan: This is a pleasant young female with a past medical history of diabetes hypertension hypercholesterolemia was noticed to have an elevated platelet count  She was also anemic  I put her on a therapeutic trial of iron Followed by Venofer  Her numbers corrected but then she was lost to follow-up  At her last visit she came back with a slightly elevated platelet count and anemia  I gave her therapeutic trial of Venofer along with B12    Her most recent blood work  Shows a slightly elevated white count 11 with a hemoglobin of 11 6 and platelet count of 053  Iron studies are better with a saturation of 29% and iron of 84  I will put her on observation  I will see her back in 4 months with repeat blood work  EGD did not show any evidence of malignancy or H pylori  Goals and Barriers:  Current Goal:  Prolong Survival from   Mild elevation of white count and platelet count was slightly low hemoglobin      Barriers: None  Patient's Capacity to Self Care:  Patient able to self care  Portions of the record may have been created with voice recognition software   Occasional wrong word or "sound a like" substitutions may have occurred due to the inherent limitations of voice recognition software   Read the chart carefully and recognize, using context, where substitutions have occurred

## 2021-04-24 DIAGNOSIS — E10.65 TYPE 1 DIABETES MELLITUS WITH HYPERGLYCEMIA (HCC): Primary | ICD-10-CM

## 2021-04-26 RX ORDER — BLOOD-GLUCOSE SENSOR
EACH MISCELLANEOUS
Qty: 3 EACH | Refills: 2 | Status: SHIPPED | OUTPATIENT
Start: 2021-04-26 | End: 2021-08-02

## 2021-04-29 ENCOUNTER — HOSPITAL ENCOUNTER (EMERGENCY)
Facility: HOSPITAL | Age: 46
Discharge: HOME | End: 2021-04-29
Attending: EMERGENCY MEDICINE | Admitting: EMERGENCY MEDICINE
Payer: COMMERCIAL

## 2021-04-29 ENCOUNTER — TELEPHONE (OUTPATIENT)
Dept: CARDIOLOGY CLINIC | Facility: CLINIC | Age: 46
End: 2021-04-29

## 2021-04-29 ENCOUNTER — APPOINTMENT (EMERGENCY)
Dept: RADIOLOGY | Facility: HOSPITAL | Age: 46
End: 2021-04-29
Payer: COMMERCIAL

## 2021-04-29 VITALS
SYSTOLIC BLOOD PRESSURE: 123 MMHG | WEIGHT: 184 LBS | RESPIRATION RATE: 20 BRPM | BODY MASS INDEX: 41.62 KG/M2 | HEART RATE: 74 BPM | TEMPERATURE: 97.3 F | DIASTOLIC BLOOD PRESSURE: 73 MMHG | OXYGEN SATURATION: 98 %

## 2021-04-29 DIAGNOSIS — M25.561 ACUTE PAIN OF RIGHT KNEE: Primary | ICD-10-CM

## 2021-04-29 DIAGNOSIS — E78.2 MIXED HYPERLIPIDEMIA: ICD-10-CM

## 2021-04-29 PROCEDURE — 99284 EMERGENCY DEPT VISIT MOD MDM: CPT | Performed by: EMERGENCY MEDICINE

## 2021-04-29 PROCEDURE — 99283 EMERGENCY DEPT VISIT LOW MDM: CPT

## 2021-04-29 PROCEDURE — 73564 X-RAY EXAM KNEE 4 OR MORE: CPT

## 2021-04-29 RX ORDER — IBUPROFEN 600 MG/1
600 TABLET ORAL ONCE
Status: COMPLETED | OUTPATIENT
Start: 2021-04-29 | End: 2021-04-29

## 2021-04-29 RX ORDER — ACETAMINOPHEN 325 MG/1
975 TABLET ORAL ONCE
Status: COMPLETED | OUTPATIENT
Start: 2021-04-29 | End: 2021-04-29

## 2021-04-29 RX ORDER — ATORVASTATIN CALCIUM 80 MG/1
80 TABLET, FILM COATED ORAL DAILY
Qty: 30 TABLET | Refills: 5 | Status: SHIPPED | OUTPATIENT
Start: 2021-04-29 | End: 2021-10-19 | Stop reason: SDUPTHER

## 2021-04-29 RX ADMIN — ACETAMINOPHEN 975 MG: 325 TABLET, FILM COATED ORAL at 13:11

## 2021-04-29 RX ADMIN — IBUPROFEN 600 MG: 600 TABLET, FILM COATED ORAL at 13:11

## 2021-04-29 NOTE — TELEPHONE ENCOUNTER
P/c'd she has used all of her Lipitor   Pt said you wanted to change her cholesterol med or did you just want to order 80 mg Lipitor?       Please advise

## 2021-04-29 NOTE — ED PROVIDER NOTES
History  Chief Complaint   Patient presents with    Knee Pain     To ED with c/o right knee pain after having it pop while getting into a truck yesterday  Denies any acute injury  Patient is a 39year old female who presents with right knee pain that started yesterday  Patient reports that she was getting into a truck with very high ground clearance, stepped up with the right leg and as she applied wait, developed acute pain and felt that she heard either click or pop  Since then, she has had pain in the right knee, constant, mild, nonradiating, waxes and wanes, worsens with movement and walking, without any associated numbness, tingling, swelling, weakness  Prior to Admission Medications   Prescriptions Last Dose Informant Patient Reported? Taking? Ascorbic Acid (VITAMIN C) 500 MG CAPS  Self Yes No   Sig: Take 2 capsules by mouth daily   Biotin 10 MG TABS  Self Yes No   Sig: Take 1 tablet by mouth daily   Blood Pressure Monitoring [place]  Self No No   Sig: Use 2 (two) times a day Monitor BP twice daily  Call office if BP > 140/90 persistently  Butalbital-APAP-Caffeine (Fioricet) -40 MG CAPS  Self No No   Sig: Take 1 tablet by mouth 4 (four) times a day as needed ([facility]   Continuous Blood Gluc Sensor (Dexcom G6 Sensor) MISC   No No   Sig: CHANGE SENSOR EVERY 10 DAYS     Glucagon (Gvoke HypoPen 2-Pack) 1 MG/0 2ML SOAJ  Self No No   Sig: Use if hypoglycemia prn   Na Sulfate-K Sulfate-Mg Sulf (Suprep Bowel Prep Kit) 17 5-3 13-1 6 GM/177ML SOLN  Self No No   Sig: Take 177 mL by mouth once for 1 dose   OneTouch Ultra test strip  Self No No   Sig: Test 3 times daily   PARoxetine (PAXIL) 20 mg tablet  Self No No   Sig: TAKE 1 TABLET BY MOUTH EVERY DAY   Probiotic Product (ALIGN PO)  Self Yes No   Sig: Take by mouth   acetaminophen (TYLENOL) 500 mg tablet  Self No No   Sig: Take 2 tablets (1,000 mg total) by mouth every 6 (six) hours as needed for mild pain or moderate pain   acetone, urine, test strip  Self No No   Sig: Use to test urine ketones for high blood sugars  albuterol (2 5 mg/3 mL) 0 083 % nebulizer solution   No No   Sig: Take 1 vial (2 5 mg total) by nebulization every 6 (six) hours as needed for wheezing or shortness of breath   albuterol (PROVENTIL HFA,VENTOLIN HFA) 90 mcg/act inhaler   No No   Sig: INHALE 2 PUFFS BY MOUTH EVERY 4 HOURS AS NEEDED FOR WHEEZING OR FOR SHORTNESS OF BREATH   aspirin (ECOTRIN LOW STRENGTH) 81 mg EC tablet  Self No No   Sig: Take 1 tablet (81 mg total) by mouth daily   atorvastatin (LIPITOR) 80 mg tablet   No No   Sig: Take 1 tablet (80 mg total) by mouth daily   cyanocobalamin (VITAMIN B-12) 100 mcg tablet  Self Yes No   Sig: Take by mouth daily   fluticasone-salmeterol (Advair Diskus) 250-50 mcg/dose inhaler  Self No No   Sig: Inhale 1 puff 2 (two) times a day Rinse mouth after use  furosemide (LASIX) 40 mg tablet  Self No No   Sig: Take 1 tablet (40 mg total) by mouth daily Take 1/2 tablet daily   Patient taking differently: Take 40 mg by mouth daily    glucagon (Glucagon Emergency) 1 MG injection  Self No No   Sig: Inject 1 mg under the skin once as needed for low blood sugar for up to 1 dose   insulin aspart (NovoLOG) 100 units/mL injection   No No   Sig: Take 1 unit of novolog for every 1 g of carb, up to 150 units daily   insulin glargine (Lantus SoloStar) 100 units/mL injection pen  Self No No   Sig: Inject 78 Units under the skin daily at bedtime   lisinopril (ZESTRIL) 20 mg tablet   No No   Sig: TAKE 2 TABLETS BY MOUTH EVERY DAY   montelukast (SINGULAIR) 10 mg tablet  Self No No   Sig: TAKE 1 TABLET BY MOUTH AT BEDTIME   omeprazole (PriLOSEC) 20 mg delayed release capsule  Self Yes No   Sig: Take 20 mg by mouth daily  Facility-Administered Medications: None       Past Medical History:   Diagnosis Date    Anemia     Asthma     w/acute exacerbation   Last assessed: 10/26/12    Chest pain 2/3/2016    [facility]     Depression     Diabetes mellitus ([facility] )     Diabetic nephropathy ([place] [address] )     Diabetic nephropathy associated with type 1 diabetes mellitus ([place] [address] ) 8/12/2020    Diabetic retinopathy ([place] [address] ) 2/3/2016    Gastroenteritis 02/03/2016    GERD (gastroesophageal reflux disease)     HTN (hypertension)     Hyperlipidemia 2/3/2016    Oligomenorrhea     Polycystic ovaries 2/3/2016    Retinal hemorrhage 2/3/2016    Retinopathy     Thrombocytosis (HCC)     Type 1 diabetes mellitus with ophthalmic manifestation ([place] [address] ) 2/3/2016       Past Surgical History:   Procedure Laterality Date    CATARACT EXTRACTION Left 10/2020    CATARACT EXTRACTION Right     RETINAL LASER PROCEDURE         Family History   Problem Relation Age of Onset    Heart murmur Mother     Diabetes Mother         Mellitus    Thyroid disease Mother         Disorder    Diabetes Father         Mellitus    Hypertension Father     Diabetes Maternal Grandfather         Mellitus    Breast cancer Paternal Grandmother     Diabetes Maternal Aunt         Mellitus    Diabetes Maternal Uncle         Mellitus    No Known Problems Maternal Grandmother     Leukemia Paternal Grandfather     Substance Abuse Neg Hx     Mental illness Neg Hx     Alcohol abuse Neg Hx      I have reviewed and agree with the history as documented  E-Cigarette/Vaping    E-Cigarette Use Never User      E-Cigarette/Vaping Substances    Nicotine No     Flavoring No      Social History     Tobacco Use    Smoking status: Never Smoker    Smokeless tobacco: Never Used    Tobacco comment: Per [employer]: Former smoker  Quit in 1994   Substance Use Topics    Alcohol use: Not Currently     Frequency: Monthly or less     Comment: Occasional/1 mixed drink twice a month if that    Drug use: No       Review of Systems   Constitutional: Negative for chills and fever  Musculoskeletal: Negative for gait problem  Right knee pain     Skin: Negative for color change and pallor  Neurological: Negative for weakness and numbness  Physical Exam  Physical Exam  Vitals signs and nursing note reviewed  Constitutional:       General: She is not in acute distress  Appearance: Normal appearance  She is well-developed  She is obese  She is not ill-appearing, toxic-appearing or diaphoretic  HENT:      Head: Normocephalic and atraumatic  Right Ear: External ear normal       Left Ear: External ear normal       Nose: Nose normal    Eyes:      Conjunctiva/sclera: Conjunctivae normal       Pupils: Pupils are equal, round, and reactive to light  Neck:      Musculoskeletal: Normal range of motion and neck supple  Cardiovascular:      Rate and Rhythm: Normal rate and regular rhythm  Heart sounds: Normal heart sounds  No murmur  No gallop  Pulmonary:      Effort: Pulmonary effort is normal  No respiratory distress  Breath sounds: Normal breath sounds  No stridor  No wheezing, rhonchi or rales  Chest:      Chest wall: No tenderness  Abdominal:      General: Bowel sounds are normal  There is no distension  Palpations: Abdomen is soft  Tenderness: There is no abdominal tenderness  There is no guarding or rebound  Musculoskeletal: Normal range of motion  Right hip: Normal  She exhibits normal range of motion, normal strength, no tenderness, no bony tenderness, no swelling, no crepitus, no deformity and no laceration  Right knee: She exhibits normal range of motion, no swelling, no effusion, no ecchymosis, no deformity, no laceration, no erythema, normal alignment, no LCL laxity, normal patellar mobility, no bony tenderness, normal meniscus and no MCL laxity  Tenderness found  Lateral joint line tenderness noted  No patellar tendon tenderness noted  Right ankle: Normal  She exhibits normal range of motion, no swelling, no ecchymosis, no deformity, no laceration and normal pulse        Right foot: Normal  Normal range of motion and normal capillary refill  No tenderness, bony tenderness, swelling, crepitus, deformity or laceration  Skin:     General: Skin is warm and dry  Neurological:      General: No focal deficit present  Mental Status: She is alert and oriented to person, place, and time  Psychiatric:         Mood and Affect: Mood normal          Behavior: Behavior normal          Vital Signs  ED Triage Vitals [04/29/21 1234]   Temperature Pulse Respirations Blood Pressure SpO2   (!) 97 3 °F (36 3 °C) 74 20 123/73 98 %      Temp Source Heart Rate Source Patient Position - Orthostatic VS BP Location FiO2 (%)   Tympanic Monitor Lying Right arm --      Pain Score       8           Vitals:    04/29/21 1234   BP: 123/73   Pulse: 74   Patient Position - Orthostatic VS: Lying         Visual Acuity      ED Medications  Medications   acetaminophen (TYLENOL) tablet 975 mg (975 mg Oral Given 4/29/21 1311)   ibuprofen (MOTRIN) tablet 600 mg (600 mg Oral Given 4/29/21 1311)       Diagnostic Studies  Results Reviewed     None                 XR knee 4+ vw right injury   ED Interpretation by Nemo Borges DO (04/29 1256)   No acute fractures interpreted by me independently                 Procedures  Procedures         ED Course                             SBIRT 22yo+      Most Recent Value   SBIRT (23 yo +)   In order to provide better care to our patients, we are screening all of our patients for alcohol and drug use  Would it be okay to ask you these screening questions? Yes Filed at: 04/29/2021 1242   Initial Alcohol Screen: US AUDIT-C    1  How often do you have a drink containing alcohol?  0 Filed at: 04/29/2021 1242   2  How many drinks containing alcohol do you have on a typical day you are drinking? 0 Filed at: 04/29/2021 1242   3a  Male UNDER 65: How often do you have five or more drinks on one occasion? 0 Filed at: 04/29/2021 1242   3b  FEMALE Any Age, or MALE 65+: How often do you have 4 or more drinks on one occassion?   0 Filed at: 04/29/2021 1242   Audit-C Score  0 Filed at: 04/29/2021 1242   MITALI: How many times in the past year have you    Used an illegal drug or used a prescription medication for non-medical reasons? Never Filed at: 04/29/2021 1242                  [facility]  Number of Diagnoses or Management Options  Acute pain of right knee:   Diagnosis management comments: Assessment and Plan:   39year old female presenting with right knee pain  Full AROM, no gross deformity/swelling/effusion, no instability present  Able to ambulate  XR negative for acute fracture  Will treat with ace wrap for sprain, take NSAIDs, rest, ice, elevate and have patient follow up with orthopedics  Disposition  Final diagnoses:   Acute pain of right knee     Time reflects when diagnosis was documented in both MDM as applicable and the Disposition within this note     Time User Action Codes Description Comment    4/29/2021 12:56 PM Yamil Bonilla [[id number]] Acute pain of right knee       ED Disposition     ED Disposition Condition Date/Time Comment    Discharge Stable Thu Apr 29, 2021 12:56 PM Flavia Perez discharge to home/self care              Follow-up Information     Follow up With Specialties Details Why Contact Info Additional [address] [facility] Schedule an appointment as soon as possible for a visit in 3 days for re-evaluation [id number] [facility] [phone]  [address] [place] [address] [facility] [place], [address] [place] [address], [address]     [facility] Emergency Department Emergency Medicine Go to  As needed, If symptoms worsen, for re-evaluation [address] [place], [phone]  [address] [facility], [address] [place] [address], [facility] [address]          Patient's Medications   Discharge Prescriptions    No medications on file     No discharge procedures on file      PDMP Review       Value Time User    PDMP Reviewed  Yes 8/8/2020  8:38 AM Maury Bazan MD          ED Provider  Electronically Signed by           Ximena Soto DO  04/29/21 0811

## 2021-04-29 NOTE — Clinical Note
June Bettencourt was seen and treated in our emergency department on 4/29/2021  No work until cleared by Family Doctor/Orthopedics        Diagnosis:     Vernell    She may return on this date: If you have any questions or concerns, please don't hesitate to call        Donnie Crain, DO    ______________________________           _______________          _______________  Hospital Representative                              Date                                Time

## 2021-04-30 ENCOUNTER — OFFICE VISIT (OUTPATIENT)
Dept: OBGYN CLINIC | Facility: CLINIC | Age: 46
End: 2021-04-30
Payer: COMMERCIAL

## 2021-04-30 VITALS
DIASTOLIC BLOOD PRESSURE: 80 MMHG | SYSTOLIC BLOOD PRESSURE: 127 MMHG | HEIGHT: 55 IN | WEIGHT: 184 LBS | BODY MASS INDEX: 42.58 KG/M2

## 2021-04-30 DIAGNOSIS — M17.11 ARTHRITIS OF RIGHT KNEE: Primary | ICD-10-CM

## 2021-04-30 PROCEDURE — 3008F BODY MASS INDEX DOCD: CPT | Performed by: ORTHOPAEDIC SURGERY

## 2021-04-30 PROCEDURE — 1036F TOBACCO NON-USER: CPT | Performed by: ORTHOPAEDIC SURGERY

## 2021-04-30 PROCEDURE — 99214 OFFICE O/P EST MOD 30 MIN: CPT | Performed by: ORTHOPAEDIC SURGERY

## 2021-04-30 PROCEDURE — 3079F DIAST BP 80-89 MM HG: CPT | Performed by: ORTHOPAEDIC SURGERY

## 2021-04-30 PROCEDURE — 3074F SYST BP LT 130 MM HG: CPT | Performed by: ORTHOPAEDIC SURGERY

## 2021-05-03 DIAGNOSIS — F32.A DEPRESSION, UNSPECIFIED DEPRESSION TYPE: ICD-10-CM

## 2021-05-04 RX ORDER — PAROXETINE HYDROCHLORIDE 20 MG/1
TABLET, FILM COATED ORAL
Qty: 90 TABLET | Refills: 0 | Status: SHIPPED | OUTPATIENT
Start: 2021-05-04 | End: 2021-08-01

## 2021-05-04 NOTE — PROGRESS NOTES
PT Evaluation     Today's date: 2021  Patient name: Karina Pittman  : 1975  MRN: [id number]  Referring provider: Akilah Lewis MD  Dx:   Encounter Diagnosis     ICD-10-CM    1  Arthritis of right knee  M17 11                   Assessment  Assessment details: Karina Pittman is a 39 y o  female who presents with increased R knee pain consistent with referring diagnosis of Arthritis of right knee  (primary encounter diagnosis) that is complex secondary to acute onset, fear avoidance and moderate pain levels  Clinically demonstrates decreased R knee ROM, strength, proprioception leading to pain with ADLs and exercise  She presents with (-) thessaly but slight discomfort at medial joint line and + medial adolph testing suggesting potential meniscal involvement despite mild to moderate discomfort and popping at times  This suggests the need for skilled OPPT to address the above listed impairments, achieve established goals and return to PLOF pain-free  If you have any questions or concerns please contact me at [phone]  Thank you!   Impairments: abnormal gait, abnormal muscle firing, abnormal muscle tone, abnormal or restricted ROM, abnormal movement, difficulty understanding, impaired balance, impaired physical strength, pain with function, safety issue, scapular dyskinesis and weight-bearing intolerance    Symptom irritability: moderateBarriers to therapy: + imaging with OA and bone spurring, complex PMH, CHF and DM1  Understanding of Dx/Px/POC: good   Prognosis: fair  Prognosis details: + imaging, recurrent pain and fear avoidance    Goals  Short Term Goals (4 weeks)  1 ) Establish independence with HEP  2 ) Decrease subjective pain levels from PRS at least to 2-5/10 at rest and with activity  3 ) Improve R knee ROM at least 5-10 degrees into fleixon/extension to allow for improved ease of movement with less guarding    Long Term Goals (8 weeks)  1 ) Improve R knee ROM equal to contralateral knee in all planes to restore normal movement with ADLs and function  2 ) Improve R knee flex/ext strength to 5/5 in all planes in order to return to pain-free ADLs and function  3 ) Improve FOTO score at least to 75 points showing improved self reported disability   4 ) Return to pain-free hiking at PLOF > 30 mins    Plan  Plan details: Initiate POC for R knee ROM, strength and stability; improve overall standing and walking endurance, return to hiking  Patient would benefit from: PT eval and skilled physical therapy  Planned modality interventions: TENS, cryotherapy and electrical stimulation/Russian stimulation  Planned therapy interventions: activity modification, ADL training, ADL retraining, balance, balance/weight bearing training, self care, strengthening, stretching, patient education, postural training, neuromuscular re-education, muscle pump exercises, manual therapy, joint mobilization, IADL retraining, therapeutic activities, therapeutic exercise, therapeutic training, graded activity, home exercise program, functional ROM exercises, flexibility, Nguyen taping, massage, coordination and gait training  Frequency: 2x week  Duration in visits: 16  Duration in weeks: 8  Plan of Care beginning date: 5/5/2021  Plan of Care expiration date: 7/7/2021  Treatment plan discussed with: patient        Subjective Evaluation    History of Present Illness  Date of onset: 4/28/2021  Mechanism of injury: Karina Pittman is a 39 y o  female who presents with increased  starting ~ 1 week ago- notes getting up into her friends truck and felt a sudden medial knee "pop"  Notes delayed onset of pain at knee  Decided to seek out MD consult at ER at [address] on 4/29/21 X-rays were (-) for fx but (+) for medial and lateral compartment OA with slight bone spurring  Referral to Ortho was given for the following day on 4/30/21 where script for Physical Therapy at [facility] was provided    Has continued pain with bending her knee backwards and continued popping with walking or twisting/pivoting  Denies night pain or changes in bowel or bladder function  Denies any NT signs or sxs  F/U with MD in 1 month with PT and injection if needed based on response  Wishes to return to PLOF pain-free  Recurrent probem    Quality of life: good    Pain  Current pain ratin  At best pain ratin  At worst pain ratin  Location: R knee   Quality: sharp and tight  Relieving factors: ice, rest, medications and support  Aggravating factors: standing, walking, lifting, stair climbing and sitting  Progression: improved    Social Support  Steps to enter house: yes  12  Stairs in house: no   L[facility] in: [facility]  Lives with: spouse    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)  Exercise history: Hiking occasionally      Diagnostic Tests  X-ray: abnormal (OA and spurring at both compartments )  Treatments  Previous treatment: medication (Tylenol; aleve)  Current treatment: medication and physical therapy  Patient Goals  Patient goals for therapy: decreased edema, decreased pain, improved balance, increased motion, increased strength, independence with ADLs/IADLs and return to sport/leisure activities  Patient goal: Reduce popping, pain and get back to hiking        Objective     Static Posture   General Observations  Asymmetrical weight bearing and shifted left       Active Range of Motion   Left Knee   Normal active range of motion  Flexion: 120 degrees   Prone flexion: 100 degrees   Extension: 0 degrees   Extensor la degrees     Right Knee   Flexion contracture  Flexion: 110 degrees   Prone flexion: 95 degrees   Extension: 2 degrees   Extensor lag: 3 degrees     Additional Active Range of Motion Details  90/90 HS length B/L 20 degrees    Mobility   Patellar Mobility:   Left Knee   Hypermobile: left medial and left lateral    Hypomobile: left superior and left inferior    Right Knee   Hypomobile: medial, lateral, superior and inferior     Strength/Myotome Testing     Left Hip   Planes of Motion   Flexion: 4+  Adduction: 5  External rotation: 4+  Internal rotation: 5    Right Hip   Planes of Motion   Flexion: 4+  Adduction: 5  External rotation: 4+  Internal rotation: 4+    Left Knee   Flexion: 5  Extension: 5    Right Knee   Flexion: 4+ (pain at B/L joint line)  Extension: 4    Tests     Right Knee   Positive medial David  Negative anterior drawer, anterior Lachman, lateral David, Thessaly's test at 5 degrees, Thessaly's test at 20 degrees, valgus stress test at 0 degrees, valgus stress test at 30 degrees, varus stress test at 0 degrees and varus stress test at 30 degrees  Swelling     Left Knee Girth Measurement (cm)   Joint line: 36 cm    Right Knee Girth Measurement (cm)   Joint line: 36 5 cm    Functional Assessment      Squat    Left within functional limits and right within functional limits       Single Leg Stance   Left: 10 seconds  Right: 5 seconds             Precautions: CHF, HTN, DM1; + OA      Manuals 5/5 IE            R knee PROM/ HS and gastroc stretch             R knee patellar mobs                                       Neuro Re-Ed             Tandem             SLS 30"x2            STD toe taps at step             STD TKE with TBand                                                    Ther Ex             SLR, hip ABD, Hip ADD mat 10x each            HS stretch with strap 3x20"            Gastroc stretch with strap 3x20"            Clamshells             Step up             Lat step down                                       Ther Activity             Bike to begin                          Gait Training                                       Modalities

## 2021-05-05 ENCOUNTER — EVALUATION (OUTPATIENT)
Dept: PHYSICAL THERAPY | Facility: CLINIC | Age: 46
End: 2021-05-05
Payer: COMMERCIAL

## 2021-05-05 VITALS — SYSTOLIC BLOOD PRESSURE: 160 MMHG | DIASTOLIC BLOOD PRESSURE: 96 MMHG

## 2021-05-05 DIAGNOSIS — M17.11 ARTHRITIS OF RIGHT KNEE: Primary | ICD-10-CM

## 2021-05-05 PROCEDURE — 97110 THERAPEUTIC EXERCISES: CPT | Performed by: PHYSICAL THERAPIST

## 2021-05-05 PROCEDURE — 97162 PT EVAL MOD COMPLEX 30 MIN: CPT | Performed by: PHYSICAL THERAPIST

## 2021-05-10 ENCOUNTER — OFFICE VISIT (OUTPATIENT)
Dept: PHYSICAL THERAPY | Facility: CLINIC | Age: 46
End: 2021-05-10
Payer: COMMERCIAL

## 2021-05-10 DIAGNOSIS — M17.11 ARTHRITIS OF RIGHT KNEE: Primary | ICD-10-CM

## 2021-05-10 PROCEDURE — 97110 THERAPEUTIC EXERCISES: CPT | Performed by: PHYSICAL THERAPIST

## 2021-05-10 PROCEDURE — 97140 MANUAL THERAPY 1/> REGIONS: CPT | Performed by: PHYSICAL THERAPIST

## 2021-05-10 NOTE — PROGRESS NOTES
Daily Note / DC    Today's date: 5/10/2021  Patient name: Debra Casper  : 1975  MRN: [id number]  Referring provider: John Chowdary MD  Dx:   Encounter Diagnosis     ICD-10-CM    1  Arthritis of right knee  M17 11                 ADDENDUM:  Vernell sent a my chart message declaring she thought her knee was perfectly fine and able to resume hiking on various trails  Notes she felt she was able to continue independently and will subsequently will be D/C'ed from PT secondary to failure to F/U  Subjective: Reports compliance with HEP, less overall knee pain and discomfort since last session  Happy with progress and       Objective: See treatment diary below      Assessment: Good tolerance to all manual stretching and patellar mobilization, still with limited quad length leading to limited superior/inferior mobility  Noting slight discomfort with step down secondary to continued quad weakness  Noting continued instability in SLS and tandem, improved slightly with continued repetitions  Will continue to benefit from skilled OPPT to address remaining balance and CKC strength deficits  Plan: Continue per plan of care        Precautions: CHF, HTN, DM1; + OA      Manuals  IE 5/10           R knee PROM/ HS and gastroc stretch  PF           R knee patellar mobs  PF             15'                        Neuro Re-Ed             Tandem  30"x2           SLS 30"x2 30"x2           STD toe taps at step  2x10           STD TKE with TBand  OTB 2x10                                                  Ther Ex             SLR, hip ABD, Hip ADD mat 10x each 2#2x10 each           HS stretch with strap 3x20" 3x20"           Gastroc stretch with strap 3x20" 2x20"           Clamshells  OTB 2x10           Step up  2x10 8"           Lat step down  2x10 4"           PB bridge rollout  10x                        Ther Activity             Bike to begin  7'                        Gait Training Modalities

## 2021-05-17 ENCOUNTER — APPOINTMENT (OUTPATIENT)
Dept: PHYSICAL THERAPY | Facility: CLINIC | Age: 46
End: 2021-05-17
Payer: COMMERCIAL

## 2021-05-17 DIAGNOSIS — I10 ESSENTIAL HYPERTENSION: Chronic | ICD-10-CM

## 2021-05-17 RX ORDER — LISINOPRIL 20 MG/1
TABLET ORAL
Qty: 60 TABLET | Refills: 0 | Status: SHIPPED | OUTPATIENT
Start: 2021-05-17 | End: 2021-06-14

## 2021-05-20 LAB
CHOLEST SERPL-MCNC: 160 MG/DL
CHOLEST/HDLC SERPL: 4.7 (CALC)
HDLC SERPL-MCNC: 34 MG/DL
LDLC SERPL CALC-MCNC: 94 MG/DL (CALC)
NONHDLC SERPL-MCNC: 126 MG/DL (CALC)
TRIGL SERPL-MCNC: 219 MG/DL

## 2021-05-24 DIAGNOSIS — J45.20 MILD INTERMITTENT ASTHMA WITHOUT COMPLICATION: ICD-10-CM

## 2021-05-24 RX ORDER — MONTELUKAST SODIUM 10 MG/1
TABLET ORAL
Qty: 90 TABLET | Refills: 0 | Status: SHIPPED | OUTPATIENT
Start: 2021-05-24 | End: 2021-08-26

## 2021-06-03 ENCOUNTER — OFFICE VISIT (OUTPATIENT)
Dept: OBGYN CLINIC | Facility: CLINIC | Age: 46
End: 2021-06-03
Payer: COMMERCIAL

## 2021-06-03 VITALS
SYSTOLIC BLOOD PRESSURE: 130 MMHG | HEIGHT: 55 IN | WEIGHT: 182 LBS | BODY MASS INDEX: 42.12 KG/M2 | DIASTOLIC BLOOD PRESSURE: 78 MMHG

## 2021-06-03 DIAGNOSIS — M17.11 ARTHRITIS OF RIGHT KNEE: Primary | ICD-10-CM

## 2021-06-03 PROCEDURE — 3078F DIAST BP <80 MM HG: CPT | Performed by: ORTHOPAEDIC SURGERY

## 2021-06-03 PROCEDURE — 3008F BODY MASS INDEX DOCD: CPT | Performed by: ORTHOPAEDIC SURGERY

## 2021-06-03 PROCEDURE — 99212 OFFICE O/P EST SF 10 MIN: CPT | Performed by: ORTHOPAEDIC SURGERY

## 2021-06-03 PROCEDURE — 3075F SYST BP GE 130 - 139MM HG: CPT | Performed by: ORTHOPAEDIC SURGERY

## 2021-06-03 PROCEDURE — 20610 DRAIN/INJ JOINT/BURSA W/O US: CPT | Performed by: ORTHOPAEDIC SURGERY

## 2021-06-03 PROCEDURE — 1036F TOBACCO NON-USER: CPT | Performed by: ORTHOPAEDIC SURGERY

## 2021-06-03 RX ORDER — METHYLPREDNISOLONE ACETATE 40 MG/ML
1 INJECTION, SUSPENSION INTRA-ARTICULAR; INTRALESIONAL; INTRAMUSCULAR; SOFT TISSUE
Status: COMPLETED | OUTPATIENT
Start: 2021-06-03 | End: 2021-06-03

## 2021-06-03 RX ORDER — BUPIVACAINE HYDROCHLORIDE 2.5 MG/ML
4 INJECTION, SOLUTION INFILTRATION; PERINEURAL
Status: COMPLETED | OUTPATIENT
Start: 2021-06-03 | End: 2021-06-03

## 2021-06-03 RX ADMIN — BUPIVACAINE HYDROCHLORIDE 4 ML: 2.5 INJECTION, SOLUTION INFILTRATION; PERINEURAL at 09:05

## 2021-06-03 RX ADMIN — METHYLPREDNISOLONE ACETATE 1 ML: 40 INJECTION, SUSPENSION INTRA-ARTICULAR; INTRALESIONAL; INTRAMUSCULAR; SOFT TISSUE at 09:05

## 2021-06-03 NOTE — PROGRESS NOTES
Orthopaedic Surgery Note    CC: Right Knee Pain      HPI:  Ms Vernell Francisco is a 39 y  o female with a history of right knee pain  Updated history:  Reports she went to PT twice and was working on HEP and knee feeling better but then went to get on motorcycle and pain flared up again  Previous history[de-identified]  States that this started Weds after trying to get up into a big truck  Went to ED  Xrays negative for fracture - told to come see ortho  Tried tylenol  Otherwise has not tried any treatment  Describes pain as sharp, popping, clicking  Pain is moderate and is 5 of 10 numerically  Worse with balancing and nothing makes it better  It is improving  ALLERGIES:  No Known Allergies    CURRENT MEDICATIONS:  Current Outpatient Medications   Medication Sig Dispense Refill    acetaminophen (TYLENOL) 500 mg tablet Take 2 tablets (1,000 mg total) by mouth every 6 (six) hours as needed for mild pain or moderate pain 30 tablet 0    acetone, urine, test strip Use to test urine ketones for high blood sugars  25 each 6    albuterol (2 5 mg/3 mL) 0 083 % nebulizer solution Take 1 vial (2 5 mg total) by nebulization every 6 (six) hours as needed for wheezing or shortness of breath 25 vial 4    albuterol (PROVENTIL HFA,VENTOLIN HFA) 90 mcg/act inhaler INHALE 2 PUFFS BY MOUTH EVERY 4 HOURS AS NEEDED FOR WHEEZING OR FOR SHORTNESS OF BREATH 25 5 g 0    Ascorbic Acid (VITAMIN C) 500 MG CAPS Take 2 capsules by mouth daily      aspirin (ECOTRIN LOW STRENGTH) 81 mg EC tablet Take 1 tablet (81 mg total) by mouth daily 30 tablet 6    atorvastatin (LIPITOR) 80 mg tablet Take 1 tablet (80 mg total) by mouth daily 30 tablet 5    Biotin 10 MG TABS Take 1 tablet by mouth daily      Blood Pressure Monitoring W[place] Use 2 (two) times a day Monitor BP twice daily  Call office if BP > 140/90 persistently   1 Device 0    Butalbital-APAP-Caffeine (Fioricet) -40 MG CAPS Take 1 tablet by mouth 4 (four) times a day as needed (headahce) 30 capsule 0    Continuous Blood Gluc Sensor (Dexcom G6 Sensor) MISC CHANGE SENSOR EVERY 10 DAYS  3 each 2    cyanocobalamin (VITAMIN B-12) 100 mcg tablet Take by mouth daily      fluticasone-salmeterol (Advair Diskus) 250-50 mcg/dose inhaler Inhale 1 puff 2 (two) times a day Rinse mouth after use  3 Inhaler 3    furosemide (LASIX) 40 mg tablet Take 1 tablet (40 mg total) by mouth daily Take 1/2 tablet daily (Patient taking differently: Take 40 mg by mouth daily ) 30 tablet 3    glucagon (Glucagon Emergency) 1 MG injection Inject 1 mg under the skin once as needed for low blood sugar for up to 1 dose 1 kit 3    Glucagon (Gvoke HypoPen 2-Pack) 1 MG/0 2ML SOAJ Use if hypoglycemia prn 1 Syringe 6    insulin aspart (NovoLOG) 100 units/mL injection Take 1 unit of novolog for every 1 g of carb, up to 150 units daily 50 mL 6    insulin glargine (Lantus SoloStar) 100 units/mL injection pen Inject 78 Units under the skin daily at bedtime 10 pen 3    lisinopril (ZESTRIL) 20 mg tablet TAKE 2 TABLETS BY MOUTH EVERY DAY 60 tablet 0    montelukast (SINGULAIR) 10 mg tablet TAKE 1 TABLET BY MOUTH AT BEDTIME 90 tablet 0    Na Sulfate-K Sulfate-Mg Sulf (Suprep Bowel Prep Kit) 17 5-3 13-1 6 GM/177ML SOLN Take 177 mL by mouth once for 1 dose 2 Bottle 0    omeprazole (PriLOSEC) 20 mg delayed release capsule Take 20 mg by mouth daily   OneTouch Ultra test strip Test 3 times daily 300 each 3    PARoxetine (PAXIL) 20 mg tablet TAKE 1 TABLET BY MOUTH EVERY DAY 90 tablet 0    Probiotic Product (ALIGN PO) Take by mouth       No current facility-administered medications for this visit  PAST MEDICAL HISTORY  Past Medical History:   Diagnosis Date    Anemia     Asthma     w/acute exacerbation   Last assessed: 10/26/12    Chest pain 2/3/2016    [facility]     Depression     [facility]     Di[facility]ropathy ([facility] )     Diabetic nephropathy associated with type 1 diabetes mellitus ([facility] ) 8/12/2020    Diabetic retinopathy ([facility] ) 2/3/2016    Gastroenteritis 02/03/2016    GERD (gastroesophageal reflux disease)     HTN (hypertension)     Hyperlipidemia 2/3/2016    Oligomenorrhea     Polycystic ovaries 2/3/2016    Retinal hemorrhage 2/3/2016    Retinopathy     Thrombocytosis (HCC)     Type 1 diabetes mellitus with ophthalmic manifestation ([facility] ) 2/3/2016       SURGICAL HISTORY  Past Surgical History:   Procedure Laterality Date    CATARACT EXTRACTION Left 10/2020    CATARACT EXTRACTION Right     RETINAL LASER PROCEDURE         FAMILY HISTORY  Family History   Problem Relation Age of Onset    Heart murmur Mother     Diabetes Mother         Mellitus    Thyroid disease Mother         Disorder    Diabetes Father         Mellitus    Hypertension Father     Diabetes Maternal Grandfather         Mellitus    Breast cancer Paternal Grandmother     Diabetes Maternal Aunt         Mellitus    Diabetes Maternal Uncle         Mellitus    No Known Problems Maternal Grandmother     Leukemia Paternal Grandfather     Substance Abuse Neg Hx     Mental illness Neg Hx     Alcohol abuse Neg Hx        SOCIAL HISTORY  Social History     Socioeconomic History    Marital status: /Civil Union     Spouse name: Not on file    Number of children: Not on file    Years of education: Not on file    Highest education level: Not on file   Occupational History    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   Social Needs    Financial resource strain: Not on file    Food insecurity     Worry: Not on file     Inability: Not on file    Transportation needs     Medical: Not on file     Non-medical: Not on file   Tobacco Use    Smoking status: Never Smoker    Smokeless tobacco: Never Used    Tobacco comment: Per [employer]: Former smoker   Quit in 1994   Substance and Sexual Activity    Alcohol use: Yes     Frequency: 2-4 times a month     Drinks per session: 1 or 2     Binge frequency: Never     Comment: Occasional/1 mixed drink twice a month if that    Drug use: No    Sexual activity: Yes     Partners: Male     Birth control/protection: Pill   Lifestyle    Physical activity     Days per week: Not on file     Minutes per session: Not on file    Stress: Not on file   Relationships    Social connections     Talks on phone: Not on file     Gets together: Not on file     Attends Taoist service: Not on file     Active member of club or organization: Not on file     Attends meetings of clubs or organizations: Not on file     Relationship status: Not on file    Intimate partner violence     Fear of current or ex partner: Not on file     Emotionally abused: Not on file     Physically abused: Not on file     Forced sexual activity: Not on file   Other Topics Concern    Not on file   Social History Narrative    Always uses seatbelt    Caffeine use: 1-2 cups coffee daily    Has smoke detectors    [employer]: [employer]         Review of Systems   Otherwise negative except per above and HPI  Physical Exam    Vitals  Vitals:    06/03/21 0853   BP: 130/78       BMI  Body mass index is 42 3 kg/m²  GENERAL: No acute distress  Alert and oriented  Well nourished and well hydrated  Appears stated age  HEENT : Normocephalic, atraumatic  Extraocular movements intact  Mask in place  NECK: Supple, trachea midline  LUNGS: Adequate and symmetric respiratory effort  No intercostal retractions or accessory muscle use  HEART: Extremities warm and perfused  ABDOMEN: Nondistended  SKIN: Warm and dry, no rash  Right Knee   Inspection/Appearance:       Swelling: No      Patella is midline  Alignment:  Knee is in neutral  Palpation - Soft Tissue: normal without effusion  ROM:       Extension - 0          Flexion - 120      extensor lag: no    Stability:  demonstrates no varus, valgus, anterior drawer or posterior drawer  Patella: stable, tracks normally         Sensation Intact to Light Touch in Sural, Saphenous, Tibial, Superficial Peroneal, and Deep Peroneal Nerve Distribution  Motor function 5 out of 5 strength in Tibialis Anterior, Gastrocnemius, Soleus, Extensor Hallucis Longus, and Flexor Hallucis Longus Muscles  Extremity Warm and Well Perfused  Imaging  A) Imaging modality available  Radiographs: yes  MRI scan: no  CT scan: no    B) Imaging findings  Subchondral cysts: no  Subchondral sclerosis: yes  Periarticular osteophytes: no  Joint subluxation: no  Joint space narrowing: yes  Bone-on-bone articulations: no  Avascular necrosis: no      Assessment and Plan  Right Knee Arthritis    - CSI in office today, recommend HEP, tylenol, NSAIDs    Large joint arthrocentesis: R knee  Universal Protocol:  Consent given by: patient  Time out: Immediately prior to procedure a "time out" was called to verify the correct patient, procedure, equipment, support staff and site/side marked as required  Site marked: the operative site was marked  Supporting Documentation  Indications: pain   Procedure Details  Location: knee - R knee  Needle size: 20 G  Approach: anterior  Medications administered: 1 mL methylPREDNISolone acetate 40 mg/mL; 4 mL bupivacaine 0 25 %    Patient tolerance: patient tolerated the procedure well with no immediate complications  Dressing:  Sterile dressing applied              Edson Pan MD  Adult Reconstruction Surgery  Department of [place] [id number]  9:03 AM

## 2021-06-13 DIAGNOSIS — I10 ESSENTIAL HYPERTENSION: Chronic | ICD-10-CM

## 2021-06-14 PROCEDURE — 4010F ACE/ARB THERAPY RXD/TAKEN: CPT | Performed by: ORTHOPAEDIC SURGERY

## 2021-06-14 RX ORDER — LISINOPRIL 20 MG/1
TABLET ORAL
Qty: 60 TABLET | Refills: 3 | Status: SHIPPED | OUTPATIENT
Start: 2021-06-14 | End: 2021-09-29

## 2021-06-16 ENCOUNTER — TELEPHONE (OUTPATIENT)
Dept: NEPHROLOGY | Facility: CLINIC | Age: 46
End: 2021-06-16

## 2021-07-15 ENCOUNTER — TELEPHONE (OUTPATIENT)
Dept: HEMATOLOGY ONCOLOGY | Facility: CLINIC | Age: 46
End: 2021-07-15

## 2021-07-15 NOTE — TELEPHONE ENCOUNTER
The patient was rescheduled from Wednesday with Dr Ana Stacy since he will no longer be going to [facility] on Wednesdays

## 2021-07-31 DIAGNOSIS — E10.65 TYPE 1 DIABETES MELLITUS WITH HYPERGLYCEMIA (HCC): ICD-10-CM

## 2021-08-01 DIAGNOSIS — F32.A DEPRESSION, UNSPECIFIED DEPRESSION TYPE: ICD-10-CM

## 2021-08-01 RX ORDER — PAROXETINE HYDROCHLORIDE 20 MG/1
TABLET, FILM COATED ORAL
Qty: 90 TABLET | Refills: 0 | Status: SHIPPED | OUTPATIENT
Start: 2021-08-01 | End: 2021-11-01

## 2021-08-02 RX ORDER — BLOOD-GLUCOSE SENSOR
EACH MISCELLANEOUS
Qty: 3 EACH | Refills: 2 | Status: SHIPPED | OUTPATIENT
Start: 2021-08-02 | End: 2021-08-17 | Stop reason: SDUPTHER

## 2021-08-04 LAB
APPEARANCE UR: CLEAR
BACTERIA UR QL AUTO: NORMAL /HPF
BILIRUB UR QL STRIP: NEGATIVE
COLOR UR: YELLOW
GLUCOSE UR QL STRIP: NEGATIVE
HGB UR QL STRIP: NEGATIVE
HYALINE CASTS #/AREA URNS LPF: NORMAL /LPF
KETONES UR QL STRIP: NEGATIVE
LEUKOCYTE ESTERASE UR QL STRIP: NEGATIVE
NITRITE UR QL STRIP: NEGATIVE
PH UR STRIP: NORMAL [PH] (ref 5–8)
PROT UR QL STRIP: NEGATIVE
RBC #/AREA URNS HPF: NORMAL /HPF
SP GR UR STRIP: 1.01 (ref 1–1.03)
SQUAMOUS #/AREA URNS HPF: NORMAL /HPF
WBC #/AREA URNS HPF: NORMAL /HPF

## 2021-08-05 LAB
ALBUMIN SERPL-MCNC: 3.9 G/DL (ref 3.6–5.1)
ALBUMIN/GLOB SERPL: 1.6 (CALC) (ref 1–2.5)
ALP SERPL-CCNC: 110 U/L (ref 31–125)
ALT SERPL-CCNC: 18 U/L (ref 6–29)
AST SERPL-CCNC: 11 U/L (ref 10–35)
BILIRUB SERPL-MCNC: 0.3 MG/DL (ref 0.2–1.2)
BUN SERPL-MCNC: 48 MG/DL (ref 7–25)
BUN/CREAT SERPL: 64 (CALC) (ref 6–22)
CALCIUM SERPL-MCNC: 9.3 MG/DL (ref 8.6–10.2)
CHLORIDE SERPL-SCNC: 103 MMOL/L (ref 98–110)
CO2 SERPL-SCNC: 27 MMOL/L (ref 20–32)
CREAT SERPL-MCNC: 0.75 MG/DL (ref 0.5–1.1)
EST. AVERAGE GLUCOSE BLD GHB EST-MCNC: 229 (CALC)
EST. AVERAGE GLUCOSE BLD GHB EST-SCNC: 12.7 (CALC)
GLOBULIN SER CALC-MCNC: 2.4 G/DL (CALC) (ref 1.9–3.7)
GLUCOSE SERPL-MCNC: 141 MG/DL (ref 65–99)
HBA1C MFR BLD: 9.6 % OF TOTAL HGB
POTASSIUM SERPL-SCNC: 4.8 MMOL/L (ref 3.5–5.3)
PROT SERPL-MCNC: 6.3 G/DL (ref 6.1–8.1)
SL AMB EGFR AFRICAN AMERICAN: 112 ML/MIN/1.73M2
SL AMB EGFR NON AFRICAN AMERICAN: 96 ML/MIN/1.73M2
SODIUM SERPL-SCNC: 137 MMOL/L (ref 135–146)

## 2021-08-05 PROCEDURE — 3046F HEMOGLOBIN A1C LEVEL >9.0%: CPT | Performed by: ORTHOPAEDIC SURGERY

## 2021-08-11 ENCOUNTER — OFFICE VISIT (OUTPATIENT)
Dept: ENDOCRINOLOGY | Facility: HOSPITAL | Age: 46
End: 2021-08-11
Payer: COMMERCIAL

## 2021-08-11 ENCOUNTER — TELEPHONE (OUTPATIENT)
Dept: HEMATOLOGY ONCOLOGY | Facility: CLINIC | Age: 46
End: 2021-08-11

## 2021-08-11 VITALS
HEIGHT: 55 IN | HEART RATE: 88 BPM | WEIGHT: 174.4 LBS | DIASTOLIC BLOOD PRESSURE: 76 MMHG | SYSTOLIC BLOOD PRESSURE: 144 MMHG | BODY MASS INDEX: 40.36 KG/M2

## 2021-08-11 DIAGNOSIS — E10.21 DIABETIC NEPHROPATHY ASSOCIATED WITH TYPE 1 DIABETES MELLITUS (HCC): ICD-10-CM

## 2021-08-11 DIAGNOSIS — E78.2 MIXED HYPERLIPIDEMIA: ICD-10-CM

## 2021-08-11 DIAGNOSIS — E10.65 TYPE 1 DIABETES MELLITUS WITH HYPERGLYCEMIA (HCC): Primary | ICD-10-CM

## 2021-08-11 DIAGNOSIS — I10 ESSENTIAL HYPERTENSION: ICD-10-CM

## 2021-08-11 DIAGNOSIS — E10.42 DIABETIC POLYNEUROPATHY ASSOCIATED WITH TYPE 1 DIABETES MELLITUS (HCC): ICD-10-CM

## 2021-08-11 PROCEDURE — 3066F NEPHROPATHY DOC TX: CPT | Performed by: PHYSICIAN ASSISTANT

## 2021-08-11 PROCEDURE — 99214 OFFICE O/P EST MOD 30 MIN: CPT | Performed by: PHYSICIAN ASSISTANT

## 2021-08-11 NOTE — PROGRESS NOTES
Swathi Avila 39 y o  female MRN: [id number]    Encounter: [id number]      Assessment/Plan     Assessment: This is a 39y o -year-old female with type 1 diabetes with neuropathy, nephropathy, hypertension and hyperlipidemia  Plan:  1  Type 1 diabetes:Most recent hemoglobin A1c has decreased to 9 6, but is still above goal   Reviewing her Dexcom download, she has inconsistencies throughout the day which could be due to varying mealtimes, and skipping meals  Did recommend trying to eat a small snack of about 15 g of carbohydrates with some protein prior to bed, and in the morning to make sure she has something small  The days she skips it seems like her blood sugar shoots up  Will not make any adjustments to her insulin at this time  Continue working on being consistent, and monitoring diet with other lifestyle modifications  If there is any concerns with hypoglycemia, please contact the office  Follow-up in 3 months with lab work completed prior to visit  2  Diabetic neuropathy:  Stable  Is up-to-date on diabetic foot exam     3  Diabetic nephropathy:  Some protein noted in her urine previously  Kidney function remains stable at this time  Will continue to monitor  Will be due for another microalbuminuria at next appointment  4  Hypertension:  Blood pressure slightly elevated in the office today  Kidney function remains stable with normal electrolytes  Continue with current medications at this time  Repeat CMP prior to next office visit  5  Hyperlipidemia:  Lipid panel did reveal elevated triglycerides which might be due to her hyperglycemia  Total cholesterol and LDL cholesterol were within normal range  Continue with current medications  Will continue monitor at this time      CC:  Type 1 diabetes follow-up    History of Present Illness     HPI:  Swathi Avila is a 39 y o  female with type 1 diabetes with diabetic neuropathy and diabetic nephropathy, hypertension, hyperlipidemia for follow-up visit  She was diagnosed with type 1 diabetes about 27 years ago  She utilizes insulin therapy and takes Lantus insulin 78 units at bedtime and NovoLog insulin 1 unit per 1 g carbohydrate with each meal  States she will take more insulin with her meals if blood sugar is running high  Did recently get a new job, and has been working more hours and different times, leading to inconsistencies with her eating  She denies polyuria, polydipsia, polyphagia, or nocturia  She denies blurry vision  She denies numbness or tingling of the feet  She denies chest pain or shortness of breath        Diabetic complications include diabetic neuropathy, diabetic nephropathy, diabetic retinopathy  She denies heart attack, stroke, or claudication      Last diabetic foot exam was performed in the endocrine office in November 2020        She reports her last eye exam was January 2021 and there was no change in her retinopathy       She uses a Dexcom continuous glucose monitoring system to check her blood sugars frequently throughout the day  Download of her Dexcom from July 29 through August 11, 2021  Average blood sugar was 249  She does have a lot of variability during day  Is in range in 20% of the time, above range 76% of the time, and below range 4% of the time       She has hypertension and diabetic nephropathy and takes lisinopril 20 mg 2 tablets daily  She has occasional headaches but no stroke-like symptoms      She has hyperlipidemia and takes atorvastatin 40 mg daily  She denies chest pain or shortness of breath      Of note she reports that she has not been feeling well in general today and is congested with a headache  She just had a COVID test done  Of note she did get a COVID vaccine last week        She also has had quite a few months of fatigue and is now on iron infusions for low iron       Review of Systems   Constitutional: Negative for activity change, appetite change, fatigue and unexpected weight change  HENT: Negative for sore throat and trouble swallowing  Eyes: Negative for visual disturbance  Respiratory: Negative for chest tightness and shortness of breath  Cardiovascular: Negative for chest pain, palpitations and leg swelling  Gastrointestinal: Negative for abdominal pain, constipation, diarrhea, nausea and vomiting  Endocrine: Negative for cold intolerance, heat intolerance, polydipsia, polyphagia and polyuria  Genitourinary: Negative for frequency  Skin: Negative for wound  Neurological: Negative for dizziness, weakness, numbness and headaches  Psychiatric/Behavioral: Negative for dysphoric mood and sleep disturbance  The patient is not nervous/anxious  Historical Information   Past Medical History:   Diagnosis Date    Anemia     Asthma     w/acute exacerbation   Last assessed: 10/26/12    Chest pain 2/3/2016    [facility]     Depression     Diabetes mellitus ([facility] )     Diabetic nephropathy ([facility] )     Diabetic nephropathy associated with type 1 diabetes mellitus ([facility] ) 8/12/2020    Diabetic retinopathy ([place] [address] ) 2/3/2016    Gastroenteritis 02/03/2016    GERD (gastroesophageal reflux disease)     HTN (hypertension)     Hyperlipidemia 2/3/2016    Oligomenorrhea     Polycystic ovaries 2/3/2016    Retinal hemorrhage 2/3/2016    Retinopathy     Thrombocytosis (HCC)     Type 1 diabetes mellitus with ophthalmic manifestation ([facility] ) 2/3/2016     Past Surgical History:   Procedure Laterality Date    CATARACT EXTRACTION Left 10/2020    CATARACT EXTRACTION Right     RETINAL LASER PROCEDURE       Social History   Social History     Substance and Sexual Activity   Alcohol Use Yes    Comment: Occasional/1 mixed drink twice a month if that     Social History     Substance and Sexual Activity   Drug Use No     Social History     Tobacco Use   Smoking Status Never Smoker   Smokeless Tobacco Never Used   Tobacco Comment    Per [employer]: Former smoker  Quit in 1994     Family History:   Family History   Problem Relation Age of Onset    Heart murmur Mother     Diabetes Mother         Mellitus    Thyroid disease Mother         Disorder    Diabetes Father         Mellitus    Hypertension Father     Diabetes Maternal Grandfather         Mellitus    Breast cancer Paternal Grandmother     Diabetes Maternal Aunt         Mellitus    Diabetes Maternal Uncle         Mellitus    No Known Problems Maternal Grandmother     Leukemia Paternal Grandfather     Substance Abuse Neg Hx     Mental illness Neg Hx     Alcohol abuse Neg Hx        Meds/Allergies   Current Outpatient Medications   Medication Sig Dispense Refill    acetaminophen (TYLENOL) 500 mg tablet Take 2 tablets (1,000 mg total) by mouth every 6 (six) hours as needed for mild pain or moderate pain 30 tablet 0    acetone, urine, test strip Use to test urine ketones for high blood sugars  25 each 6    albuterol (2 5 mg/3 mL) 0 083 % nebulizer solution Take 1 vial (2 5 mg total) by nebulization every 6 (six) hours as needed for wheezing or shortness of breath 25 vial 4    albuterol (PROVENTIL HFA,VENTOLIN HFA) 90 mcg/act inhaler INHALE 2 PUFFS BY MOUTH EVERY 4 HOURS AS NEEDED FOR WHEEZING OR FOR SHORTNESS OF BREATH 25 5 g 0    Ascorbic Acid (VITAMIN C) 500 MG CAPS Take 2 capsules by mouth daily      aspirin (ECOTRIN LOW STRENGTH) 81 mg EC tablet Take 1 tablet (81 mg total) by mouth daily 30 tablet 6    atorvastatin (LIPITOR) 80 mg tablet Take 1 tablet (80 mg total) by mouth daily 30 tablet 5    Biotin 10 MG TABS Take 1 tablet by mouth daily      Blood Pressure Monitoring [place] Use 2 (two) times a day Monitor BP twice daily  Call office if BP > 140/90 persistently   1 Device 0    Butalbital-APAP-Caffeine (Fioricet) -40 MG CAPS Take 1 tablet by mouth 4 (four) times a day as needed (headahce) 30 capsule 0    Continuous Blood Gluc Sensor (Dexcom G6 Sensor) MISC CHANGE SENSOR EVERY 10 DAYS  3 each 2    cyanocobalamin (VITAMIN B-12) 100 mcg tablet Take by mouth daily      fluticasone-salmeterol (Advair Diskus) 250-50 mcg/dose inhaler Inhale 1 puff 2 (two) times a day Rinse mouth after use  3 Inhaler 3    furosemide (LASIX) 40 mg tablet Take 1 tablet (40 mg total) by mouth daily Take 1/2 tablet daily (Patient taking differently: Take 40 mg by mouth daily ) 30 tablet 3    glucagon (Glucagon Emergency) 1 MG injection Inject 1 mg under the skin once as needed for low blood sugar for up to 1 dose 1 kit 3    Glucagon (Gvoke HypoPen 2-Pack) 1 MG/0 2ML SOAJ Use if hypoglycemia prn 1 Syringe 6    insulin aspart (NovoLOG) 100 units/mL injection Take 1 unit of novolog for every 1 g of carb, up to 150 units daily 50 mL 6    insulin glargine (Lantus SoloStar) 100 units/mL injection pen Inject 78 Units under the skin daily at bedtime 10 pen 3    lisinopril (ZESTRIL) 20 mg tablet TAKE 2 TABLETS BY MOUTH EVERY DAY 60 tablet 3    montelukast (SINGULAIR) 10 mg tablet TAKE 1 TABLET BY MOUTH AT BEDTIME 90 tablet 0    Na Sulfate-K Sulfate-Mg Sulf (Suprep Bowel Prep Kit) 17 5-3 13-1 6 GM/177ML SOLN Take 177 mL by mouth once for 1 dose 2 Bottle 0    omeprazole (PriLOSEC) 20 mg delayed release capsule Take 20 mg by mouth daily   OneTouch Ultra test strip Test 3 times daily 300 each 3    PARoxetine (PAXIL) 20 mg tablet TAKE 1 TABLET BY MOUTH EVERY DAY 90 tablet 0    Probiotic Product (ALIGN PO) Take by mouth       No current facility-administered medications for this visit  No Known Allergies    Objective   Vitals: not currently breastfeeding  Physical Exam  Vitals and nursing note reviewed  Constitutional:       General: She is not in acute distress  HENT:      Head: Normocephalic and atraumatic  Eyes:      Pupils: Pupils are equal, round, and reactive to light  Cardiovascular:      Rate and Rhythm: Normal rate and regular rhythm  Heart sounds: No murmur heard       Pulmonary: Effort: Pulmonary effort is normal  No respiratory distress  Breath sounds: Normal breath sounds  No wheezing  Musculoskeletal:         General: No swelling  Cervical back: Normal range of motion  Lymphadenopathy:      Cervical: No cervical adenopathy  Neurological:      Mental Status: She is alert and oriented to person, place, and time  Sensory: No sensory deficit  Psychiatric:         Mood and Affect: Mood normal          Behavior: Behavior normal          Thought Content: Thought content normal          The history was obtained from the review of the chart, patient      Lab Results:   Lab Results   Component Value Date/Time    Hemoglobin A1C 9 6 (H) 08/04/2021 10:16 AM    Hemoglobin A1C 10 0 (H) 02/17/2021 08:37 AM    Hemoglobin A1C 9 7 (A) 11/11/2020 09:51 AM    Hemoglobin A1C 9 3 (A) 08/12/2020 01:08 PM    WBC 11 74 (H) 08/14/2020 07:44 PM    White Blood Cell Count 11 0 (H) 04/14/2021 09:39 AM    White Blood Cell Count 11 2 (H) 01/13/2021 12:00 AM    White Blood Cell Count 9 1 11/25/2020 09:28 AM    Hemoglobin 11 6 (L) 04/14/2021 09:39 AM    Hemoglobin 11 5 (L) 01/13/2021 12:00 AM    Hemoglobin 10 8 (L) 11/25/2020 09:28 AM    Hemoglobin 12 1 08/14/2020 07:44 PM    HCT 35 1 04/14/2021 09:39 AM    HCT 34 3 (L) 01/13/2021 12:00 AM    HCT 32 6 (L) 11/25/2020 09:28 AM    Hematocrit 37 1 08/14/2020 07:44 PM    MCV 92 9 04/14/2021 09:39 AM    MCV 89 6 01/13/2021 12:00 AM    MCV 90 3 11/25/2020 09:28 AM    MCV 92 08/14/2020 07:44 PM    Platelet Count 888 (H) 04/14/2021 09:39 AM    Platelet Count 530 (H) 01/13/2021 12:00 AM    Platelet Count 099 (H) 11/25/2020 09:28 AM    Platelets 605 (H) 05/33/5708 07:44 PM    BUN 48 (H) 08/04/2021 10:16 AM    BUN 35 (H) 04/14/2021 09:39 AM    BUN 51 (H) 02/17/2021 08:37 AM    Potassium 4 8 08/04/2021 10:16 AM    Potassium 5 4 (H) 04/14/2021 09:39 AM    Potassium 5 3 02/17/2021 08:37 AM    Chloride 103 08/04/2021 10:16 AM    Chloride 100 04/14/2021 09:39 AM Chloride 102 02/17/2021 08:37 AM    CO2 27 08/04/2021 10:16 AM    CO2 29 04/14/2021 09:39 AM    CO2 24 02/17/2021 08:37 AM    Creatinine 0 75 08/04/2021 10:16 AM    Creatinine 0 89 04/14/2021 09:39 AM    Creatinine 1 08 02/17/2021 08:37 AM    Creatinine 1 20 08/14/2020 07:44 PM    AST 11 08/04/2021 10:16 AM    AST 13 04/14/2021 09:39 AM    AST 13 02/17/2021 08:37 AM    ALT 18 08/04/2021 10:16 AM    ALT 14 04/14/2021 09:39 AM    ALT 15 02/17/2021 08:37 AM    Albumin 3 9 08/04/2021 10:16 AM    Albumin 4 0 04/14/2021 09:39 AM    Albumin 3 9 02/17/2021 08:37 AM    Albumin 3 5 08/14/2020 07:44 PM    Globulin 2 4 08/04/2021 10:16 AM    Globulin 2 5 04/14/2021 09:39 AM    Globulin 2 6 02/17/2021 08:37 AM    HDL 34 (L) 05/20/2021 07:48 AM    HDL 45 (L) 10/28/2020 10:30 AM    Triglycerides 219 (H) 05/20/2021 07:48 AM    Triglycerides 128 10/28/2020 10:30 AM         Portions of the record may have been created with voice recognition software  Occasional wrong word or "sound a like" substitutions may have occurred due to the inherent limitations of voice recognition software  Read the chart carefully and recognize, using context, where substitutions have occurred

## 2021-08-11 NOTE — TELEPHONE ENCOUNTER
Appointment Confirmation     Appointment with  Dr Ndiaye Main   Appointment date & time 08/23 at 2:00pm    Location [place]   Patient verbilized Understanding  yes

## 2021-08-14 DIAGNOSIS — I50.9 ACUTE CONGESTIVE HEART FAILURE, UNSPECIFIED HEART FAILURE TYPE (HCC): ICD-10-CM

## 2021-08-16 RX ORDER — ASPIRIN 81 MG/1
TABLET, COATED ORAL
Qty: 30 TABLET | Refills: 6 | Status: SHIPPED | OUTPATIENT
Start: 2021-08-16 | End: 2022-03-08

## 2021-08-17 DIAGNOSIS — E10.65 TYPE 1 DIABETES MELLITUS WITH HYPERGLYCEMIA (HCC): ICD-10-CM

## 2021-08-17 RX ORDER — BLOOD-GLUCOSE TRANSMITTER
EACH MISCELLANEOUS
Qty: 1 EACH | Refills: 3 | Status: SHIPPED | OUTPATIENT
Start: 2021-08-17 | End: 2022-08-05

## 2021-08-17 RX ORDER — BLOOD-GLUCOSE SENSOR
EACH MISCELLANEOUS
Qty: 3 EACH | Refills: 12 | Status: SHIPPED | OUTPATIENT
Start: 2021-08-17

## 2021-08-20 ENCOUNTER — TELEPHONE (OUTPATIENT)
Dept: HEMATOLOGY ONCOLOGY | Facility: HOSPITAL | Age: 46
End: 2021-08-20

## 2021-08-20 NOTE — TELEPHONE ENCOUNTER
The patient will need labs prior to her visit with Dr Ellen De La Torre on Monday 8/23 or we will have to reschedule  Orders are in the system

## 2021-08-23 ENCOUNTER — TELEPHONE (OUTPATIENT)
Dept: HEMATOLOGY ONCOLOGY | Facility: HOSPITAL | Age: 46
End: 2021-08-23

## 2021-08-23 ENCOUNTER — TELEPHONE (OUTPATIENT)
Dept: ADMINISTRATIVE | Facility: OTHER | Age: 46
End: 2021-08-23

## 2021-08-23 NOTE — TELEPHONE ENCOUNTER
Upon review of the In Basket request and the patient's chart, initial outreach has been made via telephone call and fax, please see Contacts section for details       Thank you  Lavonne Mcclendon MA

## 2021-08-23 NOTE — TELEPHONE ENCOUNTER
----- Message from Liliam Cheung sent at 8/20/2021 11:56 AM EDT -----  Regarding: diabetic eye exam  08/20/21 11:57 AM    Hello, our patient Aure Haro has had a diabetic eye exam completed/performed  Please assist in updating the patient chart by Dr Arelia Sacks Specialist   The date of service is 2021      Thank you,  Anna Hewitt MA  [facility]

## 2021-08-23 NOTE — TELEPHONE ENCOUNTER
Called Patient, JOSUE stating that she missed her appointment  JOSUE stating for her to call and reschedule her appointment

## 2021-08-23 NOTE — LETTER
Diabetic Eye Exam Form    Date Requested: 21  Patient: Lu Street  Patient : 1975   Referring Provider: JAY HIDALGO [facility]      Dilated Retinal Exam, Optomap-Iris Exam, or Fundus Photography Performed? (Quechan one please)         Yes         No       Date of Diabetic Eye Exam ______________________________    Left Eye      Exam did show retinopathy    Exam did not show retinopathy         Mild       Moderate       None       Proliferative       Severe     Right Eye     Exam did show retinopathy    Exam did not show retinopathy         Mild       Moderate       None       Proliferative       Severe     Comments __________________________________________________________    Practice Providing Exam ______________________________________________    Exam Performed By (print name) _______________________________________      Provider Signature ___________________________________________________      These reports are needed for  compliance    Please fax this completed form and a copy of the Diabetic Eye Exam report to our office located at [place] [address] as soon as possible to [phone] attention Trudi: Phone [phone]    We thank you for your assistance in treating our mutual patient     (sent to [facility])

## 2021-08-24 NOTE — TELEPHONE ENCOUNTER
Upon review of the In Basket request we were able to locate, review, and update the patient chart as requested for Diabetic Eye Exam     Any additional questions or concerns should be emailed to the Practice Liaisons via [email or web address] email, please do not reply via In Basket      Thank you  Katie Wright MA

## 2021-08-26 DIAGNOSIS — J45.20 MILD INTERMITTENT ASTHMA WITHOUT COMPLICATION: ICD-10-CM

## 2021-08-26 RX ORDER — MONTELUKAST SODIUM 10 MG/1
TABLET ORAL
Qty: 90 TABLET | Refills: 0 | Status: SHIPPED | OUTPATIENT
Start: 2021-08-26 | End: 2021-11-29

## 2021-08-31 ENCOUNTER — TELEPHONE (OUTPATIENT)
Dept: HEMATOLOGY ONCOLOGY | Facility: CLINIC | Age: 46
End: 2021-08-31

## 2021-08-31 NOTE — TELEPHONE ENCOUNTER
Patient requesting lab orders be fax to [facility] in [facility]  She will be going to get labs done tomorrow   She can be reached at [phone]

## 2021-09-01 ENCOUNTER — OFFICE VISIT (OUTPATIENT)
Dept: FAMILY MEDICINE CLINIC | Facility: CLINIC | Age: 46
End: 2021-09-01
Payer: COMMERCIAL

## 2021-09-01 VITALS
BODY MASS INDEX: 40.31 KG/M2 | HEIGHT: 56 IN | WEIGHT: 179.2 LBS | SYSTOLIC BLOOD PRESSURE: 130 MMHG | RESPIRATION RATE: 16 BRPM | HEART RATE: 92 BPM | DIASTOLIC BLOOD PRESSURE: 60 MMHG

## 2021-09-01 DIAGNOSIS — Z00.00 ANNUAL PHYSICAL EXAM: Primary | ICD-10-CM

## 2021-09-01 DIAGNOSIS — Z11.1 SCREENING-PULMONARY TB: ICD-10-CM

## 2021-09-01 DIAGNOSIS — E10.65 TYPE 1 DIABETES MELLITUS WITH HYPERGLYCEMIA (HCC): ICD-10-CM

## 2021-09-01 DIAGNOSIS — E10.21 DIABETIC NEPHROPATHY ASSOCIATED WITH TYPE 1 DIABETES MELLITUS (HCC): ICD-10-CM

## 2021-09-01 DIAGNOSIS — D75.839 THROMBOCYTOSIS: ICD-10-CM

## 2021-09-01 DIAGNOSIS — F33.1 MODERATE EPISODE OF RECURRENT MAJOR DEPRESSIVE DISORDER (HCC): ICD-10-CM

## 2021-09-01 DIAGNOSIS — E10.42 DIABETIC POLYNEUROPATHY ASSOCIATED WITH TYPE 1 DIABETES MELLITUS (HCC): ICD-10-CM

## 2021-09-01 DIAGNOSIS — J44.9 CHRONIC OBSTRUCTIVE PULMONARY DISEASE, UNSPECIFIED COPD TYPE (HCC): ICD-10-CM

## 2021-09-01 DIAGNOSIS — Z23 NEED FOR VACCINATION: ICD-10-CM

## 2021-09-01 DIAGNOSIS — E78.2 MIXED HYPERLIPIDEMIA: ICD-10-CM

## 2021-09-01 DIAGNOSIS — E66.01 SEVERE OBESITY (BMI 35.0-39.9) WITH COMORBIDITY (HCC): ICD-10-CM

## 2021-09-01 DIAGNOSIS — E10.3213 BOTH EYES AFFECTED BY MILD NONPROLIFERATIVE DIABETIC RETINOPATHY WITH MACULAR EDEMA, ASSOCIATED WITH TYPE 1 DIABETES MELLITUS (HCC): ICD-10-CM

## 2021-09-01 DIAGNOSIS — I50.22 CHRONIC SYSTOLIC CONGESTIVE HEART FAILURE (HCC): ICD-10-CM

## 2021-09-01 PROBLEM — I50.9 ACUTE CHF (CONGESTIVE HEART FAILURE) (HCC): Status: RESOLVED | Noted: 2020-08-06 | Resolved: 2021-09-01

## 2021-09-01 PROCEDURE — 90472 IMMUNIZATION ADMIN EACH ADD: CPT

## 2021-09-01 PROCEDURE — 90714 TD VACC NO PRESV 7 YRS+ IM: CPT

## 2021-09-01 PROCEDURE — 3075F SYST BP GE 130 - 139MM HG: CPT | Performed by: PHYSICIAN ASSISTANT

## 2021-09-01 PROCEDURE — 99396 PREV VISIT EST AGE 40-64: CPT

## 2021-09-01 PROCEDURE — 1036F TOBACCO NON-USER: CPT | Performed by: PHYSICIAN ASSISTANT

## 2021-09-01 PROCEDURE — 99214 OFFICE O/P EST MOD 30 MIN: CPT | Performed by: PHYSICIAN ASSISTANT

## 2021-09-01 PROCEDURE — 3078F DIAST BP <80 MM HG: CPT | Performed by: PHYSICIAN ASSISTANT

## 2021-09-01 PROCEDURE — 90471 IMMUNIZATION ADMIN: CPT

## 2021-09-01 PROCEDURE — 90682 RIV4 VACC RECOMBINANT DNA IM: CPT

## 2021-09-01 NOTE — PATIENT INSTRUCTIONS

## 2021-09-01 NOTE — PROGRESS NOTES
ADULT ANNUAL PHYSICAL  [facility]    NAME: Jud Angulo  AGE: 55 y o  SEX: female  : 1975     DATE: 2021     Assessment and Plan:     55year old female with multiple medical issues    Immunizations and preventive care screenings were discussed with patient today  Appropriate education was printed on patient's after visit summary  Counseling:  Alcohol/drug use: discussed moderation in alcohol intake, the recommendations for healthy alcohol use, and avoidance of illicit drug use  Dental Health: discussed importance of regular tooth brushing, flossing, and dental visits  Injury prevention: discussed safety/seat belts, safety helmets, smoke detectors, carbon dioxide detectors, and smoking near bedding or upholstery  · Sexual health: discussed sexually transmitted diseases, partner selection, use of condoms, avoidance of unintended pregnancy, and contraceptive alternatives  Return in 1 year (on 2022)  Chief Complaint:     Chief Complaint   Patient presents with    Asthma    Depression    Diabetes    Hyperlipidemia    Hypertension      History of Present Illness:     Adult Annual Physical   Patient here for a comprehensive physical exam  The patient reports no problems  Diet and Physical Activity  · Diet/Nutrition: diabetic diet  · Exercise: walking  Depression Screening  PHQ-9 Depression Screening    PHQ-9:   Frequency of the following problems over the past two weeks:           General Health  · Sleep: sleeps well and gets 7-8 hours of sleep on average  · Hearing: normal - bilateral   · Vision: vision problems: diabetic changes, sees [facility] and every 6 months     · Dental: regular dental visits and brushes teeth twice daily  /GYN Health  · Patient is: postmenopausal  · Pap Dr Angela Zamarripa  · Mammo due     Review of Systems:     Review of Systems   Constitutional: Negative  HENT: Negative  Eyes: Negative  Respiratory: Negative  Cardiovascular: Negative  Gastrointestinal: Negative  Endocrine: Negative  Genitourinary: Negative  Musculoskeletal: Negative  Skin: Negative  Allergic/Immunologic: Negative  Neurological: Negative  Hematological: Negative  Psychiatric/Behavioral: Negative  Past Medical History:     Past Medical History:   Diagnosis Date    Anemia     Asthma     w/acute exacerbation  Last assessed: 10/26/12    Chest pain 2/3/2016    CHF (congestive heart failure) ([facility] )     Depression     Diabetes mellitus ([facility] )     Diabetic nephropathy ([facility] )     Diabetic nephropathy associated with type 1 diabetes mellitus ([facility] ) 8/12/2020    Diabetic retinopathy ([facility] ) 2/3/2016    Gastroenteritis 02/03/2016    GERD (gastroesophageal reflux disease)     HTN (hypertension)     Hyperlipidemia 2/3/2016    Oligomenorrhea     Polycystic ovaries 2/3/2016    Retinal hemorrhage 2/3/2016    Retinopathy     Thrombocytosis (HCC)     Type 1 diabetes mellitus with ophthalmic manifestation ([facility] ) 2/3/2016      Past Surgical History:     Past Surgical History:   Procedure Laterality Date    CATARACT EXTRACTION Left 10/2020    CATARACT EXTRACTION Right     RETINAL LASER PROCEDURE        Social History:     Social History     Socioeconomic History    Marital status: /Civil Union     Spouse name: None    Number of children: None    Years of education: None    Highest education level: None   Occupational History    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   Tobacco Use    Smoking status: Never Smoker    Smokeless tobacco: Never Used    Tobacco comment: Per [employer]: Former smoker   Quit in 1994   Vaping Use    Vaping Use: Never used   Substance and Sexual Activity    Alcohol use: Yes     Comment: Occasional/1 mixed drink twice a month if that    Drug use: No    Sexual activity: Yes     Partners: Male     Birth control/protection: Pill   Other Topics Concern    None   Social History Narrative    Always uses seatbelt    Caffeine use: 1-2 cups coffee daily    Has smoke detectors    Sh[employer] Affiliations: Juan     Social Determinants of Health     Financial Resource Strain:     Difficulty of Paying Living Expenses:    Food Insecurity:     Worried About Running Out of Food in the Last Year:     [address] in the Last Year:    Transportation Needs:     Lack of Transportation (Medical):      Lack of Transportation (Non-Medical):    Physical Activity:     Days of Exercise per Week:     Minutes of Exercise per Session:    Stress:     Feeling of Stress :    Social Connections:     Frequency of Communication with Friends and Family:     Frequency of Social Gatherings with Friends and Family:     Attends Shinto Services:     Active Member of Clubs or Organizations:     Attends Club or Organization Meetings:     Marital Status:    Intimate Partner Violence:     Fear of Current or Ex-Partner:     Emotionally Abused:     Physically Abused:     Sexually Abused:       Family History:     Family History   Problem Relation Age of Onset    Heart murmur Mother     Diabetes Mother         Mellitus    Thyroid disease Mother         Disorder    Diabetes Father         Mellitus    Hypertension Father     Diabetes Maternal Grandfather         Mellitus    Breast cancer Paternal Grandmother     Diabetes Maternal Aunt         Mellitus    Diabetes Maternal Uncle         Mellitus    No Known Problems Maternal Grandmother     Leukemia Paternal Grandfather     Substance Abuse Neg Hx     Mental illness Neg Hx     Alcohol abuse Neg Hx       Current Medications:     Current Outpatient Medications   Medication Sig Dispense Refill    acetaminophen (TYLENOL) 500 mg tablet Take 2 tablets (1,000 mg total) by mouth every 6 (six) hours as needed for mild pain or moderate pain 30 tablet 0    acetone, urine, test strip Use to test urine ketones for high blood sugars  25 each 6    albuterol (2 5 mg/3 mL) 0 083 % nebulizer solution Take 1 vial (2 5 mg total) by nebulization every 6 (six) hours as needed for wheezing or shortness of breath 25 vial 4    albuterol (PROVENTIL HFA,VENTOLIN HFA) 90 mcg/act inhaler INHALE 2 PUFFS BY MOUTH EVERY 4 HOURS AS NEEDED FOR WHEEZING OR FOR SHORTNESS OF BREATH 25 5 g 0    Ascorbic Acid (VITAMIN C) 500 MG CAPS Take 2 capsules by mouth daily      Aspirin Low Dose 81 MG EC tablet TAKE 1 TABLET BY MOUTH EVERY DAY 30 tablet 6    atorvastatin (LIPITOR) 80 mg tablet Take 1 tablet (80 mg total) by mouth daily 30 tablet 5    Biotin 10 MG TABS Take 1 tablet by mouth daily      Blood Pressure Monitoring [place] Use 2 (two) times a day Monitor BP twice daily  Call office if BP > 140/90 persistently  1 Device 0    Butalbital-APAP-Caffeine (Fioricet) -40 MG CAPS Take 1 tablet by mouth 4 (four) times a day as needed (headahce) 30 capsule 0    Continuous Blood Gluc Sensor (Dexcom G6 Sensor) MISC Change every 10 days 3 each 12    Continuous Blood Gluc Transmit (Dexcom G6 Transmitter) MISC Change every 90 days 1 each 3    cyanocobalamin (VITAMIN B-12) 100 mcg tablet Take by mouth daily      fluticasone-salmeterol (Advair Diskus) 250-50 mcg/dose inhaler Inhale 1 puff 2 (two) times a day Rinse mouth after use   3 Inhaler 3    glucagon (Glucagon Emergency) 1 MG injection Inject 1 mg under the skin once as needed for low blood sugar for up to 1 dose 1 kit 3    Glucagon (Gvoke HypoPen 2-Pack) 1 MG/0 2ML SOAJ Use if hypoglycemia prn 1 Syringe 6    insulin aspart (NovoLOG) 100 units/mL injection INJECT 1 UNITS OF NOVOLOG FOR EVERY 1 GRAM OF CARB, UP  UNITS DAILY 50 mL 6    insulin glargine (Lantus SoloStar) 100 units/mL injection pen Inject 78 Units under the skin daily at bedtime 10 pen 3    lisinopril (ZESTRIL) 20 mg tablet TAKE 2 TABLETS BY MOUTH EVERY DAY 60 tablet 3    montelukast (SINGULAIR) 10 mg tablet TAKE 1 TABLET BY MOUTH AT BEDTIME 90 tablet 0    omeprazole (PriLOSEC) 20 mg delayed release capsule Take 20 mg by mouth daily   OneTouch Ultra test strip Test 3 times daily 300 each 3    PARoxetine (PAXIL) 20 mg tablet TAKE 1 TABLET BY MOUTH EVERY DAY 90 tablet 0    Probiotic Product (ALIGN PO) Take by mouth      furosemide (LASIX) 40 mg tablet Take 1 tablet (40 mg total) by mouth daily Take 1/2 tablet daily (Patient taking differently: Take 40 mg by mouth daily ) 30 tablet 3    Na Sulfate-K Sulfate-Mg Sulf (Suprep Bowel Prep Kit) 17 5-3 13-1 6 GM/177ML SOLN Take 177 mL by mouth once for 1 dose 2 Bottle 0     No current facility-administered medications for this visit  Allergies:     No Known Allergies   Physical Exam:     /60 (BP Location: Left arm, Patient Position: Sitting, Cuff Size: Standard)   Pulse 92   Resp 16   Ht 4' 7 75" (1 416 m)   Wt 81 3 kg (179 lb 3 2 oz)   Breastfeeding No   BMI 40 54 kg/m²     Physical Exam  Constitutional:       Appearance: Normal appearance  She is well-developed and normal weight  HENT:      Head: Normocephalic and atraumatic  Right Ear: Hearing, tympanic membrane, ear canal and external ear normal       Left Ear: Hearing, tympanic membrane, ear canal and external ear normal       Nose: Nose normal       Mouth/Throat:      Mouth: Mucous membranes are moist       Pharynx: Oropharynx is clear  Uvula midline  Eyes:      Extraocular Movements: Extraocular movements intact  Conjunctiva/sclera: Conjunctivae normal       Pupils: Pupils are equal, round, and reactive to light  Neck:      Thyroid: No thyromegaly  Cardiovascular:      Rate and Rhythm: Normal rate and regular rhythm  Pulses: Normal pulses  Heart sounds: Normal heart sounds  No murmur heard  Pulmonary:      Effort: Pulmonary effort is normal       Breath sounds: Normal breath sounds  Abdominal:      General: Bowel sounds are normal  There is no distension        Palpations: Abdomen is soft  There is no mass  Tenderness: There is no abdominal tenderness  Musculoskeletal:         General: Normal range of motion  Cervical back: Normal range of motion and neck supple  Right lower leg: No edema  Left lower leg: No edema  Lymphadenopathy:      Cervical: No cervical adenopathy  Skin:     General: Skin is warm  Neurological:      General: No focal deficit present  Mental Status: She is alert and oriented to person, place, and time  Cranial Nerves: No cranial nerve deficit  Deep Tendon Reflexes: Reflexes normal    Psychiatric:         Mood and Affect: Mood normal          Behavior: Behavior normal          Thought Content:  Thought content normal          Judgment: Judgment normal           MCKAY [place]

## 2021-09-02 NOTE — PROGRESS NOTES
Assessment/Plan:    1  Type 1 diabetes mellitus with nephropathy (HCC)     - c/w Dr Ascencio/MCKAY, A1C down to 9 6, has made small diet adjustments since August visit and has noted large improvements in sugars, more compliant with night lantus thanks to , will follow up in 3 months     2  Diabetic polyneuropathy associated with type 1 diabetes mellitus (HCC)     - c/w Dr Brandon, A1C down to 9 6, has made small diet adjustments since August visit and has noted large improvements in sugars, more compliant with night lantus thanks to      3  Diabetic nephropathy associated with type 1 diabetes mellitus ([facility])     - c/w Dr Brandon, A1C down to 9 6, has made small diet adjustments since August visit and has noted large improvements in sugars, more compliant with night lantus thanks to   - will see Dr Grant Pete in Oct 2021     4  Both eyes affected 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, associated with type 1 diabetes mellitus (HCC)     - c/w opthal     5  Acute congestive heart failure, unspecified heart failure type ([facility] [place] [facility] )     - has appointment with Dr Carlo Singh, continue with lisinopril, lasix, aspirin  - advised to continue to monitor weights if greater than 5 lb change to call  - discussed cutting back on salt to less than 2 gr, discussed food options and alternatives  - resume healthy walking to lose weight  - continue to monitor weight     6  Severe obesity (BMI 35 0-39  9) with comorbidity ([facility] [place] [facility] )     - stressed the importance of losing weight and taking the extra stress off her heart     7  Moderate episode of recurrent major depressive disorder (HCC)     - on paxil, continue as is     8  Mixed hyperlipidemia     - lipitor 80 mg now, continue with diet, cardiology     9  Anemia     - normal colon/EGD, will continue with heme onc, Dr Benito Figueroa     10    thrombocytonia     - under care heme has appt in September, Dr Benito Figueroa     F/u 6 months or sooner if needed           Subjective:   Chief Complaint   Patient presents with    Asthma    Depression    Diabetes    Hyperlipidemia    Hypertension      Patient ID: Mauricio Peralta is a 55 y o  female  Patient here for follow up  Has been busy this summer, new job working 50-55 hours, more physically active which is good  Has been better with medications,  helping iwht breakfast, snacks and night lantus which has been helping sugars  Fasting <120 now  Saw Toyin POLK who was very helping last visit in August  A1C 9 6% but with adjustments thinks it will be better in 3 months  Saw opthal today, stable  Has appt with heme this month, nephro [facility] next month, cardio [place] 11/2021  No complaints  Weights stable  Limiting salt  The following portions of the patient's history were reviewed and updated as appropriate: PMH, PSH, PSH, PFH    Past Medical History:   Diagnosis Date    Anemia     Asthma     w/acute exacerbation   Last assessed: 10/26/12    Chest pain 2/3/2016    CHF (congestive heart failure) ([address]     Depression     Diabetes mellitus ([address]     Diabetic nephropathy ([address]     Diabetic nephropathy associated with type 1 diabetes mellitus ([address] ) 8/12/2020    Diabetic retinopathy ([address] ) 2/3/2016    Gastroenteritis 02/03/2016    GERD (gastroesophageal reflux disease)     HTN (hypertension)     Hyperlipidemia 2/3/2016    Oligomenorrhea     Polycystic ovaries 2/3/2016    Retinal hemorrhage 2/3/2016    Retinopathy     Thrombocytosis (HCC)     Type 1 diabetes mellitus with ophthalmic manifestation ([address] ) 2/3/2016     Past Surgical History:   Procedure Laterality Date    CATARACT EXTRACTION Left 10/2020    CATARACT EXTRACTION Right     RETINAL LASER PROCEDURE       Family History   Problem Relation Age of Onset    Heart murmur Mother     Diabetes Mother         Mellitus    Thyroid disease Mother         Disorder    Diabetes Father         Mellitus    Hypertension Father  Diabetes Maternal Grandfather         Mellitus    Breast cancer Paternal Grandmother     Diabetes Maternal Aunt         Mellitus    Diabetes Maternal Uncle         Mellitus    No Known Problems Maternal Grandmother     Leukemia Paternal Grandfather     Substance Abuse Neg Hx     Mental illness Neg Hx     Alcohol abuse Neg Hx      Social History     Socioeconomic History    Marital status: /Civil Union     Spouse name: Not on file    Number of children: Not on file    Years of education: Not on file    Highest education level: Not on file   Occupational History    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   Tobacco Use    Smoking status: Never Smoker    Smokeless tobacco: Never Used    Tobacco comment: Per [employer]: Former smoker  Quit in 1994   Vaping Use    Vaping Use: Never used   Substance and Sexual Activity    Alcohol use: Yes     Comment: Occasional/1 mixed drink twice a month if that    Drug use: No    Sexual activity: Yes     Partners: Male     Birth control/protection: Pill   Other Topics Concern    Not on file   Social History Narrative    Always uses seatbelt    Caffeine use: 1-2 cups coffee daily    Has smoke detectors    [employer] Affiliations: [employer]     Social Determinants of Health     Financial Resource Strain:     Difficulty of Paying Living Expenses:    Food Insecurity:     Worried About Running Out of Food in the Last Year:     [address] in the Last Year:    Transportation Needs:     Lack of Transportation (Medical):      Lack of Transportation (Non-Medical):    Physical Activity:     Days of Exercise per Week:     Minutes of Exercise per Session:    Stress:     Feeling of Stress :    Social Connections:     Frequency of Communication with Friends and Family:     Frequency of Social Gatherings with Friends and Family:     Attends [employer] Services:     Active Member of Clubs or Organizations:     Attends Club or Organization Meetings:     Marital Status:    Intimate Partner Violence:     Fear of Current or Ex-Partner:     Emotionally Abused:     Physically Abused:     Sexually Abused:        Current Outpatient Medications:     acetaminophen (TYLENOL) 500 mg tablet, Take 2 tablets (1,000 mg total) by mouth every 6 (six) hours as needed for mild pain or moderate pain, Disp: 30 tablet, Rfl: 0    acetone, urine, test strip, Use to test urine ketones for high blood sugars  , Disp: 25 each, Rfl: 6    albuterol (2 5 mg/3 mL) 0 083 % nebulizer solution, Take 1 vial (2 5 mg total) by nebulization every 6 (six) hours as needed for wheezing or shortness of breath, Disp: 25 vial, Rfl: 4    albuterol (PROVENTIL HFA,VENTOLIN HFA) 90 mcg/act inhaler, INHALE 2 PUFFS BY MOUTH EVERY 4 HOURS AS NEEDED FOR WHEEZING OR FOR SHORTNESS OF BREATH, Disp: 25 5 g, Rfl: 0    Ascorbic Acid (VITAMIN C) 500 MG CAPS, Take 2 capsules by mouth daily, Disp: , Rfl:     Aspirin Low Dose 81 MG EC tablet, TAKE 1 TABLET BY MOUTH EVERY DAY, Disp: 30 tablet, Rfl: 6    atorvastatin (LIPITOR) 80 mg tablet, Take 1 tablet (80 mg total) by mouth daily, Disp: 30 tablet, Rfl: 5    Biotin 10 MG TABS, Take 1 tablet by mouth daily, Disp: , Rfl:     Blood Pressure Monitoring [place], Use 2 (two) times a day Monitor BP twice daily  Call office if BP > 140/90 persistently  , Disp: 1 Device, Rfl: 0    Butalbital-APAP-Caffeine (Fioricet) -40 MG CAPS, Take 1 tablet by mouth 4 (four) times a day as needed ([facility]), Disp: 30 capsule, Rfl: 0    Continuous Blood Gluc Sensor (Dexcom G6 Sensor) MISC, Change every 10 days, Disp: 3 each, Rfl: 12    Continuous Blood Gluc Transmit (Dexcom G6 Transmitter) MISC, Change every 90 days, Disp: 1 each, Rfl: 3    cyanocobalamin (VITAMIN B-12) 100 mcg tablet, Take by mouth daily, Disp: , Rfl:     fluticasone-salmeterol (Advair Diskus) 250-50 mcg/dose inhaler, Inhale 1 puff 2 (two) times a day Rinse mouth after use , Disp: 3 Inhaler, Rfl: 3    glucagon (Glucagon Emergency) 1 MG injection, Inject 1 mg under the skin once as needed for low blood sugar for up to 1 dose, Disp: 1 kit, Rfl: 3    Glucagon (Gvoke HypoPen 2-Pack) 1 MG/0 2ML SOAJ, Use if hypoglycemia prn, Disp: 1 Syringe, Rfl: 6    insulin aspart (NovoLOG) 100 units/mL injection, INJECT 1 UNITS OF NOVOLOG FOR EVERY 1 GRAM OF CARB, UP  UNITS DAILY, Disp: 50 mL, Rfl: 6    insulin glargine (Lantus SoloStar) 100 units/mL injection pen, Inject 78 Units under the skin daily at bedtime, Disp: 10 pen, Rfl: 3    lisinopril (ZESTRIL) 20 mg tablet, TAKE 2 TABLETS BY MOUTH EVERY DAY, Disp: 60 tablet, Rfl: 3    montelukast (SINGULAIR) 10 mg tablet, TAKE 1 TABLET BY MOUTH AT BEDTIME, Disp: 90 tablet, Rfl: 0    omeprazole (PriLOSEC) 20 mg delayed release capsule, Take 20 mg by mouth daily  , Disp: , Rfl:     OneTouch Ultra test strip, Test 3 times daily, Disp: 300 each, Rfl: 3    PARoxetine (PAXIL) 20 mg tablet, TAKE 1 TABLET BY MOUTH EVERY DAY, Disp: 90 tablet, Rfl: 0    Probiotic Product (ALIGN PO), Take by mouth, Disp: , Rfl:     furosemide (LASIX) 40 mg tablet, Take 1 tablet (40 mg total) by mouth daily Take 1/2 tablet daily (Patient taking differently: Take 40 mg by mouth daily ), Disp: 30 tablet, Rfl: 3    Na Sulfate-K Sulfate-Mg Sulf (Suprep Bowel Prep Kit) 17 5-3 13-1 6 GM/177ML SOLN, Take 177 mL by mouth once for 1 dose, Disp: 2 Bottle, Rfl: 0    Review of Systems   Constitutional: Negative  HENT: Negative  Eyes: Negative  Respiratory: Negative  Cardiovascular: Negative  Gastrointestinal: Negative  Endocrine: Negative  Genitourinary: Negative  Musculoskeletal: Negative  Skin: Negative  Allergic/Immunologic: Negative  Neurological: Negative  Hematological: Negative  Psychiatric/Behavioral: Negative                Objective:    Vitals:    09/01/21 1250   BP: 130/60   BP Location: Left arm   Patient Position: Sitting   Cuff Size: Standard   Pulse: 92   Resp: 16   Weight: 81 3 kg (179 lb 3 2 oz)   Height: 4' 7 75" (1 416 m)        Physical Exam  Constitutional:       Appearance: Normal appearance  She is well-developed  HENT:      Head: Normocephalic and atraumatic  Cardiovascular:      Rate and Rhythm: Normal rate and regular rhythm  Pulses: Normal pulses  no weak pulses          Dorsalis pedis pulses are 2+ on the right side and 2+ on the left side  Posterior tibial pulses are 2+ on the right side and 2+ on the left side  Heart sounds: Normal heart sounds  Pulmonary:      Effort: Pulmonary effort is normal       Breath sounds: Normal breath sounds  Feet:      Right foot:      Skin integrity: No ulcer, skin breakdown, erythema, warmth, callus or dry skin  Left foot:      Skin integrity: No ulcer, skin breakdown, erythema, warmth, callus or dry skin  Skin:     General: Skin is warm  Neurological:      General: No focal deficit present  Mental Status: She is alert and oriented to person, place, and time  Psychiatric:         Mood and Affect: Mood normal          Behavior: Behavior normal          Thought Content: Thought content normal          Judgment: Judgment normal        Patient's shoes and socks removed  Right Foot/Ankle   Right Foot Inspection  Skin Exam: skin normal and skin intact no dry skin, no warmth, no callus, no erythema, no maceration, no abnormal color, no pre-ulcer, no ulcer and no callus                          Toe Exam: ROM and strength within normal limits  Sensory   Vibration: intact  Proprioception: intact   Monofilament testing: intact  Vascular  Capillary refills: < 3 seconds  The right DP pulse is 2+  The right PT pulse is 2+       Left Foot/Ankle  Left Foot Inspection  Skin Exam: skin normal and skin intactno dry skin, no warmth, no erythema, no maceration, normal color, no pre-ulcer, no ulcer and no callus                         Toe Exam: ROM and strength within normal limits                   Sensory   Vibration: intact  Proprioception: intact  Monofilament: intact  Vascular  Capillary refills: < 3 seconds  The left DP pulse is 2+  The left PT pulse is 2+  Assign Risk Category:  No deformity present; No loss of protective sensation;  No weak pulses       Risk: 0

## 2021-09-03 ENCOUNTER — TELEMEDICINE (OUTPATIENT)
Dept: FAMILY MEDICINE CLINIC | Facility: CLINIC | Age: 46
End: 2021-09-03
Payer: COMMERCIAL

## 2021-09-03 VITALS — BODY MASS INDEX: 38.83 KG/M2 | WEIGHT: 172.6 LBS | HEIGHT: 56 IN | TEMPERATURE: 99 F

## 2021-09-03 DIAGNOSIS — Z20.822 ENCOUNTER BY TELEHEALTH FOR SUSPECTED COVID-19: Primary | ICD-10-CM

## 2021-09-03 DIAGNOSIS — J45.31 MILD PERSISTENT ASTHMA WITH ACUTE EXACERBATION: ICD-10-CM

## 2021-09-03 DIAGNOSIS — J06.9 UPPER RESPIRATORY TRACT INFECTION, UNSPECIFIED TYPE: ICD-10-CM

## 2021-09-03 PROCEDURE — 99213 OFFICE O/P EST LOW 20 MIN: CPT | Performed by: FAMILY MEDICINE

## 2021-09-03 PROCEDURE — U0005 INFEC AGEN DETEC AMPLI PROBE: HCPCS | Performed by: FAMILY MEDICINE

## 2021-09-03 PROCEDURE — U0003 INFECTIOUS AGENT DETECTION BY NUCLEIC ACID (DNA OR RNA); SEVERE ACUTE RESPIRATORY SYNDROME CORONAVIRUS 2 (SARS-COV-2) (CORONAVIRUS DISEASE [COVID-19]), AMPLIFIED PROBE TECHNIQUE, MAKING USE OF HIGH THROUGHPUT TECHNOLOGIES AS DESCRIBED BY CMS-2020-01-R: HCPCS | Performed by: FAMILY MEDICINE

## 2021-09-03 PROCEDURE — 3008F BODY MASS INDEX DOCD: CPT | Performed by: PHYSICIAN ASSISTANT

## 2021-09-03 RX ORDER — FLUTICASONE PROPIONATE 50 MCG
2 SPRAY, SUSPENSION (ML) NASAL DAILY
Qty: 6 G | Refills: 0 | Status: SHIPPED | OUTPATIENT
Start: 2021-09-03 | End: 2021-10-27

## 2021-09-03 RX ORDER — PREDNISONE 10 MG/1
TABLET ORAL
Qty: 20 TABLET | Refills: 0 | Status: SHIPPED | OUTPATIENT
Start: 2021-09-03 | End: 2021-10-27

## 2021-09-03 RX ORDER — ALBUTEROL SULFATE 2.5 MG/3ML
2.5 SOLUTION RESPIRATORY (INHALATION) EVERY 6 HOURS PRN
Qty: 75 ML | Refills: 3 | Status: SHIPPED | OUTPATIENT
Start: 2021-09-03

## 2021-09-03 NOTE — PROGRESS NOTES
COVID-19 Outpatient Progress Note    Assessment/Plan:    Problem List Items Addressed This Visit        Respiratory    Asthma    Relevant Medications    albuterol (2 5 mg/3 mL) 0 083 % nebulizer solution    predniSONE 10 mg tablet      Other Visit Diagnoses     Encounter by telehealth for suspected COVID-19    -  Primary    Relevant Orders    Novel Coronavirus (Covid-19),PCR [facility] - Collected in Office    Upper respiratory tract infection, unspecified type        Relevant Medications    fluticasone (FLONASE) 50 mcg/act nasal spray    Other Relevant Orders    Novel Coronavirus (Covid-19),PCR [facility] - Collected in Office         Disposition:     I recommended self-quarantine for 10 days and to watch for symptoms until 14 days after exposure  If patient were to develop symptoms, they should self isolate and call our office for further guidance  I recommended the patient to come to our office to perform PCR testing for COVID-19  Advised patient to self isolate until results come back, discussed rest, plenty of fluids, Tylenol as needed, Mucinex as needed for cough/ congestion, continue Albuterol nebulizer every 4-6 hours as needed for chest tightness, start Flonase and prednisone taper  Advised to call the office for worsening symptoms or go to ER  Patient understands and agrees with the treatment plan  I have spent 15 minutes directly with the patient          Verification of patient location:    Patient is located in the following state in which I hold an active license [place]    Encounter provider Kevin Figueroa MD    Provider located at [address] [place] [address] [id number] [phone]    Recent Visits  Date Type Provider Dept   09/01/21 Office Visit Penelope Balderrama PA-C [address] recent visits within past 7 days and meeting all other requirements  Today's Visits  Date Type Provider Dept   09/03/21 Telemedicine Cricket Rajan MD [address] today's visits and meeting all other requirements  Future Appointments  No visits were found meeting these conditions  Showing future appointments within next 150 days and meeting all other requirements     This virtual check-in was done via [employer] and patient was informed that this is a secure, HIPAA-compliant platform  She agrees to proceed  Patient agrees to participate in a virtual check in via telephone or video visit instead of presenting to the office to address urgent/immediate medical needs  Patient is aware this is a billable service  After connecting through [facility], the patient was identified by name and date of birth  Meghan Oakes was informed that this was a telemedicine visit and that the exam was being conducted confidentially over secure lines  My office door was closed  No one else was in the room  Meghan Oakes acknowledged consent and understanding of privacy and security of the telemedicine visit  I informed the patient that I have reviewed her record in Epic and presented the opportunity for her to ask any questions regarding the visit today  The patient agreed to participate  Subjective:   Meghan Oakes is a 55 y o  female who is concerned about COVID-19  Patient's symptoms include fatigue, nasal congestion, rhinorrhea, sore throat, cough, chest tightness and headache  Patient denies fever, chills, anosmia, loss of taste, abdominal pain, nausea, vomiting and diarrhea       Date of symptom onset: 8/31/2021  COVID-19 vaccination status: Fully vaccinated    Exposure:   Contact with a person who is under investigation (PUI) for or who is positive for COVID-19 within the last 14 days?: No    Hospitalized recently for fever and/or lower respiratory symptoms?: No      Patient reports onset of symptoms 3 days ago, she is now having chest congestion, wheezing and chest tightness and using her Albuterol neb treatments twice a day, she si also taking Mcuinex and using saline nasal spray  Lab Results   Component Value Date    SARSCOV2 Positive (A) 02/24/2021    SARSCOV2 Not Detected 05/28/2020     Past Medical History:   Diagnosis Date    Anemia     Asthma     w/acute exacerbation  Last assessed: 10/26/12    Chest pain 2/3/2016    [facility]congest[facility] failure) ([facility] )     Depression     Diabetes mellitus ([facility] )     Diabetic nephropathy ([facility] )     Diabetic nephropathy associated with type 1 diabetes mellitus ([facility] ) 8/12/2020    Diabetic retinopathy ([facility] ) 2/3/2016    Gastroenteritis 02/03/2016    GERD (gastroesophageal reflux disease)     HTN (hypertension)     Hyperlipidemia 2/3/2016    Oligomenorrhea     Polycystic ovaries 2/3/2016    Retinal hemorrhage 2/3/2016    Retinopathy     Thrombocytosis (HCC)     Type 1 diabetes mellitus with ophthalmic manifestation ([facility] ) 2/3/2016     Past Surgical History:   Procedure Laterality Date    CATARACT EXTRACTION Left 10/2020    CATARACT EXTRACTION Right     RETINAL LASER PROCEDURE       Current Outpatient Medications   Medication Sig Dispense Refill    acetaminophen (TYLENOL) 500 mg tablet Take 2 tablets (1,000 mg total) by mouth every 6 (six) hours as needed for mild pain or moderate pain 30 tablet 0    acetone, urine, test strip Use to test urine ketones for high blood sugars   25 each 6    albuterol (2 5 mg/3 mL) 0 083 % nebulizer solution Take 3 mL (2 5 mg total) by nebulization every 6 (six) hours as needed for wheezing or shortness of breath 75 mL 3    albuterol (PROVENTIL HFA,VENTOLIN HFA) 90 mcg/act inhaler INHALE 2 PUFFS BY MOUTH EVERY 4 HOURS AS NEEDED FOR WHEEZING OR FOR SHORTNESS OF BREATH 25 5 g 0    Ascorbic Acid (VITAMIN C) 500 MG CAPS Take 2 capsules by mouth daily      Aspirin Low Dose 81 MG EC tablet TAKE 1 TABLET BY MOUTH EVERY DAY 30 tablet 6    atorvastatin (LIPITOR) 80 mg tablet Take 1 tablet (80 mg total) by mouth daily 30 tablet 5    Biotin 10 MG TABS Take 1 tablet by mouth daily      Blood Pressure Monitoring [place] Use 2 (two) times a day Monitor BP twice daily  Call office if BP > 140/90 persistently  1 Device 0    Butalbital-APAP-Caffeine (Fioricet) -40 MG CAPS Take 1 tablet by mouth 4 (four) times a day as needed (headahce) 30 capsule 0    Continuous Blood Gluc Sensor (Dexcom G6 Sensor) MISC Change every 10 days 3 each 12    Continuous Blood Gluc Transmit (Dexcom G6 Transmitter) MISC Change every 90 days 1 each 3    cyanocobalamin (VITAMIN B-12) 100 mcg tablet Take by mouth daily      fluticasone-salmeterol (Advair Diskus) 250-50 mcg/dose inhaler Inhale 1 puff 2 (two) times a day Rinse mouth after use  3 Inhaler 3    glucagon (Glucagon Emergency) 1 MG injection Inject 1 mg under the skin once as needed for low blood sugar for up to 1 dose 1 kit 3    Glucagon (Gvoke HypoPen 2-Pack) 1 MG/0 2ML SOAJ Use if hypoglycemia prn 1 Syringe 6    insulin aspart (NovoLOG) 100 units/mL injection INJECT 1 UNITS OF NOVOLOG FOR EVERY 1 GRAM OF CARB, UP  UNITS DAILY 50 mL 6    insulin glargine (Lantus SoloStar) 100 units/mL injection pen Inject 78 Units under the skin daily at bedtime 10 pen 3    lisinopril (ZESTRIL) 20 mg tablet TAKE 2 TABLETS BY MOUTH EVERY DAY 60 tablet 3    montelukast (SINGULAIR) 10 mg tablet TAKE 1 TABLET BY MOUTH AT BEDTIME 90 tablet 0    omeprazole (PriLOSEC) 20 mg delayed release capsule Take 20 mg by mouth daily        OneTouch Ultra test strip Test 3 times daily 300 each 3    PARoxetine (PAXIL) 20 mg tablet TAKE 1 TABLET BY MOUTH EVERY DAY 90 tablet 0    Probiotic Product (ALIGN PO) Take by mouth      fluticasone (FLONASE) 50 mcg/act nasal spray 2 sprays into each nostril daily 6 g 0    furosemide (LASIX) 40 mg tablet Take 1 tablet (40 mg total) by mouth daily Take 1/2 tablet daily (Patient taking differently: Take 40 mg by mouth daily ) 30 tablet 3    Na Sulfate-K Sulfate-Mg Sulf (Suprep Bowel Prep Kit) 17 5-3 13-1 6 GM/177ML SOLN Take 177 mL by mouth once for 1 dose 2 Bottle 0    predniSONE 10 mg tablet Take 4 tablets daily with food for 2 days, 3 tablets daily for 2 days, 2 tablets daily for 2 days, 1 tablet daily for 2 days 20 tablet 0     No current facility-administered medications for this visit  No Known Allergies    Review of Systems   Constitutional: Positive for fatigue  Negative for chills and fever  HENT: Positive for congestion, rhinorrhea, sinus pressure and sore throat  Negative for ear pain  Respiratory: Positive for cough, chest tightness and wheezing  Gastrointestinal: Negative for abdominal pain, diarrhea, nausea and vomiting  Neurological: Positive for headaches  Objective:    Vitals:    09/03/21 1012   Temp: 99 °F (37 2 °C)   TempSrc: Oral   Weight: 78 3 kg (172 lb 9 6 oz)   Height: 4' 7 75" (1 416 m)       Physical Exam  Constitutional:       General: She is not in acute distress  Pulmonary:      Effort: Pulmonary effort is normal       Comments: No signs of respiratory distress, tachypnea, conversational dyspnea or audible wheezing noted  Neurological:      Mental Status: She is alert and oriented to person, place, and time  Psychiatric:         Mood and Affect: Mood normal          Behavior: Behavior normal          Thought Content: Thought content normal          VIRTUAL VISIT DISCLAIMER    Domenick Litten verbally agrees to participate in [employer]  Pt is aware that [employer] could be limited without vital signs or the ability to perform a full hands-on physical exam  Vernell Saravia understands she or the provider may request at any time to terminate the video visit and request the patient to seek care or treatment in person

## 2021-09-04 LAB
ALBUMIN SERPL-MCNC: 4 G/DL (ref 3.6–5.1)
ALBUMIN/GLOB SERPL: 1.8 (CALC) (ref 1–2.5)
ALP SERPL-CCNC: 103 U/L (ref 31–125)
ALT SERPL-CCNC: 25 U/L (ref 6–29)
AST SERPL-CCNC: 19 U/L (ref 10–35)
BASOPHILS # BLD AUTO: 38 CELLS/UL (ref 0–200)
BASOPHILS NFR BLD AUTO: 0.5 %
BILIRUB SERPL-MCNC: 0.3 MG/DL (ref 0.2–1.2)
BUN SERPL-MCNC: 27 MG/DL (ref 7–25)
BUN/CREAT SERPL: 40 (CALC) (ref 6–22)
CALCIUM SERPL-MCNC: 8.6 MG/DL (ref 8.6–10.2)
CHLORIDE SERPL-SCNC: 99 MMOL/L (ref 98–110)
CO2 SERPL-SCNC: 29 MMOL/L (ref 20–32)
CREAT SERPL-MCNC: 0.68 MG/DL (ref 0.5–1.1)
EOSINOPHIL # BLD AUTO: 372 CELLS/UL (ref 15–500)
EOSINOPHIL NFR BLD AUTO: 4.9 %
ERYTHROCYTE [DISTWIDTH] IN BLOOD BY AUTOMATED COUNT: 12.5 % (ref 11–15)
FERRITIN SERPL-MCNC: 371 NG/ML (ref 16–232)
GLOBULIN SER CALC-MCNC: 2.2 G/DL (CALC) (ref 1.9–3.7)
GLUCOSE SERPL-MCNC: 269 MG/DL (ref 65–99)
HCT VFR BLD AUTO: 32 % (ref 35–45)
HGB BLD-MCNC: 10.6 G/DL (ref 11.7–15.5)
IRON SATN MFR SERPL: 19 % (CALC) (ref 16–45)
IRON SERPL-MCNC: 49 MCG/DL (ref 40–190)
LYMPHOCYTES # BLD AUTO: 980 CELLS/UL (ref 850–3900)
LYMPHOCYTES NFR BLD AUTO: 12.9 %
M TB IFN-G CD4+ BCKGRND COR BLD-ACNC: <0 IU/ML
M TB IFN-G CD4+ BCKGRND COR BLD-ACNC: <0 IU/ML
M TB IFN-G CD4+ T-CELLS BLD-ACNC: 0.48 IU/ML
M TB TUBERC IFN-G BLD QL: NEGATIVE
M TB TUBERC IGNF/MITOGEN IGNF CONTROL: 3.12 IU/ML
MCH RBC QN AUTO: 30.5 PG (ref 27–33)
MCHC RBC AUTO-ENTMCNC: 33.1 G/DL (ref 32–36)
MCV RBC AUTO: 92.2 FL (ref 80–100)
METHYLMALONATE SERPL-SCNC: 125 NMOL/L (ref 87–318)
MONOCYTES # BLD AUTO: 448 CELLS/UL (ref 200–950)
MONOCYTES NFR BLD AUTO: 5.9 %
NEUTROPHILS # BLD AUTO: 5761 CELLS/UL (ref 1500–7800)
NEUTROPHILS NFR BLD AUTO: 75.8 %
PLATELET # BLD AUTO: 459 THOUSAND/UL (ref 140–400)
PMV BLD REES-ECKER: 10.6 FL (ref 7.5–12.5)
POTASSIUM SERPL-SCNC: 4.5 MMOL/L (ref 3.5–5.3)
PROT SERPL-MCNC: 6.2 G/DL (ref 6.1–8.1)
RBC # BLD AUTO: 3.47 MILLION/UL (ref 3.8–5.1)
SARS-COV-2 RNA RESP QL NAA+PROBE: NEGATIVE
SL AMB EGFR AFRICAN AMERICAN: 122 ML/MIN/1.73M2
SL AMB EGFR NON AFRICAN AMERICAN: 105 ML/MIN/1.73M2
SODIUM SERPL-SCNC: 138 MMOL/L (ref 135–146)
TIBC SERPL-MCNC: 257 MCG/DL (CALC) (ref 250–450)
WBC # BLD AUTO: 7.6 THOUSAND/UL (ref 3.8–10.8)

## 2021-09-07 ENCOUNTER — TELEPHONE (OUTPATIENT)
Dept: FAMILY MEDICINE CLINIC | Facility: CLINIC | Age: 46
End: 2021-09-07

## 2021-09-07 ENCOUNTER — OFFICE VISIT (OUTPATIENT)
Dept: URGENT CARE | Facility: CLINIC | Age: 46
End: 2021-09-07
Payer: COMMERCIAL

## 2021-09-07 VITALS
HEART RATE: 90 BPM | HEIGHT: 56 IN | OXYGEN SATURATION: 97 % | BODY MASS INDEX: 38.69 KG/M2 | RESPIRATION RATE: 20 BRPM | TEMPERATURE: 99.1 F | WEIGHT: 172 LBS

## 2021-09-07 DIAGNOSIS — J45.31 MILD PERSISTENT ASTHMATIC BRONCHITIS WITH ACUTE EXACERBATION: Primary | ICD-10-CM

## 2021-09-07 PROCEDURE — 99213 OFFICE O/P EST LOW 20 MIN: CPT | Performed by: PHYSICIAN ASSISTANT

## 2021-09-07 RX ORDER — BENZONATATE 200 MG/1
200 CAPSULE ORAL 3 TIMES DAILY PRN
Qty: 20 CAPSULE | Refills: 0 | Status: SHIPPED | OUTPATIENT
Start: 2021-09-07 | End: 2022-08-04 | Stop reason: CLARIF

## 2021-09-07 RX ORDER — AZITHROMYCIN 250 MG/1
TABLET, FILM COATED ORAL
Qty: 6 TABLET | Refills: 0 | Status: SHIPPED | OUTPATIENT
Start: 2021-09-07 | End: 2021-09-13 | Stop reason: ALTCHOICE

## 2021-09-07 NOTE — LETTER
September 7, 2021     Patient: Renu Bingham   YOB: 1975   Date of Visit: 9/7/2021       To Whom it May Concern:    Jennifer Garcia was seen in my clinic on 9/7/2021  She may return to work on 9/10/2021  If you have any questions or concerns, please don't hesitate to call           Sincerely,          Jacquie Saavedra PA-C        CC: No Recipients

## 2021-09-07 NOTE — TELEPHONE ENCOUNTER
----- Message from Pramod Steward MD sent at 9/6/2021  8:06 PM EDT -----  Please let patient know that her COVID-19 swab was negative  Patient was aware

## 2021-09-07 NOTE — TELEPHONE ENCOUNTER
Pt had a virtual visit on 9/3, covid test has come back negative but she is still sick  Would like to know what to do next to treat her symptoms? She also needs a work note, OK to write?

## 2021-09-07 NOTE — TELEPHONE ENCOUNTER
Pt did go to urgent care today and was diagnosed w/ bronchitis and given meds  However, she still needs a work note to cover Friday, Sat and Monday  She had a virtual with Dr Aysha Wong on 9/3, is it OK to write note and fax to her work?

## 2021-09-07 NOTE — PATIENT INSTRUCTIONS
Acute Bronchitis   WHAT YOU NEED TO KNOW:   Acute bronchitis is swelling and irritation in your lungs  It is usually caused by a virus and most often happens in the winter  Bronchitis may also be caused by bacteria or by a chemical irritant, such as smoke  DISCHARGE INSTRUCTIONS:   Return to the emergency department if:   · You cough up blood  · Your lips or fingernails turn blue  · You feel like you are not getting enough air when you breathe  Call your doctor if:   · Your symptoms do not go away or get worse, even after treatment  · Your cough does not get better within 4 weeks  · You have questions or concerns about your condition or care  Medicines: You may  need any of the following:  · Cough suppressants  decrease your urge to cough  · Decongestants  help loosen mucus in your lungs and make it easier to cough up  This can help you breathe easier  · Inhalers  may be given  Your healthcare provider may give you one or more inhalers to help you breathe easier and cough less  An inhaler gives your medicine to open your airways  Ask your healthcare provider to show you how to use your inhaler correctly  · Acetaminophen  decreases pain and fever  It is available without a doctor's order  Ask how much to take and how often to take it  Follow directions  Read the labels of all other medicines you are using to see if they also contain acetaminophen, or ask your doctor or pharmacist  Acetaminophen can cause liver damage if not taken correctly  Do not use more than 4 grams (4,000 milligrams) total of acetaminophen in one day  · NSAIDs  help decrease swelling and pain or fever  This medicine is available with or without a doctor's order  NSAIDs can cause stomach bleeding or kidney problems in certain people  If you take blood thinner medicine, always ask your healthcare provider if NSAIDs are safe for you  Always read the medicine label and follow directions      · Take your medicine as directed  Contact your healthcare provider if you think your medicine is not helping or if you have side effects  Tell him of her if you are allergic to any medicine  Keep a list of the medicines, vitamins, and herbs you take  Include the amounts, and when and why you take them  Bring the list or the pill bottles to follow-up visits  Carry your medicine list with you in case of an emergency  Self-care:   · Drink liquids as directed  You may need to drink more liquids than usual to stay hydrated  Ask how much liquid to drink each day and which liquids are best for you  · Use a cool mist humidifier  to increase air moisture in your home  This may make it easier for you to breathe and help decrease your cough  · Get more rest   Rest helps your body to heal  Slowly start to do more each day  Rest when you feel it is needed  · Avoid irritants in the air  Avoid chemicals, fumes, and dust  Wear a face mask if you must work around dust or fumes  Stay inside on days when air pollution levels are high  If you have allergies, stay inside when pollen counts are high  Do not use aerosol products, such as spray-on deodorant, bug spray, and hair spray  · Do not smoke or be around others who are smoking  Nicotine and other chemicals in cigarettes and cigars can cause lung damage  Ask your healthcare provider for information if you currently smoke and need help to quit  E-cigarettes or smokeless tobacco still contain nicotine  Talk to your healthcare provider before you use these products  Prevent acute bronchitis:       · Get the vaccinations you need  Ask your healthcare provider if you should get the flu or pneumonia vaccines  · Prevent the spread of germs  You can decrease your risk for acute bronchitis and other illnesses by doing the following:     ? Wash your hands often with soap and water  Carry germ-killing hand lotion or gel with you   You can use the lotion or gel to clean your hands when soap and water are not available  ? Do not touch your eyes, nose, or mouth unless you have washed your hands first     ? Always cover your mouth when you cough to prevent the spread of germs  It is best to cough into a tissue or your shirt sleeve instead of into your hand  Ask those around you to cover their mouths when they cough  ? Try to avoid people who have a cold or the flu  If you are sick, stay away from others as much as possible  Follow up with your doctor as directed:  Write down questions you have so you will remember to ask them during your follow-up visits  © [employer] 2021 Information is for End User's use only and may not be sold, redistributed or otherwise used for commercial purposes  All illustrations and images included in CareNotes® are the copyrighted property of A D A M [employer] Inc  or Isreal Zheng  The above information is an  only  It is not intended as medical advice for individual conditions or treatments  Talk to your doctor, nurse or pharmacist before following any medical regimen to see if it is safe and effective for you

## 2021-09-07 NOTE — TELEPHONE ENCOUNTER
Pt had a TB blood test done at [address] [place] [address] and needs to have a copy of results for her employer  Can you check and see if we have those results and get her a copy? [facility]

## 2021-09-08 ENCOUNTER — TELEPHONE (OUTPATIENT)
Dept: FAMILY MEDICINE CLINIC | Facility: CLINIC | Age: 46
End: 2021-09-08

## 2021-09-08 NOTE — TELEPHONE ENCOUNTER
----- Message from Loki Cotto PA-C sent at 9/7/2021  9:56 PM EDT -----  Please let patient know her TB screen was negative, where can we fax it to?

## 2021-09-10 ENCOUNTER — TELEPHONE (OUTPATIENT)
Dept: FAMILY MEDICINE CLINIC | Facility: CLINIC | Age: 46
End: 2021-09-10

## 2021-09-10 NOTE — TELEPHONE ENCOUNTER
PT is calling asking if she can a work note for today  She is still coughing  Feels a little better since she went to urgent care  She will go back to work tomorrow

## 2021-09-13 ENCOUNTER — OFFICE VISIT (OUTPATIENT)
Dept: HEMATOLOGY ONCOLOGY | Facility: HOSPITAL | Age: 46
End: 2021-09-13
Payer: COMMERCIAL

## 2021-09-13 VITALS
SYSTOLIC BLOOD PRESSURE: 132 MMHG | DIASTOLIC BLOOD PRESSURE: 76 MMHG | WEIGHT: 176 LBS | OXYGEN SATURATION: 98 % | TEMPERATURE: 98.1 F | RESPIRATION RATE: 16 BRPM | HEIGHT: 56 IN | BODY MASS INDEX: 39.59 KG/M2 | HEART RATE: 92 BPM

## 2021-09-13 DIAGNOSIS — E53.8 B12 DEFICIENCY: ICD-10-CM

## 2021-09-13 DIAGNOSIS — D64.9 ANEMIA, UNSPECIFIED TYPE: Primary | ICD-10-CM

## 2021-09-13 DIAGNOSIS — D75.839 THROMBOCYTOSIS: ICD-10-CM

## 2021-09-13 PROCEDURE — 1036F TOBACCO NON-USER: CPT | Performed by: INTERNAL MEDICINE

## 2021-09-13 PROCEDURE — 3008F BODY MASS INDEX DOCD: CPT | Performed by: INTERNAL MEDICINE

## 2021-09-13 PROCEDURE — 3078F DIAST BP <80 MM HG: CPT | Performed by: INTERNAL MEDICINE

## 2021-09-13 PROCEDURE — 3075F SYST BP GE 130 - 139MM HG: CPT | Performed by: INTERNAL MEDICINE

## 2021-09-13 PROCEDURE — 99214 OFFICE O/P EST MOD 30 MIN: CPT | Performed by: INTERNAL MEDICINE

## 2021-09-13 NOTE — PROGRESS NOTES
Hematology/Oncology Outpatient Follow- up Note  Graciela Blind 55 y o  female MRN: @ Encounter: [id number]        Date:  9/13/2021    Presenting Complaint/Diagnosis :     Elevated platelet count for at least 4 years with fluctuating hemoglobin and low iron    Previous Hematologic/ Oncologic History:      Venofer  Oral iron    Current Hematologic/ Oncologic Treatment:       Observation    Interval History:      The patient returns for follow-up visit  Iron studies have gone down slightly  White count is normal at 7 6 with a platelet count of 435  I suspect she needs more iron  I would give her once a month iron and see her back in 6 months  Ferritin was slightly elevated but this could be inflammatory  Her hemoglobin had come up to 11 6 in April after she received Venofer and is now 10 6  Another option is to observe and if it goes lower then consider Venofer  Neutrophil count has returned  To normal       Test Results:    Imaging: No results found  Labs:   Lab Results   Component Value Date    WBC 7 6 09/01/2021    HGB 10 6 (L) 09/01/2021    HCT 32 0 (L) 09/01/2021    MCV 92 2 09/01/2021     (H) 09/01/2021     Lab Results   Component Value Date     01/28/2017    K 4 5 09/01/2021    CL 99 09/01/2021    CO2 29 09/01/2021    BUN 27 (H) 09/01/2021    CREATININE 0 68 09/01/2021    GLUF 184 (H) 11/14/2017    CALCIUM 8 6 09/01/2021    AST 19 09/01/2021    ALT 25 09/01/2021    ALKPHOS 103 09/01/2021    PROT 5 9 (L) 01/28/2017    BILITOT 0 3 01/28/2017    EGFR 55 08/14/2020     Lab Results   Component Value Date    IRON 49 09/01/2021    TIBC 257 09/01/2021    FERRITIN 371 (H) 09/01/2021       Lab Results   Component Value Date    LUIEWIOE41 805 11/25/2020       ROS: As stated in the history of present illness otherwise his 14 point review of systems today was negative        Active Problems:   Patient Active Problem List   Diagnosis    Type 1 diabetes mellitus with hyperglycemia ([facility]    Asthma  Hypertension    Moderate episode of recurrent major depressive disorder (HCC)    Hyperlipidemia    Polycystic ovarian syndrome    Retinal hemorrhage    Anemia    Asthma, mild persistent    Both eyes affected 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, associated with type 1 diabetes mellitus (HCC)    Nervousness    Thrombocytosis (HCC)    Severe obesity (BMI 35 0-39  9) with comorbidity ([facility] )    Gastroesophageal reflux disease without esophagitis    Other proteinuria    Diabetic polyneuropathy associated with type 1 diabetes mellitus ([facility] )    Diabetic nephropathy associated with type 1 diabetes mellitus ([facility]    Nuclear sclerotic cataract of left eye    B12 deficiency    History of COVID-19    Palpitations    Chronic obstructive pulmonary disease, unspecified COPD type [facility]    Chronic systolic congestive heart failure ([facility] )       Past Medical History:   Past Medical History:   Diagnosis Date    Anemia     Asthma     w/acute exacerbation   Last assessed: 10/26/12    Chest pain 2/3/2016    CHF (congestive heart failure) ([facility]     Depression     Diabetes mellitus ([facility]     Diabetic nephropathy ([facility]     Diabetic nephropathy associated with type 1 diabetes mellitus ([facility] ) 8/12/2020    Diabetic retinopathy ([facility] ) 2/3/2016    Gastroenteritis 02/03/2016    GERD (gastroesophageal reflux disease)     HTN (hypertension)     Hyperlipidemia 2/3/2016    Oligomenorrhea     Polycystic ovaries 2/3/2016    Retinal hemorrhage 2/3/2016    Retinopathy     Thrombocytosis (HCC)     Type 1 diabetes mellitus with ophthalmic manifestation ([facility] ) 2/3/2016       Surgical History:   Past Surgical History:   Procedure Laterality Date    CATARACT EXTRACTION Left 10/2020    CATARACT EXTRACTION Right     RETINAL LASER PROCEDURE         Family History:    Family History   Problem Relation Age of Onset    Heart murmur Mother     Diabetes Mother         Mellitus    Thyroid disease Mother         Disorder    Diabetes Father         Mellitus    Hypertension Father     Diabetes Maternal Grandfather         Mellitus    Breast cancer Paternal Grandmother     Diabetes Maternal Aunt         Mellitus    Diabetes Maternal Uncle         Mellitus    No Known Problems Maternal Grandmother     Leukemia Paternal Grandfather     Substance Abuse Neg Hx     Mental illness Neg Hx     Alcohol abuse Neg Hx        Cancer-related family history includes Breast cancer in her paternal grandmother  Social History:   Social History     Socioeconomic History    Marital status: /Civil Union     Spouse name: Not on file    Number of children: Not on file    Years of education: Not on file    Highest education level: Not on file   Occupational History    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   Tobacco Use    Smoking status: Never Smoker    Smokeless tobacco: Never Used    Tobacco comment: Per [employer]: Former smoker  Quit in 1994   Vaping Use    Vaping Use: Never used   Substance and Sexual Activity    Alcohol use: Yes     Comment: Occasional/1 mixed drink twice a month if that    Drug use: No    Sexual activity: Yes     Partners: Male     Birth control/protection: Pill   Other Topics Concern    Not on file   Social History Narrative    Always uses seatbelt    Caffeine use: 1-2 cups coffee daily    Has smoke detectors    [employer]th[employer] Affiliations: [employer]     Social Determinants of Health     Financial Resource Strain:     Difficulty of Paying Living Expenses:    Food Insecurity:     Worried About Running Out of Food in the Last Year:     [address] in the Last Year:    Transportation Needs:     Lack of Transportation (Medical):      Lack of Transportation (Non-Medical):    Physical Activity:     Days of Exercise per Week:     Minutes of Exercise per Session:    Stress:     Feeling of Stress :    Social Connections:     Frequency of Communication with Friends and Family:     Frequency of Social Gatherings with Friends and Family:     Attends [employer] Services:     Active Member of Clubs or Organizations:     Attends Club or Organization Meetings:     Marital Status:    Intimate Partner Violence:     Fear of Current or Ex-Partner:     Emotionally Abused:     Physically Abused:     Sexually Abused:        Current Medications:   Current Outpatient Medications   Medication Sig Dispense Refill    acetaminophen (TYLENOL) 500 mg tablet Take 2 tablets (1,000 mg total) by mouth every 6 (six) hours as needed for mild pain or moderate pain 30 tablet 0    acetone, urine, test strip Use to test urine ketones for high blood sugars  25 each 6    albuterol (2 5 mg/3 mL) 0 083 % nebulizer solution Take 3 mL (2 5 mg total) by nebulization every 6 (six) hours as needed for wheezing or shortness of breath 75 mL 3    albuterol (PROVENTIL HFA,VENTOLIN HFA) 90 mcg/act inhaler INHALE 2 PUFFS BY MOUTH EVERY 4 HOURS AS NEEDED FOR WHEEZING OR FOR SHORTNESS OF BREATH 25 5 g 0    Ascorbic Acid (VITAMIN C) 500 MG CAPS Take 2 capsules by mouth daily      Aspirin Low Dose 81 MG EC tablet TAKE 1 TABLET BY MOUTH EVERY DAY 30 tablet 6    atorvastatin (LIPITOR) 80 mg tablet Take 1 tablet (80 mg total) by mouth daily 30 tablet 5    benzonatate (TESSALON) 200 MG capsule Take 1 capsule (200 mg total) by mouth 3 (three) times a day as needed for cough 20 capsule 0    Biotin 10 MG TABS Take 1 tablet by mouth daily      Blood Pressure Monitoring [place] Use 2 (two) times a day Monitor BP twice daily  Call office if BP > 140/90 persistently   1 Device 0    Butalbital-APAP-Caffeine (Fioricet) -40 MG CAPS Take 1 tablet by mouth 4 (four) times a day as needed (headahce) 30 capsule 0    Continuous Blood Gluc Sensor (Dexcom G6 Sensor) MISC Change every 10 days 3 each 12    Continuous Blood Gluc Transmit (Dexcom G6 Transmitter) MISC Change every 90 days 1 each 3    cyanocobalamin (VITAMIN B-12) 100 mcg tablet Take by mouth daily      fluticasone (FLONASE) 50 mcg/act nasal spray 2 sprays into each nostril daily 6 g 0    fluticasone-salmeterol (Advair Diskus) 250-50 mcg/dose inhaler Inhale 1 puff 2 (two) times a day Rinse mouth after use  3 Inhaler 3    furosemide (LASIX) 40 mg tablet Take 1 tablet (40 mg total) by mouth daily Take 1/2 tablet daily (Patient taking differently: Take 40 mg by mouth daily ) 30 tablet 3    glucagon (Glucagon Emergency) 1 MG injection Inject 1 mg under the skin once as needed for low blood sugar for up to 1 dose 1 kit 3    Glucagon (Gvoke HypoPen 2-Pack) 1 MG/0 2ML SOAJ Use if hypoglycemia prn 1 Syringe 6    insulin aspart (NovoLOG) 100 units/mL injection INJECT 1 UNITS OF NOVOLOG FOR EVERY 1 GRAM OF CARB, UP  UNITS DAILY 50 mL 6    insulin glargine (Lantus SoloStar) 100 units/mL injection pen Inject 78 Units under the skin daily at bedtime 10 pen 3    lisinopril (ZESTRIL) 20 mg tablet TAKE 2 TABLETS BY MOUTH EVERY DAY 60 tablet 3    montelukast (SINGULAIR) 10 mg tablet TAKE 1 TABLET BY MOUTH AT BEDTIME 90 tablet 0    omeprazole (PriLOSEC) 20 mg delayed release capsule Take 20 mg by mouth daily   OneTouch Ultra test strip Test 3 times daily 300 each 3    PARoxetine (PAXIL) 20 mg tablet TAKE 1 TABLET BY MOUTH EVERY DAY 90 tablet 0    predniSONE 10 mg tablet Take 4 tablets daily with food for 2 days, 3 tablets daily for 2 days, 2 tablets daily for 2 days, 1 tablet daily for 2 days 20 tablet 0    Probiotic Product (ALIGN PO) Take by mouth       No current facility-administered medications for this visit  Allergies: No Known Allergies    Physical Exam:    Body surface area is 1 68 meters squared      Wt Readings from Last 3 Encounters:   09/13/21 79 8 kg (176 lb)   09/07/21 78 kg (172 lb)   09/03/21 78 3 kg (172 lb 9 6 oz)        Temp Readings from Last 3 Encounters:   09/13/21 98 1 °F (36 7 °C) (Temporal)   09/07/21 99 1 °F (37 3 °C) (Tympanic)   09/03/21 99 °F (37 2 °C) (Oral)        BP Readings from Last 3 Encounters:   09/13/21 132/76   09/01/21 130/60   08/11/21 144/76         Pulse Readings from Last 3 Encounters:   09/13/21 92   09/07/21 90   09/01/21 92       Physical Exam     Constitutional   General appearance: No acute distress, well appearing and well nourished  Eyes   Conjunctiva and lids: No swelling, erythema or discharge  Pupils and irises: Equal, round and reactive to light  Ears, Nose, Mouth, and Throat   External inspection of ears and nose: Normal     Nasal mucosa, septum, and turbinates: Normal without edema or erythema  Oropharynx: Normal with no erythema, edema, exudate or lesions  Pulmonary   Respiratory effort: No increased work of breathing or signs of respiratory distress  Auscultation of lungs: Clear to auscultation  Cardiovascular   Palpation of heart: Normal PMI, no thrills  Auscultation of heart: Normal rate and rhythm, normal S1 and S2, without murmurs  Examination of extremities for edema and/or varicosities: Normal     Carotid pulses: Normal     Abdomen   Abdomen: Non-tender, no masses  Liver and spleen: No hepatomegaly or splenomegaly  Lymphatic   Palpation of lymph nodes in neck: No lymphadenopathy  Musculoskeletal   Gait and station: Normal     Digits and nails: Normal without clubbing or cyanosis  Inspection/palpation of joints, bones, and muscles: Normal     Skin   Skin and subcutaneous tissue: Normal without rashes or lesions  Neurologic   Cranial nerves: Cranial nerves 2-12 intact  Sensation: No sensory loss  Psychiatric   Orientation to person, place, and time: Normal     Mood and affect: Normal         Assessment / Plan: This is a pleasant   66-year-old female with a past medical history of diabetes hypertension hypercholesterolemia was noticed to have an elevated platelet count  She was also anemic   I put her on a therapeutic trial of iron Followed by Denise patrick corrected but then she was lost to follow-up  She then came back to see us within elevated platelet count and anemia and I gave her therapeutic trial of Venofer along with B12  Her numbers improved and her hemoglobin went 11 6  It is now down to 10 6 but her ferritin is still slightly elevated  Iron saturation is lower at 19%  I did offer monthly Venofer for the next 6 months to see if this would help bring her numbers up but since she is asymptomatic she states she would rather just observe  I think this is reasonable  It may be anemia of chronic disease /inflammation  We will continue to follow  She has had a GI workup in the past   I will see her back in 4 months with repeat blood work  Platelet count is still below 500 so observation is reasonable  Goals and Barriers:  Current Goal:  Prolong Survival from  Iron deficiency  Barriers: None  Patient's Capacity to Self Care:  Patient able to self care  Portions of the record may have been created with voice recognition software   Occasional wrong word or "sound a like" substitutions may have occurred due to the inherent limitations of voice recognition software   Read the chart carefully and recognize, using context, where substitutions have occurred

## 2021-09-29 DIAGNOSIS — I50.9 ACUTE CONGESTIVE HEART FAILURE, UNSPECIFIED HEART FAILURE TYPE (HCC): ICD-10-CM

## 2021-09-29 DIAGNOSIS — I10 ESSENTIAL HYPERTENSION: Chronic | ICD-10-CM

## 2021-09-29 PROCEDURE — 4010F ACE/ARB THERAPY RXD/TAKEN: CPT | Performed by: INTERNAL MEDICINE

## 2021-09-29 RX ORDER — FUROSEMIDE 40 MG/1
40 TABLET ORAL DAILY
Qty: 45 TABLET | Refills: 3 | Status: CANCELLED | OUTPATIENT
Start: 2021-09-29 | End: 2022-01-27

## 2021-09-29 RX ORDER — FUROSEMIDE 40 MG/1
40 TABLET ORAL DAILY
Qty: 45 TABLET | Refills: 3 | Status: SHIPPED | OUTPATIENT
Start: 2021-09-29 | End: 2022-01-20 | Stop reason: SDUPTHER

## 2021-09-29 RX ORDER — LISINOPRIL 20 MG/1
TABLET ORAL
Qty: 60 TABLET | Refills: 3 | Status: SHIPPED | OUTPATIENT
Start: 2021-09-29 | End: 2022-01-24

## 2021-09-30 ENCOUNTER — TELEPHONE (OUTPATIENT)
Dept: CARDIOLOGY CLINIC | Facility: CLINIC | Age: 46
End: 2021-09-30

## 2021-10-05 DIAGNOSIS — J45.20 MILD INTERMITTENT ASTHMA WITHOUT COMPLICATION: ICD-10-CM

## 2021-10-06 ENCOUNTER — OFFICE VISIT (OUTPATIENT)
Dept: NEPHROLOGY | Facility: HOSPITAL | Age: 46
End: 2021-10-06
Payer: COMMERCIAL

## 2021-10-06 VITALS
HEART RATE: 82 BPM | SYSTOLIC BLOOD PRESSURE: 122 MMHG | DIASTOLIC BLOOD PRESSURE: 68 MMHG | WEIGHT: 178.2 LBS | HEIGHT: 56 IN | BODY MASS INDEX: 40.09 KG/M2 | RESPIRATION RATE: 16 BRPM

## 2021-10-06 DIAGNOSIS — R80.8 OTHER PROTEINURIA: Primary | ICD-10-CM

## 2021-10-06 DIAGNOSIS — E10.21 DIABETIC NEPHROPATHY ASSOCIATED WITH TYPE 1 DIABETES MELLITUS (HCC): ICD-10-CM

## 2021-10-06 DIAGNOSIS — I10 PRIMARY HYPERTENSION: ICD-10-CM

## 2021-10-06 DIAGNOSIS — I50.22 CHRONIC SYSTOLIC CONGESTIVE HEART FAILURE (HCC): ICD-10-CM

## 2021-10-06 PROCEDURE — 3078F DIAST BP <80 MM HG: CPT | Performed by: INTERNAL MEDICINE

## 2021-10-06 PROCEDURE — 3074F SYST BP LT 130 MM HG: CPT | Performed by: INTERNAL MEDICINE

## 2021-10-06 PROCEDURE — 99214 OFFICE O/P EST MOD 30 MIN: CPT | Performed by: INTERNAL MEDICINE

## 2021-10-06 PROCEDURE — 3066F NEPHROPATHY DOC TX: CPT | Performed by: INTERNAL MEDICINE

## 2021-10-19 DIAGNOSIS — E78.2 MIXED HYPERLIPIDEMIA: ICD-10-CM

## 2021-10-19 RX ORDER — ATORVASTATIN CALCIUM 80 MG/1
80 TABLET, FILM COATED ORAL DAILY
Qty: 30 TABLET | Refills: 11 | Status: SHIPPED | OUTPATIENT
Start: 2021-10-19

## 2021-10-27 ENCOUNTER — OFFICE VISIT (OUTPATIENT)
Dept: CARDIOLOGY CLINIC | Facility: CLINIC | Age: 46
End: 2021-10-27
Payer: COMMERCIAL

## 2021-10-27 VITALS
HEART RATE: 74 BPM | DIASTOLIC BLOOD PRESSURE: 68 MMHG | HEIGHT: 56 IN | SYSTOLIC BLOOD PRESSURE: 136 MMHG | BODY MASS INDEX: 39.72 KG/M2 | WEIGHT: 176.6 LBS

## 2021-10-27 DIAGNOSIS — E10.65 TYPE 1 DIABETES MELLITUS WITH HYPERGLYCEMIA (HCC): ICD-10-CM

## 2021-10-27 DIAGNOSIS — I10 PRIMARY HYPERTENSION: ICD-10-CM

## 2021-10-27 DIAGNOSIS — E78.2 MIXED HYPERLIPIDEMIA: ICD-10-CM

## 2021-10-27 DIAGNOSIS — I50.32 CHRONIC DIASTOLIC CONGESTIVE HEART FAILURE (HCC): Primary | ICD-10-CM

## 2021-10-27 PROCEDURE — 1036F TOBACCO NON-USER: CPT | Performed by: INTERNAL MEDICINE

## 2021-10-27 PROCEDURE — 3008F BODY MASS INDEX DOCD: CPT | Performed by: INTERNAL MEDICINE

## 2021-10-27 PROCEDURE — 99214 OFFICE O/P EST MOD 30 MIN: CPT | Performed by: INTERNAL MEDICINE

## 2021-10-31 DIAGNOSIS — F32.A DEPRESSION, UNSPECIFIED DEPRESSION TYPE: ICD-10-CM

## 2021-11-01 RX ORDER — PAROXETINE HYDROCHLORIDE 20 MG/1
TABLET, FILM COATED ORAL
Qty: 90 TABLET | Refills: 0 | Status: SHIPPED | OUTPATIENT
Start: 2021-11-01 | End: 2022-02-08

## 2021-11-05 DIAGNOSIS — E10.65 TYPE 1 DIABETES MELLITUS WITH HYPERGLYCEMIA (HCC): ICD-10-CM

## 2021-11-05 RX ORDER — INSULIN GLARGINE 100 [IU]/ML
INJECTION, SOLUTION SUBCUTANEOUS
Qty: 30 ML | Refills: 3 | Status: SHIPPED | OUTPATIENT
Start: 2021-11-05

## 2021-11-16 ENCOUNTER — OFFICE VISIT (OUTPATIENT)
Dept: OBGYN CLINIC | Facility: CLINIC | Age: 46
End: 2021-11-16
Payer: COMMERCIAL

## 2021-11-16 VITALS
WEIGHT: 174.8 LBS | DIASTOLIC BLOOD PRESSURE: 58 MMHG | BODY MASS INDEX: 39.32 KG/M2 | SYSTOLIC BLOOD PRESSURE: 128 MMHG | HEIGHT: 56 IN

## 2021-11-16 DIAGNOSIS — N91.2 AMENORRHEA: ICD-10-CM

## 2021-11-16 DIAGNOSIS — Z12.4 CERVICAL CANCER SCREENING: ICD-10-CM

## 2021-11-16 DIAGNOSIS — Z01.419 ENCOUNTER FOR ANNUAL ROUTINE GYNECOLOGICAL EXAMINATION: ICD-10-CM

## 2021-11-16 DIAGNOSIS — N92.6 IRREGULAR PERIODS: Primary | ICD-10-CM

## 2021-11-16 DIAGNOSIS — Z11.51 SCREENING FOR HPV (HUMAN PAPILLOMAVIRUS): ICD-10-CM

## 2021-11-16 PROCEDURE — 3008F BODY MASS INDEX DOCD: CPT | Performed by: INTERNAL MEDICINE

## 2021-11-16 PROCEDURE — 3061F NEG MICROALBUMINURIA REV: CPT | Performed by: INTERNAL MEDICINE

## 2021-11-16 PROCEDURE — 81002 URINALYSIS NONAUTO W/O SCOPE: CPT | Performed by: OBSTETRICS & GYNECOLOGY

## 2021-11-16 PROCEDURE — S0610 ANNUAL GYNECOLOGICAL EXAMINA: HCPCS | Performed by: OBSTETRICS & GYNECOLOGY

## 2021-11-22 LAB
CYTOLOGIST CVX/VAG CYTO: NORMAL
DX ICD CODE: NORMAL
HPV I/H RISK 4 DNA CVX QL PROBE+SIG AMP: NEGATIVE
OTHER STN SPEC: NORMAL
PATH REPORT.FINAL DX SPEC: NORMAL
SL AMB NOTE:: NORMAL
SL AMB SPECIMEN ADEQUACY: NORMAL
SL AMB TEST METHODOLOGY: NORMAL

## 2021-11-29 DIAGNOSIS — J45.20 MILD INTERMITTENT ASTHMA WITHOUT COMPLICATION: ICD-10-CM

## 2021-11-29 RX ORDER — MONTELUKAST SODIUM 10 MG/1
TABLET ORAL
Qty: 90 TABLET | Refills: 0 | Status: SHIPPED | OUTPATIENT
Start: 2021-11-29 | End: 2022-03-07

## 2021-12-30 ENCOUNTER — TELEPHONE (OUTPATIENT)
Dept: FAMILY MEDICINE CLINIC | Facility: CLINIC | Age: 46
End: 2021-12-30

## 2021-12-30 DIAGNOSIS — G43.909 MIGRAINE WITHOUT STATUS MIGRAINOSUS, NOT INTRACTABLE, UNSPECIFIED MIGRAINE TYPE: ICD-10-CM

## 2021-12-30 RX ORDER — BUTALBITAL, ACETAMINOPHEN AND CAFFEINE 300; 40; 50 MG/1; MG/1; MG/1
1 CAPSULE ORAL 4 TIMES DAILY PRN
Qty: 30 CAPSULE | Refills: 0 | Status: SHIPPED | OUTPATIENT
Start: 2021-12-30

## 2022-01-03 ENCOUNTER — TELEPHONE (OUTPATIENT)
Dept: HEMATOLOGY ONCOLOGY | Facility: HOSPITAL | Age: 47
End: 2022-01-03

## 2022-01-03 NOTE — TELEPHONE ENCOUNTER
Called patient and LVM that I needed to reschedule her 1/17/22 appt from Dr Chad Catalan to Kandi Aguilar LVM with new time and provider  LVM with hopeline # if this appt needed to be rescheduled

## 2022-01-12 ENCOUNTER — TELEPHONE (OUTPATIENT)
Dept: HEMATOLOGY ONCOLOGY | Facility: HOSPITAL | Age: 47
End: 2022-01-12

## 2022-01-12 NOTE — TELEPHONE ENCOUNTER
01/12/22    LVM reminding pt for updated labs prior to the next appt  LABS:  CBC and differential  Comprehensive metabolic panel  Ferritin  Iron Saturation %  Iron  Methylmalonic acid, serum  TIBC    Advising pt to get blood work done in any [employer] labs  [facility] number also provided

## 2022-01-20 DIAGNOSIS — I50.9 ACUTE CONGESTIVE HEART FAILURE, UNSPECIFIED HEART FAILURE TYPE (HCC): ICD-10-CM

## 2022-01-20 RX ORDER — FUROSEMIDE 40 MG/1
40 TABLET ORAL DAILY
Qty: 30 TABLET | Refills: 11 | Status: SHIPPED | OUTPATIENT
Start: 2022-01-20 | End: 2022-07-19

## 2022-01-22 DIAGNOSIS — I10 ESSENTIAL HYPERTENSION: Chronic | ICD-10-CM

## 2022-01-24 RX ORDER — LISINOPRIL 20 MG/1
TABLET ORAL
Qty: 60 TABLET | Refills: 3 | Status: SHIPPED | OUTPATIENT
Start: 2022-01-24 | End: 2022-06-13

## 2022-02-08 DIAGNOSIS — F32.A DEPRESSION, UNSPECIFIED DEPRESSION TYPE: ICD-10-CM

## 2022-02-08 RX ORDER — PAROXETINE HYDROCHLORIDE 20 MG/1
TABLET, FILM COATED ORAL
Qty: 90 TABLET | Refills: 0 | Status: SHIPPED | OUTPATIENT
Start: 2022-02-08 | End: 2022-05-08

## 2022-03-02 LAB
LEFT EYE DIABETIC RETINOPATHY: NORMAL
RIGHT EYE DIABETIC RETINOPATHY: NORMAL

## 2022-03-07 DIAGNOSIS — J45.20 MILD INTERMITTENT ASTHMA WITHOUT COMPLICATION: ICD-10-CM

## 2022-03-07 RX ORDER — MONTELUKAST SODIUM 10 MG/1
TABLET ORAL
Qty: 90 TABLET | Refills: 0 | Status: SHIPPED | OUTPATIENT
Start: 2022-03-07 | End: 2022-06-05

## 2022-03-08 DIAGNOSIS — I50.9 ACUTE CONGESTIVE HEART FAILURE, UNSPECIFIED HEART FAILURE TYPE (HCC): ICD-10-CM

## 2022-03-08 DIAGNOSIS — J45.909 UNCOMPLICATED ASTHMA, UNSPECIFIED ASTHMA SEVERITY, UNSPECIFIED WHETHER PERSISTENT: ICD-10-CM

## 2022-03-08 RX ORDER — ASPIRIN 81 MG/1
TABLET, COATED ORAL
Qty: 30 TABLET | Refills: 0 | Status: SHIPPED | OUTPATIENT
Start: 2022-03-08

## 2022-03-09 RX ORDER — ALBUTEROL SULFATE 90 UG/1
AEROSOL, METERED RESPIRATORY (INHALATION)
Qty: 25.5 G | Refills: 0 | Status: SHIPPED | OUTPATIENT
Start: 2022-03-09

## 2022-03-16 NOTE — LETTER
Completed another PA for [facility]    Dx: 31.76 (Bipolar, in remission, most recent episode depressed)    Prior meds--Abilify (akathesia); Lithium     December 2, 2019     Patient: Tameka Rose   YOB: 1975   Date of Visit: 12/2/2019       To Whom it May Concern:    Tameka Rose was seen in my clinic on 12/2/2019  She may return to work on 12/04/2019  If you have any questions or concerns, please don't hesitate to call           Sincerely,          Tamera Niño PA-C        CC: No Recipients

## 2022-04-06 ENCOUNTER — TELEPHONE (OUTPATIENT)
Dept: HEMATOLOGY ONCOLOGY | Facility: CLINIC | Age: 47
End: 2022-04-06

## 2022-04-06 ENCOUNTER — TELEPHONE (OUTPATIENT)
Dept: NEPHROLOGY | Facility: CLINIC | Age: 47
End: 2022-04-06

## 2022-04-06 NOTE — TELEPHONE ENCOUNTER
Scheduling Appointment     Who Is Calling to Schedule Patient    Doctor Dr Lamont Mckenzie   Location [facility]   Date and Time 05/13 at 9:20am    Reason for scheduling appointment Follow up    Patient verbalized understanding   Yes

## 2022-04-06 NOTE — TELEPHONE ENCOUNTER
Patient called because she was confused on when she should go for lab work/ when she needed an appointment  According to her AVS she us due back in October of 2022 and she should have lab work done in March of 2022  Patient verbally understood and has no further questions

## 2022-04-08 ENCOUNTER — APPOINTMENT (OUTPATIENT)
Dept: LAB | Facility: CLINIC | Age: 47
End: 2022-04-08
Payer: COMMERCIAL

## 2022-04-08 DIAGNOSIS — D75.839 THROMBOCYTOSIS: ICD-10-CM

## 2022-04-08 DIAGNOSIS — E10.65 TYPE I DIABETES MELLITUS WITH HYPEROSMOLAR COMA (HCC): ICD-10-CM

## 2022-04-08 DIAGNOSIS — R80.8 OTHER PROTEINURIA: ICD-10-CM

## 2022-04-08 DIAGNOSIS — E10.65 TYPE 1 DIABETES MELLITUS WITH HYPERGLYCEMIA (HCC): ICD-10-CM

## 2022-04-08 DIAGNOSIS — E53.8 B12 DEFICIENCY: ICD-10-CM

## 2022-04-08 DIAGNOSIS — D64.9 ANEMIA, UNSPECIFIED TYPE: ICD-10-CM

## 2022-04-08 DIAGNOSIS — E10.69 TYPE I DIABETES MELLITUS WITH HYPEROSMOLAR COMA (HCC): ICD-10-CM

## 2022-04-08 LAB
ALBUMIN SERPL BCP-MCNC: 3.9 G/DL (ref 3.5–5)
ALP SERPL-CCNC: 120 U/L (ref 46–116)
ALT SERPL W P-5'-P-CCNC: 27 U/L (ref 12–78)
ANION GAP SERPL CALCULATED.3IONS-SCNC: 3 MMOL/L (ref 4–13)
AST SERPL W P-5'-P-CCNC: 12 U/L (ref 5–45)
BASOPHILS # BLD AUTO: 0.1 THOUSANDS/ΜL (ref 0–0.1)
BASOPHILS NFR BLD AUTO: 1 % (ref 0–1)
BILIRUB SERPL-MCNC: 0.28 MG/DL (ref 0.2–1)
BUN SERPL-MCNC: 37 MG/DL (ref 5–25)
CALCIUM SERPL-MCNC: 9.5 MG/DL (ref 8.3–10.1)
CHLORIDE SERPL-SCNC: 103 MMOL/L (ref 100–108)
CO2 SERPL-SCNC: 29 MMOL/L (ref 21–32)
CREAT SERPL-MCNC: 0.88 MG/DL (ref 0.6–1.3)
CREAT UR-MCNC: 61.9 MG/DL
CREAT UR-MCNC: 61.9 MG/DL
EOSINOPHIL # BLD AUTO: 0.68 THOUSAND/ΜL (ref 0–0.61)
EOSINOPHIL NFR BLD AUTO: 7 % (ref 0–6)
ERYTHROCYTE [DISTWIDTH] IN BLOOD BY AUTOMATED COUNT: 12.5 % (ref 11.6–15.1)
EST. AVERAGE GLUCOSE BLD GHB EST-MCNC: 260 MG/DL
FERRITIN SERPL-MCNC: 234 NG/ML (ref 8–388)
GFR SERPL CREATININE-BSD FRML MDRD: 79 ML/MIN/1.73SQ M
GLUCOSE P FAST SERPL-MCNC: 179 MG/DL (ref 65–99)
HBA1C MFR BLD: 10.7 %
HCT VFR BLD AUTO: 37.9 % (ref 34.8–46.1)
HGB BLD-MCNC: 12.5 G/DL (ref 11.5–15.4)
IMM GRANULOCYTES # BLD AUTO: 0.04 THOUSAND/UL (ref 0–0.2)
IMM GRANULOCYTES NFR BLD AUTO: 0 % (ref 0–2)
IRON SATN MFR SERPL: 18 % (ref 15–50)
IRON SERPL-MCNC: 59 UG/DL (ref 50–170)
LYMPHOCYTES # BLD AUTO: 2.04 THOUSANDS/ΜL (ref 0.6–4.47)
LYMPHOCYTES NFR BLD AUTO: 22 % (ref 14–44)
MCH RBC QN AUTO: 31.2 PG (ref 26.8–34.3)
MCHC RBC AUTO-ENTMCNC: 33 G/DL (ref 31.4–37.4)
MCV RBC AUTO: 95 FL (ref 82–98)
MICROALBUMIN UR-MCNC: 419 MG/L (ref 0–20)
MICROALBUMIN/CREAT 24H UR: 677 MG/G CREATININE (ref 0–30)
MONOCYTES # BLD AUTO: 0.53 THOUSAND/ΜL (ref 0.17–1.22)
MONOCYTES NFR BLD AUTO: 6 % (ref 4–12)
NEUTROPHILS # BLD AUTO: 6.03 THOUSANDS/ΜL (ref 1.85–7.62)
NEUTS SEG NFR BLD AUTO: 64 % (ref 43–75)
NRBC BLD AUTO-RTO: 0 /100 WBCS
PLATELET # BLD AUTO: 444 THOUSANDS/UL (ref 149–390)
PMV BLD AUTO: 10.7 FL (ref 8.9–12.7)
POTASSIUM SERPL-SCNC: 4.4 MMOL/L (ref 3.5–5.3)
PROT SERPL-MCNC: 7.3 G/DL (ref 6.4–8.2)
PROT UR-MCNC: 58 MG/DL
PROT/CREAT UR: 0.94 MG/G{CREAT} (ref 0–0.1)
RBC # BLD AUTO: 4.01 MILLION/UL (ref 3.81–5.12)
SODIUM SERPL-SCNC: 135 MMOL/L (ref 136–145)
TIBC SERPL-MCNC: 319 UG/DL (ref 250–450)
WBC # BLD AUTO: 9.42 THOUSAND/UL (ref 4.31–10.16)

## 2022-04-08 PROCEDURE — 82043 UR ALBUMIN QUANTITATIVE: CPT

## 2022-04-08 PROCEDURE — 83550 IRON BINDING TEST: CPT

## 2022-04-08 PROCEDURE — 83036 HEMOGLOBIN GLYCOSYLATED A1C: CPT

## 2022-04-08 PROCEDURE — 85025 COMPLETE CBC W/AUTO DIFF WBC: CPT

## 2022-04-08 PROCEDURE — 83540 ASSAY OF IRON: CPT

## 2022-04-08 PROCEDURE — 3046F HEMOGLOBIN A1C LEVEL >9.0%: CPT | Performed by: PHYSICIAN ASSISTANT

## 2022-04-08 PROCEDURE — 36415 COLL VENOUS BLD VENIPUNCTURE: CPT

## 2022-04-08 PROCEDURE — 82728 ASSAY OF FERRITIN: CPT

## 2022-04-08 PROCEDURE — 3066F NEPHROPATHY DOC TX: CPT | Performed by: PHYSICIAN ASSISTANT

## 2022-04-08 PROCEDURE — 84156 ASSAY OF PROTEIN URINE: CPT

## 2022-04-08 PROCEDURE — 80053 COMPREHEN METABOLIC PANEL: CPT

## 2022-04-08 PROCEDURE — 3060F POS MICROALBUMINURIA REV: CPT | Performed by: PHYSICIAN ASSISTANT

## 2022-04-08 PROCEDURE — 82570 ASSAY OF URINE CREATININE: CPT

## 2022-04-13 ENCOUNTER — OFFICE VISIT (OUTPATIENT)
Dept: ENDOCRINOLOGY | Facility: HOSPITAL | Age: 47
End: 2022-04-13
Payer: COMMERCIAL

## 2022-04-13 ENCOUNTER — TELEPHONE (OUTPATIENT)
Dept: NEPHROLOGY | Facility: CLINIC | Age: 47
End: 2022-04-13

## 2022-04-13 VITALS
HEIGHT: 56 IN | DIASTOLIC BLOOD PRESSURE: 72 MMHG | BODY MASS INDEX: 41.39 KG/M2 | HEART RATE: 93 BPM | SYSTOLIC BLOOD PRESSURE: 142 MMHG | WEIGHT: 184 LBS

## 2022-04-13 DIAGNOSIS — E10.65 TYPE 1 DIABETES MELLITUS WITH HYPERGLYCEMIA (HCC): Primary | ICD-10-CM

## 2022-04-13 PROCEDURE — 3008F BODY MASS INDEX DOCD: CPT | Performed by: PHYSICIAN ASSISTANT

## 2022-04-13 PROCEDURE — 95251 CONT GLUC MNTR ANALYSIS I&R: CPT | Performed by: PHYSICIAN ASSISTANT

## 2022-04-13 PROCEDURE — 3078F DIAST BP <80 MM HG: CPT | Performed by: PHYSICIAN ASSISTANT

## 2022-04-13 PROCEDURE — 99214 OFFICE O/P EST MOD 30 MIN: CPT | Performed by: PHYSICIAN ASSISTANT

## 2022-04-13 PROCEDURE — 1036F TOBACCO NON-USER: CPT | Performed by: PHYSICIAN ASSISTANT

## 2022-04-13 PROCEDURE — 3077F SYST BP >= 140 MM HG: CPT | Performed by: PHYSICIAN ASSISTANT

## 2022-04-13 NOTE — TELEPHONE ENCOUNTER
----- Message from [place], [place] sent at 4/12/2022  4:42 PM EDT -----  UPC ratio slightly elevated but blood work acceptable  Continue lisinopril without changes  Will monitor UPC ratio prior to next appointment

## 2022-04-13 NOTE — PATIENT INSTRUCTIONS
Continue monitor diet, and maintain physical activity  Make sure to drink plenty of water throughout the day  Make sure to be consistent with insulin      Continue with current insulin regimen at this time        Continue to utilize Dexcom to monitor blood sugar  Send in blood sugar logs in 2 weeks  Did put in referral for diabetes education if you consider following up with them         Follow up in 3 months with lab work prior to visit

## 2022-04-13 NOTE — TELEPHONE ENCOUNTER
I called the patient in regards to result note per Dr Cheryl Dickinson  Unable to reach pt at this time

## 2022-04-13 NOTE — PROGRESS NOTES
Vick Tom 55 y o  female MRN: [id number]    Encounter: [id number]      Assessment/Plan     Assessment: This is a 55y o -year-old female with type 1 diabetes with neuropathy, nephropathy, hypertension and hyperlipidemia  Plan:  1  Type 1 diabetes:  Most recent hemoglobin A1c has increased to 10 7  Reviewing her Dexcom download she does have a lot of variability in her blood sugars  Did discussed possible changes we could make with her medications  Did recommend increasing her Levemir and change in the carb ratio at breakfast   Also considered adding metformin as she has PCOS  We did briefly talk about interest in an insulin pump  However she knows she has not been consistent with her insulin dosing, it would like to try again in another 3 months before any adjustments are made  I did put in referral for [facility] to see if we can help improve her lifestyle, and be more consistent with her insulin dosing  Follow-up in 3 months with lab work completed prior to visit      2  Diabetic neuropathy:  Stable  Is up-to-date on diabetic foot exam      3  Diabetic nephropathy:  Some protein noted in her urine previously  Kidney function remains stable at this time  Will continue to monitor  Will be due for another microalbuminuria at next appointment      4  Hypertension:  Blood pressure slightly elevated in the office today  Kidney function remains stable with normal electrolytes  Continue with current medications at this time  Repeat CMP prior to next office visit      5  Hyperlipidemia:  Lipid panel did reveal elevated triglycerides which might be due to her hyperglycemia  Total cholesterol and LDL cholesterol were within normal range  Continue with current medications  Repeat lipid panel prior to next office visit  CC:  Type 1 diabetes follow-up    History of Present Illness     HPI:  Claudia Rosa a 39 y  o  female with type 1 diabetes with diabetic neuropathy and diabetic nephropathy, hypertension, hyperlipidemia for follow-up visit    She was diagnosed with type 1 diabetes about 27 years ago  Grant Rondon utilizes insulin therapy and takes Lantus insulin 78 units at bedtime and NovoLog insulin 1 unit per 1 g carbohydrate with each meal   Does continue with inconsistency with her insulin  States she does lot of travel for, will eat in the car, does not necessarily take her insulin on time  Will also skip taking her Lantus in the evening     She denies polyuria, polydipsia, polyphagia, or nocturia   She denies blurry vision   She denies numbness or tingling of the feet   She denies chest pain or shortness of breath        Diabetic complications include diabetic neuropathy, diabetic nephropathy, diabetic retinopathy   She denies heart attack, stroke, or claudication      Last diabetic foot exam was performed in the endocrine office in September 2021        She reports her last eye exam was September 2021 and there was no change in her retinopathy       She uses a Dexcom continuous glucose monitoring system to check her blood sugars frequently throughout the day  Download of her Dexcom from March 31 through April 13, 2022 reveals an average glucose level of 241 with standard deviation of 89  She is within target range 28% time, above target range 70% time, below target range 2% of the time  One average blood sugars are he running slightly high in the morning  She may have some episodes of hypoglycemia after breakfast   Overall blood sugars are does consistently running high        She has hypertension and diabetic nephropathy and takes lisinopril 20 mg 2 tablets daily    She has occasional headaches but no stroke-like symptoms      She has hyperlipidemia and takes atorvastatin 40 mg daily   She denies chest pain or shortness of breath       She also has had quite a few months of fatigue and is now on iron infusions for low iron       Review of Systems   Constitutional: Negative for activity change, appetite change, fatigue and unexpected weight change  HENT: Negative for sore throat and trouble swallowing  Eyes: Negative for visual disturbance  Respiratory: Negative for chest tightness and shortness of breath  Cardiovascular: Negative for chest pain, palpitations and leg swelling  Gastrointestinal: Negative for abdominal pain, constipation, diarrhea, nausea and vomiting  Endocrine: Negative for cold intolerance, heat intolerance, polydipsia, polyphagia and polyuria  Genitourinary: Negative for frequency  Skin: Negative for wound  Neurological: Negative for dizziness, weakness, numbness and headaches  Psychiatric/Behavioral: Negative for dysphoric mood and sleep disturbance  The patient is not nervous/anxious  Historical Information   Past Medical History:   Diagnosis Date    Anemia     Asthma     w/acute exacerbation   Last assessed: 10/26/12    Chest pain 2/3/2016    [facility]     Depression     [facility]     [facility]     Diabetic nephropathy associated with type 1 diabetes mellitus ([facility] ) 8/12/2020    Diabetic retinopathy ([facility] ) 2/3/2016    Endometriosis     Gastroenteritis 02/03/2016    GERD (gastroesophageal reflux disease)     HTN (hypertension)     Hyperlipidemia 2/3/2016    Oligomenorrhea     Polycystic ovaries 2/3/2016    Retinal hemorrhage 2/3/2016    Retinopathy     Thrombocytosis     Type 1 diabetes mellitus with ophthalmic manifestation ([facility] ) 2/3/2016     Past Surgical History:   Procedure Laterality Date    CATARACT EXTRACTION Left 10/2020    CATARACT EXTRACTION Right     RETINAL LASER PROCEDURE       Social History   Social History     Substance and Sexual Activity   Alcohol Use Yes    Comment: Occasional/1 mixed drink twice a month if that     Social History     Substance and Sexual Activity   Drug Use No     Social History     Tobacco Use   Smoking Status Never Smoker   Smokeless Tobacco Never Used   Tobacco Comment    Per [employer]: Former smoker  Quit in 1994     Family History:   Family History   Problem Relation Age of Onset    Heart murmur Mother     Diabetes Mother         Mellitus    Thyroid disease Mother         Disorder    Diabetes Father         Mellitus    Hypertension Father     Diabetes Maternal Grandfather         Mellitus    Breast cancer Paternal Grandmother     Diabetes Maternal Aunt         Mellitus    Diabetes Maternal Uncle         Mellitus    No Known Problems Maternal Grandmother     Leukemia Paternal Grandfather     Substance Abuse Neg Hx     Mental illness Neg Hx     Alcohol abuse Neg Hx        Meds/Allergies   Current Outpatient Medications   Medication Sig Dispense Refill    acetaminophen (TYLENOL) 500 mg tablet Take 2 tablets (1,000 mg total) by mouth every 6 (six) hours as needed for mild pain or moderate pain 30 tablet 0    acetone, urine, test strip Use to test urine ketones for high blood sugars   25 each 6    Advair Diskus 250-50 MCG/DOSE inhaler INHALE 1 PUFF BY MOUTH TWO TIMES DAILY, RINSE MOUTH AFTER  blister 3    albuterol (2 5 mg/3 mL) 0 083 % nebulizer solution Take 3 mL (2 5 mg total) by nebulization every 6 (six) hours as needed for wheezing or shortness of breath 75 mL 3    albuterol (PROVENTIL HFA,VENTOLIN HFA) 90 mcg/act inhaler INHALE 2 PUFFS BY MOUTH EVERY 4 HOURS AS NEEDED FOR WHEEZING OR FOR SHORTNESS OF BREATH 25 5 g 0    Ascorbic Acid (VITAMIN C) 500 MG CAPS Take 2 capsules by mouth daily      Aspirin Low Dose 81 MG EC tablet TAKE 1 TABLET BY MOUTH EVERY DAY 30 tablet 0    atorvastatin (LIPITOR) 80 mg tablet Take 1 tablet (80 mg total) by mouth daily 30 tablet 11    benzonatate (TESSALON) 200 MG capsule Take 1 capsule (200 mg total) by mouth 3 (three) times a day as needed for cough (Patient not taking: Reported on 11/16/2021 ) 20 capsule 0    Biotin 10 MG TABS Take 1 tablet by mouth daily      Blood Pressure Monitoring [place] Use 2 (two) times a day Monitor BP twice daily  Call office if BP > 140/90 persistently  1 Device 0    Butalbital-APAP-Caffeine (Fioricet) -40 MG CAPS Take 1 tablet by mouth 4 (four) times a day as needed (headahce) 30 capsule 0    Continuous Blood Gluc Sensor (Dexcom G6 Sensor) MISC Change every 10 days 3 each 12    Continuous Blood Gluc Transmit (Dexcom G6 Transmitter) MISC Change every 90 days 1 each 3    cyanocobalamin (VITAMIN B-12) 100 mcg tablet Take by mouth daily      furosemide (LASIX) 40 mg tablet Take 1 tablet (40 mg total) by mouth daily 30 tablet 11    glucagon (Glucagon Emergency) 1 MG injection Inject 1 mg under the skin once as needed for low blood sugar for up to 1 dose 1 kit 3    Glucagon (Gvoke HypoPen 2-Pack) 1 MG/0 2ML SOAJ Use if hypoglycemia prn 1 Syringe 6    insulin aspart (NovoLOG) 100 units/mL injection INJECT 1 UNITS OF NOVOLOG FOR EVERY 1 GRAM OF CARB, UP  UNITS DAILY 50 mL 6    Lantus SoloStar 100 units/mL injection pen INJECT 78 UNITS UNDER THE SKIN AT BEDTIME 30 mL 3    lisinopril (ZESTRIL) 20 mg tablet TAKE 2 TABLETS BY MOUTH EVERY DAY 60 tablet 3    montelukast (SINGULAIR) 10 mg tablet TAKE 1 TABLET BY MOUTH AT BEDTIME  90 tablet 0    omeprazole (PriLOSEC) 20 mg delayed release capsule Take 20 mg by mouth daily   OneTouch Ultra test strip Test 3 times daily 300 each 3    PARoxetine (PAXIL) 20 mg tablet TAKE 1 TABLET BY MOUTH EVERY DAY 90 tablet 0    Probiotic Product (ALIGN PO) Take by mouth       No current facility-administered medications for this visit  No Known Allergies    Objective   Vitals: not currently breastfeeding  Physical Exam  Vitals and nursing note reviewed  Constitutional:       General: She is not in acute distress  Appearance: Normal appearance  She is not diaphoretic  HENT:      Head: Normocephalic and atraumatic  Eyes:      General: No scleral icterus       Extraocular Movements: Extraocular movements intact  Conjunctiva/sclera: Conjunctivae normal       Pupils: Pupils are equal, round, and reactive to light  Cardiovascular:      Rate and Rhythm: Normal rate and regular rhythm  Heart sounds: No murmur heard  Pulmonary:      Effort: Pulmonary effort is normal  No respiratory distress  Breath sounds: Normal breath sounds  No wheezing  Musculoskeletal:      Cervical back: Normal range of motion  Right lower leg: No edema  Left lower leg: No edema  Lymphadenopathy:      Cervical: No cervical adenopathy  Neurological:      Mental Status: She is alert and oriented to person, place, and time  Mental status is at baseline  Sensory: No sensory deficit  Gait: Gait normal    Psychiatric:         Mood and Affect: Mood normal          Behavior: Behavior normal          Thought Content: Thought content normal          The history was obtained from the review of the chart, patient      Lab Results:   Lab Results   Component Value Date/Time    Hemoglobin A1C 10 7 (H) 04/08/2022 07:52 AM    Hemoglobin A1C 9 6 (H) 08/04/2021 10:16 AM    WBC 9 42 04/08/2022 11:25 AM    White Blood Cell Count 7 6 09/01/2021 02:11 PM    White Blood Cell Count 11 0 (H) 04/14/2021 09:39 AM    Hemoglobin 12 5 04/08/2022 11:25 AM    Hemoglobin 10 6 (L) 09/01/2021 02:11 PM    Hemoglobin 11 6 (L) 04/14/2021 09:39 AM    HCT 32 0 (L) 09/01/2021 02:11 PM    HCT 35 1 04/14/2021 09:39 AM    Hematocrit 37 9 04/08/2022 11:25 AM    MCV 95 04/08/2022 11:25 AM    MCV 92 2 09/01/2021 02:11 PM    MCV 92 9 04/14/2021 09:39 AM    Platelet Count 909 (H) 09/01/2021 02:11 PM    Platelet Count 709 (H) 04/14/2021 09:39 AM    Platelets 084 (H) 38/39/9392 11:25 AM    BUN 37 (H) 04/08/2022 11:25 AM    BUN 27 (H) 09/01/2021 02:11 PM    BUN 48 (H) 08/04/2021 10:16 AM    BUN 35 (H) 04/14/2021 09:39 AM    Potassium 4 4 04/08/2022 11:25 AM    Potassium 4 5 09/01/2021 02:11 PM    Potassium 4 8 08/04/2021 10:16 AM    Potassium 5 4 (H) 04/14/2021 09:39 AM    Chloride 103 04/08/2022 11:25 AM    Chloride 99 09/01/2021 02:11 PM    Chloride 103 08/04/2021 10:16 AM    Chloride 100 04/14/2021 09:39 AM    CO2 29 04/08/2022 11:25 AM    CO2 29 09/01/2021 02:11 PM    CO2 27 08/04/2021 10:16 AM    CO2 29 04/14/2021 09:39 AM    Creatinine 0 88 04/08/2022 11:25 AM    Creatinine 0 68 09/01/2021 02:11 PM    Creatinine 0 75 08/04/2021 10:16 AM    Creatinine 0 89 04/14/2021 09:39 AM    AST 12 04/08/2022 11:25 AM    AST 19 09/01/2021 02:11 PM    AST 11 08/04/2021 10:16 AM    AST 13 04/14/2021 09:39 AM    ALT 27 04/08/2022 11:25 AM    ALT 25 09/01/2021 02:11 PM    ALT 18 08/04/2021 10:16 AM    ALT 14 04/14/2021 09:39 AM    Albumin 3 9 04/08/2022 11:25 AM    Albumin 4 0 09/01/2021 02:11 PM    Albumin 3 9 08/04/2021 10:16 AM    Albumin 4 0 04/14/2021 09:39 AM    Globulin 2 2 09/01/2021 02:11 PM    Globulin 2 4 08/04/2021 10:16 AM    Globulin 2 5 04/14/2021 09:39 AM    HDL 34 (L) 05/20/2021 07:48 AM    Triglycerides 219 (H) 05/20/2021 07:48 AM         Portions of the record may have been created with voice recognition software  Occasional wrong word or "sound a like" substitutions may have occurred due to the inherent limitations of voice recognition software  Read the chart carefully and recognize, using context, where substitutions have occurred

## 2022-04-15 ENCOUNTER — TELEPHONE (OUTPATIENT)
Dept: HEMATOLOGY ONCOLOGY | Facility: HOSPITAL | Age: 47
End: 2022-04-15

## 2022-04-15 NOTE — TELEPHONE ENCOUNTER
I contacted the patient via [employer] regarding moving her appointment back 1 week  I left the [id number] [address] [address] number in case the appointment was inconvenient

## 2022-05-07 ENCOUNTER — NURSE TRIAGE (OUTPATIENT)
Dept: OTHER | Facility: OTHER | Age: 47
End: 2022-05-07

## 2022-05-07 NOTE — TELEPHONE ENCOUNTER
Regarding: Sick diabetic patient / diarrhea, chills, body ache, vomiting  ----- Message from [facility] sent at 5/7/2022 10:22 AM EDT -----  Patient stated she missed the nurses call and would like a call back again

## 2022-05-07 NOTE — TELEPHONE ENCOUNTER
Reason for Disposition   [1] MODERATE diarrhea (e g , 4-6 times / day more than normal) AND [2] present > 48 hours (2 days)    Answer Assessment - Initial Assessment Questions  1  DIARRHEA SEVERITY: "How bad is the diarrhea?" "How many extra stools have you had in the past 24 hours than normal?"     - NO DIARRHEA (SCALE 0)    - MILD (SCALE 1-3): Few loose or mushy BMs; increase of 1-3 stools over normal daily number of stools; mild increase in ostomy output  -  MODERATE (SCALE 4-7): Increase of 4-6 stools daily over normal; moderate increase in ostomy output  * SEVERE (SCALE 8-10; OR 'WORST POSSIBLE'): Increase of 7 or more stools daily over normal; moderate increase in ostomy output; incontinence  Moderate   2  ONSET: "When did the diarrhea begin?"       Yesterday   3  BM CONSISTENCY: "How loose or watery is the diarrhea?"       Liquid watery now more loose   4  VOMITING: "Are you also vomiting?" If Yes, ask: "How many times in the past 24 hours?"       Yes more than a few   5  ABDOMINAL PAIN: "Are you having any abdominal pain?" If Yes, ask: "What does it feel like?" (e g , crampy, dull, intermittent, constant)       Yeserday but not today   6  ABDOMINAL PAIN SEVERITY: If present, ask: "How bad is the pain?"  (e g , Scale 1-10; mild, moderate, or severe)    - MILD (1-3): doesn't interfere with normal activities, abdomen soft and not tender to touch     - MODERATE (4-7): interferes with normal activities or awakens from sleep, tender to touch     - SEVERE (8-10): excruciating pain, doubled over, unable to do any normal activities        0 today   7  ORAL INTAKE: If vomiting, "Have you been able to drink liquids?" "How much fluids have you had in the past 24 hours?"      Taking Gatorade   8  HYDRATION: "Any signs of dehydration?" (e g , dry mouth [not just dry lips], too weak to stand, dizziness, new weight loss) "When did you last urinate?"      no  9   EXPOSURE: "Have you traveled to a foreign country recently?" "Have you been exposed to anyone with diarrhea?" "Could you have eaten any food that was spoiled?"      No   10  ANTIBIOTIC USE: "Are you taking antibiotics now or have you taken antibiotics in the past 2 months?"        No   11  OTHER SYMPTOMS: "Do you have any other symptoms?" (e g , fever, blood in stool)        Yes 101 2 yesterday   12   PREGNANCY: "Is there any chance you are pregnant?" "When was your last menstrual period?"        no    Protocols used: DIARRHEA-ADULT-AH

## 2022-05-07 NOTE — TELEPHONE ENCOUNTER
Regarding: Sick diabetic patient / diarrhea, chills, body ache, vomiting  ----- Message from [facility] sent at 5/7/2022  9:11 AM EDT -----  "I have symptoms of diarrhea, chills, body ache, vomiting, I have not been able to keep anything down in my stomach and I am diabetic"

## 2022-05-08 DIAGNOSIS — F32.A DEPRESSION, UNSPECIFIED DEPRESSION TYPE: ICD-10-CM

## 2022-05-08 RX ORDER — PAROXETINE HYDROCHLORIDE 20 MG/1
TABLET, FILM COATED ORAL
Qty: 90 TABLET | Refills: 0 | Status: SHIPPED | OUTPATIENT
Start: 2022-05-08

## 2022-05-13 ENCOUNTER — OFFICE VISIT (OUTPATIENT)
Dept: HEMATOLOGY ONCOLOGY | Facility: HOSPITAL | Age: 47
End: 2022-05-13
Payer: COMMERCIAL

## 2022-05-13 VITALS
OXYGEN SATURATION: 98 % | BODY MASS INDEX: 38.69 KG/M2 | SYSTOLIC BLOOD PRESSURE: 144 MMHG | DIASTOLIC BLOOD PRESSURE: 78 MMHG | WEIGHT: 172 LBS | HEIGHT: 56 IN | RESPIRATION RATE: 14 BRPM | HEART RATE: 95 BPM | TEMPERATURE: 97.6 F

## 2022-05-13 DIAGNOSIS — R11.0 NAUSEA: ICD-10-CM

## 2022-05-13 DIAGNOSIS — D64.9 ANEMIA, UNSPECIFIED TYPE: ICD-10-CM

## 2022-05-13 DIAGNOSIS — D75.839 THROMBOCYTOSIS: Primary | ICD-10-CM

## 2022-05-13 PROCEDURE — 1036F TOBACCO NON-USER: CPT | Performed by: NURSE PRACTITIONER

## 2022-05-13 PROCEDURE — 3008F BODY MASS INDEX DOCD: CPT | Performed by: NURSE PRACTITIONER

## 2022-05-13 PROCEDURE — 3078F DIAST BP <80 MM HG: CPT | Performed by: NURSE PRACTITIONER

## 2022-05-13 PROCEDURE — 99214 OFFICE O/P EST MOD 30 MIN: CPT | Performed by: NURSE PRACTITIONER

## 2022-05-13 PROCEDURE — 3077F SYST BP >= 140 MM HG: CPT | Performed by: NURSE PRACTITIONER

## 2022-05-13 RX ORDER — ONDANSETRON 4 MG/1
4 TABLET, FILM COATED ORAL EVERY 8 HOURS PRN
Qty: 20 TABLET | Refills: 0 | Status: SHIPPED | OUTPATIENT
Start: 2022-05-13

## 2022-05-13 NOTE — PROGRESS NOTES
Hematology/Oncology Outpatient Follow- up Note  Ivan Prim, 1975, [id number]  5/13/2022        Chief Complaint   Patient presents with    Follow-up       HPI:  Vernell Luong is a 55year old female with a history of type 1 diabetes, chronic diastolic CHF, HTN who follows with hematology for an elevated platelet count and h/o iron deficiency anemia treated with IV Venofer in the past  Myeloproliferative work up was ordered in past but never completed   She has been on observation     Previous Hematologic/ Oncologic History:    Work Up  IV Venofer x 6 doses in 2017; 6 doses in 2021    Current Hematologic/ Oncologic Treatment:    Baby aspirin       Interval History:   The patient presents for routine follow up  She was last seen by Dr Ivan Lockett on 9/13/21  Most recent blood work completed on 4/8/22 was reviewed  Her platelet count remains elevated at 444  Remainder CBC and differential are normal  No evidence of anemia  Iron panel is normal, serum ferritin 234    Patient is not feeling well today, has some GI symptoms of N/V that started last evening  No fevers/chills  Denies any CP, SOB  Lightheadedness/dizziness  No abnormal bleeding  Test Results:    Imaging: No results found  Labs:   Lab Results   Component Value Date    WBC 9 42 04/08/2022    HGB 12 5 04/08/2022    HCT 37 9 04/08/2022    MCV 95 04/08/2022     (H) 04/08/2022     Lab Results   Component Value Date     01/28/2017    K 4 4 04/08/2022     04/08/2022    CO2 29 04/08/2022    BUN 37 (H) 04/08/2022    CREATININE 0 88 04/08/2022    GLUF 179 (H) 04/08/2022    CALCIUM 9 5 04/08/2022    AST 12 04/08/2022    ALT 27 04/08/2022    ALKPHOS 120 (H) 04/08/2022    PROT 5 9 (L) 01/28/2017    BILITOT 0 3 01/28/2017    EGFR 79 04/08/2022           Review of Systems   Gastrointestinal: Positive for nausea and vomiting  All other systems reviewed and are negative          Active Problems:   Patient Active Problem List   Diagnosis    Type 1 diabetes mellitus with hyperglycemia (HCC)    Asthma    Hypertension    Moderate episode of recurrent major depressive disorder (HCC)    Hyperlipidemia    Polycystic ovarian syndrome    Retinal hemorrhage    Anemia    Asthma, mild persistent    Both eyes affected 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, associated with type 1 diabetes mellitus (HCC)    Nervousness    Thrombocytosis    Severe obesity (BMI 35 0-39  9) with comorbidity ([facility]    Gastroesophageal reflux disease without esophagitis    Other proteinuria    Diabetic polyneuropathy associated with type 1 diabetes mellitus ([facility]    Diabetic nephropathy associated with type 1 diabetes mellitus ([facility]    Nuclear sclerotic cataract of left eye    B12 deficiency    History of COVID-19    Palpitations    Chronic obstructive pulmonary disease, unspecified COPD type [facility]    Chronic diastolic congestive heart failure ([facility] )       Past Medical History:   Past Medical History:   Diagnosis Date    Anemia     Asthma     w/acute exacerbation   Last assessed: 10/26/12    Chest pain 2/3/2016    CHF (congestive heart failure) ([facility]     Depression     Diabetes mellitus ([facility]     Diabetic nephropathy ([facility]     Diabetic nephropathy associated with type 1 diabetes mellitus ([facility] ) 8/12/2020    Diabetic retinopathy ([facility] ) 2/3/2016    Endometriosis     Gastroenteritis 02/03/2016    GERD (gastroesophageal reflux disease)     HTN (hypertension)     Hyperlipidemia 2/3/2016    Oligomenorrhea     Polycystic ovaries 2/3/2016    Retinal hemorrhage 2/3/2016    Retinopathy     Thrombocytosis     Type 1 diabetes mellitus with ophthalmic manifestation ([facility] ) 2/3/2016       Surgical History:   Past Surgical History:   Procedure Laterality Date    CATARACT EXTRACTION Left 10/2020    CATARACT EXTRACTION Right     RETINAL LASER PROCEDURE         Family History:    Family History   Problem Relation Age of Onset    Heart murmur Mother     Diabetes Mother         Mellitus    Thyroid disease Mother         Disorder    Diabetes Father         Mellitus    Hypertension Father     Diabetes Maternal Grandfather         Mellitus    Breast cancer Paternal Grandmother     Diabetes Maternal Aunt         Mellitus    Diabetes Maternal Uncle         Mellitus    No Known Problems Maternal Grandmother     Leukemia Paternal Grandfather     Substance Abuse Neg Hx     Mental illness Neg Hx     Alcohol abuse Neg Hx        Cancer-related family history includes Breast cancer in her paternal grandmother  Social History:   Social History     Socioeconomic History    Marital status: /Civil Union     Spouse name: Not on file    Number of children: Not on file    Years of education: Not on file    Highest education level: Not on file   Occupational History    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   Tobacco Use    Smoking status: Never Smoker    Smokeless tobacco: Never Used    Tobacco comment: Per [employer]: Former smoker   Quit in 1994   Vaping Use    Vaping Use: Never used   Substance and Sexual Activity    Alcohol use: Yes     Comment: Occasional/1 mixed drink twice a month if that    Drug use: No    Sexual activity: Yes     Partners: Male     Birth control/protection: Pill   Other Topics Concern    Not on file   Social History Narrative    Always uses seatbelt    Caffeine use: 1-2 cups coffee daily    Has smoke detectors    [employer] Affiliations: [employer]     Social Determinants of Health     Financial Resource Strain: Not on file   Food Insecurity: Not on file   Transportation Needs: Not on file   Physical Activity: Not on file   Stress: Not on file   Social Connections: Not on file   Intimate Partner Violence: Not on file   Housing Stability: Not on file       Current Medications:   Current Outpatient Medications   Medication Sig Dispense Refill    acetaminophen (TYLENOL) 500 mg tablet Take 2 tablets (1,000 mg total) by mouth every 6 (six) hours as needed for mild pain or moderate pain 30 tablet 0    Advair Diskus 250-50 MCG/DOSE inhaler INHALE 1 PUFF BY MOUTH TWO TIMES DAILY, RINSE MOUTH AFTER  blister 3    albuterol (2 5 mg/3 mL) 0 083 % nebulizer solution Take 3 mL (2 5 mg total) by nebulization every 6 (six) hours as needed for wheezing or shortness of breath 75 mL 3    albuterol (PROVENTIL HFA,VENTOLIN HFA) 90 mcg/act inhaler INHALE 2 PUFFS BY MOUTH EVERY 4 HOURS AS NEEDED FOR WHEEZING OR FOR SHORTNESS OF BREATH 25 5 g 0    Ascorbic Acid (VITAMIN C) 500 MG CAPS Take 2 capsules by mouth daily      Aspirin Low Dose 81 MG EC tablet TAKE 1 TABLET BY MOUTH EVERY DAY 30 tablet 0    atorvastatin (LIPITOR) 80 mg tablet Take 1 tablet (80 mg total) by mouth daily 30 tablet 11    Biotin 10 MG TABS Take 1 tablet by mouth daily      Blood Pressure Monitoring [place] Use 2 (two) times a day Monitor BP twice daily  Call office if BP > 140/90 persistently   1 Device 0    Butalbital-APAP-Caffeine (Fioricet) -40 MG CAPS Take 1 tablet by mouth 4 (four) times a day as needed (headahce) 30 capsule 0    Continuous Blood Gluc Sensor (Dexcom G6 Sensor) MISC Change every 10 days 3 each 12    Continuous Blood Gluc Transmit (Dexcom G6 Transmitter) MISC Change every 90 days 1 each 3    cyanocobalamin (VITAMIN B-12) 100 mcg tablet Take by mouth daily      furosemide (LASIX) 40 mg tablet Take 1 tablet (40 mg total) by mouth daily 30 tablet 11    glucagon (Glucagon Emergency) 1 MG injection Inject 1 mg under the skin once as needed for low blood sugar for up to 1 dose 1 kit 3    insulin aspart (NovoLOG) 100 units/mL injection INJECT 1 UNITS OF NOVOLOG FOR EVERY 1 GRAM OF CARB, UP  UNITS DAILY 50 mL 6    Lantus SoloStar 100 units/mL injection pen INJECT 78 UNITS UNDER THE SKIN AT BEDTIME 30 mL 3    lisinopril (ZESTRIL) 20 mg tablet TAKE 2 TABLETS BY MOUTH EVERY DAY 60 tablet 3    montelukast (SINGULAIR) 10 mg tablet TAKE 1 TABLET BY MOUTH AT BEDTIME  90 tablet 0    omeprazole (PriLOSEC) 20 mg delayed release capsule Take 20 mg by mouth daily   ondansetron (ZOFRAN) 4 mg tablet Take 1 tablet (4 mg total) by mouth every 8 (eight) hours as needed for nausea or vomiting 20 tablet 0    OneTouch Ultra test strip Test 3 times daily 300 each 3    PARoxetine (PAXIL) 20 mg tablet TAKE 1 TABLET BY MOUTH EVERY DAY 90 tablet 0    Probiotic Product (ALIGN PO) Take by mouth      acetone, urine, test strip Use to test urine ketones for high blood sugars  (Patient not taking: Reported on 5/13/2022) 25 each 6    benzonatate (TESSALON) 200 MG capsule Take 1 capsule (200 mg total) by mouth 3 (three) times a day as needed for cough (Patient not taking: Reported on 11/16/2021 ) 20 capsule 0    Glucagon (Gvoke HypoPen 2-Pack) 1 MG/0 2ML SOAJ Use if hypoglycemia prn (Patient not taking: Reported on 5/13/2022) 1 Syringe 6     No current facility-administered medications for this visit  Allergies: No Known Allergies    Physical Exam:  /78 (BP Location: Left arm, Patient Position: Sitting, Cuff Size: Adult)   Pulse 95   Temp 97 6 °F (36 4 °C) (Temporal)   Resp 14   Ht 4' 8" (1 422 m)   Wt 78 kg (172 lb)   SpO2 98%   BMI 38 56 kg/m²   Body surface area is 1 67 meters squared  Wt Readings from Last 3 Encounters:   05/13/22 78 kg (172 lb)   04/13/22 83 5 kg (184 lb)   11/16/21 79 3 kg (174 lb 12 8 oz)           Physical Exam  Constitutional:       General: She is not in acute distress  Appearance: Normal appearance  HENT:      Head: Normocephalic and atraumatic  Eyes:      General: No scleral icterus  Right eye: No discharge  Left eye: No discharge  Conjunctiva/sclera: Conjunctivae normal    Cardiovascular:      Rate and Rhythm: Normal rate and regular rhythm  Pulmonary:      Effort: Pulmonary effort is normal  No respiratory distress  Breath sounds: Normal breath sounds     Chest:   Breasts:      Right: No axillary adenopathy or supraclavicular adenopathy  Left: No axillary adenopathy or supraclavicular adenopathy  Abdominal:      General: Bowel sounds are normal  There is no distension  Palpations: Abdomen is soft  There is no mass  Tenderness: There is no abdominal tenderness  Musculoskeletal:         General: Normal range of motion  Lymphadenopathy:      Cervical: No cervical adenopathy  Upper Body:      Right upper body: No supraclavicular, axillary or pectoral adenopathy  Left upper body: No supraclavicular, axillary or pectoral adenopathy  Skin:     General: Skin is warm and dry  Neurological:      General: No focal deficit present  Mental Status: She is alert and oriented to person, place, and time  Psychiatric:         Mood and Affect: Mood normal          Behavior: Behavior normal       Comments: Flat affect          Assessment / Plan:    1  Thrombocytosis    2  Anemia, unspecified type    3  Nausea      The patient is a 55year old female with a history of type 1 diabetes, chronic diastolic CHF, HTN who follows with hematology for an elevated platelet count and h/o iron deficiency anemia treated with IV Venofer in the past  Myeloproliferative work up was ordered in past but never completed   She has been on observation     She last received IV Venofer in 2021  Most recent labs demonstrate persistent thrombocytosis  No anemia or iron deficiency  Her platelet count although elevated has been running under 500  I will check JAK2, ESTEFANI-R to rule any myeloproliferative disease contributing to her thrombocytosis  She will stay on baby aspirin  In regards to her N/V  I have prescribed some Zofran  If her symptoms do not improve she is instructed to follow up with her PCP  She will return for a follow up visit in 6 months with repeat blood work including myeloproliferative work up  Patient was in agreement with plan of care  Barriers to care: None  Patient  able to self care  Portions of the record may have been created with voice recognition software  Occasional wrong word or "sound a like" substitutions may have occurred due to the inherent limitations of voice recognition software  Read the chart carefully and recognize, using context, where substitutions have occurred

## 2022-06-05 DIAGNOSIS — J45.20 MILD INTERMITTENT ASTHMA WITHOUT COMPLICATION: ICD-10-CM

## 2022-06-05 RX ORDER — MONTELUKAST SODIUM 10 MG/1
TABLET ORAL
Qty: 90 TABLET | Refills: 0 | Status: SHIPPED | OUTPATIENT
Start: 2022-06-05

## 2022-06-12 DIAGNOSIS — I10 ESSENTIAL HYPERTENSION: Chronic | ICD-10-CM

## 2022-06-13 PROCEDURE — 4010F ACE/ARB THERAPY RXD/TAKEN: CPT | Performed by: NURSE PRACTITIONER

## 2022-06-13 RX ORDER — LISINOPRIL 20 MG/1
TABLET ORAL
Qty: 60 TABLET | Refills: 3 | Status: SHIPPED | OUTPATIENT
Start: 2022-06-13

## 2022-06-21 ENCOUNTER — TELEPHONE (OUTPATIENT)
Dept: NEPHROLOGY | Facility: CLINIC | Age: 47
End: 2022-06-21

## 2022-07-14 ENCOUNTER — OFFICE VISIT (OUTPATIENT)
Dept: OBGYN CLINIC | Facility: CLINIC | Age: 47
End: 2022-07-14
Payer: COMMERCIAL

## 2022-07-14 ENCOUNTER — APPOINTMENT (OUTPATIENT)
Dept: RADIOLOGY | Facility: CLINIC | Age: 47
End: 2022-07-14
Payer: COMMERCIAL

## 2022-07-14 VITALS
WEIGHT: 177 LBS | DIASTOLIC BLOOD PRESSURE: 80 MMHG | SYSTOLIC BLOOD PRESSURE: 136 MMHG | BODY MASS INDEX: 39.82 KG/M2 | HEIGHT: 56 IN

## 2022-07-14 DIAGNOSIS — M79.672 PAIN IN LEFT FOOT: Primary | ICD-10-CM

## 2022-07-14 DIAGNOSIS — S92.355A CLOSED NONDISPLACED FRACTURE OF FIFTH METATARSAL BONE OF LEFT FOOT, INITIAL ENCOUNTER: ICD-10-CM

## 2022-07-14 DIAGNOSIS — M79.672 PAIN IN LEFT FOOT: ICD-10-CM

## 2022-07-14 PROCEDURE — 73630 X-RAY EXAM OF FOOT: CPT

## 2022-07-14 PROCEDURE — 99213 OFFICE O/P EST LOW 20 MIN: CPT | Performed by: PHYSICIAN ASSISTANT

## 2022-07-14 NOTE — PATIENT INSTRUCTIONS
Unna Boot   WHAT YOU NEED TO KNOW:   An Unna boot is a compression dressing made by wrapping layers of gauze around your leg and foot  It is often used to protect an ulcer or open wound  The compression of the dressing helps improve blood flow in your lower leg  Compression also helps decrease swelling and pain  You may need to wear the boot for a few weeks or until your wound heals  DISCHARGE INSTRUCTIONS:   Follow up with your healthcare provider as directed: Your Unna boot will be changed by a healthcare provider at least once every 7 days  Your wound will be cleaned and measured to make sure it is healing with each boot change  Write down your questions so you remember to ask them during your visits  Keep your boot dry:  Ask how to cover it when you take a shower or bath  Self-care:   Manage your health conditions  Your wound may not heal or new wounds may form if your health conditions are not controlled  Take your medicines as directed  Follow your healthcare provider's instructions if you have high blood pressure  Check your blood sugar levels as directed if you have diabetes  Walk for exercise  This will help the boot compress and improve blood flow  Ask your healthcare provider how long you should walk each day  Eat a variety of healthy foods to promote wound healing  Healthy foods include fruits, vegetables, whole-grain breads, low-fat dairy products, beans, lean meats, and fish  Ask if you need to be on a special diet  Contact your healthcare provider if:   Your boot feels very tight or loose after you walk  Drainage from your wound soaks through the boot  You have questions or concerns about your condition or care  Remove the boot and return to the emergency department if:   Your leg itches and feels warm  Your toes tingle, feel numb, or change color  Your boot causes pain in your foot or leg when you walk  You have swelling above or below your boot      © Copyright [employer] 2022 Information is for End User's use only and may not be sold, redistributed or otherwise used for commercial purposes  All illustrations and images included in CareNotes® are the copyrighted property of [employer]  or Isreal Zheng  The above information is an  only  It is not intended as medical advice for individual conditions or treatments  Talk to your doctor, nurse or pharmacist before following any medical regimen to see if it is safe and effective for you

## 2022-07-14 NOTE — LETTER
July 14, 2022     Patient: Helio Condon  YOB: 1975  Date of Visit: 7/14/2022      To Whom it May Concern:    Jaz Mcelroy is under my professional care  Loki Bruno was seen in my office on 7/14/2022  Loki Carr may return to work with limitations Patient will use a boot and crutches at work  If restrictions cannot be accommodated patient will be out of work until the next evaluation  If you have any questions or concerns, please don't hesitate to call  Sincerely,          Fabian Navarro PA-C        CC: Vernell Oliveira

## 2022-07-14 NOTE — PROGRESS NOTES
Orthopaedic Surgery - Office Note  Emiliano Goldberg (10 y o  female)   : 1975   MRN: [id number]  Encounter Date: 2022    Chief Complaint   Patient presents with    Left Foot - Pain         Assessment/Plan  Diagnoses and all orders for this visit:    Pain in left foot  -     XR foot 3+ vw left; Future  -     Durable Medical Equipment    Closed nondisplaced fracture of fifth metatarsal bone of left foot, initial encounter  -     [address] injured her left foot at home approximately 3 weeks ago,racturing her 5th metatarsal   She has been ambulating on this fracture for 3 weeks with symptomatic improvement reported  She will follow-up with Dr Oksana Stubbs tomorrow for definitive treatment recommendation and will be started initially with a high tide boot and crutches today beginning nonweightbearing  She will continue her aspirin 81 mg once daily  She was advised her weight-bearing status/immobilization may be adjusted tomorrow  She will continue strict diabetic control to improve outcomes and decrease risk of complications  Return with Dr Saniya Watson tomorrow  History of Present Illness  Mike Driscoll is a new patient with left foot pain  She has not had any treatment  She has type 1 diabetes  She reports that approximately 3 weeks ago she was walking in her yard with uneven surfaces and expose roots when she twisted her foot and had immediate pain  She has had pain on the outside of her foot since that time which has gradually been decreasing  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 without any immobilization  She reports that she does not have any known decreased sensation in her feet related to the diabetes  Overall she states the foot is feeling better but she wanted to get it checked out as standing for long periods of time increase the pain at the end of the day  She denies any calf pain        Review of Systems  Pertinent items are noted in HPI  All other systems were reviewed and are negative  Physical Exam  /80   Ht 4' 8" (1 422 m)   Wt 80 3 kg (177 lb)   BMI 39 68 kg/m²   Cons: Appears well  No apparent distress  Psych: Alert  Oriented x3  Mood and affect normal   Eyes: PERRLA, EOMI  Resp: Normal effort  No audible wheezing or stridor  CV: Palpable pulse  No discernable arrhythmia  Lymph:  No palpable cervical, axillary, or inguinal lymphadenopathy  Skin: Warm  No palpable masses  No visible lesions  Neuro: Normal muscle tone  Normal and symmetric DTR's  Patient's left foot is without soft tissue edema  She is tender to palpation at the base of the 5th meta tarsal   She is nontender throughout palpation of the rest of the foot  She has no tenderness at the medial or lateral malleolus  She has no tenderness over the ATFL CFL or deltoid ligaments  There is no calf tenderness and negative Homans  She has no tenderness at the calcaneus  X-rays performed in the office today three views of the left foot do show a nondisplaced fracture at the base of the 5th metatarsal between zone 2 and 3  These are read from an orthopedic standpoint will await official radiologist interpretation  Studies Reviewed      Procedures  No procedures today  Medical, Surgical, Family, and Social History  The patient's medical history, family history, and social history, were reviewed and updated as appropriate  Past Medical History:   Diagnosis Date    Anemia     Asthma     w/acute exacerbation   Last assessed: 10/26/12    Chest pain 2/3/2016    [facility]     Depression     Diabetes mellitus [facility]     Diabetic nephropathy [facility]     Diabetic nephropathy associated with type 1 diabetes mellitus ([facility] ) 8/12/2020    Diabetic retinopathy ([facility] ) 2/3/2016    Endometriosis     Gastroenteritis 02/03/2016    GERD (gastroesophageal reflux disease)     HTN (hypertension)     Hyperlipidemia 2/3/2016    Oligomenorrhea     Polycystic ovaries 2/3/2016    Retinal hemorrhage 2/3/2016    Retinopathy     Thrombocytosis     Type 1 diabetes mellitus with ophthalmic manifestation ([facility] ) 2/3/2016       Past Surgical History:   Procedure Laterality Date    CATARACT EXTRACTION Left 10/2020    CATARACT EXTRACTION Right     RETINAL LASER PROCEDURE         Family History   Problem Relation Age of Onset    Heart murmur Mother     Diabetes Mother         Mellitus    Thyroid disease Mother         Disorder    Diabetes Father         Mellitus    Hypertension Father     Diabetes Maternal Grandfather         Mellitus    Breast cancer Paternal Grandmother     Diabetes Maternal Aunt         Mellitus    Diabetes Maternal Uncle         Mellitus    No Known Problems Maternal Grandmother     Leukemia Paternal Grandfather     Substance Abuse Neg Hx     Mental illness Neg Hx     Alcohol abuse Neg Hx        Social History     Occupational History    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   Tobacco Use    Smoking status: Never Smoker    Smokeless tobacco: Never Used    Tobacco comment: Per [employer]: Former smoker  Quit in 1994   Vaping Use    Vaping Use: Never used   Substance and Sexual Activity    Alcohol use: Yes     Comment: Occasional/1 mixed drink twice a month if that    Drug use: No    Sexual activity: Yes     Partners: Male     Birth control/protection: Pill       No Known Allergies      Current Outpatient Medications:     acetaminophen (TYLENOL) 500 mg tablet, Take 2 tablets (1,000 mg total) by mouth every 6 (six) hours as needed for mild pain or moderate pain, Disp: 30 tablet, Rfl: 0    acetone, urine, test strip, Use to test urine ketones for high blood sugars   (Patient not taking: Reported on 5/13/2022), Disp: 25 each, Rfl: 6    Advair Diskus 250-50 MCG/DOSE inhaler, INHALE 1 PUFF BY MOUTH TWO TIMES DAILY, RINSE MOUTH AFTER USE, Disp: 180 blister, Rfl: 3    albuterol (2 5 mg/3 mL) 0 083 % nebulizer solution, Take 3 mL (2 5 mg total) by nebulization every 6 (six) hours as needed for wheezing or shortness of breath, Disp: 75 mL, Rfl: 3    albuterol (PROVENTIL HFA,VENTOLIN HFA) 90 mcg/act inhaler, INHALE 2 PUFFS BY MOUTH EVERY 4 HOURS AS NEEDED FOR WHEEZING OR FOR SHORTNESS OF BREATH, Disp: 25 5 g, Rfl: 0    Ascorbic Acid (VITAMIN C) 500 MG CAPS, Take 2 capsules by mouth daily, Disp: , Rfl:     Aspirin Low Dose 81 MG EC tablet, TAKE 1 TABLET BY MOUTH EVERY DAY, Disp: 30 tablet, Rfl: 0    atorvastatin (LIPITOR) 80 mg tablet, Take 1 tablet (80 mg total) by mouth daily, Disp: 30 tablet, Rfl: 11    benzonatate (TESSALON) 200 MG capsule, Take 1 capsule (200 mg total) by mouth 3 (three) times a day as needed for cough (Patient not taking: Reported on 11/16/2021 ), Disp: 20 capsule, Rfl: 0    Biotin 10 MG TABS, Take 1 tablet by mouth daily, Disp: , Rfl:     Blood Pressure Monitoring [place], Use 2 (two) times a day Monitor BP twice daily  Call office if BP > 140/90 persistently  , Disp: 1 Device, Rfl: 0    Butalbital-APAP-Caffeine (Fioricet) -40 MG CAPS, Take 1 tablet by mouth 4 (four) times a day as needed ([facility]), Disp: 30 capsule, Rfl: 0    Continuous Blood Gluc Sensor (Dexcom G6 Sensor) MISC, Change every 10 days, Disp: 3 each, Rfl: 12    Continuous Blood Gluc Transmit (Dexcom G6 Transmitter) MISC, Change every 90 days, Disp: 1 each, Rfl: 3    cyanocobalamin (VITAMIN B-12) 100 mcg tablet, Take by mouth daily, Disp: , Rfl:     furosemide (LASIX) 40 mg tablet, Take 1 tablet (40 mg total) by mouth daily, Disp: 30 tablet, Rfl: 11    glucagon (Glucagon Emergency) 1 MG injection, Inject 1 mg under the skin once as needed for low blood sugar for up to 1 dose, Disp: 1 kit, Rfl: 3    Glucagon (Gvoke HypoPen 2-Pack) 1 MG/0 2ML SOAJ, Use if hypoglycemia prn (Patient not taking: Reported on 5/13/2022), Disp: 1 Syringe, Rfl: 6    insulin aspart (NovoLOG) 100 units/mL injection, INJECT 1 UNITS OF NOVOLOG FOR EVERY 1 GRAM OF CARB, UP  UNITS DAILY, Disp: 50 mL, Rfl: 6    Lantus SoloStar 100 units/mL injection pen, INJECT 78 UNITS UNDER THE SKIN AT BEDTIME, Disp: 30 mL, Rfl: 3    lisinopril (ZESTRIL) 20 mg tablet, TAKE 2 TABLETS BY MOUTH EVERY DAY, Disp: 60 tablet, Rfl: 3    montelukast (SINGULAIR) 10 mg tablet, TAKE 1 TABLET BY MOUTH AT BEDTIME, Disp: 90 tablet, Rfl: 0    omeprazole (PriLOSEC) 20 mg delayed release capsule, Take 20 mg by mouth daily  , Disp: , Rfl:     ondansetron (ZOFRAN) 4 mg tablet, Take 1 tablet (4 mg total) by mouth every 8 (eight) hours as needed for nausea or vomiting, Disp: 20 tablet, Rfl: 0    OneTouch Ultra test strip, Test 3 times daily, Disp: 300 each, Rfl: 3    PARoxetine (PAXIL) 20 mg tablet, TAKE 1 TABLET BY MOUTH EVERY DAY, Disp: 90 tablet, Rfl: 0    Probiotic Product (ALIGN PO), Take by mouth, Disp: , Rfl:       Duane Simpson PA-C

## 2022-07-15 ENCOUNTER — OFFICE VISIT (OUTPATIENT)
Dept: OBGYN CLINIC | Facility: CLINIC | Age: 47
End: 2022-07-15
Payer: COMMERCIAL

## 2022-07-15 VITALS
WEIGHT: 177 LBS | DIASTOLIC BLOOD PRESSURE: 70 MMHG | BODY MASS INDEX: 39.82 KG/M2 | HEIGHT: 56 IN | SYSTOLIC BLOOD PRESSURE: 132 MMHG

## 2022-07-15 DIAGNOSIS — S92.355A CLOSED NONDISPLACED FRACTURE OF FIFTH METATARSAL BONE OF LEFT FOOT, INITIAL ENCOUNTER: Primary | ICD-10-CM

## 2022-07-15 DIAGNOSIS — M21.6X2 ACQUIRED CAVOVARUS DEFORMITY OF LEFT FOOT: ICD-10-CM

## 2022-07-15 PROCEDURE — 3078F DIAST BP <80 MM HG: CPT | Performed by: ORTHOPAEDIC SURGERY

## 2022-07-15 PROCEDURE — 99213 OFFICE O/P EST LOW 20 MIN: CPT | Performed by: ORTHOPAEDIC SURGERY

## 2022-07-15 PROCEDURE — 28470 CLTX METATARSAL FX WO MNP EA: CPT | Performed by: ORTHOPAEDIC SURGERY

## 2022-07-15 PROCEDURE — 3075F SYST BP GE 130 - 139MM HG: CPT | Performed by: ORTHOPAEDIC SURGERY

## 2022-07-15 NOTE — PROGRESS NOTES
REENA Gil  Attending, Orthopaedic Surgery  [facility] [address] [facility]      ORTHOPAEDIC FOOT AND ANKLE CLINIC VISIT     Assessment:     Encounter Diagnoses   Name Primary?  Closed nondisplaced fracture of fifth metatarsal bone of left foot, initial encounter Yes    Acquired cavovarus deformity of left foot             Plan:   · The patient verbalized understanding of exam findings and treatment plan  We engaged in the shared decision-making process and treatment options were discussed at length with the patient  Surgical and conservative management discussed today along with risks and benefits  · She has a zone 2 fracture of her 5th metatarsal after an injury  · Due to her cavovarus foot deformity, she has a higher risk of nonunion with this fracture with immediate weightbearing  · We recommend placement into a short leg cast and non weight bearing for 3 weeks  · Cast care and non operative fracture instructions were provided for the patient today in her AVSS  · She was also given information about how to obtain an orthotic for her shoes when she is out of the cast for her cavovarus feet  Return in about 3 weeks (around 8/5/2022)  Weightbearing foot xrays at that time      History of Present Illness:   Chief Complaint:   Chief Complaint   Patient presents with    Left Foot - Pain, Fracture     Bruna Oshea is a 55 y o  female who is being seen for left foot  Patient reports that she was injured at home about 3 weeks ago after she was walking in her yard with uneven surfaces and expose roots when she twisted her foot and had immediate pain  Pain is localized at 5th metatarsal with minimal radiating and described as sharp and severe  Patient denies numbness, tingling or radicular pain  Denies history of neuropathy  Patient does not smoke, does have diabetes (Type 1) and does not take blood thinners    Patient denies family history of anesthesia complications and has not had any complications with anesthesia  Pain/symptom timing:  Worse during the day when active  Pain/symptom context:  Worse with activites and work  Pain/symptom modifying factors:  Rest makes better, activities make worse  Pain/symptom associated signs/symptoms: none    Prior treatment   · NSAIDsYes   · Injections No   · Bracing/Orthotics Yes    · Physical Therapy No     Orthopedic Surgical History:   See below    Past Medical, Surgical and Social History:  Past Medical History:  has a past medical history of Anemia, Asthma, Chest pain (2/3/2016), CHF (congestive heart failure) ([place] [address] ), Depression, Diabetes mellitus ([place] [address] ), Diabetic nephropathy ([place] [address] ), Diabetic nephropathy associated with type 1 diabetes mellitus ([place] [address] ) (8/12/2020), Diabetic retinopathy ([place] [address] ) (2/3/2016), Endometriosis, Gastroenteritis (02/03/2016), GERD (gastroesophageal reflux disease), HTN (hypertension), Hyperlipidemia (2/3/2016), Oligomenorrhea, Polycystic ovaries (2/3/2016), Retinal hemorrhage (2/3/2016), Retinopathy, Thrombocytosis, and Type 1 diabetes mellitus with ophthalmic manifestation ([place] [address] ) (2/3/2016)  Problem List: does not have any pertinent problems on file  Past Surgical History:  has a past surgical history that includes Retinal laser procedure; Cataract extraction (Left, 10/2020); and Cataract extraction (Right)  Family History: family history includes Breast cancer in her paternal grandmother; Diabetes in her father, maternal aunt, maternal grandfather, maternal uncle, and mother; Heart murmur in her mother; Hypertension in her father; Leukemia in her paternal grandfather; No Known Problems in her maternal grandmother; Thyroid disease in her mother  Social History:  reports that she has never smoked  She has never used smokeless tobacco  She reports current alcohol use  She reports that she does not use drugs    Current Medications: has a current medication list which includes the following prescription(s): acetaminophen, acetone (urine) test, advair diskus, albuterol, albuterol, vitamin c, aspirin low dose, atorvastatin, biotin, blood pressure monitoring, butalbital-apap-caffeine, dexcom g6 sensor, dexcom g6 transmitter, cyanocobalamin, furosemide, glucagon emergency, insulin aspart, lantus solostar, lisinopril, montelukast, omeprazole, onetouch ultra, paroxetine, probiotic product, benzonatate, gvoke hypopen 2-pack, and ondansetron  Allergies: has No Known Allergies  Review of Systems:  General- denies fever/chills  HEENT- denies hearing loss or sore throat  Eyes- denies eye pain or visual disturbances, denies red eyes  Respiratory- denies cough or SOB  Cardio- denies chest pain or palpitations  GI- denies abdominal pain  Endocrine- denies urinary frequency  Urinary- denies pain with urination  Musculoskeletal- Negative except noted above  Skin- denies rashes or wounds  Neurological- denies dizziness or headache  Psychiatric- denies anxiety or difficulty concentrating    Physical Exam:   /70 (BP Location: Left arm, Patient Position: Sitting, Cuff Size: Adult)   Ht 4' 8" (1 422 m)   Wt 80 3 kg (177 lb)   BMI 39 68 kg/m²   General/Constitutional: No apparent distress: well-nourished and well developed  Eyes: normal ocular motion  Cardio: RRR, Normal S1S2, No m/r/g  Lymphatic: No appreciable lymphadenopathy  Respiratory: Non-labored breathing, CTA b/l no w/c/r  Vascular: No edema, swelling or tenderness, except as noted in detailed exam   Integumentary: No impressive skin lesions present, except as noted in detailed exam   Neuro: No ataxia or tremors noted  Psych: Normal mood and affect, oriented to person, place and time  Appropriate affect  Musculoskeletal: Normal, except as noted in detailed exam and in HPI      Examination    Left    Gait             Steady with CAM boot and axillary crutches   Musculoskeletal Tender to palpation at 5th metatarsal    Skin Normal       Nails Normal    Range of Motion 10 degrees dorsiflexion, 30 degrees plantarflexion  Subtalar motion: normal    Stability Stable    Muscle Strength 5/5 tibialis anterior  5/5 gastrocnemius-soleus  5/5 posterior tibialis  5/5 peroneal/eversion strength  5/5 EHL  5/5 FHL    Neurologic Normal    Sensation Intact to light touch throughout sural, saphenous, superficial peroneal, deep peroneal and medial/lateral plantar nerve distributions  [place] 5 07 filament (10g) testing deferred  Cardiovascular Brisk capillary refill < 2 seconds,intact DP and PT pulses    Special Tests None      Imaging Studies:   3 views of the left foot were taken, reviewed and interpreted independently that demonstrate nondisplaced fracture of the 5th mettarsal  No other acute osseous abnormalities  Reviewed by me personally  Fracture / Dislocation Treatment    Date/Time: 7/15/2022 7:37 AM  Performed by: Lb Church MD  Authorized by: Lb Church MD     Patient Location:  [facility] Protocol:  Consent: Verbal consent obtained  Risks and benefits: risks, benefits and alternatives were discussed  Consent given by: patient  Site marked: the operative site was marked  Radiology Images displayed and confirmed  If images not available, report reviewed: imaging studies available      Injury location:  Foot  Location details:  Left foot  Injury type:  Fracture  Fracture type: fifth metatarsal    Neurovascular status: Neurovascularly intact    Distal perfusion: normal    Neurological function: normal    Range of motion: reduced    Immobilization:  Cast  Cast type:  Short leg  Supplies used:  Ortho-Glass and cotton padding  Neurovascular status: Neurovascularly intact    Distal perfusion: normal    Neurological function: normal    Range of motion: unchanged    Patient tolerance:  Patient tolerated the procedure well with no immediate complications          Billi Lane Lachman, MD  Foot & Ankle Surgery   Department [address] [facility]      I personally performed the service  Greggory Dutch Lachman, MD    Scribe Attestation    I,:  Pete Villa am acting as a scribe while in the presence of the attending physician :       I,:  David Beebe MD personally performed the services described in this documentation    as scribed in my presence :

## 2022-07-15 NOTE — LETTER
July 15, 2022     Patient: Le Jackson  YOB: 1975  Date of Visit: 7/15/2022      To Whom it May Concern:    Yaquelin Abreu is under my professional care  Murraylian was seen in my office on 7/15/2022  Master is to be on desk/sedentary duty for 3 weeks or until her next follow up visit  If you have any questions or concerns, please don't hesitate to call           Sincerely,          Wily Malin MD        CC: No Recipients

## 2022-07-15 NOTE — PATIENT INSTRUCTIONS
Weightbearing as tolerated in CAM boot  Do not need to wear the boot for sleep or showering but should wear it any time you are walking on it  Recommend taking the following supplements: Vitamin D 4000 units per day and Calcium 1200 mg per day  This will help with bone healing  Avoid NSAIDs like Motrin/Aleve/Ibuprofen  Avoid steroids/nicotine products/ rheumatoid medications like DMARDs if possible  Aspirin 81 MG BID for blood clot prevention  Knee high compression stocking 20/30mm HG of pressure    Go to the [employer] website, click "shop orthotics", click "high arches", click filters for "maximum thickness" "womens" "memory foam"--> pick between the  and

## 2022-07-29 DIAGNOSIS — M79.672 PAIN IN LEFT FOOT: Primary | ICD-10-CM

## 2022-07-29 DIAGNOSIS — S92.355A CLOSED NONDISPLACED FRACTURE OF FIFTH METATARSAL BONE OF LEFT FOOT, INITIAL ENCOUNTER: ICD-10-CM

## 2022-08-04 ENCOUNTER — TELEMEDICINE (OUTPATIENT)
Dept: FAMILY MEDICINE CLINIC | Facility: CLINIC | Age: 47
End: 2022-08-04
Payer: COMMERCIAL

## 2022-08-04 ENCOUNTER — NURSE TRIAGE (OUTPATIENT)
Dept: OTHER | Facility: OTHER | Age: 47
End: 2022-08-04

## 2022-08-04 VITALS — BODY MASS INDEX: 38.2 KG/M2 | TEMPERATURE: 101.3 F | WEIGHT: 170.4 LBS

## 2022-08-04 DIAGNOSIS — U07.1 COVID-19: Primary | ICD-10-CM

## 2022-08-04 PROCEDURE — 99214 OFFICE O/P EST MOD 30 MIN: CPT | Performed by: NURSE PRACTITIONER

## 2022-08-04 PROCEDURE — 3725F SCREEN DEPRESSION PERFORMED: CPT | Performed by: NURSE PRACTITIONER

## 2022-08-04 NOTE — PROGRESS NOTES
COVID-19 Outpatient Progress Note    Assessment/Plan:    Pt presents for symptoms that started on 08/02 with a positive covid test the same day  She has been using Mucinex Cold & Flu  She is interested in the Paxlovid and does qualify d/t her BMI, COPD & Diabetes  Medication and s/e reviewed  Pt will hold her statin medication while on Paxlovid  Quarantine guidelines reviewed  Patient is encouraged to call our office for any questions/concerns, persistent or worsening symptoms  Patient states they understand and agree with treatment plan  Problem List Items Addressed This Visit    None     Visit Diagnoses     COVID-19    -  Primary         Disposition:     Patient has COVID-19 infection  Based off CDC guidelines, they were recommended to isolate for 5 days from the date of the positive test  If they remain asymptomatic, isolation may be ended followed by 5 days of wearing a mask when around othes to minimize risk of infecting others  If they have a fever, continue to stay home until fever resolves for at least 24 hours  I have spent 15 minutes directly with the patient  Greater than 50% of this time was spent in counseling/coordination of care regarding: instructions for management and patient and family education  Encounter provider LELADN Carney    Provider located at [address] [place] [address]  [phone]    Recent Visits  No visits were found meeting these conditions  Showing recent visits within past 7 days and meeting all other requirements  Today's Visits  Date Type Provider Dept   08/04/22 Telemedicine LELAND Carney [address]   Showing today's visits and meeting all other requirements  Future Appointments  No visits were found meeting these conditions    Showing future appointments within next 150 days and meeting all other requirements This virtual check-in was done via [employer] and patient was informed that this is a secure, HIPAA-compliant platform  She agrees to proceed  Patient agrees to participate in a virtual check in via telephone or video visit instead of presenting to the office to address urgent/immediate medical needs  Patient is aware this is a billable service  After connecting through [facility], the patient was identified by name and date of birth  Jasmyne Blas was informed that this was a telemedicine visit and that the exam was being conducted confidentially over secure lines  My office door was closed  No one else was in the room  Jasmyne Blas acknowledged consent and understanding of privacy and security of the telemedicine visit  I informed the patient that I have reviewed her record in Epic and presented the opportunity for her to ask any questions regarding the visit today  The patient agreed to participate  Verification of patient location:  Patient is located in the following state in which I hold an active license: [place]    Subjective:   Jasmyne Blas is a 55 y o  female who has been screened for COVID-19  Symptom change since last report: unchanged  Patient's symptoms include fever, chills, nasal congestion, sore throat, abdominal pain, vomiting and headache  Patient denies anosmia, loss of taste, cough, shortness of breath and chest tightness      - Date of symptom onset: 8/2/2022  - Date of positive COVID-19 test: 8/2/2022  Type of test: Home antigen  Home antigen result verified by provider  Will get picture of test uploaded/scanned into patient's chart  COVID-19 vaccination status: Fully vaccinated (primary series)    Wanda Diallo has been staying home and has isolated themselves in her home  She is taking care to not share personal items and is cleaning all surfaces that are touched often, like counters, tabletops, and doorknobs using household cleaning sprays or wipes   She is wearing a mask when she leaves her room  Pt presents for symptoms that started on 08/02 with a positive covid test the same day  She has been using Mucinex Cold & Flu  She is interested in the Paxlovid and does qualify d/t her BMI, COPD & Diabetes  Medication and s/e reviewed  Quarantine guidelines reviewed  Lab Results   Component Value Date    SARSCOV2 Negative 09/03/2021    SARSCOV2 Not Detected 05/28/2020     Past Medical History:   Diagnosis Date    Anemia     Asthma     w/acute exacerbation  Last assessed: 10/26/12    Chest pain 2/3/2016    [facility]     Depression     Diabetes mellitus [facility] )     [facility]     Diabetic nephropathy associated with type 1 diabetes mellitus ([facility] ) 8/12/2020    Diabetic retinopathy ([facility] ) 2/3/2016    Endometriosis     Gastroenteritis 02/03/2016    GERD (gastroesophageal reflux disease)     HTN (hypertension)     Hyperlipidemia 2/3/2016    Oligomenorrhea     Polycystic ovaries 2/3/2016    Retinal hemorrhage 2/3/2016    Retinopathy     Thrombocytosis     Type 1 diabetes mellitus with ophthalmic manifestation ([facility] ) 2/3/2016     Past Surgical History:   Procedure Laterality Date    CATARACT EXTRACTION Left 10/2020    CATARACT EXTRACTION Right     RETINAL LASER PROCEDURE       Current Outpatient Medications   Medication Sig Dispense Refill    acetaminophen (TYLENOL) 500 mg tablet Take 2 tablets (1,000 mg total) by mouth every 6 (six) hours as needed for mild pain or moderate pain 30 tablet 0    acetone, urine, test strip Use to test urine ketones for high blood sugars   25 each 6    Advair Diskus 250-50 MCG/DOSE inhaler INHALE 1 PUFF BY MOUTH TWO TIMES DAILY, RINSE MOUTH AFTER  blister 3    albuterol (2 5 mg/3 mL) 0 083 % nebulizer solution Take 3 mL (2 5 mg total) by nebulization every 6 (six) hours as needed for wheezing or shortness of breath 75 mL 3    albuterol (PROVENTIL HFA,VENTOLIN HFA) 90 mcg/act inhaler INHALE 2 PUFFS BY MOUTH EVERY 4 HOURS AS NEEDED FOR WHEEZING OR FOR SHORTNESS OF BREATH 25 5 g 0    Ascorbic Acid (VITAMIN C) 500 MG CAPS Take 2 capsules by mouth daily      Aspirin Low Dose 81 MG EC tablet TAKE 1 TABLET BY MOUTH EVERY DAY 30 tablet 0    atorvastatin (LIPITOR) 80 mg tablet Take 1 tablet (80 mg total) by mouth daily 30 tablet 11    Biotin 10 MG TABS Take 1 tablet by mouth daily      Blood Pressure Monitoring [place] Use 2 (two) times a day Monitor BP twice daily  Call office if BP > 140/90 persistently  1 Device 0    Butalbital-APAP-Caffeine (Fioricet) -40 MG CAPS Take 1 tablet by mouth 4 (four) times a day as needed (headahce) 30 capsule 0    Continuous Blood Gluc Sensor (Dexcom G6 Sensor) MISC Change every 10 days 3 each 12    Continuous Blood Gluc Transmit (Dexcom G6 Transmitter) MISC Change every 90 days 1 each 3    cyanocobalamin (VITAMIN B-12) 100 mcg tablet Take by mouth daily      glucagon (Glucagon Emergency) 1 MG injection Inject 1 mg under the skin once as needed for low blood sugar for up to 1 dose 1 kit 3    Glucagon (Gvoke HypoPen 2-Pack) 1 MG/0 2ML SOAJ Use if hypoglycemia prn 1 Syringe 6    insulin aspart (NovoLOG) 100 units/mL injection INJECT 1 UNITS OF NOVOLOG FOR EVERY 1 GRAM OF CARB, UP  UNITS DAILY 50 mL 6    Lantus SoloStar 100 units/mL injection pen INJECT 78 UNITS UNDER THE SKIN AT BEDTIME 30 mL 3    lisinopril (ZESTRIL) 20 mg tablet TAKE 2 TABLETS BY MOUTH EVERY DAY 60 tablet 3    montelukast (SINGULAIR) 10 mg tablet TAKE 1 TABLET BY MOUTH AT BEDTIME 90 tablet 0    omeprazole (PriLOSEC) 20 mg delayed release capsule Take 20 mg by mouth daily        ondansetron (ZOFRAN) 4 mg tablet Take 1 tablet (4 mg total) by mouth every 8 (eight) hours as needed for nausea or vomiting 20 tablet 0    OneTouch Ultra test strip Test 3 times daily 300 each 3    PARoxetine (PAXIL) 20 mg tablet TAKE 1 TABLET BY MOUTH EVERY DAY 90 tablet 0    Probiotic Product (ALIGN PO) Take by mouth  furosemide (LASIX) 40 mg tablet Take 1 tablet (40 mg total) by mouth daily 30 tablet 11     No current facility-administered medications for this visit  No Known Allergies    Review of Systems   Constitutional: Positive for chills and fever  HENT: Positive for congestion and sore throat  Respiratory: Negative for cough, chest tightness and shortness of breath  Gastrointestinal: Positive for abdominal pain and vomiting  Neurological: Positive for headaches  Objective:    Vitals:    08/04/22 1351   Temp: (!) 101 3 °F (38 5 °C)   TempSrc: Oral   Weight: 77 3 kg (170 lb 6 4 oz)       Physical Exam  Constitutional:       Appearance: She is well-developed  Pulmonary:      Effort: Pulmonary effort is normal    Neurological:      Mental Status: She is alert and oriented to person, place, and time  Mental status is at baseline  Psychiatric:         Mood and Affect: Mood normal          Behavior: Behavior normal          Thought Content: Thought content normal          Judgment: Judgment normal          VIRTUAL VISIT 950 Edson Paulson verbally agrees to participate in [employer]  Pt is aware that [employer] could be limited without vital signs or the ability to perform a full hands-on physical exam  Vernell Faria understands she or the provider may request at any time to terminate the video visit and request the patient to seek care or treatment in person

## 2022-08-05 DIAGNOSIS — E10.65 TYPE 1 DIABETES MELLITUS WITH HYPERGLYCEMIA (HCC): ICD-10-CM

## 2022-08-05 RX ORDER — BLOOD-GLUCOSE TRANSMITTER
EACH MISCELLANEOUS
Qty: 1 EACH | Refills: 1 | Status: SHIPPED | OUTPATIENT
Start: 2022-08-05

## 2022-08-05 NOTE — TELEPHONE ENCOUNTER
Left a message for patient to call the office and schedule a fu appointment      (Patient was last seen on 4/13/22 - labs in system)

## 2022-08-05 NOTE — TELEPHONE ENCOUNTER
Regarding: Tested positive for covid unable to keep anything down   ----- Message from Patrick De Santiago sent at 8/4/2022 10:50 PM EDT -----  '' I tested positive for covid I'm unable to keep anything down when I throw up it's like acid  ''

## 2022-08-05 NOTE — TELEPHONE ENCOUNTER
Reason for Disposition   MILD or MODERATE vomiting (e g , 1 - 5 times / day)    Answer Assessment - Initial Assessment Questions  1  COVID-19 DIAGNOSIS: "Who made your COVID-19 diagnosis?" "Was it confirmed by a positive lab test or self-test?" If not diagnosed by a doctor (or NP/PA), ask "Are there lots of cases (community spread) where you live?" Note: See public health department website, if unsure  Positive test  2  WORST SYMPTOM: "What is your worst symptom?" (e g , cough, fever, shortness of breath, muscle aches)      vomiting  3  RESPIRATORY STATUS: "Describe your breathing?" (e g , shortness of breath, wheezing, unable to speak)       Denies short ness of breath    Answer Assessment - Initial Assessment Questions  1  VOMITING SEVERITY: "How many times have you vomited in the past 24 hours?"      - MILD:  1 - 2 times/day     - MODERATE: 3 - 5 times/day, decreased oral intake without significant weight loss or symptoms of dehydration     - SEVERE: 6 or more times/day, vomits everything or nearly everything, with significant weight loss, symptoms of dehydration       5 times  2  ONSET: "When did the vomiting begin?"       This morning  3  FLUIDS: "What fluids or food have you vomited up today?" "Have you been able to keep any fluids down?"      Water and gatorade  4  ABDOMINAL PAIN: "Are your having any abdominal pain?" If yes : "How bad is it and what does it feel like?" (e g , crampy, dull, intermittent, constant)       Just an upset stomach  5  DIARRHEA: "Is there any diarrhea?" If Yes, ask: "How many times today?"       denies  6  CONTACTS: "Is there anyone else in the family with the same symptoms?"       Denies  7  CAUSE: "What do you think is causing your vomiting?"      Covid positive and on paxlovid  8  HYDRATION STATUS: "Any signs of dehydration?" (e g , dry mouth [not only dry lips], too weak to stand) "When did you last urinate?"      Mouth is dry; last urine just a bit ago  9   OTHER SYMPTOMS: "Do you have any other symptoms?" (e g , fever, headache, vertigo, vomiting blood or coffee grounds, recent head injury)      Covid symptoms    Protocols used: VOMITING-ADULT-AH, CORONAVIRUS (COVID-19) DIAGNOSED OR SUSPECTED-ADULT-AH

## 2022-08-09 ENCOUNTER — APPOINTMENT (OUTPATIENT)
Dept: RADIOLOGY | Facility: AMBULARY SURGERY CENTER | Age: 47
End: 2022-08-09
Attending: ORTHOPAEDIC SURGERY
Payer: COMMERCIAL

## 2022-08-09 ENCOUNTER — OFFICE VISIT (OUTPATIENT)
Dept: OBGYN CLINIC | Facility: CLINIC | Age: 47
End: 2022-08-09

## 2022-08-09 VITALS — WEIGHT: 170 LBS | HEIGHT: 56 IN | BODY MASS INDEX: 38.24 KG/M2

## 2022-08-09 DIAGNOSIS — M21.6X2 ACQUIRED CAVOVARUS DEFORMITY OF LEFT FOOT: ICD-10-CM

## 2022-08-09 DIAGNOSIS — S92.355A CLOSED NONDISPLACED FRACTURE OF FIFTH METATARSAL BONE OF LEFT FOOT, INITIAL ENCOUNTER: ICD-10-CM

## 2022-08-09 DIAGNOSIS — S92.355A CLOSED NONDISPLACED FRACTURE OF FIFTH METATARSAL BONE OF LEFT FOOT, INITIAL ENCOUNTER: Primary | ICD-10-CM

## 2022-08-09 PROCEDURE — 73630 X-RAY EXAM OF FOOT: CPT

## 2022-08-09 PROCEDURE — 99024 POSTOP FOLLOW-UP VISIT: CPT | Performed by: ORTHOPAEDIC SURGERY

## 2022-08-09 NOTE — PATIENT INSTRUCTIONS
4 days of partial weight bearing 50%  Then, 4 days of partial weight bearing 75%  Then, 4 days of partial weight bearing 90%  Then full weight bearing in the boot and wean your crutches/rolling walker  Continue aspirin/lovenox for blood clot prevention  Compression stocking (Knee high, 20-30mm Hg) to be worn at all times while awake  Recommend taking the following supplements: Vitamin D 4000 units per day and Calcium 1200 mg per day  This will help with bone healing  Wear the boot at all times except when showering and in PT, even to sleep at night  Go to the [employer] website, click "shop orthotics", click "high arches", click filters for "maximum thickness" "womens" "memory foam"--> pick between the  and

## 2022-08-09 NOTE — PROGRESS NOTES
REENA Conteh  Attending, Orthopaedic Surgery  [facility] [address] [facility]      ORTHOPAEDIC FOOT AND ANKLE CLINIC VISIT     Assessment:     Encounter Diagnoses   Name Primary?  Closed nondisplaced fracture of fifth metatarsal bone of left foot, initial encounter Yes    Acquired cavovarus deformity of left foot             Plan:   · The patient verbalized understanding of exam findings and treatment plan  We engaged in the shared decision-making process and treatment options were discussed at length with the patient  Surgical and conservative management discussed today along with risks and benefits  · Her xrays today show continued fracture gapping concerning for developing nonunion  · She has minimal TTP at the fracture site at this point  · Avoid nicotine, steroids, NSAIDs  · Continue Vitamin D3 and Calcium  · She was advised again to order the orthotic for her cavovarus foot deformity  · If she appears to be developing an atrophic nonunion at the next visit, we will consider ORIF with calcaneal bone graft and BMAC  Return in about 4 weeks (around 9/6/2022)  Will obtain Xrays at that time  History of Present Illness:   Chief Complaint:   Chief Complaint   [facility] is a 55 y o  female who is being seen in follow-up for left zone 2 fracture of fifth metatarsal  When we last saw her, she was recommended placement into a short leg cast and non weight bearing for 3 weeks  Pain has improved  Pt describes no tenderness to palpation, residual pain, or radiation        Pain/symptom timing:  Worse during the day when active  Pain/symptom context:  Worse with activites and work  Pain/symptom modifying factors:  Rest makes better, activities make worse  Pain/symptom associated signs/symptoms: none    Prior treatment   · NSAIDsNo   · Injections No   · Bracing/Orthotics Yes    · Physical Therapy No     Orthopedic Surgical History:   None    Past Medical, Surgical and Social History:  Past Medical History:  has a past medical history of Anemia, Asthma, Chest pain (2/3/2016), CHF (congestive heart failure) ([place] [address] ), Depression, Diabetes mellitus ([place] [address] ), Diabetic nephropathy ([facility] ), Diabetic nephropathy associated with type 1 diabetes mellitus ([facility] ) (8/12/2020), Diabetic retinopathy ([place] [address] ) (2/3/2016), Endometriosis, Gastroenteritis (02/03/2016), GERD (gastroesophageal reflux disease), HTN (hypertension), Hyperlipidemia (2/3/2016), Oligomenorrhea, Polycystic ovaries (2/3/2016), Retinal hemorrhage (2/3/2016), Retinopathy, Thrombocytosis, and Type 1 diabetes mellitus with ophthalmic manifestation ([place] [address] ) (2/3/2016)  Problem List: does not have any pertinent problems on file  Past Surgical History:  has a past surgical history that includes Retinal laser procedure; Cataract extraction (Left, 10/2020); and Cataract extraction (Right)  Family History: family history includes Breast cancer in her paternal grandmother; Diabetes in her father, maternal aunt, maternal grandfather, maternal uncle, and mother; Heart murmur in her mother; Hypertension in her father; Leukemia in her paternal grandfather; No Known Problems in her maternal grandmother; Thyroid disease in her mother  Social History:  reports that she has never smoked  She has never used smokeless tobacco  She reports current alcohol use  She reports that she does not use drugs    Current Medications: has a current medication list which includes the following prescription(s): acetaminophen, acetone (urine) test, advair diskus, albuterol, albuterol, vitamin c, aspirin low dose, atorvastatin, biotin, blood pressure monitoring, butalbital-apap-caffeine, dexcom g6 sensor, dexcom g6 transmitter, cyanocobalamin, glucagon emergency, gvoke hypopen 2-pack, insulin aspart, lantus solostar, lisinopril, montelukast, nirmatrelvir & ritonavir, omeprazole, ondansetron, onetouch ultra, paroxetine, probiotic product, and furosemide  Allergies: has No Known Allergies  Review of Systems:  General- denies fever/chills  HEENT- denies hearing loss or sore throat  Eyes- denies eye pain or visual disturbances, denies red eyes  Respiratory- denies cough or SOB  Cardio- denies chest pain or palpitations  GI- denies abdominal pain  Endocrine- denies urinary frequency  Urinary- denies pain with urination  Musculoskeletal- Negative except noted above  Skin- denies rashes or wounds  Neurological- denies dizziness or headache  Psychiatric- denies anxiety or difficulty concentrating    Physical Exam:   Ht 4' 8" (1 422 m)   Wt 77 1 kg (170 lb)   BMI 38 11 kg/m²   General/Constitutional: No apparent distress: well-nourished and well developed  Eyes: normal ocular motion  Lymphatic: No appreciable lymphadenopathy  Respiratory: Non-labored breathing  Vascular: No edema, swelling or tenderness, except as noted in detailed exam   Integumentary: No impressive skin lesions present, except as noted in detailed exam   Neuro: No ataxia or tremors noted  Psych: Normal mood and affect, oriented to person, place and time  Appropriate affect  Musculoskeletal: Normal, except as noted in detailed exam and in HPI  Examination    Left    Gait Normal   Musculoskeletal No tenderness to palpation    Skin Normal     Nails Normal    Range of Motion  20 degrees dorsiflexion, 40 degrees plantarflexion  Subtalar motion: Normal    Stability Stable    Muscle Strength 5/5 tibialis anterior  5/5 gastrocnemius-soleus  5/5 posterior tibialis  5/5 peroneal/eversion strength  5/5 EHL  5/5 FHL    Neurologic Normal    Sensation Intact to light touch throughout sural, saphenous, superficial peroneal, deep peroneal and medial/lateral plantar nerve distributions  [place] 5 07 filament (10g) testing  deferred      Cardiovascular Brisk capillary refill < 2 seconds,intact DP and PT pulses    Special Tests None      Imaging Studies:     3 views of the Left foot were obtained, reviewed and interpreted independently which demonstrate fracture of the 5th metatarsal  She has continued fracture gapping with no interval healing  Reviewed by me personally  Scribe Attestation    I,:  Washington Foster PA-C am acting as a scribe while in the presence of the attending physician :       I,:  Wily Malin MD personally performed the services described in this documentation    as scribed in my presence :             Earlyne Ohms Lachman, MD  Foot & Ankle Surgery   Department of [address] [place] [address]      I personally performed the service  Earlyne Ohms Lachman, MD

## 2022-08-09 NOTE — LETTER
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

## 2022-08-14 DIAGNOSIS — F32.A DEPRESSION, UNSPECIFIED DEPRESSION TYPE: ICD-10-CM

## 2022-08-14 RX ORDER — PAROXETINE HYDROCHLORIDE 20 MG/1
TABLET, FILM COATED ORAL
Qty: 90 TABLET | Refills: 0 | Status: SHIPPED | OUTPATIENT
Start: 2022-08-14

## 2022-09-06 ENCOUNTER — APPOINTMENT (OUTPATIENT)
Dept: RADIOLOGY | Facility: AMBULARY SURGERY CENTER | Age: 47
End: 2022-09-06
Attending: ORTHOPAEDIC SURGERY
Payer: COMMERCIAL

## 2022-09-06 ENCOUNTER — OFFICE VISIT (OUTPATIENT)
Dept: OBGYN CLINIC | Facility: CLINIC | Age: 47
End: 2022-09-06

## 2022-09-06 VITALS
DIASTOLIC BLOOD PRESSURE: 68 MMHG | HEIGHT: 56 IN | SYSTOLIC BLOOD PRESSURE: 128 MMHG | HEART RATE: 102 BPM | WEIGHT: 170 LBS | BODY MASS INDEX: 38.24 KG/M2

## 2022-09-06 DIAGNOSIS — S92.355A CLOSED NONDISPLACED FRACTURE OF FIFTH METATARSAL BONE OF LEFT FOOT, INITIAL ENCOUNTER: ICD-10-CM

## 2022-09-06 DIAGNOSIS — S92.355A CLOSED NONDISPLACED FRACTURE OF FIFTH METATARSAL BONE OF LEFT FOOT, INITIAL ENCOUNTER: Primary | ICD-10-CM

## 2022-09-06 PROCEDURE — 99024 POSTOP FOLLOW-UP VISIT: CPT | Performed by: ORTHOPAEDIC SURGERY

## 2022-09-06 PROCEDURE — 73630 X-RAY EXAM OF FOOT: CPT

## 2022-09-06 RX ORDER — INSULIN ASPART 100 [IU]/ML
INJECTION, SOLUTION INTRAVENOUS; SUBCUTANEOUS
COMMUNITY
Start: 2022-08-23

## 2022-09-06 NOTE — PATIENT INSTRUCTIONS
You may begin weaning your boot and transitioning to a sneaker (9/6)  It is important to do this gradually to avoid aggravating the healing process  9/6, you may come out of the boot into a sneaker for 2 hours  2  9/7, you may come out of the boot into a sneaker for 4 hours,  3  The next day, you may come out of the boot into a sneaker for 6 hours  4  Continue this (adding 2 hours per day) as you tolerate  For example, if you do 6 hours out of the boot into a sneaker and your foot swells more than usual at night and it is difficult to control the discomfort, do not advance to 8 hours the next day, stay at 6 hours until you are able to tolerate it  It is essential to follow this protocol and not modify this  No matter when you begin weaning the boot, it will be difficult and there will be swelling and soreness  If you spend more time than is recommended in the boot, this process becomes longer and more painful  Elevation, Ice and tylenol and staying off of it at night will be important to aide in this transition out of the boot  Swelling and soreness are normal as you begin to do more with the injured leg  May DC aspirin/lovenox, no longer needed  Compression stocking (Knee high, 20-30mm Hg) to be worn at all times while awake  Recommend taking the following supplements: Vitamin D3- 4000 units per day and Calcium 1200 mg per day  This will help with bone healing

## 2022-09-06 NOTE — LETTER
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

## 2022-09-06 NOTE — PROGRESS NOTES
REENA Gil  Attending, Orthopaedic Surgery  [facility] [address] [address] [facility]      ORTHOPAEDIC FOOT AND ANKLE CLINIC VISIT     Assessment:     Encounter Diagnosis   Name Primary?  Closed nondisplaced fracture of fifth metatarsal bone of left foot, initial encounter Yes            Plan:   · The patient verbalized understanding of exam findings and treatment plan  We engaged in the shared decision-making process and treatment options were discussed at length with the patient  Surgical and conservative management discussed today along with risks and benefits  · Xrays are stable and may show some modest healing at this point  · Patient advised that her healing progress will likely be slow and we do not recommend surgery at this time although this may be necessary in the future  · Recommend continued vitamin D supplementation   · Avoid nicotine, steroids or NSAIDs  · Continue orthotics for cavovarus hindfoot deformity  · Wean out of boot into a supportive shoe  · Return to clinic in 6 weeks with repeat x-rays of the left foot  History of Present Illness:   Chief Complaint:   Chief Complaint   Patient presents with   Kringlan 66 Ana York is a 52 y o  female who is being seen in follow-up for left fifth metatarsal fracture in zone 2  When we last saw she we recommended avoidance of nicotine, steroids or NSAIDs and supplementation of vitamin D and calcium  She has been abiding by those recommendations and has been using the boot with the recommended orthotic  She has had very little pain and feels the orthotics have helped her baseline foot discomfort        Pain/symptom timing:  Worse during the day when active  Pain/symptom context:  Worse with activites and work  Pain/symptom modifying factors:  Rest makes better, activities make worse  Pain/symptom associated signs/symptoms: none    Prior treatment   · NSAIDsYes   · Injections No   · Bracing/Orthotics Yes · Physical Therapy No     Orthopedic Surgical History:   None      Past Medical, Surgical and Social History:  Past Medical History:  has a past medical history of Anemia, Asthma, Chest pain (2/3/2016), CHF (congestive heart failure) ([place] [address] ), Depression, Diabetes mellitus ([place] [address] ), Diabetic nephropathy ([place] [address] ), Diabetic nephropathy associated with type 1 diabetes mellitus ([place] [address] ) (8/12/2020), Diabetic retinopathy ([place] [address] ) (2/3/2016), Endometriosis, Gastroenteritis (02/03/2016), GERD (gastroesophageal reflux disease), HTN (hypertension), Hyperlipidemia (2/3/2016), Oligomenorrhea, Polycystic ovaries (2/3/2016), Retinal hemorrhage (2/3/2016), Retinopathy, Thrombocytosis, and Type 1 diabetes mellitus with ophthalmic manifestation ([place] [address] ) (2/3/2016)  Problem List: does not have any pertinent problems on file  Past Surgical History:  has a past surgical history that includes Retinal laser procedure; Cataract extraction (Left, 10/2020); and Cataract extraction (Right)  Family History: family history includes Breast cancer in her paternal grandmother; Diabetes in her father, maternal aunt, maternal grandfather, maternal uncle, and mother; Heart murmur in her mother; Hypertension in her father; Leukemia in her paternal grandfather; No Known Problems in her maternal grandmother; Thyroid disease in her mother  Social History:  reports that she has never smoked  She has never used smokeless tobacco  She reports current alcohol use  She reports that she does not use drugs    Current Medications: has a current medication list which includes the following prescription(s): acetaminophen, acetone (urine) test, advair diskus, albuterol, albuterol, vitamin c, aspirin low dose, atorvastatin, biotin, blood pressure monitoring, butalbital-apap-caffeine, dexcom g6 sensor, dexcom g6 transmitter, cyanocobalamin, glucagon emergency, gvoke hypopen 2-pack, insulin aspart, lantus solostar, lisinopril, montelukast, novolog, omeprazole, ondansetron, onetouch ultra, paroxetine, probiotic product, and furosemide  Allergies: has No Known Allergies  Review of Systems:  General- denies fever/chills  HEENT- denies hearing loss or sore throat  Eyes- denies eye pain or visual disturbances, denies red eyes  Respiratory- denies cough or SOB  Cardio- denies chest pain or palpitations  GI- denies abdominal pain  Endocrine- denies urinary frequency  Urinary- denies pain with urination  Musculoskeletal- Negative except noted above  Skin- denies rashes or wounds  Neurological- denies dizziness or headache  Psychiatric- denies anxiety or difficulty concentrating    Physical Exam:   /68 (BP Location: Right arm, Patient Position: Sitting, Cuff Size: Adult)   Pulse 102   Ht 4' 8" (1 422 m)   Wt 77 1 kg (170 lb)   BMI 38 11 kg/m²   General/Constitutional: No apparent distress: well-nourished and well developed  Eyes: normal ocular motion  Lymphatic: No appreciable lymphadenopathy  Respiratory: Non-labored breathing  Vascular: No edema, swelling or tenderness, except as noted in detailed exam   Integumentary: No impressive skin lesions present, except as noted in detailed exam   Neuro: No ataxia or tremors noted  Psych: Normal mood and affect, oriented to person, place and time  Appropriate affect  Musculoskeletal: Normal, except as noted in detailed exam and in HPI  Examination    Left    Gait Normal   Musculoskeletal Non tender to palpation at 5th metatarsal base     Skin Normal       Nails Normal    Range of Motion  15 degrees dorsiflexion, 45 deg degrees plantarflexion  Subtalar motion: full    Stability Stable    Muscle Strength 5/5 tibialis anterior  5/5 gastrocnemius-soleus  5/5 posterior tibialis  5/5 peroneal/eversion strength  5/5 EHL  5/5 FHL    Neurologic Normal    Sensation  Intact to light touch throughout sural, saphenous, superficial peroneal, deep peroneal and medial/lateral plantar nerve distributions    [place] 5 07 filament (10g) testing deferred  Cardiovascular Brisk capillary refill < 2 seconds,intact DP and PT pulses    Special Tests None      Imaging Studies:     3 views of the Left foot were obtained, reviewed and interpreted independently which demonstrate slight interval callous formation of the 5th metatarsal base fracture  Reviewed by me personally  Angelina Cinnamon Lachman, MD  Foot & Ankle Surgery   Department of [address] [place] [address]      I personally performed the service  Angelina Cinnamon Lachman, MD

## 2022-09-11 DIAGNOSIS — E10.65 TYPE 1 DIABETES MELLITUS WITH HYPERGLYCEMIA (HCC): ICD-10-CM

## 2022-09-11 DIAGNOSIS — J45.20 MILD INTERMITTENT ASTHMA WITHOUT COMPLICATION: ICD-10-CM

## 2022-09-11 RX ORDER — BLOOD-GLUCOSE SENSOR
EACH MISCELLANEOUS
Qty: 3 EACH | Refills: 12 | Status: SHIPPED | OUTPATIENT
Start: 2022-09-11

## 2022-09-11 RX ORDER — MONTELUKAST SODIUM 10 MG/1
TABLET ORAL
Qty: 90 TABLET | Refills: 0 | Status: SHIPPED | OUTPATIENT
Start: 2022-09-11

## 2022-09-22 ENCOUNTER — TELEPHONE (OUTPATIENT)
Dept: ADMINISTRATIVE | Facility: OTHER | Age: 47
End: 2022-09-22

## 2022-09-22 NOTE — TELEPHONE ENCOUNTER
Media Tab review:    03/02/2022 [facility] O[facility]  does not document Diabetic Eye Exam (Dilated, fundus photography, retinal exam)

## 2022-09-22 NOTE — TELEPHONE ENCOUNTER
Upon review of the In Basket request we have found/obtained the documentation  After careful review of the document we are unable to complete this request for Diabetic Eye Exam because the documentation does not have the proper verbiage (wording) needed to close the requested care gap(s) and the documentation does not have the result(s) needed to close the requested care gap(s)  Any additional questions or concerns should be emailed to the Practice Liaisons via [email or web address] email, please do not reply via In Basket      Thank you  [facility], [place]

## 2022-09-22 NOTE — TELEPHONE ENCOUNTER
----- Message from Liliam Downing sent at 9/21/2022  3:05 PM EDT -----  Regarding: Care Gap request  09/21/22 3:05 PM    Hello, our patient attached above has had Diabetic Eye Exam completed/performed  Please assist in updating the patient chart by pulling the document from the Media Tab  The date of service is 03/02/2022       Thank you,  Harshad Fisher MA  [facility]

## 2022-09-22 NOTE — TELEPHONE ENCOUNTER
Upon review of the In Basket request and the patient's chart, initial outreach has been made via fax, please see Contacts section for details       Thank you  Vijay Hernández MA

## 2022-09-22 NOTE — LETTER
Diabetic Eye Exam Form    Date Requested: 22  Patient: Travon Ye  Patient : 1975   Referring Provider: Jose R Ball PA-C    DIABETIC Eye Exam Date _______________________________    Type of Exam MUST be documented for Diabetic Eye Exams  Please CHECK ONE  Retinal Exam       Dilated Retinal Exam       OCT       Optomap-Iris Exam      Fundus Photography     Left Eye - Please check Retinopathy AND Type or No Retinopathy      Exam did show retinopathy    Exam did not show retinopathy         Mild     Proliferative           Moderate    Severe            None         Right Eye - Please check Retinopathy AND Type or No Retinopathy     Exam did show retinopathy    Exam did not show retinopathy         Mild     Proliferative        Moderate    Severe        None       Comments __________________________________________________________    Practice Providing Exam ______________________________________________    Exam Performed By (print name) _______________________________________      Provider Signature ___________________________________________________    These reports are needed for  compliance  Please fax this completed form and a copy of the Diabetic Eye Exam report to our office located at [place] [address] as soon as possible via [phone] attention Trudi: Phone [phone]  We thank you for your assistance in treating our mutual patient     (sent to [facility])

## 2022-09-23 NOTE — TELEPHONE ENCOUNTER
Upon review of the In Basket request we were able to locate, review, and update the patient chart as requested for Diabetic Eye Exam     Any additional questions or concerns should be emailed to the Practice Liaisons via [email or web address] email, please do not reply via In Basket      Thank you  Adalberto Sharpe MA

## 2022-10-08 DIAGNOSIS — I10 ESSENTIAL HYPERTENSION: Chronic | ICD-10-CM

## 2022-10-08 RX ORDER — LISINOPRIL 20 MG/1
TABLET ORAL
Qty: 60 TABLET | Refills: 3 | Status: SHIPPED | OUTPATIENT
Start: 2022-10-08

## 2022-10-13 ENCOUNTER — OFFICE VISIT (OUTPATIENT)
Dept: OBGYN CLINIC | Facility: CLINIC | Age: 47
End: 2022-10-13
Payer: COMMERCIAL

## 2022-10-13 ENCOUNTER — APPOINTMENT (OUTPATIENT)
Dept: RADIOLOGY | Facility: CLINIC | Age: 47
End: 2022-10-13
Payer: COMMERCIAL

## 2022-10-13 VITALS
BODY MASS INDEX: 38.24 KG/M2 | HEART RATE: 101 BPM | DIASTOLIC BLOOD PRESSURE: 66 MMHG | WEIGHT: 170 LBS | SYSTOLIC BLOOD PRESSURE: 129 MMHG | HEIGHT: 56 IN

## 2022-10-13 DIAGNOSIS — M17.11 PRIMARY OSTEOARTHRITIS OF RIGHT KNEE: ICD-10-CM

## 2022-10-13 DIAGNOSIS — M25.561 ACUTE PAIN OF RIGHT KNEE: ICD-10-CM

## 2022-10-13 DIAGNOSIS — M25.561 ACUTE PAIN OF RIGHT KNEE: Primary | ICD-10-CM

## 2022-10-13 PROCEDURE — 73564 X-RAY EXAM KNEE 4 OR MORE: CPT

## 2022-10-13 PROCEDURE — 99213 OFFICE O/P EST LOW 20 MIN: CPT | Performed by: PHYSICIAN ASSISTANT

## 2022-10-13 NOTE — PROGRESS NOTES
Orthopaedic Surgery - Office Note  Les Akhtar (21 y o  female)   : 1975   MRN: [id number]  Encounter Date: 10/13/2022    Chief Complaint   Patient presents with   • Right Knee - Pain         Assessment/Plan  Diagnoses and all orders for this visit:    Acute pain of right knee  -     XR knee 4+ vw right injury; Future  -     Injection Procedure Prior Authorization; Future    Primary osteoarthritis of right knee  -     Injection Procedure Prior Authorization; Future    The diagnosis as well as treatment options were reviewed with the patient in the office today  The risks and benefits of a cortisone injection were reviewed  Specifically the risks of a cortisone injection with poorly controlled diabetes and hemoglobin A1c of 10 7 were discussed  In addition she is being treated for the 5th metatarsal fracture and been advised to avoid NSAIDs and cortisone  Would not recommend a cortisone injection today  Best available injection option would be to start a Visco injection series which will be prior authorized and patient will follow-up in a week to start the series  She will ice the knee for comfort 20 minutes on 1 hour off 3 times a day  In addition patient will be started in physical therapy for the right knee    Patient should continue to try and improve diabetic control and continue to follow with Dr Teri Odom for the 5th metatarsal fracture       Return for Recheck in approx one week to start visco series with Dr Fatoumata Lam or Arti Patel  History of Present Illness  This is a previous patient here for right knee pain  She reports she has had increased pain while favoring the right side recovering from her left foot fracture  She denies anyone specific trauma to the right knee  She states she is treated for arthritis on the side in the past and had a cortisone injection with good relief of pain in the remote past  (2021)    She reports the pain is primarily in the medial aspect of the knee as well as the lateral patella  No one specific trauma is reported recently  She denies any calf pain or paresthesias  She has not noticed any swelling in the knee  She has a contributing medical history of type 1 diabetes and altered gait while recovering from a left 5th metatarsal fracture  Her most recent hemoglobin A1c was 10 7    Review of Systems  Pertinent items are noted in HPI  All other systems were reviewed and are negative  Physical Exam  /66   Pulse 101   Ht 4' 8" (1 422 m)   Wt 77 1 kg (170 lb)   BMI 38 11 kg/m²   Cons: Appears well  No apparent distress  Psych: Alert  Oriented x3  Mood and affect normal   Eyes: PERRLA, EOMI  Resp: Normal effort  No audible wheezing or stridor  CV: Palpable pulse  No discernable arrhythmia  Lymph:  No palpable cervical, axillary, or inguinal lymphadenopathy  Skin: Warm  No palpable masses  No visible lesions  Neuro: Normal muscle tone  Normal and symmetric DTR's  Patient's right knee has no intra-articular effusion  There are no skin breakdown lesions or signs of infection  She is tender to palpation on the lateral border of the patella with noted retropatellar crepitus  She has full extension and flexes to 120°  She has mild quad and hamstring weakness to 4+ out of 5  She has no pain or instability to valgus or varus stressing  She is tender to palpation on the medial compartment and nontender on the lateral compartment  She has a negative medial and lateral David's  She has no instability with Lachman's or posterior drawer  Gait is heel-to-toe without significant abnormality  She has no calf tenderness and a negative Homans  X-rays performed in the office today four views of the right knee show no acute fracture dislocation  Medial joint space narrowing is noted with near bone-on-bone deformity  Lateral joint space is narrowed  Moderate patellofemoral degenerative changes are noted with osteophyte formation seen  These read from an orthopedic standpoint will await official radiologist interpretation        Studies Reviewed  Study Result    Narrative & Impression   RIGHT KNEE     INDICATION:   pain      COMPARISON:  None     VIEWS:  XR KNEE 4+ VW RIGHT INJURY         FINDINGS:     There is no acute fracture or dislocation      There is no joint effusion      No significant degenerative changes      No lytic or blastic osseous lesion      There are atherosclerotic calcifications  Soft tissues are otherwise unremarkable      IMPRESSION:     No acute osseous abnormality            Workstation performed: [id number]      X-ray images as well as reports were reviewed by myself in the office today  I agree with radiologist interpretation from 2021    Procedures  No procedures today  Medical, Surgical, Family, and Social History  The patient's medical history, family history, and social history, were reviewed and updated as appropriate  Past Medical History:   Diagnosis Date   • Anemia    • Asthma     w/acute exacerbation   Last assessed: 10/26/12   • Chest pain 2/3/2016   • CHF (congestive heart failure) ([facility] [address] )    • Depression    • Diabetes mellitus ([address] )    • Diabetic nephropathy ([facility] [address] )    • Diabetic nephropathy associated with type 1 diabetes mellitus ([facility] [address] ) 8/12/2020   • Diabetic retinopathy ([facility] [address] ) 2/3/2016   • Endometriosis    • Gastroenteritis 02/03/2016   • GERD (gastroesophageal reflux disease)    • HTN (hypertension)    • Hyperlipidemia 2/3/2016   • Oligomenorrhea    • Polycystic ovaries 2/3/2016   • Retinal hemorrhage 2/3/2016   • Retinopathy    • Thrombocytosis    • Type 1 diabetes mellitus with ophthalmic manifestation ([facility] [address] ) 2/3/2016       Past Surgical History:   Procedure Laterality Date   • CATARACT EXTRACTION Left 10/2020   • CATARACT EXTRACTION Right    • RETINAL LASER PROCEDURE         Family History   Problem Relation Age of Onset   • Heart murmur Mother    • Diabetes Mother         Mellitus   • Thyroid disease Mother         Disorder   • Diabetes Father         Mellitus   • Hypertension Father    • Diabetes Maternal Grandfather         Mellitus   • Breast cancer Paternal Grandmother    • Diabetes Maternal Aunt         Mellitus   • Diabetes Maternal Uncle         Mellitus   • No Known Problems Maternal Grandmother    • Leukemia Paternal Grandfather    • Substance Abuse Neg Hx    • Mental illness Neg Hx    • Alcohol abuse Neg Hx        Social History     Occupational History   • 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   Tobacco Use   • Smoking status: Never Smoker   • Smokeless tobacco: Never Used   • Tobacco comment: Per [employer]: Former smoker  Quit in 1994   Vaping Use   • Vaping Use: Never used   Substance and Sexual Activity   • Alcohol use: Yes     Comment: Occasional/1 mixed drink twice a month if that   • Drug use: No   • Sexual activity: Yes     Partners: Male     Birth control/protection: Pill       No Known Allergies      Current Outpatient Medications:   •  acetaminophen (TYLENOL) 500 mg tablet, Take 2 tablets (1,000 mg total) by mouth every 6 (six) hours as needed for mild pain or moderate pain, Disp: 30 tablet, Rfl: 0  •  acetone, urine, test strip, Use to test urine ketones for high blood sugars  , Disp: 25 each, Rfl: 6  •  Advair Diskus 250-50 MCG/DOSE inhaler, INHALE 1 PUFF BY MOUTH TWO TIMES DAILY, RINSE MOUTH AFTER USE, Disp: 180 blister, Rfl: 3  •  albuterol (2 5 mg/3 mL) 0 083 % nebulizer solution, Take 3 mL (2 5 mg total) by nebulization every 6 (six) hours as needed for wheezing or shortness of breath, Disp: 75 mL, Rfl: 3  •  albuterol (PROVENTIL HFA,VENTOLIN HFA) 90 mcg/act inhaler, INHALE 2 PUFFS BY MOUTH EVERY 4 HOURS AS NEEDED FOR WHEEZING OR FOR SHORTNESS OF BREATH, Disp: 25 5 g, Rfl: 0  •  Ascorbic Acid (VITAMIN C) 500 MG CAPS, Take 2 capsules by mouth daily, Disp: , Rfl:   •  Aspirin Low Dose 81 MG EC tablet, TAKE 1 TABLET BY MOUTH EVERY DAY, Disp: 30 tablet, Rfl: 0  •  atorvastatin (LIPITOR) 80 mg tablet, Take 1 tablet (80 mg total) by mouth daily, Disp: 30 tablet, Rfl: 11  •  Biotin 10 MG TABS, Take 1 tablet by mouth daily, Disp: , Rfl:   •  Blood Pressure Monitoring [place], Use 2 (two) times a day Monitor BP twice daily  Call office if BP > 140/90 persistently  , Disp: 1 Device, Rfl: 0  •  Butalbital-APAP-Caffeine (Fioricet) -40 MG CAPS, Take 1 tablet by mouth 4 (four) times a day as needed (headce), Disp: 30 capsule, Rfl: 0  •  Continuous Blood Gluc Sensor (Dexcom G6 Sensor) MISC, CHANGE EVERY 10 DAYS, Disp: 3 each, Rfl: 12  •  Continuous Blood Gluc Transmit (Dexcom G6 Transmitter) MISC, CHANGE EVERY 90 DAYS, Disp: 1 each, Rfl: 1  •  cyanocobalamin (VITAMIN B-12) 100 mcg tablet, Take by mouth daily, Disp: , Rfl:   •  glucagon (Glucagon Emergency) 1 MG injection, Inject 1 mg under the skin once as needed for low blood sugar for up to 1 dose, Disp: 1 kit, Rfl: 3  •  Glucagon (Gvoke HypoPen 2-Pack) 1 MG/0 2ML SOAJ, Use if hypoglycemia prn, Disp: 1 Syringe, Rfl: 6  •  insulin aspart (NovoLOG) 100 units/mL injection, INJECT 1 UNITS OF NOVOLOG FOR EVERY 1 GRAM OF CARB, UP  UNITS DAILY, Disp: 50 mL, Rfl: 6  •  Lantus SoloStar 100 units/mL injection pen, INJECT 78 UNITS UNDER THE SKIN AT BEDTIME, Disp: 30 mL, Rfl: 3  •  lisinopril (ZESTRIL) 20 mg tablet, TAKE 2 TABLETS BY MOUTH EVERY DAY, Disp: 60 tablet, Rfl: 3  •  montelukast (SINGULAIR) 10 mg tablet, TAKE 1 TABLET BY MOUTH AT BEDTIME, Disp: 90 tablet, Rfl: 0  •  NovoLOG 100 UNIT/ML injection, INJECT 1 UNITS OF NOVOLOG FOR EVERY 1 GRAM OF CARB, UP  UNITS DAILY, Disp: , Rfl:   •  omeprazole (PriLOSEC) 20 mg delayed release capsule, Take 20 mg by mouth daily  , Disp: , Rfl:   •  ondansetron (ZOFRAN) 4 mg tablet, Take 1 tablet (4 mg total) by mouth every 8 (eight) hours as needed for nausea or vomiting, Disp: 20 tablet, Rfl: 0  •  OneTouch Ultra test strip, Test 3 times daily, Disp: 300 each, Rfl: 3  •  PARoxetine (PAXIL) 20 mg tablet, TAKE 1 TABLET BY MOUTH EVERY DAY, Disp: 90 tablet, Rfl: 0  •  Probiotic Product (ALIGN PO), Take by mouth, Disp: , Rfl:   •  furosemide (LASIX) 40 mg tablet, Take 1 tablet (40 mg total) by mouth daily, Disp: 30 tablet, Rfl: [address] [place], [place]

## 2022-10-13 NOTE — PATIENT INSTRUCTIONS
Hyaluronic Acid (By injection)   Hyaluronic Acid (fqt-wv-hfo-ON-ate AS-id)  Treats severe pain in your knee due to osteoarthritis  Brand Name(s): Durolane, Euflexxa, Gel-One, GelSyn-3, GenVisc 850, Hyalgan, Hymovis, Monovisc, Orthovisc, Supartz FX, TriLURON, TriVisc, Visco-3   There may be other brand names for this medicine  When This Medicine Should Not Be Used: This medicine is not right for everyone  You should not receive it if you had an allergic reaction to hyaluronic acid or if you have a bleeding disorder  How to Use This Medicine:   Injectable  Your doctor will tell you how many injections you will need  This medicine is injected into your knee joint  A nurse or other health provider will give you this medicine  Drugs and Foods to Avoid:      Ask your doctor or pharmacist before using any other medicine, including over-the-counter medicines, vitamins, and herbal products  Warnings While Using This Medicine:   Tell your doctor if you are pregnant or breastfeeding, or if you have any allergies, including to birds, feathers, or eggs  Rest your knee for 48 hours after you receive an injection  Do not do strenuous, weightbearing activities, such as jogging or tennis  Avoid activities that keep you standing for longer than 1 hour  Possible Side Effects While Using This Medicine:   Call your doctor right away if you notice any of these side effects: Allergic reaction: Itching or hives, swelling in your face or hands, swelling or tingling in your mouth or throat, chest tightness, trouble breathing  If you notice these less serious side effects, talk with your doctor:   Mild increase in pain or swelling in your knee  Pain, redness, or swelling where the medicine is injected  If you notice other side effects that you think are caused by this medicine, tell your doctor  Call your doctor for medical advice about side effects   You may report side effects to FDA at 9-517-FDA-9251    © Copyright [employer] 2022 Information is for [employer] use only and may not be sold, redistributed or otherwise used for commercial purposes  The above information is an  only  It is not intended as medical advice for individual conditions or treatments  Talk to your doctor, nurse or pharmacist before following any medical regimen to see if it is safe and effective for you

## 2022-10-21 ENCOUNTER — OFFICE VISIT (OUTPATIENT)
Dept: OBGYN CLINIC | Facility: CLINIC | Age: 47
End: 2022-10-21
Payer: COMMERCIAL

## 2022-10-21 ENCOUNTER — APPOINTMENT (OUTPATIENT)
Dept: RADIOLOGY | Facility: CLINIC | Age: 47
End: 2022-10-21
Payer: COMMERCIAL

## 2022-10-21 VITALS
BODY MASS INDEX: 38.24 KG/M2 | SYSTOLIC BLOOD PRESSURE: 144 MMHG | OXYGEN SATURATION: 99 % | WEIGHT: 170 LBS | DIASTOLIC BLOOD PRESSURE: 78 MMHG | HEIGHT: 56 IN | HEART RATE: 81 BPM

## 2022-10-21 DIAGNOSIS — S92.355A CLOSED NONDISPLACED FRACTURE OF FIFTH METATARSAL BONE OF LEFT FOOT, INITIAL ENCOUNTER: Primary | ICD-10-CM

## 2022-10-21 DIAGNOSIS — S92.355A CLOSED NONDISPLACED FRACTURE OF FIFTH METATARSAL BONE OF LEFT FOOT, INITIAL ENCOUNTER: ICD-10-CM

## 2022-10-21 PROCEDURE — 73630 X-RAY EXAM OF FOOT: CPT

## 2022-10-21 PROCEDURE — 99213 OFFICE O/P EST LOW 20 MIN: CPT | Performed by: ORTHOPAEDIC SURGERY

## 2022-10-21 NOTE — PROGRESS NOTES
REENA Jackman  Attending, Orthopaedic Surgery  [facility] [address] [facility]      ORTHOPAEDIC FOOT AND ANKLE CLINIC VISIT     Assessment:     Encounter Diagnosis   Name Primary? • Closed nondisplaced fracture of fifth metatarsal bone of left foot, initial encounter Yes            Plan:   · The patient verbalized understanding of exam findings and treatment plan  We engaged in the shared decision-making process and treatment options were discussed at length with the patient  Surgical and conservative management discussed today along with risks and benefits  · ***  No follow-ups on file  History of Present Illness:   Chief Complaint:   Chief Complaint   Patient presents with   • Left Foot - Follow-up     Le Jackson is a 52 y o  female who is being seen in follow-up for left fifth metatarsal fracture in zone 2  When we last saw she we recommended weaning out of the boot to a supportive sneaker  Pain has *** improved  Residual pain is localized at *** with minimal radiating and described as sharp and severe        Pain/symptom timing:  Worse during the day when active  Pain/symptom context:  Worse with activites and work  Pain/symptom modifying factors:  Rest makes better, activities make worse  Pain/symptom associated signs/symptoms: none    Prior treatment   · NSAIDsYes   · Injections No   · Bracing/Orthotics Yes    · Physical Therapy No     Orthopedic Surgical History:   See below    Past Medical, Surgical and Social History:  Past Medical History:  has a past medical history of Anemia, Asthma, Chest pain (2/3/2016), CHF (congestive heart failure) ([facility] [address] ), Depression, Diabetes mellitus ([facility] [address] ), Diabetic nephropathy ([facility] [address] ), Diabetic nephropathy associated with type 1 diabetes mellitus ([facility] [address] ) (8/12/2020), Diabetic retinopathy ([facility] [address] ) (2/3/2016), Endometriosis, Gastroenteritis (02/03/2016), GERD (gastroesophageal reflux disease), HTN (hypertension), Hyperlipidemia (2/3/2016), Oligomenorrhea, Polycystic ovaries (2/3/2016), Retinal hemorrhage (2/3/2016), Retinopathy, Thrombocytosis, and Type 1 diabetes mellitus with ophthalmic manifestation ([facility] [address] ) (2/3/2016)  Problem List: does not have any pertinent problems on file  Past Surgical History:  has a past surgical history that includes Retinal laser procedure; Cataract extraction (Left, 10/2020); and Cataract extraction (Right)  Family History: family history includes Breast cancer in her paternal grandmother; Diabetes in her father, maternal aunt, maternal grandfather, maternal uncle, and mother; Heart murmur in her mother; Hypertension in her father; Leukemia in her paternal grandfather; No Known Problems in her maternal grandmother; Thyroid disease in her mother  Social History:  reports that she has never smoked  She has never used smokeless tobacco  She reports current alcohol use  She reports that she does not use drugs  Current Medications: has a current medication list which includes the following prescription(s): acetaminophen, acetone (urine) test, advair diskus, albuterol, albuterol, vitamin c, aspirin low dose, atorvastatin, biotin, blood pressure monitoring, butalbital-apap-caffeine, dexcom g6 sensor, dexcom g6 transmitter, cyanocobalamin, glucagon emergency, gvoke hypopen 2-pack, insulin aspart, lantus solostar, lisinopril, montelukast, novolog, omeprazole, ondansetron, onetouch ultra, paroxetine, probiotic product, and furosemide  Allergies: has No Known Allergies       Review of Systems:  General- denies fever/chills  HEENT- denies hearing loss or sore throat  Eyes- denies eye pain or visual disturbances, denies red eyes  Respiratory- denies cough or SOB  Cardio- denies chest pain or palpitations  GI- denies abdominal pain  Endocrine- denies urinary frequency  Urinary- denies pain with urination  Musculoskeletal- Negative except noted above  Skin- denies rashes or wounds  Neurological- denies dizziness or headache  Psychiatric- denies anxiety or difficulty concentrating    Physical Exam:   Ht 4' 8" (1 422 m)   Wt 77 1 kg (170 lb)   BMI 38 11 kg/m²   General/Constitutional: No apparent distress: well-nourished and well developed  Eyes: normal ocular motion  Lymphatic: No appreciable lymphadenopathy  Respiratory: Non-labored breathing  Vascular: No edema, swelling or tenderness, except as noted in detailed exam   Integumentary: No impressive skin lesions present, except as noted in detailed exam   Neuro: No ataxia or tremors noted  Psych: Normal mood and affect, oriented to person, place and time  Appropriate affect  Musculoskeletal: Normal, except as noted in detailed exam and in HPI  Examination    Left    Gait { :46142::"Normal"}   Musculoskeletal Tender to palpation at ***    Skin Normal   ***Well-healed incisions  Nails Normal    Range of Motion  *** degrees dorsiflexion, *** degrees plantarflexion  Subtalar motion: ***    Stability Stable    Muscle Strength 5/5 tibialis anterior  5/5 gastrocnemius-soleus  5/5 posterior tibialis  5/5 peroneal/eversion strength  5/5 EHL  5/5 FHL    Neurologic Normal    Sensation *** Intact to light touch throughout sural, saphenous, superficial peroneal, deep peroneal and medial/lateral plantar nerve distributions  [place]-Beau 5 07 filament (10g) testing *** deferred  Cardiovascular Brisk capillary refill < 2 seconds,intact DP and PT pulses    Special Tests None      Imaging Studies:     3 views of the Left foot were obtained, reviewed and interpreted independently which demonstrate ***  Reviewed by me personally  Adella Hagedorn Lachman, MD  Foot & Ankle Surgery   Department [address]      I personally performed the service  Adella Hagedorn Lachman, MD        Scribe Attestation    I,:  Nickolas Ortiz am acting as a scribe while in the presence of the attending physician :       I,:  Zacarias Merida MD personally performed the services described in this documentation    as scribed in my presence :

## 2022-10-21 NOTE — PROGRESS NOTES
REENA Allen  Attending, Orthopaedic Surgery  [facility] [address] [facility]      ORTHOPAEDIC FOOT AND ANKLE CLINIC VISIT     Assessment:     Encounter Diagnosis   Name Primary? • Closed nondisplaced fracture of fifth metatarsal bone of left foot, initial encounter Yes            Plan:   · The patient verbalized understanding of exam findings and treatment plan  We engaged in the shared decision-making process and treatment options were discussed at length with the patient  Surgical and conservative management discussed today along with risks and benefits  · She is 3 months out from a left zone 2 fifth MT fracture  She is doing well, 0/10 pain  · Xray demonstrates minimal healing at the fracture site  · Work note provided, no restrictions from an orthopedic standpoint  · She will return in 3 months for a new set of weightbearing xrays   Return in 3 months (on 1/21/2023)  - Weightbearing left foot xray      History of Present Illness:   Chief Complaint:   Chief Complaint   Patient presents with   • Left Foot - Follow-up     Madi Abrams is a 52 y o  female who is being seen in follow-up for left above fracture  When we last saw she we recommended vit d, avoid products that prevent bone healing, Wean out of boot into a supportive shoe  Pain has  improved  Residual pain is localized at fx site with minimal radiating and described as sharp and severe        Pain/symptom timing:  Worse during the day when active  Pain/symptom context:  Worse with activites and work  Pain/symptom modifying factors:  Rest makes better, activities make worse  Pain/symptom associated signs/symptoms: none    Prior treatment   · NSAIDsNo   · Injections No   · Bracing/Orthotics No    · Physical Therapy No     Orthopedic Surgical History:   See below    Past Medical, Surgical and Social History:  Past Medical History:  has a past medical history of Anemia, Asthma, Chest pain (2/3/2016), CHF (congestive heart failure) ([place] [address] ), Depression, Diabetes mellitus ([place] [address] ), Diabetic nephropathy ([place] [address] ), Diabetic nephropathy associated with type 1 diabetes mellitus ([place] [address] ) (8/12/2020), Diabetic retinopathy ([place] [address] ) (2/3/2016), Endometriosis, Gastroenteritis (02/03/2016), GERD (gastroesophageal reflux disease), HTN (hypertension), Hyperlipidemia (2/3/2016), Oligomenorrhea, Polycystic ovaries (2/3/2016), Retinal hemorrhage (2/3/2016), Retinopathy, Thrombocytosis, and Type 1 diabetes mellitus with ophthalmic manifestation ([place] [address] ) (2/3/2016)  Problem List: does not have any pertinent problems on file  Past Surgical History:  has a past surgical history that includes Retinal laser procedure; Cataract extraction (Left, 10/2020); and Cataract extraction (Right)  Family History: family history includes Breast cancer in her paternal grandmother; Diabetes in her father, maternal aunt, maternal grandfather, maternal uncle, and mother; Heart murmur in her mother; Hypertension in her father; Leukemia in her paternal grandfather; No Known Problems in her maternal grandmother; Thyroid disease in her mother  Social History:  reports that she has never smoked  She has never used smokeless tobacco  She reports current alcohol use  She reports that she does not use drugs  Current Medications: has a current medication list which includes the following prescription(s): acetaminophen, acetone (urine) test, advair diskus, albuterol, albuterol, vitamin c, aspirin low dose, atorvastatin, biotin, blood pressure monitoring, butalbital-apap-caffeine, dexcom g6 sensor, dexcom g6 transmitter, cyanocobalamin, glucagon emergency, gvoke hypopen 2-pack, insulin aspart, lantus solostar, lisinopril, montelukast, novolog, omeprazole, ondansetron, onetouch ultra, paroxetine, probiotic product, and furosemide  Allergies: has No Known Allergies       Review of Systems:  General- denies fever/chills  HEENT- denies hearing loss or sore throat  Eyes- denies eye pain or visual disturbances, denies red eyes  Respiratory- denies cough or SOB  Cardio- denies chest pain or palpitations  GI- denies abdominal pain  Endocrine- denies urinary frequency  Urinary- denies pain with urination  Musculoskeletal- Negative except noted above  Skin- denies rashes or wounds  Neurological- denies dizziness or headache  Psychiatric- denies anxiety or difficulty concentrating    Physical Exam:   /78 (BP Location: Left arm, Patient Position: Sitting, Cuff Size: Adult)   Pulse 81   Ht 4' 8" (1 422 m)   Wt 77 1 kg (170 lb)   SpO2 99%   BMI 38 11 kg/m²   General/Constitutional: No apparent distress: well-nourished and well developed  Eyes: normal ocular motion  Lymphatic: No appreciable lymphadenopathy  Respiratory: Non-labored breathing  Vascular: No edema, swelling or tenderness, except as noted in detailed exam   Integumentary: No impressive skin lesions present, except as noted in detailed exam   Neuro: No ataxia or tremors noted  Psych: Normal mood and affect, oriented to person, place and time  Appropriate affect  Musculoskeletal: Normal, except as noted in detailed exam and in HPI  Examination    Left    Gait Normal   Musculoskeletal NTTP at fx site    Skin Normal      Nails Normal    Range of Motion  20 degrees dorsiflexion, 40 degrees plantarflexion  Subtalar motion: normal    Stability Stable    Muscle Strength 5/5 tibialis anterior  5/5 gastrocnemius-soleus  5/5 posterior tibialis  5/5 peroneal/eversion strength  5/5 EHL  5/5 FHL    Neurologic Normal    Sensation  Intact to light touch throughout sural, saphenous, superficial peroneal, deep peroneal and medial/lateral plantar nerve distributions  [place]-Beau 5 07 filament (10g) testing  deferred      Cardiovascular Brisk capillary refill < 2 seconds,intact DP and PT pulses    Special Tests None      Imaging Studies:     3 views of the Left foot were obtained, reviewed and interpreted independently which demonstrate minimal healing at fracture site  Reviewed by me personally  Scribe Attestation    I,:  Bee Aparicio PA-C am acting as a scribe while in the presence of the attending physician :       I,:  Madeleine Braga MD personally performed the services described in this documentation    as scribed in my presence :             Isiah Hoyer Lachman, MD  Foot & Ankle Surgery   Department of [address] [place] [address]      I personally performed the service  Isiah Hoyer Lachman, MD

## 2022-10-21 NOTE — LETTER
October 21, 2022     Patient: Destiny Burnham  YOB: 1975  Date of Visit: 10/21/2022      To Whom it May Concern:    Omar Treviño is under my professional care  Arlon Lanes was seen in my office on 10/21/2022  Arlon Lanes may return to work without restrictions  If you have any questions or concerns, please don't hesitate to call           Sincerely,        Harshil Garcia PA-C

## 2022-10-24 ENCOUNTER — TELEPHONE (OUTPATIENT)
Dept: NEPHROLOGY | Facility: HOSPITAL | Age: 47
End: 2022-10-24

## 2022-10-24 DIAGNOSIS — I10 HYPERTENSION, UNSPECIFIED TYPE: Primary | ICD-10-CM

## 2022-10-24 DIAGNOSIS — E10.21 DIABETIC NEPHROPATHY ASSOCIATED WITH TYPE 1 DIABETES MELLITUS (HCC): ICD-10-CM

## 2022-10-24 NOTE — TELEPHONE ENCOUNTER
----- Message from Kieran Weston DO sent at 10/24/2022  1:03 PM EDT -----  BMP, UA, UpCr, Thanks!  ----- Message -----  From: Moraima Garrett  Sent: 10/24/2022  11:40 AM EDT  To: Kieran Weston, DO    What labs you want me to order for patient   Thanks

## 2022-10-24 NOTE — TELEPHONE ENCOUNTER
Called and spoke with [employer] to complete their bloodwork prior to their appointment on 11/3 with Dr Kwasi Dobbs at the [facility]

## 2022-10-25 DIAGNOSIS — J45.909 UNCOMPLICATED ASTHMA, UNSPECIFIED ASTHMA SEVERITY, UNSPECIFIED WHETHER PERSISTENT: ICD-10-CM

## 2022-10-25 RX ORDER — ALBUTEROL SULFATE 90 UG/1
AEROSOL, METERED RESPIRATORY (INHALATION)
Qty: 25.5 G | Refills: 0 | Status: SHIPPED | OUTPATIENT
Start: 2022-10-25

## 2022-10-27 ENCOUNTER — PROCEDURE VISIT (OUTPATIENT)
Dept: OBGYN CLINIC | Facility: CLINIC | Age: 47
End: 2022-10-27
Payer: COMMERCIAL

## 2022-10-27 VITALS
HEIGHT: 56 IN | DIASTOLIC BLOOD PRESSURE: 70 MMHG | WEIGHT: 189 LBS | BODY MASS INDEX: 42.52 KG/M2 | SYSTOLIC BLOOD PRESSURE: 120 MMHG

## 2022-10-27 DIAGNOSIS — M17.11 PRIMARY LOCALIZED OSTEOARTHRITIS OF RIGHT KNEE: Primary | ICD-10-CM

## 2022-10-27 PROCEDURE — 20610 DRAIN/INJ JOINT/BURSA W/O US: CPT | Performed by: ORTHOPAEDIC SURGERY

## 2022-10-27 RX ORDER — HYALURONATE SODIUM 10 MG/ML
20 SYRINGE (ML) INTRAARTICULAR
Status: COMPLETED | OUTPATIENT
Start: 2022-10-27 | End: 2022-10-27

## 2022-10-27 RX ADMIN — Medication 20 MG: at 12:24

## 2022-10-27 NOTE — PROGRESS NOTES
Assessment:     1  Primary localized osteoarthritis of right knee        Plan:     Problem List Items Addressed This Visit        Musculoskeletal and Integument    Primary localized osteoarthritis of right knee - Primary     Findings consistent with right knee osteoarthritis  Findings and treatment options were discussed the patient  X-rays were reviewed with her  Prognosis was discussed  Discussed she mostly developed acute inflammation the knee joint from over compensating when she came out of the boot on the left lower extremity  Ideally we would have tried cortisone injection 1st to reduce inflammation and pain  The joint supplement injections have already been approved by the insurance so we will begin the series today  The 1st of 3 Euflexxa injections were given to the right knee joint  She tolerated the procedure well  Advised to apply a cold compress today  Recommend low-impact exercises such as stationary bicycle swimming  Continue ice and NSAIDs as needed for pain  Follow up in one week for the second injection  All questions were answered to patient's satisfaction  Relevant Orders    Large joint arthrocentesis: R knee         Subjective:     Patient ID: Dat Jackson is a 52 y o  female  Chief Complaint: This is a 35-year-old white female here for evaluation of her right knee  Referred by Katie Crook PA-C  Pain began gradually a few weeks ago  It started after she came out of her cam walker on the left lower extremity for a 5th metatarsal fracture being treated by Dr Ina Hung  She denies any injury  Pain is constant and aching in nature  She has increased stiffness with prolonged sitting  It is localized mostly along the medial aspect of the knee  She denies any locking, catching or giving away  Cortisone injection was not given due to patient having diabetes and being treated for a left foot fracture at that time    Joint supplement injections were ordered and she was referred to us for administration of injections  She does have a history of being treated by Dr Vandana Cole a year ago for right knee osteoarthritis  She had a cortisone injection at that time that provided complete relief until recently  Patient intake form reviewed  Allergy:  No Known Allergies  Medications:  all current active meds have been reviewed  Past Medical History:  Past Medical History:   Diagnosis Date   • Anemia    • Asthma     w/acute exacerbation   Last assessed: 10/26/12   • Chest pain 2/3/2016   • CHF (congestive heart failure) ([facility] [address] )    • Depression    • Diabetes mellitus ([facility] [address] )    • Diabetic nephropathy ([facility] [address] )    • Diabetic nephropathy associated with type 1 diabetes mellitus ([facility] [address] ) 8/12/2020   • Diabetic retinopathy ([facility] [address] ) 2/3/2016   • Endometriosis    • Gastroenteritis 02/03/2016   • GERD (gastroesophageal reflux disease)    • HTN (hypertension)    • Hyperlipidemia 2/3/2016   • Oligomenorrhea    • Polycystic ovaries 2/3/2016   • Retinal hemorrhage 2/3/2016   • Retinopathy    • Thrombocytosis    • [facility] 2/3/2016     Past Surgical History:  Past Surgical History:   Procedure Laterality Date   • CATARACT EXTRACTION Left 10/2020   • CATARACT EXTRACTION Right    • RETINAL LASER PROCEDURE       Family History:  Family History   Problem Relation Age of Onset   • Heart murmur Mother    • Diabetes Mother         Mellitus   • Thyroid disease Mother         Disorder   • Diabetes Father         Mellitus   • Hypertension Father    • Diabetes Maternal Grandfather         Mellitus   • Breast cancer Paternal Grandmother    • Diabetes Maternal Aunt         Mellitus   • Diabetes Maternal Uncle         Mellitus   • No Known Problems Maternal Grandmother    • Leukemia Paternal Grandfather    • Substance Abuse Neg Hx    • Mental illness Neg Hx    • Alcohol abuse Neg Hx      Social History:  Social History     Substance and Sexual Activity   Alcohol Use Yes Comment: Occasional/1 mixed drink twice a month if that     Social History     Substance and Sexual Activity   Drug Use No     Social History     Tobacco Use   Smoking Status Never Smoker   Smokeless Tobacco Never Used   Tobacco Comment    Per [employer]: Former smoker  Quit in 1994     Review of Systems   Constitutional: Negative  HENT: Negative  Eyes: Negative  Respiratory: Negative  Cardiovascular: Negative  Gastrointestinal: Negative  Endocrine: Negative  Genitourinary: Negative  Musculoskeletal: Positive for arthralgias and gait problem (antalgic)  Skin: Negative  Allergic/Immunologic: Negative  Hematological: Negative  Psychiatric/Behavioral: Negative  Objective:  BP Readings from Last 1 Encounters:   10/27/22 120/70      Wt Readings from Last 1 Encounters:   10/27/22 85 7 kg (189 lb)      BMI:   Estimated body mass index is 42 37 kg/m² as calculated from the following:    Height as of this encounter: 4' 8" (1 422 m)  Weight as of this encounter: 85 7 kg (189 lb)  BSA:   Estimated body surface area is 1 73 meters squared as calculated from the following:    Height as of this encounter: 4' 8" (1 422 m)  Weight as of this encounter: 85 7 kg (189 lb)  Physical Exam  Constitutional:       General: She is not in acute distress  Appearance: She is well-developed  HENT:      Head: Normocephalic  Eyes:      Conjunctiva/sclera: Conjunctivae normal       Pupils: Pupils are equal, round, and reactive to light  Pulmonary:      Effort: Pulmonary effort is normal  No respiratory distress  Musculoskeletal:      Right knee: No effusion  Instability Tests: Medial David test negative and lateral David test negative  Skin:     General: Skin is warm and dry  Neurological:      Mental Status: She is alert and oriented to person, place, and time     Psychiatric:         Behavior: Behavior normal        Right Knee Exam     Tenderness   The patient is experiencing tenderness in the medial joint line  Range of Motion   The patient has normal right knee ROM  Tests   David:  Medial - negative Lateral - negative  Varus: negative Valgus: negative  Drawer:  Anterior - negative    Posterior - negative    Other   Erythema: absent  Scars: absent  Sensation: normal  Pulse: present  Swelling: none  Effusion: no effusion present    Comments:  Mild patellofemoral crepitation            I have personally reviewed pertinent films in PACS and my interpretation is X-rays of the right knee reveal mild-to-moderate tricompartmental osteoarthritis worse in the patellofemoral compartment with marginal osteophyte formation  Large joint arthrocentesis: R knee  Universal Protocol:  Consent: Verbal consent obtained    Risks and benefits: risks, benefits and alternatives were discussed  Consent given by: patient  Patient understanding: patient states understanding of the procedure being performed  Site marked: the operative site was marked  Supporting Documentation  Indications: pain   Procedure Details  Location: knee - R knee  Preparation: Patient was prepped and draped in the usual sterile fashion  Needle size: 22 G  Ultrasound guidance: no  Approach: anterolateral  Medications administered: 20 mg Sodium Hyaluronate 20 MG/2ML    Patient tolerance: patient tolerated the procedure well with no immediate complications  Dressing:  Sterile dressing applied          Scribe Attestation    I,:  Teddy Tolbert PA-C am acting as a scribe while in the presence of the attending physician :       I,:  Aminah Eckert MD personally performed the services described in this documentation    as scribed in my presence :

## 2022-10-27 NOTE — ASSESSMENT & PLAN NOTE
Findings consistent with right knee osteoarthritis  Findings and treatment options were discussed the patient  X-rays were reviewed with her  Prognosis was discussed  Discussed she mostly developed acute inflammation the knee joint from over compensating when she came out of the boot on the left lower extremity  Ideally we would have tried cortisone injection 1st to reduce inflammation and pain  The joint supplement injections have already been approved by the insurance so we will begin the series today  The 1st of 3 Euflexxa injections were given to the right knee joint  She tolerated the procedure well  Advised to apply a cold compress today  Recommend low-impact exercises such as stationary bicycle swimming  Continue ice and NSAIDs as needed for pain  Follow up in one week for the second injection  All questions were answered to patient's satisfaction

## 2022-11-03 ENCOUNTER — PROCEDURE VISIT (OUTPATIENT)
Dept: OBGYN CLINIC | Facility: CLINIC | Age: 47
End: 2022-11-03

## 2022-11-03 VITALS
SYSTOLIC BLOOD PRESSURE: 120 MMHG | HEIGHT: 56 IN | BODY MASS INDEX: 40.72 KG/M2 | DIASTOLIC BLOOD PRESSURE: 64 MMHG | WEIGHT: 181 LBS

## 2022-11-03 DIAGNOSIS — M17.11 PRIMARY LOCALIZED OSTEOARTHRITIS OF RIGHT KNEE: Primary | ICD-10-CM

## 2022-11-03 RX ORDER — HYALURONATE SODIUM 10 MG/ML
20 SYRINGE (ML) INTRAARTICULAR
Status: COMPLETED | OUTPATIENT
Start: 2022-11-03 | End: 2022-11-03

## 2022-11-03 RX ADMIN — Medication 20 MG: at 13:03

## 2022-11-03 NOTE — ASSESSMENT & PLAN NOTE
Findings consistent with right knee osteoarthritis  Findings and treatment options were discussed the patient  Second of 3 right knee Euflexxa injections was given today  She tolerated the procedure well  Advised to apply cold compress today  Follow up in one week for the 3rd injection  All questions were answered to patient's satisfaction

## 2022-11-03 NOTE — PROGRESS NOTES
Assessment:     1  Primary localized osteoarthritis of right knee        Plan:     Problem List Items Addressed This Visit        Musculoskeletal and Integument    Primary localized osteoarthritis of right knee - Primary     Findings consistent with right knee osteoarthritis  Findings and treatment options were discussed the patient  Second of 3 right knee Euflexxa injections was given today  She tolerated the procedure well  Advised to apply cold compress today  Follow up in one week for the 3rd injection  All questions were answered to patient's satisfaction  Relevant Medications    Sodium Hyaluronate 20 mg (Completed)    Other Relevant Orders    Large joint arthrocentesis: R knee (Completed)         Subjective:     Patient ID: Catarino Farah is a 52 y o  female  Chief Complaint: This is a 26-year-old white female following up for right knee osteoarthritis  She is here for the 2nd of 3 right knee Euflexxa injections  No issues after the 1st injection  She is feeling some mild relief  Allergy:  No Known Allergies  Medications:  all current active meds have been reviewed  Past Medical History:  Past Medical History:   Diagnosis Date   • Anemia    • Asthma     w/acute exacerbation   Last assessed: 10/26/12   • Chest pain 2/3/2016   • CHF (congestive heart failure) ([address] )    • Depression    • Diabetes mellitus ([address] )    • Diabetic nephropathy ([address] )    • Diabetic nephropathy associated with type 1 diabetes mellitus ([address] ) 8/12/2020   • Diabetic retinopathy ([address] ) 2/3/2016   • Endometriosis    • Gastroenteritis 02/03/2016   • GERD (gastroesophageal reflux disease)    • HTN (hypertension)    • Hyperlipidemia 2/3/2016   • Oligomenorrhea    • Polycystic ovaries 2/3/2016   • Retinal hemorrhage 2/3/2016   • Retinopathy    • Thrombocytosis    • Type 1 diabetes mellitus with ophthalmic manifestation ([address] ) 2/3/2016     Past Surgical History:  Past Surgical History:   Procedure Laterality Date   • CATARACT EXTRACTION Left 10/2020   • CATARACT EXTRACTION Right    • RETINAL LASER PROCEDURE       Family History:  Family History   Problem Relation Age of Onset   • Heart murmur Mother    • Diabetes Mother         Mellitus   • Thyroid disease Mother         Disorder   • Diabetes Father         Mellitus   • Hypertension Father    • Diabetes Maternal Grandfather         Mellitus   • Breast cancer Paternal Grandmother    • Diabetes Maternal Aunt         Mellitus   • Diabetes Maternal Uncle         Mellitus   • No Known Problems Maternal Grandmother    • Leukemia Paternal Grandfather    • Substance Abuse Neg Hx    • Mental illness Neg Hx    • Alcohol abuse Neg Hx      Social History:  Social History     Substance and Sexual Activity   Alcohol Use Yes    Comment: Occasional/1 mixed drink twice a month if that     Social History     Substance and Sexual Activity   Drug Use No     Social History     Tobacco Use   Smoking Status Never Smoker   Smokeless Tobacco Never Used   Tobacco Comment    Per [employer]: Former smoker  Quit in 1994     Review of Systems   Constitutional: Negative  HENT: Negative  Eyes: Negative  Respiratory: Negative  Cardiovascular: Negative  Gastrointestinal: Negative  Endocrine: Negative  Genitourinary: Negative  Musculoskeletal: Positive for arthralgias and gait problem (antalgic)  Skin: Negative  Allergic/Immunologic: Negative  Hematological: Negative  Psychiatric/Behavioral: Negative  Objective:  BP Readings from Last 1 Encounters:   11/03/22 120/64      Wt Readings from Last 1 Encounters:   11/03/22 82 1 kg (181 lb)      BMI:   Estimated body mass index is 40 58 kg/m² as calculated from the following:    Height as of this encounter: 4' 8" (1 422 m)  Weight as of this encounter: 82 1 kg (181 lb)  BSA:   Estimated body surface area is 1 7 meters squared as calculated from the following:    Height as of this encounter: 4' 8" (1 422 m)      Weight as of this encounter: 82 1 kg (181 lb)  Physical Exam  Constitutional:       General: She is not in acute distress  Appearance: She is well-developed  HENT:      Head: Normocephalic  Eyes:      Conjunctiva/sclera: Conjunctivae normal       Pupils: Pupils are equal, round, and reactive to light  Pulmonary:      Effort: Pulmonary effort is normal  No respiratory distress  Musculoskeletal:      Right knee: No effusion  Instability Tests: Medial David test negative and lateral David test negative  Skin:     General: Skin is warm and dry  Neurological:      Mental Status: She is alert and oriented to person, place, and time  Psychiatric:         Behavior: Behavior normal        Right Knee Exam     Tenderness   The patient is experiencing tenderness in the medial joint line  Range of Motion   The patient has normal right knee ROM  Tests   David:  Medial - negative Lateral - negative  Varus: negative Valgus: negative  Drawer:  Anterior - negative    Posterior - negative    Other   Erythema: absent  Scars: absent  Sensation: normal  Pulse: present  Swelling: none  Effusion: no effusion present    Comments:  Mild patellofemoral crepitation            I have personally reviewed pertinent films in PACS and my interpretation is X-rays of the right knee reveal mild-to-moderate tricompartmental osteoarthritis worse in the patellofemoral compartment with marginal osteophyte formation  Large joint arthrocentesis: R knee  Universal Protocol:  Consent: Verbal consent obtained    Risks and benefits: risks, benefits and alternatives were discussed  Consent given by: patient  Patient understanding: patient states understanding of the procedure being performed    Supporting Documentation  Indications: pain   Procedure Details  Location: knee - R knee  Preparation: Patient was prepped and draped in the usual sterile fashion  Needle size: 22 G  Approach: anterolateral  Medications administered: 20 mg Sodium Hyaluronate 20 MG/2ML    Patient tolerance: patient tolerated the procedure well with no immediate complications  Dressing:  Sterile dressing applied

## 2022-11-10 ENCOUNTER — PROCEDURE VISIT (OUTPATIENT)
Dept: OBGYN CLINIC | Facility: CLINIC | Age: 47
End: 2022-11-10

## 2022-11-10 ENCOUNTER — TELEPHONE (OUTPATIENT)
Dept: HEMATOLOGY ONCOLOGY | Facility: CLINIC | Age: 47
End: 2022-11-10

## 2022-11-10 VITALS
HEIGHT: 56 IN | WEIGHT: 181 LBS | DIASTOLIC BLOOD PRESSURE: 70 MMHG | SYSTOLIC BLOOD PRESSURE: 130 MMHG | BODY MASS INDEX: 40.72 KG/M2

## 2022-11-10 DIAGNOSIS — M17.11 PRIMARY LOCALIZED OSTEOARTHRITIS OF RIGHT KNEE: Primary | ICD-10-CM

## 2022-11-10 NOTE — PROGRESS NOTES
Assessment:     1  Primary localized osteoarthritis of right knee        Plan:     Problem List Items Addressed This Visit        Musculoskeletal and Integument    Primary localized osteoarthritis of right knee - Primary     Findings consistent with right knee osteoarthritis  Findings and treatment options were discussed the patient  The 3rd of 3 right knee Euflexxa injections was given today  She tolerated the procedure well  Advised to apply cold compress today  Follow-up as needed if symptoms return  Advised patient the soonest she can have another series is in 6 months  All questions were answered to patient's satisfaction  Relevant Medications    sodium hyaluronate (ORTHOVISC) injection SOSY 30 mg (Completed)    Other Relevant Orders    Large joint arthrocentesis: R knee (Completed)         Subjective:     Patient ID: Pedro Duran is a 52 y o  female  Chief Complaint: This is a 59-year-old white female following up for right knee osteoarthritis  She is here for the 3rd of 3 right knee Euflexxa injections  She states she has been having more aching pain this past week, especially at the end of the day  She has tried icing, but states she has more pain after icing  Allergy:  No Known Allergies  Medications:  all current active meds have been reviewed  Past Medical History:  Past Medical History:   Diagnosis Date   • Anemia    • Asthma     w/acute exacerbation   Last assessed: 10/26/12   • Chest pain 2/3/2016   • CHF (congestive heart failure) [address] )    • Depression    • Diabetes mellitus ([address] )    • Diabetic nephropathy ([address] )    • Diabetic nephropathy associated with type 1 diabetes mellitus ([address] ) 8/12/2020   • Diabetic retinopathy ([facility] ) 2/3/2016   • Endometriosis    • Gastroenteritis 02/03/2016   • GERD (gastroesophageal reflux disease)    • HTN (hypertension)    • Hyperlipidemia 2/3/2016   • Oligomenorrhea    • Polycystic ovaries 2/3/2016   • Retinal hemorrhage 2/3/2016   • Retinopathy    • Thrombocytosis    • Type 1 diabetes mellitus with ophthalmic manifestation ([facility] ) 2/3/2016     Past Surgical History:  Past Surgical History:   Procedure Laterality Date   • CATARACT EXTRACTION Left 10/2020   • CATARACT EXTRACTION Right    • RETINAL LASER PROCEDURE       Family History:  Family History   Problem Relation Age of Onset   • Heart murmur Mother    • Diabetes Mother         Mellitus   • Thyroid disease Mother         Disorder   • Diabetes Father         Mellitus   • Hypertension Father    • Diabetes Maternal Grandfather         Mellitus   • Breast cancer Paternal Grandmother    • Diabetes Maternal Aunt         Mellitus   • Diabetes Maternal Uncle         Mellitus   • No Known Problems Maternal Grandmother    • Leukemia Paternal Grandfather    • Substance Abuse Neg Hx    • Mental illness Neg Hx    • Alcohol abuse Neg Hx      Social History:  Social History     Substance and Sexual Activity   Alcohol Use Yes    Comment: Occasional/1 mixed drink twice a month if that     Social History     Substance and Sexual Activity   Drug Use No     Social History     Tobacco Use   Smoking Status Never Smoker   Smokeless Tobacco Never Used   Tobacco Comment    Per [employer]: Former smoker  Quit in 1994     Review of Systems   Constitutional: Negative  HENT: Negative  Eyes: Negative  Respiratory: Negative  Cardiovascular: Negative  Gastrointestinal: Negative  Endocrine: Negative  Genitourinary: Negative  Musculoskeletal: Positive for arthralgias and gait problem (antalgic)  Skin: Negative  Allergic/Immunologic: Negative  Hematological: Negative  Psychiatric/Behavioral: Negative  Objective:  BP Readings from Last 1 Encounters:   11/10/22 130/70      Wt Readings from Last 1 Encounters:   11/10/22 82 1 kg (181 lb)      BMI:   Estimated body mass index is 40 58 kg/m² as calculated from the following:    Height as of this encounter: 4' 8" (1 422 m)      Weight as of this encounter: 82 1 kg (181 lb)  BSA:   Estimated body surface area is 1 7 meters squared as calculated from the following:    Height as of this encounter: 4' 8" (1 422 m)  Weight as of this encounter: 82 1 kg (181 lb)  Physical Exam  Constitutional:       General: She is not in acute distress  Appearance: She is well-developed  HENT:      Head: Normocephalic  Eyes:      Conjunctiva/sclera: Conjunctivae normal       Pupils: Pupils are equal, round, and reactive to light  Pulmonary:      Effort: Pulmonary effort is normal  No respiratory distress  Musculoskeletal:      Right knee: No effusion  Instability Tests: Medial David test negative and lateral David test negative  Skin:     General: Skin is warm and dry  Neurological:      Mental Status: She is alert and oriented to person, place, and time  Psychiatric:         Behavior: Behavior normal        Right Knee Exam     Tenderness   The patient is experiencing tenderness in the medial joint line  Range of Motion   The patient has normal right knee ROM  Tests   David:  Medial - negative Lateral - negative  Varus: negative Valgus: negative  Drawer:  Anterior - negative    Posterior - negative    Other   Erythema: absent  Scars: absent  Sensation: normal  Pulse: present  Swelling: none  Effusion: no effusion present    Comments:  Mild patellofemoral crepitation            No new imaging  Large joint arthrocentesis: R knee  Universal Protocol:  Consent: Verbal consent obtained    Risks and benefits: risks, benefits and alternatives were discussed  Consent given by: patient  Patient understanding: patient states understanding of the procedure being performed    Supporting Documentation  Indications: pain   Procedure Details  Location: knee - R knee  Preparation: Patient was prepped and draped in the usual sterile fashion  Needle size: 22 G  Approach: anterolateral  Medications administered: 30 mg sodium hyaluronate 30 mg/2 mL    Patient tolerance: patient tolerated the procedure well with no immediate complications  Dressing:  Sterile dressing applied

## 2022-11-10 NOTE — ASSESSMENT & PLAN NOTE
Findings consistent with right knee osteoarthritis  Findings and treatment options were discussed the patient  The 3rd of 3 right knee Euflexxa injections was given today  She tolerated the procedure well  Advised to apply cold compress today  Follow-up as needed if symptoms return  Advised patient the soonest she can have another series is in 6 months  All questions were answered to patient's satisfaction

## 2022-11-10 NOTE — TELEPHONE ENCOUNTER
11/10/22    LVM reminding pt for updated labs prior to the next appt  Labs:  CBC and differential  Comprehensive metabolic panel  Iron Panel (Includes Ferritin, Iron Sat%, Iron, and TIBC)  JAK2 V617F,Ql,W/RFL Exons 12,13 and MPL L196,D807  Methylmalonic acid, serum    Advising pt to get blood work done in any [employer] labs  - YENNY-2 test may take about 2 weeks to be resulted  I r/s pt to f/u with Ludmila on 12/14  [facility] number also provided

## 2022-11-14 DIAGNOSIS — F32.A DEPRESSION, UNSPECIFIED DEPRESSION TYPE: ICD-10-CM

## 2022-11-14 RX ORDER — PAROXETINE HYDROCHLORIDE 20 MG/1
TABLET, FILM COATED ORAL
Qty: 90 TABLET | Refills: 0 | Status: SHIPPED | OUTPATIENT
Start: 2022-11-14

## 2022-11-15 DIAGNOSIS — E78.2 MIXED HYPERLIPIDEMIA: ICD-10-CM

## 2022-11-15 RX ORDER — HYALURONATE SODIUM 10 MG/ML
20 SYRINGE (ML) INTRAARTICULAR
Status: COMPLETED | OUTPATIENT
Start: 2022-11-15 | End: 2022-11-15

## 2022-11-15 RX ORDER — ATORVASTATIN CALCIUM 80 MG/1
TABLET, FILM COATED ORAL
Qty: 30 TABLET | Refills: 11 | Status: SHIPPED | OUTPATIENT
Start: 2022-11-15

## 2022-11-15 RX ADMIN — Medication 20 MG: at 09:00

## 2022-12-05 DIAGNOSIS — J45.20 MILD INTERMITTENT ASTHMA WITHOUT COMPLICATION: ICD-10-CM

## 2022-12-05 RX ORDER — MONTELUKAST SODIUM 10 MG/1
TABLET ORAL
Qty: 90 TABLET | Refills: 0 | Status: SHIPPED | OUTPATIENT
Start: 2022-12-05

## 2022-12-12 ENCOUNTER — TELEPHONE (OUTPATIENT)
Dept: HEMATOLOGY ONCOLOGY | Facility: HOSPITAL | Age: 47
End: 2022-12-12

## 2022-12-12 NOTE — TELEPHONE ENCOUNTER
12/12/22    Referring my telephone note on 11/10:    I canceled pt's appt with Cecilia Willis on 12/15 d/t pt's labs are still not completed  Hope line number provided for CB to r/s if pt gets labs done

## 2022-12-18 DIAGNOSIS — I50.9 ACUTE CONGESTIVE HEART FAILURE, UNSPECIFIED HEART FAILURE TYPE (HCC): ICD-10-CM

## 2022-12-22 DIAGNOSIS — I50.9 ACUTE CONGESTIVE HEART FAILURE, UNSPECIFIED HEART FAILURE TYPE (HCC): ICD-10-CM

## 2022-12-22 RX ORDER — FUROSEMIDE 40 MG/1
40 TABLET ORAL DAILY
Qty: 30 TABLET | Refills: 0 | Status: SHIPPED | OUTPATIENT
Start: 2022-12-22

## 2022-12-27 RX ORDER — FUROSEMIDE 40 MG/1
TABLET ORAL
Qty: 30 TABLET | Refills: 11 | Status: SHIPPED | OUTPATIENT
Start: 2022-12-27

## 2023-01-18 DIAGNOSIS — I50.9 ACUTE CONGESTIVE HEART FAILURE, UNSPECIFIED HEART FAILURE TYPE (HCC): ICD-10-CM

## 2023-01-18 RX ORDER — FUROSEMIDE 40 MG/1
TABLET ORAL
Qty: 30 TABLET | Refills: 0 | Status: SHIPPED | OUTPATIENT
Start: 2023-01-18

## 2023-01-29 DIAGNOSIS — I10 ESSENTIAL HYPERTENSION: Chronic | ICD-10-CM

## 2023-01-29 RX ORDER — LISINOPRIL 20 MG/1
TABLET ORAL
Qty: 60 TABLET | Refills: 3 | Status: SHIPPED | OUTPATIENT
Start: 2023-01-29

## 2023-02-07 ENCOUNTER — OFFICE VISIT (OUTPATIENT)
Dept: OBGYN CLINIC | Facility: CLINIC | Age: 48
End: 2023-02-07

## 2023-02-07 VITALS
DIASTOLIC BLOOD PRESSURE: 68 MMHG | WEIGHT: 188 LBS | BODY MASS INDEX: 42.29 KG/M2 | HEIGHT: 56 IN | SYSTOLIC BLOOD PRESSURE: 120 MMHG

## 2023-02-07 DIAGNOSIS — M17.11 PRIMARY LOCALIZED OSTEOARTHRITIS OF RIGHT KNEE: Primary | ICD-10-CM

## 2023-02-07 RX ORDER — BUPIVACAINE HYDROCHLORIDE 2.5 MG/ML
8 INJECTION, SOLUTION INFILTRATION; PERINEURAL
Status: COMPLETED | OUTPATIENT
Start: 2023-02-07 | End: 2023-02-07

## 2023-02-07 RX ORDER — BETAMETHASONE SODIUM PHOSPHATE AND BETAMETHASONE ACETATE 3; 3 MG/ML; MG/ML
6 INJECTION, SUSPENSION INTRA-ARTICULAR; INTRALESIONAL; INTRAMUSCULAR; SOFT TISSUE
Status: COMPLETED | OUTPATIENT
Start: 2023-02-07 | End: 2023-02-07

## 2023-02-07 RX ADMIN — BETAMETHASONE SODIUM PHOSPHATE AND BETAMETHASONE ACETATE 6 MG: 3; 3 INJECTION, SUSPENSION INTRA-ARTICULAR; INTRALESIONAL; INTRAMUSCULAR; SOFT TISSUE at 14:56

## 2023-02-07 RX ADMIN — BUPIVACAINE HYDROCHLORIDE 8 ML: 2.5 INJECTION, SOLUTION INFILTRATION; PERINEURAL at 14:56

## 2023-02-07 NOTE — PROGRESS NOTES
Assessment:     1  Primary localized osteoarthritis of right knee        Plan:     Problem List Items Addressed This Visit        Musculoskeletal and Integument    Primary localized osteoarthritis of right knee - Primary     Findings consistent with mild right knee osteoarthritis  Findings and treatment options were discussed with the patient  Patient had much more relief with cortisone versus joint supplement injections  She would like a cortisone injection to the right knee joint today  She tolerated the procedure well  Advised to apply cold compress today  Continue ice and NSAIDs as needed for pain  Encouraged low impact exercises  Follow-up as needed if symptoms return  Advised patient as soon as she can have another cortisone injection is in 3 to 4 months  All patient's questions were answered to her satisfaction  This note is created using dictation transcription  It may contain typographical errors, grammatical errors, improperly dictated words, background noise and other errors  Relevant Medications    betamethasone acetate-betamethasone sodium phosphate (CELESTONE) injection 6 mg (Completed)    bupivacaine (MARCAINE) 0 25 % injection 8 mL (Completed)    Other Relevant Orders    Large joint arthrocentesis: R knee (Completed)      Subjective:     Patient ID: Bayron Enrique is a 52 y o  female  Chief Complaint: This is a 42-year-old white female following up for right knee osteoarthritis  She completed a series of joint supplement injections in November 2022  She states she had minimal benefit from the series  She had much more relief from cortisone injection given in the past   She states she had about a year of relief with the cortisone injection  She is requesting a cortisone injection of the right knee joint today  She continues to have aching pain of the right knee  It is localized anteriorly  It is worse when descending stairs  No new injury      Allergy:  No Known Allergies  Medications:  all current active meds have been reviewed  Past Medical History:  Past Medical History:   Diagnosis Date   • Anemia    • Asthma     w/acute exacerbation  Last assessed: 10/26/12   • Chest pain 2/3/2016   • CHF (congestive heart failure) ([facility] )    • Depression    • Diabetes mellitus ([facility] )    • Diabetic nephropathy ([facility] )    • Diabetic nephropathy associated with type 1 diabetes mellitus ([facility] ) 8/12/2020   • Diabetic retinopathy ([place] [address] ) 2/3/2016   • Endometriosis    • Gastroenteritis 02/03/2016   • GERD (gastroesophageal reflux disease)    • HTN (hypertension)    • Hyperlipidemia 2/3/2016   • Oligomenorrhea    • Polycystic ovaries 2/3/2016   • Retinal hemorrhage 2/3/2016   • Retinopathy    • Thrombocytosis    • Type 1 diabetes mellitus with ophthalmic manifestation ([place] [address] ) 2/3/2016     Past Surgical History:  Past Surgical History:   Procedure Laterality Date   • CATARACT EXTRACTION Left 10/2020   • CATARACT EXTRACTION Right    • RETINAL LASER PROCEDURE       Family History:  Family History   Problem Relation Age of Onset   • Heart murmur Mother    • Diabetes Mother         Mellitus   • Thyroid disease Mother         Disorder   • Diabetes Father         Mellitus   • Hypertension Father    • Diabetes Maternal Grandfather         Mellitus   • Breast cancer Paternal Grandmother    • Diabetes Maternal Aunt         Mellitus   • Diabetes Maternal Uncle         Mellitus   • No Known Problems Maternal Grandmother    • Leukemia Paternal Grandfather    • Substance Abuse Neg Hx    • Mental illness Neg Hx    • Alcohol abuse Neg Hx      Social History:  Social History     Substance and Sexual Activity   Alcohol Use Yes    Comment: Occasional/1 mixed drink twice a month if that     Social History     Substance and Sexual Activity   Drug Use No     Social History     Tobacco Use   Smoking Status Never   Smokeless Tobacco Never   Tobacco Comments    Per [employer]: Former smoker   Quit in [address] [employer] Constitutional: Negative  HENT: Negative  Eyes: Negative  Respiratory: Negative  Cardiovascular: Negative  Gastrointestinal: Negative  Endocrine: Negative  Genitourinary: Negative  Musculoskeletal: Positive for arthralgias (Right knee) and gait problem (antalgic)  Skin: Negative  Allergic/Immunologic: Negative  Hematological: Negative  Psychiatric/Behavioral: Negative  Objective:  BP Readings from Last 1 Encounters:   02/07/23 120/68      Wt Readings from Last 1 Encounters:   02/07/23 85 3 kg (188 lb)      BMI:   Estimated body mass index is 42 15 kg/m² as calculated from the following:    Height as of this encounter: 4' 8" (1 422 m)  Weight as of this encounter: 85 3 kg (188 lb)  BSA:   Estimated body surface area is 1 73 meters squared as calculated from the following:    Height as of this encounter: 4' 8" (1 422 m)  Weight as of this encounter: 85 3 kg (188 lb)  Physical Exam  Vitals and nursing note reviewed  Constitutional:       General: She is not in acute distress  Appearance: Normal appearance  She is well-developed  She is obese  HENT:      Head: Normocephalic and atraumatic  Right Ear: External ear normal       Left Ear: External ear normal    Eyes:      Extraocular Movements: Extraocular movements intact  Conjunctiva/sclera: Conjunctivae normal    Pulmonary:      Effort: Pulmonary effort is normal  No respiratory distress  Musculoskeletal:      Cervical back: Neck supple  Right knee: No effusion  Instability Tests: Medial David test negative and lateral David test negative  Skin:     General: Skin is warm and dry  Neurological:      Mental Status: She is alert and oriented to person, place, and time  Deep Tendon Reflexes: Reflexes are normal and symmetric     Psychiatric:         Mood and Affect: Mood normal          Behavior: Behavior normal        Right Knee Exam     Tenderness   Right knee tenderness location: patellofemoral     Range of Motion   The patient has normal right knee ROM  Tests   David:  Medial - negative Lateral - negative  Varus: negative Valgus: negative  Patellar apprehension: negative    Other   Erythema: absent  Scars: absent  Sensation: normal  Pulse: present  Swelling: none  Effusion: no effusion present    Comments:  Mild patellofemoral crepitation            No new imaging  Large joint arthrocentesis: R knee  Universal Protocol:  Consent: Verbal consent obtained    Risks and benefits: risks, benefits and alternatives were discussed  Consent given by: patient  Patient understanding: patient states understanding of the procedure being performed  Site marked: the operative site was marked  Supporting Documentation  Indications: pain   Procedure Details  Location: knee - R knee  Preparation: Patient was prepped and draped in the usual sterile fashion  Needle size: 22 G  Ultrasound guidance: no  Approach: anterolateral  Medications administered: 6 mg betamethasone acetate-betamethasone sodium phosphate 6 (3-3) mg/mL; 8 mL bupivacaine 0 25 %    Patient tolerance: patient tolerated the procedure well with no immediate complications  Dressing:  Sterile dressing applied        Scribe Attestation    I,:  Genora Holstein, PA-C am acting as a scribe while in the presence of the attending physician :       I,:  Tiffany Farley MD personally performed the services described in this documentation    as scribed in my presence :

## 2023-02-07 NOTE — ASSESSMENT & PLAN NOTE
Findings consistent with mild right knee osteoarthritis  Findings and treatment options were discussed with the patient  Patient had much more relief with cortisone versus joint supplement injections  She would like a cortisone injection to the right knee joint today  She tolerated the procedure well  Advised to apply cold compress today  Continue ice and NSAIDs as needed for pain  Encouraged low impact exercises  Follow-up as needed if symptoms return  Advised patient as soon as she can have another cortisone injection is in 3 to 4 months  All patient's questions were answered to her satisfaction  This note is created using dictation transcription  It may contain typographical errors, grammatical errors, improperly dictated words, background noise and other errors

## 2023-02-09 ENCOUNTER — TELEPHONE (OUTPATIENT)
Dept: HEMATOLOGY ONCOLOGY | Facility: CLINIC | Age: 48
End: 2023-02-09

## 2023-02-09 DIAGNOSIS — E10.65 TYPE 1 DIABETES MELLITUS WITH HYPERGLYCEMIA (HCC): Primary | ICD-10-CM

## 2023-02-09 RX ORDER — INSULIN ASPART 100 [IU]/ML
INJECTION, SOLUTION INTRAVENOUS; SUBCUTANEOUS
Qty: 50 ML | Refills: 0 | Status: SHIPPED | OUTPATIENT
Start: 2023-02-09

## 2023-02-09 NOTE — TELEPHONE ENCOUNTER
Thrombosis and anemia follow-up  JAK2 MPN work-up ordered  I do not see any pending results in patient chart  We will call patient tomorrow rescheduling her appointment or if she got anything outside of [address], she can bring in the results

## 2023-02-09 NOTE — TELEPHONE ENCOUNTER
Scheduling Appointment SEND TO POOLS    Person calling in Patient     If other than patient calling, are they listed on the communication consent form? N/A   Doctor Dr Sebastian Sanchez   Appointment date and time 2/13/23 1:20 PM   Reason for scheduling appointment Follow up   Patient verbalized understanding?   Yes   No show policy reviewed with patient No

## 2023-02-10 ENCOUNTER — TELEPHONE (OUTPATIENT)
Dept: HEMATOLOGY ONCOLOGY | Facility: HOSPITAL | Age: 48
End: 2023-02-10

## 2023-02-10 NOTE — TELEPHONE ENCOUNTER
2/10/2023    Per my note from yesterday, thrombosis and anemia follow-up  For now, I do not have any results ready in patient's chart  This is a follow-up appointment to go over her blood test results  I will cancel her appointment with Dr Rashaun Cuellar on Monday if I still do not see any blood test results or receive any phone call from patient by end of today  Hope line number provided for callback to reschedule

## 2023-02-13 NOTE — TELEPHONE ENCOUNTER
2/13/2023    Called back and spoke with patient  I explained to her that I will cancel her appointments with Dr Jhon Lancaster today since I do not see any blood work especially JAK2 mutation test result in her chart  Advising patient to get the blood work done first and to call us back to schedule the follow-up appointment with Javier Hargrove in 2 - 4 weeks  If there is any hard time for hopeline to find a slot for her, she was instructed to transfer her phone call to me directly and I will help schedule her to see Bran Roman as soon as I can  Hope line number and my name provided  Patient verbalized great understanding and appreciation

## 2023-02-19 DIAGNOSIS — I50.9 ACUTE CONGESTIVE HEART FAILURE, UNSPECIFIED HEART FAILURE TYPE (HCC): ICD-10-CM

## 2023-02-20 RX ORDER — FUROSEMIDE 40 MG/1
TABLET ORAL
Qty: 30 TABLET | Refills: 0 | Status: SHIPPED | OUTPATIENT
Start: 2023-02-20

## 2023-02-21 ENCOUNTER — APPOINTMENT (OUTPATIENT)
Dept: LAB | Facility: CLINIC | Age: 48
End: 2023-02-21

## 2023-02-21 DIAGNOSIS — E10.65 TYPE 1 DIABETES MELLITUS WITH HYPERGLYCEMIA (HCC): ICD-10-CM

## 2023-02-21 DIAGNOSIS — E10.21 DIABETIC NEPHROPATHY ASSOCIATED WITH TYPE 1 DIABETES MELLITUS (HCC): ICD-10-CM

## 2023-02-21 DIAGNOSIS — I10 HYPERTENSION, UNSPECIFIED TYPE: ICD-10-CM

## 2023-02-21 DIAGNOSIS — D64.9 ANEMIA, UNSPECIFIED TYPE: ICD-10-CM

## 2023-02-21 DIAGNOSIS — D75.839 THROMBOCYTOSIS: ICD-10-CM

## 2023-02-21 LAB
ALBUMIN SERPL BCP-MCNC: 3.8 G/DL (ref 3.5–5)
ALP SERPL-CCNC: 92 U/L (ref 46–116)
ALT SERPL W P-5'-P-CCNC: 26 U/L (ref 12–78)
ANION GAP SERPL CALCULATED.3IONS-SCNC: 7 MMOL/L (ref 4–13)
AST SERPL W P-5'-P-CCNC: 16 U/L (ref 5–45)
BASOPHILS # BLD AUTO: 0.07 THOUSANDS/ÂΜL (ref 0–0.1)
BASOPHILS NFR BLD AUTO: 1 % (ref 0–1)
BILIRUB SERPL-MCNC: 0.3 MG/DL (ref 0.2–1)
BUN SERPL-MCNC: 47 MG/DL (ref 5–25)
CALCIUM SERPL-MCNC: 8.8 MG/DL (ref 8.3–10.1)
CHLORIDE SERPL-SCNC: 101 MMOL/L (ref 96–108)
CHOLEST SERPL-MCNC: 148 MG/DL
CO2 SERPL-SCNC: 26 MMOL/L (ref 21–32)
CREAT SERPL-MCNC: 0.86 MG/DL (ref 0.6–1.3)
CREAT UR-MCNC: 53.6 MG/DL
EOSINOPHIL # BLD AUTO: 0.41 THOUSAND/ÂΜL (ref 0–0.61)
EOSINOPHIL NFR BLD AUTO: 5 % (ref 0–6)
ERYTHROCYTE [DISTWIDTH] IN BLOOD BY AUTOMATED COUNT: 13.9 % (ref 11.6–15.1)
FERRITIN SERPL-MCNC: 179 NG/ML (ref 8–388)
GFR SERPL CREATININE-BSD FRML MDRD: 80 ML/MIN/1.73SQ M
GLUCOSE P FAST SERPL-MCNC: 122 MG/DL (ref 65–99)
HCT VFR BLD AUTO: 33.7 % (ref 34.8–46.1)
HDLC SERPL-MCNC: 40 MG/DL
HGB BLD-MCNC: 10.3 G/DL (ref 11.5–15.4)
IMM GRANULOCYTES # BLD AUTO: 0.05 THOUSAND/UL (ref 0–0.2)
IMM GRANULOCYTES NFR BLD AUTO: 1 % (ref 0–2)
IRON SATN MFR SERPL: 22 % (ref 15–50)
IRON SERPL-MCNC: 68 UG/DL (ref 50–170)
LDLC SERPL CALC-MCNC: 69 MG/DL (ref 0–100)
LYMPHOCYTES # BLD AUTO: 1.84 THOUSANDS/ÂΜL (ref 0.6–4.47)
LYMPHOCYTES NFR BLD AUTO: 20 % (ref 14–44)
MCH RBC QN AUTO: 28.6 PG (ref 26.8–34.3)
MCHC RBC AUTO-ENTMCNC: 30.6 G/DL (ref 31.4–37.4)
MCV RBC AUTO: 94 FL (ref 82–98)
MONOCYTES # BLD AUTO: 0.67 THOUSAND/ÂΜL (ref 0.17–1.22)
MONOCYTES NFR BLD AUTO: 7 % (ref 4–12)
NEUTROPHILS # BLD AUTO: 6.15 THOUSANDS/ÂΜL (ref 1.85–7.62)
NEUTS SEG NFR BLD AUTO: 66 % (ref 43–75)
NONHDLC SERPL-MCNC: 108 MG/DL
NRBC BLD AUTO-RTO: 0 /100 WBCS
PLATELET # BLD AUTO: 409 THOUSANDS/UL (ref 149–390)
PMV BLD AUTO: 10.3 FL (ref 8.9–12.7)
POTASSIUM SERPL-SCNC: 4 MMOL/L (ref 3.5–5.3)
PROT SERPL-MCNC: 6.8 G/DL (ref 6.4–8.4)
PROT UR-MCNC: 13 MG/DL
PROT/CREAT UR: 0.24 MG/G{CREAT} (ref 0–0.1)
RBC # BLD AUTO: 3.6 MILLION/UL (ref 3.81–5.12)
SODIUM SERPL-SCNC: 134 MMOL/L (ref 135–147)
TIBC SERPL-MCNC: 313 UG/DL (ref 250–450)
TRIGL SERPL-MCNC: 197 MG/DL
TSH SERPL DL<=0.05 MIU/L-ACNC: 2.71 UIU/ML (ref 0.45–4.5)
WBC # BLD AUTO: 9.19 THOUSAND/UL (ref 4.31–10.16)

## 2023-02-22 LAB
BACTERIA UR QL AUTO: ABNORMAL /HPF
BILIRUB UR QL STRIP: NEGATIVE
CLARITY UR: CLEAR
COLOR UR: ABNORMAL
EST. AVERAGE GLUCOSE BLD GHB EST-MCNC: 258 MG/DL
GLUCOSE UR STRIP-MCNC: NEGATIVE MG/DL
HBA1C MFR BLD: 10.6 %
HGB UR QL STRIP.AUTO: NEGATIVE
HYALINE CASTS #/AREA URNS LPF: ABNORMAL /LPF
KETONES UR STRIP-MCNC: NEGATIVE MG/DL
LEUKOCYTE ESTERASE UR QL STRIP: NEGATIVE
NITRITE UR QL STRIP: NEGATIVE
NON-SQ EPI CELLS URNS QL MICRO: ABNORMAL /HPF
PH UR STRIP.AUTO: 6 [PH]
PROT UR STRIP-MCNC: ABNORMAL MG/DL
RBC #/AREA URNS AUTO: ABNORMAL /HPF
SP GR UR STRIP.AUTO: 1.01 (ref 1–1.03)
UROBILINOGEN UR STRIP-ACNC: <2 MG/DL
WBC #/AREA URNS AUTO: ABNORMAL /HPF

## 2023-02-23 ENCOUNTER — OFFICE VISIT (OUTPATIENT)
Dept: ENDOCRINOLOGY | Facility: HOSPITAL | Age: 48
End: 2023-02-23

## 2023-02-23 VITALS
HEIGHT: 56 IN | OXYGEN SATURATION: 97 % | HEART RATE: 78 BPM | BODY MASS INDEX: 42.97 KG/M2 | SYSTOLIC BLOOD PRESSURE: 140 MMHG | WEIGHT: 191 LBS | DIASTOLIC BLOOD PRESSURE: 82 MMHG

## 2023-02-23 DIAGNOSIS — E10.65 TYPE 1 DIABETES MELLITUS WITH HYPERGLYCEMIA (HCC): Primary | ICD-10-CM

## 2023-02-23 RX ORDER — INSULIN ASPART 100 [IU]/ML
INJECTION, SOLUTION INTRAVENOUS; SUBCUTANEOUS
Qty: 50 ML | Refills: 2 | Status: SHIPPED | OUTPATIENT
Start: 2023-02-23

## 2023-02-23 RX ORDER — INSULIN GLARGINE 100 [IU]/ML
78 INJECTION, SOLUTION SUBCUTANEOUS DAILY
Qty: 60 ML | Refills: 2 | Status: SHIPPED | OUTPATIENT
Start: 2023-02-23

## 2023-02-23 NOTE — PROGRESS NOTES
Sebastien Pickard 52 y o  female MRN: [id number]    Encounter: [id number]      Assessment/Plan     Assessment: This is a 52y o -year-old female with type 1 diabetes with neuropathy, nephropathy, hypertension and hyperlipidemia  Plan:  1  Type 1 diabetes: Most recent hemoglobin A1c was 10 6  Review of her Dexcom, she has been doing quite well within the past 2 weeks but is unclear what she has been doing different  At this time no adjustments will be made to her insulin  I do encourage her to try and make sure she takes her NovoLog prior to lunch  Do think she would also benefit from MNT put in a referral in for diabetes education  Contact the office with any concerns or questions  I would like to see downloads of her Dexcom roughly every 2 weeks  Follow-up in 3 months with lab work completed prior to visit      2  Diabetic neuropathy:  Stable   Is up-to-date on diabetic foot exam      3  Diabetic nephropathy:  Some protein noted in her urine previously  Elina  function remains stable at this time  Romayne Carrion continue to monitor   Will be due for another microalbuminuria at next appointment      4  Hypertension: Normotensive in the office today  Elina Nanny function remains stable with normal electrolytes   Continue with current medications at this time   Repeat CMP prior to next office visit      5  Hyperlipidemia:  Lipid panel did reveal elevated triglycerides which might be due to her hyperglycemia   Total cholesterol and LDL cholesterol were within normal range   Continue with current medications  Repeat lipid panel prior to next office visit      CC: Type 1 diabetes follow-up    History of Present Illness     HPI:  Vernell Darby is a 52 y o  female with type 1 diabetes with diabetic neuropathy and diabetic nephropathy, hypertension, hyperlipidemia for follow-up visit    She was diagnosed with type 1 diabetes about 29 years ago  Troy Artis utilizes insulin therapy and takes Lantus insulin 78 units at bedtime and NovoLog insulin 1 unit per 1 g carbohydrate with each meal   Does continue with inconsistency with her insulin mainly with lunch as she is on the road  States she does lot of travel for, will eat in the car, does not necessarily take her insulin on time  Has been more consistent with her Lantus recently     She denies polyuria, polydipsia, polyphagia, or nocturia   She denies blurry vision   She denies numbness or tingling of the feet   She denies chest pain or shortness of breath  Does admit to some poor diet with her lunch as she tries to keep food in a car that she can eat on the go  Is willing to follow-up with diabetes education       Diabetic complications include diabetic neuropathy, diabetic nephropathy, diabetic retinopathy   She denies heart attack, stroke, or claudication      Last diabetic foot exam was performed in the endocrine office in September 2021        She reports her last eye exam was 2022 and there was no change in her retinopathy       She uses a Dexcom continuous glucose monitoring system to check her blood sugars frequently throughout the day  Download of Dexcom from February 10 through February 23, 2023 revealed an average glucose level of 194 with a standard deviation of 86  She is in target range 50% of time, above target range 47% time, below target range 3% time  In the morning glucose levels are doing fairly well, but there is an increase in glucose levels with lunch  On average she is running high throughout the evening, but glucose levels can dip down which is likely due to taking her insulin    Overnight she is typically high, but trends downward by the morning       She has hypertension and diabetic nephropathy and takes lisinopril 20 mg 2 tablets daily    She has occasional headaches but no stroke-like symptoms      She has hyperlipidemia and takes atorvastatin 40 mg daily   She denies chest pain or shortness of breath       She also has had quite a few months of fatigue and is now on iron infusions for low iron  Review of Systems   Constitutional: Negative for activity change, appetite change, fatigue and unexpected weight change  HENT: Negative for sore throat and trouble swallowing  Eyes: Negative for visual disturbance  Respiratory: Negative for chest tightness and shortness of breath  Cardiovascular: Negative for chest pain, palpitations and leg swelling  Gastrointestinal: Negative for abdominal pain, constipation, diarrhea, nausea and vomiting  Endocrine: Negative for cold intolerance, heat intolerance, polydipsia, polyphagia and polyuria  Genitourinary: Negative for frequency  Skin: Negative for wound  Neurological: Negative for dizziness, weakness, numbness and headaches  Psychiatric/Behavioral: Negative for dysphoric mood and sleep disturbance  The patient is not nervous/anxious  Historical Information   Past Medical History:   Diagnosis Date   • Anemia    • Asthma     w/acute exacerbation   Last assessed: 10/26/12   • Chest pain 2/3/2016   • CHF (congestive heart failure) ([facility] )    • Depression    • Diabetes mellitus ([place] [address] )    • Diabetic nephropathy ([place] [address] )    • Diabetic nephropathy associated with type 1 diabetes mellitus ([place] [address] ) 8/12/2020   • Diabetic retinopathy ([place] [address] ) 2/3/2016   • Endometriosis    • Gastroenteritis 02/03/2016   • GERD (gastroesophageal reflux disease)    • HTN (hypertension)    • Hyperlipidemia 2/3/2016   • Oligomenorrhea    • Polycystic ovaries 2/3/2016   • Retinal hemorrhage 2/3/2016   • Retinopathy    • Thrombocytosis    • Type 1 diabetes mellitus with ophthalmic manifestation ([facility] ) 2/3/2016     Past Surgical History:   Procedure Laterality Date   • CATARACT EXTRACTION Left 10/2020   • CATARACT EXTRACTION Right    • RETINAL LASER PROCEDURE       Social History   Social History     Substance and Sexual Activity   Alcohol Use Yes    Comment: Occasional/1 mixed drink twice a month if that     Social History     Substance and Sexual Activity   Drug Use No     Social History     Tobacco Use   Smoking Status Never   Smokeless Tobacco Never   Tobacco Comments    Per [employer]: Former smoker  Quit in 1994     Family History:   Family History   Problem Relation Age of Onset   • Heart murmur Mother    • Diabetes Mother         Mellitus   • Thyroid disease Mother         Disorder   • Diabetes Father         Mellitus   • Hypertension Father    • Diabetes Maternal Grandfather         Mellitus   • Breast cancer Paternal Grandmother    • Diabetes Maternal Aunt         Mellitus   • Diabetes Maternal Uncle         Mellitus   • No Known Problems Maternal Grandmother    • Leukemia Paternal Grandfather    • Substance Abuse Neg Hx    • Mental illness Neg Hx    • Alcohol abuse Neg Hx        Meds/Allergies   Current Outpatient Medications   Medication Sig Dispense Refill   • acetaminophen (TYLENOL) 500 mg tablet Take 2 tablets (1,000 mg total) by mouth every 6 (six) hours as needed for mild pain or moderate pain 30 tablet 0   • acetone, urine, test strip Use to test urine ketones for high blood sugars  25 each 6   • Advair Diskus 250-50 MCG/DOSE inhaler INHALE 1 PUFF BY MOUTH TWO TIMES DAILY, RINSE MOUTH AFTER  blister 3   • albuterol (2 5 mg/3 mL) 0 083 % nebulizer solution Take 3 mL (2 5 mg total) by nebulization every 6 (six) hours as needed for wheezing or shortness of breath 75 mL 3   • albuterol (PROVENTIL HFA,VENTOLIN HFA) 90 mcg/act inhaler INHALE 2 PUFFS BY MOUTH EVERY 4 HOURS AS NEEDED FOR WHEEZING OR FOR SHORTNESS OF BREATH 25 5 g 0   • Ascorbic Acid (VITAMIN C) 500 MG CAPS Take 2 capsules by mouth daily     • Aspirin Low Dose 81 MG EC tablet TAKE 1 TABLET BY MOUTH EVERY DAY 30 tablet 0   • atorvastatin (LIPITOR) 80 mg tablet TAKE 1 TABLET BY MOUTH EVERY DAY 30 tablet 11   • Biotin 10 MG TABS Take 1 tablet by mouth daily     • Blood Pressure Monitoring [place] Use 2 (two) times a day Monitor BP twice daily   Call office if BP > 140/90 persistently  1 Device 0   • Butalbital-APAP-Caffeine (Fioricet) -40 MG CAPS Take 1 tablet by mouth 4 (four) times a day as needed (headahce) 30 capsule 0   • Continuous Blood Gluc Sensor (Dexcom G6 Sensor) MISC CHANGE EVERY 10 DAYS 3 each 12   • Continuous Blood Gluc Transmit (Dexcom G6 Transmitter) MISC CHANGE EVERY 90 DAYS 1 each 1   • cyanocobalamin (VITAMIN B-12) 100 mcg tablet Take by mouth daily     • furosemide (LASIX) 40 mg tablet TAKE 1 TABLET BY MOUTH EVERY DAY 30 tablet 0   • glucagon (Glucagon Emergency) 1 MG injection Inject 1 mg under the skin once as needed for low blood sugar for up to 1 dose 1 kit 3   • Glucagon (Gvoke HypoPen 2-Pack) 1 MG/0 2ML SOAJ Use if hypoglycemia prn 1 Syringe 6   • insulin aspart (NovoLOG) 100 units/mL injection INJECT 1 UNITS OF NOVOLOG FOR EVERY 1 GRAM OF CARB, UP  UNITS DAILY 50 mL 6   • Insulin Glargine Solostar (Lantus SoloStar) 100 UNIT/ML SOPN Inject 0 78 mL (78 Units total) under the skin in the morning 60 mL 2   • lisinopril (ZESTRIL) 20 mg tablet TAKE 2 TABLETS BY MOUTH EVERY DAY 60 tablet 3   • montelukast (SINGULAIR) 10 mg tablet TAKE 1 TABLET BY MOUTH AT BEDTIME 90 tablet 0   • NovoLOG 100 UNIT/ML injection Inject 1 unit of Novolog for every 1 gram of carbs, up to 150 units daily 50 mL 2   • omeprazole (PriLOSEC) 20 mg delayed release capsule Take 20 mg by mouth daily  • OneTouch Ultra test strip Test 3 times daily 300 each 3   • PARoxetine (PAXIL) 20 mg tablet TAKE 1 TABLET BY MOUTH EVERY DAY 90 tablet 0   • Probiotic Product (ALIGN PO) Take by mouth     • ondansetron (ZOFRAN) 4 mg tablet Take 1 tablet (4 mg total) by mouth every 8 (eight) hours as needed for nausea or vomiting (Patient not taking: Reported on 2/23/2023) 20 tablet 0     No current facility-administered medications for this visit       No Known Allergies    Objective   Vitals: Blood pressure 140/82, pulse 78, height 4' 8" (1 422 m), weight 86 6 kg (191 lb), SpO2 97 %, not currently breastfeeding  Physical Exam  Vitals and nursing note reviewed  Constitutional:       General: She is not in acute distress  Appearance: Normal appearance  She is not diaphoretic  HENT:      Head: Normocephalic and atraumatic  Eyes:      General: No scleral icterus  Extraocular Movements: Extraocular movements intact  Conjunctiva/sclera: Conjunctivae normal       Pupils: Pupils are equal, round, and reactive to light  Cardiovascular:      Rate and Rhythm: Normal rate and regular rhythm  Pulses: no weak pulses          Dorsalis pedis pulses are 2+ on the right side and 2+ on the left side  Posterior tibial pulses are 2+ on the right side and 2+ on the left side  Heart sounds: No murmur heard  Pulmonary:      Effort: Pulmonary effort is normal  No respiratory distress  Breath sounds: Normal breath sounds  No wheezing  Musculoskeletal:      Cervical back: Normal range of motion  Right lower leg: No edema  Left lower leg: No edema  Feet:      Right foot:      Skin integrity: No ulcer, skin breakdown, erythema, warmth, callus or dry skin  Left foot:      Skin integrity: No ulcer, skin breakdown, erythema, warmth, callus or dry skin  Lymphadenopathy:      Cervical: No cervical adenopathy  Neurological:      Mental Status: She is alert and oriented to person, place, and time  Mental status is at baseline  Sensory: No sensory deficit  Gait: Gait normal    Psychiatric:         Mood and Affect: Mood normal          Behavior: Behavior normal          Thought Content: Thought content normal      Patient's shoes and socks removed  Right Foot/Ankle   Right Foot Inspection  Skin Exam: skin normal and skin intact  No dry skin, no warmth, no callus, no erythema, no maceration, no abnormal color, no pre-ulcer, no ulcer and no callus  Toe Exam: ROM and strength within normal limits   No tenderness, erythema and  no right toe deformity    Sensory   Proprioception: intact  Monofilament testing: intact    Vascular  Capillary refills: < 3 seconds  The right DP pulse is 2+  The right PT pulse is 2+  Left Foot/Ankle  Left Foot Inspection  Skin Exam: skin normal and skin intact  No dry skin, no warmth, no erythema, no maceration, normal color, no pre-ulcer, no ulcer and no callus  Toe Exam: ROM and strength within normal limits  No tenderness, no erythema and no left toe deformity  Sensory   Proprioception: intact  Monofilament testing: intact    Vascular  Capillary refills: < 3 seconds  The left DP pulse is 2+  The left PT pulse is 2+  Assign Risk Category  No deformity present  No loss of protective sensation  No weak pulses  Risk: 0        The history was obtained from the review of the chart, patient      Lab Results:   Lab Results   Component Value Date/Time    Hemoglobin A1C 10 6 (H) 02/21/2023 07:12 AM    Hemoglobin A1C 10 7 (H) 04/08/2022 07:52 AM    WBC 9 19 02/21/2023 08:43 AM    WBC 9 42 04/08/2022 11:25 AM    Hemoglobin 10 3 (L) 02/21/2023 08:43 AM    Hemoglobin 12 5 04/08/2022 11:25 AM    Hematocrit 33 7 (L) 02/21/2023 08:43 AM    Hematocrit 37 9 04/08/2022 11:25 AM    MCV 94 02/21/2023 08:43 AM    MCV 95 04/08/2022 11:25 AM    Platelets 680 (H) 05/21/3395 08:43 AM    Platelets 979 (H) 03/70/5477 11:25 AM    BUN 47 (H) 02/21/2023 08:43 AM    BUN 37 (H) 04/08/2022 11:25 AM    Potassium 4 0 02/21/2023 08:43 AM    Potassium 4 4 04/08/2022 11:25 AM    Chloride 101 02/21/2023 08:43 AM    Chloride 103 04/08/2022 11:25 AM    CO2 26 02/21/2023 08:43 AM    CO2 29 04/08/2022 11:25 AM    Creatinine 0 86 02/21/2023 08:43 AM    Creatinine 0 88 04/08/2022 11:25 AM    AST 16 02/21/2023 08:43 AM    AST 12 04/08/2022 11:25 AM    ALT 26 02/21/2023 08:43 AM    ALT 27 04/08/2022 11:25 AM    Albumin 3 8 02/21/2023 08:43 AM    Albumin 3 9 04/08/2022 11:25 AM    HDL, Direct 40 (L) 02/21/2023 08:43 AM    Triglycerides 197 (H) 02/21/2023 08:43 AM       Portions of the record may have been created with voice recognition software  Occasional wrong word or "sound a like" substitutions may have occurred due to the inherent limitations of voice recognition software  Read the chart carefully and recognize, using context, where substitutions have occurred

## 2023-03-02 ENCOUNTER — TELEPHONE (OUTPATIENT)
Dept: HEMATOLOGY ONCOLOGY | Facility: CLINIC | Age: 48
End: 2023-03-02

## 2023-03-02 NOTE — TELEPHONE ENCOUNTER
Scheduling Appointment SEND TO [facility]    Person calling in Patient     If other than patient calling, are they listed on the communication consent form? na   Doctor LELAND Christopher   [facility]   Appointment date and time 3/8/23 @ 10:am Masoud   Reason for scheduling appointment 6 Month Follow Up    Patient verbalized understanding?   yes   No show policy reviewed with patient yes

## 2023-03-03 DIAGNOSIS — J45.20 MILD INTERMITTENT ASTHMA WITHOUT COMPLICATION: ICD-10-CM

## 2023-03-03 RX ORDER — MONTELUKAST SODIUM 10 MG/1
TABLET ORAL
Qty: 90 TABLET | Refills: 0 | Status: SHIPPED | OUTPATIENT
Start: 2023-03-03

## 2023-03-07 LAB
METHYLMALONATE SERPL-SCNC: 158 NMOL/L (ref 0–378)
METHYLMALONATE SERPL-SCNC: 161 NMOL/L (ref 0–378)

## 2023-03-07 NOTE — PROGRESS NOTES
Hematology/Oncology Outpatient Follow- up Note  Misty Hoover, 1975, [id number]  3/8/2023        Chief Complaint   Patient presents with   • Follow-up       HPI:  Vernell Rogers is a 52year old female with a history of type 1 diabetes, chronic diastolic CHF, HTN who follows with hematology for an elevated platelet count and h/o iron deficiency anemia treated with IV Venofer in the past  Myeloproliferative work up was ordered in past but never completed   She has been on observation     Previous Hematologic/ Oncologic History:    Work Up  IV Venofer x 6 doses in 2017; 6 doses in 2021    Current Hematologic/ Oncologic Treatment:    Baby aspirin       Interval History:   The patient presents for routine follow up  She was last seen 5/13/2022  At last visit, her platelet count was still elevated so I reordered myeloproliferative work-up and patient was recommended to follow-up to review results  Unfortunately, she still has not had myeloproliferative work-up completed  Her most recent blood work completed on 2/21/23 reviewed  CBC with differential shows mild normocytic anemia  Hemoglobin 10 3, MCV 94  White count normal   Platelet count 679  Differential  normal  Serum iron 68, iron saturation 22%, ferritin 179    Patient is asymptomatic from her mild anemia  She does follow closely with endocrinology and cardiology  Denies any abnormal bleeding  No chest pain, shortness of breath, lightheadedness or dizziness  Most recent EGD and colonoscopy were done in April 2021  She has not had a recent mammogram     Test Results:    Imaging: No results found      Labs:   Lab Results   Component Value Date    WBC 9 19 02/21/2023    HGB 10 3 (L) 02/21/2023    HCT 33 7 (L) 02/21/2023    MCV 94 02/21/2023     (H) 02/21/2023     Lab Results   Component Value Date     01/28/2017    K 4 0 02/21/2023     02/21/2023    CO2 26 02/21/2023    BUN 47 (H) 02/21/2023    CREATININE 0 86 02/21/2023    GLUF 122 (H) 02/21/2023    CALCIUM 8 8 02/21/2023    AST 16 02/21/2023    ALT 26 02/21/2023    ALKPHOS 92 02/21/2023    PROT 5 9 (L) 01/28/2017    BILITOT 0 3 01/28/2017    EGFR 80 02/21/2023           Review of Systems   All other systems reviewed and are negative  Active Problems:   Patient Active Problem List   Diagnosis   • Type 1 diabetes mellitus with hyperglycemia (HCC)   • Asthma   • Hypertension   • Moderate episode of recurrent major depressive disorder (HCC)   • Hyperlipidemia   • Polycystic ovarian syndrome   • Retinal hemorrhage   • Anemia   • Asthma, mild persistent   • Both eyes affected 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, associated with type 1 diabetes mellitus ([facility])   • Nervousness   • Thrombocytosis   • Severe obesity (BMI 35 0-39  9) with comorbidity ([facility] [address] )   • Gastroesophageal reflux disease without esophagitis   • Other proteinuria   • Diabetic polyneuropathy associated with type 1 diabetes mellitus ([facility] [address] )   • Diabetic nephropathy associated with type 1 diabetes mellitus ([facility] [address] )   • Nuclear sclerotic cataract of left eye   • B12 deficiency   • History of COVID-19   • Palpitations   • Chronic obstructive pulmonary disease, unspecified COPD type ([facility] [address] )   • Chronic diastolic congestive heart failure ([facility] [address] )   • Primary localized osteoarthritis of right knee       Past Medical History:   Past Medical History:   Diagnosis Date   • Anemia    • Asthma     w/acute exacerbation   Last assessed: 10/26/12   • Chest pain 2/3/2016   • CHF (congestive heart failure) ([facility] [address] )    • Depression    • Diabetes mellitus ([facility] [address] )    • Diabetic nephropathy ([facility] [address] )    • Diabetic nephropathy associated with type 1 diabetes mellitus ([facility] [address] ) 8/12/2020   • Diabetic retinopathy ([facility] [address] ) 2/3/2016   • Endometriosis    • Gastroenteritis 02/03/2016   • GERD (gastroesophageal reflux disease)    • HTN (hypertension)    • Hyperlipidemia 2/3/2016   • Oligomenorrhea    • Polycystic ovaries 2/3/2016   • Retinal hemorrhage 2/3/2016   • Retinopathy    • Thrombocytosis    • Type 1 diabetes mellitus with ophthalmic manifestation ([facility] [address] ) 2/3/2016       Surgical History:   Past Surgical History:   Procedure Laterality Date   • CATARACT EXTRACTION Left 10/2020   • CATARACT EXTRACTION Right    • RETINAL LASER PROCEDURE         Family History:    Family History   Problem Relation Age of Onset   • Heart murmur Mother    • Diabetes Mother         Mellitus   • Thyroid disease Mother         Disorder   • Diabetes Father         Mellitus   • Hypertension Father    • Diabetes Maternal Grandfather         Mellitus   • Breast cancer Paternal Grandmother    • Diabetes Maternal Aunt         Mellitus   • Diabetes Maternal Uncle         Mellitus   • No Known Problems Maternal Grandmother    • Leukemia Paternal Grandfather    • Substance Abuse Neg Hx    • Mental illness Neg Hx    • Alcohol abuse Neg Hx        Cancer-related family history includes Breast cancer in her paternal grandmother  Social History:   Social History     Socioeconomic History   • Marital status: /Civil Union     Spouse name: Not on file   • Number of children: Not on file   • Years of education: Not on file   • Highest education level: Not on file   Occupational History   • 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   Tobacco Use   • Smoking status: Never   • Smokeless tobacco: Never   • Tobacco comments:     Per [employer]: Former smoker   Quit in 1994   Vaping Use   • Vaping Use: Never used   Substance and Sexual Activity   • Alcohol use: Yes     Comment: Occasional/1 mixed drink twice a month if that   • Drug use: No   • Sexual activity: Yes     Partners: Male     Birth control/protection: Pill   Other Topics Concern   • Not on file   Social History Narrative    Always uses seatbelt    Caffeine use: 1-2 cups coffee daily    Has smoke detectors    Yaz[employer]ism Affiliations: [employer]     Social Determinants of Health     Financial Resource Strain: Not on file   Food Insecurity: Not on file Transportation Needs: Not on file   Physical Activity: Not on file   Stress: Not on file   Social Connections: Not on file   Intimate Partner Violence: Not on file   Housing Stability: Not on file       Current Medications:   Current Outpatient Medications   Medication Sig Dispense Refill   • acetaminophen (TYLENOL) 500 mg tablet Take 2 tablets (1,000 mg total) by mouth every 6 (six) hours as needed for mild pain or moderate pain 30 tablet 0   • acetone, urine, test strip Use to test urine ketones for high blood sugars  25 each 6   • Advair Diskus 250-50 MCG/DOSE inhaler INHALE 1 PUFF BY MOUTH TWO TIMES DAILY, RINSE MOUTH AFTER  blister 3   • albuterol (2 5 mg/3 mL) 0 083 % nebulizer solution Take 3 mL (2 5 mg total) by nebulization every 6 (six) hours as needed for wheezing or shortness of breath 75 mL 3   • albuterol (PROVENTIL HFA,VENTOLIN HFA) 90 mcg/act inhaler INHALE 2 PUFFS BY MOUTH EVERY 4 HOURS AS NEEDED FOR WHEEZING OR FOR SHORTNESS OF BREATH 25 5 g 0   • Ascorbic Acid (VITAMIN C) 500 MG CAPS Take 2 capsules by mouth daily     • Aspirin Low Dose 81 MG EC tablet TAKE 1 TABLET BY MOUTH EVERY DAY 30 tablet 0   • atorvastatin (LIPITOR) 80 mg tablet TAKE 1 TABLET BY MOUTH EVERY DAY 30 tablet 11   • Biotin 10 MG TABS Take 1 tablet by mouth daily     • Blood Pressure Monitoring [place] Use 2 (two) times a day Monitor BP twice daily  Call office if BP > 140/90 persistently   1 Device 0   • Butalbital-APAP-Caffeine (Fioricet) -40 MG CAPS Take 1 tablet by mouth 4 (four) times a day as needed (headahce) 30 capsule 0   • Continuous Blood Gluc Sensor (Dexcom G6 Sensor) MISC CHANGE EVERY 10 DAYS 3 each 12   • Continuous Blood Gluc Transmit (Dexcom G6 Transmitter) MISC CHANGE EVERY 90 DAYS 1 each 1   • cyanocobalamin (VITAMIN B-12) 100 mcg tablet Take by mouth daily     • furosemide (LASIX) 40 mg tablet TAKE 1 TABLET BY MOUTH EVERY DAY 30 tablet 0   • glucagon (Glucagon Emergency) 1 MG injection Inject 1 mg under the skin once as needed for low blood sugar for up to 1 dose 1 kit 3   • Glucagon (Gvoke HypoPen 2-Pack) 1 MG/0 2ML SOAJ Use if hypoglycemia prn 1 Syringe 6   • insulin aspart (NovoLOG) 100 units/mL injection INJECT 1 UNITS OF NOVOLOG FOR EVERY 1 GRAM OF CARB, UP  UNITS DAILY 50 mL 6   • Insulin Glargine Solostar (Lantus SoloStar) 100 UNIT/ML SOPN Inject 0 78 mL (78 Units total) under the skin in the morning 60 mL 2   • lisinopril (ZESTRIL) 20 mg tablet TAKE 2 TABLETS BY MOUTH EVERY DAY 60 tablet 3   • montelukast (SINGULAIR) 10 mg tablet TAKE 1 TABLET BY MOUTH AT BEDTIME 90 tablet 0   • NovoLOG 100 UNIT/ML injection Inject 1 unit of Novolog for every 1 gram of carbs, up to 150 units daily 50 mL 2   • omeprazole (PriLOSEC) 20 mg delayed release capsule Take 20 mg by mouth daily  • OneTouch Ultra test strip Test 3 times daily 300 each 3   • PARoxetine (PAXIL) 20 mg tablet TAKE 1 TABLET BY MOUTH EVERY DAY 90 tablet 0   • Probiotic Product (ALIGN PO) Take by mouth     • ondansetron (ZOFRAN) 4 mg tablet Take 1 tablet (4 mg total) by mouth every 8 (eight) hours as needed for nausea or vomiting (Patient not taking: Reported on 2/23/2023) 20 tablet 0     No current facility-administered medications for this visit  Allergies: No Known Allergies    Physical Exam:  /76 (BP Location: Left arm, Patient Position: Sitting, Cuff Size: Large)   Pulse 92   Temp 98 °F (36 7 °C) (Temporal)   Resp 14   Ht 4' 8" (1 422 m)   Wt 83 9 kg (185 lb)   SpO2 95%   BMI 41 48 kg/m²   Body surface area is 1 72 meters squared  Wt Readings from Last 3 Encounters:   03/08/23 83 9 kg (185 lb)   02/23/23 86 6 kg (191 lb)   02/07/23 85 3 kg (188 lb)           Physical Exam  Constitutional:       General: She is not in acute distress  Appearance: Normal appearance  HENT:      Head: Normocephalic and atraumatic  Eyes:      General: No scleral icterus  Right eye: No discharge           Left eye: No discharge  Conjunctiva/sclera: Conjunctivae normal    Cardiovascular:      Rate and Rhythm: Normal rate and regular rhythm  Pulmonary:      Effort: Pulmonary effort is normal  No respiratory distress  Breath sounds: Normal breath sounds  Abdominal:      General: Bowel sounds are normal  There is no distension  Palpations: Abdomen is soft  There is no mass  Tenderness: There is no abdominal tenderness  Musculoskeletal:         General: Normal range of motion  Lymphadenopathy:      Cervical: No cervical adenopathy  Upper Body:      Right upper body: No supraclavicular, axillary or pectoral adenopathy  Left upper body: No supraclavicular, axillary or pectoral adenopathy  Skin:     General: Skin is warm and dry  Neurological:      General: No focal deficit present  Mental Status: She is alert and oriented to person, place, and time  Psychiatric:         Mood and Affect: Mood normal          Behavior: Behavior normal          Assessment / Plan:    1  Thrombocytosis    2  Normocytic anemia    3  Myeloproliferative disorder ([facility] [address] )    4  Screening mammogram for breast cancer      The patient is a 52year old female with a history of type 1 diabetes, chronic diastolic CHF, HTN who follows with hematology for an elevated platelet count and h/o iron deficiency anemia treated with IV Venofer in the past  Myeloproliferative work up was ordered in past but never completed   She has been on observation     She last received IV Venofer in 2021  Most recent labs demonstrate evidence of normocytic anemia  Hemoglobin has fluctuated over the years between 10 and 12  Most recent hemoglobin 10 3 with normocytic indices  Most recent iron panel is normal   Her platelets do remain mildly elevated at 409  I suspect her anemia is secondary to chronic disease  No evidence of iron deficiency on recent blood work  Patient is asymptomatic from her mild normocytic anemia    Observation is recommended  I will reorder myeloproliferative work-up for completeness sake since her platelets do remain mildly elevated  I will also check a reticulocyte count  She is overdue for screening mammogram   This is requested today  She is encouraged to have this completed    She will return for a follow up visit in 3 months with repeat blood work including myeloproliferative work up  Patient was in agreement with plan of care  She knows to call at anytime with questions or concerns        Barriers to care: None  Patient  able to self care  Portions of the record may have been created with voice recognition software  Occasional wrong word or "sound a like" substitutions may have occurred due to the inherent limitations of voice recognition software  Read the chart carefully and recognize, using context, where substitutions have occurred

## 2023-03-08 ENCOUNTER — OFFICE VISIT (OUTPATIENT)
Age: 48
End: 2023-03-08

## 2023-03-08 VITALS
OXYGEN SATURATION: 95 % | BODY MASS INDEX: 41.61 KG/M2 | HEART RATE: 92 BPM | RESPIRATION RATE: 14 BRPM | SYSTOLIC BLOOD PRESSURE: 120 MMHG | WEIGHT: 185 LBS | TEMPERATURE: 98 F | DIASTOLIC BLOOD PRESSURE: 76 MMHG | HEIGHT: 56 IN

## 2023-03-08 DIAGNOSIS — E10.65 TYPE 1 DIABETES MELLITUS WITH HYPERGLYCEMIA (HCC): ICD-10-CM

## 2023-03-08 DIAGNOSIS — D64.9 NORMOCYTIC ANEMIA: ICD-10-CM

## 2023-03-08 DIAGNOSIS — D75.839 THROMBOCYTOSIS: Primary | ICD-10-CM

## 2023-03-08 DIAGNOSIS — D47.1 MYELOPROLIFERATIVE DISORDER (HCC): ICD-10-CM

## 2023-03-08 DIAGNOSIS — Z12.31 SCREENING MAMMOGRAM FOR BREAST CANCER: ICD-10-CM

## 2023-03-08 RX ORDER — BLOOD-GLUCOSE TRANSMITTER
EACH MISCELLANEOUS
Qty: 1 EACH | Refills: 1 | Status: SHIPPED | OUTPATIENT
Start: 2023-03-08

## 2023-03-13 LAB
LEFT EYE DIABETIC RETINOPATHY: POSITIVE
RIGHT EYE DIABETIC RETINOPATHY: POSITIVE

## 2023-03-13 PROCEDURE — 2022F DILAT RTA XM EVC RTNOPTHY: CPT | Performed by: NURSE PRACTITIONER

## 2023-03-20 DIAGNOSIS — I50.9 ACUTE CONGESTIVE HEART FAILURE, UNSPECIFIED HEART FAILURE TYPE (HCC): ICD-10-CM

## 2023-03-20 RX ORDER — FUROSEMIDE 40 MG/1
TABLET ORAL
Qty: 30 TABLET | Refills: 0 | Status: SHIPPED | OUTPATIENT
Start: 2023-03-20

## 2023-03-23 DIAGNOSIS — I10 PRIMARY HYPERTENSION: Primary | ICD-10-CM

## 2023-03-23 DIAGNOSIS — R80.8 OTHER PROTEINURIA: ICD-10-CM

## 2023-03-23 DIAGNOSIS — E10.21 DIABETIC NEPHROPATHY ASSOCIATED WITH TYPE 1 DIABETES MELLITUS (HCC): ICD-10-CM

## 2023-04-03 ENCOUNTER — TELEPHONE (OUTPATIENT)
Dept: NEPHROLOGY | Facility: CLINIC | Age: 48
End: 2023-04-03

## 2023-04-03 NOTE — TELEPHONE ENCOUNTER
Called and spoke with [employer] to complete their bloodwork prior to their appointment on 4/6 with Dr Nancy Perkins at the [facility]

## 2023-04-03 NOTE — TELEPHONE ENCOUNTER
Lm isaac Matt to call the office back to see is all possible to move her appointment from 4/6 at 12:30 pm to 4/4 at 930 into our [facility] schedule as dr Mcintyre needs to leave early that day

## 2023-04-04 NOTE — TELEPHONE ENCOUNTER
LM for patient regarding changing her virtual visit from 12 pm to 10:30 am on 4/6 with Dr Torsten Kaur- due to a change in providers schedule

## 2023-04-06 ENCOUNTER — OFFICE VISIT (OUTPATIENT)
Dept: CARDIOLOGY CLINIC | Facility: CLINIC | Age: 48
End: 2023-04-06

## 2023-04-06 ENCOUNTER — TELEMEDICINE (OUTPATIENT)
Dept: NEPHROLOGY | Facility: HOSPITAL | Age: 48
End: 2023-04-06

## 2023-04-06 VITALS
BODY MASS INDEX: 42.11 KG/M2 | HEART RATE: 85 BPM | DIASTOLIC BLOOD PRESSURE: 66 MMHG | SYSTOLIC BLOOD PRESSURE: 134 MMHG | WEIGHT: 187.2 LBS | HEIGHT: 56 IN

## 2023-04-06 VITALS
WEIGHT: 187 LBS | HEIGHT: 56 IN | SYSTOLIC BLOOD PRESSURE: 134 MMHG | DIASTOLIC BLOOD PRESSURE: 63 MMHG | BODY MASS INDEX: 42.07 KG/M2

## 2023-04-06 DIAGNOSIS — E10.21 DIABETIC NEPHROPATHY ASSOCIATED WITH TYPE 1 DIABETES MELLITUS (HCC): ICD-10-CM

## 2023-04-06 DIAGNOSIS — I50.32 CHRONIC DIASTOLIC CONGESTIVE HEART FAILURE (HCC): Primary | ICD-10-CM

## 2023-04-06 DIAGNOSIS — D64.9 ANEMIA, UNSPECIFIED TYPE: ICD-10-CM

## 2023-04-06 DIAGNOSIS — E87.1 HYPONATREMIA: ICD-10-CM

## 2023-04-06 DIAGNOSIS — I10 PRIMARY HYPERTENSION: ICD-10-CM

## 2023-04-06 DIAGNOSIS — R80.8 OTHER PROTEINURIA: Primary | ICD-10-CM

## 2023-04-06 DIAGNOSIS — R60.0 EDEMA OF LEFT LOWER EXTREMITY: ICD-10-CM

## 2023-04-06 DIAGNOSIS — I50.32 CHRONIC DIASTOLIC CONGESTIVE HEART FAILURE (HCC): ICD-10-CM

## 2023-04-06 DIAGNOSIS — E78.2 MIXED HYPERLIPIDEMIA: ICD-10-CM

## 2023-04-06 NOTE — PROGRESS NOTES
Virtual Regular Visit    Verification of patient location:    Patient is located in the following state in which I hold an active license [place]      Assessment/Plan:    Problem List Items Addressed This Visit        Endocrine    Diabetic nephropathy associated with type 1 diabetes mellitus ([facility] [address] )       Cardiovascular and Mediastinum    Hypertension    Chronic diastolic congestive heart failure (HCC)       Other    Anemia    Other proteinuria - Primary    Relevant Orders    Basic metabolic panel    Urinalysis with microscopic    Protein / creatinine ratio, urine    Hyponatremia    Relevant Orders    Basic metabolic panel     1  Proteinuria in setting of DM type 1, concern for diabetic nephropathy in setting of uncontrolled blood sugars  -of note, normal renal function with last sCr 0 86 as of 2/21/23  -last UA showed trace protein, 0-3 hyaline casts  -UpCr 0 24g as of 2/21/23 - much improved, negligible  -already on total 40mg lisinopril daily begun 7/10/17  -avoid nonsteroidals including ibuprofen, aleve, advil, motrin, naproxen, indomethacin, celebrex, toradol as these medications can lead to increased protein in the urine     2  Hypertension - BP well controlled  -continue on lisinopril 20mg twice daily, lasix 40mg daily - continue this  -avoid high salt/sodium containing diet  -avoid beverages containing caffeine     3  Anemia -  Hgb 10 3, repeat CBC  Recent iron panel shows normal iron studies with slightly low iron sat  Would follow with hematology  Has had colonoscopy - was normal     4  Mild hyponatremia - sNa 134 on last bloodwork, hyperglycemia may be contributing, repeat BMP, avoid excessive water intake     5  Uncontrolled DM type 1 with retinopathy - last A1C 10 6 as of Feb 2023, needs improved glycemic control to improve proteinuria and decrease risk of CKD developing       6  GERD - on omeprazole 20mg daily    7   Chronic diastolic CHF - monitor daily weight, continue lasix 40mg daily, to have echo next month d/t leg edema, follows with cardiology     RTC in 1 year  Obtain blood and urine testing May 2023  Reason for visit is   Chief Complaint   Patient presents with   • Virtual Regular Visit   • Follow-up   • Proteinuria        Encounter provider Stefani Schwab DO    Provider located at [address]  [address] [place] [address]      Recent Visits  No visits were found meeting these conditions  Showing recent visits within past 7 days and meeting all other requirements  Today's Visits  Date Type Provider Dept   04/06/23 Telemedicine Moni Kati Alvarado DO [facility]   Showing today's visits and meeting all other requirements  Future Appointments  No visits were found meeting these conditions  Showing future appointments within next 150 days and meeting all other requirements       The patient was identified by name and date of birth  Candelaria Mo was informed that this is a telemedicine visit and that the visit is being conducted through the [address] [employer] platform  She agrees to proceed     My office door was closed  No one else was in the room  She acknowledged consent and understanding of privacy and security of the video platform  The patient has agreed to participate and understands they can discontinue the visit at any time  Patient is aware this is a billable service  Richi Arias is a 52 y o  female presents in virtual follow up of proteinuria  Denies NSAID use regularly  Does use this for joint pain in knee arthritis  HTN - BP at home 100-301A systolic  DM1 - blood sugars doing better  Denies leg edema  Follows with cardio  Past Medical History:   Diagnosis Date   • Anemia    • Asthma     w/acute exacerbation   Last assessed: 10/26/12   • Chest pain 2/3/2016   • CHF (congestive heart failure) ([facility] [address] )    • Depression    • Diabetes mellitus ([facility] [address] )    • Diabetic nephropathy ([facility] [address] )    • Diabetic nephropathy associated with type 1 diabetes mellitus ([place] [address] ) 8/12/2020   • Diabetic retinopathy ([place] [address] ) 2/3/2016   • Endometriosis    • Gastroenteritis 02/03/2016   • GERD (gastroesophageal reflux disease)    • HTN (hypertension)    • Hyperlipidemia 2/3/2016   • Oligomenorrhea    • Polycystic ovaries 2/3/2016   • Retinal hemorrhage 2/3/2016   • Retinopathy    • Thrombocytosis    • Type 1 diabetes mellitus with ophthalmic manifestation ([place] [address] ) 2/3/2016       Past Surgical History:   Procedure Laterality Date   • CATARACT EXTRACTION Left 10/2020   • CATARACT EXTRACTION Right    • RETINAL LASER PROCEDURE         Current Outpatient Medications   Medication Sig Dispense Refill   • acetaminophen (TYLENOL) 500 mg tablet Take 2 tablets (1,000 mg total) by mouth every 6 (six) hours as needed for mild pain or moderate pain 30 tablet 0   • acetone, urine, test strip Use to test urine ketones for high blood sugars  25 each 6   • Advair Diskus 250-50 MCG/DOSE inhaler INHALE 1 PUFF BY MOUTH TWO TIMES DAILY, RINSE MOUTH AFTER  blister 3   • albuterol (2 5 mg/3 mL) 0 083 % nebulizer solution Take 3 mL (2 5 mg total) by nebulization every 6 (six) hours as needed for wheezing or shortness of breath 75 mL 3   • albuterol (PROVENTIL HFA,VENTOLIN HFA) 90 mcg/act inhaler INHALE 2 PUFFS BY MOUTH EVERY 4 HOURS AS NEEDED FOR WHEEZING OR FOR SHORTNESS OF BREATH 25 5 g 0   • Ascorbic Acid (VITAMIN C) 500 MG CAPS Take 2 capsules by mouth daily     • Aspirin Low Dose 81 MG EC tablet TAKE 1 TABLET BY MOUTH EVERY DAY 30 tablet 0   • atorvastatin (LIPITOR) 80 mg tablet TAKE 1 TABLET BY MOUTH EVERY DAY 30 tablet 11   • Biotin 10 MG TABS Take 1 tablet by mouth daily     • Blood Pressure Monitoring [place] Use 2 (two) times a day Monitor BP twice daily  Call office if BP > 140/90 persistently   1 Device 0   • Butalbital-APAP-Caffeine (Fioricet) -40 MG CAPS Take 1 tablet by mouth 4 (four) times a day as needed (headahce) 30 capsule 0   • Continuous Blood Gluc Sensor (Dexcom G6 Sensor) MISC CHANGE EVERY 10 DAYS 3 each 12   • Continuous Blood Gluc Transmit (Dexcom G6 Transmitter) MISC CHANGE EVERY 90 DAYS 1 each 1   • cyanocobalamin (VITAMIN B-12) 100 mcg tablet Take by mouth daily     • furosemide (LASIX) 40 mg tablet TAKE 1 TABLET BY MOUTH EVERY DAY 30 tablet 0   • glucagon (Glucagon Emergency) 1 MG injection Inject 1 mg under the skin once as needed for low blood sugar for up to 1 dose 1 kit 3   • Glucagon (Gvoke HypoPen 2-Pack) 1 MG/0 2ML SOAJ Use if hypoglycemia prn 1 Syringe 6   • insulin aspart (NovoLOG) 100 units/mL injection INJECT 1 UNITS OF NOVOLOG FOR EVERY 1 GRAM OF CARB, UP  UNITS DAILY 50 mL 6   • Insulin Glargine Solostar (Lantus SoloStar) 100 UNIT/ML SOPN Inject 0 78 mL (78 Units total) under the skin in the morning 60 mL 2   • lisinopril (ZESTRIL) 20 mg tablet TAKE 2 TABLETS BY MOUTH EVERY DAY 60 tablet 3   • montelukast (SINGULAIR) 10 mg tablet TAKE 1 TABLET BY MOUTH AT BEDTIME 90 tablet 0   • NovoLOG 100 UNIT/ML injection Inject 1 unit of Novolog for every 1 gram of carbs, up to 150 units daily 50 mL 2   • omeprazole (PriLOSEC) 20 mg delayed release capsule Take 20 mg by mouth daily  • ondansetron (ZOFRAN) 4 mg tablet Take 1 tablet (4 mg total) by mouth every 8 (eight) hours as needed for nausea or vomiting 20 tablet 0   • OneTouch Ultra test strip Test 3 times daily 300 each 3   • PARoxetine (PAXIL) 20 mg tablet TAKE 1 TABLET BY MOUTH EVERY DAY 90 tablet 0   • Probiotic Product (ALIGN PO) Take by mouth       No current facility-administered medications for this visit  No Known Allergies    Review of Systems   Constitutional: Negative for chills and fever  HENT: Negative for sore throat and trouble swallowing  Eyes: Negative for visual disturbance  Respiratory: Negative for cough and shortness of breath  Cardiovascular: Positive for leg swelling (left foot at times)  Negative for chest pain     Gastrointestinal: "Negative for abdominal pain, constipation, diarrhea, nausea and vomiting  Endocrine: Negative for polyuria  Genitourinary: Negative for difficulty urinating, dysuria and hematuria  Musculoskeletal: Negative for back pain and neck pain  Skin: Negative for rash  Neurological: Positive for light-headedness (occasional) and numbness (in hands at times)  Negative for dizziness  Psychiatric/Behavioral: The patient is not nervous/anxious  Video Exam    Vitals:    04/06/23 1134   BP: 134/63   Weight: 84 8 kg (187 lb)   Height: 4' 8\" (1 422 m)       Physical Exam  Vitals reviewed  Constitutional:       Appearance: Normal appearance  She is obese  HENT:      Head: Normocephalic and atraumatic  Nose: Nose normal       Mouth/Throat:      Mouth: Mucous membranes are moist       Pharynx: No oropharyngeal exudate or posterior oropharyngeal erythema  Eyes:      General: No scleral icterus  Right eye: No discharge  Left eye: No discharge  Extraocular Movements: Extraocular movements intact  Comments: eyeglasses   Pulmonary:      Effort: Pulmonary effort is normal       Breath sounds: No wheezing (none audible)  Comments: Symmetric chest expansion  Abdominal:      General: There is no distension  Palpations: Abdomen is soft  Tenderness: There is no abdominal tenderness  Musculoskeletal:         General: No swelling  Cervical back: Normal range of motion  No rigidity  Skin:     Coloration: Skin is not jaundiced or pale  Neurological:      General: No focal deficit present  Mental Status: She is alert     Psychiatric:         Mood and Affect: Mood normal          Behavior: Behavior normal           I spent 16 minutes directly with the patient during this visit      "

## 2023-04-06 NOTE — PATIENT INSTRUCTIONS
Low-Sodium Diet   AMBULATORY CARE:   A low-sodium diet  limits foods that are high in sodium (salt)  You will need to follow a low-sodium diet if you have high blood pressure, kidney disease, or heart failure  You may also need to follow this diet if you have a condition that is causing your body to retain (hold) extra fluid  You may need to limit the amount of sodium you eat in a day to 1,500 to 2,000 mg  Ask your healthcare provider how much sodium you can have each day  How to use food labels to choose foods that are low in sodium:  Read food labels to find the amount of sodium they contain  The amount of sodium is listed in milligrams (mg)  The % Daily Value (DV) column tells you how much of your daily needs are met by 1 serving of the food for each nutrient listed  Choose foods that have less than 5% of the DV of sodium  These foods are considered low in sodium  Foods that have 20% or more of the DV of sodium are considered high in sodium  Some food labels may also list any of the following terms that tell you about the sodium content in the food:  Sodium-free:  Less than 5 mg in each serving    Very low sodium:  35 mg of sodium or less in each serving    Low sodium:  140 mg of sodium or less in each serving    Reduced sodium: At least 25% less sodium in each serving than the regular type    Light in sodium:  50% less sodium in each serving    Unsalted or no added salt:  No extra salt is added during processing (the food may still contain sodium)       Foods to avoid:  Salty foods are high in sodium   You should avoid the following:  Processed foods:      Mixes for cornbread, biscuits, cake, and pudding     Instant foods, such as potatoes, cereals, noodles, and rice     Packaged foods, such as bread stuffing, rice and pasta mixes, snack dip mixes, and macaroni and cheese     Canned foods, such as canned vegetables, soups, broths, sauces, and vegetable or tomato juice    Snack foods, such as salted chips, popcorn, pretzels, pork rinds, salted crackers, and salted nuts    Frozen foods, such as dinners, entrees, vegetables with sauces, and breaded meats    Sauerkraut, pickled vegetables, and other foods prepared in brine    Meats and cheeses:      Smoked or cured meat, such as corned beef, grewal, ham, hot dogs, and sausage    Canned meats or spreads, such as potted meats, sardines, anchovies, and imitation seafood    Deli or lunch meats, such as bologna, ham, turkey, and roast beef    Processed cheese, such as American cheese and cheese spreads    Condiments, sauces, and seasonings:      Salt (¼ teaspoon of salt contains 575 mg of sodium)    Seasonings made with salt, such as garlic salt, celery salt, onion salt, and seasoned salt    Regular soy sauce, barbecue sauce, teriyaki sauce, steak sauce, Worcestershire sauce, and most flavored vinegars    Canned gravy and mixes     Regular condiments, such as mustard, ketchup, and salad dressings    Pickles and olives    Meat tenderizers and monosodium glutamate (MSG)    Foods to include:  Read the food label to find the exact amount of sodium in each serving  Bread and cereal:  Try to choose breads with less than 80 mg of sodium per serving  Bread, roll, ping, tortilla, or unsalted crackers  Ready-to-eat cereals with less than 5% DV of sodium (examples include shredded wheat and puffed rice)    Pasta    Vegetables and fruits:      Unsalted fresh, frozen, or canned vegetables    Fresh, frozen, or canned fruits    Fruit juice    Dairy:  One serving has about 150 mg of sodium  Milk, all types    Yogurt    Hard cheese, such as cheddar, Swiss, [employer], or [employer] and other protein foods:  Some raw meats may have added sodium       Plain meats, fish, and poultry     Eggs    Other foods:      Homemade pudding    Unsalted nuts, popcorn, or pretzels    Unsalted butter or margarine    Ways to get less sodium:  If you are used to the flavor of salt, it will take time to get used to low-sodium foods  Your healthcare provider or dietitian can help you create a plan for lowering sodium  The plan will be specific to your needs and your family's needs  You may focus on 1 or 2 changes each week, such as the following: Add spices and herbs to foods instead of salt during cooking  Use salt-free seasonings to add flavor to foods  Examples include onion powder, garlic powder, basil, greene powder, paprika, and parsley  Try lemon or lime juice or vinegar to give foods a tart flavor  Use hot peppers, pepper, or cayenne pepper to add a spicy flavor  Do not keep a salt shaker at your kitchen table  This may help keep you from adding salt to food at the table  A teaspoon of salt has 2,300 mg of sodium  It may take time to get used to enjoying the natural flavor of food instead of adding salt  Talk to your healthcare provider before you use salt substitutes  Some salt substitutes have a high amount of a chemical called potassium chloride  This form of potassium needs to be avoided if you have kidney disease  Choose low-sodium foods at restaurants  Meals from restaurants are often high in sodium  Some restaurants have nutrition information on the menu that tells you the amount of sodium in their foods  If possible, ask for your food to be prepared with less, or no salt  Shop for unsalted or low-sodium foods and snacks at the grocery store  Examples include unsalted or low-sodium broths, soups, and canned vegetables  Choose fresh or frozen vegetables instead  Choose unsalted nuts or seeds or fresh fruits or vegetables as snacks  Read food labels and choose salt-free, very low-sodium, or low-sodium foods  You can also rinse canned vegetables under running water to remove extra sodium before you cook them  © Copyright Fern[employer] 2022 Information is for End User's use only and may not be sold, redistributed or otherwise used for commercial purposes    The above information is an  only  It is not intended as medical advice for individual conditions or treatments  Talk to your doctor, nurse or pharmacist before following any medical regimen to see if it is safe and effective for you

## 2023-04-06 NOTE — PATIENT INSTRUCTIONS
1  Proteinuria in setting of DM type 1, concern for diabetic nephropathy in setting of uncontrolled blood sugars  -of note, normal renal function with last sCr 0 86 as of 2/21/23  -last UA showed trace protein, 0-3 hyaline casts  -UpCr 0 24g as of 2/21/23 - much improved, negligible  -already on total 40mg lisinopril daily begun 7/10/17  -avoid nonsteroidals including ibuprofen, aleve, advil, motrin, naproxen, indomethacin, celebrex, toradol as these medications can lead to increased protein in the urine     2  Hypertension - BP well controlled  -continue on lisinopril 20mg twice daily, lasix 40mg daily - continue this  -avoid high salt/sodium containing diet  -avoid beverages containing caffeine     3  Anemia -  Hgb 10 3, repeat CBC  Recent iron panel shows normal iron studies with slightly low iron sat  Would follow with hematology  Has had colonoscopy - was normal     4  Mild hyponatremia - sNa 134 on last bloodwork, hyperglycemia may be contributing, repeat BMP, avoid excessive water intake     5  Uncontrolled DM type 1 with retinopathy - last A1C 10 6 as of Feb 2023, needs improved glycemic control to improve proteinuria and decrease risk of CKD developing  6  GERD - on omeprazole 20mg daily    7  Chronic diastolic CHF - monitor daily weight, continue lasix 40mg daily, to have echo next month d/t leg edema, follows with cardiology     RTC in 1 year  Obtain blood and urine testing May 2023

## 2023-04-06 NOTE — PROGRESS NOTES
Cardiology Follow Up    Emmy Navarro  1975  [id number]  [address] [facility] [place]  [address] [phone]    1  Primary hypertension  POCT ECG      2  Chronic diastolic congestive heart failure ([address] )        3  Mixed hyperlipidemia            Discussion/Summary:    1  Chronic diastolic CHF - Paty is doing well on Lasix 40 mg daily  We went over following a low-sodium diet  She did have grade 2 diastolic dysfunction on her echocardiogram and we ordered an updated echocardiogram today  She should weigh herself daily  Blood work will be followed closely  2   Hyperlipidemia - Her atorvastatin was increased to 80 mg daily last year  Her LDL is now at goal   She has blood work followed on a regular basis  3   Hypertension - Her blood pressure is under good control  She will continue lisinopril and she should periodically check her blood pressure at home  4   Lower extremity edema - She does tend to get more left lower extremity edema than right  We did order a venous duplex of the left lower extremity and ordered an updated echocardiogram   She will continue the same dose of Lasix  If her testing is unremarkable we will see her back in 1 year  Interval History:     Mrs Ehsan Silver comes in for follow-up given her history of chronic diastolic CHF and risk factors for cardiovascular disease  Shira Singh during a hospitalization in June of 2020 in which she presented with acute CHF  This was the 1st time she ever had any issues with CHF  Echocardiogram showed normal LV systolic function but grade 2 diastolic dysfunction  She responded well to IV Lasix and was placed on Lasix daily  She has been on this since  In follow-up she had a stress echocardiogram that was normal   Her only change was that her atorvastatin was increased to 80 mg daily  [place] Harper for the most part is felt well  She does get intermittent lower extremity edema, and over the last year she has noticed more edema on the left  It is intermittent and will go away  She remains on the same dose of Lasix and is tolerating it  She has been trying to follow a low-sodium diet  She has not had any worsening shortness of breath, orthopnea or PND  No chest pain or any symptoms of angina  No palpitations, lightheadedness or any syncope  Patient Active Problem List   Diagnosis   • Type 1 diabetes mellitus with hyperglycemia ([place] [address] )   • Asthma   • Hypertension   • Moderate episode of recurrent major depressive disorder (HCC)   • Hyperlipidemia   • Polycystic ovarian syndrome   • Retinal hemorrhage   • Anemia   • Asthma, mild persistent   • Both eyes affected 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, associated with type 1 diabetes mellitus ([place] [address] )   • Nervousness   • Thrombocytosis   • Severe obesity (BMI 35 0-39  9) with comorbidity ([place] [address] )   • Gastroesophageal reflux disease without esophagitis   • Other proteinuria   • Diabetic polyneuropathy associated with type 1 diabetes mellitus ([place] [address] )   • Diabetic nephropathy associated with type 1 diabetes mellitus ([place] [address] )   • Nuclear sclerotic cataract of left eye   • B12 deficiency   • History of COVID-19   • Palpitations   • Chronic obstructive pulmonary disease, unspecified COPD type ([place] [address] )   • Chronic diastolic congestive heart failure ([place] [address] )   • Primary localized osteoarthritis of right knee     Past Medical History:   Diagnosis Date   • Anemia    • Asthma     w/acute exacerbation   Last assessed: 10/26/12   • Chest pain 2/3/2016   • CHF (congestive heart failure) ([place] [address] )    • Depression    • Diabetes mellitus ([place] [address] )    • Diabetic nephropathy ([place] [address] )    • Diabetic nephropathy associated with type 1 diabetes mellitus ([facility] ) 8/12/2020   • Diabetic retinopathy ([place] [address] ) 2/3/2016   • Endometriosis    • Gastroenteritis 02/03/2016   • GERD (gastroesophageal reflux disease)    • HTN (hypertension)    • Hyperlipidemia 2/3/2016   • Oligomenorrhea    • Polycystic ovaries 2/3/2016   • Retinal hemorrhage 2/3/2016   • Retinopathy    • Thrombocytosis    • Type 1 diabetes mellitus with ophthalmic manifestation ([facility] ) 2/3/2016     Social History     Socioeconomic History   • Marital status: /Civil Union     Spouse name: Not on file   • Number of children: Not on file   • Years of education: Not on file   • Highest education level: Not on file   Occupational History   • 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   Tobacco Use   • Smoking status: Never   • Smokeless tobacco: Never   • Tobacco comments:     Per [employer]: Former smoker   Quit in 1994   Vaping Use   • Vaping Use: Never used   Substance and Sexual Activity   • Alcohol use: Yes     Comment: Occasional/1 mixed drink twice a month if that   • Drug use: No   • Sexual activity: Yes     Partners: Male     Birth control/protection: Pill   Other Topics Concern   • Not on file   Social History Narrative    Always uses seatbelt    Caffeine use: 1-2 cups coffee daily    Has smoke detectors    Mandaeism Affiliations: [employer]     Social Determinants of Health     Financial Resource Strain: Not on file   Food Insecurity: Not on file   Transportation Needs: Not on file   Physical Activity: Not on file   Stress: Not on file   Social Connections: Not on file   Intimate Partner Violence: Not on file   Housing Stability: Not on file      Family History   Problem Relation Age of Onset   • Heart murmur Mother    • Diabetes Mother         Mellitus   • Thyroid disease Mother         Disorder   • Diabetes Father         Mellitus   • Hypertension Father    • Diabetes Maternal Grandfather         Mellitus   • Breast cancer Paternal Grandmother    • Diabetes Maternal Aunt         Mellitus   • Diabetes Maternal Uncle         Mellitus   • No Known Problems Maternal Grandmother    • Leukemia Paternal Grandfather    • Substance Abuse Neg Hx    • Mental illness Neg Hx    • Alcohol abuse Neg Hx      Past Surgical History:   Procedure Laterality Date   • CATARACT EXTRACTION Left 10/2020   • CATARACT EXTRACTION Right    • RETINAL LASER PROCEDURE         Current Outpatient Medications:   •  acetaminophen (TYLENOL) 500 mg tablet, Take 2 tablets (1,000 mg total) by mouth every 6 (six) hours as needed for mild pain or moderate pain, Disp: 30 tablet, Rfl: 0  •  acetone, urine, test strip, Use to test urine ketones for high blood sugars  , Disp: 25 each, Rfl: 6  •  Advair Diskus 250-50 MCG/DOSE inhaler, INHALE 1 PUFF BY MOUTH TWO TIMES DAILY, RINSE MOUTH AFTER USE, Disp: 180 blister, Rfl: 3  •  albuterol (2 5 mg/3 mL) 0 083 % nebulizer solution, Take 3 mL (2 5 mg total) by nebulization every 6 (six) hours as needed for wheezing or shortness of breath, Disp: 75 mL, Rfl: 3  •  albuterol (PROVENTIL HFA,VENTOLIN HFA) 90 mcg/act inhaler, INHALE 2 PUFFS BY MOUTH EVERY 4 HOURS AS NEEDED FOR WHEEZING OR FOR SHORTNESS OF BREATH, Disp: 25 5 g, Rfl: 0  •  Ascorbic Acid (VITAMIN C) 500 MG CAPS, Take 2 capsules by mouth daily, Disp: , Rfl:   •  Aspirin Low Dose 81 MG EC tablet, TAKE 1 TABLET BY MOUTH EVERY DAY, Disp: 30 tablet, Rfl: 0  •  atorvastatin (LIPITOR) 80 mg tablet, TAKE 1 TABLET BY MOUTH EVERY DAY, Disp: 30 tablet, Rfl: 11  •  Biotin 10 MG TABS, Take 1 tablet by mouth daily, Disp: , Rfl:   •  Blood Pressure Monitoring [place], Use 2 (two) times a day Monitor BP twice daily  Call office if BP > 140/90 persistently  , Disp: 1 Device, Rfl: 0  •  Butalbital-APAP-Caffeine (Fioricet) -40 MG CAPS, Take 1 tablet by mouth 4 (four) times a day as needed (headahce), Disp: 30 capsule, Rfl: 0  •  Continuous Blood Gluc Sensor (Dexcom G6 Sensor) MISC, CHANGE EVERY 10 DAYS, Disp: 3 each, Rfl: 12  •  Continuous Blood Gluc Transmit (Dexcom G6 Transmitter) MISC, CHANGE EVERY 90 DAYS, Disp: 1 each, Rfl: 1  •  cyanocobalamin (VITAMIN B-12) 100 mcg tablet, Take by mouth daily, Disp: , Rfl:   •  furosemide (LASIX) 40 mg tablet, TAKE 1 TABLET BY MOUTH EVERY DAY, Disp: 30 tablet, Rfl: 0  •  glucagon (Glucagon Emergency) 1 MG injection, Inject 1 mg under the skin once as needed for low blood sugar for up to 1 dose, Disp: 1 kit, Rfl: 3  •  Glucagon (Gvoke HypoPen 2-Pack) 1 MG/0 2ML SOAJ, Use if hypoglycemia prn, Disp: 1 Syringe, Rfl: 6  •  insulin aspart (NovoLOG) 100 units/mL injection, INJECT 1 UNITS OF NOVOLOG FOR EVERY 1 GRAM OF CARB, UP  UNITS DAILY, Disp: 50 mL, Rfl: 6  •  Insulin Glargine Solostar (Lantus SoloStar) 100 UNIT/ML SOPN, Inject 0 78 mL (78 Units total) under the skin in the morning, Disp: 60 mL, Rfl: 2  •  lisinopril (ZESTRIL) 20 mg tablet, TAKE 2 TABLETS BY MOUTH EVERY DAY, Disp: 60 tablet, Rfl: 3  •  montelukast (SINGULAIR) 10 mg tablet, TAKE 1 TABLET BY MOUTH AT BEDTIME, Disp: 90 tablet, Rfl: 0  •  NovoLOG 100 UNIT/ML injection, Inject 1 unit of Novolog for every 1 gram of carbs, up to 150 units daily, Disp: 50 mL, Rfl: 2  •  omeprazole (PriLOSEC) 20 mg delayed release capsule, Take 20 mg by mouth daily  , Disp: , Rfl:   •  ondansetron (ZOFRAN) 4 mg tablet, Take 1 tablet (4 mg total) by mouth every 8 (eight) hours as needed for nausea or vomiting, Disp: 20 tablet, Rfl: 0  •  OneTouch Ultra test strip, Test 3 times daily, Disp: 300 each, Rfl: 3  •  PARoxetine (PAXIL) 20 mg tablet, TAKE 1 TABLET BY MOUTH EVERY DAY, Disp: 90 tablet, Rfl: 0  •  Probiotic Product (ALIGN PO), Take by mouth, Disp: , Rfl:   No Known Allergies    Labs:  Lab Results   Component Value Date     01/28/2017    K 4 0 02/21/2023    K 4 5 09/01/2021     02/21/2023    CL 99 09/01/2021    CO2 26 02/21/2023    CO2 29 09/01/2021    BUN 47 (H) 02/21/2023    BUN 27 (H) 09/01/2021    CREATININE 0 86 02/21/2023    CREATININE 0 57 01/28/2017    CALCIUM 8 8 02/21/2023    CALCIUM 8 6 09/01/2021     Lab Results   Component Value Date    WBC 9 19 02/21/2023    WBC 6 4 01/28/2017    HGB 10 3 (L) 02/21/2023    HGB 11 3 (L) 01/28/2017    HCT 33 7 (L) 02/21/2023    HCT 34 8 (L) 01/28/2017    MCV 94 02/21/2023    MCV 90 6 01/28/2017     (H) 02/21/2023     (H) 01/28/2017     Lab Results   Component Value Date    CHOL 192 01/28/2017    TRIG 197 (H) 02/21/2023    TRIG 219 (H) 05/20/2021    HDL 40 (L) 02/21/2023    HDL 34 (L) 05/20/2021     Imaging: ECG shows sinus rhythm and is within normal limits  STRESS ECHO (11/2020): IMPRESSIONS:  Normal study after maximal exercise without reproduction of symptoms  STRESS RESULTS:  Duration of exercise was 6 min and 39 sec  Maximal work rate was 7 1 METs  Maximal heart rate during stress was 155 bpm ( 88 % of maximal predicted heart rate)  Target heart rate was achieved  There was no chest pain during stress      ECG CONCLUSIONS:  The stress ECG was equivocal for ischemia  Upsloping to horizontal inferior ST segment change noted at peak with gradual resolution in recovery      BASELINE:  There were no regional wall motion abnormalities  Overall left ventricular systolic function was normal      PEAK STRESS:  There were no regional wall motion abnormalities  There was an appropriate augmentation in LV function      ECHO CONCLUSIONS:  There was no echocardiographic evidence for stress-induced ischemia  ECHO (8/2020):  LEFT VENTRICLE:  Systolic function was normal  Ejection fraction was estimated to be 65 %  Although no diagnostic regional wall motion abnormality was identified, this possibility cannot be completely excluded on the basis of this study  Features were consistent with a pseudonormal left ventricular filling pattern, with concomitant abnormal relaxation and increased filling pressure (grade 2 diastolic dysfunction)      MITRAL VALVE:  There was mild annular calcification  There was mild regurgitation      AORTIC VALVE:  There was trace regurgitation      TRICUSPID VALVE:  There was trace regurgitation        Review of Systems:  Review of Systems   Constitutional: Positive for "fatigue  HENT: Negative  Eyes: Negative  Respiratory: Negative  Cardiovascular: Positive for leg swelling  Gastrointestinal: Negative  Musculoskeletal: Negative  Skin: Negative  Allergic/Immunologic: Negative  Neurological: Negative  Hematological: Negative  Psychiatric/Behavioral: Negative  All other systems reviewed and are negative  Vitals:    04/06/23 0943   BP: 134/66   BP Location: Right arm   Patient Position: Sitting   Cuff Size: Large   Pulse: 85   Weight: 84 9 kg (187 lb 3 2 oz)   Height: 4' 8\" (1 422 m)       Physical Exam  Vitals and nursing note reviewed  Constitutional:       Appearance: She is well-developed  HENT:      Head: Normocephalic and atraumatic  Eyes:      General: No scleral icterus  Right eye: No discharge  Left eye: No discharge  Pupils: Pupils are equal, round, and reactive to light  Neck:      Thyroid: No thyromegaly  Vascular: No JVD  Cardiovascular:      Rate and Rhythm: Normal rate and regular rhythm  No extrasystoles are present  Pulses: Normal pulses  No decreased pulses  Heart sounds: Normal heart sounds, S1 normal and S2 normal  No murmur heard  No friction rub  No gallop  Pulmonary:      Effort: Pulmonary effort is normal  No respiratory distress  Breath sounds: Normal breath sounds  No wheezing or rales  Abdominal:      General: Bowel sounds are normal  There is no distension  Palpations: Abdomen is soft  Tenderness: There is no abdominal tenderness  Musculoskeletal:         General: No tenderness or deformity  Normal range of motion  Cervical back: Normal range of motion and neck supple  Skin:     General: Skin is warm and dry  Findings: No rash  Neurological:      Mental Status: She is alert and oriented to person, place, and time  Cranial Nerves: No cranial nerve deficit  Psychiatric:         Thought Content:  Thought content normal          Judgment: " Judgment normal        Counseling / Coordination of Care  Total office time spent today 30 minutes  Greater than 50% of total time was spent with the patient and / or family counseling and / or coordination of care

## 2023-05-04 ENCOUNTER — HOSPITAL ENCOUNTER (OUTPATIENT)
Dept: NON INVASIVE DIAGNOSTICS | Age: 48
Discharge: HOME | End: 2023-05-04

## 2023-05-04 VITALS
WEIGHT: 187 LBS | HEART RATE: 88 BPM | SYSTOLIC BLOOD PRESSURE: 132 MMHG | HEIGHT: 56 IN | BODY MASS INDEX: 42.07 KG/M2 | DIASTOLIC BLOOD PRESSURE: 62 MMHG

## 2023-05-04 DIAGNOSIS — R60.0 EDEMA OF LEFT LOWER EXTREMITY: ICD-10-CM

## 2023-05-04 DIAGNOSIS — I50.32 CHRONIC DIASTOLIC CONGESTIVE HEART FAILURE (HCC): ICD-10-CM

## 2023-05-04 DIAGNOSIS — I10 PRIMARY HYPERTENSION: ICD-10-CM

## 2023-05-04 LAB
AORTIC ROOT: 2.5 CM
APICAL FOUR CHAMBER EJECTION FRACTION: 61 %
ASCENDING AORTA: 2.9 CM
DOP CALC LVOT AREA: 2.54 CM2
DOP CALC LVOT DIAMETER: 1.8 CM
E WAVE DECELERATION TIME: 168 MS
FRACTIONAL SHORTENING: 43 (ref 28–44)
INTERVENTRICULAR SEPTUM IN DIASTOLE (PARASTERNAL SHORT AXIS VIEW): 1 CM
INTERVENTRICULAR SEPTUM: 1 CM (ref 0.6–1.1)
LEFT ATRIUM AREA SYSTOLE SINGLE PLANE A4C: 9 CM2
LEFT ATRIUM SIZE: 3.6 CM
LEFT INTERNAL DIMENSION IN SYSTOLE: 2.3 CM (ref 2.1–4)
LEFT VENTRICULAR INTERNAL DIMENSION IN DIASTOLE: 4 CM (ref 3.5–6)
LEFT VENTRICULAR POSTERIOR WALL IN END DIASTOLE: 1.2 CM
LEFT VENTRICULAR STROKE VOLUME: 52 ML
LVSV (TEICH): 52 ML
MV E'TISSUE VEL-SEP: 7 CM/S
MV PEAK A VEL: 0.8 M/S
MV PEAK E VEL: 87 CM/S
MV STENOSIS PRESSURE HALF TIME: 49 MS
MV VALVE AREA P 1/2 METHOD: 4.49
RIGHT ATRIUM AREA SYSTOLE A4C: 12.1 CM2
RIGHT VENTRICLE ID DIMENSION: 3.8 CM
SL CV LV EF: 65
SL CV PED ECHO LEFT VENTRICLE DIASTOLIC VOLUME (MOD BIPLANE) 2D: 70 ML
SL CV PED ECHO LEFT VENTRICLE SYSTOLIC VOLUME (MOD BIPLANE) 2D: 18 ML
TRICUSPID ANNULAR PLANE SYSTOLIC EXCURSION: 2.8 CM

## 2023-05-19 DIAGNOSIS — F32.A DEPRESSION, UNSPECIFIED DEPRESSION TYPE: ICD-10-CM

## 2023-05-19 RX ORDER — PAROXETINE HYDROCHLORIDE 20 MG/1
20 TABLET, FILM COATED ORAL DAILY
Qty: 30 TABLET | Refills: 0 | Status: SHIPPED | OUTPATIENT
Start: 2023-05-19

## 2023-05-19 NOTE — TELEPHONE ENCOUNTER
Lashaun Adler is not here today and will not be checking messages  [employer] called this morning indicating they have sent over 3 electronic requests for this medication  If you could please refill and send that would be greatly appreciated! Thank you

## 2023-05-19 NOTE — TELEPHONE ENCOUNTER
Called patient to let her know she is overdue for an appointment with Marylin Shelby  No answer, left voicemail to call back to set appointment

## 2023-05-23 DIAGNOSIS — J45.909 UNCOMPLICATED ASTHMA, UNSPECIFIED ASTHMA SEVERITY, UNSPECIFIED WHETHER PERSISTENT: ICD-10-CM

## 2023-05-24 RX ORDER — ALBUTEROL SULFATE 90 UG/1
AEROSOL, METERED RESPIRATORY (INHALATION)
Qty: 25.5 G | Refills: 0 | Status: SHIPPED | OUTPATIENT
Start: 2023-05-24

## 2023-06-01 ENCOUNTER — TELEPHONE (OUTPATIENT)
Dept: FAMILY MEDICINE CLINIC | Facility: CLINIC | Age: 48
End: 2023-06-01

## 2023-06-02 DIAGNOSIS — J45.20 MILD INTERMITTENT ASTHMA WITHOUT COMPLICATION: ICD-10-CM

## 2023-06-02 RX ORDER — MONTELUKAST SODIUM 10 MG/1
TABLET ORAL
Qty: 90 TABLET | Refills: 0 | Status: SHIPPED | OUTPATIENT
Start: 2023-06-02

## 2023-06-05 ENCOUNTER — APPOINTMENT (OUTPATIENT)
Dept: LAB | Facility: CLINIC | Age: 48
End: 2023-06-05
Payer: COMMERCIAL

## 2023-06-05 DIAGNOSIS — E10.65 TYPE 1 DIABETES MELLITUS WITH HYPERGLYCEMIA (HCC): ICD-10-CM

## 2023-06-05 DIAGNOSIS — E10.21 DIABETIC NEPHROPATHY ASSOCIATED WITH TYPE 1 DIABETES MELLITUS (HCC): ICD-10-CM

## 2023-06-05 DIAGNOSIS — R80.8 OTHER PROTEINURIA: ICD-10-CM

## 2023-06-05 DIAGNOSIS — E87.1 HYPONATREMIA: ICD-10-CM

## 2023-06-05 DIAGNOSIS — D75.839 THROMBOCYTOSIS: ICD-10-CM

## 2023-06-05 DIAGNOSIS — I10 PRIMARY HYPERTENSION: ICD-10-CM

## 2023-06-05 DIAGNOSIS — D64.9 NORMOCYTIC ANEMIA: ICD-10-CM

## 2023-06-05 LAB
ALBUMIN SERPL BCP-MCNC: 3.8 G/DL (ref 3.5–5)
ALP SERPL-CCNC: 101 U/L (ref 46–116)
ALT SERPL W P-5'-P-CCNC: 24 U/L (ref 12–78)
ANION GAP SERPL CALCULATED.3IONS-SCNC: 3 MMOL/L (ref 4–13)
AST SERPL W P-5'-P-CCNC: 11 U/L (ref 5–45)
BACTERIA UR QL AUTO: ABNORMAL /HPF
BASOPHILS # BLD AUTO: 0.1 THOUSANDS/ÂΜL (ref 0–0.1)
BASOPHILS NFR BLD AUTO: 1 % (ref 0–1)
BILIRUB SERPL-MCNC: 0.22 MG/DL (ref 0.2–1)
BILIRUB UR QL STRIP: NEGATIVE
BUN SERPL-MCNC: 64 MG/DL (ref 5–25)
CALCIUM SERPL-MCNC: 9.4 MG/DL (ref 8.3–10.1)
CHLORIDE SERPL-SCNC: 104 MMOL/L (ref 96–108)
CLARITY UR: CLEAR
CO2 SERPL-SCNC: 28 MMOL/L (ref 21–32)
COLOR UR: ABNORMAL
CREAT SERPL-MCNC: 1.4 MG/DL (ref 0.6–1.3)
CREAT UR-MCNC: 98.1 MG/DL
EOSINOPHIL # BLD AUTO: 0.59 THOUSAND/ÂΜL (ref 0–0.61)
EOSINOPHIL NFR BLD AUTO: 6 % (ref 0–6)
ERYTHROCYTE [DISTWIDTH] IN BLOOD BY AUTOMATED COUNT: 12.8 % (ref 11.6–15.1)
EST. AVERAGE GLUCOSE BLD GHB EST-MCNC: 226 MG/DL
FERRITIN SERPL-MCNC: 148 NG/ML (ref 11–307)
GFR SERPL CREATININE-BSD FRML MDRD: 44 ML/MIN/1.73SQ M
GLUCOSE P FAST SERPL-MCNC: 81 MG/DL (ref 65–99)
GLUCOSE UR STRIP-MCNC: NEGATIVE MG/DL
HBA1C MFR BLD: 9.5 %
HCT VFR BLD AUTO: 32 % (ref 34.8–46.1)
HGB BLD-MCNC: 10.6 G/DL (ref 11.5–15.4)
HGB UR QL STRIP.AUTO: NEGATIVE
HYALINE CASTS #/AREA URNS LPF: ABNORMAL /LPF
IMM GRANULOCYTES # BLD AUTO: 0.04 THOUSAND/UL (ref 0–0.2)
IMM GRANULOCYTES NFR BLD AUTO: 0 % (ref 0–2)
IRON SATN MFR SERPL: 19 % (ref 15–50)
IRON SERPL-MCNC: 62 UG/DL (ref 50–170)
KETONES UR STRIP-MCNC: NEGATIVE MG/DL
LEUKOCYTE ESTERASE UR QL STRIP: NEGATIVE
LYMPHOCYTES # BLD AUTO: 1.84 THOUSANDS/ÂΜL (ref 0.6–4.47)
LYMPHOCYTES NFR BLD AUTO: 20 % (ref 14–44)
MCH RBC QN AUTO: 30.4 PG (ref 26.8–34.3)
MCHC RBC AUTO-ENTMCNC: 33.1 G/DL (ref 31.4–37.4)
MCV RBC AUTO: 92 FL (ref 82–98)
MONOCYTES # BLD AUTO: 0.64 THOUSAND/ÂΜL (ref 0.17–1.22)
MONOCYTES NFR BLD AUTO: 7 % (ref 4–12)
NEUTROPHILS # BLD AUTO: 5.94 THOUSANDS/ÂΜL (ref 1.85–7.62)
NEUTS SEG NFR BLD AUTO: 66 % (ref 43–75)
NITRITE UR QL STRIP: NEGATIVE
NON-SQ EPI CELLS URNS QL MICRO: ABNORMAL /HPF
NRBC BLD AUTO-RTO: 0 /100 WBCS
PH UR STRIP.AUTO: 5.5 [PH]
PLATELET # BLD AUTO: 443 THOUSANDS/UL (ref 149–390)
PMV BLD AUTO: 11 FL (ref 8.9–12.7)
POTASSIUM SERPL-SCNC: 4.8 MMOL/L (ref 3.5–5.3)
PROT SERPL-MCNC: 7.6 G/DL (ref 6.4–8.4)
PROT UR STRIP-MCNC: ABNORMAL MG/DL
PROT UR-MCNC: 16 MG/DL
PROT/CREAT UR: 0.16 MG/G{CREAT} (ref 0–0.1)
RBC # BLD AUTO: 3.49 MILLION/UL (ref 3.81–5.12)
RBC #/AREA URNS AUTO: ABNORMAL /HPF
RETICS # AUTO: ABNORMAL 10*3/UL (ref 14097–95744)
RETICS # CALC: 2.15 % (ref 0.37–1.87)
SODIUM SERPL-SCNC: 135 MMOL/L (ref 135–147)
SP GR UR STRIP.AUTO: 1.01 (ref 1–1.03)
TIBC SERPL-MCNC: 331 UG/DL (ref 250–450)
UROBILINOGEN UR STRIP-ACNC: <2 MG/DL
WBC # BLD AUTO: 9.15 THOUSAND/UL (ref 4.31–10.16)
WBC #/AREA URNS AUTO: ABNORMAL /HPF

## 2023-06-05 PROCEDURE — 83036 HEMOGLOBIN GLYCOSYLATED A1C: CPT

## 2023-06-05 PROCEDURE — 82728 ASSAY OF FERRITIN: CPT

## 2023-06-05 PROCEDURE — 85025 COMPLETE CBC W/AUTO DIFF WBC: CPT

## 2023-06-05 PROCEDURE — 85045 AUTOMATED RETICULOCYTE COUNT: CPT

## 2023-06-05 PROCEDURE — 83550 IRON BINDING TEST: CPT

## 2023-06-05 PROCEDURE — 82570 ASSAY OF URINE CREATININE: CPT

## 2023-06-05 PROCEDURE — 80053 COMPREHEN METABOLIC PANEL: CPT

## 2023-06-05 PROCEDURE — 83540 ASSAY OF IRON: CPT

## 2023-06-05 PROCEDURE — 81001 URINALYSIS AUTO W/SCOPE: CPT

## 2023-06-05 PROCEDURE — 84156 ASSAY OF PROTEIN URINE: CPT

## 2023-06-05 PROCEDURE — 36415 COLL VENOUS BLD VENIPUNCTURE: CPT

## 2023-06-06 ENCOUNTER — TELEPHONE (OUTPATIENT)
Dept: NEPHROLOGY | Facility: CLINIC | Age: 48
End: 2023-06-06

## 2023-06-06 ENCOUNTER — OFFICE VISIT (OUTPATIENT)
Dept: FAMILY MEDICINE CLINIC | Facility: CLINIC | Age: 48
End: 2023-06-06
Payer: COMMERCIAL

## 2023-06-06 VITALS
HEART RATE: 86 BPM | DIASTOLIC BLOOD PRESSURE: 70 MMHG | HEIGHT: 56 IN | SYSTOLIC BLOOD PRESSURE: 138 MMHG | RESPIRATION RATE: 18 BRPM | OXYGEN SATURATION: 100 % | WEIGHT: 186 LBS | BODY MASS INDEX: 41.84 KG/M2

## 2023-06-06 DIAGNOSIS — Z11.59 NEED FOR HEPATITIS C SCREENING TEST: ICD-10-CM

## 2023-06-06 DIAGNOSIS — E66.01 MORBID OBESITY WITH BMI OF 40.0-44.9, ADULT (HCC): ICD-10-CM

## 2023-06-06 DIAGNOSIS — I10 PRIMARY HYPERTENSION: ICD-10-CM

## 2023-06-06 DIAGNOSIS — E87.1 HYPONATREMIA: Primary | ICD-10-CM

## 2023-06-06 DIAGNOSIS — Z23 NEED FOR PNEUMOCOCCAL VACCINE: ICD-10-CM

## 2023-06-06 DIAGNOSIS — Z00.00 ANNUAL PHYSICAL EXAM: Primary | ICD-10-CM

## 2023-06-06 DIAGNOSIS — J45.20 MILD INTERMITTENT ASTHMA WITHOUT COMPLICATION: ICD-10-CM

## 2023-06-06 PROCEDURE — 99396 PREV VISIT EST AGE 40-64: CPT | Performed by: STUDENT IN AN ORGANIZED HEALTH CARE EDUCATION/TRAINING PROGRAM

## 2023-06-06 PROCEDURE — 90471 IMMUNIZATION ADMIN: CPT

## 2023-06-06 PROCEDURE — 90677 PCV20 VACCINE IM: CPT

## 2023-06-06 RX ORDER — FLUTICASONE PROPIONATE AND SALMETEROL 250; 50 UG/1; UG/1
1 POWDER RESPIRATORY (INHALATION) DAILY
Qty: 1 BLISTER | Refills: 1 | Status: SHIPPED | OUTPATIENT
Start: 2023-06-06 | End: 2023-07-06

## 2023-06-06 NOTE — TELEPHONE ENCOUNTER
Left voicemail for the patient relaying the message above  Requested patient call back to verify receipt of the message

## 2023-06-06 NOTE — TELEPHONE ENCOUNTER
Left voicemail for the patient as well as the patient's spouse, Emeterio Kelly, relaying the message above  Requested patient and Krishna call back to verify receipt

## 2023-06-06 NOTE — PATIENT INSTRUCTIONS
Wellness Visit for Adults   AMBULATORY CARE:   A wellness visit  is when you see your healthcare provider to get screened for health problems  Your healthcare provider will also give you advice on how to stay healthy  Write down your questions so you remember to ask them  Ask your healthcare provider how often you should have a wellness visit  What happens at a wellness visit:  Your healthcare provider will ask about your health, and your family history of health problems  This includes high blood pressure, heart disease, and cancer  He or she will ask if you have symptoms that concern you, if you smoke, and about your mood  You may also be asked about your intake of medicines, supplements, food, and alcohol  Any of the following may be done:  • Your weight  will be checked  Your height may also be checked so your body mass index (BMI) can be calculated  Your BMI shows if you are at a healthy weight  • Your blood pressure  and heart rate will be checked  Your temperature may also be checked  • Blood and urine tests  may be done  Blood tests may be done to check your cholesterol levels  Abnormal cholesterol levels increase your risk for heart disease and stroke  You may also need a blood or urine test to check for diabetes if you are at increased risk  Urine tests may be done to look for signs of an infection or kidney disease  • A physical exam  includes checking your heartbeat and lungs with a stethoscope  Your healthcare provider may also check your skin to look for sun damage  • Screening tests  may be recommended  A screening test is done to check for diseases that may not cause symptoms  The screening tests you may need depend on your age, gender, family history, and lifestyle habits  For example, colorectal screening may be recommended if you are 48years old or older  Screening tests you need if you are a woman:   • A Pap smear  is used to screen for cervical cancer   Pap smears are usually done every 3 to 5 years depending on your age  You may need them more often if you have had abnormal Pap smear test results in the past  Ask your healthcare provider how often you should have a Pap smear  • A mammogram  is an x-ray of your breasts to screen for breast cancer  Experts recommend mammograms every 2 years starting at age 48 years  You may need a mammogram at age 52 years or younger if you have an increased risk for breast cancer  Talk to your healthcare provider about when you should start having mammograms and how often you need them  Vaccines you may need:   • Get an influenza vaccine  every year  The influenza vaccine protects you from the flu  Several types of viruses cause the flu  The viruses change over time, so new vaccines are made each year  • Get a tetanus-diphtheria (Td) booster vaccine  every 10 years  This vaccine protects you against tetanus and diphtheria  Tetanus is a severe infection that may cause painful muscle spasms and lockjaw  Diphtheria is a severe bacterial infection that causes a thick covering in the back of your mouth and throat  • Get a human papillomavirus (HPV) vaccine  if you are female and aged 23 to 32 or male 23 to 24 and never received it  This vaccine protects you from HPV infection  HPV is the most common infection spread by sexual contact  HPV may also cause vaginal, penile, and anal cancers  • Get a pneumococcal vaccine  if you are aged 72 years or older  The pneumococcal vaccine is an injection given to protect you from pneumococcal disease  Pneumococcal disease is an infection caused by pneumococcal bacteria  The infection may cause pneumonia, meningitis, or an ear infection  • Get a shingles vaccine  if you are 60 or older, even if you have had shingles before  The shingles vaccine is an injection to protect you from the varicella-zoster virus  This is the same virus that causes chickenpox   Shingles is a painful rash that develops in people who had chickenpox or have been exposed to the virus  How to eat healthy:  My Plate is a model for planning healthy meals  It shows the types and amounts of foods that should go on your plate  Fruits and vegetables make up about half of your plate, and grains and protein make up the other half  A serving of dairy is included on the side of your plate  The amount of calories and serving sizes you need depends on your age, gender, weight, and height  Examples of healthy foods are listed below:  • Eat a variety of vegetables  such as dark green, red, and orange vegetables  You can also include canned vegetables low in sodium (salt) and frozen vegetables without added butter or sauces  • Eat a variety of fresh fruits , canned fruit in 100% juice, frozen fruit, and dried fruit  • Include whole grains  At least half of the grains you eat should be whole grains  Examples include whole-wheat bread, wheat pasta, brown rice, and whole-grain cereals such as oatmeal     • Eat a variety of protein foods such as seafood (fish and shellfish), lean meat, and poultry without skin (turkey and chicken)  Examples of lean meats include pork leg, shoulder, or tenderloin, and beef round, sirloin, tenderloin, and extra lean ground beef  Other protein foods include eggs and egg substitutes, beans, peas, soy products, nuts, and seeds  • Choose low-fat dairy products such as skim or 1% milk or low-fat yogurt, cheese, and cottage cheese  • Limit unhealthy fats  such as butter, hard margarine, and shortening  Exercise:  Exercise at least 30 minutes per day on most days of the week  Some examples of exercise include walking, biking, dancing, and swimming  You can also fit in more physical activity by taking the stairs instead of the elevator or parking farther away from stores  Include muscle strengthening activities 2 days each week  Regular exercise provides many health benefits   It helps you manage your weight, and decreases your risk for type 2 diabetes, heart disease, stroke, and high blood pressure  Exercise can also help improve your mood  Ask your healthcare provider about the best exercise plan for you  General health and safety guidelines:   • Do not smoke  Nicotine and other chemicals in cigarettes and cigars can cause lung damage  Ask your healthcare provider for information if you currently smoke and need help to quit  E-cigarettes or smokeless tobacco still contain nicotine  Talk to your healthcare provider before you use these products  • Limit alcohol  A drink of alcohol is 12 ounces of beer, 5 ounces of wine, or 1½ ounces of liquor  • Lose weight, if needed  Being overweight increases your risk of certain health conditions  These include heart disease, high blood pressure, type 2 diabetes, and certain types of cancer  • Protect your skin  Do not sunbathe or use tanning beds  Use sunscreen with a SPF 15 or higher  Apply sunscreen at least 15 minutes before you go outside  Reapply sunscreen every 2 hours  Wear protective clothing, hats, and sunglasses when you are outside  • Drive safely  Always wear your seatbelt  Make sure everyone in your car wears a seatbelt  A seatbelt can save your life if you are in an accident  Do not use your cell phone when you are driving  This could distract you and cause an accident  Pull over if you need to make a call or send a text message  • Practice safe sex  Use latex condoms if are sexually active and have more than one partner  Your healthcare provider may recommend screening tests for sexually transmitted infections (STIs)  • Wear helmets, lifejackets, and protective gear  Always wear a helmet when you ride a bike or motorcycle, go skiing, or play sports that could cause a head injury  Wear protective equipment when you play sports  Wear a lifejacket when you are on a boat or doing water sports      © Copyright [employer] 2022 Information is for End User's use only and may not be sold, redistributed or otherwise used for commercial purposes  The above information is an  only  It is not intended as medical advice for individual conditions or treatments  Talk to your doctor, nurse or pharmacist before following any medical regimen to see if it is safe and effective for you  Weight Management   AMBULATORY CARE:   Why it is important to manage your weight:  Being overweight increases your risk of health conditions such as heart disease, high blood pressure, type 2 diabetes, and certain types of cancer  It can also increase your risk for osteoarthritis, sleep apnea, and other respiratory problems  Aim for a slow, steady weight loss  Even a small amount of weight loss can lower your risk of health problems  Risks of being overweight:  Extra weight can cause many health problems, including the following:  • Diabetes (high blood sugar level)    • High blood pressure or high cholesterol    • Heart disease    • Stroke    • Gallbladder or liver disease    • Cancer of the colon, breast, prostate, liver, or kidney    • Sleep apnea    • Arthritis or gout    Screening  is done to check for health conditions before you have signs or symptoms  If you are 28to 79years old, your blood sugar level may be checked every 3 years for signs of prediabetes or diabetes  Your healthcare provider will check your blood pressure at each visit  High blood pressure can lead to a stroke or other problems  Your provider may check for signs of heart disease, cancer, or other health problems  How to lose weight safely:  A safe and healthy way to lose weight is to eat fewer calories and get regular exercise  • You can lose up about 1 pound a week by decreasing the number of calories you eat by 500 calories each day  You can decrease calories by eating smaller portion sizes or by cutting out high-calorie foods   Read labels to find out how many calories are in the foods you eat          • You can also burn calories with exercise such as walking, swimming, or biking  You will be more likely to keep weight off if you make these changes part of your lifestyle  Exercise at least 30 minutes per day on most days of the week  You can also fit in more physical activity by taking the stairs instead of the elevator or parking farther away from stores  Ask your healthcare provider about the best exercise plan for you  Healthy meal plan for weight management:  A healthy meal plan includes a variety of foods, contains fewer calories, and helps you stay healthy  A healthy meal plan includes the following:     • Eat whole-grain foods more often  A healthy meal plan should contain fiber  Fiber is the part of grains, fruits, and vegetables that is not broken down by your body  Whole-grain foods are healthy and provide extra fiber in your diet  Some examples of whole-grain foods are whole-wheat breads and pastas, oatmeal, brown rice, and bulgur  • Eat a variety of vegetables every day  Include dark, leafy greens such as spinach, kale, reina greens, and mustard greens  Eat yellow and orange vegetables such as carrots, sweet potatoes, and winter squash  • Eat a variety of fruits every day  Choose fresh or canned fruit (canned in its own juice or light syrup) instead of juice  Fruit juice has very little or no fiber  • Eat low-fat dairy foods  Drink fat-free (skim) milk or 1% milk  Eat fat-free yogurt and low-fat cottage cheese  Try low-fat cheeses such as mozzarella and other reduced-fat cheeses  • Choose meat and other protein foods that are low in fat  Choose beans or other legumes such as split peas or lentils  Choose fish, skinless poultry (chicken or turkey), or lean cuts of red meat (beef or pork)  Before you cook meat or poultry, cut off any visible fat  • Use less fat and oil  Try baking foods instead of frying them   Add less fat, such as margarine, sour cream, regular salad dressing and mayonnaise to foods  Eat fewer high-fat foods  Some examples of high-fat foods include french fries, doughnuts, ice cream, and cakes  • Eat fewer sweets  Limit foods and drinks that are high in sugar  This includes candy, cookies, regular soda, and sweetened drinks  Ways to decrease calories:   • Eat smaller portions  ? Use a small plate with smaller servings  ? Do not eat second helpings  ? When you eat at a restaurant, ask for a box and place half of your meal in the box before you eat  ? Share an entrée with someone else  • Replace high-calorie snacks with healthy, low-calorie snacks  ? Choose fresh fruit, vegetables, fat-free rice cakes, or air-popped popcorn instead of potato chips, nuts, or chocolate  ? Choose water or calorie-free drinks instead of soda or sweetened drinks  • Do not shop for groceries when you are hungry  You may be more likely to make unhealthy food choices  Take a grocery list of healthy foods and shop after you have eaten  • Eat regular meals  Do not skip meals  Skipping meals can lead to overeating later in the day  This can make it harder for you to lose weight  Eat a healthy snack in place of a meal if you do not have time to eat a regular meal  Talk with a dietitian to help you create a meal plan and schedule that is right for you  Other things to consider as you try to lose weight:   • Be aware of situations that may give you the urge to overeat, such as eating while watching television  Find ways to avoid these situations  For example, read a book, go for a walk, or do crafts  • Meet with a weight loss support group or friends who are also trying to lose weight  This may help you stay motivated to continue working on your weight loss goals  © Copyright [employer] 2022 Information is for End User's use only and may not be sold, redistributed or otherwise used for commercial purposes    The above information is an  only  It is not intended as medical advice for individual conditions or treatments  Talk to your doctor, nurse or pharmacist before following any medical regimen to see if it is safe and effective for you

## 2023-06-06 NOTE — PROGRESS NOTES
ADULT ANNUAL PHYSICAL  [facility]    NAME: Celine Schmitt  AGE: 52 y o  SEX: female  : 1975     DATE: 2023     Assessment and Plan:     Problem List Items Addressed This Visit    None  Visit Diagnoses     Annual physical exam    -  Primary    Relevant Orders    Lipid panel    Vitamin D 25 hydroxy    Need for pneumococcal vaccine        Relevant Orders    Pneumococcal Conjugate Vaccine 20-valent (Pcv20) (Completed)    Need for hepatitis C screening test        Relevant Orders    Hepatitis C antibody    Morbid obesity with BMI of 40 0-44 9, adult ([address] )        Relevant Orders    Ambulatory referral to Nutrition Services        Hypertension: Blood pressure controlled on current regimen, patient missed dose this morning prior to coming in  On lisinopril 20 mg twice daily  Discussed low-sodium diet    Hyperlipidemia: Last lipid panel 2023, will order for repeat lipid panel  Patient to continue taking atorvastatin 80 mg tab daily    Type 1 diabetes mellitus: Did A1c yesterday for upcoming appointment with endocrinology on 23; A1c 9 5 improved from previous  Continue current regimen per specialist    Asthma: Controlled on current regimen, continue    Anemia: Has appointment with heme-onc on 23, CBC and iron panel completed yesterday    Obesity with BMI 41 7: discussed with patient healthy eating and exercise regimen, given pts extensive history; recommended nutrition referral, pt in agreement     Immunizations and preventive care screenings were discussed with patient today  Appropriate education was printed on patient's after visit summary  Pneumococcal vaccine provided to patient today in office, tolerated well      Screening:  Last eye exam:   Last foot exam: 2023; 2023  Cervical cancer screening: due 2023  Breast cancer screening: scheduled 2023  Colon cancer screenin2021 due in 5 yrs 4/2026    Counseling:  · Exercise: the importance of regular exercise/physical activity was discussed  Recommend exercise 3-5 times per week for at least 30 minutes  BMI Counseling: Body mass index is 41 7 kg/m²  The BMI is above normal  Nutrition recommendations include encouraging healthy choices of fruits and vegetables, consuming healthier snacks and moderation in carbohydrate intake  Exercise recommendations include exercising 3-5 times per week  Patient referred to nutritionist  Rationale for BMI follow-up plan is due to patient being overweight or obese  Return in 2 weeks (on 6/20/2023)  Chief Complaint:     Chief Complaint   Patient presents with   • Physical Exam      History of Present Illness:     Adult Annual Physical   Patient here for a comprehensive physical exam  The patient reports no problems  Mrs Marcus Liz is a 49-year-old female who presents today to [facility]  Patient was last seen for a physical exam at this office in 2021  Patient has a past medical history of hypertension, hyperlipidemia, type 1 diabetes with retinopathy and being monitored for nephropathy, chronic diastolic CHF, anemia  Patient does report she has continued to follow-up with her specialty providers including cardiology (Dr Emiliano Tan), nephro (Dr Shruti Marr), Endo (Dr Adenike Figueroa), heme-onc  Patient reports compliance with medication and no acute complaints at this visit today  Patient does report recently being unemployed and is working hard to seek employment  Denies any headaches chest pain shortness of breath abdominal pain nausea vomiting diarrhea  Diet and Physical Activity  · Diet/Nutrition: limited junk food and consuming 3-5 servings of fruits/vegetables daily  · Exercise: no formal exercise        Depression Screening  PHQ-2/9 Depression Screening    Little interest or pleasure in doing things: 0 - not at all  Feeling down, depressed, or hopeless: 0 - not at all  Trouble falling or staying asleep, or sleeping too much: 0 - not at all  Feeling tired or having little energy: 0 - not at all  Poor appetite or overeatin - several days  Feeling bad about yourself - or that you are a failure or have let yourself or your family down: 0 - not at all  Trouble concentrating on things, such as reading the newspaper or watching television: 0 - not at all  Moving or speaking so slowly that other people could have noticed  Or the opposite - being so fidgety or restless that you have been moving around a lot more than usual: 0 - not at all  Thoughts that you would be better off dead, or of hurting yourself in some way: 0 - not at all  PHQ-9 Score: 1   PHQ-9 Interpretation: No or Minimal depression        General Health  · Sleep: gets 4-6 hours of sleep on average  · Hearing: normal - bilateral   · Vision: goes for regular eye exams and wears glasses  · Dental: no dental visits for >1 year and brushes teeth twice daily  /GYN Health  · Patient is: premenopausal  · Last menstrual period: 2023  · Contraceptive method: none  Review of Systems:     Review of Systems   Constitutional: Negative for activity change, appetite change, fatigue and fever  HENT: Negative for congestion, ear pain, sinus pressure and sore throat  Eyes: Negative for pain  Respiratory: Negative for cough, chest tightness and shortness of breath  Cardiovascular: Negative for chest pain and leg swelling  Gastrointestinal: Negative for abdominal pain, diarrhea, nausea and vomiting  Genitourinary: Negative for dysuria  Musculoskeletal: Negative for back pain  Neurological: Negative for dizziness, light-headedness and headaches  Psychiatric/Behavioral: Negative for sleep disturbance and suicidal ideas  Past Medical History:     Past Medical History:   Diagnosis Date   • Anemia    • Arthritis    • Asthma     w/acute exacerbation   Last assessed: 10/26/12   • Chest pain 2016   • CHF (congestive heart failure) ([place] [address] )    • Depression    • Diabetes mellitus ([place] [address] )    • Diabetic nephropathy ([place] [address] )    • Diabetic nephropathy associated with type 1 diabetes mellitus ([place] [address] ) 08/12/2020   • Diabetic retinopathy ([place] [address] ) 02/03/2016   • Endometriosis    • Gastroenteritis 02/03/2016   • GERD (gastroesophageal reflux disease)    • HTN (hypertension)    • Hyperlipidemia 02/03/2016   • Hypertension    • Obesity    • Oligomenorrhea    • Polycystic ovaries 02/03/2016   • Retinal hemorrhage 02/03/2016   • Retinopathy    • Thrombocytosis    • Type 1 diabetes mellitus with ophthalmic manifestation ([place] [address] ) 02/03/2016      Past Surgical History:     Past Surgical History:   Procedure Laterality Date   • CATARACT EXTRACTION Left 10/2020   • CATARACT EXTRACTION Right    • RETINAL LASER PROCEDURE        Social History:     Social History     Socioeconomic History   • Marital status: /Civil Union     Spouse name: None   • Number of children: 0   • Years of education: None   • Highest education level: High school graduate   Occupational History   • 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   Tobacco Use   • Smoking status: Never   • Smokeless tobacco: Never   • Tobacco comments:     Per [employer]: Former smoker   Quit in 1994   Vaping Use   • Vaping Use: Never used   Substance and Sexual Activity   • Alcohol use: Yes     Comment: Occasional/1 mixed drink twice a month if that   • Drug use: No   • Sexual activity: Yes     Partners: Male     Birth control/protection: Pill   Other Topics Concern   • None   Social History Narrative    Always uses seatbelt    Caffeine use: 1-2 cups coffee daily    Has smoke detectors    Confucianist Affiliations: [employer]        Currently unemployed; was previously doing home care; continuing job search     Social Determinants of Health     Financial Resource Strain: Not on file   Food Insecurity: Not on file   Transportation Needs: Not on file   Physical Activity: Not on file   Stress: Not on file   Social Connections: Not on file   Intimate Partner Violence: Not on file   Housing Stability: Not on file      Family History:     Family History   Problem Relation Age of Onset   • Heart murmur Mother    • Diabetes Mother         Mellitus   • Thyroid disease Mother         Disorder   • Diabetes Father         Mellitus   • Hypertension Father    • Diabetes Maternal Grandfather         Mellitus   • Breast cancer Paternal Grandmother    • Diabetes Maternal Aunt         Mellitus   • Diabetes Maternal Uncle         Mellitus   • No Known Problems Maternal Grandmother    • Leukemia Paternal Grandfather    • Substance Abuse Neg Hx    • Mental illness Neg Hx    • Alcohol abuse Neg Hx       Current Medications:     Current Outpatient Medications   Medication Sig Dispense Refill   • acetaminophen (TYLENOL) 500 mg tablet Take 2 tablets (1,000 mg total) by mouth every 6 (six) hours as needed for mild pain or moderate pain 30 tablet 0   • acetone, urine, test strip Use to test urine ketones for high blood sugars  25 each 6   • albuterol (2 5 mg/3 mL) 0 083 % nebulizer solution Take 3 mL (2 5 mg total) by nebulization every 6 (six) hours as needed for wheezing or shortness of breath 75 mL 3   • albuterol (PROVENTIL HFA,VENTOLIN HFA) 90 mcg/act inhaler INHALE 2 PUFFS EVERY 4 HOURS AS NEEDED FOR WHEEZING OR FOR SHORTNESS OF BREATH 25 5 g 0   • Ascorbic Acid (VITAMIN C) 500 MG CAPS Take 2 capsules by mouth daily     • Aspirin Low Dose 81 MG EC tablet TAKE 1 TABLET BY MOUTH EVERY DAY 30 tablet 0   • atorvastatin (LIPITOR) 80 mg tablet TAKE 1 TABLET BY MOUTH EVERY DAY 30 tablet 11   • Biotin 10 MG TABS Take 1 tablet by mouth daily     • Blood Pressure Monitoring [place] Use 2 (two) times a day Monitor BP twice daily  Call office if BP > 140/90 persistently   1 Device 0   • Butalbital-APAP-Caffeine (Fioricet) -40 MG CAPS Take 1 tablet by mouth 4 (four) times a day as needed (headahce) 30 capsule 0   • Continuous Blood Gluc Sensor (Dexcom G6 Sensor) MISC "CHANGE EVERY 10 DAYS 3 each 12   • Continuous Blood Gluc Transmit (Dexcom G6 Transmitter) MISC CHANGE EVERY 90 DAYS 1 each 1   • cyanocobalamin (VITAMIN B-12) 100 mcg tablet Take by mouth daily     • furosemide (LASIX) 40 mg tablet TAKE 1 TABLET BY MOUTH EVERY DAY 30 tablet 11   • glucagon (Glucagon Emergency) 1 MG injection Inject 1 mg under the skin once as needed for low blood sugar for up to 1 dose 1 kit 3   • Glucagon (Gvoke HypoPen 2-Pack) 1 MG/0 2ML SOAJ Use if hypoglycemia prn 1 Syringe 6   • Insulin Glargine Solostar (Lantus SoloStar) 100 UNIT/ML SOPN Inject 0 78 mL (78 Units total) under the skin in the morning 60 mL 2   • lisinopril (ZESTRIL) 20 mg tablet TAKE 2 TABLETS BY MOUTH EVERY DAY 60 tablet 3   • montelukast (SINGULAIR) 10 mg tablet TAKE 1 TABLET BY MOUTH AT BEDTIME 90 tablet 0   • NovoLOG 100 UNIT/ML injection Inject 1 unit of Novolog for every 1 gram of carbs, up to 150 units daily 50 mL 2   • omeprazole (PriLOSEC) 20 mg delayed release capsule Take 20 mg by mouth daily  • OneTouch Ultra test strip Test 3 times daily 300 each 3   • PARoxetine (PAXIL) 20 mg tablet Take 1 tablet (20 mg total) by mouth daily 30 tablet 0   • Probiotic Product (ALIGN PO) Take by mouth     • Fluticasone-Salmeterol (Advair Diskus) 250-50 mcg/dose inhaler Inhale 1 puff daily Rinse mouth after use  1 blister 1     No current facility-administered medications for this visit  Allergies:     No Known Allergies   Physical Exam:     /70 (BP Location: Left arm, Patient Position: Sitting, Cuff Size: Large)   Pulse 86   Resp 18   Ht 4' 8\" (1 422 m)   Wt 84 4 kg (186 lb)   SpO2 100%   BMI 41 70 kg/m²     Physical Exam  Vitals reviewed  Constitutional:       Appearance: Normal appearance  She is obese  HENT:      Head: Normocephalic and atraumatic        Right Ear: Tympanic membrane and ear canal normal       Left Ear: Tympanic membrane and ear canal normal       Nose: Nose normal       Mouth/Throat:      " Mouth: Mucous membranes are moist       Pharynx: Oropharynx is clear  Eyes:      Extraocular Movements: Extraocular movements intact  Conjunctiva/sclera: Conjunctivae normal       Pupils: Pupils are equal, round, and reactive to light  Cardiovascular:      Rate and Rhythm: Normal rate and regular rhythm  Pulses: Normal pulses  no weak pulses     Heart sounds: Normal heart sounds  Pulmonary:      Effort: Pulmonary effort is normal       Breath sounds: Normal breath sounds  Abdominal:      General: Abdomen is flat  Bowel sounds are normal    Musculoskeletal:         General: Normal range of motion  Cervical back: Normal range of motion and neck supple  Feet:      Right foot:      Skin integrity: No ulcer, skin breakdown, erythema, warmth or dry skin  Left foot:      Skin integrity: No ulcer, skin breakdown, erythema, warmth, callus or dry skin  Lymphadenopathy:      Cervical: No cervical adenopathy  Skin:     General: Skin is warm  Neurological:      General: No focal deficit present  Mental Status: She is alert and oriented to person, place, and time  Psychiatric:         Mood and Affect: Mood normal        Diabetic Foot Exam    Patient's shoes and socks removed  Right Foot/Ankle   Right Foot Inspection  Skin Exam: skin normal and skin intact  No dry skin, no warmth, no erythema, no maceration, no abnormal color, no pre-ulcer and no ulcer  Toe Exam: ROM and strength within normal limits  Left Foot/Ankle  Left Foot Inspection  Skin Exam: skin normal and skin intact  No dry skin, no warmth, no erythema, no maceration, normal color, no pre-ulcer, no ulcer and no callus  Toe Exam: ROM and strength within normal limits       Assign Risk Category  No deformity present  No loss of protective sensation  No weak pulses  Risk: 0       Cristal Reed MD  [facility]

## 2023-06-06 NOTE — TELEPHONE ENCOUNTER
----- Message from Pauline Velarde DO sent at 6/6/2023  3:26 PM EDT -----  Patient with acute kidney injury  sCr up to 1 4 and BUN 64  Concern for possible dehydration  Should be seen in ER with repeat labs  B/l sCr 0 8

## 2023-06-07 ENCOUNTER — OFFICE VISIT (OUTPATIENT)
Dept: ENDOCRINOLOGY | Facility: HOSPITAL | Age: 48
End: 2023-06-07
Payer: COMMERCIAL

## 2023-06-07 VITALS
DIASTOLIC BLOOD PRESSURE: 70 MMHG | BODY MASS INDEX: 41.84 KG/M2 | SYSTOLIC BLOOD PRESSURE: 138 MMHG | WEIGHT: 186 LBS | OXYGEN SATURATION: 97 % | HEART RATE: 87 BPM | HEIGHT: 56 IN

## 2023-06-07 DIAGNOSIS — I10 ESSENTIAL HYPERTENSION: Chronic | ICD-10-CM

## 2023-06-07 DIAGNOSIS — E10.65 TYPE 1 DIABETES MELLITUS WITH HYPERGLYCEMIA (HCC): Primary | ICD-10-CM

## 2023-06-07 PROCEDURE — 95251 CONT GLUC MNTR ANALYSIS I&R: CPT | Performed by: PHYSICIAN ASSISTANT

## 2023-06-07 PROCEDURE — 99214 OFFICE O/P EST MOD 30 MIN: CPT | Performed by: PHYSICIAN ASSISTANT

## 2023-06-07 NOTE — TELEPHONE ENCOUNTER
Phillip Flores throughout these events to keep her updated  Informed her of patient's decline to go to ER  Dr Jigar Flores requested a BMP be completed by patient tomorrow with a significant increase in hydration

## 2023-06-07 NOTE — PROGRESS NOTES
Nik Mullen 52 y o  female MRN: [id number]    Encounter: [id number]      Assessment/Plan     Assessment: This is a 52y o -year-old female with type 1 diabetes with neuropathy, nephropathy, hypertension and hyperlipidemia  Plan:  1  Type 1 diabetes: Most recent hemoglobin A1c was 9 5  Recently has adjusted the time of her insulin and recommend continue with Lantus in the morning as she was previously have hypoglycemia over night  No adjustments to insulin at this time  I would like to see a download in about two weeks, and will likely have to make adjustments at that time  Possibly increasing Lantus and changing carb ratio to 1 unit for every 2 g carbohydrates  We also discussed switching to different types of insulin  She will contact insurance  Contact the office with any concerns or questions  Follow up in 3 months with lab work prior to visit      2  Diabetic neuropathy:  Stable   Is up-to-date on diabetic foot exam      3  Diabetic nephropathy:  Some protein noted in her urine previously  Abrahan Leys function was off recently which may be related to dehydration   Will continue to monitor        4  Hypertension: Normotensive in the office today   Creatinine and BUN elevated due to possible dehydration  Does follow up with nephrology  Andreia Faustin with current medications at this time  Pina Desai prior to next office visit      5  Hyperlipidemia:  Lipid panel did reveal elevated triglycerides which might be due to her hyperglycemia   Total cholesterol and LDL cholesterol were within normal range   Continue with current medications  We will continue to monitor over time      CC: Type 1 diabetes follow-up    History of Present Illness     HPI:  Vernell HARRINGTON Wilner is a 52 y o  female with type 1 diabetes with diabetic neuropathy and diabetic nephropathy, hypertension, hyperlipidemia for follow-up visit    She was diagnosed with type 1 diabetes about 29 years ago  Jennifer Lerner utilizes insulin therapy and takes Lantus insulin 78 units at bedtime and NovoLog insulin 1 unit per 1 g carbohydrate with each meal   Could not give definitive answer on normal amount of insulin with meals, but can take up to 60 units   Was laid off from her last job, and states that she has been more consistent with her insulin as she has not been traveling as much     She denies polyuria, polydipsia, polyphagia, or nocturia   She denies blurry vision   She denies numbness or tingling of the feet   She denies chest pain or shortness of breath  Diabetic complications include diabetic neuropathy, diabetic nephropathy, diabetic retinopathy   She denies heart attack, stroke, or claudication      Last diabetic foot exam was performed June 2023   She reports her last eye exam was March 2023 and there was no change in her retinopathy       She uses a Dexcom continuous glucose monitoring system to check her blood sugars frequently throughout the day  Download of Dexcom from May 25 through June 7, 2023 reveals an average glucose level of 226 with a standard deviation of 95  She is in target range 34% of the time, below target range 2% time, and above target range 64% of time  At this time because of the wide variability glucose levels seem to be all over the place  On average glucose levels trend up with breakfast start decreasing in the afternoon, then increase again after dinner      She has hypertension and diabetic nephropathy and takes lisinopril 20 mg 2 tablets daily    She has occasional headaches but no stroke-like symptoms      She has hyperlipidemia and takes atorvastatin 40 mg daily   She denies chest pain or shortness of breath       She also has had quite a few months of fatigue and is now on iron infusions for low iron  Review of Systems   Constitutional: Negative for activity change, appetite change, fatigue and unexpected weight change  HENT: Negative for sore throat and trouble swallowing  Eyes: Negative for visual disturbance  Respiratory: Negative for chest tightness and shortness of breath  Cardiovascular: Negative for chest pain, palpitations and leg swelling  Gastrointestinal: Negative for abdominal pain, constipation, diarrhea, nausea and vomiting  Endocrine: Negative for cold intolerance, heat intolerance, polydipsia, polyphagia and polyuria  Genitourinary: Negative for frequency  Skin: Negative for wound  Neurological: Negative for dizziness, weakness, numbness and headaches  Psychiatric/Behavioral: Negative for dysphoric mood and sleep disturbance  The patient is not nervous/anxious  Historical Information   Past Medical History:   Diagnosis Date   • Anemia    • Arthritis    • Asthma     w/acute exacerbation  Last assessed: 10/26/12   • Chest pain 02/03/2016   • CHF (congestive heart failure) ([place] [address] )    • Depression    • Diabetes mellitus ([place] [address] )    • Diabetic nephropathy ([place] [address] )    • Diabetic nephropathy associated with type 1 diabetes mellitus ([place] [address] ) 08/12/2020   • Diabetic retinopathy ([place] [address] ) 02/03/2016   • Endometriosis    • Gastroenteritis 02/03/2016   • GERD (gastroesophageal reflux disease)    • HTN (hypertension)    • Hyperlipidemia 02/03/2016   • Hypertension    • Obesity    • Oligomenorrhea    • Polycystic ovaries 02/03/2016   • Retinal hemorrhage 02/03/2016   • Retinopathy    • Thrombocytosis    • Type 1 diabetes mellitus with ophthalmic manifestation ([place] [address] ) 02/03/2016     Past Surgical History:   Procedure Laterality Date   • CATARACT EXTRACTION Left 10/2020   • CATARACT EXTRACTION Right    • RETINAL LASER PROCEDURE       Social History   Social History     Substance and Sexual Activity   Alcohol Use Yes    Comment: Occasional/1 mixed drink twice a month if that     Social History     Substance and Sexual Activity   Drug Use No     Social History     Tobacco Use   Smoking Status Never   Smokeless Tobacco Never   Tobacco Comments    Per [employer]: Former smoker   Quit in 1994     Family History:   Family History   Problem Relation Age of Onset   • Heart murmur Mother    • Diabetes Mother         Mellitus   • Thyroid disease Mother         Disorder   • Diabetes Father         Mellitus   • Hypertension Father    • Diabetes Maternal Grandfather         Mellitus   • Breast cancer Paternal Grandmother    • Diabetes Maternal Aunt         Mellitus   • Diabetes Maternal Uncle         Mellitus   • No Known Problems Maternal Grandmother    • Leukemia Paternal Grandfather    • Substance Abuse Neg Hx    • Mental illness Neg Hx    • Alcohol abuse Neg Hx        Meds/Allergies   Current Outpatient Medications   Medication Sig Dispense Refill   • acetaminophen (TYLENOL) 500 mg tablet Take 2 tablets (1,000 mg total) by mouth every 6 (six) hours as needed for mild pain or moderate pain 30 tablet 0   • acetone, urine, test strip Use to test urine ketones for high blood sugars  25 each 6   • albuterol (2 5 mg/3 mL) 0 083 % nebulizer solution Take 3 mL (2 5 mg total) by nebulization every 6 (six) hours as needed for wheezing or shortness of breath 75 mL 3   • albuterol (PROVENTIL HFA,VENTOLIN HFA) 90 mcg/act inhaler INHALE 2 PUFFS EVERY 4 HOURS AS NEEDED FOR WHEEZING OR FOR SHORTNESS OF BREATH 25 5 g 0   • Ascorbic Acid (VITAMIN C) 500 MG CAPS Take 2 capsules by mouth daily     • Aspirin Low Dose 81 MG EC tablet TAKE 1 TABLET BY MOUTH EVERY DAY 30 tablet 0   • atorvastatin (LIPITOR) 80 mg tablet TAKE 1 TABLET BY MOUTH EVERY DAY 30 tablet 11   • Biotin 10 MG TABS Take 1 tablet by mouth daily     • Blood Pressure Monitoring [place] Use 2 (two) times a day Monitor BP twice daily  Call office if BP > 140/90 persistently   1 Device 0   • Butalbital-APAP-Caffeine (Fioricet) -40 MG CAPS Take 1 tablet by mouth 4 (four) times a day as needed (headahce) 30 capsule 0   • Continuous Blood Gluc Sensor (Dexcom G6 Sensor) MISC CHANGE EVERY 10 DAYS 3 each 12   • Continuous Blood Gluc Transmit (Dexcom G6 Transmitter) MISC CHANGE EVERY 90 DAYS 1 each 1 "  • cyanocobalamin (VITAMIN B-12) 100 mcg tablet Take by mouth daily     • Fluticasone-Salmeterol (Advair Diskus) 250-50 mcg/dose inhaler Inhale 1 puff daily Rinse mouth after use  1 blister 1   • furosemide (LASIX) 40 mg tablet TAKE 1 TABLET BY MOUTH EVERY DAY 30 tablet 11   • glucagon (Glucagon Emergency) 1 MG injection Inject 1 mg under the skin once as needed for low blood sugar for up to 1 dose 1 kit 3   • Glucagon (Gvoke HypoPen 2-Pack) 1 MG/0 2ML SOAJ Use if hypoglycemia prn 1 Syringe 6   • Insulin Glargine Solostar (Lantus SoloStar) 100 UNIT/ML SOPN Inject 0 78 mL (78 Units total) under the skin in the morning 60 mL 2   • lisinopril (ZESTRIL) 20 mg tablet TAKE 2 TABLETS BY MOUTH EVERY DAY 60 tablet 3   • montelukast (SINGULAIR) 10 mg tablet TAKE 1 TABLET BY MOUTH AT BEDTIME 90 tablet 0   • NovoLOG 100 UNIT/ML injection Inject 1 unit of Novolog for every 1 gram of carbs, up to 150 units daily 50 mL 2   • omeprazole (PriLOSEC) 20 mg delayed release capsule Take 20 mg by mouth daily  • OneTouch Ultra test strip Test 3 times daily 300 each 3   • PARoxetine (PAXIL) 20 mg tablet Take 1 tablet (20 mg total) by mouth daily 30 tablet 0   • Probiotic Product (ALIGN PO) Take by mouth       No current facility-administered medications for this visit  No Known Allergies    Objective   Vitals: Blood pressure 138/70, pulse 87, height 4' 8\" (1 422 m), weight 84 4 kg (186 lb), SpO2 97 %, not currently breastfeeding  Physical Exam  Vitals and nursing note reviewed  Constitutional:       General: She is not in acute distress  Appearance: Normal appearance  She is not diaphoretic  HENT:      Head: Normocephalic and atraumatic  Eyes:      General: No scleral icterus  Extraocular Movements: Extraocular movements intact  Conjunctiva/sclera: Conjunctivae normal       Pupils: Pupils are equal, round, and reactive to light  Cardiovascular:      Rate and Rhythm: Normal rate and regular rhythm        " Heart sounds: No murmur heard  Pulmonary:      Effort: Pulmonary effort is normal  No respiratory distress  Breath sounds: Normal breath sounds  No wheezing  Musculoskeletal:      Cervical back: Normal range of motion  Right lower leg: No edema  Left lower leg: No edema  Lymphadenopathy:      Cervical: No cervical adenopathy  Neurological:      Mental Status: She is alert and oriented to person, place, and time  Mental status is at baseline  Sensory: No sensory deficit  Gait: Gait normal    Psychiatric:         Mood and Affect: Mood normal          Behavior: Behavior normal          Thought Content: Thought content normal          The history was obtained from the review of the chart, patient      Lab Results:   Lab Results   Component Value Date/Time    Albumin 3 8 06/05/2023 12:35 PM    Albumin 3 8 02/21/2023 08:43 AM    ALT 24 06/05/2023 12:35 PM    ALT 26 02/21/2023 08:43 AM    AST 11 06/05/2023 12:35 PM    AST 16 02/21/2023 08:43 AM    BUN 64 (H) 06/05/2023 12:35 PM    BUN 47 (H) 02/21/2023 08:43 AM    Chloride 104 06/05/2023 12:35 PM    Chloride 101 02/21/2023 08:43 AM    CO2 28 06/05/2023 12:35 PM    CO2 26 02/21/2023 08:43 AM    Creatinine 1 40 (H) 06/05/2023 12:35 PM    Creatinine 0 86 02/21/2023 08:43 AM    Hematocrit 32 0 (L) 06/05/2023 12:35 PM    Hematocrit 33 7 (L) 02/21/2023 08:43 AM    HDL, Direct 40 (L) 02/21/2023 08:43 AM    Hemoglobin 10 6 (L) 06/05/2023 12:35 PM    Hemoglobin 10 3 (L) 02/21/2023 08:43 AM    Hemoglobin A1C 9 5 (H) 06/05/2023 12:35 PM    Hemoglobin A1C 10 6 (H) 02/21/2023 07:12 AM    Potassium 4 8 06/05/2023 12:35 PM    Potassium 4 0 02/21/2023 08:43 AM    MCV 92 06/05/2023 12:35 PM    MCV 94 02/21/2023 08:43 AM    Platelets 044 (H) 92/51/1197 12:35 PM    Platelets 022 (H) 92/11/5363 08:43 AM    Total Protein 7 6 06/05/2023 12:35 PM    Total Protein 6 8 02/21/2023 08:43 AM    Triglycerides 197 (H) 02/21/2023 08:43 AM    WBC 9 15 06/05/2023 12:35 PM "WBC 9 19 02/21/2023 08:43 AM         Portions of the record may have been created with voice recognition software  Occasional wrong word or \"sound a like\" substitutions may have occurred due to the inherent limitations of voice recognition software  Read the chart carefully and recognize, using context, where substitutions have occurred    "

## 2023-06-07 NOTE — PROGRESS NOTES
HEMATOLOGY / [address] FOLLOW UP NOTE    Primary Care Provider: Sandhya Luque MD  Referring Provider:    MRN: [id number]  : 1975    Reason for Encounter: Follow-up for history of thrombocytosis and iron deficiency anemia       Interval History: Patient returns for follow-up visit  Most recent blood work completed on 2023 shows normal white count, hemoglobin 10 6, MCV 92, platelets 837  Differential normal  Her BUN 64, creatinine was 1 4 this is up significantly, as her baseline creatinine is 0 8  Patient states her nephrologist was in touch and recommended ED evaluation  However patient declined and related the SABRINA due to not drinking enough fluids  She reports she has increased her fluid intake since her labs were drawn  Repeat blood work done this morning is still pending  Overall, she reports she feels well  Denies any constitutional symptoms or concerning symptoms today  She recently returned from a trip to [address] [place] [address]:  Please note that a 14-point review of systems was performed to include Constitutional, HEENT, Respiratory, CVS, GI, , Musculoskeletal, Integumentary, Neurologic, Rheumatologic, Endocrinologic, Psychiatric, Lymphatic, and Hematologic/Oncologic systems were reviewed and are negative unless otherwise stated in HPI  Positive and negative findings pertinent to this evaluation are incorporated into the history of present illness        ECOG PS: 0    PROBLEM LIST:  Patient Active Problem List   Diagnosis   • Type 1 diabetes mellitus with hyperglycemia (HCC)   • Asthma   • Hypertension   • Moderate episode of recurrent major depressive disorder (HCC)   • Hyperlipidemia   • Polycystic ovarian syndrome   • Retinal hemorrhage   • Anemia   • Asthma, mild persistent   • Both eyes affected 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, associated with type 1 diabetes mellitus (HCC)   • Nervousness   • Thrombocytosis   • Severe obesity (BMI 35 0-39  9) with comorbidity ([place] [address] )   • Gastroesophageal reflux disease without esophagitis   • Other proteinuria   • Diabetic polyneuropathy associated with type 1 diabetes mellitus ([facility] )   • Diabetic nephropathy associated with type 1 diabetes mellitus ([facility] )   • Nuclear sclerotic cataract of left eye   • B12 deficiency   • History of COVID-19   • Palpitations   • Chronic diastolic congestive heart failure ([facility] )   • Primary localized osteoarthritis of right knee   • Hyponatremia       Assessment / Plan:  1  Normocytic anemia    2  Thrombocytosis      Patient is a 51-year-old female with history of type 1 diabetes,  chronic diastolic CHF, HTN who follows with hematology for an elevated platelet count and h/o iron deficiency anemia treated with IV Venofer in the past  Myeloproliferative work up was ordered in past but never completed   She has been on observation   Her most recent CBC with differential is stable  Hemoglobin 10 6 with normocytic indices  Platelets mildly elevated at 443  Anemia is consistent with chronic disease  No evidence of iron deficiency on repeat blood work  Of concern was her elevated serum creatinine  She did have repeat CMP done prior to visit today which is pending  She will be following with nephrology  From a hematologic perspective, continued observation is recommended  She will return for follow-up visit in 6 months with repeat blood work  Patient verbalized understanding and is in agreement with this plan of care  She knows to call anytime with questions or concerns    I spent 20 minutes on chart review, face to face counseling time, coordination of care and documentation      Past Medical History:   has a past medical history of Anemia, Arthritis, Asthma, Chest pain (02/03/2016), CHF (congestive heart failure) ([facility] ), Depression, Diabetes mellitus ([facility] ), Diabetic nephropathy ([facility] ), Diabetic nephropathy associated with type 1 diabetes mellitus ([facility] ) (08/12/2020), Diabetic retinopathy ([facility] ) (02/03/2016), Endometriosis, Gastroenteritis (02/03/2016), GERD (gastroesophageal reflux disease), HTN (hypertension), Hyperlipidemia (02/03/2016), Hypertension, Obesity, Oligomenorrhea, Polycystic ovaries (02/03/2016), Retinal hemorrhage (02/03/2016), Retinopathy, Thrombocytosis, and Type 1 diabetes mellitus with ophthalmic manifestation ([facility] ) (02/03/2016)  PAST SURGICAL HISTORY:   has a past surgical history that includes Retinal laser procedure; Cataract extraction (Left, 10/2020); and Cataract extraction (Right)  CURRENT MEDICATIONS  No current facility-administered medications for this visit  Current Outpatient Medications   Medication Sig Dispense Refill   • acetaminophen (TYLENOL) 500 mg tablet Take 2 tablets (1,000 mg total) by mouth every 6 (six) hours as needed for mild pain or moderate pain 30 tablet 0   • acetone, urine, test strip Use to test urine ketones for high blood sugars  25 each 6   • albuterol (2 5 mg/3 mL) 0 083 % nebulizer solution Take 3 mL (2 5 mg total) by nebulization every 6 (six) hours as needed for wheezing or shortness of breath 75 mL 3   • albuterol (PROVENTIL HFA,VENTOLIN HFA) 90 mcg/act inhaler INHALE 2 PUFFS EVERY 4 HOURS AS NEEDED FOR WHEEZING OR FOR SHORTNESS OF BREATH 25 5 g 0   • Ascorbic Acid (VITAMIN C) 500 MG CAPS Take 2 capsules by mouth daily     • Aspirin Low Dose 81 MG EC tablet TAKE 1 TABLET BY MOUTH EVERY DAY 30 tablet 0   • atorvastatin (LIPITOR) 80 mg tablet TAKE 1 TABLET BY MOUTH EVERY DAY 30 tablet 11   • Biotin 10 MG TABS Take 1 tablet by mouth daily     • Blood Pressure Monitoring [place] Use 2 (two) times a day Monitor BP twice daily  Call office if BP > 140/90 persistently   1 Device 0   • Butalbital-APAP-Caffeine (Fioricet) -40 MG CAPS Take 1 tablet by mouth 4 (four) times a day as needed (headahce) 30 capsule 0   • Continuous Blood Gluc Sensor (Dexcom G6 Sensor) MISC CHANGE EVERY 10 DAYS 3 each 12   • Continuous Blood Gluc Transmit (Dexcom G6 Transmitter) MISC CHANGE EVERY 90 DAYS 1 each 1   • cyanocobalamin (VITAMIN B-12) 100 mcg tablet Take by mouth daily     • Fluticasone-Salmeterol (Advair Diskus) 250-50 mcg/dose inhaler Inhale 1 puff daily Rinse mouth after use  1 blister 1   • furosemide (LASIX) 40 mg tablet TAKE 1 TABLET BY MOUTH EVERY DAY 30 tablet 11   • glucagon (Glucagon Emergency) 1 MG injection Inject 1 mg under the skin once as needed for low blood sugar for up to 1 dose 1 kit 3   • Glucagon (Gvoke HypoPen 2-Pack) 1 MG/0 2ML SOAJ Use if hypoglycemia prn 1 Syringe 6   • Insulin Glargine Solostar (Lantus SoloStar) 100 UNIT/ML SOPN Inject 0 78 mL (78 Units total) under the skin in the morning 60 mL 2   • lisinopril (ZESTRIL) 20 mg tablet TAKE 2 TABLETS BY MOUTH EVERY DAY 60 tablet 3   • montelukast (SINGULAIR) 10 mg tablet TAKE 1 TABLET BY MOUTH AT BEDTIME 90 tablet 0   • NovoLOG 100 UNIT/ML injection Inject 1 unit of Novolog for every 1 gram of carbs, up to 150 units daily 50 mL 2   • omeprazole (PriLOSEC) 20 mg delayed release capsule Take 20 mg by mouth daily  • OneTouch Ultra test strip Test 3 times daily 300 each 3   • PARoxetine (PAXIL) 20 mg tablet Take 1 tablet (20 mg total) by mouth daily 30 tablet 0   • Probiotic Product (ALIGN PO) Take by mouth       Facility-Administered Medications Ordered in Other Visits   Medication Dose Route Frequency Provider Last Rate Last Admin   • sodium chloride 0 9 % bolus 1,000 mL  1,000 mL Intravenous Once Jessica Rivera PA-C         [unfilled]    SOCIAL HISTORY:   reports that she has never smoked  She has never used smokeless tobacco  She reports current alcohol use  She reports that she does not use drugs       FAMILY HISTORY:  family history includes Breast cancer in her paternal grandmother; Diabetes in her father, maternal aunt, maternal grandfather, maternal uncle, and mother; Heart murmur in her mother; Hypertension in her father; Leukemia in her paternal "grandfather; No Known Problems in her maternal grandmother; Thyroid disease in her mother  ALLERGIES:  has No Known Allergies  Physical Exam:  Vital Signs:   Visit Vitals  /70 (BP Location: Left arm)   Pulse 68   Temp 98 °F (36 7 °C)   Resp 17   Ht 4' 8\" (1 422 m)   Wt 85 3 kg (188 lb)   SpO2 98%   BMI 42 15 kg/m²   OB Status Unknown   Smoking Status Never   BSA 1 73 m²     Body mass index is 42 15 kg/m²  Body surface area is 1 73 meters squared  Physical Exam  Constitutional:       General: She is not in acute distress  Appearance: Normal appearance  HENT:      Head: Normocephalic and atraumatic  Eyes:      General: No scleral icterus  Right eye: No discharge  Left eye: No discharge  Conjunctiva/sclera: Conjunctivae normal    Cardiovascular:      Rate and Rhythm: Normal rate and regular rhythm  Pulmonary:      Effort: Pulmonary effort is normal  No respiratory distress  Breath sounds: Normal breath sounds  Abdominal:      General: Bowel sounds are normal  There is no distension  Palpations: Abdomen is soft  There is no mass  Tenderness: There is no abdominal tenderness  Musculoskeletal:         General: Normal range of motion  Lymphadenopathy:      Cervical: No cervical adenopathy  Upper Body:      Right upper body: No supraclavicular, axillary or pectoral adenopathy  Left upper body: No supraclavicular, axillary or pectoral adenopathy  Skin:     General: Skin is warm and dry  Neurological:      General: No focal deficit present  Mental Status: She is alert and oriented to person, place, and time     Psychiatric:         Mood and Affect: Mood normal          Behavior: Behavior normal          Labs:  Lab Results   Component Value Date    HCT 30 3 (L) 06/08/2023    HGB 9 5 (L) 06/08/2023    MCV 93 06/08/2023     (H) 06/08/2023    WBC 7 89 06/08/2023     Lab Results   Component Value Date    AGAP 2 (L) 06/08/2023    ALKPHOS " 132 (H) 06/08/2023    ALT 21 06/08/2023    AST 7 06/08/2023    BILITOT 0 3 01/28/2017    BUN 73 (H) 06/08/2023    CALCIUM 8 7 06/08/2023     06/08/2023    CO2 28 06/08/2023    CREATININE 1 66 (H) 06/08/2023    EGFR 36 06/08/2023    GLUC 269 (H) 09/01/2021    GLUF 273 (H) 06/08/2023    K 5 1 06/08/2023     01/28/2017    PROT 5 9 (L) 01/28/2017    SODIUM 135 06/08/2023    TBILI 0 19 (L) 06/08/2023    TP 7 0 06/08/2023

## 2023-06-07 NOTE — TELEPHONE ENCOUNTER
Patient called back  She declines going to the ER as she agrees the increase in creatinine has been caused by dehydration  States that she saw her PCP yesterday and Endo today, has been drinking much more water since  Patient wants to know if Dr Bettye Weston wants to order any follow up labs for when she goes this Friday to the lab  Please advise

## 2023-06-07 NOTE — PATIENT INSTRUCTIONS
Continue monitor diet, and maintain physical activity  Make sure to drink plenty of water throughout the day  Make sure to be consistent with insulin  Contact insurance to see what insulin is covered  Continue with current insulin regimen at this time  Call to have us download Dexcom in 2 weeks  Did put in referral for diabetes education if you consider following up with them  Follow up in 3 months with lab work prior to visit

## 2023-06-08 ENCOUNTER — HOSPITAL ENCOUNTER (EMERGENCY)
Facility: HOSPITAL | Age: 48
Discharge: HOME | End: 2023-06-08
Attending: EMERGENCY MEDICINE
Payer: COMMERCIAL

## 2023-06-08 ENCOUNTER — APPOINTMENT (OUTPATIENT)
Dept: LAB | Facility: CLINIC | Age: 48
End: 2023-06-08
Payer: COMMERCIAL

## 2023-06-08 ENCOUNTER — TELEPHONE (OUTPATIENT)
Dept: NEPHROLOGY | Facility: CLINIC | Age: 48
End: 2023-06-08

## 2023-06-08 ENCOUNTER — OFFICE VISIT (OUTPATIENT)
Age: 48
End: 2023-06-08
Payer: COMMERCIAL

## 2023-06-08 VITALS
TEMPERATURE: 98 F | DIASTOLIC BLOOD PRESSURE: 70 MMHG | SYSTOLIC BLOOD PRESSURE: 160 MMHG | HEIGHT: 56 IN | HEART RATE: 68 BPM | WEIGHT: 188 LBS | OXYGEN SATURATION: 98 % | RESPIRATION RATE: 17 BRPM | BODY MASS INDEX: 42.29 KG/M2

## 2023-06-08 VITALS
SYSTOLIC BLOOD PRESSURE: 97 MMHG | RESPIRATION RATE: 18 BRPM | DIASTOLIC BLOOD PRESSURE: 53 MMHG | TEMPERATURE: 98 F | OXYGEN SATURATION: 94 % | HEART RATE: 88 BPM

## 2023-06-08 DIAGNOSIS — D64.9 NORMOCYTIC ANEMIA: Primary | ICD-10-CM

## 2023-06-08 DIAGNOSIS — R79.89 ELEVATED SERUM CREATININE: Primary | ICD-10-CM

## 2023-06-08 DIAGNOSIS — I10 PRIMARY HYPERTENSION: ICD-10-CM

## 2023-06-08 DIAGNOSIS — D75.839 THROMBOCYTOSIS: ICD-10-CM

## 2023-06-08 DIAGNOSIS — Z11.59 NEED FOR HEPATITIS C SCREENING TEST: ICD-10-CM

## 2023-06-08 DIAGNOSIS — E87.1 HYPONATREMIA: ICD-10-CM

## 2023-06-08 DIAGNOSIS — N17.9 AKI (ACUTE KIDNEY INJURY) (HCC): Primary | ICD-10-CM

## 2023-06-08 DIAGNOSIS — E10.65 TYPE 1 DIABETES MELLITUS WITH HYPERGLYCEMIA (HCC): ICD-10-CM

## 2023-06-08 DIAGNOSIS — E86.0 DEHYDRATION: ICD-10-CM

## 2023-06-08 LAB
ALBUMIN SERPL BCP-MCNC: 3.3 G/DL (ref 3.5–5)
ALBUMIN SERPL BCP-MCNC: 3.8 G/DL (ref 3.5–5)
ALP SERPL-CCNC: 132 U/L (ref 46–116)
ALP SERPL-CCNC: 97 U/L (ref 34–104)
ALT SERPL W P-5'-P-CCNC: 12 U/L (ref 7–52)
ALT SERPL W P-5'-P-CCNC: 21 U/L (ref 12–78)
ANION GAP SERPL CALCULATED.3IONS-SCNC: 2 MMOL/L (ref 4–13)
ANION GAP SERPL CALCULATED.3IONS-SCNC: 7 MMOL/L (ref 4–13)
AST SERPL W P-5'-P-CCNC: 7 U/L (ref 5–45)
AST SERPL W P-5'-P-CCNC: 9 U/L (ref 13–39)
BASOPHILS # BLD AUTO: 0.06 THOUSANDS/ÂΜL (ref 0–0.1)
BASOPHILS # BLD AUTO: 0.07 THOUSANDS/ÂΜL (ref 0–0.1)
BASOPHILS NFR BLD AUTO: 1 % (ref 0–1)
BASOPHILS NFR BLD AUTO: 1 % (ref 0–1)
BILIRUB SERPL-MCNC: 0.19 MG/DL (ref 0.2–1)
BILIRUB SERPL-MCNC: 0.23 MG/DL (ref 0.2–1)
BILIRUB UR QL STRIP: NEGATIVE
BUN SERPL-MCNC: 64 MG/DL (ref 5–25)
BUN SERPL-MCNC: 73 MG/DL (ref 5–25)
CALCIUM ALBUM COR SERPL-MCNC: 9.3 MG/DL (ref 8.3–10.1)
CALCIUM SERPL-MCNC: 8.7 MG/DL (ref 8.3–10.1)
CALCIUM SERPL-MCNC: 9.1 MG/DL (ref 8.4–10.2)
CHLORIDE SERPL-SCNC: 100 MMOL/L (ref 96–108)
CHLORIDE SERPL-SCNC: 105 MMOL/L (ref 96–108)
CLARITY UR: CLEAR
CO2 SERPL-SCNC: 27 MMOL/L (ref 21–32)
CO2 SERPL-SCNC: 28 MMOL/L (ref 21–32)
COLOR UR: COLORLESS
CREAT SERPL-MCNC: 1.3 MG/DL (ref 0.6–1.3)
CREAT SERPL-MCNC: 1.66 MG/DL (ref 0.6–1.3)
CREAT UR-MCNC: 36.4 MG/DL
EOSINOPHIL # BLD AUTO: 0.41 THOUSAND/ÂΜL (ref 0–0.61)
EOSINOPHIL # BLD AUTO: 0.45 THOUSAND/ÂΜL (ref 0–0.61)
EOSINOPHIL NFR BLD AUTO: 5 % (ref 0–6)
EOSINOPHIL NFR BLD AUTO: 6 % (ref 0–6)
ERYTHROCYTE [DISTWIDTH] IN BLOOD BY AUTOMATED COUNT: 12.3 % (ref 11.6–15.1)
ERYTHROCYTE [DISTWIDTH] IN BLOOD BY AUTOMATED COUNT: 12.6 % (ref 11.6–15.1)
EST. AVERAGE GLUCOSE BLD GHB EST-MCNC: 220 MG/DL
GFR SERPL CREATININE-BSD FRML MDRD: 36 ML/MIN/1.73SQ M
GFR SERPL CREATININE-BSD FRML MDRD: 48 ML/MIN/1.73SQ M
GLUCOSE P FAST SERPL-MCNC: 273 MG/DL (ref 65–99)
GLUCOSE SERPL-MCNC: 174 MG/DL (ref 65–140)
GLUCOSE UR STRIP-MCNC: NEGATIVE MG/DL
HBA1C MFR BLD: 9.3 %
HCT VFR BLD AUTO: 29.1 % (ref 34.8–46.1)
HCT VFR BLD AUTO: 30.3 % (ref 34.8–46.1)
HCV AB SER QL: NORMAL
HGB BLD-MCNC: 9.5 G/DL (ref 11.5–15.4)
HGB BLD-MCNC: 9.5 G/DL (ref 11.5–15.4)
HGB UR QL STRIP.AUTO: NEGATIVE
IMM GRANULOCYTES # BLD AUTO: 0.03 THOUSAND/UL (ref 0–0.2)
IMM GRANULOCYTES # BLD AUTO: 0.03 THOUSAND/UL (ref 0–0.2)
IMM GRANULOCYTES NFR BLD AUTO: 0 % (ref 0–2)
IMM GRANULOCYTES NFR BLD AUTO: 0 % (ref 0–2)
KETONES UR STRIP-MCNC: NEGATIVE MG/DL
LEUKOCYTE ESTERASE UR QL STRIP: NEGATIVE
LYMPHOCYTES # BLD AUTO: 1.41 THOUSANDS/ÂΜL (ref 0.6–4.47)
LYMPHOCYTES # BLD AUTO: 1.83 THOUSANDS/ÂΜL (ref 0.6–4.47)
LYMPHOCYTES NFR BLD AUTO: 18 % (ref 14–44)
LYMPHOCYTES NFR BLD AUTO: 20 % (ref 14–44)
MCH RBC QN AUTO: 29.1 PG (ref 26.8–34.3)
MCH RBC QN AUTO: 30.1 PG (ref 26.8–34.3)
MCHC RBC AUTO-ENTMCNC: 31.4 G/DL (ref 31.4–37.4)
MCHC RBC AUTO-ENTMCNC: 32.6 G/DL (ref 31.4–37.4)
MCV RBC AUTO: 92 FL (ref 82–98)
MCV RBC AUTO: 93 FL (ref 82–98)
MONOCYTES # BLD AUTO: 0.6 THOUSAND/ÂΜL (ref 0.17–1.22)
MONOCYTES # BLD AUTO: 0.7 THOUSAND/ÂΜL (ref 0.17–1.22)
MONOCYTES NFR BLD AUTO: 8 % (ref 4–12)
MONOCYTES NFR BLD AUTO: 8 % (ref 4–12)
NEUTROPHILS # BLD AUTO: 5.34 THOUSANDS/ÂΜL (ref 1.85–7.62)
NEUTROPHILS # BLD AUTO: 6.17 THOUSANDS/ÂΜL (ref 1.85–7.62)
NEUTS SEG NFR BLD AUTO: 66 % (ref 43–75)
NEUTS SEG NFR BLD AUTO: 67 % (ref 43–75)
NITRITE UR QL STRIP: NEGATIVE
NRBC BLD AUTO-RTO: 0 /100 WBCS
NRBC BLD AUTO-RTO: 0 /100 WBCS
PH UR STRIP.AUTO: 5.5 [PH]
PLATELET # BLD AUTO: 370 THOUSANDS/UL (ref 149–390)
PLATELET # BLD AUTO: 393 THOUSANDS/UL (ref 149–390)
PMV BLD AUTO: 10.4 FL (ref 8.9–12.7)
PMV BLD AUTO: 10.9 FL (ref 8.9–12.7)
POTASSIUM SERPL-SCNC: 4.8 MMOL/L (ref 3.5–5.3)
POTASSIUM SERPL-SCNC: 5.1 MMOL/L (ref 3.5–5.3)
PROT SERPL-MCNC: 6.8 G/DL (ref 6.4–8.4)
PROT SERPL-MCNC: 7 G/DL (ref 6.4–8.4)
PROT UR STRIP-MCNC: NEGATIVE MG/DL
PROT UR-MCNC: 13 MG/DL
PROT/CREAT UR: 0.36 MG/G{CREAT} (ref 0–0.1)
RBC # BLD AUTO: 3.16 MILLION/UL (ref 3.81–5.12)
RBC # BLD AUTO: 3.26 MILLION/UL (ref 3.81–5.12)
RETICS # AUTO: ABNORMAL 10*3/UL (ref 14097–95744)
RETICS # CALC: 2.09 % (ref 0.37–1.87)
SODIUM SERPL-SCNC: 134 MMOL/L (ref 135–147)
SODIUM SERPL-SCNC: 135 MMOL/L (ref 135–147)
SP GR UR STRIP.AUTO: <1.005 (ref 1–1.03)
UROBILINOGEN UR STRIP-ACNC: <2 MG/DL
WBC # BLD AUTO: 7.89 THOUSAND/UL (ref 4.31–10.16)
WBC # BLD AUTO: 9.21 THOUSAND/UL (ref 4.31–10.16)

## 2023-06-08 PROCEDURE — 80053 COMPREHEN METABOLIC PANEL: CPT

## 2023-06-08 PROCEDURE — 85025 COMPLETE CBC W/AUTO DIFF WBC: CPT

## 2023-06-08 PROCEDURE — 82570 ASSAY OF URINE CREATININE: CPT

## 2023-06-08 PROCEDURE — 96360 HYDRATION IV INFUSION INIT: CPT

## 2023-06-08 PROCEDURE — 99213 OFFICE O/P EST LOW 20 MIN: CPT | Performed by: NURSE PRACTITIONER

## 2023-06-08 PROCEDURE — 36415 COLL VENOUS BLD VENIPUNCTURE: CPT

## 2023-06-08 PROCEDURE — 99284 EMERGENCY DEPT VISIT MOD MDM: CPT

## 2023-06-08 PROCEDURE — 80053 COMPREHEN METABOLIC PANEL: CPT | Performed by: PHYSICIAN ASSISTANT

## 2023-06-08 PROCEDURE — 85025 COMPLETE CBC W/AUTO DIFF WBC: CPT | Performed by: PHYSICIAN ASSISTANT

## 2023-06-08 PROCEDURE — 81003 URINALYSIS AUTO W/O SCOPE: CPT | Performed by: PHYSICIAN ASSISTANT

## 2023-06-08 PROCEDURE — 86803 HEPATITIS C AB TEST: CPT

## 2023-06-08 PROCEDURE — 96361 HYDRATE IV INFUSION ADD-ON: CPT

## 2023-06-08 PROCEDURE — 84156 ASSAY OF PROTEIN URINE: CPT

## 2023-06-08 PROCEDURE — 83036 HEMOGLOBIN GLYCOSYLATED A1C: CPT

## 2023-06-08 PROCEDURE — 85045 AUTOMATED RETICULOCYTE COUNT: CPT

## 2023-06-08 RX ADMIN — SODIUM CHLORIDE 1000 ML: 0.9 INJECTION, SOLUTION INTRAVENOUS at 16:18

## 2023-06-08 NOTE — ED PROVIDER NOTES
History  Chief Complaint   Patient presents with   • Abnormal Lab     Patient presents to the ED with c/o abnormal lab, states kidney function was off yesterday and today through two rounds of blood work  Patient is a 51 y/o F with h/o DM, Diastolic CHF on lasix presents to the ED with abnormal Cr  She states she had it checked by her nephrologist on 6/5 and her Cr was elevated  She had it rechecked today and it worsened  She states her nephrologist sent her here for IV fluids  Patient denies abdominal pain, back pain or urinary symptoms  No fevers, chills, nausea or vomiting  She denies edema, chest pain or SOB  History provided by:  Patient  Evaluation of Abnormal Diagnostic Test  Time since result:  1 day  Patient referred by:  Specialist  Resulting agency:  [facility]      Prior to Admission Medications   Prescriptions Last Dose Informant Patient Reported? Taking? Ascorbic Acid (VITAMIN C) 500 MG CAPS  Self Yes No   Sig: Take 2 capsules by mouth daily   Aspirin Low Dose 81 MG EC tablet  Self No No   Sig: TAKE 1 TABLET BY MOUTH EVERY DAY   Biotin 10 MG TABS  Self Yes No   Sig: Take 1 tablet by mouth daily   Blood Pressure Monitoring [place]  Self No No   Sig: Use 2 (two) times a day Monitor BP twice daily  Call office if BP > 140/90 persistently  Butalbital-APAP-Caffeine (Fioricet) -40 MG CAPS  Self No No   Sig: Take 1 tablet by mouth 4 (four) times a day as needed (headahce)   Continuous Blood Gluc Sensor (Dexcom G6 Sensor) MISC  Self No No   Sig: CHANGE EVERY 10 DAYS   Continuous Blood Gluc Transmit (Dexcom G6 Transmitter) MISC  Self No No   Sig: CHANGE EVERY 90 DAYS   Fluticasone-Salmeterol (Advair Diskus) 250-50 mcg/dose inhaler  Self No No   Sig: Inhale 1 puff daily Rinse mouth after use     Glucagon (Gvoke HypoPen 2-Pack) 1 MG/0 2ML SOAJ  Self No No   Sig: Use if hypoglycemia prn   Insulin Glargine Solostar (Lantus SoloStar) 100 UNIT/ML SOPN  Self No No   Sig: Inject 0 78 mL (78 Units total) under the skin in the morning   NovoLOG 100 UNIT/ML injection  Self No No   Sig: Inject 1 unit of Novolog for every 1 gram of carbs, up to 150 units daily   OneTouch Ultra test strip  Self No No   Sig: Test 3 times daily   PARoxetine (PAXIL) 20 mg tablet  Self No No   Sig: Take 1 tablet (20 mg total) by mouth daily   Probiotic Product (ALIGN PO)  Self Yes No   Sig: Take by mouth   acetaminophen (TYLENOL) 500 mg tablet  Self No No   Sig: Take 2 tablets (1,000 mg total) by mouth every 6 (six) hours as needed for mild pain or moderate pain   acetone, urine, test strip  Self No No   Sig: Use to test urine ketones for high blood sugars  albuterol (2 5 mg/3 mL) 0 083 % nebulizer solution  Self No No   Sig: Take 3 mL (2 5 mg total) by nebulization every 6 (six) hours as needed for wheezing or shortness of breath   albuterol (PROVENTIL HFA,VENTOLIN HFA) 90 mcg/act inhaler  Self No No   Sig: INHALE 2 PUFFS EVERY 4 HOURS AS NEEDED FOR WHEEZING OR FOR SHORTNESS OF BREATH   atorvastatin (LIPITOR) 80 mg tablet  Self No No   Sig: TAKE 1 TABLET BY MOUTH EVERY DAY   cyanocobalamin (VITAMIN B-12) 100 mcg tablet  Self Yes No   Sig: Take by mouth daily   furosemide (LASIX) 40 mg tablet  Self No No   Sig: TAKE 1 TABLET BY MOUTH EVERY DAY   glucagon (Glucagon Emergency) 1 MG injection  Self No No   Sig: Inject 1 mg under the skin once as needed for low blood sugar for up to 1 dose   lisinopril (ZESTRIL) 20 mg tablet  Self No No   Sig: TAKE 2 TABLETS BY MOUTH EVERY DAY   montelukast (SINGULAIR) 10 mg tablet  Self No No   Sig: TAKE 1 TABLET BY MOUTH AT BEDTIME   omeprazole (PriLOSEC) 20 mg delayed release capsule  Self Yes No   Sig: Take 20 mg by mouth daily  Facility-Administered Medications: None       Past Medical History:   Diagnosis Date   • Anemia    • Arthritis    • Asthma     w/acute exacerbation   Last assessed: 10/26/12   • Chest pain 02/03/2016   • CHF (congestive heart failure) ([facility] [address] )    • Depression    • Diabetes mellitus ([place] [address] )    • Diabetic nephropathy ([place] [address] )    • Diabetic nephropathy associated with type 1 diabetes mellitus ([place] [address] ) 08/12/2020   • Diabetic retinopathy ([place] [address] ) 02/03/2016   • Endometriosis    • Gastroenteritis 02/03/2016   • GERD (gastroesophageal reflux disease)    • HTN (hypertension)    • Hyperlipidemia 02/03/2016   • Hypertension    • Obesity    • Oligomenorrhea    • Polycystic ovaries 02/03/2016   • Retinal hemorrhage 02/03/2016   • Retinopathy    • Thrombocytosis    • Type 1 diabetes mellitus with ophthalmic manifestation ([place] [address] ) 02/03/2016       Past Surgical History:   Procedure Laterality Date   • CATARACT EXTRACTION Left 10/2020   • CATARACT EXTRACTION Right    • RETINAL LASER PROCEDURE         Family History   Problem Relation Age of Onset   • Heart murmur Mother    • Diabetes Mother         Mellitus   • Thyroid disease Mother         Disorder   • Diabetes Father         Mellitus   • Hypertension Father    • Diabetes Maternal Grandfather         Mellitus   • Breast cancer Paternal Grandmother    • Diabetes Maternal Aunt         Mellitus   • Diabetes Maternal Uncle         Mellitus   • No Known Problems Maternal Grandmother    • Leukemia Paternal Grandfather    • Substance Abuse Neg Hx    • Mental illness Neg Hx    • Alcohol abuse Neg Hx      I have reviewed and agree with the history as documented  E-Cigarette/Vaping   • E-Cigarette Use Never User      E-Cigarette/Vaping Substances   • Nicotine No    • THC No    • CBD No    • Flavoring No      Social History     Tobacco Use   • Smoking status: Never   • Smokeless tobacco: Never   • Tobacco comments:     Per [employer]: Former smoker  Quit in 1994   Vaping Use   • Vaping Use: Never used   Substance Use Topics   • Alcohol use: Yes     Comment: Occasional/1 mixed drink twice a month if that   • Drug use: No       Review of Systems   Constitutional: Negative for chills and fever  HENT: Negative  Respiratory: Negative for cough and shortness of breath  Cardiovascular: Negative for chest pain, palpitations and leg swelling  Gastrointestinal: Negative for abdominal pain, diarrhea, nausea and vomiting  Genitourinary: Negative for dysuria, frequency and hematuria  Musculoskeletal: Negative for back pain and neck pain  Skin: Negative for color change, pallor and rash  Neurological: Negative for dizziness, weakness, light-headedness and numbness  Psychiatric/Behavioral: Negative for confusion  All other systems reviewed and are negative  Physical Exam  Physical Exam  Vitals and nursing note reviewed  Constitutional:       General: She is not in acute distress  Appearance: Normal appearance  She is well-developed, well-groomed and normal weight  She is not ill-appearing or diaphoretic  HENT:      Head: Normocephalic and atraumatic  Right Ear: External ear normal       Left Ear: External ear normal       Nose: Nose normal       Mouth/Throat:      Mouth: Mucous membranes are moist       Pharynx: Oropharynx is clear  Eyes:      Conjunctiva/sclera: Conjunctivae normal    Cardiovascular:      Rate and Rhythm: Normal rate and regular rhythm  Heart sounds: Normal heart sounds  Pulmonary:      Effort: Pulmonary effort is normal       Breath sounds: Normal breath sounds  No wheezing, rhonchi or rales  Abdominal:      General: Abdomen is flat  Bowel sounds are normal       Palpations: Abdomen is soft  Tenderness: There is no abdominal tenderness  There is no right CVA tenderness or left CVA tenderness  Musculoskeletal:         General: Normal range of motion  Cervical back: Normal range of motion and neck supple  Right lower leg: No edema  Left lower leg: No edema  Skin:     General: Skin is warm and dry  Coloration: Skin is not jaundiced or pale  Findings: No rash  Neurological:      General: No focal deficit present  Mental Status: She is alert and oriented to person, place, and time  Psychiatric:         Mood and Affect: Mood normal          Behavior: Behavior is cooperative           Vital Signs  ED Triage Vitals [06/08/23 1550]   Temperature Pulse Respirations Blood Pressure SpO2   98 °F (36 7 °C) 101 18 149/65 94 %      Temp Source Heart Rate Source Patient Position - Orthostatic VS BP Location FiO2 (%)   Temporal Monitor Sitting Right arm --      Pain Score       No Pain           Vitals:    06/08/23 1550 06/08/23 1630 06/08/23 1730 06/08/23 1745   BP: 149/65 106/51 (!) 94/47 97/53   Pulse: 101 90 90 88   Patient Position - Orthostatic VS: Sitting Sitting Lying Lying         Visual Acuity      ED Medications  Medications   sodium chloride 0 9 % bolus 1,000 mL (0 mL Intravenous Stopped 6/8/23 1822)       Diagnostic Studies  Results Reviewed     Procedure Component Value Units Date/Time    UA w Reflex to Microscopic w Reflex to Culture [[id number]]  (Abnormal) Collected: 06/08/23 1851    Lab Status: Final result Specimen: Urine, Clean Catch Updated: 06/08/23 1901     Color, UA Colorless     Clarity, UA Clear     Specific Gravity, UA <1 005     pH, UA 5 5     Leukocytes, UA Negative     Nitrite, UA Negative     Protein, UA Negative mg/dl      Glucose, UA Negative mg/dl      Ketones, UA Negative mg/dl      Urobilinogen, UA <2 0 mg/dl      Bilirubin, UA Negative     Occult Blood, UA Negative    Comprehensive metabolic panel [[id number]]  (Abnormal) Collected: 06/08/23 1618    Lab Status: Final result Specimen: Blood from Arm, Right Updated: 06/08/23 1641     Sodium 134 mmol/L      Potassium 4 8 mmol/L      Chloride 100 mmol/L      CO2 27 mmol/L      ANION GAP 7 mmol/L      BUN 64 mg/dL      Creatinine 1 30 mg/dL      Glucose 174 mg/dL      Calcium 9 1 mg/dL      AST 9 U/L      ALT 12 U/L      Alkaline Phosphatase 97 U/L      Total Protein 6 8 g/dL      Albumin 3 8 g/dL      Total Bilirubin 0 23 mg/dL      eGFR 48 ml/min/1 73sq m     Narrative:      [place] guidelines for Chronic Kidney Disease (CKD):   •  Stage 1 with normal or high GFR (GFR > 90 mL/min/1 73 square meters)  •  Stage 2 Mild CKD (GFR = 60-89 mL/min/1 73 square meters)  •  Stage 3A Moderate CKD (GFR = 45-59 mL/min/1 73 square meters)  •  Stage 3B Moderate CKD (GFR = 30-44 mL/min/1 73 square meters)  •  Stage 4 Severe CKD (GFR = 15-29 mL/min/1 73 square meters)  •  Stage 5 End Stage CKD (GFR <15 mL/min/1 73 square meters)  Note: GFR calculation is accurate only with a steady state creatinine    CBC and differential [[id number]]  (Abnormal) Collected: 06/08/23 1618    Lab Status: Final result Specimen: Blood from Arm, Right Updated: 06/08/23 1627     WBC 9 21 Thousand/uL      RBC 3 16 Million/uL      Hemoglobin 9 5 g/dL      Hematocrit 29 1 %      MCV 92 fL      MCH 30 1 pg      MCHC 32 6 g/dL      RDW 12 3 %      MPV 10 4 fL      Platelets 796 Thousands/uL      nRBC 0 /100 WBCs      Neutrophils Relative 66 %      Immat GRANS % 0 %      Lymphocytes Relative 20 %      Monocytes Relative 8 %      Eosinophils Relative 5 %      Basophils Relative 1 %      Neutrophils Absolute 6 17 Thousands/µL      Immature Grans Absolute 0 03 Thousand/uL      Lymphocytes Absolute 1 83 Thousands/µL      Monocytes Absolute 0 70 Thousand/µL      Eosinophils Absolute 0 41 Thousand/µL      Basophils Absolute 0 07 Thousands/µL                  No orders to display              Procedures  Procedures         ED Course  ED Course as of 06/08/23 1917   Thu Jun 08, 2023   1842 Patient urinated, but was unable to provide urine specimen  She was given water, will await urine sample  SBIRT 22yo+    Flowsheet Row Most Recent Value   Initial Alcohol Screen: US AUDIT-C     1  How often do you have a drink containing alcohol? 0 Filed at: 06/08/2023 1551   2  How many drinks containing alcohol do you have on a typical day you are drinking? 0 Filed at: 06/08/2023 1551   3a  Male UNDER 65:  How often do you have five or more drinks on one occasion? 0 Filed at: 06/08/2023 1551   3b  FEMALE Any Age, or MALE 65+: How often do you have 4 or more drinks on one occassion? 0 Filed at: 06/08/2023 1551   Audit-C Score 0 Filed at: 06/08/2023 1551   MITALI: How many times in the past year have you    Used an illegal drug or used a prescription medication for non-medical reasons? Never Filed at: 06/08/2023 1551                    Medical Decision Making  Patient sent to the ED for worsening Cr, will check labs and give IV fluids and reassess  Will check UA to r/o UTI  Patient asymptomatic  She is on diuretic, most likely the cause of dehydration  CR improved prior to IV fluids  She was given 1L IV fliuds  No signs of UTI, will d/c with close f/u with nephrology  Dehydration: acute illness or injury  Elevated serum creatinine: acute illness or injury  Amount and/or Complexity of Data Reviewed  External Data Reviewed: labs and notes  Labs: ordered  Disposition  Final diagnoses:   Elevated serum creatinine   Dehydration     Time reflects when diagnosis was documented in both MDM as applicable and the Disposition within this note     Time User Action Codes Description Comment    6/8/2023  7:02 PM Ricarda López Add [R79 89] Elevated serum creatinine     6/8/2023  7:02 PM Ricarda López Add [E86 0] Dehydration       ED Disposition     ED Disposition   Discharge    Condition   Stable    Date/Time   Thu Jun 8, 2023  7:02 PM    Comment   Hieu Deal discharge to home/self care  Follow-up Information     Follow up With Specialties Details Why [address], DO Nephrology Call in 1 day For recheck [place] [phone]            Patient's Medications   Discharge Prescriptions    No medications on file       No discharge procedures on file      PDMP Review       Value Time User    PDMP Reviewed  Yes 8/8/2020  8:38 AM Anahi Diaz MD          ED Provider  Electronically Signed by           Rosita Torres PA-C  06/08/23 8444

## 2023-06-08 NOTE — ED NOTES
Pt given cup for urine sample, returned from bathroom and unable to catch any urine in cup  PA aware and water given PO to try again       Rosey Dent RN  06/08/23 4514

## 2023-06-08 NOTE — TELEPHONE ENCOUNTER
Called and with patient  Patient aware  serum creatinine has worsened  She has worsened acute kidney injury  I believe she needs ER evaluation for this and will probably need IVF  I do not recommend further delay in Er evaluation based on labs  Patient will like a call from Dr Lisa Goldstein

## 2023-06-08 NOTE — TELEPHONE ENCOUNTER
----- Message from Charmayne Elk, DO sent at 6/8/2023  1:12 PM EDT -----  Her serum creatinine has worsened  She has worsened acute kidney injury  I believe she needs ER evaluation for this and will probably need IVF  I do not recommend further delay in Er evaluation based on labs  Thanks

## 2023-06-08 NOTE — TELEPHONE ENCOUNTER
Explained the patient need for ER evaluation in light of SABRINA which may be driven by prerenal cause while on diuretic and ACEi  sCr 1 4 earlier this week without improvement despite increased oral hydration  sCr now 1 6 with b/l sCr 0 8-0 9  Patient not seen in person by me since 2021  Highly recommend in person evaluation immediately  Patient agrees  ADT order placed to [facility]

## 2023-06-08 NOTE — ED NOTES
Patient not currently in ED to be triaged, patient remains a call ahead patient at this time, registration aware to alert staff if and when patient arrives      Roberto Blackburn RN  06/08/23 0499

## 2023-06-09 ENCOUNTER — TELEPHONE (OUTPATIENT)
Dept: NEPHROLOGY | Facility: CLINIC | Age: 48
End: 2023-06-09

## 2023-06-09 DIAGNOSIS — E87.1 HYPONATREMIA: ICD-10-CM

## 2023-06-09 DIAGNOSIS — D64.9 ANEMIA, UNSPECIFIED TYPE: ICD-10-CM

## 2023-06-09 DIAGNOSIS — I10 PRIMARY HYPERTENSION: Primary | ICD-10-CM

## 2023-06-09 DIAGNOSIS — R80.8 OTHER PROTEINURIA: ICD-10-CM

## 2023-06-09 NOTE — TELEPHONE ENCOUNTER
Received phone call from pt, pt was sent to ED per Elio Hernandez request yesterday, pt was calling to advise that she received fluids and since being released pt has been staying hydrated and feeling better, pt wanted to know if Elizabet Chow wants pt to have labs done or fu with any concerns

## 2023-06-13 ENCOUNTER — TELEPHONE (OUTPATIENT)
Dept: OTHER | Facility: OTHER | Age: 48
End: 2023-06-13

## 2023-06-13 NOTE — TELEPHONE ENCOUNTER
Patient called and is asking if the office can give her a call regarding the blood work that she has to get done

## 2023-06-14 RX ORDER — LISINOPRIL 20 MG/1
TABLET ORAL
Qty: 60 TABLET | Refills: 1 | Status: SHIPPED | OUTPATIENT
Start: 2023-06-14

## 2023-06-15 ENCOUNTER — APPOINTMENT (OUTPATIENT)
Dept: LAB | Facility: CLINIC | Age: 48
End: 2023-06-15
Payer: COMMERCIAL

## 2023-06-15 DIAGNOSIS — E87.1 HYPOSMOLALITY SYNDROME: ICD-10-CM

## 2023-06-15 DIAGNOSIS — D64.9 RELATIVE ANEMIA: ICD-10-CM

## 2023-06-15 DIAGNOSIS — I10 ESSENTIAL HYPERTENSION, MALIGNANT: Primary | ICD-10-CM

## 2023-06-15 DIAGNOSIS — R80.8 OTHER PROTEINURIA: ICD-10-CM

## 2023-06-15 LAB
ANION GAP SERPL CALCULATED.3IONS-SCNC: 4 MMOL/L (ref 4–13)
BUN SERPL-MCNC: 47 MG/DL (ref 5–25)
CALCIUM SERPL-MCNC: 8.9 MG/DL (ref 8.3–10.1)
CHLORIDE SERPL-SCNC: 105 MMOL/L (ref 96–108)
CO2 SERPL-SCNC: 28 MMOL/L (ref 21–32)
CREAT SERPL-MCNC: 1.3 MG/DL (ref 0.6–1.3)
GFR SERPL CREATININE-BSD FRML MDRD: 48 ML/MIN/1.73SQ M
GLUCOSE P FAST SERPL-MCNC: 119 MG/DL (ref 65–99)
POTASSIUM SERPL-SCNC: 4.7 MMOL/L (ref 3.5–5.3)
SODIUM SERPL-SCNC: 137 MMOL/L (ref 135–147)

## 2023-06-15 PROCEDURE — 36415 COLL VENOUS BLD VENIPUNCTURE: CPT

## 2023-06-15 PROCEDURE — 80048 BASIC METABOLIC PNL TOTAL CA: CPT

## 2023-06-16 ENCOUNTER — OFFICE VISIT (OUTPATIENT)
Dept: FAMILY MEDICINE CLINIC | Facility: CLINIC | Age: 48
End: 2023-06-16
Payer: COMMERCIAL

## 2023-06-16 VITALS
TEMPERATURE: 98.5 F | RESPIRATION RATE: 16 BRPM | DIASTOLIC BLOOD PRESSURE: 60 MMHG | SYSTOLIC BLOOD PRESSURE: 100 MMHG | HEIGHT: 56 IN | HEART RATE: 94 BPM | WEIGHT: 188 LBS | BODY MASS INDEX: 42.29 KG/M2 | OXYGEN SATURATION: 96 %

## 2023-06-16 DIAGNOSIS — Z11.1 PPD SCREENING TEST: ICD-10-CM

## 2023-06-16 DIAGNOSIS — E10.65 TYPE 1 DIABETES MELLITUS WITH HYPERGLYCEMIA (HCC): Primary | ICD-10-CM

## 2023-06-16 DIAGNOSIS — E78.2 MIXED HYPERLIPIDEMIA: ICD-10-CM

## 2023-06-16 DIAGNOSIS — I10 PRIMARY HYPERTENSION: ICD-10-CM

## 2023-06-16 PROCEDURE — 99214 OFFICE O/P EST MOD 30 MIN: CPT | Performed by: STUDENT IN AN ORGANIZED HEALTH CARE EDUCATION/TRAINING PROGRAM

## 2023-06-16 PROCEDURE — 86580 TB INTRADERMAL TEST: CPT

## 2023-06-16 NOTE — PROGRESS NOTES
Name: Mary Moreno      : 1975      MRN: [id number]  Encounter Provider: Misa Crandall MD  Encounter Date: 2023   Encounter department: [facility]  4 2     1  Type 1 diabetes mellitus with hyperglycemia (HCC)  -     Albumin / creatinine urine ratio    2  Primary hypertension    3  Mixed hyperlipidemia    4  PPD screening test  -     TB Skin Test    Type 1 diabetes mellitus: a1c 9 3, managed by endo; continue current regimen  Pt advised to continue close monitoring and follow up  Will order for urine microalbumin as last check 2022    Hypertension: controlled on current regimen, continue to montior     Hyperlipidemia: Last lipid panel 2023, order was placed last visit however lab did not run, pt advised to complete it when she goes for urine microalbumin  Continue statin therapy at this time     Anemia likely secondary to chronic disease: cbc stable, managed by heme/onc     TB screening: PPD test needed for work, placed today and will return within 72 hours to be read     Screening:  Last eye exam:   Last foot exam: 2023; 2023  Cervical cancer screening: due 2023  Breast cancer screening: scheduled 2023  Colon cancer screenin2021 due in 5 yrs 2026     Follow-up in 3 months    Subjective      Mrs Veronika Gallardo is a 51-year-old female who presents today for a follow-up  Patient was last seen here in the office on  for an annual physical exam   Patient reports she also had labs ordered through nephrology and was noted to have increase in creatinine and BUN therefore the specialist had strongly encouraged her to go to the ER due to possible dehydration  Patient reports she did go to the ER on  was given IV fluids and has recently had some labs showing improvement  Patient reports she has been doing well with no acute complaints or concerns, is starting a new job in [place] at [employer]  Needs PPD testing today  Review of Systems   Constitutional: Negative for chills, fatigue and fever  HENT: Negative for congestion and sinus pain  Respiratory: Negative for cough, chest tightness and shortness of breath  Cardiovascular: Negative for chest pain and leg swelling  Gastrointestinal: Negative for abdominal pain  Genitourinary: Negative for dysuria  Skin: Negative for rash  Neurological: Negative for dizziness, weakness, light-headedness and headaches  Current Outpatient Medications on File Prior to Visit   Medication Sig   • acetaminophen (TYLENOL) 500 mg tablet Take 2 tablets (1,000 mg total) by mouth every 6 (six) hours as needed for mild pain or moderate pain   • acetone, urine, test strip Use to test urine ketones for high blood sugars  • albuterol (2 5 mg/3 mL) 0 083 % nebulizer solution Take 3 mL (2 5 mg total) by nebulization every 6 (six) hours as needed for wheezing or shortness of breath   • albuterol (PROVENTIL HFA,VENTOLIN HFA) 90 mcg/act inhaler INHALE 2 PUFFS EVERY 4 HOURS AS NEEDED FOR WHEEZING OR FOR SHORTNESS OF BREATH   • Ascorbic Acid (VITAMIN C) 500 MG CAPS Take 2 capsules by mouth daily   • Aspirin Low Dose 81 MG EC tablet TAKE 1 TABLET BY MOUTH EVERY DAY   • atorvastatin (LIPITOR) 80 mg tablet TAKE 1 TABLET BY MOUTH EVERY DAY   • Biotin 10 MG TABS Take 1 tablet by mouth daily   • Blood Pressure Monitoring [place] Use 2 (two) times a day Monitor BP twice daily  Call office if BP > 140/90 persistently     • Butalbital-APAP-Caffeine (Fioricet) -40 MG CAPS Take 1 tablet by mouth 4 (four) times a day as needed ([facility])   • Continuous Blood Gluc Sensor (Dexcom G6 Sensor) MISC CHANGE EVERY 10 DAYS   • Continuous Blood Gluc Transmit (Dexcom G6 Transmitter) MISC CHANGE EVERY 90 DAYS   • cyanocobalamin (VITAMIN B-12) 100 mcg tablet Take by mouth daily   • Fluticasone-Salmeterol (Advair Diskus) 250-50 mcg/dose inhaler Inhale 1 puff daily Rinse mouth after "use    • furosemide (LASIX) 40 mg tablet TAKE 1 TABLET BY MOUTH EVERY DAY   • glucagon (Glucagon Emergency) 1 MG injection Inject 1 mg under the skin once as needed for low blood sugar for up to 1 dose   • Glucagon (Gvoke HypoPen 2-Pack) 1 MG/0 2ML SOAJ Use if hypoglycemia prn   • Insulin Glargine Solostar (Lantus SoloStar) 100 UNIT/ML SOPN Inject 0 78 mL (78 Units total) under the skin in the morning   • lisinopril (ZESTRIL) 20 mg tablet TAKE 2 TABLETS BY MOUTH EVERY DAY   • montelukast (SINGULAIR) 10 mg tablet TAKE 1 TABLET BY MOUTH AT BEDTIME   • NovoLOG 100 UNIT/ML injection Inject 1 unit of Novolog for every 1 gram of carbs, up to 150 units daily   • omeprazole (PriLOSEC) 20 mg delayed release capsule Take 20 mg by mouth daily  • OneTouch Ultra test strip Test 3 times daily   • PARoxetine (PAXIL) 20 mg tablet Take 1 tablet (20 mg total) by mouth daily   • Probiotic Product (ALIGN PO) Take by mouth       Objective     /60   Pulse 94   Temp 98 5 °F (36 9 °C)   Resp 16   Ht 4' 8\" (1 422 m)   Wt 85 3 kg (188 lb)   SpO2 96%   BMI 42 15 kg/m²     Physical Exam  Constitutional:       Appearance: Normal appearance  She is obese  HENT:      Head: Normocephalic and atraumatic  Right Ear: Tympanic membrane and ear canal normal       Left Ear: Tympanic membrane and ear canal normal       Nose: Nose normal       Mouth/Throat:      Mouth: Mucous membranes are moist       Pharynx: Oropharynx is clear  Eyes:      Extraocular Movements: Extraocular movements intact  Pupils: Pupils are equal, round, and reactive to light  Cardiovascular:      Rate and Rhythm: Normal rate and regular rhythm  Pulses: Normal pulses  Heart sounds: Normal heart sounds  Pulmonary:      Effort: Pulmonary effort is normal       Breath sounds: Normal breath sounds  Neurological:      Mental Status: She is alert     Psychiatric:         Mood and Affect: Mood normal        Kimberli Buchanan MD  "

## 2023-06-19 ENCOUNTER — CLINICAL SUPPORT (OUTPATIENT)
Dept: FAMILY MEDICINE CLINIC | Facility: CLINIC | Age: 48
End: 2023-06-19

## 2023-06-19 ENCOUNTER — APPOINTMENT (OUTPATIENT)
Dept: LAB | Facility: CLINIC | Age: 48
End: 2023-06-19
Payer: COMMERCIAL

## 2023-06-19 DIAGNOSIS — Z00.00 ROUTINE GENERAL MEDICAL EXAMINATION AT A HEALTH CARE FACILITY: ICD-10-CM

## 2023-06-19 DIAGNOSIS — E10.65 TYPE 1 DIABETES MELLITUS WITH HYPERGLYCEMIA (HCC): ICD-10-CM

## 2023-06-19 DIAGNOSIS — Z11.1 ENCOUNTER FOR PPD SKIN TEST READING: Primary | ICD-10-CM

## 2023-06-19 LAB
CHOLEST SERPL-MCNC: 170 MG/DL
CREAT UR-MCNC: 97 MG/DL
HDLC SERPL-MCNC: 37 MG/DL
INDURATION: NORMAL MM
LDLC SERPL CALC-MCNC: 95 MG/DL (ref 0–100)
MICROALBUMIN UR-MCNC: 84.2 MG/L (ref 0–20)
MICROALBUMIN/CREAT 24H UR: 87 MG/G CREATININE (ref 0–30)
NONHDLC SERPL-MCNC: 133 MG/DL
TB SKIN TEST: NEGATIVE
TRIGL SERPL-MCNC: 191 MG/DL

## 2023-06-19 PROCEDURE — 82043 UR ALBUMIN QUANTITATIVE: CPT | Performed by: STUDENT IN AN ORGANIZED HEALTH CARE EDUCATION/TRAINING PROGRAM

## 2023-06-19 PROCEDURE — 82306 VITAMIN D 25 HYDROXY: CPT

## 2023-06-19 PROCEDURE — 82570 ASSAY OF URINE CREATININE: CPT | Performed by: STUDENT IN AN ORGANIZED HEALTH CARE EDUCATION/TRAINING PROGRAM

## 2023-06-19 PROCEDURE — 36415 COLL VENOUS BLD VENIPUNCTURE: CPT

## 2023-06-19 PROCEDURE — 80061 LIPID PANEL: CPT

## 2023-06-19 RX ORDER — INSULIN ASPART 100 [IU]/ML
INJECTION, SOLUTION INTRAVENOUS; SUBCUTANEOUS
Qty: 50 ML | Refills: 2 | Status: SHIPPED | OUTPATIENT
Start: 2023-06-19

## 2023-06-20 LAB — 25(OH)D3 SERPL-MCNC: 29.3 NG/ML (ref 30–100)

## 2023-06-29 ENCOUNTER — HOSPITAL ENCOUNTER (OUTPATIENT)
Dept: MAMMOGRAPHY | Facility: IMAGING CENTER | Age: 48
Discharge: HOME | End: 2023-06-29
Payer: COMMERCIAL

## 2023-06-29 VITALS — HEIGHT: 56 IN | WEIGHT: 180 LBS | BODY MASS INDEX: 40.49 KG/M2

## 2023-06-29 DIAGNOSIS — Z12.31 SCREENING MAMMOGRAM FOR BREAST CANCER: ICD-10-CM

## 2023-06-29 PROCEDURE — 77063 BREAST TOMOSYNTHESIS BI: CPT

## 2023-06-29 PROCEDURE — 77067 SCR MAMMO BI INCL CAD: CPT

## 2023-07-01 DIAGNOSIS — F32.A DEPRESSION, UNSPECIFIED DEPRESSION TYPE: ICD-10-CM

## 2023-07-01 DIAGNOSIS — J45.909 UNCOMPLICATED ASTHMA, UNSPECIFIED ASTHMA SEVERITY, UNSPECIFIED WHETHER PERSISTENT: ICD-10-CM

## 2023-07-01 RX ORDER — ALBUTEROL SULFATE 90 UG/1
AEROSOL, METERED RESPIRATORY (INHALATION)
Qty: 25.5 G | Refills: 0 | Status: SHIPPED | OUTPATIENT
Start: 2023-07-01

## 2023-07-01 RX ORDER — PAROXETINE HYDROCHLORIDE 20 MG/1
TABLET, FILM COATED ORAL
Qty: 30 TABLET | Refills: 0 | Status: SHIPPED | OUTPATIENT
Start: 2023-07-01

## 2023-07-15 NOTE — ADDENDUM NOTE
Addended by: Mikala Alberto on: 8/9/2022 03:29 PM     Modules accepted: Orders Verbal/written post procedure instructions were given to patient/caregiver./Instructed patient/caregiver to follow-up with primary care physician./Instructed patient/caregiver regarding signs and symptoms of infection./Keep the cast/splint/dressing clean and dry.

## 2023-07-17 DIAGNOSIS — F32.A DEPRESSION, UNSPECIFIED DEPRESSION TYPE: ICD-10-CM

## 2023-07-17 RX ORDER — PAROXETINE HYDROCHLORIDE 20 MG/1
TABLET, FILM COATED ORAL
Qty: 30 TABLET | Refills: 0 | Status: SHIPPED | OUTPATIENT
Start: 2023-07-17

## 2023-07-19 ENCOUNTER — OFFICE VISIT (OUTPATIENT)
Dept: FAMILY MEDICINE CLINIC | Facility: CLINIC | Age: 48
End: 2023-07-19
Payer: COMMERCIAL

## 2023-07-19 VITALS
RESPIRATION RATE: 16 BRPM | HEART RATE: 88 BPM | HEIGHT: 56 IN | TEMPERATURE: 98.8 F | WEIGHT: 185.2 LBS | DIASTOLIC BLOOD PRESSURE: 70 MMHG | BODY MASS INDEX: 41.66 KG/M2 | SYSTOLIC BLOOD PRESSURE: 130 MMHG | OXYGEN SATURATION: 99 %

## 2023-07-19 DIAGNOSIS — K52.9 GASTROENTERITIS: Primary | ICD-10-CM

## 2023-07-19 DIAGNOSIS — J02.9 SORE THROAT: ICD-10-CM

## 2023-07-19 LAB
S PYO AG THROAT QL: NEGATIVE
SARS-COV-2 AG UPPER RESP QL IA: NEGATIVE
VALID CONTROL: NORMAL

## 2023-07-19 PROCEDURE — 87880 STREP A ASSAY W/OPTIC: CPT | Performed by: STUDENT IN AN ORGANIZED HEALTH CARE EDUCATION/TRAINING PROGRAM

## 2023-07-19 PROCEDURE — 87811 SARS-COV-2 COVID19 W/OPTIC: CPT | Performed by: STUDENT IN AN ORGANIZED HEALTH CARE EDUCATION/TRAINING PROGRAM

## 2023-07-19 PROCEDURE — 99213 OFFICE O/P EST LOW 20 MIN: CPT | Performed by: STUDENT IN AN ORGANIZED HEALTH CARE EDUCATION/TRAINING PROGRAM

## 2023-07-19 NOTE — LETTER
July 19, 2023     Patient: Ina Christensen  YOB: 1975  Date of Visit: 7/19/2023      To Whom it May Concern:    Zia Morales is under my professional care. Ahmet Palmer was seen in my office on 7/19/2023. Ahmet Palmer may return to work on 7/21/23 . Please excuse her for the absence from 7/18/2023-7/20/2023    If you have any questions or concerns, please don't hesitate to call.          Sincerely,          Eli Spencer MD

## 2023-07-19 NOTE — PROGRESS NOTES
Name: Thee Santos      : 1975      MRN: [id number]  Encounter Provider: Aniya Florian MD  Encounter Date: 2023   Encounter department: [address] [place] [address]     1. Gastroenteritis    2. Sore throat  -     POCT rapid strepA  -     POCT Rapid Covid Ag  -     Throat culture    rapid strep and COVID in office negative we will send off for throat culture to evaluate for strep pharyngitis however less likely as patient's symptoms are resembling gastroenteritis    Given patient's symptoms of nausea and a few episodes of vomiting strongly encourage patient to stay hydrated including water and electrolytes. Patient also encouraged to eat a bland diet. Symptomatic treatment recommended patient strongly encouraged if no improvement in symptoms within the next week and unable to tolerate p.o. to go to ER. Subjective      Ms. Meenu Clemons is a 69-year-old female who presents to the office today for an acute care visit. Patient reports since the last 2 days she has noticed abdominal pain along with some vomiting and diarrhea which started today patient reports she had a low-grade temp Tmax 100.4F yesterday. Denies any runny nose cough or sick contacts. Patient does report a mild sore throat. Patient was at a wedding this past weekend and unsure if anyone was sick there. Patient has been hydrating with water and seltzer water. Diet unchanged. Review of Systems   Constitutional: Positive for fatigue. Negative for chills and fever. HENT: Positive for sore throat. Negative for congestion. Respiratory: Negative for cough, chest tightness and shortness of breath. Cardiovascular: Negative for chest pain. Gastrointestinal: Positive for diarrhea, nausea and vomiting. Negative for blood in stool and constipation. Genitourinary: Negative for dysuria. Neurological: Negative for dizziness, weakness, light-headedness and headaches.        Current Outpatient Medications on File Prior to Visit   Medication Sig   • acetaminophen (TYLENOL) 500 mg tablet Take 2 tablets (1,000 mg total) by mouth every 6 (six) hours as needed for mild pain or moderate pain   • acetone, urine, test strip Use to test urine ketones for high blood sugars. • albuterol (2.5 mg/3 mL) 0.083 % nebulizer solution Take 3 mL (2.5 mg total) by nebulization every 6 (six) hours as needed for wheezing or shortness of breath   • albuterol (PROVENTIL HFA,VENTOLIN HFA) 90 mcg/act inhaler INHALE 2 PUFFS BY MOUTH EVERY 4 HOURS AS NEEDED FOR WHEEZING OR SHORTNESS OF BREATH   • Ascorbic Acid (VITAMIN C) 500 MG CAPS Take 2 capsules by mouth daily   • Aspirin Low Dose 81 MG EC tablet TAKE 1 TABLET BY MOUTH EVERY DAY   • atorvastatin (LIPITOR) 80 mg tablet TAKE 1 TABLET BY MOUTH EVERY DAY   • Biotin 10 MG TABS Take 1 tablet by mouth daily   • Blood Pressure Monitoring [place] Use 2 (two) times a day Monitor BP twice daily. Call office if BP > 140/90 persistently.    • Butalbital-APAP-Caffeine (Fioricet) -40 MG CAPS Take 1 tablet by mouth 4 (four) times a day as needed ([facility])   • Continuous Blood Gluc Sensor (Dexcom G6 Sensor) MISC CHANGE EVERY 10 DAYS   • Continuous Blood Gluc Transmit (Dexcom G6 Transmitter) MISC CHANGE EVERY 90 DAYS   • cyanocobalamin (VITAMIN B-12) 100 mcg tablet Take by mouth daily   • furosemide (LASIX) 40 mg tablet TAKE 1 TABLET BY MOUTH EVERY DAY   • glucagon (Glucagon Emergency) 1 MG injection Inject 1 mg under the skin once as needed for low blood sugar for up to 1 dose   • Glucagon (Gvoke HypoPen 2-Pack) 1 MG/0.2ML SOAJ Use if hypoglycemia prn   • Insulin Glargine Solostar (Lantus SoloStar) 100 UNIT/ML SOPN Inject 0.78 mL (78 Units total) under the skin in the morning   • lisinopril (ZESTRIL) 20 mg tablet TAKE 2 TABLETS BY MOUTH EVERY DAY   • montelukast (SINGULAIR) 10 mg tablet TAKE 1 TABLET BY MOUTH AT BEDTIME   • NovoLOG 100 UNIT/ML injection INJECT 1 UNITS OF NOVOLOG FOR EVERY 1 GRAM OF CARBS, UP  UNITS DAILY   • omeprazole (PriLOSEC) 20 mg delayed release capsule Take 20 mg by mouth daily. • OneTouch Ultra test strip Test 3 times daily   • PARoxetine (PAXIL) 20 mg tablet TAKE 1 TABLET BY MOUTH EVERY DAY   • Probiotic Product (ALIGN PO) Take by mouth   • Fluticasone-Salmeterol (Advair Diskus) 250-50 mcg/dose inhaler Inhale 1 puff daily Rinse mouth after use. Objective     /70   Pulse 88   Temp 98.8 °F (37.1 °C)   Resp 16   Ht 4' 8" (1.422 m)   Wt 84 kg (185 lb 3.2 oz)   LMP 05/03/2023 (Approximate) Comment: ncop per pt  SpO2 99%   BMI 41.52 kg/m²     Physical Exam  Vitals reviewed. Constitutional:       General: She is not in acute distress. Appearance: She is not ill-appearing. HENT:      Head: Normocephalic and atraumatic. Mouth/Throat:      Mouth: Mucous membranes are moist.      Pharynx: Oropharynx is clear. No pharyngeal swelling or oropharyngeal exudate. Eyes:      Extraocular Movements: Extraocular movements intact. Cardiovascular:      Rate and Rhythm: Normal rate and regular rhythm. Heart sounds: Normal heart sounds. Pulmonary:      Effort: Pulmonary effort is normal.      Breath sounds: Normal breath sounds. Abdominal:      General: Bowel sounds are normal.      Tenderness: There is no abdominal tenderness. There is no guarding or rebound. Negative signs include Reddy's sign, Rovsing's sign, McBurney's sign and psoas sign. Neurological:      Mental Status: She is alert.        Rohit Marina MD

## 2023-07-21 ENCOUNTER — TELEPHONE (OUTPATIENT)
Dept: FAMILY MEDICINE CLINIC | Facility: CLINIC | Age: 48
End: 2023-07-21

## 2023-07-21 ENCOUNTER — HOSPITAL ENCOUNTER (EMERGENCY)
Facility: HOSPITAL | Age: 48
Discharge: HOME | End: 2023-07-21
Attending: EMERGENCY MEDICINE
Payer: COMMERCIAL

## 2023-07-21 VITALS
DIASTOLIC BLOOD PRESSURE: 58 MMHG | TEMPERATURE: 97.8 F | RESPIRATION RATE: 18 BRPM | SYSTOLIC BLOOD PRESSURE: 111 MMHG | BODY MASS INDEX: 41.61 KG/M2 | HEART RATE: 71 BPM | OXYGEN SATURATION: 100 % | WEIGHT: 185 LBS | HEIGHT: 56 IN

## 2023-07-21 DIAGNOSIS — E86.0 MILD DEHYDRATION: ICD-10-CM

## 2023-07-21 DIAGNOSIS — D64.9 CHRONIC ANEMIA: ICD-10-CM

## 2023-07-21 DIAGNOSIS — R11.2 NAUSEA AND VOMITING, UNSPECIFIED VOMITING TYPE: Primary | ICD-10-CM

## 2023-07-21 LAB
ALBUMIN SERPL BCP-MCNC: 4 G/DL (ref 3.5–5)
ALP SERPL-CCNC: 81 U/L (ref 34–104)
ALT SERPL W P-5'-P-CCNC: 13 U/L (ref 7–52)
ANION GAP SERPL CALCULATED.3IONS-SCNC: 7 MMOL/L
AST SERPL W P-5'-P-CCNC: 9 U/L (ref 13–39)
ATRIAL RATE: 73 BPM
B-HCG SERPL-ACNC: <1 MIU/ML (ref 0–5)
BASE EXCESS BLDA CALC-SCNC: -1 MMOL/L (ref -2–3)
BASOPHILS # BLD AUTO: 0.06 THOUSANDS/ÂΜL (ref 0–0.1)
BASOPHILS NFR BLD AUTO: 1 % (ref 0–1)
BETA-HYDROXYBUTYRATE: 0.1 MMOL/L
BILIRUB SERPL-MCNC: 0.31 MG/DL (ref 0.2–1)
BUN SERPL-MCNC: 62 MG/DL (ref 5–25)
CA-I BLD-SCNC: 1.26 MMOL/L (ref 1.12–1.32)
CALCIUM SERPL-MCNC: 9.8 MG/DL (ref 8.4–10.2)
CHLORIDE SERPL-SCNC: 103 MMOL/L (ref 96–108)
CO2 SERPL-SCNC: 26 MMOL/L (ref 21–32)
CREAT SERPL-MCNC: 1.34 MG/DL (ref 0.6–1.3)
EOSINOPHIL # BLD AUTO: 0.43 THOUSAND/ÂΜL (ref 0–0.61)
EOSINOPHIL NFR BLD AUTO: 4 % (ref 0–6)
ERYTHROCYTE [DISTWIDTH] IN BLOOD BY AUTOMATED COUNT: 12.6 % (ref 11.6–15.1)
EXT PREGNANCY TEST URINE: NEGATIVE
EXT. CONTROL: NORMAL
GFR SERPL CREATININE-BSD FRML MDRD: 47 ML/MIN/1.73SQ M
GLUCOSE SERPL-MCNC: 100 MG/DL (ref 65–140)
GLUCOSE SERPL-MCNC: 109 MG/DL (ref 65–140)
GLUCOSE SERPL-MCNC: 91 MG/DL (ref 65–140)
HCO3 BLDA-SCNC: 25.3 MMOL/L (ref 24–30)
HCT VFR BLD AUTO: 29.9 % (ref 34.8–46.1)
HCT VFR BLD CALC: 29 % (ref 34.8–46.1)
HGB BLD-MCNC: 9.6 G/DL (ref 11.5–15.4)
HGB BLDA-MCNC: 9.9 G/DL (ref 11.5–15.4)
IMM GRANULOCYTES # BLD AUTO: 0.04 THOUSAND/UL (ref 0–0.2)
IMM GRANULOCYTES NFR BLD AUTO: 0 % (ref 0–2)
LACTATE SERPL-SCNC: 1 MMOL/L (ref 0.5–2)
LIPASE SERPL-CCNC: 25 U/L (ref 11–82)
LYMPHOCYTES # BLD AUTO: 2.01 THOUSANDS/ÂΜL (ref 0.6–4.47)
LYMPHOCYTES NFR BLD AUTO: 20 % (ref 14–44)
MCH RBC QN AUTO: 29.8 PG (ref 26.8–34.3)
MCHC RBC AUTO-ENTMCNC: 32.1 G/DL (ref 31.4–37.4)
MCV RBC AUTO: 93 FL (ref 82–98)
MONOCYTES # BLD AUTO: 0.67 THOUSAND/ÂΜL (ref 0.17–1.22)
MONOCYTES NFR BLD AUTO: 7 % (ref 4–12)
NEUTROPHILS # BLD AUTO: 6.95 THOUSANDS/ÂΜL (ref 1.85–7.62)
NEUTS SEG NFR BLD AUTO: 68 % (ref 43–75)
NRBC BLD AUTO-RTO: 0 /100 WBCS
P AXIS: 70 DEGREES
PCO2 BLD: 27 MMOL/L (ref 21–32)
PCO2 BLD: 45.6 MM HG (ref 42–50)
PH BLD: 7.35 [PH] (ref 7.3–7.4)
PLATELET # BLD AUTO: 359 THOUSANDS/UL (ref 149–390)
PMV BLD AUTO: 9.7 FL (ref 8.9–12.7)
PO2 BLD: 24 MM HG (ref 35–45)
POTASSIUM BLD-SCNC: 5.1 MMOL/L (ref 3.5–5.3)
POTASSIUM SERPL-SCNC: 5 MMOL/L (ref 3.5–5.3)
PR INTERVAL: 148 MS
PROT SERPL-MCNC: 7.1 G/DL (ref 6.4–8.4)
QRS AXIS: 85 DEGREES
QRSD INTERVAL: 90 MS
QT INTERVAL: 380 MS
QTC INTERVAL: 418 MS
RBC # BLD AUTO: 3.22 MILLION/UL (ref 3.81–5.12)
SAO2 % BLD FROM PO2: 39 % (ref 60–85)
SODIUM BLD-SCNC: 138 MMOL/L (ref 136–145)
SODIUM SERPL-SCNC: 136 MMOL/L (ref 135–147)
SPECIMEN SOURCE: ABNORMAL
T WAVE AXIS: 15 DEGREES
VENTRICULAR RATE: 73 BPM
WBC # BLD AUTO: 10.16 THOUSAND/UL (ref 4.31–10.16)

## 2023-07-21 PROCEDURE — 36415 COLL VENOUS BLD VENIPUNCTURE: CPT | Performed by: PHYSICIAN ASSISTANT

## 2023-07-21 PROCEDURE — 96374 THER/PROPH/DIAG INJ IV PUSH: CPT

## 2023-07-21 PROCEDURE — 93005 ELECTROCARDIOGRAM TRACING: CPT

## 2023-07-21 PROCEDURE — 81025 URINE PREGNANCY TEST: CPT | Performed by: PHYSICIAN ASSISTANT

## 2023-07-21 PROCEDURE — 84132 ASSAY OF SERUM POTASSIUM: CPT

## 2023-07-21 PROCEDURE — 82010 KETONE BODYS QUAN: CPT | Performed by: PHYSICIAN ASSISTANT

## 2023-07-21 PROCEDURE — 85025 COMPLETE CBC W/AUTO DIFF WBC: CPT | Performed by: PHYSICIAN ASSISTANT

## 2023-07-21 PROCEDURE — 82330 ASSAY OF CALCIUM: CPT

## 2023-07-21 PROCEDURE — 82947 ASSAY GLUCOSE BLOOD QUANT: CPT

## 2023-07-21 PROCEDURE — 83690 ASSAY OF LIPASE: CPT | Performed by: PHYSICIAN ASSISTANT

## 2023-07-21 PROCEDURE — 85014 HEMATOCRIT: CPT

## 2023-07-21 PROCEDURE — 83605 ASSAY OF LACTIC ACID: CPT | Performed by: PHYSICIAN ASSISTANT

## 2023-07-21 PROCEDURE — 96361 HYDRATE IV INFUSION ADD-ON: CPT

## 2023-07-21 PROCEDURE — 82948 REAGENT STRIP/BLOOD GLUCOSE: CPT

## 2023-07-21 PROCEDURE — 84702 CHORIONIC GONADOTROPIN TEST: CPT | Performed by: PHYSICIAN ASSISTANT

## 2023-07-21 PROCEDURE — 80053 COMPREHEN METABOLIC PANEL: CPT | Performed by: PHYSICIAN ASSISTANT

## 2023-07-21 PROCEDURE — 99285 EMERGENCY DEPT VISIT HI MDM: CPT | Performed by: EMERGENCY MEDICINE

## 2023-07-21 PROCEDURE — 93010 ELECTROCARDIOGRAM REPORT: CPT | Performed by: INTERNAL MEDICINE

## 2023-07-21 PROCEDURE — 99284 EMERGENCY DEPT VISIT MOD MDM: CPT

## 2023-07-21 PROCEDURE — 82803 BLOOD GASES ANY COMBINATION: CPT

## 2023-07-21 PROCEDURE — 84295 ASSAY OF SERUM SODIUM: CPT

## 2023-07-21 RX ORDER — MAGNESIUM HYDROXIDE/ALUMINUM HYDROXICE/SIMETHICONE 120; 1200; 1200 MG/30ML; MG/30ML; MG/30ML
30 SUSPENSION ORAL ONCE
Status: COMPLETED | OUTPATIENT
Start: 2023-07-21 | End: 2023-07-21

## 2023-07-21 RX ORDER — ONDANSETRON 2 MG/ML
4 INJECTION INTRAMUSCULAR; INTRAVENOUS ONCE
Status: COMPLETED | OUTPATIENT
Start: 2023-07-21 | End: 2023-07-21

## 2023-07-21 RX ORDER — ONDANSETRON 4 MG/1
4 TABLET, FILM COATED ORAL EVERY 6 HOURS
Qty: 12 TABLET | Refills: 0 | Status: SHIPPED | OUTPATIENT
Start: 2023-07-21 | End: 2023-07-24

## 2023-07-21 RX ORDER — FAMOTIDINE 20 MG/1
20 TABLET, FILM COATED ORAL ONCE
Status: COMPLETED | OUTPATIENT
Start: 2023-07-21 | End: 2023-07-21

## 2023-07-21 RX ORDER — CALCIUM CARBONATE 500 MG/1
500 TABLET, CHEWABLE ORAL DAILY PRN
Status: DISCONTINUED | OUTPATIENT
Start: 2023-07-21 | End: 2023-07-21 | Stop reason: HOSPADM

## 2023-07-21 RX ADMIN — ALUMINUM HYDROXIDE, MAGNESIUM HYDROXIDE, AND DIMETHICONE 30 ML: 200; 20; 200 SUSPENSION ORAL at 10:23

## 2023-07-21 RX ADMIN — SODIUM CHLORIDE 1000 ML: 0.9 INJECTION, SOLUTION INTRAVENOUS at 09:57

## 2023-07-21 RX ADMIN — ONDANSETRON HYDROCHLORIDE 4 MG: 2 INJECTION, SOLUTION INTRAMUSCULAR; INTRAVENOUS at 09:55

## 2023-07-21 RX ADMIN — FAMOTIDINE 20 MG: 20 TABLET, FILM COATED ORAL at 10:23

## 2023-07-21 NOTE — Clinical Note
Alyce Elias was seen and treated in our emergency department on 7/21/2023. Diagnosis:     Grajeda Section  is off the rest of the shift today. She may return on this date: 07/24/2023         If you have any questions or concerns, please don't hesitate to call.       Te Acosta PA-C    ______________________________           _______________          _______________  Hospital Representative                              Date                                Time

## 2023-07-21 NOTE — TELEPHONE ENCOUNTER
Patient called is vomittting not getting better. I scheduled an appointment. Dr. Kati Burnett recommended patient go to ER to check to see if patient needs fluids.     Patient rafaela go to ER

## 2023-07-21 NOTE — ED PROVIDER NOTES
History  Chief Complaint   Patient presents with   • Vomiting     Pt states " I went to the doctor on Wednesday because I have been sick. I threw up and had some fevers and they tested me for covid and it was negative. They think I have a stomach bug.I'm just so dehydrated." Pt reports unable to eat and drink and feeling fatigue. HPI    80-year-old female past medical/history hypertension, hyperlipidemia 2 diabetes, smoker, history of congenital heart disease, asthma and obesity. She has known grade 2 diastolic dysfunction per echocardiogram review on 8/7/2020 and is on chronic Lasix as well. She states she has had vomiting and admittedly ongoing since Monday. She reports she went to a wedding this past Saturday and then started feeling fatigued on Sunday. Monday had isolated vomiting as well as Tuesday had 1-2 episodes. She went to her PCP on Wednesday who is presumptive that this is likely a viral syndrome. She has had ongoing vomiting since that time. She came to emergency department due to her history of type 2 diabetes and ongoing vomiting. Feels epigastric queasiness. Prior to Admission Medications   Prescriptions Last Dose Informant Patient Reported? Taking? Ascorbic Acid (VITAMIN C) 500 MG CAPS  Self Yes No   Sig: Take 2 capsules by mouth daily   Aspirin Low Dose 81 MG EC tablet  Self No No   Sig: TAKE 1 TABLET BY MOUTH EVERY DAY   Biotin 10 MG TABS  Self Yes No   Sig: Take 1 tablet by mouth daily   Blood Pressure Monitoring [place]  Self No No   Sig: Use 2 (two) times a day Monitor BP twice daily. Call office if BP > 140/90 persistently.    Butalbital-APAP-Caffeine (Fioricet) -40 MG CAPS  Self No No   Sig: Take 1 tablet by mouth 4 (four) times a day as needed (headahce)   Continuous Blood Gluc Sensor (Dexcom G6 Sensor) MISC  Self No No   Sig: CHANGE EVERY 10 DAYS   Continuous Blood Gluc Transmit (Dexcom G6 Transmitter) MISC  Self No No   Sig: CHANGE EVERY 90 DAYS Fluticasone-Salmeterol (Advair Diskus) 250-50 mcg/dose inhaler  Self No No   Sig: Inhale 1 puff daily Rinse mouth after use. Glucagon (Gvoke HypoPen 2-Pack) 1 MG/0.2ML SOAJ  Self No No   Sig: Use if hypoglycemia prn   Insulin Glargine Solostar (Lantus SoloStar) 100 UNIT/ML SOPN  Self No No   Sig: Inject 0.78 mL (78 Units total) under the skin in the morning   NovoLOG 100 UNIT/ML injection   No No   Sig: INJECT 1 UNITS OF NOVOLOG FOR EVERY 1 GRAM OF CARBS, UP  UNITS DAILY   OneTouch Ultra test strip  Self No No   Sig: Test 3 times daily   PARoxetine (PAXIL) 20 mg tablet   No No   Sig: TAKE 1 TABLET BY MOUTH EVERY DAY   Probiotic Product (ALIGN PO)  Self Yes No   Sig: Take by mouth   acetaminophen (TYLENOL) 500 mg tablet  Self No No   Sig: Take 2 tablets (1,000 mg total) by mouth every 6 (six) hours as needed for mild pain or moderate pain   acetone, urine, test strip  Self No No   Sig: Use to test urine ketones for high blood sugars.    albuterol (2.5 mg/3 mL) 0.083 % nebulizer solution  Self No No   Sig: Take 3 mL (2.5 mg total) by nebulization every 6 (six) hours as needed for wheezing or shortness of breath   albuterol (PROVENTIL HFA,VENTOLIN HFA) 90 mcg/act inhaler   No No   Sig: INHALE 2 PUFFS BY MOUTH EVERY 4 HOURS AS NEEDED FOR WHEEZING OR SHORTNESS OF BREATH   atorvastatin (LIPITOR) 80 mg tablet  Self No No   Sig: TAKE 1 TABLET BY MOUTH EVERY DAY   cyanocobalamin (VITAMIN B-12) 100 mcg tablet  Self Yes No   Sig: Take by mouth daily   furosemide (LASIX) 40 mg tablet  Self No No   Sig: TAKE 1 TABLET BY MOUTH EVERY DAY   glucagon (Glucagon Emergency) 1 MG injection  Self No No   Sig: Inject 1 mg under the skin once as needed for low blood sugar for up to 1 dose   lisinopril (ZESTRIL) 20 mg tablet   No No   Sig: TAKE 2 TABLETS BY MOUTH EVERY DAY   montelukast (SINGULAIR) 10 mg tablet  Self No No   Sig: TAKE 1 TABLET BY MOUTH AT BEDTIME   omeprazole (PriLOSEC) 20 mg delayed release capsule  Self Yes No Sig: Take 20 mg by mouth daily. Facility-Administered Medications: None       Past Medical History:   Diagnosis Date   • Anemia    • Arthritis    • Asthma     w/acute exacerbation. Last assessed: 10/26/12   • Chest pain 02/03/2016   • CHF (congestive heart failure) ([address])    • Depression    • Diabetes mellitus ([address])    • Diabetic nephropathy ([address])    • Diabetic nephropathy associated with type 1 diabetes mellitus ([address]) 08/12/2020   • Diabetic retinopathy ([address]) 02/03/2016   • Endometriosis    • Gastroenteritis 02/03/2016   • GERD (gastroesophageal reflux disease)    • HTN (hypertension)    • Hyperlipidemia 02/03/2016   • Hypertension    • Obesity    • Oligomenorrhea    • Polycystic ovaries 02/03/2016   • Retinal hemorrhage 02/03/2016   • Retinopathy    • Thrombocytosis    • Type 1 diabetes mellitus with ophthalmic manifestation ([address]) 02/03/2016       Past Surgical History:   Procedure Laterality Date   • CATARACT EXTRACTION Left 10/2020   • CATARACT EXTRACTION Right    • RETINAL LASER PROCEDURE         Family History   Problem Relation Age of Onset   • Heart murmur Mother    • Diabetes Mother         Mellitus   • Thyroid disease Mother         Disorder   • Diabetes Father         Mellitus   • Hypertension Father    • No Known Problems Maternal Grandmother    • Diabetes Maternal Grandfather         Mellitus   • Breast cancer Paternal Grandmother 79 - 70   • Leukemia Paternal Grandfather 50   • Diabetes Maternal Aunt         Mellitus   • Diabetes Maternal Uncle         Mellitus   • Substance Abuse Neg Hx    • Mental illness Neg Hx    • Alcohol abuse Neg Hx      I have reviewed and agree with the history as documented. E-Cigarette/Vaping   • E-Cigarette Use Never User      E-Cigarette/Vaping Substances   • Nicotine No    • THC No    • CBD No    • Flavoring No      Social History     Tobacco Use   • Smoking status: Never   • Smokeless tobacco: Never   • Tobacco comments:     Per [employer]: Former smoker.  Quit in 1994   Vaping Use   • Vaping Use: Never used   Substance Use Topics   • Alcohol use: Yes     Comment: Occasional/1 mixed drink twice a month if that   • Drug use: No       Review of Systems   Constitutional: Negative for chills and fever. HENT: Negative for ear pain and sore throat. Eyes: Negative for pain and visual disturbance. Respiratory: Negative for cough and shortness of breath. Cardiovascular: Negative for chest pain and palpitations. Gastrointestinal: Positive for nausea and vomiting. Negative for abdominal pain. Genitourinary: Negative for dysuria and hematuria. Musculoskeletal: Negative for arthralgias and back pain. Skin: Negative for color change and rash. Neurological: Negative for seizures and syncope. All other systems reviewed and are negative. Physical Exam  Physical Exam  Vitals and nursing note reviewed. Constitutional:       General: She is not in acute distress. Appearance: She is well-developed. She is obese. HENT:      Head: Normocephalic and atraumatic. Mouth/Throat:      Mouth: Mucous membranes are dry. Eyes:      Conjunctiva/sclera: Conjunctivae normal.   Cardiovascular:      Rate and Rhythm: Normal rate and regular rhythm. Heart sounds: No murmur heard. Pulmonary:      Effort: Pulmonary effort is normal. No respiratory distress. Breath sounds: Normal breath sounds. Abdominal:      Palpations: Abdomen is soft. Tenderness: There is no abdominal tenderness. Musculoskeletal:         General: No swelling. Cervical back: Neck supple. Skin:     General: Skin is warm and dry. Capillary Refill: Capillary refill takes less than 2 seconds. Neurological:      Mental Status: She is alert.    Psychiatric:         Mood and Affect: Mood normal.         Vital Signs  ED Triage Vitals [07/21/23 0947]   Temperature Pulse Respirations Blood Pressure SpO2   97.8 °F (36.6 °C) 71 20 146/65 98 %      Temp Source Heart Rate Source Patient Position - Orthostatic VS BP Location FiO2 (%)   Oral Monitor Lying Left arm --      Pain Score       5           Vitals:    07/21/23 1100 07/21/23 1115 07/21/23 1130 07/21/23 1200   BP: 103/51  116/61 111/58   Pulse: 71 76 74 71   Patient Position - Orthostatic VS:             Visual Acuity      ED Medications  Medications   sodium chloride 0.9 % bolus 1,000 mL (0 mL Intravenous Stopped 7/21/23 1116)   ondansetron (ZOFRAN) injection 4 mg (4 mg Intravenous Given 7/21/23 0955)   famotidine (PEPCID) tablet 20 mg (20 mg Oral Given 7/21/23 1023)   aluminum-magnesium hydroxide-simethicone (MAALOX) oral suspension 30 mL (30 mL Oral Given 7/21/23 1023)       Diagnostic Studies  Results Reviewed     Procedure Component Value Units Date/Time    Lactic acid, plasma (w/reflex if result > 2.0) [[id number]]  (Normal) Collected: 07/21/23 1023    Lab Status: Final result Specimen: Blood from Arm, Right Updated: 07/21/23 1127     LACTIC ACID 1.0 mmol/L     Narrative:      Result may be elevated if tourniquet was used during collection. Beta Hydroxybutyrate [[id number]]  (Normal) Collected: 07/21/23 1023    Lab Status: Final result Specimen: Blood from Arm, Right Updated: 07/21/23 1054     BETA-HYDROXYBUTYRATE 0.1 mmol/L     Pregnancy, hCG, quantitative [[id number]]  (Normal) Collected: 07/21/23 0954    Lab Status: Final result Specimen: Blood from Arm, Right Updated: 07/21/23 1046     HCG, Quant <1 mIU/mL     Narrative:       Expected Ranges:    HCG results between 5 and 25 mIU/mL may be indicative of early pregnancy but should be interpreted in light of the total clinical presentation. HCG can rise to detectable levels in celia and post menopausal women (0-11.6 mIU/mL).      Approximate               Approximate HCG  Gestation age          Concentration ( mIU/mL)  _____________          ______________________   Salas Robison                      HCG values  0.2-1                       5-50  1-2                           2-3 100-5000  3-4                         500-38016  4-5                         1000-44994  5-6                         [id number]  6-8                         [id number]  8-12                        [id number]      POCT pregnancy, urine [[id number]]  (Normal) Resulted: 07/21/23 1039    Lab Status: Final result Specimen: Urine Updated: 07/21/23 1039     EXT Preg Test, Ur Negative     Control Valid    Comprehensive metabolic panel [[id number]]  (Abnormal) Collected: 07/21/23 0954    Lab Status: Final result Specimen: Blood from Arm, Right Updated: 07/21/23 1035     Sodium 136 mmol/L      Potassium 5.0 mmol/L      Chloride 103 mmol/L      CO2 26 mmol/L      ANION GAP 7 mmol/L      BUN 62 mg/dL      Creatinine 1.34 mg/dL      Glucose 109 mg/dL      Calcium 9.8 mg/dL      AST 9 U/L      ALT 13 U/L      Alkaline Phosphatase 81 U/L      Total Protein 7.1 g/dL      Albumin 4.0 g/dL      Total Bilirubin 0.31 mg/dL      eGFR 47 ml/min/1.73sq m     Narrative:      [place] guidelines for Chronic Kidney Disease (CKD):   •  Stage 1 with normal or high GFR (GFR > 90 mL/min/1.73 square meters)  •  Stage 2 Mild CKD (GFR = 60-89 mL/min/1.73 square meters)  •  Stage 3A Moderate CKD (GFR = 45-59 mL/min/1.73 square meters)  •  Stage 3B Moderate CKD (GFR = 30-44 mL/min/1.73 square meters)  •  Stage 4 Severe CKD (GFR = 15-29 mL/min/1.73 square meters)  •  Stage 5 End Stage CKD (GFR <15 mL/min/1.73 square meters)  Note: GFR calculation is accurate only with a steady state creatinine    Lipase [[id number]]  (Normal) Collected: 07/21/23 0954    Lab Status: Final result Specimen: Blood from Arm, Right Updated: 07/21/23 1035     Lipase 25 u/L     POCT Blood Gas (CG8+) [[id number]]  (Abnormal) Collected: 07/21/23 1031    Lab Status: Final result Specimen: Venous Updated: 07/21/23 1035     ph, Victor Hugo ISTAT 7.352     pCO2, Victor Hugo i-STAT 45.6 mm HG      pO2, Victor Hugo i-STAT 24.0 mm HG      BE, i-STAT -1 mmol/L      HCO3, Victor Hugo i-STAT 25.3 mmol/L      CO2, i-STAT 27 mmol/L      O2 Sat, i-STAT 39 %      SODIUM, I-STAT 138 mmol/l      Potassium, i-STAT 5.1 mmol/L      Calcium, Ionized i-STAT 1.26 mmol/L      Hct, i-STAT 29 %      Hgb, i-STAT 9.9 g/dl      Glucose, i-STAT 91 mg/dl      Specimen Type VENOUS    Fingerstick Glucose (POCT) [[id number]]  (Normal) Collected: 07/21/23 1028    Lab Status: Final result Updated: 07/21/23 1030     POC Glucose 100 mg/dl     CBC and differential [[id number]]  (Abnormal) Collected: 07/21/23 0954    Lab Status: Final result Specimen: Blood from Arm, Right Updated: 07/21/23 1005     WBC 10.16 Thousand/uL      RBC 3.22 Million/uL      Hemoglobin 9.6 g/dL      Hematocrit 29.9 %      MCV 93 fL      MCH 29.8 pg      MCHC 32.1 g/dL      RDW 12.6 %      MPV 9.7 fL      Platelets 005 Thousands/uL      nRBC 0 /100 WBCs      Neutrophils Relative 68 %      Immat GRANS % 0 %      Lymphocytes Relative 20 %      Monocytes Relative 7 %      Eosinophils Relative 4 %      Basophils Relative 1 %      Neutrophils Absolute 6.95 Thousands/µL      Immature Grans Absolute 0.04 Thousand/uL      Lymphocytes Absolute 2.01 Thousands/µL      Monocytes Absolute 0.67 Thousand/µL      Eosinophils Absolute 0.43 Thousand/µL      Basophils Absolute 0.06 Thousands/µL                  No orders to display              Procedures  ECG 12 Lead Documentation Only    Date/Time: 7/21/2023 10:43 AM    Performed by: Hannah Rivas PA-C  Authorized by: Hannah Rivas PA-C    Indications / Diagnosis:  Epigastric pain  ECG reviewed by me, the ED Provider: yes    Patient location:  ED  Previous ECG:     Previous ECG:  Compared to current    Comparison ECG info:  Prior EKG 6 August 2020 normal sinus rhythm with normal EKG 90 bpm.    Comparison to cardiac monitor: Yes    Interpretation:     Interpretation: normal    Rate:     ECG rate:  73    ECG rate assessment: normal    Rhythm:     Rhythm: sinus rhythm    Ectopy: Ectopy: none    QRS:     QRS axis:  Normal  Conduction:     Conduction: normal    ST segments:     ST segments:  Normal  T waves:     T waves: normal    Comments:      Self interpreted by me. ED Course  ED Course as of 07/21/23 1605   Fri Jul 21, 2023   1031 Hemoglobin(!): 9.6  Stable hemoglobin level compared to prior 9.5 1-month ago   1154 Prior throat cx reviewed and negative. SBIRT 22yo+    Flowsheet Row Most Recent Value   Initial Alcohol Screen: US AUDIT-C     1. How often do you have a drink containing alcohol? 0 Filed at: 07/21/2023 1004   2. How many drinks containing alcohol do you have on a typical day you are drinking? 0 Filed at: 07/21/2023 1004   3a. Male UNDER 65: How often do you have five or more drinks on one occasion? 0 Filed at: 07/21/2023 1004   3b. FEMALE Any Age, or MALE 65+: How often do you have 4 or more drinks on one occassion? 0 Filed at: 07/21/2023 1004   Audit-C Score 0 Filed at: 07/21/2023 1004   MITALI: How many times in the past year have you. .. Used an illegal drug or used a prescription medication for non-medical reasons? Never Filed at: 07/21/2023 1004                    Medical Decision Making  Patient with ongoing nausea vomiting minimal and intermittent nausea. Felt better post Zofran. Discussed referral to PCP Dr. Art Reasons and possible need for follow-up with GI in the outpatient setting if ongoing and recurrent. Lipase was negative. Differential considered for cholecystitis, pancreatitis, viral gastroenteritis, DKA. Nontender abdomen and normal LFTs ruling out acute cholecystitis, lipase normal ruling out pancreatitis, no significant glucose elevation or ketosis. Given prescription for Zofran in the outpatient setting.     Chronic anemia: chronic illness or injury  Mild dehydration: self-limited or minor problem  Nausea and vomiting, unspecified vomiting type: self-limited or minor problem  Amount and/or Complexity of Data Reviewed  Labs: ordered. Decision-making details documented in ED Course. Risk  OTC drugs. Prescription drug management. Disposition  Final diagnoses:   Nausea and vomiting, unspecified vomiting type   Mild dehydration   Chronic anemia     Time reflects when diagnosis was documented in both MDM as applicable and the Disposition within this note     Time User Action Codes Description Comment    7/21/2023 11:53 AM Ma Roll Add [R11.2] Nausea and vomiting, unspecified vomiting type     7/21/2023 11:53 AM Ma Roll Add [E86.0] Mild dehydration     7/21/2023  4:04 PM Ma Roll Add [D64.9] Chronic anemia       ED Disposition     ED Disposition   Discharge    Condition   Stable    Date/Time   Fri Jul 21, 2023 11:53 AM    Comment   Thyra Glance discharge to home/self care. Follow-up Information     Follow up With Specialties Details Why Contact Info Additional Information    Rohit Marina MD [facility] Medicine Call today Call today for follow up appointment.  [address] [facility] [address]  [address] [facility] Emergency Department Emergency Medicine Go to  If symptoms worsen [address] [facility] [phone]  [address] [facility] Emergency Department, [address] [facility], [address]          Discharge Medication List as of 7/21/2023 11:59 AM      START taking these medications    Details   ondansetron (ZOFRAN) 4 mg tablet Take 1 tablet (4 mg total) by mouth every 6 (six) hours for 3 days, Starting Fri 7/21/2023, Until Mon 7/24/2023, Normal         CONTINUE these medications which have NOT CHANGED    Details   acetaminophen (TYLENOL) 500 mg tablet Take 2 tablets (1,000 mg total) by mouth every 6 (six) hours as needed for mild pain or moderate pain, Starting Fri 9/27/2019, OTC      acetone, urine, test strip Use to test urine ketones for high blood sugars. , Normal      albuterol (2.5 mg/3 mL) 0.083 % nebulizer solution Take 3 mL (2.5 mg total) by nebulization every 6 (six) hours as needed for wheezing or shortness of breath, Starting Fri 9/3/2021, Normal      albuterol (PROVENTIL HFA,VENTOLIN HFA) 90 mcg/act inhaler INHALE 2 PUFFS BY MOUTH EVERY 4 HOURS AS NEEDED FOR WHEEZING OR SHORTNESS OF BREATH, Normal      Ascorbic Acid (VITAMIN C) 500 MG CAPS Take 2 capsules by mouth daily, Historical Med      Aspirin Low Dose 81 MG EC tablet TAKE 1 TABLET BY MOUTH EVERY DAY, Normal      atorvastatin (LIPITOR) 80 mg tablet TAKE 1 TABLET BY MOUTH EVERY DAY, Normal      Biotin 10 MG TABS Take 1 tablet by mouth daily, Historical Med      Blood Pressure Monitoring [place] Use 2 (two) times a day Monitor BP twice daily. Call office if BP > 140/90 persistently. , Starting Wed 1/20/2021, Normal      Butalbital-APAP-Caffeine (Fioricet) -40 MG CAPS Take 1 tablet by mouth 4 (four) times a day as needed ([facility] Starting Thu 12/30/2021, Normal      Continuous Blood Gluc Sensor (Dexcom G6 Sensor) MISC CHANGE EVERY 10 DAYS, Normal      Continuous Blood Gluc Transmit (Dexcom G6 Transmitter) MISC CHANGE EVERY 90 DAYS, Normal      cyanocobalamin (VITAMIN B-12) 100 mcg tablet Take by mouth daily, Historical Med      Fluticasone-Salmeterol (Advair Diskus) 250-50 mcg/dose inhaler Inhale 1 puff daily Rinse mouth after use., Starting Tue 6/6/2023, Until Thu 7/6/2023, Normal      furosemide (LASIX) 40 mg tablet TAKE 1 TABLET BY MOUTH EVERY DAY, Normal      glucagon (Glucagon Emergency) 1 MG injection Inject 1 mg under the skin once as needed for low blood sugar for up to 1 dose, Starting Wed 12/16/2020, Normal      Glucagon (Gvoke HypoPen 2-Pack) 1 MG/0.2ML SOAJ Use if hypoglycemia prn, Normal      Insulin Glargine Solostar (Lantus SoloStar) 100 UNIT/ML SOPN Inject 0.78 mL (78 Units total) under the skin in the morning, Starting Thu 2/23/2023, Normal      lisinopril (ZESTRIL) 20 mg tablet TAKE 2 TABLETS BY MOUTH EVERY DAY, Normal      montelukast (SINGULAIR) 10 mg tablet TAKE 1 TABLET BY MOUTH AT BEDTIME, Normal      NovoLOG 100 UNIT/ML injection INJECT 1 UNITS OF NOVOLOG FOR EVERY 1 GRAM OF CARBS, UP  UNITS DAILY, Normal      omeprazole (PriLOSEC) 20 mg delayed release capsule Take 20 mg by mouth daily. , Historical Med      OneTouch Ultra test strip Test 3 times daily, Normal      PARoxetine (PAXIL) 20 mg tablet TAKE 1 TABLET BY MOUTH EVERY DAY, Normal      Probiotic Product (ALIGN PO) Take by mouth, Starting Mon 6/29/2015, Historical Med             No discharge procedures on file.     PDMP Review       Value Time User    PDMP Reviewed  Yes 8/8/2020  8:38 AM Eriberto Becerra MD          ED Provider  Electronically Signed by           Fredo Avila PA-C  07/21/23 6169

## 2023-07-21 NOTE — DISCHARGE INSTRUCTIONS
Discussed return emergency department for any newly developing or worsening signs or symptoms. Patient understood all instructions prior to discharge and plan agreed upon by patient and myself.

## 2023-08-08 ENCOUNTER — CLINICAL SUPPORT (OUTPATIENT)
Dept: FAMILY MEDICINE CLINIC | Facility: CLINIC | Age: 48
End: 2023-08-08
Payer: COMMERCIAL

## 2023-08-08 DIAGNOSIS — Z11.1 SCREENING FOR TUBERCULOSIS: Primary | ICD-10-CM

## 2023-08-08 PROCEDURE — 86580 TB INTRADERMAL TEST: CPT

## 2023-08-10 ENCOUNTER — CLINICAL SUPPORT (OUTPATIENT)
Dept: FAMILY MEDICINE CLINIC | Facility: CLINIC | Age: 48
End: 2023-08-10

## 2023-08-10 DIAGNOSIS — Z11.1 ENCOUNTER FOR PPD SKIN TEST READING: Primary | ICD-10-CM

## 2023-08-10 LAB
INDURATION: 0 MM
TB SKIN TEST: NEGATIVE

## 2023-08-14 DIAGNOSIS — I10 ESSENTIAL HYPERTENSION: Chronic | ICD-10-CM

## 2023-08-14 DIAGNOSIS — F32.A DEPRESSION, UNSPECIFIED DEPRESSION TYPE: ICD-10-CM

## 2023-08-14 RX ORDER — PAROXETINE HYDROCHLORIDE 20 MG/1
TABLET, FILM COATED ORAL
Qty: 30 TABLET | Refills: 0 | Status: SHIPPED | OUTPATIENT
Start: 2023-08-14

## 2023-08-15 RX ORDER — LISINOPRIL 20 MG/1
TABLET ORAL
Qty: 60 TABLET | Refills: 1 | Status: SHIPPED | OUTPATIENT
Start: 2023-08-15

## 2023-08-20 DIAGNOSIS — J45.20 MILD INTERMITTENT ASTHMA WITHOUT COMPLICATION: ICD-10-CM

## 2023-08-20 RX ORDER — MONTELUKAST SODIUM 10 MG/1
TABLET ORAL
Qty: 90 TABLET | Refills: 0 | Status: SHIPPED | OUTPATIENT
Start: 2023-08-20

## 2023-09-14 DIAGNOSIS — F32.A DEPRESSION, UNSPECIFIED DEPRESSION TYPE: ICD-10-CM

## 2023-09-14 RX ORDER — PAROXETINE HYDROCHLORIDE 20 MG/1
TABLET, FILM COATED ORAL
Qty: 30 TABLET | Refills: 0 | Status: SHIPPED | OUTPATIENT
Start: 2023-09-14 | End: 2023-09-15 | Stop reason: SDUPTHER

## 2023-09-15 ENCOUNTER — APPOINTMENT (OUTPATIENT)
Dept: LAB | Facility: CLINIC | Age: 48
End: 2023-09-15
Payer: COMMERCIAL

## 2023-09-15 ENCOUNTER — TRANSCRIBE ORDERS (OUTPATIENT)
Dept: LAB | Facility: CLINIC | Age: 48
End: 2023-09-15

## 2023-09-15 ENCOUNTER — OFFICE VISIT (OUTPATIENT)
Dept: FAMILY MEDICINE CLINIC | Facility: CLINIC | Age: 48
End: 2023-09-15
Payer: COMMERCIAL

## 2023-09-15 VITALS
TEMPERATURE: 98.4 F | HEIGHT: 56 IN | SYSTOLIC BLOOD PRESSURE: 124 MMHG | DIASTOLIC BLOOD PRESSURE: 70 MMHG | BODY MASS INDEX: 43.6 KG/M2 | HEART RATE: 94 BPM | RESPIRATION RATE: 16 BRPM | OXYGEN SATURATION: 99 % | WEIGHT: 193.8 LBS

## 2023-09-15 DIAGNOSIS — I10 ESSENTIAL HYPERTENSION: Chronic | ICD-10-CM

## 2023-09-15 DIAGNOSIS — E87.0 TYPE I DIABETES MELLITUS WITH HYPEROSMOLAR COMA: Primary | ICD-10-CM

## 2023-09-15 DIAGNOSIS — E10.69 TYPE I DIABETES MELLITUS WITH HYPEROSMOLAR COMA: Primary | ICD-10-CM

## 2023-09-15 DIAGNOSIS — F32.A DEPRESSION, UNSPECIFIED DEPRESSION TYPE: ICD-10-CM

## 2023-09-15 DIAGNOSIS — J45.20 MILD INTERMITTENT ASTHMA WITHOUT COMPLICATION: ICD-10-CM

## 2023-09-15 DIAGNOSIS — E10.65 TYPE I DIABETES MELLITUS WITH HYPEROSMOLAR COMA: Primary | ICD-10-CM

## 2023-09-15 DIAGNOSIS — E10.65 TYPE 1 DIABETES MELLITUS WITH HYPERGLYCEMIA (HCC): Primary | ICD-10-CM

## 2023-09-15 LAB
ALBUMIN SERPL BCP-MCNC: 3.7 G/DL (ref 3.5–5)
ALP SERPL-CCNC: 87 U/L (ref 34–104)
ALT SERPL W P-5'-P-CCNC: 26 U/L (ref 7–52)
ANION GAP SERPL CALCULATED.3IONS-SCNC: 10 MMOL/L
AST SERPL W P-5'-P-CCNC: 24 U/L (ref 13–39)
BILIRUB SERPL-MCNC: 0.28 MG/DL (ref 0.2–1)
BUN SERPL-MCNC: 38 MG/DL (ref 5–25)
CALCIUM SERPL-MCNC: 8.7 MG/DL (ref 8.4–10.2)
CHLORIDE SERPL-SCNC: 99 MMOL/L (ref 96–108)
CO2 SERPL-SCNC: 29 MMOL/L (ref 21–32)
CREAT SERPL-MCNC: 1.07 MG/DL (ref 0.6–1.3)
EST. AVERAGE GLUCOSE BLD GHB EST-MCNC: 223 MG/DL
GFR SERPL CREATININE-BSD FRML MDRD: 61 ML/MIN/1.73SQ M
GLUCOSE P FAST SERPL-MCNC: 163 MG/DL (ref 65–99)
HBA1C MFR BLD: 9.4 %
POTASSIUM SERPL-SCNC: 4.5 MMOL/L (ref 3.5–5.3)
PROT SERPL-MCNC: 6.4 G/DL (ref 6.4–8.4)
SODIUM SERPL-SCNC: 138 MMOL/L (ref 135–147)

## 2023-09-15 PROCEDURE — 36415 COLL VENOUS BLD VENIPUNCTURE: CPT

## 2023-09-15 PROCEDURE — 83036 HEMOGLOBIN GLYCOSYLATED A1C: CPT

## 2023-09-15 PROCEDURE — 99214 OFFICE O/P EST MOD 30 MIN: CPT | Performed by: STUDENT IN AN ORGANIZED HEALTH CARE EDUCATION/TRAINING PROGRAM

## 2023-09-15 PROCEDURE — 80053 COMPREHEN METABOLIC PANEL: CPT

## 2023-09-15 RX ORDER — PAROXETINE HYDROCHLORIDE 20 MG/1
20 TABLET, FILM COATED ORAL DAILY
Qty: 90 TABLET | Refills: 0 | Status: SHIPPED | OUTPATIENT
Start: 2023-09-15 | End: 2023-12-14

## 2023-09-15 RX ORDER — LISINOPRIL 20 MG/1
40 TABLET ORAL DAILY
Qty: 180 TABLET | Refills: 0 | Status: SHIPPED | OUTPATIENT
Start: 2023-09-15 | End: 2023-12-14

## 2023-09-15 RX ORDER — FLUTICASONE PROPIONATE AND SALMETEROL 250; 50 UG/1; UG/1
1 POWDER RESPIRATORY (INHALATION) DAILY
Qty: 1 BLISTER | Refills: 1 | Status: SHIPPED | OUTPATIENT
Start: 2023-09-15 | End: 2023-10-15

## 2023-09-18 ENCOUNTER — OFFICE VISIT (OUTPATIENT)
Dept: ENDOCRINOLOGY | Facility: HOSPITAL | Age: 48
End: 2023-09-18
Payer: COMMERCIAL

## 2023-09-18 VITALS
DIASTOLIC BLOOD PRESSURE: 70 MMHG | OXYGEN SATURATION: 98 % | HEART RATE: 67 BPM | SYSTOLIC BLOOD PRESSURE: 120 MMHG | BODY MASS INDEX: 43.78 KG/M2 | WEIGHT: 194.6 LBS | HEIGHT: 56 IN

## 2023-09-18 DIAGNOSIS — E78.2 MIXED HYPERLIPIDEMIA: ICD-10-CM

## 2023-09-18 DIAGNOSIS — E10.65 TYPE 1 DIABETES MELLITUS WITH HYPERGLYCEMIA (HCC): Primary | ICD-10-CM

## 2023-09-18 DIAGNOSIS — I10 PRIMARY HYPERTENSION: ICD-10-CM

## 2023-09-18 PROCEDURE — 99214 OFFICE O/P EST MOD 30 MIN: CPT | Performed by: PHYSICIAN ASSISTANT

## 2023-09-18 NOTE — PATIENT INSTRUCTIONS
Continue monitor diet, and maintain physical activity. Make sure to drink plenty of water throughout the day. Increase Lantus to 84 units. Decrease novolog to 1 to 2g carbs. Continue with current insulin regimen at this time. Call to have us download Dexcom in 2 weeks. Did put in referral for diabetes education if you consider following up with them. Follow up in 3 months with lab work prior to visit.

## 2023-09-18 NOTE — PROGRESS NOTES
David Mancia 50 y.o. female MRN: [id number]    Encounter: [id number]      Assessment/Plan     Assessment: This is a 50y.o.-year-old female with type 1 diabetes with neuropathy, nephropathy, hypertension and hyperlipidemia. Plan:  1. Type 1 diabetes: Most recent hemoglobin A1c was 9.4.   0 for Dexcom glucose levels are consistently running high. Declines seem to occur after taking NovoLog. She may be receiving too much NovoLog at this time, would like to increase her Lantus to 84 units daily. We will try and focus more on a 1:2 carb ratio with NovoLog. Once again may consider switching to different insulin to see if this has more success. Continue utilizing the Dexcom to monitor glucose levels. Contact the office with any concerns or questions. Follow-up in 3 months with lab work completed prior to visit.     2. Diabetic neuropathy:  Stable.  Is up-to-date on diabetic foot exam.     3. Diabetic nephropathy:  Some protein noted in her urine previously. Kelvin Finley function doing better at this time. Clyde Monique continue to monitor.       4. Hypertension: Normotensive in the office today.  Any function doing better at this time. Does follow up with nephrology. Darlin Din with current medications at this time. Emiliano Yates prior to next office visit.     5. Hyperlipidemia:  Lipid panel did reveal elevated triglycerides which might be due to her hyperglycemia.  Total cholesterol and LDL cholesterol were within normal range.  Continue with current medications. We will continue to monitor over time. CC: Type 1 diabetes follow-up    History of Present Illness     HPI:  Vernell Darby is a 47 y. o. female with type 1 diabetes with diabetic neuropathy and diabetic nephropathy, hypertension, hyperlipidemia for follow-up visit.   She was diagnosed with type 1 diabetes about 29 years ago. Christiano Middleton utilizes insulin therapy and takes Lantus insulin 78 units at bedtime and NovoLog insulin 1 unit per 1 g carbohydrate with each meal.  Get a definitive answer on how much NovoLog she is typically using throughout the day. Has had a lot of stress over the summer she has been trying to find a new job. States that she has been out of her normal routine because of this. Recently has gotten back into working out on a daily basis. She denies polyuria, polydipsia, polyphagia, or nocturia.  She denies blurry vision.  She denies numbness or tingling of the feet.  She denies chest pain or shortness of breath. Diabetic complications include diabetic neuropathy, diabetic nephropathy, diabetic retinopathy.  She denies heart attack, stroke, or claudication. Overall doing well today, but frustrated about her current A1c.     Last diabetic foot exam was performed June 2023.  She reports her last eye exam was March 2023 and there was no change in her retinopathy.      Download of Dexcom from September 5 through September 18, 2023 reveals an average glucose level of 214 with a standard deviation of 90. She is in target range 36% time, above target range 62% time, below target range 2% time. On average glucose levels are high overnight, and trend down slowly. Lowest glucose levels are typically between 8 AM and 1 PM.  Glucose levels do seem to trend upwards after lunch and then slowly decrease on average throughout the day but again she can have quite a bit of variability.     She has hypertension and diabetic nephropathy and takes lisinopril 20 mg 2 tablets daily.   She has occasional headaches but no stroke-like symptoms.     She has hyperlipidemia and takes atorvastatin 40 mg daily.  She denies chest pain or shortness of breath.      Review of Systems   Constitutional: Negative for activity change, appetite change, fatigue and unexpected weight change. HENT: Negative for sore throat and trouble swallowing. Eyes: Negative for visual disturbance. Respiratory: Negative for chest tightness and shortness of breath.     Cardiovascular: Negative for chest pain, palpitations and leg swelling. Gastrointestinal: Negative for abdominal pain, constipation, diarrhea, nausea and vomiting. Endocrine: Negative for cold intolerance, heat intolerance, polydipsia, polyphagia and polyuria. Genitourinary: Negative for frequency. Skin: Negative for wound. Neurological: Negative for dizziness, weakness, numbness and headaches. Psychiatric/Behavioral: Negative for dysphoric mood and sleep disturbance. The patient is not nervous/anxious. Historical Information   Past Medical History:   Diagnosis Date   • Anemia    • Arthritis    • Asthma     w/acute exacerbation. Last assessed: 10/26/12   • Chest pain 02/03/2016   • CHF (congestive heart failure) ([address])    • Depression    • Diabetes mellitus ([address])    • Diabetic nephropathy ([address])    • Diabetic nephropathy associated with type 1 diabetes mellitus ([address]) 08/12/2020   • Diabetic retinopathy ([address]) 02/03/2016   • Endometriosis    • Gastroenteritis 02/03/2016   • GERD (gastroesophageal reflux disease)    • HTN (hypertension)    • Hyperlipidemia 02/03/2016   • Hypertension    • Obesity    • Oligomenorrhea    • Polycystic ovaries 02/03/2016   • Retinal hemorrhage 02/03/2016   • Retinopathy    • Thrombocytosis    • Type 1 diabetes mellitus with ophthalmic manifestation ([address]) 02/03/2016     Past Surgical History:   Procedure Laterality Date   • CATARACT EXTRACTION Left 10/2020   • CATARACT EXTRACTION Right    • RETINAL LASER PROCEDURE       Social History   Social History     Substance and Sexual Activity   Alcohol Use Yes    Comment: Occasional/1 mixed drink twice a month if that     Social History     Substance and Sexual Activity   Drug Use No     Social History     Tobacco Use   Smoking Status Never   Smokeless Tobacco Never   Tobacco Comments    Per [employer]: Former smoker.  Quit in 1994     Family History:   Family History   Problem Relation Age of Onset   • Heart murmur Mother    • Diabetes Mother         Mellitus • Thyroid disease Mother         Disorder   • Diabetes Father         Mellitus   • Hypertension Father    • No Known Problems Maternal Grandmother    • Diabetes Maternal Grandfather         Mellitus   • Breast cancer Paternal Grandmother 79 - 70   • Leukemia Paternal Grandfather 50   • Diabetes Maternal Aunt         Mellitus   • Diabetes Maternal Uncle         Mellitus   • Substance Abuse Neg Hx    • Mental illness Neg Hx    • Alcohol abuse Neg Hx        Meds/Allergies   Current Outpatient Medications   Medication Sig Dispense Refill   • acetaminophen (TYLENOL) 500 mg tablet Take 2 tablets (1,000 mg total) by mouth every 6 (six) hours as needed for mild pain or moderate pain 30 tablet 0   • acetone, urine, test strip Use to test urine ketones for high blood sugars. 25 each 6   • albuterol (2.5 mg/3 mL) 0.083 % nebulizer solution Take 3 mL (2.5 mg total) by nebulization every 6 (six) hours as needed for wheezing or shortness of breath 75 mL 3   • albuterol (PROVENTIL HFA,VENTOLIN HFA) 90 mcg/act inhaler INHALE 2 PUFFS BY MOUTH EVERY 4 HOURS AS NEEDED FOR WHEEZING OR SHORTNESS OF BREATH 25.5 g 0   • Ascorbic Acid (VITAMIN C) 500 MG CAPS Take 2 capsules by mouth daily     • Aspirin Low Dose 81 MG EC tablet TAKE 1 TABLET BY MOUTH EVERY DAY 30 tablet 0   • atorvastatin (LIPITOR) 80 mg tablet TAKE 1 TABLET BY MOUTH EVERY DAY 30 tablet 11   • Blood Pressure Monitoring [place] Use 2 (two) times a day Monitor BP twice daily. Call office if BP > 140/90 persistently.  1 Device 0   • Butalbital-APAP-Caffeine (Fioricet) -40 MG CAPS Take 1 tablet by mouth 4 (four) times a day as needed (headahce) 30 capsule 0   • Continuous Blood Gluc Sensor (Dexcom G6 Sensor) MISC CHANGE EVERY 10 DAYS 3 each 12   • Continuous Blood Gluc Transmit (Dexcom G6 Transmitter) MISC CHANGE EVERY 90 DAYS 1 each 1   • cyanocobalamin (VITAMIN B-12) 100 mcg tablet Take by mouth daily     • Fluticasone-Salmeterol (Advair Diskus) 250-50 mcg/dose inhaler Inhale 1 puff daily Rinse mouth after use. 1 blister 1   • furosemide (LASIX) 40 mg tablet TAKE 1 TABLET BY MOUTH EVERY DAY 30 tablet 11   • glucagon (Glucagon Emergency) 1 MG injection Inject 1 mg under the skin once as needed for low blood sugar for up to 1 dose 1 kit 3   • Glucagon (Gvoke HypoPen 2-Pack) 1 MG/0.2ML SOAJ Use if hypoglycemia prn 1 Syringe 6   • Insulin Glargine Solostar (Lantus SoloStar) 100 UNIT/ML SOPN Inject 0.78 mL (78 Units total) under the skin in the morning 60 mL 2   • lisinopril (ZESTRIL) 20 mg tablet Take 2 tablets (40 mg total) by mouth daily 180 tablet 0   • montelukast (SINGULAIR) 10 mg tablet TAKE 1 TABLET BY MOUTH AT BEDTIME 90 tablet 0   • NovoLOG 100 UNIT/ML injection INJECT 1 UNITS OF NOVOLOG FOR EVERY 1 GRAM OF CARBS, UP  UNITS DAILY 50 mL 2   • omeprazole (PriLOSEC) 20 mg delayed release capsule Take 20 mg by mouth daily. • OneTouch Ultra test strip Test 3 times daily 300 each 3   • PARoxetine (PAXIL) 20 mg tablet Take 1 tablet (20 mg total) by mouth daily 90 tablet 0   • Probiotic Product (ALIGN PO) Take by mouth       No current facility-administered medications for this visit. No Known Allergies    Objective   Vitals: Blood pressure 120/70, pulse 67, height 4' 8" (1.422 m), weight 88.3 kg (194 lb 9.6 oz), SpO2 98 %, not currently breastfeeding. Physical Exam  Vitals and nursing note reviewed. Constitutional:       General: She is not in acute distress. Appearance: Normal appearance. She is not diaphoretic. HENT:      Head: Normocephalic and atraumatic. Eyes:      General: No scleral icterus. Extraocular Movements: Extraocular movements intact. Conjunctiva/sclera: Conjunctivae normal.      Pupils: Pupils are equal, round, and reactive to light. Cardiovascular:      Rate and Rhythm: Normal rate and regular rhythm. Heart sounds: No murmur heard. Pulmonary:      Effort: Pulmonary effort is normal. No respiratory distress.       Breath sounds: Normal breath sounds. No wheezing. Musculoskeletal:      Cervical back: Normal range of motion. Right lower leg: No edema. Left lower leg: No edema. Lymphadenopathy:      Cervical: No cervical adenopathy. Neurological:      Mental Status: She is alert and oriented to person, place, and time. Mental status is at baseline. Sensory: No sensory deficit. Gait: Gait normal.   Psychiatric:         Mood and Affect: Mood normal.         Behavior: Behavior normal.         Thought Content: Thought content normal.         The history was obtained from the review of the chart, patient.     Lab Results:   Lab Results   Component Value Date/Time    Hemoglobin A1C 9.4 (H) 09/15/2023 10:24 AM    Hemoglobin A1C 9.3 (H) 06/08/2023 08:21 AM    Hemoglobin A1C 9.5 (H) 06/05/2023 12:35 PM    WBC 10.16 07/21/2023 09:54 AM    WBC 9.21 06/08/2023 04:18 PM    WBC 7.89 06/08/2023 08:21 AM    Hemoglobin 9.6 (L) 07/21/2023 09:54 AM    Hemoglobin 9.5 (L) 06/08/2023 04:18 PM    Hemoglobin 9.5 (L) 06/08/2023 08:21 AM    Hgb, i-STAT 9.9 (L) 07/21/2023 10:31 AM    Hematocrit 29.9 (L) 07/21/2023 09:54 AM    Hematocrit 29.1 (L) 06/08/2023 04:18 PM    Hematocrit 30.3 (L) 06/08/2023 08:21 AM    Hct, i-STAT 29 (L) 07/21/2023 10:31 AM    MCV 93 07/21/2023 09:54 AM    MCV 92 06/08/2023 04:18 PM    MCV 93 06/08/2023 08:21 AM    Platelets 117 28/15/7381 09:54 AM    Platelets 413 96/06/1172 04:18 PM    Platelets 385 (H) 44/26/7513 08:21 AM    BUN 38 (H) 09/15/2023 10:24 AM    BUN 62 (H) 07/21/2023 09:54 AM    BUN 47 (H) 06/15/2023 09:02 AM    Potassium 4.5 09/15/2023 10:24 AM    Potassium 5.0 07/21/2023 09:54 AM    Potassium 4.7 06/15/2023 09:02 AM    Chloride 99 09/15/2023 10:24 AM    Chloride 103 07/21/2023 09:54 AM    Chloride 105 06/15/2023 09:02 AM    CO2 29 09/15/2023 10:24 AM    CO2 26 07/21/2023 09:54 AM    CO2 28 06/15/2023 09:02 AM    CO2, i-STAT 27 07/21/2023 10:31 AM    Creatinine 1.07 09/15/2023 10:24 AM Creatinine 1.34 (H) 07/21/2023 09:54 AM    Creatinine 1.30 06/15/2023 09:02 AM    AST 24 09/15/2023 10:24 AM    AST 9 (L) 07/21/2023 09:54 AM    AST 9 (L) 06/08/2023 04:18 PM    ALT 26 09/15/2023 10:24 AM    ALT 13 07/21/2023 09:54 AM    ALT 12 06/08/2023 04:18 PM    Total Protein 6.4 09/15/2023 10:24 AM    Total Protein 7.1 07/21/2023 09:54 AM    Total Protein 6.8 06/08/2023 04:18 PM    Albumin 3.7 09/15/2023 10:24 AM    Albumin 4.0 07/21/2023 09:54 AM    Albumin 3.8 06/08/2023 04:18 PM    HDL, Direct 37 (L) 06/19/2023 10:32 AM    HDL, Direct 40 (L) 02/21/2023 08:43 AM    Triglycerides 191 (H) 06/19/2023 10:32 AM    Triglycerides 197 (H) 02/21/2023 08:43 AM         Portions of the record may have been created with voice recognition software. Occasional wrong word or "sound a like" substitutions may have occurred due to the inherent limitations of voice recognition software. Read the chart carefully and recognize, using context, where substitutions have occurred.

## 2023-09-19 DIAGNOSIS — E10.65 TYPE 1 DIABETES MELLITUS WITH HYPERGLYCEMIA (HCC): ICD-10-CM

## 2023-09-19 RX ORDER — INSULIN ASPART 100 [IU]/ML
INJECTION, SOLUTION INTRAVENOUS; SUBCUTANEOUS
Qty: 50 ML | Refills: 2 | Status: SHIPPED | OUTPATIENT
Start: 2023-09-19

## 2023-09-20 NOTE — PROGRESS NOTES
Name: Olga Marrufo      : 1975      MRN: [id number]  Encounter Provider: Delmer Lincoln MD  Encounter Date: 9/15/2023   Encounter department: [address] [place] [address]     1. Type 1 diabetes mellitus with hyperglycemia (HCC)    2. Mild intermittent asthma without complication  -     Fluticasone-Salmeterol (Advair Diskus) 250-50 mcg/dose inhaler; Inhale 1 puff daily Rinse mouth after use. 3. Essential hypertension  -     lisinopril (ZESTRIL) 20 mg tablet; Take 2 tablets (40 mg total) by mouth daily    4. Depression, unspecified depression type  -     PARoxetine (PAXIL) 20 mg tablet; Take 1 tablet (20 mg total) by mouth daily    Diabetes: reviewed most recent a1c from 9/15 is 9.4 similar to previous result has been following up with endocrinology closely for management and has an appointment in a few days to discuss and adjust medication regimen as needed  Up-to-date on eye exam  Up-to-date on foot exam    Asthma: Controlled, needs refill of Advair, refill provided    Hypertension: Blood pressure very nicely controlled continue current regimen    Depression: No acute symptoms has been on Paxil for quite some time tolerating well needs refill      Patient to return to office in 2024 for annual physical exam unless need to be seen sooner    Subjective      Iveth Avendano is a 68-year-old female who presents to the office today to follow-up. Patient reports she has been doing well with no acute complaints or concerns at this time. Has been following up with all her specialists, and compliance with medication. Review of Systems   Constitutional: Negative for chills and fever. HENT: Negative for ear pain and sore throat. Eyes: Negative for visual disturbance. Respiratory: Negative for cough and shortness of breath. Cardiovascular: Negative for chest pain and palpitations.    Gastrointestinal: Negative for abdominal pain, constipation, diarrhea and vomiting. Genitourinary: Negative for dysuria. Skin: Negative for rash. Neurological: Negative for dizziness and headaches. Current Outpatient Medications on File Prior to Visit   Medication Sig   • acetaminophen (TYLENOL) 500 mg tablet Take 2 tablets (1,000 mg total) by mouth every 6 (six) hours as needed for mild pain or moderate pain   • acetone, urine, test strip Use to test urine ketones for high blood sugars. • albuterol (2.5 mg/3 mL) 0.083 % nebulizer solution Take 3 mL (2.5 mg total) by nebulization every 6 (six) hours as needed for wheezing or shortness of breath   • albuterol (PROVENTIL HFA,VENTOLIN HFA) 90 mcg/act inhaler INHALE 2 PUFFS BY MOUTH EVERY 4 HOURS AS NEEDED FOR WHEEZING OR SHORTNESS OF BREATH   • Ascorbic Acid (VITAMIN C) 500 MG CAPS Take 2 capsules by mouth daily   • Aspirin Low Dose 81 MG EC tablet TAKE 1 TABLET BY MOUTH EVERY DAY   • atorvastatin (LIPITOR) 80 mg tablet TAKE 1 TABLET BY MOUTH EVERY DAY   • Blood Pressure Monitoring [place] Use 2 (two) times a day Monitor BP twice daily. Call office if BP > 140/90 persistently.    • Butalbital-APAP-Caffeine (Fioricet) -40 MG CAPS Take 1 tablet by mouth 4 (four) times a day as needed ([facility])   • Continuous Blood Gluc Sensor (Dexcom G6 Sensor) MISC CHANGE EVERY 10 DAYS   • Continuous Blood Gluc Transmit (Dexcom G6 Transmitter) MISC CHANGE EVERY 90 DAYS   • cyanocobalamin (VITAMIN B-12) 100 mcg tablet Take by mouth daily   • furosemide (LASIX) 40 mg tablet TAKE 1 TABLET BY MOUTH EVERY DAY   • glucagon (Glucagon Emergency) 1 MG injection Inject 1 mg under the skin once as needed for low blood sugar for up to 1 dose   • Glucagon (Gvoke HypoPen 2-Pack) 1 MG/0.2ML SOAJ Use if hypoglycemia prn   • Insulin Glargine Solostar (Lantus SoloStar) 100 UNIT/ML SOPN Inject 0.78 mL (78 Units total) under the skin in the morning   • montelukast (SINGULAIR) 10 mg tablet TAKE 1 TABLET BY MOUTH AT BEDTIME   • omeprazole (PriLOSEC) 20 mg delayed release capsule Take 20 mg by mouth daily. • OneTouch Ultra test strip Test 3 times daily   • Probiotic Product (ALIGN PO) Take by mouth       Objective     /70   Pulse 94   Temp 98.4 °F (36.9 °C)   Resp 16   Ht 4' 8" (1.422 m)   Wt 87.9 kg (193 lb 12.8 oz)   SpO2 99%   BMI 43.45 kg/m²     Physical Exam  Vitals reviewed. Constitutional:       Appearance: She is obese. HENT:      Head: Normocephalic and atraumatic. Right Ear: Tympanic membrane and ear canal normal.      Left Ear: Tympanic membrane and ear canal normal.      Mouth/Throat:      Mouth: Mucous membranes are moist.   Eyes:      Extraocular Movements: Extraocular movements intact. Cardiovascular:      Rate and Rhythm: Normal rate and regular rhythm. Pulses: Normal pulses. Heart sounds: Normal heart sounds. Pulmonary:      Effort: Pulmonary effort is normal.      Breath sounds: Normal breath sounds. Abdominal:      Tenderness: There is no abdominal tenderness. Neurological:      Mental Status: She is alert.    Psychiatric:         Mood and Affect: Mood normal.       Lisset Moraes MD

## 2023-09-29 ENCOUNTER — TELEPHONE (OUTPATIENT)
Dept: FAMILY MEDICINE CLINIC | Facility: CLINIC | Age: 48
End: 2023-09-29

## 2023-10-01 DIAGNOSIS — E10.65 TYPE 1 DIABETES MELLITUS WITH HYPERGLYCEMIA (HCC): ICD-10-CM

## 2023-10-02 RX ORDER — PROCHLORPERAZINE 25 MG/1
SUPPOSITORY RECTAL
Qty: 1 EACH | Refills: 1 | Status: SHIPPED | OUTPATIENT
Start: 2023-10-02

## 2023-10-02 RX ORDER — PROCHLORPERAZINE 25 MG/1
SUPPOSITORY RECTAL
Qty: 3 EACH | Refills: 12 | Status: SHIPPED | OUTPATIENT
Start: 2023-10-02

## 2023-10-18 ENCOUNTER — TELEPHONE (OUTPATIENT)
Dept: HEMATOLOGY ONCOLOGY | Facility: CLINIC | Age: 48
End: 2023-10-18

## 2023-10-18 NOTE — TELEPHONE ENCOUNTER
I called Carolynn Meade regarding an appointment that they have scheduled with LELAND Plata scheduled on  12/8/23      I left a voicemail explaining to patient that this appointment will need to be rescheduled due to a change in the providers schedule. Patient was advised to call [facility] to reschedule.   Another attempt will be made to reschedule You can access the FollowMyHealth Patient Portal offered by [facility] by registering at the following website: [email or web address]. By joining [facility]’s FollowMyHealth portal, you will also be able to view your health information using other applications (apps) compatible with our system.

## 2023-10-19 DIAGNOSIS — J45.909 UNCOMPLICATED ASTHMA, UNSPECIFIED ASTHMA SEVERITY, UNSPECIFIED WHETHER PERSISTENT: ICD-10-CM

## 2023-10-19 RX ORDER — ALBUTEROL SULFATE 90 UG/1
AEROSOL, METERED RESPIRATORY (INHALATION)
Qty: 25.5 G | Refills: 0 | Status: SHIPPED | OUTPATIENT
Start: 2023-10-19

## 2023-10-24 ENCOUNTER — TELEPHONE (OUTPATIENT)
Dept: HEMATOLOGY ONCOLOGY | Facility: CLINIC | Age: 48
End: 2023-10-24

## 2023-10-24 NOTE — TELEPHONE ENCOUNTER
I called Linda Talavera regarding an appointment that they have scheduled with LELAND Camacho scheduled on  12/08/2023      I left a voicemail explaining to patient that this appointment will need to be rescheduled due to a change in the providers schedule. Patient was advised to call [facility] to reschedule.   Another attempt will be made to reschedule

## 2023-10-25 ENCOUNTER — TELEPHONE (OUTPATIENT)
Dept: HEMATOLOGY ONCOLOGY | Facility: CLINIC | Age: 48
End: 2023-10-25

## 2023-10-25 NOTE — TELEPHONE ENCOUNTER
I called Melissa Bhagat regarding an appointment that they have scheduled with LELAND Hadley scheduled on  12/08/2023      I left a voicemail explaining to patient that this appointment will need to be rescheduled due to a change in the providers schedule. Patient was advised to call [facility] to reschedule. This is the third attempt to reschedule, and patient's appointment has been cancelled. A [employer] message (if applicable) has been sent to patient and a letter has been mailed to patients home address.

## 2023-10-26 DIAGNOSIS — F32.A DEPRESSION, UNSPECIFIED DEPRESSION TYPE: ICD-10-CM

## 2023-10-26 RX ORDER — PAROXETINE HYDROCHLORIDE 20 MG/1
20 TABLET, FILM COATED ORAL DAILY
Qty: 90 TABLET | Refills: 0 | Status: SHIPPED | OUTPATIENT
Start: 2023-10-26 | End: 2024-01-24

## 2023-10-27 ENCOUNTER — TELEPHONE (OUTPATIENT)
Dept: HEMATOLOGY ONCOLOGY | Facility: CLINIC | Age: 48
End: 2023-10-27

## 2023-10-27 NOTE — TELEPHONE ENCOUNTER
Appointment Change  Cancel, Reschedule, Change to Virtual      Who are you speaking with? Patient   If it is not the patient, is the caller listed on the communication consent form? N/A   Which provider is the appointment scheduled with? LELAND Concepcion   When was the original appointment scheduled? Please list date and time 12/8/23 @ 8   At which location is the appointment scheduled to take place? [place]   Was the appointment rescheduled? Was the appointment changed from an in person visit to a virtual visit? If so, please list the details of the change. 12/19/23 @ 930   What is the reason for the appointment change? provider       Was [facility] transport scheduled? No   Does [facility] transport need to be scheduled for the new visit (if applicable) No   Does the patient need an infusion appointment rescheduled? No   Does the patient have an upcoming infusion appointment scheduled? If so, when? No   Is the patient undergoing chemotherapy? No   For appointments cancelled with less than 24 hours:  Was the no-show policy reviewed?  N/A Patient tolerating oral fluids at this time.

## 2023-10-28 DIAGNOSIS — E78.2 MIXED HYPERLIPIDEMIA: ICD-10-CM

## 2023-10-30 RX ORDER — ATORVASTATIN CALCIUM 80 MG/1
TABLET, FILM COATED ORAL
Qty: 30 TABLET | Refills: 11 | Status: SHIPPED | OUTPATIENT
Start: 2023-10-30

## 2023-11-15 DIAGNOSIS — J45.20 MILD INTERMITTENT ASTHMA WITHOUT COMPLICATION: ICD-10-CM

## 2023-11-15 RX ORDER — MONTELUKAST SODIUM 10 MG/1
TABLET ORAL
Qty: 90 TABLET | Refills: 0 | Status: SHIPPED | OUTPATIENT
Start: 2023-11-15

## 2023-11-27 DIAGNOSIS — E10.65 TYPE 1 DIABETES MELLITUS WITH HYPERGLYCEMIA (HCC): ICD-10-CM

## 2023-11-27 RX ORDER — INSULIN GLARGINE 100 [IU]/ML
INJECTION, SOLUTION SUBCUTANEOUS
Qty: 60 ML | Refills: 2 | Status: SHIPPED | OUTPATIENT
Start: 2023-11-27

## 2023-12-07 DIAGNOSIS — J45.31 MILD PERSISTENT ASTHMA WITH ACUTE EXACERBATION: ICD-10-CM

## 2023-12-07 RX ORDER — ALBUTEROL SULFATE 2.5 MG/3ML
SOLUTION RESPIRATORY (INHALATION)
Qty: 75 ML | Refills: 3 | Status: SHIPPED | OUTPATIENT
Start: 2023-12-07

## 2023-12-08 ENCOUNTER — APPOINTMENT (EMERGENCY)
Dept: RADIOLOGY | Facility: HOSPITAL | Age: 48
End: 2023-12-08
Payer: COMMERCIAL

## 2023-12-08 ENCOUNTER — HOSPITAL ENCOUNTER (EMERGENCY)
Facility: HOSPITAL | Age: 48
Discharge: HOME | End: 2023-12-08
Attending: EMERGENCY MEDICINE
Payer: COMMERCIAL

## 2023-12-08 VITALS
DIASTOLIC BLOOD PRESSURE: 72 MMHG | TEMPERATURE: 99.1 F | OXYGEN SATURATION: 96 % | SYSTOLIC BLOOD PRESSURE: 154 MMHG | HEART RATE: 93 BPM | RESPIRATION RATE: 19 BRPM

## 2023-12-08 DIAGNOSIS — B33.8 RSV (RESPIRATORY SYNCYTIAL VIRUS INFECTION): ICD-10-CM

## 2023-12-08 DIAGNOSIS — J45.901 ASTHMA EXACERBATION: Primary | ICD-10-CM

## 2023-12-08 LAB
2HR DELTA HS TROPONIN: 1 NG/L
ALBUMIN SERPL BCP-MCNC: 3.8 G/DL (ref 3.5–5)
ALP SERPL-CCNC: 106 U/L (ref 34–104)
ALT SERPL W P-5'-P-CCNC: 21 U/L (ref 7–52)
ANION GAP SERPL CALCULATED.3IONS-SCNC: 10 MMOL/L
AST SERPL W P-5'-P-CCNC: 19 U/L (ref 13–39)
ATRIAL RATE: 97 BPM
BASE EXCESS BLDA CALC-SCNC: -2 MMOL/L (ref -2–3)
BASOPHILS # BLD AUTO: 0.07 THOUSANDS/ÂΜL (ref 0–0.1)
BASOPHILS NFR BLD AUTO: 1 % (ref 0–1)
BETA-HYDROXYBUTYRATE: 0.1 MMOL/L
BILIRUB SERPL-MCNC: 0.21 MG/DL (ref 0.2–1)
BNP SERPL-MCNC: 49 PG/ML (ref 0–100)
BUN SERPL-MCNC: 44 MG/DL (ref 5–25)
CA-I BLD-SCNC: 1.08 MMOL/L (ref 1.12–1.32)
CALCIUM SERPL-MCNC: 8.5 MG/DL (ref 8.4–10.2)
CARDIAC TROPONIN I PNL SERPL HS: 6 NG/L
CARDIAC TROPONIN I PNL SERPL HS: 7 NG/L
CHLORIDE SERPL-SCNC: 100 MMOL/L (ref 96–108)
CO2 SERPL-SCNC: 24 MMOL/L (ref 21–32)
CREAT SERPL-MCNC: 1.12 MG/DL (ref 0.6–1.3)
EOSINOPHIL # BLD AUTO: 0.52 THOUSAND/ÂΜL (ref 0–0.61)
EOSINOPHIL NFR BLD AUTO: 5 % (ref 0–6)
ERYTHROCYTE [DISTWIDTH] IN BLOOD BY AUTOMATED COUNT: 14.6 % (ref 11.6–15.1)
FLUAV RNA RESP QL NAA+PROBE: NEGATIVE
FLUBV RNA RESP QL NAA+PROBE: NEGATIVE
GFR SERPL CREATININE-BSD FRML MDRD: 58 ML/MIN/1.73SQ M
GLUCOSE SERPL-MCNC: 271 MG/DL (ref 65–140)
GLUCOSE SERPL-MCNC: 281 MG/DL (ref 65–140)
GLUCOSE SERPL-MCNC: 298 MG/DL (ref 65–140)
HCO3 BLDA-SCNC: 23.1 MMOL/L (ref 24–30)
HCT VFR BLD AUTO: 29.2 % (ref 34.8–46.1)
HCT VFR BLD CALC: 28 % (ref 34.8–46.1)
HGB BLD-MCNC: 9.1 G/DL (ref 11.5–15.4)
HGB BLDA-MCNC: 9.5 G/DL (ref 11.5–15.4)
IMM GRANULOCYTES # BLD AUTO: 0.08 THOUSAND/UL (ref 0–0.2)
IMM GRANULOCYTES NFR BLD AUTO: 1 % (ref 0–2)
LYMPHOCYTES # BLD AUTO: 2.15 THOUSANDS/ÂΜL (ref 0.6–4.47)
LYMPHOCYTES NFR BLD AUTO: 19 % (ref 14–44)
MCH RBC QN AUTO: 28.3 PG (ref 26.8–34.3)
MCHC RBC AUTO-ENTMCNC: 31.2 G/DL (ref 31.4–37.4)
MCV RBC AUTO: 91 FL (ref 82–98)
MONOCYTES # BLD AUTO: 0.87 THOUSAND/ÂΜL (ref 0.17–1.22)
MONOCYTES NFR BLD AUTO: 8 % (ref 4–12)
NEUTROPHILS # BLD AUTO: 7.63 THOUSANDS/ÂΜL (ref 1.85–7.62)
NEUTS SEG NFR BLD AUTO: 66 % (ref 43–75)
NRBC BLD AUTO-RTO: 0 /100 WBCS
P AXIS: 63 DEGREES
PCO2 BLD: 24 MMOL/L (ref 21–32)
PCO2 BLD: 39.3 MM HG (ref 42–50)
PH BLD: 7.38 [PH] (ref 7.3–7.4)
PLATELET # BLD AUTO: 373 THOUSANDS/UL (ref 149–390)
PMV BLD AUTO: 10 FL (ref 8.9–12.7)
PO2 BLD: 47 MM HG (ref 35–45)
POTASSIUM BLD-SCNC: 4.8 MMOL/L (ref 3.5–5.3)
POTASSIUM SERPL-SCNC: 4.8 MMOL/L (ref 3.5–5.3)
PR INTERVAL: 136 MS
PROT SERPL-MCNC: 6.9 G/DL (ref 6.4–8.4)
QRS AXIS: 88 DEGREES
QRSD INTERVAL: 80 MS
QT INTERVAL: 356 MS
QTC INTERVAL: 452 MS
RBC # BLD AUTO: 3.21 MILLION/UL (ref 3.81–5.12)
RSV RNA RESP QL NAA+PROBE: POSITIVE
SAO2 % BLD FROM PO2: 82 % (ref 60–85)
SARS-COV-2 RNA RESP QL NAA+PROBE: NEGATIVE
SODIUM BLD-SCNC: 134 MMOL/L (ref 136–145)
SODIUM SERPL-SCNC: 134 MMOL/L (ref 135–147)
SPECIMEN SOURCE: ABNORMAL
T WAVE AXIS: 61 DEGREES
VENTRICULAR RATE: 97 BPM
WBC # BLD AUTO: 11.32 THOUSAND/UL (ref 4.31–10.16)

## 2023-12-08 PROCEDURE — 0241U HB NFCT DS VIR RESP RNA 4 TRGT: CPT | Performed by: EMERGENCY MEDICINE

## 2023-12-08 PROCEDURE — 84484 ASSAY OF TROPONIN QUANT: CPT | Performed by: EMERGENCY MEDICINE

## 2023-12-08 PROCEDURE — 82947 ASSAY GLUCOSE BLOOD QUANT: CPT

## 2023-12-08 PROCEDURE — 84295 ASSAY OF SERUM SODIUM: CPT

## 2023-12-08 PROCEDURE — 99285 EMERGENCY DEPT VISIT HI MDM: CPT | Performed by: EMERGENCY MEDICINE

## 2023-12-08 PROCEDURE — 85014 HEMATOCRIT: CPT

## 2023-12-08 PROCEDURE — 82330 ASSAY OF CALCIUM: CPT

## 2023-12-08 PROCEDURE — 93005 ELECTROCARDIOGRAM TRACING: CPT

## 2023-12-08 PROCEDURE — 82010 KETONE BODYS QUAN: CPT | Performed by: EMERGENCY MEDICINE

## 2023-12-08 PROCEDURE — 71045 X-RAY EXAM CHEST 1 VIEW: CPT

## 2023-12-08 PROCEDURE — 83880 ASSAY OF NATRIURETIC PEPTIDE: CPT | Performed by: EMERGENCY MEDICINE

## 2023-12-08 PROCEDURE — 84132 ASSAY OF SERUM POTASSIUM: CPT

## 2023-12-08 PROCEDURE — 82803 BLOOD GASES ANY COMBINATION: CPT

## 2023-12-08 PROCEDURE — 80053 COMPREHEN METABOLIC PANEL: CPT | Performed by: EMERGENCY MEDICINE

## 2023-12-08 PROCEDURE — 94640 AIRWAY INHALATION TREATMENT: CPT

## 2023-12-08 PROCEDURE — 85025 COMPLETE CBC W/AUTO DIFF WBC: CPT | Performed by: EMERGENCY MEDICINE

## 2023-12-08 PROCEDURE — 36415 COLL VENOUS BLD VENIPUNCTURE: CPT | Performed by: EMERGENCY MEDICINE

## 2023-12-08 PROCEDURE — 99285 EMERGENCY DEPT VISIT HI MDM: CPT

## 2023-12-08 PROCEDURE — 82948 REAGENT STRIP/BLOOD GLUCOSE: CPT

## 2023-12-08 RX ORDER — ALBUTEROL SULFATE 2.5 MG/3ML
5 SOLUTION RESPIRATORY (INHALATION) ONCE
Status: COMPLETED | OUTPATIENT
Start: 2023-12-08 | End: 2023-12-08

## 2023-12-08 RX ORDER — PREDNISONE 20 MG/1
40 TABLET ORAL ONCE
Status: COMPLETED | OUTPATIENT
Start: 2023-12-08 | End: 2023-12-08

## 2023-12-08 RX ORDER — PREDNISONE 20 MG/1
40 TABLET ORAL DAILY
Qty: 8 TABLET | Refills: 0 | Status: SHIPPED | OUTPATIENT
Start: 2023-12-08

## 2023-12-08 RX ADMIN — ALBUTEROL SULFATE 5 MG: 2.5 SOLUTION RESPIRATORY (INHALATION) at 00:37

## 2023-12-08 RX ADMIN — PREDNISONE 40 MG: 20 TABLET ORAL at 01:41

## 2023-12-08 RX ADMIN — IPRATROPIUM BROMIDE 0.5 MG: 0.5 SOLUTION RESPIRATORY (INHALATION) at 00:37

## 2023-12-08 NOTE — ED PROVIDER NOTES
History  Chief Complaint   Patient presents with    Shortness of Breath     Patient reports cold symptoms for the past few days. Tonight she became SOB. Patient reports doing several dakota treatments throughout the day and mucinex this afternoon. 49-year-old female with history of asthma, type 1 diabetes, diastolic CHF presents for evaluation of upper respiratory infection symptoms starting Monday, worsening over the course of the week, started having worsening shortness of breath, increased phlegm production only partially relieved by her inhaler which she has been using 4 times a day, tonight states that she was having trouble worse when she was going to sleep so she came in for evaluation. No current chest pain, no weight gain, no significant lower extremity swelling, has been sleeping with her normal 1 pillow at night. Prior to Admission Medications   Prescriptions Last Dose Informant Patient Reported? Taking? Ascorbic Acid (VITAMIN C) 500 MG CAPS  Self Yes No   Sig: Take 2 capsules by mouth daily   Aspirin Low Dose 81 MG EC tablet  Self No No   Sig: TAKE 1 TABLET BY MOUTH EVERY DAY   Blood Pressure Monitoring [place]  Self No No   Sig: Use 2 (two) times a day Monitor BP twice daily. Call office if BP > 140/90 persistently. Butalbital-APAP-Caffeine (Fioricet) -40 MG CAPS  Self No No   Sig: Take 1 tablet by mouth 4 (four) times a day as needed (headahce)   Continuous Blood Gluc Sensor (Dexcom G6 Sensor) MISC   No No   Sig: CHANGE EVERY 10 DAYS   Continuous Blood Gluc Transmit (Dexcom G6 Transmitter) MISC   No No   Sig: CHANGE EVERY 90 DAYS   Fluticasone-Salmeterol (Advair Diskus) 250-50 mcg/dose inhaler   No No   Sig: Inhale 1 puff daily Rinse mouth after use.    Glucagon (Gvoke HypoPen 2-Pack) 1 MG/0.2ML SOAJ  Self No No   Sig: Use if hypoglycemia prn   Lantus SoloStar 100 units/mL SOPN   No No   Sig: INJECT 78 UNITS SUBCUTANEOUSLY (UNDER THE SKIN) EVERY MORNING   NovoLOG 100 UNIT/ML injection No No   Sig: INJECT 1 UNITS OF NOVOLOG FOR EVERY 1 GRAM OF CARBS, UP  UNITS DAILY. OneTouch Ultra test strip  Self No No   Sig: Test 3 times daily   PARoxetine (PAXIL) 20 mg tablet   No No   Sig: Take 1 tablet (20 mg total) by mouth daily   Probiotic Product (ALIGN PO)  Self Yes No   Sig: Take by mouth   acetaminophen (TYLENOL) 500 mg tablet  Self No No   Sig: Take 2 tablets (1,000 mg total) by mouth every 6 (six) hours as needed for mild pain or moderate pain   acetone, urine, test strip  Self No No   Sig: Use to test urine ketones for high blood sugars. albuterol (2.5 mg/3 mL) 0.083 % nebulizer solution   No No   Sig: INHALE CONTENTS OF 1 VIAL BY NEBULIZER EVERY 6 HOURS AS NEEDED FOR WHEEZING OR FOR SHORTNESS OF BREATH.   albuterol (PROVENTIL HFA,VENTOLIN HFA) 90 mcg/act inhaler   No No   Sig: INHALE 2 PUFSS BY MOUTH EVERY 4 HOURS AS NEEDED WHEEZING OR FOR SHORTNESS OF BREATH   atorvastatin (LIPITOR) 80 mg tablet   No No   Sig: TAKE 1 TABLET BY MOUTH EVERY DAY   cyanocobalamin (VITAMIN B-12) 100 mcg tablet  Self Yes No   Sig: Take by mouth daily   furosemide (LASIX) 40 mg tablet  Self No No   Sig: TAKE 1 TABLET BY MOUTH EVERY DAY   glucagon (Glucagon Emergency) 1 MG injection  Self No No   Sig: Inject 1 mg under the skin once as needed for low blood sugar for up to 1 dose   lisinopril (ZESTRIL) 20 mg tablet   No No   Sig: Take 2 tablets (40 mg total) by mouth daily   montelukast (SINGULAIR) 10 mg tablet   No No   Sig: TAKE 1 TABLET BY MOUTH AT BEDTIME   omeprazole (PriLOSEC) 20 mg delayed release capsule  Self Yes No   Sig: Take 20 mg by mouth daily. Facility-Administered Medications: None       Past Medical History:   Diagnosis Date    Anemia     Arthritis     Asthma     w/acute exacerbation.  Last assessed: 10/26/12    Chest pain 02/03/2016    CHF (congestive heart failure) (HCC)     Depression     Diabetes mellitus ([address])     Diabetic nephropathy ([address])     Diabetic nephropathy associated with type 1 diabetes mellitus ([address]) 08/12/2020    Diabetic retinopathy ([address]) 02/03/2016    Endometriosis     Gastroenteritis 02/03/2016    GERD (gastroesophageal reflux disease)     HTN (hypertension)     Hyperlipidemia 02/03/2016    Hypertension     Obesity     Oligomenorrhea     Polycystic ovaries 02/03/2016    Retinal hemorrhage 02/03/2016    Retinopathy     Thrombocytosis     Type 1 diabetes mellitus with ophthalmic manifestation ([address]) 02/03/2016       Past Surgical History:   Procedure Laterality Date    CATARACT EXTRACTION Left 10/2020    CATARACT EXTRACTION Right     RETINAL LASER PROCEDURE         Family History   Problem Relation Age of Onset    Heart murmur Mother     Diabetes Mother         Mellitus    Thyroid disease Mother         Disorder    Diabetes Father         Mellitus    Hypertension Father     No Known Problems Maternal Grandmother     Diabetes Maternal Grandfather         Mellitus    Breast cancer Paternal Grandmother 79 - 70    Leukemia Paternal Grandfather 50    Diabetes Maternal Aunt         Mellitus    Diabetes Maternal Uncle         Mellitus    Substance Abuse Neg Hx     Mental illness Neg Hx     Alcohol abuse Neg Hx      I have reviewed and agree with the history as documented. E-Cigarette/Vaping    E-Cigarette Use Never User      E-Cigarette/Vaping Substances    Nicotine No     THC No     CBD No     Flavoring No      Social History     Tobacco Use    Smoking status: Never    Smokeless tobacco: Never    Tobacco comments:     Per [employer]: Former smoker. Quit in 1994   Vaping Use    Vaping Use: Never used   Substance Use Topics    Alcohol use: Yes     Comment: Occasional/1 mixed drink twice a month if that    Drug use: No       Review of Systems   Constitutional:  Negative for appetite change and fever. HENT:  Positive for congestion. Negative for rhinorrhea and sore throat. Eyes:  Negative for photophobia and visual disturbance. Respiratory:  Positive for cough and shortness of breath. Negative for chest tightness and wheezing. Cardiovascular:  Negative for chest pain, palpitations and leg swelling. Gastrointestinal:  Negative for abdominal distention, abdominal pain, blood in stool, constipation and diarrhea. Genitourinary:  Negative for dysuria, flank pain, frequency, hematuria and urgency. Musculoskeletal:  Negative for back pain. Skin:  Negative for rash. Neurological:  Negative for dizziness, weakness and headaches. All other systems reviewed and are negative. Physical Exam  Physical Exam  Vitals and nursing note reviewed. Constitutional:       Appearance: She is well-developed. HENT:      Head: Normocephalic and atraumatic. Eyes:      Pupils: Pupils are equal, round, and reactive to light. Cardiovascular:      Rate and Rhythm: Normal rate and regular rhythm. Heart sounds: No murmur heard. No friction rub. No gallop. Pulmonary:      Effort: Pulmonary effort is normal.      Breath sounds: Wheezing present. No rales. Chest:      Chest wall: No tenderness. Abdominal:      General: There is no distension. Palpations: Abdomen is soft. There is no mass. Tenderness: There is no guarding or rebound. Musculoskeletal:      Cervical back: Normal range of motion and neck supple. Right lower leg: Edema present. Left lower leg: Edema present. Skin:     General: Skin is warm and dry. Neurological:      Mental Status: She is alert and oriented to person, place, and time.          Vital Signs  ED Triage Vitals   Temperature Pulse Respirations Blood Pressure SpO2   12/08/23 0013 12/08/23 0012 12/08/23 0012 12/08/23 0012 12/08/23 0012   99.1 °F (37.3 °C) (!) 108 18 (!) 209/86 97 %      Temp Source Heart Rate Source Patient Position - Orthostatic VS BP Location FiO2 (%)   12/08/23 0013 12/08/23 0012 12/08/23 0012 12/08/23 0012 --   Axillary Monitor Sitting Left arm       Pain Score       --                  Vitals:    12/08/23 0045 12/08/23 0100 12/08/23 0130 12/08/23 0230   BP: 168/72 162/77 159/69 154/72   Pulse: 95 92 97 93   Patient Position - Orthostatic VS:             Visual Acuity      ED Medications  Medications   albuterol inhalation solution 5 mg (5 mg Nebulization Given 12/8/23 0037)   ipratropium (ATROVENT) 0.02 % inhalation solution 0.5 mg (0.5 mg Nebulization Given 12/8/23 0037)   predniSONE tablet 40 mg (40 mg Oral Given 12/8/23 0141)       Diagnostic Studies  Results Reviewed       Procedure Component Value Units Date/Time    HS Troponin I 2hr [[id number]]  (Normal) Collected: 12/08/23 0228    Lab Status: Final result Specimen: Blood from Arm, Left Updated: 12/08/23 0254     hs TnI 2hr 7 ng/L      Delta 2hr hsTnI 1 ng/L     HS Troponin I 4hr [[id number]]     Lab Status: No result Specimen: Blood     FLU/RSV/COVID - if FLU/RSV clinically relevant [[id number]]  (Abnormal) Collected: 12/08/23 0040    Lab Status: Final result Specimen: Nares from Nose Updated: 12/08/23 0126     SARS-CoV-2 Negative     INFLUENZA A PCR Negative     INFLUENZA B PCR Negative     RSV PCR Positive    Narrative:      FOR PEDIATRIC PATIENTS - copy/paste COVID Guidelines URL to browser: [email or web address]    SARS-CoV-2 assay is a Nucleic Acid Amplification assay intended for the  qualitative detection of nucleic acid from SARS-CoV-2 in nasopharyngeal  swabs. Results are for the presumptive identification of SARS-CoV-2 RNA. Positive results are indicative of infection with SARS-CoV-2, the virus  causing COVID-19, but do not rule out bacterial infection or co-infection  with other viruses. Laboratories within the [facility] and its  territories are required to report all positive results to the appropriate  public health authorities. Negative results do not preclude SARS-CoV-2  infection and should not be used as the sole basis for treatment or other  patient management decisions.  Negative results must be combined with  clinical observations, patient history, and epidemiological information. This test has not been FDA cleared or approved. This test has been authorized by FDA under an Emergency Use Authorization  (EUA). This test is only authorized for the duration of time the  declaration that circumstances exist justifying the authorization of the  emergency use of an in vitro diagnostic tests for detection of SARS-CoV-2  virus and/or diagnosis of COVID-19 infection under section 564(b)(1) of  the Act, 21 U. S.C. 244NYA-5(O)(1), unless the authorization is terminated  or revoked sooner. The test has been validated but independent review by FDA  and CLIA is pending. Test performed using [employer] GeneXpert: This RT-PCR assay targets N2,  a region unique to SARS-CoV-2. A conserved region in the E-gene was chosen  for pan-Sarbecovirus detection which includes SARS-CoV-2. According to CMS-2020-01-R, this platform meets the definition of high-throughput technology.     HS Troponin 0hr (reflex protocol) [[id number]]  (Normal) Collected: 12/08/23 0040    Lab Status: Final result Specimen: Blood from Arm, Left Updated: 12/08/23 0115     hs TnI 0hr 6 ng/L     B-Type Natriuretic Peptide(BNP) [[id number]]  (Normal) Collected: 12/08/23 0040    Lab Status: Final result Specimen: Blood from Arm, Left Updated: 12/08/23 0114     BNP 49 pg/mL     Comprehensive metabolic panel [[id number]]  (Abnormal) Collected: 12/08/23 0040    Lab Status: Final result Specimen: Blood from Arm, Left Updated: 12/08/23 0109     Sodium 134 mmol/L      Potassium 4.8 mmol/L      Chloride 100 mmol/L      CO2 24 mmol/L      ANION GAP 10 mmol/L      BUN 44 mg/dL      Creatinine 1.12 mg/dL      Glucose 281 mg/dL      Calcium 8.5 mg/dL      AST 19 U/L      ALT 21 U/L      Alkaline Phosphatase 106 U/L      Total Protein 6.9 g/dL      Albumin 3.8 g/dL      Total Bilirubin 0.21 mg/dL      eGFR 58 ml/min/1.73sq m     Narrative:      [facility] guidelines for Chronic Kidney Disease (CKD):     Stage 1 with normal or high GFR (GFR > 90 mL/min/1.73 square meters)    Stage 2 Mild CKD (GFR = 60-89 mL/min/1.73 square meters)    Stage 3A Moderate CKD (GFR = 45-59 mL/min/1.73 square meters)    Stage 3B Moderate CKD (GFR = 30-44 mL/min/1.73 square meters)    Stage 4 Severe CKD (GFR = 15-29 mL/min/1.73 square meters)    Stage 5 End Stage CKD (GFR <15 mL/min/1.73 square meters)  Note: GFR calculation is accurate only with a steady state creatinine    Beta Hydroxybutyrate [[id number]]  (Normal) Collected: 12/08/23 0040    Lab Status: Final result Specimen: Blood from Arm, Left Updated: 12/08/23 0059     BETA-HYDROXYBUTYRATE 0.1 mmol/L     CBC and differential [[id number]]  (Abnormal) Collected: 12/08/23 0040    Lab Status: Final result Specimen: Blood from Arm, Left Updated: 12/08/23 0052     WBC 11.32 Thousand/uL      RBC 3.21 Million/uL      Hemoglobin 9.1 g/dL      Hematocrit 29.2 %      MCV 91 fL      MCH 28.3 pg      MCHC 31.2 g/dL      RDW 14.6 %      MPV 10.0 fL      Platelets 042 Thousands/uL      nRBC 0 /100 WBCs      Neutrophils Relative 66 %      Immat GRANS % 1 %      Lymphocytes Relative 19 %      Monocytes Relative 8 %      Eosinophils Relative 5 %      Basophils Relative 1 %      Neutrophils Absolute 7.63 Thousands/µL      Immature Grans Absolute 0.08 Thousand/uL      Lymphocytes Absolute 2.15 Thousands/µL      Monocytes Absolute 0.87 Thousand/µL      Eosinophils Absolute 0.52 Thousand/µL      Basophils Absolute 0.07 Thousands/µL     POCT Blood Gas (CG8+) [[id number]]  (Abnormal) Collected: 12/08/23 0047    Lab Status: Final result Specimen: Venous Updated: 12/08/23 0051     ph, Victro Hugo ISTAT 7.377     pCO2, Victor Hugo i-STAT 39.3 mm HG      pO2, Victor Hugo i-STAT 47.0 mm HG      BE, i-STAT -2 mmol/L      HCO3, Victor Hugo i-STAT 23.1 mmol/L      CO2, i-STAT 24 mmol/L      O2 Sat, i-STAT 82 %      SODIUM, I-STAT 134 mmol/l      Potassium, i-STAT 4.8 mmol/L      Calcium, Ionized i-STAT 1.08 mmol/L      Hct, i-STAT 28 %      Hgb, i-STAT 9.5 g/dl      Glucose, i-STAT 298 mg/dl      Specimen Type VENOUS    Fingerstick Glucose (POCT) [[id number]]  (Abnormal) Collected: 12/08/23 0013    Lab Status: Final result Updated: 12/08/23 0043     POC Glucose 271 mg/dl     POCT pregnancy, urine [[id number]]     Lab Status: No result                    XR chest portable    (Results Pending)              Procedures  Procedures         ED Course  ED Course as of 12/08/23 0313   Fri Dec 08, 2023   [facility] reviewed most recently seen by endocrinology 9/18/2023, at that time patient with multiple episodes of hypoglycemia in setting of NovoLog use, Lantus increased to 84 units    0044 POC Glucose(!): 271   0044 Procedure Note: EKG  Date/Time: 12/08/23 12:47 AM   Performed by: Benito Fletcher  Authorized by: Benito Fletcher  Indications / Diagnosis: CP  ECG reviewed by me, the ED Provider: yes   The EKG demonstrates:  Rhythm: normal sinus  Intervals: normal intervals  Axis: normal axis  QRS/Blocks: normal QRS  ST Changes: No acute ST Changes, no STD/MARYCHUY.       0051 ph, Victor Hugo ISTAT: 7.377   0053 Hemoglobin(!): 9.1  Baseline   0107 XR chest portable  No obvious infiltrate, does have some perihilar fullness   0127 Resting comfortably no acute respiratory distress, RSV positive however saturating 100% on room air with improved respiratory status, will start on steroids as previously has helped in setting of viral faction triggered asthma exacerbation, patient aware to keep a close eye on her sugars add planning to give herself increased insulin dosages if sugar elevates due to steroid treatment             HEART Risk Score      Flowsheet Row Most Recent Value   Heart Score Risk Calculator    History 0 Filed at: 12/08/2023 0256   ECG 0 Filed at: 12/08/2023 0256   Age 1 Filed at: 12/08/2023 0256   Risk Factors 1 Filed at: 12/08/2023 0256   Troponin 0 Filed at: 12/08/2023 0256   HEART Score 2 Filed at: 12/08/2023 8667 Medical Decision Making  55-year-old female with worsening shortness of breath, cough differential diagnosis ACS, arrhythmia, pneumothorax, pneumonia, bronchospasm, acute on chronic CHF, viral URI    Obtain lab work, imaging will reevaluate    Amount and/or Complexity of Data Reviewed  Labs: ordered. Decision-making details documented in ED Course. Radiology: ordered. Decision-making details documented in ED Course. Risk  Prescription drug management. Disposition  Final diagnoses:   Asthma exacerbation   RSV (respiratory syncytial virus infection)     Time reflects when diagnosis was documented in both MDM as applicable and the Disposition within this note       Time User Action Codes Description Comment    12/8/2023  1:26 AM Vivien Roel Add [J06.9] Upper respiratory infection     12/8/2023  1:26 AM Vivien Roel Add [J45.901] Asthma exacerbation     12/8/2023  1:26 AM Vivien Roel Modify [J45.901] Asthma exacerbation     12/8/2023  1:26 AM Vivien Roel Remove [J06.9] Upper respiratory infection     12/8/2023  1:26 AM Vivien Roel Add [J20.9] Acute bronchitis     12/8/2023  1:26 AM Vivien Roel Remove [J20.9] Acute bronchitis     12/8/2023  1:26 AM Vivien Roel Add [J21.0] RSV (acute bronchiolitis due to respiratory syncytial virus)     12/8/2023  1:27 AM Vivien Roel Remove [J21.0] RSV (acute bronchiolitis due to respiratory syncytial virus)     12/8/2023  1:27 AM Vivien Roel Add [B33.8] RSV (respiratory syncytial virus infection)           ED Disposition       ED Disposition   Discharge    Condition   Stable    Date/Time   Fri Dec 8, 2023 0256    [address] [place] [address] discharge to home/self care.                    Follow-up Information       Follow up With Specialties Details Why Contact Info Additional Information     [address] [facility] Emergency Department Emergency Medicine  If symptoms worsen [address] [facility] Emergency Department, [address] [address], [address]    Elham Navarro MD Family Medicine Schedule an appointment as soon as possible for a visit   [address] [address]  [phone]               Patient's Medications   Discharge Prescriptions    PREDNISONE 20 MG TABLET    Take 2 tablets (40 mg total) by mouth daily       Start Date: 12/8/2023 End Date: --       Order Dose: 40 mg       Quantity: 8 tablet    Refills: 0       No discharge procedures on file.     PDMP Review         Value Time User    PDMP Reviewed  Yes 8/8/2020  8:38 AM Sukhwinder Jesus MD            ED Provider  Electronically Signed by             Navarro Dunbar DO  12/08/23 8665

## 2023-12-15 ENCOUNTER — TELEPHONE (OUTPATIENT)
Age: 48
End: 2023-12-15

## 2023-12-15 DIAGNOSIS — D75.839 THROMBOCYTOSIS: ICD-10-CM

## 2023-12-15 DIAGNOSIS — D64.9 NORMOCYTIC ANEMIA: ICD-10-CM

## 2023-12-15 NOTE — TELEPHONE ENCOUNTER
Left message reminding patient to have labs completed for upcoming appointment 12/19/23.   Provided [facility] telephone number for any questions/concerns

## 2023-12-16 NOTE — PROGRESS NOTES
HEMATOLOGY / ONCOLOGY CLINIC FOLLOW UP NOTE    Primary Care Provider: Rika Nugent MD  Referring Provider:    MRN: [id number]  : 1975    Reason for Encounter: Follow-up for history of thrombocytosis and iron deficiency anemia       Interval History: Patient returns for follow-up visit for history of anemia of chronic disease, mild thrombocytosis. She tested positive for RSV on 2023.  She is feeling improved.  Recent blood work shows WBC 11.2, hemoglobin 10.7, MCV 89, platelets 447.  Iron panel normal.  Normal serum chemistries, creatinine 1, BUN 42.  Blood glucose 163.  She continues to follow closely with endocrinology for management of her type 1 diabetes.      REVIEW OF SYSTEMS:  Please note that a 14-point review of systems was performed to include Constitutional, HEENT, Respiratory, CVS, GI, , Musculoskeletal, Integumentary, Neurologic, Rheumatologic, Endocrinologic, Psychiatric, Lymphatic, and Hematologic/Oncologic systems were reviewed and are negative unless otherwise stated in HPI. Positive and negative findings pertinent to this evaluation are incorporated into the history of present illness.      ECOG PS: 0    PROBLEM LIST:  Patient Active Problem List   Diagnosis    Type 1 diabetes mellitus with hyperglycemia (HCC)    Asthma    Hypertension    Moderate episode of recurrent major depressive disorder (HCC)    Hyperlipidemia    Polycystic ovarian syndrome    Retinal hemorrhage    Anemia    Asthma, mild persistent    Both eyes affected 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, associated with type 1 diabetes mellitus (HCC)    Nervousness    Thrombocytosis    Severe obesity (BMI 35.0-39.9) with comorbidity (HCC)    Gastroesophageal reflux disease without esophagitis    Other proteinuria    Diabetic polyneuropathy associated with type 1 diabetes mellitus (HCC)    Diabetic nephropathy associated with type 1 diabetes mellitus (HCC)    Nuclear sclerotic cataract of left  eye    B12 deficiency    History of COVID-19    Palpitations    Chronic diastolic congestive heart failure (HCC)    Primary localized osteoarthritis of right knee    Hyponatremia       Assessment / Plan:  1. Anemia, unspecified type      Patient is a 48 year-old female with history of type 1 diabetes,  chronic diastolic CHF, HTN who follows with hematology for an elevated platelet count and h/o iron deficiency anemia treated with IV Venofer in the past. Myeloproliferative work up was ordered in past but never completed   She has been on observation   Her most recent CBC with differential is stable.  Hemoglobin 10.7 with normocytic indices.  Platelets mildly elevated at 447.  Anemia is consistent with chronic disease.  No evidence of iron deficiency on repeat blood work.    From a hematologic perspective, continued observation is recommended.  She will continue to follow closely with her other specialists.  She will return for follow-up visit in 6 months with repeat blood work.  Patient verbalized understanding and is in agreement with this plan of care.  She knows to call anytime with questions or concerns    I spent 20 minutes on chart review, face to face counseling time, coordination of care and documentation.    Past Medical History:   has a past medical history of Anemia, Arthritis, Asthma, Chest pain (02/03/2016), CHF (congestive heart failure) ([facility]), Depression, Diabetes mellitus ([facility]), Diabetic nephropathy ([facility]), Diabetic nephropathy associated with type 1 diabetes mellitus ([facility]) (08/12/2020), Diabetic retinopathy ([facility]) (02/03/2016), Endometriosis, Gastroenteritis (02/03/2016), GERD (gastroesophageal reflux disease), HTN (hypertension), Hyperlipidemia (02/03/2016), Hypertension, Obesity, Oligomenorrhea, Polycystic ovaries (02/03/2016), Retinal hemorrhage (02/03/2016), Retinopathy, Thrombocytosis, and Type 1 diabetes mellitus with ophthalmic manifestation ([facility]) (02/03/2016).    PAST SURGICAL HISTORY:   has a  past surgical history that includes Retinal laser procedure; Cataract extraction (Left, 10/2020); and Cataract extraction (Right).    CURRENT MEDICATIONS  Current Outpatient Medications   Medication Sig Dispense Refill    acetaminophen (TYLENOL) 500 mg tablet Take 2 tablets (1,000 mg total) by mouth every 6 (six) hours as needed for mild pain or moderate pain 30 tablet 0    albuterol (2.5 mg/3 mL) 0.083 % nebulizer solution INHALE CONTENTS OF 1 VIAL BY NEBULIZER EVERY 6 HOURS AS NEEDED FOR WHEEZING OR FOR SHORTNESS OF BREATH. 75 mL 3    albuterol (PROVENTIL HFA,VENTOLIN HFA) 90 mcg/act inhaler INHALE 2 PUFSS BY MOUTH EVERY 4 HOURS AS NEEDED WHEEZING OR FOR SHORTNESS OF BREATH 25.5 g 0    Ascorbic Acid (VITAMIN C) 500 MG CAPS Take 2 capsules by mouth daily      Aspirin Low Dose 81 MG EC tablet TAKE 1 TABLET BY MOUTH EVERY DAY 30 tablet 0    atorvastatin (LIPITOR) 80 mg tablet TAKE 1 TABLET BY MOUTH EVERY DAY 30 tablet 11    Butalbital-APAP-Caffeine (Fioricet) -40 MG CAPS Take 1 tablet by mouth 4 (four) times a day as needed (headahce) 30 capsule 0    cyanocobalamin (VITAMIN B-12) 100 mcg tablet Take by mouth daily      furosemide (LASIX) 40 mg tablet TAKE 1 TABLET BY MOUTH EVERY DAY 30 tablet 11    glucagon (Glucagon Emergency) 1 MG injection Inject 1 mg under the skin once as needed for low blood sugar for up to 1 dose 1 kit 3    Glucagon (Gvoke HypoPen 2-Pack) 1 MG/0.2ML SOAJ Use if hypoglycemia prn 1 Syringe 6    Lantus SoloStar 100 units/mL SOPN INJECT 78 UNITS SUBCUTANEOUSLY (UNDER THE SKIN) EVERY MORNING 60 mL 2    montelukast (SINGULAIR) 10 mg tablet TAKE 1 TABLET BY MOUTH AT BEDTIME 90 tablet 0    NovoLOG 100 UNIT/ML injection INJECT 1 UNIT OF NOVOLOG FOR EVERY 1 GRAM OF CARBS, UP  UNITS DAILY 50 mL 2    omeprazole (PriLOSEC) 20 mg delayed release capsule Take 20 mg by mouth daily.      PARoxetine (PAXIL) 20 mg tablet Take 1 tablet (20 mg total) by mouth daily 90 tablet 0    Probiotic Product  "(ALIGN PO) Take by mouth      acetone, urine, test strip Use to test urine ketones for high blood sugars. 25 each 6    Blood Pressure Monitoring [place] Use 2 (two) times a day Monitor BP twice daily. Call office if BP > 140/90 persistently. 1 Device 0    Continuous Blood Gluc Sensor (Dexcom G6 Sensor) MISC CHANGE EVERY 10 DAYS 3 each 12    Continuous Blood Gluc Transmit (Dexcom G6 Transmitter) MISC CHANGE EVERY 90 DAYS 1 each 1    Fluticasone-Salmeterol (Advair Diskus) 250-50 mcg/dose inhaler Inhale 1 puff daily Rinse mouth after use. 1 blister 1    lisinopril (ZESTRIL) 20 mg tablet Take 2 tablets (40 mg total) by mouth daily 180 tablet 0    OneTouch Ultra test strip Test 3 times daily 300 each 3    predniSONE 20 mg tablet Take 2 tablets (40 mg total) by mouth daily 8 tablet 0     No current facility-administered medications for this visit.     [unfilled]    SOCIAL HISTORY:   reports that she has never smoked. She has never used smokeless tobacco. She reports current alcohol use. She reports that she does not use drugs.     FAMILY HISTORY:  family history includes Breast cancer (age of onset: 70 - 79) in her paternal grandmother; Diabetes in her father, maternal aunt, maternal grandfather, maternal uncle, and mother; Heart murmur in her mother; Hypertension in her father; Leukemia (age of onset: 48) in her paternal grandfather; No Known Problems in her maternal grandmother; Thyroid disease in her mother.     ALLERGIES:  has No Known Allergies.      Physical Exam:  Vital Signs:   Visit Vitals  /66 (BP Location: Right arm, Patient Position: Sitting, Cuff Size: Large)   Pulse 91   Temp 98.4 °F (36.9 °C) (Temporal)   Resp 18   Ht 4' 8\" (1.422 m)   Wt 87.1 kg (192 lb)   SpO2 98%   BMI 43.05 kg/m²   OB Status Perimenopausal   Smoking Status Never   BSA 1.74 m²     Body mass index is 43.05 kg/m².  Body surface area is 1.75 meters squared.    Physical Exam  Constitutional:       General: She is not in acute distress.     " Appearance: Normal appearance.   HENT:      Head: Normocephalic and atraumatic.   Eyes:      General: No scleral icterus.        Right eye: No discharge.         Left eye: No discharge.      Conjunctiva/sclera: Conjunctivae normal.   Cardiovascular:      Rate and Rhythm: Normal rate and regular rhythm.   Pulmonary:      Effort: Pulmonary effort is normal. No respiratory distress.      Breath sounds: Normal breath sounds.   Abdominal:      General: Bowel sounds are normal. There is no distension.      Palpations: Abdomen is soft. There is no mass.      Tenderness: There is no abdominal tenderness.   Musculoskeletal:         General: Normal range of motion.   Lymphadenopathy:      Cervical: No cervical adenopathy.      Upper Body:      Right upper body: No supraclavicular, axillary or pectoral adenopathy.      Left upper body: No supraclavicular, axillary or pectoral adenopathy.   Skin:     General: Skin is warm and dry.   Neurological:      General: No focal deficit present.      Mental Status: She is alert and oriented to person, place, and time.   Psychiatric:         Mood and Affect: Mood normal.         Behavior: Behavior normal.         Labs:  Lab Results   Component Value Date    WBC 11.21 (H) 12/18/2023    HGB 10.7 (L) 12/18/2023    HCT 32.4 (L) 12/18/2023    MCV 89 12/18/2023     (H) 12/18/2023     Lab Results   Component Value Date     01/28/2017    SODIUM 136 12/18/2023    K 4.2 12/18/2023     12/18/2023    CO2 24 12/18/2023    AGAP 12 12/18/2023    BUN 42 (H) 12/18/2023    CREATININE 1.00 12/18/2023    GLUC 281 (H) 12/08/2023    GLUF 163 (H) 12/18/2023    CALCIUM 9.6 12/18/2023    AST 13 12/18/2023    ALT 16 12/18/2023    ALKPHOS 106 (H) 12/18/2023    PROT 5.9 (L) 01/28/2017    TP 6.8 12/18/2023    BILITOT 0.3 01/28/2017    TBILI 0.27 12/18/2023    EGFR 66 12/18/2023

## 2023-12-17 DIAGNOSIS — E10.65 TYPE 1 DIABETES MELLITUS WITH HYPERGLYCEMIA (HCC): ICD-10-CM

## 2023-12-18 ENCOUNTER — APPOINTMENT (OUTPATIENT)
Dept: LAB | Facility: CLINIC | Age: 48
End: 2023-12-18
Payer: COMMERCIAL

## 2023-12-18 DIAGNOSIS — E10.65 TYPE 1 DIABETES MELLITUS WITH HYPERGLYCEMIA (HCC): ICD-10-CM

## 2023-12-18 DIAGNOSIS — D75.839 THROMBOCYTOSIS: ICD-10-CM

## 2023-12-18 DIAGNOSIS — D64.9 NORMOCYTIC ANEMIA: ICD-10-CM

## 2023-12-18 LAB
ALBUMIN SERPL BCP-MCNC: 3.9 G/DL (ref 3.5–5)
ALP SERPL-CCNC: 106 U/L (ref 34–104)
ALT SERPL W P-5'-P-CCNC: 16 U/L (ref 7–52)
ANION GAP SERPL CALCULATED.3IONS-SCNC: 12 MMOL/L
AST SERPL W P-5'-P-CCNC: 13 U/L (ref 13–39)
BASOPHILS # BLD AUTO: 0.08 THOUSANDS/ÂΜL (ref 0–0.1)
BASOPHILS NFR BLD AUTO: 1 % (ref 0–1)
BILIRUB SERPL-MCNC: 0.27 MG/DL (ref 0.2–1)
BUN SERPL-MCNC: 42 MG/DL (ref 5–25)
CALCIUM SERPL-MCNC: 9.6 MG/DL (ref 8.4–10.2)
CHLORIDE SERPL-SCNC: 100 MMOL/L (ref 96–108)
CO2 SERPL-SCNC: 24 MMOL/L (ref 21–32)
CREAT SERPL-MCNC: 1 MG/DL (ref 0.6–1.3)
EOSINOPHIL # BLD AUTO: 0.43 THOUSAND/ÂΜL (ref 0–0.61)
EOSINOPHIL NFR BLD AUTO: 4 % (ref 0–6)
ERYTHROCYTE [DISTWIDTH] IN BLOOD BY AUTOMATED COUNT: 15 % (ref 11.6–15.1)
EST. AVERAGE GLUCOSE BLD GHB EST-MCNC: 258 MG/DL
FERRITIN SERPL-MCNC: 129 NG/ML (ref 11–307)
GFR SERPL CREATININE-BSD FRML MDRD: 66 ML/MIN/1.73SQ M
GLUCOSE P FAST SERPL-MCNC: 163 MG/DL (ref 65–99)
HBA1C MFR BLD: 10.6 %
HCT VFR BLD AUTO: 32.4 % (ref 34.8–46.1)
HGB BLD-MCNC: 10.7 G/DL (ref 11.5–15.4)
IMM GRANULOCYTES # BLD AUTO: 0.07 THOUSAND/UL (ref 0–0.2)
IMM GRANULOCYTES NFR BLD AUTO: 1 % (ref 0–2)
IRON SATN MFR SERPL: 19 % (ref 15–50)
IRON SERPL-MCNC: 58 UG/DL (ref 50–212)
LYMPHOCYTES # BLD AUTO: 2.49 THOUSANDS/ÂΜL (ref 0.6–4.47)
LYMPHOCYTES NFR BLD AUTO: 22 % (ref 14–44)
MCH RBC QN AUTO: 29.5 PG (ref 26.8–34.3)
MCHC RBC AUTO-ENTMCNC: 33 G/DL (ref 31.4–37.4)
MCV RBC AUTO: 89 FL (ref 82–98)
MONOCYTES # BLD AUTO: 0.77 THOUSAND/ÂΜL (ref 0.17–1.22)
MONOCYTES NFR BLD AUTO: 7 % (ref 4–12)
NEUTROPHILS # BLD AUTO: 7.37 THOUSANDS/ÂΜL (ref 1.85–7.62)
NEUTS SEG NFR BLD AUTO: 65 % (ref 43–75)
NRBC BLD AUTO-RTO: 0 /100 WBCS
PLATELET # BLD AUTO: 447 THOUSANDS/UL (ref 149–390)
PMV BLD AUTO: 10.2 FL (ref 8.9–12.7)
POTASSIUM SERPL-SCNC: 4.2 MMOL/L (ref 3.5–5.3)
PROT SERPL-MCNC: 6.8 G/DL (ref 6.4–8.4)
RBC # BLD AUTO: 3.63 MILLION/UL (ref 3.81–5.12)
SODIUM SERPL-SCNC: 136 MMOL/L (ref 135–147)
TIBC SERPL-MCNC: 309 UG/DL (ref 250–450)
UIBC SERPL-MCNC: 251 UG/DL (ref 155–355)
WBC # BLD AUTO: 11.21 THOUSAND/UL (ref 4.31–10.16)

## 2023-12-18 PROCEDURE — 83540 ASSAY OF IRON: CPT

## 2023-12-18 PROCEDURE — 83918 ORGANIC ACIDS TOTAL QUANT: CPT

## 2023-12-18 PROCEDURE — 83550 IRON BINDING TEST: CPT

## 2023-12-18 PROCEDURE — 83036 HEMOGLOBIN GLYCOSYLATED A1C: CPT

## 2023-12-18 PROCEDURE — 85025 COMPLETE CBC W/AUTO DIFF WBC: CPT

## 2023-12-18 PROCEDURE — 82728 ASSAY OF FERRITIN: CPT

## 2023-12-18 PROCEDURE — 36415 COLL VENOUS BLD VENIPUNCTURE: CPT

## 2023-12-18 PROCEDURE — 80053 COMPREHEN METABOLIC PANEL: CPT

## 2023-12-18 RX ORDER — INSULIN ASPART 100 [IU]/ML
INJECTION, SOLUTION INTRAVENOUS; SUBCUTANEOUS
Qty: 50 ML | Refills: 2 | Status: SHIPPED | OUTPATIENT
Start: 2023-12-18

## 2023-12-19 ENCOUNTER — OFFICE VISIT (OUTPATIENT)
Age: 48
End: 2023-12-19
Payer: COMMERCIAL

## 2023-12-19 VITALS
HEIGHT: 56 IN | HEART RATE: 91 BPM | WEIGHT: 192 LBS | OXYGEN SATURATION: 98 % | SYSTOLIC BLOOD PRESSURE: 122 MMHG | TEMPERATURE: 98.4 F | RESPIRATION RATE: 18 BRPM | BODY MASS INDEX: 43.19 KG/M2 | DIASTOLIC BLOOD PRESSURE: 66 MMHG

## 2023-12-19 DIAGNOSIS — D47.1 MYELOPROLIFERATIVE DISORDER (HCC): Primary | ICD-10-CM

## 2023-12-19 DIAGNOSIS — D64.9 ANEMIA, UNSPECIFIED TYPE: Primary | ICD-10-CM

## 2023-12-19 PROCEDURE — 99213 OFFICE O/P EST LOW 20 MIN: CPT | Performed by: NURSE PRACTITIONER

## 2023-12-26 LAB — METHYLMALONATE SERPL-SCNC: 150 NMOL/L (ref 0–378)

## 2024-01-28 DIAGNOSIS — J45.909 UNCOMPLICATED ASTHMA, UNSPECIFIED ASTHMA SEVERITY, UNSPECIFIED WHETHER PERSISTENT: ICD-10-CM

## 2024-01-29 RX ORDER — ALBUTEROL SULFATE 90 UG/1
AEROSOL, METERED RESPIRATORY (INHALATION)
Qty: 25.5 G | Refills: 0 | Status: SHIPPED | OUTPATIENT
Start: 2024-01-29

## 2024-02-05 ENCOUNTER — TELEPHONE (OUTPATIENT)
Dept: FAMILY MEDICINE CLINIC | Facility: CLINIC | Age: 49
End: 2024-02-05

## 2024-02-05 DIAGNOSIS — J45.30 MILD PERSISTENT ASTHMA WITHOUT COMPLICATION: Primary | ICD-10-CM

## 2024-02-05 DIAGNOSIS — I10 ESSENTIAL HYPERTENSION: Chronic | ICD-10-CM

## 2024-02-05 RX ORDER — FLUTICASONE PROPIONATE AND SALMETEROL 250; 50 UG/1; UG/1
1 POWDER RESPIRATORY (INHALATION) 2 TIMES DAILY
Qty: 60 BLISTER | Refills: 2 | Status: SHIPPED | OUTPATIENT
Start: 2024-02-05

## 2024-02-05 RX ORDER — LISINOPRIL 20 MG/1
40 TABLET ORAL DAILY
Qty: 180 TABLET | Refills: 0 | Status: SHIPPED | OUTPATIENT
Start: 2024-02-05 | End: 2024-05-05

## 2024-02-05 NOTE — TELEPHONE ENCOUNTER
Patient left message stating that Advair is no longer covered by her insurance. Needs alternative sent to [facility]. Preferred medication is fluticasone-salmeterol

## 2024-02-22 NOTE — LETTER
March 1, 2018     Patient: Andres Ahmadi   YOB: 1975   Date of Visit: 3/1/2018       To Whom it May Concern:    Andres Ahmadi is under my professional care  She was seen in my office on 3/1/2018  She may return to work on 3/5/2018  If you have any questions or concerns, please don't hesitate to call           Sincerely,          LELAND Gagnon        CC: No Recipients PHYSICAL EXAM:  GENERAL: NAD, well-groomed, well-developed  HEAD:  Atraumatic, Normocephalic  EYES: EOMI, PERRLA, conjunctiva and sclera clear  ENMT: No tonsillar erythema, exudates, or enlargement; Moist mucous membranes  NECK: Supple, No JVD, Normal thyroid  HEART: Regular rate and rhythm; No murmurs, rubs, or gallops  RESPIRATORY: CTA B/L, No W/R/R  ABDOMEN: Tender to Palpation in suprapubic region; Bowel sounds present  NEUROLOGY: A&Ox3, nonfocal, moving all extremities  EXTREMITIES:  2+ Peripheral Pulses, No clubbing, cyanosis, or edema  SKIN: warm, dry, normal color, no rash or abnormal lesions

## 2024-02-26 DIAGNOSIS — J45.20 MILD INTERMITTENT ASTHMA WITHOUT COMPLICATION: ICD-10-CM

## 2024-02-26 RX ORDER — MONTELUKAST SODIUM 10 MG/1
TABLET ORAL
Qty: 90 TABLET | Refills: 0 | Status: SHIPPED | OUTPATIENT
Start: 2024-02-26

## 2024-03-05 DIAGNOSIS — F32.A DEPRESSION, UNSPECIFIED DEPRESSION TYPE: ICD-10-CM

## 2024-03-05 RX ORDER — PAROXETINE HYDROCHLORIDE 20 MG/1
20 TABLET, FILM COATED ORAL DAILY
Qty: 90 TABLET | Refills: 0 | Status: SHIPPED | OUTPATIENT
Start: 2024-03-05

## 2024-03-15 DIAGNOSIS — E10.65 TYPE 1 DIABETES MELLITUS WITH HYPERGLYCEMIA (HCC): ICD-10-CM

## 2024-03-15 RX ORDER — INSULIN ASPART 100 [IU]/ML
INJECTION, SOLUTION INTRAVENOUS; SUBCUTANEOUS
Qty: 50 ML | Refills: 2 | Status: SHIPPED | OUTPATIENT
Start: 2024-03-15

## 2024-03-18 LAB
LEFT EYE DIABETIC RETINOPATHY: POSITIVE
RIGHT EYE DIABETIC RETINOPATHY: POSITIVE

## 2024-03-25 DIAGNOSIS — I50.9 ACUTE CONGESTIVE HEART FAILURE, UNSPECIFIED HEART FAILURE TYPE (HCC): ICD-10-CM

## 2024-03-25 RX ORDER — FUROSEMIDE 40 MG/1
TABLET ORAL
Qty: 30 TABLET | Refills: 2 | Status: SHIPPED | OUTPATIENT
Start: 2024-03-25

## 2024-04-06 DIAGNOSIS — E10.65 TYPE 1 DIABETES MELLITUS WITH HYPERGLYCEMIA (HCC): ICD-10-CM

## 2024-04-08 ENCOUNTER — TELEPHONE (OUTPATIENT)
Age: 49
End: 2024-04-08

## 2024-04-08 RX ORDER — PROCHLORPERAZINE 25 MG/1
SUPPOSITORY RECTAL
Qty: 1 EACH | Refills: 1 | Status: SHIPPED | OUTPATIENT
Start: 2024-04-08

## 2024-04-08 NOTE — TELEPHONE ENCOUNTER
Pt called in wanting to reschedule her appt. Pt is now scheduled for 04/11/2024 with Dr. Gonzales. Pt is aware and confirmed

## 2024-04-10 ENCOUNTER — APPOINTMENT (OUTPATIENT)
Dept: LAB | Facility: CLINIC | Age: 49
End: 2024-04-10
Payer: COMMERCIAL

## 2024-04-10 ENCOUNTER — TELEPHONE (OUTPATIENT)
Dept: ADMINISTRATIVE | Facility: OTHER | Age: 49
End: 2024-04-10

## 2024-04-10 DIAGNOSIS — E10.65 TYPE 1 DIABETES MELLITUS WITH HYPERGLYCEMIA (HCC): Primary | ICD-10-CM

## 2024-04-10 DIAGNOSIS — E10.65 TYPE 1 DIABETES MELLITUS WITH HYPERGLYCEMIA (HCC): ICD-10-CM

## 2024-04-10 LAB
ALBUMIN SERPL BCP-MCNC: 4.1 G/DL (ref 3.5–5)
ALP SERPL-CCNC: 94 U/L (ref 34–104)
ALT SERPL W P-5'-P-CCNC: 16 U/L (ref 7–52)
ANION GAP SERPL CALCULATED.3IONS-SCNC: 11 MMOL/L (ref 4–13)
AST SERPL W P-5'-P-CCNC: 14 U/L (ref 13–39)
BILIRUB SERPL-MCNC: 0.33 MG/DL (ref 0.2–1)
BUN SERPL-MCNC: 41 MG/DL (ref 5–25)
CALCIUM SERPL-MCNC: 9.4 MG/DL (ref 8.4–10.2)
CHLORIDE SERPL-SCNC: 100 MMOL/L (ref 96–108)
CHOLEST SERPL-MCNC: 181 MG/DL
CO2 SERPL-SCNC: 27 MMOL/L (ref 21–32)
CREAT SERPL-MCNC: 1.14 MG/DL (ref 0.6–1.3)
EST. AVERAGE GLUCOSE BLD GHB EST-MCNC: 255 MG/DL
GFR SERPL CREATININE-BSD FRML MDRD: 56 ML/MIN/1.73SQ M
GLUCOSE P FAST SERPL-MCNC: 134 MG/DL (ref 65–99)
HBA1C MFR BLD: 10.5 %
HDLC SERPL-MCNC: 35 MG/DL
LDLC SERPL CALC-MCNC: 104 MG/DL (ref 0–100)
NONHDLC SERPL-MCNC: 146 MG/DL
POTASSIUM SERPL-SCNC: 4.6 MMOL/L (ref 3.5–5.3)
PROT SERPL-MCNC: 7.1 G/DL (ref 6.4–8.4)
SODIUM SERPL-SCNC: 138 MMOL/L (ref 135–147)
TRIGL SERPL-MCNC: 209 MG/DL
TSH SERPL DL<=0.05 MIU/L-ACNC: 2.57 UIU/ML (ref 0.45–4.5)

## 2024-04-10 PROCEDURE — 36415 COLL VENOUS BLD VENIPUNCTURE: CPT

## 2024-04-10 PROCEDURE — 84443 ASSAY THYROID STIM HORMONE: CPT

## 2024-04-10 PROCEDURE — 83036 HEMOGLOBIN GLYCOSYLATED A1C: CPT

## 2024-04-10 PROCEDURE — 80053 COMPREHEN METABOLIC PANEL: CPT

## 2024-04-10 PROCEDURE — 80061 LIPID PANEL: CPT

## 2024-04-10 NOTE — LETTER
Diabetic Eye Exam Form    Date Requested: 04/10/24  Patient: Vernell Darby  Patient : 1975   Referring Provider: Rika Nugent MD Please complete form.      DIABETIC Eye Exam Date _______________________________      Type of Exam MUST be documented for Diabetic Eye Exams. Please CHECK ONE.     Retinal Exam       Dilated Retinal Exam       OCT       Optomap-Iris Exam      Fundus Photography       Left Eye - Please check Retinopathy or No Retinopathy        Exam did show retinopathy    Exam did not show retinopathy       Right Eye - Please check Retinopathy or No Retinopathy       Exam did show retinopathy    Exam did not show retinopathy       Comments __________________________________________________________    Practice Providing Exam ______________________________________________    Exam Performed By (print name) _______________________________________      Provider Signature ___________________________________________________      These reports are needed for  compliance.  Please fax this completed form and a copy of the Diabetic Eye Exam report to our office located at [address] [place] [address] as soon as possible via Fax [phone] aki Martin: Phone [phone]  We thank you for your assistance in treating our mutual patient.

## 2024-04-10 NOTE — TELEPHONE ENCOUNTER
----- Message from Alexandra Graves MA sent at 4/10/2024 10:13 AM EDT -----  Regarding: care gap request  04/10/24 10:13 AM    Hello, our patient attached above has had Diabetic Eye Exam completed/performed. Please assist in updating the patient chart by pulling the document from the Media Tab. The date of service is 3/28/24.     Thank you,  Alexandra RAMIREZ

## 2024-04-10 NOTE — TELEPHONE ENCOUNTER
Upon review of the In Basket request and the patient's chart, initial outreach has been made via fax to facility. Please see Contacts section for details. Faxed form for clarification regarding type of eye exam performed.     Thank you  Veronica Overton

## 2024-04-11 ENCOUNTER — OFFICE VISIT (OUTPATIENT)
Dept: ENDOCRINOLOGY | Facility: HOSPITAL | Age: 49
End: 2024-04-11
Payer: COMMERCIAL

## 2024-04-11 ENCOUNTER — TELEPHONE (OUTPATIENT)
Dept: ADMINISTRATIVE | Facility: OTHER | Age: 49
End: 2024-04-11

## 2024-04-11 VITALS
BODY MASS INDEX: 43.64 KG/M2 | SYSTOLIC BLOOD PRESSURE: 138 MMHG | HEIGHT: 56 IN | DIASTOLIC BLOOD PRESSURE: 74 MMHG | WEIGHT: 194 LBS | HEART RATE: 80 BPM | OXYGEN SATURATION: 94 %

## 2024-04-11 DIAGNOSIS — E10.65 TYPE 1 DIABETES MELLITUS WITH HYPERGLYCEMIA (HCC): Primary | ICD-10-CM

## 2024-04-11 PROCEDURE — 99214 OFFICE O/P EST MOD 30 MIN: CPT | Performed by: PHYSICIAN ASSISTANT

## 2024-04-11 RX ORDER — INSULIN GLULISINE 100 [IU]/ML
INJECTION, SOLUTION SUBCUTANEOUS
Qty: 60 ML | Refills: 3 | Status: SHIPPED | OUTPATIENT
Start: 2024-04-11

## 2024-04-11 RX ORDER — METFORMIN HYDROCHLORIDE 500 MG/1
500 TABLET, EXTENDED RELEASE ORAL
Qty: 90 TABLET | Refills: 1 | Status: SHIPPED | OUTPATIENT
Start: 2024-04-11

## 2024-04-11 RX ORDER — INSULIN DEGLUDEC 200 U/ML
84 INJECTION, SOLUTION SUBCUTANEOUS DAILY
Qty: 27 ML | Refills: 3 | Status: SHIPPED | OUTPATIENT
Start: 2024-04-11

## 2024-04-11 RX ORDER — ACYCLOVIR 400 MG/1
1 TABLET ORAL
Qty: 3 EACH | Refills: 11 | Status: SHIPPED | OUTPATIENT
Start: 2024-04-11

## 2024-04-11 NOTE — PATIENT INSTRUCTIONS
Continue monitor diet, and maintain physical activity.  Make sure to drink plenty of water throughout the day.      Plan on swithcing both insulin based on insurance coverage. Looking at Tresiba or Toujeo for long acting, and Apidra or Lyumjev for short acting.     Continue Lantus 84 units. Continue novolog 1 to 2g carbs.     Start metformin 500 mg daily. Call if having any side effects.      Call to have us download Dexcom in 2 weeks.     Did put in referral for diabetes education if you consider following up with them.        Follow up in 3 months with lab work prior to visit.

## 2024-04-11 NOTE — TELEPHONE ENCOUNTER
----- Message from Iva Simental sent at 4/10/2024  2:00 PM EDT -----  Regarding: Care Gap Request  04/10/24 2:00 PM    Hello, our patient attached above has had Diabetic Eye Exam completed/performed. Please assist in updating the patient chart by pulling the document from the chart review under encounter. The date of service is 04/04/2024.     Thank you,  Iva Simental  [facility]

## 2024-04-11 NOTE — PROGRESS NOTES
Vernell Darby 48 y.o. female MRN: [id number]    Encounter: [id number]      Assessment/Plan     Assessment:  This is a 48 y.o.-year-old female with type 1 diabetes with neuropathy, nephropathy, hypertension and hyperlipidemia.    Plan:  1. Type 1 diabetes: Most recent hemoglobin A1c was 10.5.  Unfortunately her A1c is typically in the 9s or 10s.  Dexcom continues to show elevated glucose levels.  At this time her current regimen does not appear to be working out for her.  Has been on Lantus and NovoLog for years, and I think at this time it might be beneficial for her to switch insulins to see if they will help improve glucose levels.  At this time I will send over prescription for Tresiba and to continue with 84 units daily, and also Apidra to continue with 1 unit for every 1 g carbohydrate.  Due to her current weight I do have some concerns with insulin resistance as she is in fact using quite a bit of insulin even with being a type I diabetic.  She may benefit from a low-dose of metformin, so we will start her on 500 mg daily.  Did discuss side effects of medication.  If there is any concerns, please contact the office.  Continue utilizing Dexcom to monitor glucose levels.  We will switch her to a Dexcom G7.  Would like to see the Dexcom download in about 2 weeks to see if any adjustments need to be made to any of her medications.  Continue with lifestyle modifications to help improve glucose levels.  Contact the office with any concerns or questions.  Follow-up in 3 months with lab work completed prior to visit.     2. Diabetic neuropathy:  Stable.  Is up-to-date on diabetic foot exam.     3. Diabetic nephropathy:  Some protein noted in her urine previously.  Kidney function doing better at this time.  Will continue to monitor.       4. Hypertension: Normotensive in the office today.  Any function doing better at this time.  Does follow up with nephrology.  Continue with current medications at this time.  Repeat  CMP prior to next office visit.     5. Hyperlipidemia: Lipid panel does continue to show elevated triglycerides, but this time around LDL was slightly elevated.  Lifestyle modification may be able to help improve these numbers, so will not make any adjustments to medication at this time.  Continue with atorvastatin 80 mg daily.    CC: Type 1 diabetes follow-up    History of Present Illness     HPI:  Vernell Darby is a 48 y.o. female with type 1 diabetes with diabetic neuropathy and diabetic nephropathy, hypertension, hyperlipidemia for follow-up visit.   She was diagnosed with type 1 diabetes about 29 years ago.  She utilizes insulin therapy and takes Lantus insulin 84 units at bedtime and NovoLog insulin 1 unit per 1 g carbohydrate with each meal.  Get a definitive answer on how much NovoLog she is typically using throughout the day.  She denies polyuria, polydipsia, polyphagia, or nocturia.  She denies blurry vision.  She denies numbness or tingling of the feet.  She denies chest pain or shortness of breath.  Diabetic complications include diabetic neuropathy, diabetic nephropathy, diabetic retinopathy.  She denies heart attack, stroke, or claudication.  Overall she is doing well today.  Is trying different ways to make sure she is staying active throughout the day and also to help improve weight loss to help with blood sugars.  She is also wondering if she needs a new type of insulin as it does not seem like the Lantus or NovoLog is helping lower blood sugars.     Last diabetic foot exam was performed June 2023.  She reports her last eye exam was March 2023 and there was no change in her retinopathy.      Download of Dexcom from download of Dexcom from March 29 through April 11, 2024 reveals an average glucose level of 182 with a standard deviation of 79.  She is in target range 12% of time, above target range 88% of the time.  Glucose levels do trend down overnight, and she remains on average stable throughout  the day with a little dip down prior to dinner.  Afterwards glucose levels do increase and as stated above then decline overnight..     She has hypertension and diabetic nephropathy and takes lisinopril 20 mg 2 tablets daily.   She has occasional headaches but no stroke-like symptoms.     She has hyperlipidemia and takes atorvastatin 40 mg daily.  She denies chest pain or shortness of breath.      Review of Systems   Constitutional:  Negative for activity change, appetite change, fatigue and unexpected weight change.   HENT:  Negative for sore throat and trouble swallowing.    Eyes:  Negative for visual disturbance.   Respiratory:  Negative for chest tightness and shortness of breath.    Cardiovascular:  Negative for chest pain, palpitations and leg swelling.   Gastrointestinal:  Negative for abdominal pain, constipation, diarrhea, nausea and vomiting.   Endocrine: Negative for cold intolerance, heat intolerance, polydipsia, polyphagia and polyuria.   Genitourinary:  Negative for frequency.   Skin:  Negative for wound.   Neurological:  Negative for dizziness, weakness, numbness and headaches.   Psychiatric/Behavioral:  Negative for dysphoric mood and sleep disturbance. The patient is not nervous/anxious.        Historical Information   Past Medical History:   Diagnosis Date   • Anemia    • Arthritis    • Asthma     w/acute exacerbation. Last assessed: 10/26/12   • Chest pain 02/03/2016   • CHF (congestive heart failure) (HCC)    • Depression    • Diabetes mellitus (HCC)    • Diabetic nephropathy (HCC)    • Diabetic nephropathy associated with type 1 diabetes mellitus (HCC) 08/12/2020   • Diabetic retinopathy (HCC) 02/03/2016   • Endometriosis    • Gastroenteritis 02/03/2016   • GERD (gastroesophageal reflux disease)    • HTN (hypertension)    • Hyperlipidemia 02/03/2016   • Hypertension    • Obesity    • Oligomenorrhea    • Polycystic ovaries 02/03/2016   • Retinal hemorrhage 02/03/2016   • Retinopathy    •  Thrombocytosis    • Type 1 diabetes mellitus with ophthalmic manifestation (HCC) 02/03/2016     Past Surgical History:   Procedure Laterality Date   • CATARACT EXTRACTION Left 10/2020   • CATARACT EXTRACTION Right    • RETINAL LASER PROCEDURE       Social History   Social History     Substance and Sexual Activity   Alcohol Use Yes    Comment: Occasional/1 mixed drink twice a month if that     Social History     Substance and Sexual Activity   Drug Use No     Social History     Tobacco Use   Smoking Status Never   Smokeless Tobacco Never   Tobacco Comments    Per [employer]: Former smoker. Quit in 1994     Family History:   Family History   Problem Relation Age of Onset   • Heart murmur Mother    • Diabetes Mother         Mellitus   • Thyroid disease Mother         Disorder   • Diabetes Father         Mellitus   • Hypertension Father    • No Known Problems Maternal Grandmother    • Diabetes Maternal Grandfather         Mellitus   • Breast cancer Paternal Grandmother 70 - 79   • Leukemia Paternal Grandfather 48   • Diabetes Maternal Aunt         Mellitus   • Diabetes Maternal Uncle         Mellitus   • Substance Abuse Neg Hx    • Mental illness Neg Hx    • Alcohol abuse Neg Hx        Meds/Allergies   Current Outpatient Medications   Medication Sig Dispense Refill   • acetaminophen (TYLENOL) 500 mg tablet Take 2 tablets (1,000 mg total) by mouth every 6 (six) hours as needed for mild pain or moderate pain 30 tablet 0   • acetone, urine, test strip Use to test urine ketones for high blood sugars. 25 each 6   • albuterol (2.5 mg/3 mL) 0.083 % nebulizer solution INHALE CONTENTS OF 1 VIAL BY NEBULIZER EVERY 6 HOURS AS NEEDED FOR WHEEZING OR FOR SHORTNESS OF BREATH. 75 mL 3   • albuterol (PROVENTIL HFA,VENTOLIN HFA) 90 mcg/act inhaler INHALE 2 PUFSS BY MOUTH EVERY 4 HOURS AS NEEDED WHEEZING OR FOR SHORTNESS OF BREATH 25.5 g 0   • Ascorbic Acid (VITAMIN C) 500 MG CAPS Take 2 capsules by mouth daily     • Aspirin Low Dose 81  MG EC tablet TAKE 1 TABLET BY MOUTH EVERY DAY 30 tablet 0   • atorvastatin (LIPITOR) 80 mg tablet TAKE 1 TABLET BY MOUTH EVERY DAY 30 tablet 11   • Blood Pressure Monitoring [place] Use 2 (two) times a day Monitor BP twice daily. Call office if BP > 140/90 persistently. 1 Device 0   • Continuous Blood Gluc Sensor (Dexcom G6 Sensor) MISC CHANGE EVERY 10 DAYS 3 each 12   • Continuous Blood Gluc Sensor (Dexcom G7 Sensor) Use 1 Device every 10 days 3 each 11   • Continuous Blood Gluc Transmit (Dexcom G6 Transmitter) MISC USE AS DIRECTED. CHANGE EVERY 90 DAYS. 1 each 1   • cyanocobalamin (VITAMIN B-12) 100 mcg tablet Take by mouth daily     • Fluticasone-Salmeterol (Wixela Inhub) 250-50 mcg/dose inhaler Inhale 1 puff 2 (two) times a day Rinse mouth after use. 60 blister 2   • furosemide (LASIX) 40 mg tablet TAKE 1 TABLET BY MOUTH EVERY DAY 30 tablet 2   • glucagon (Glucagon Emergency) 1 MG injection Inject 1 mg under the skin once as needed for low blood sugar for up to 1 dose 1 kit 3   • Glucagon (Gvoke HypoPen 2-Pack) 1 MG/0.2ML SOAJ Use if hypoglycemia prn 1 Syringe 6   • insulin degludec (Tresiba FlexTouch) 200 units/mL CONCENTRATED U-200 injection pen Inject 84 Units under the skin daily 27 mL 3   • insulin glulisine (Apidra) 100 units/mL injection INJECT 1 UNIT OF NOVOLOG FOR EVERY 1 GRAM OF CARBS, UP  UNITS DAILY 60 mL 3   • lisinopril (ZESTRIL) 20 mg tablet Take 2 tablets (40 mg total) by mouth daily 180 tablet 0   • metFORMIN (GLUCOPHAGE-XR) 500 mg 24 hr tablet Take 1 tablet (500 mg total) by mouth daily with breakfast 90 tablet 1   • montelukast (SINGULAIR) 10 mg tablet TAKE 1 TABLET BY MOUTH AT BEDTIME 90 tablet 0   • omeprazole (PriLOSEC) 20 mg delayed release capsule Take 20 mg by mouth daily.     • OneTouch Ultra test strip Test 3 times daily 300 each 3   • PARoxetine (PAXIL) 20 mg tablet TAKE 1 TABLET BY MOUTH EVERY DAY 90 tablet 0   • Probiotic Product (ALIGN PO) Take by mouth     •  "Butalbital-APAP-Caffeine (Fioricet) -40 MG CAPS Take 1 tablet by mouth 4 (four) times a day as needed (headprerna) (Patient not taking: Reported on 4/11/2024) 30 capsule 0     No current facility-administered medications for this visit.     No Known Allergies    Objective   Vitals: Blood pressure 138/74, pulse 80, height 4' 8\" (1.422 m), weight 88 kg (194 lb), SpO2 94%, not currently breastfeeding.    Physical Exam  Vitals and nursing note reviewed.   Constitutional:       General: She is not in acute distress.     Appearance: Normal appearance. She is not diaphoretic.   HENT:      Head: Normocephalic and atraumatic.   Eyes:      General: No scleral icterus.     Extraocular Movements: Extraocular movements intact.      Conjunctiva/sclera: Conjunctivae normal.      Pupils: Pupils are equal, round, and reactive to light.   Neck:      Thyroid: No thyroid mass, thyromegaly or thyroid tenderness.   Cardiovascular:      Rate and Rhythm: Normal rate and regular rhythm.      Heart sounds: No murmur heard.  Pulmonary:      Effort: Pulmonary effort is normal. No respiratory distress.      Breath sounds: Normal breath sounds. No wheezing.   Musculoskeletal:      Cervical back: Normal range of motion.      Right lower leg: No edema.      Left lower leg: No edema.   Lymphadenopathy:      Cervical: No cervical adenopathy.   Neurological:      Mental Status: She is alert and oriented to person, place, and time. Mental status is at baseline.      Sensory: No sensory deficit.      Gait: Gait normal.   Psychiatric:         Mood and Affect: Mood normal.         Behavior: Behavior normal.         Thought Content: Thought content normal.         The history was obtained from the review of the chart, patient.    Lab Results:   Lab Results   Component Value Date/Time    Hemoglobin A1C 10.5 (H) 04/10/2024 11:51 AM    Hemoglobin A1C 10.6 (H) 12/18/2023 07:16 AM    Hemoglobin A1C 9.4 (H) 09/15/2023 10:24 AM    WBC 11.21 (H) 12/18/2023 " 07:16 AM    WBC 11.32 (H) 12/08/2023 12:40 AM    WBC 10.16 07/21/2023 09:54 AM    Hemoglobin 10.7 (L) 12/18/2023 07:16 AM    Hemoglobin 9.1 (L) 12/08/2023 12:40 AM    Hemoglobin 9.6 (L) 07/21/2023 09:54 AM    Hgb, i-STAT 9.5 (L) 12/08/2023 12:47 AM    Hgb, i-STAT 9.9 (L) 07/21/2023 10:31 AM    Hematocrit 32.4 (L) 12/18/2023 07:16 AM    Hematocrit 29.2 (L) 12/08/2023 12:40 AM    Hematocrit 29.9 (L) 07/21/2023 09:54 AM    Hct, i-STAT 28 (L) 12/08/2023 12:47 AM    Hct, i-STAT 29 (L) 07/21/2023 10:31 AM    MCV 89 12/18/2023 07:16 AM    MCV 91 12/08/2023 12:40 AM    MCV 93 07/21/2023 09:54 AM    Platelets 447 (H) 12/18/2023 07:16 AM    Platelets 373 12/08/2023 12:40 AM    Platelets 359 07/21/2023 09:54 AM    BUN 41 (H) 04/10/2024 11:51 AM    BUN 42 (H) 12/18/2023 07:16 AM    BUN 44 (H) 12/08/2023 12:40 AM    Potassium 4.6 04/10/2024 11:51 AM    Potassium 4.2 12/18/2023 07:16 AM    Potassium 4.8 12/08/2023 12:40 AM    Chloride 100 04/10/2024 11:51 AM    Chloride 100 12/18/2023 07:16 AM    Chloride 100 12/08/2023 12:40 AM    CO2 27 04/10/2024 11:51 AM    CO2 24 12/18/2023 07:16 AM    CO2 24 12/08/2023 12:40 AM    CO2, i-STAT 24 12/08/2023 12:47 AM    CO2, i-STAT 27 07/21/2023 10:31 AM    Creatinine 1.14 04/10/2024 11:51 AM    Creatinine 1.00 12/18/2023 07:16 AM    Creatinine 1.12 12/08/2023 12:40 AM    AST 14 04/10/2024 11:51 AM    AST 13 12/18/2023 07:16 AM    AST 19 12/08/2023 12:40 AM    ALT 16 04/10/2024 11:51 AM    ALT 16 12/18/2023 07:16 AM    ALT 21 12/08/2023 12:40 AM    Total Protein 7.1 04/10/2024 11:51 AM    Total Protein 6.8 12/18/2023 07:16 AM    Total Protein 6.9 12/08/2023 12:40 AM    Albumin 4.1 04/10/2024 11:51 AM    Albumin 3.9 12/18/2023 07:16 AM    Albumin 3.8 12/08/2023 12:40 AM    HDL, Direct 35 (L) 04/10/2024 11:51 AM    HDL, Direct 37 (L) 06/19/2023 10:32 AM    Triglycerides 209 (H) 04/10/2024 11:51 AM    Triglycerides 191 (H) 06/19/2023 10:32 AM         Portions of the record may have been created  "with voice recognition software. Occasional wrong word or \"sound a like\" substitutions may have occurred due to the inherent limitations of voice recognition software. Read the chart carefully and recognize, using context, where substitutions have occurred.    "

## 2024-04-12 ENCOUNTER — TELEPHONE (OUTPATIENT)
Age: 49
End: 2024-04-12

## 2024-04-12 ENCOUNTER — TELEPHONE (OUTPATIENT)
Dept: ADMINISTRATIVE | Facility: OTHER | Age: 49
End: 2024-04-12

## 2024-04-12 ENCOUNTER — TELEPHONE (OUTPATIENT)
Dept: NEPHROLOGY | Facility: CLINIC | Age: 49
End: 2024-04-12

## 2024-04-12 NOTE — TELEPHONE ENCOUNTER
Left message to schedule April follow up appointment with Dr. Taylor in [place].  This is the 1st attempt.

## 2024-04-12 NOTE — TELEPHONE ENCOUNTER
Patient called and scheduled Follow up appointment in July.    Patient asking if there are any labs Dr. Taylor would like done prior to appointment.     Patient would like call back if labs are placed.

## 2024-04-12 NOTE — TELEPHONE ENCOUNTER
PA for Tresiba    Submitted via    []CMM-KEY    [x][employer]-Case ID # [id number]   []Faxed to plan   []Other website    []Phone call Case ID #      Office notes sent, clinical questions answered. Awaiting determination    Turnaround time for your insurance to make a decision on your Prior Authorization can take 7-21 business days.

## 2024-04-12 NOTE — TELEPHONE ENCOUNTER
PA for Glulisine    Submitted via    []CMM-KEY    [x][employer]-Case ID # [id number]   []Faxed to plan   []Other website    []Phone call Case ID #      Office notes sent, clinical questions answered. Awaiting determination    Turnaround time for your insurance to make a decision on your Prior Authorization can take 7-21 business days.

## 2024-04-12 NOTE — TELEPHONE ENCOUNTER
Patient was calling because Dr. Gonzales was going to switch her long, and short acting insulin. The 2 names of the insulin for short acting is apidra, or lyumjev. Can patient be contacted to see what short acting insulin Dr. Gonzales is recommending for the patient. She said she is on her last dose of novolog insulin, so before she got that script filled she wanted to see what changes Edu Gonzales was going to make.

## 2024-04-12 NOTE — TELEPHONE ENCOUNTER
Upon review of the In Basket request we were able to note that no further action is required. The patient chart is up to date as a result of a previous request.      Any additional questions or concerns should be emailed to the Practice Liaisons via the appropriate education email address, please do not reply via In Basket.    Thank you  Jorge L Bazan

## 2024-04-12 NOTE — TELEPHONE ENCOUNTER
Upon review of the In Basket request we were able to locate, review, and update the patient chart as requested for Diabetic Eye Exam.  DOS is 3-18-24.    Any additional questions or concerns should be emailed to the Practice Liaisons via the appropriate education email address, please do not reply via In Basket.    Thank you  Taryn Valencia

## 2024-04-12 NOTE — TELEPHONE ENCOUNTER
----- Message from Kandy Flores MA sent at 4/11/2024 11:56 AM EDT -----  Regarding: diabetic eye exam  04/11/24 11:59 AM    Hello, our patient Vernell Darby has had Diabetic Eye Exam completed/performed. Please assist in updating the patient chart by pulling the document from the Media Tab. The date of service is 03/28/24.     Thank you,  Kandy Flores MA  [facility] [place]

## 2024-04-15 DIAGNOSIS — E10.65 TYPE 1 DIABETES MELLITUS WITH HYPERGLYCEMIA (HCC): Primary | ICD-10-CM

## 2024-04-15 RX ORDER — INSULIN GLARGINE 300 U/ML
84 INJECTION, SOLUTION SUBCUTANEOUS DAILY
Qty: 18 ML | Refills: 2 | Status: SHIPPED | OUTPATIENT
Start: 2024-04-15

## 2024-04-15 RX ORDER — INSULIN ASPART INJECTION 100 [IU]/ML
INJECTION, SOLUTION SUBCUTANEOUS
Qty: 60 ML | Refills: 3 | Status: SHIPPED | OUTPATIENT
Start: 2024-04-15

## 2024-04-15 NOTE — TELEPHONE ENCOUNTER
PA for Apidra Denied    Reason         Message sent to office clinical pool Yes    Denial letter scanned into Media Yes    Appeal started No ( Provider will need to decide if appeal is warranted and send clinical documentation to PA team for initiation.)

## 2024-04-15 NOTE — TELEPHONE ENCOUNTER
Upon review of the In Basket request we were able to locate, review, and update the patient chart as requested for Diabetic Eye Exam.    Any additional questions or concerns should be emailed to the Practice Liaisons via the appropriate education email address, please do not reply via In Basket.    Thank you  Veronica Overton

## 2024-04-15 NOTE — TELEPHONE ENCOUNTER
Left message for patient to call back. I believe she will need syringes if we provide her the Fiasp vial.

## 2024-04-15 NOTE — TELEPHONE ENCOUNTER
PA for apidra Denied    Reason        Message sent to office clinical pool {Yes/No (free text):40431}    Denial letter scanned into Media {Yes/No (free text):32333}    Appeal started {YES/NO:15284} ( Provider will need to decide if appeal is warranted and send clinical documentation to PA team for initiation.)

## 2024-04-15 NOTE — TELEPHONE ENCOUNTER
She is already on NovoLog which she believes is not helping her.  Novolin is not a good option for her.  If she is going to run out of insulin prior to us figuring out that if the last will be covered, we can give her sample.

## 2024-04-15 NOTE — TELEPHONE ENCOUNTER
Instead of the Tresiba I sent over Toujeo.  As for the Apidra, they did not give me a recommendation on the denial letter of what they do cover.  It does not look like Lyirmajev would be covered either, so I sent over Fiasp.

## 2024-04-23 ENCOUNTER — TELEPHONE (OUTPATIENT)
Age: 49
End: 2024-04-23

## 2024-04-23 ENCOUNTER — OFFICE VISIT (OUTPATIENT)
Dept: DIABETES SERVICES | Facility: HOSPITAL | Age: 49
End: 2024-04-23
Payer: COMMERCIAL

## 2024-04-23 DIAGNOSIS — E10.65 TYPE 1 DIABETES MELLITUS WITH HYPERGLYCEMIA (HCC): Primary | ICD-10-CM

## 2024-04-23 PROCEDURE — 95249 CONT GLUC MNTR PT PROV EQP: CPT | Performed by: DIETITIAN, REGISTERED

## 2024-04-23 NOTE — PROGRESS NOTES
Dexcom G7 Personal Training    Met with Vernell Darby for Dexcom G7 personal training. Patient comes in today with there own unit to be trained on. Completed all aspects of training, including site selection on rotation, not infusing insulin near the sensor site, proper insertion technique, inserting codes into the , charging, waterproof sensor, range of 20ft, setting high low alarms that can be adjusted based on their preferences. They put on their first sensor by themselves with no issue.  Left my office today with sensor on and in 30 min warm up mode.      Vernell Darby will be running the dexcom through their phone.    Discussed creating a Clarity account to be able to link and upload from home or auto upload from their phone. Patient was added to our clarity account today while in office and invited to link to us if using their phone. Patient understands that reciever will be downloaded while in office at time of visit and/or cell phone will automatically upload to our clarity for them. They understand that their blood sugars are not monitored by us on a regular basis, but that we can access them as needed or desired by the patient and provider.     Dexcom's phone number is in their paperwork, encouraged Vernell to reach out to [employer] if they have any issues after hours, 24/7. Training completed, will call with questions.     Lab Results   Component Value Date    HGBA1C 10.5 (H) 04/10/2024       Lab Results   Component Value Date     01/28/2017    SODIUM 138 04/10/2024    K 4.6 04/10/2024     04/10/2024    CO2 27 04/10/2024    AGAP 11 04/10/2024    BUN 41 (H) 04/10/2024    CREATININE 1.14 04/10/2024    GLUC 281 (H) 12/08/2023    GLUF 134 (H) 04/10/2024    CALCIUM 9.4 04/10/2024    AST 14 04/10/2024    ALT 16 04/10/2024    ALKPHOS 94 04/10/2024    PROT 5.9 (L) 01/28/2017    TP 7.1 04/10/2024    BILITOT 0.3 01/28/2017    TBILI 0.33 04/10/2024    EGFR 56 04/10/2024           Patient response to  instruction    Comprehension: good  Motivation: good  Expected Compliance: good  Response to Teachback: 100%, demonstrated understanding    Thank you for referring your patient to [facility], it was a pleasure working with them today. Please feel free to call with any questions or concerns.

## 2024-04-23 NOTE — TELEPHONE ENCOUNTER
Patient called bc she would like to schedule for today to start her dexcom G7 sensors Demetrio. She is already on a G6 so demetrio only needs 45 mins. She is due to switch sensors today,

## 2024-04-29 DIAGNOSIS — I10 ESSENTIAL HYPERTENSION: Chronic | ICD-10-CM

## 2024-04-29 RX ORDER — LISINOPRIL 20 MG/1
40 TABLET ORAL DAILY
Qty: 180 TABLET | Refills: 1 | Status: SHIPPED | OUTPATIENT
Start: 2024-04-29

## 2024-05-06 ENCOUNTER — TELEPHONE (OUTPATIENT)
Dept: HEMATOLOGY ONCOLOGY | Facility: CLINIC | Age: 49
End: 2024-05-06

## 2024-05-06 NOTE — TELEPHONE ENCOUNTER
I called Vernell regarding an appointment that they have scheduled with LELAND Felton scheduled on  06/17/2024      I left a voicemail explaining to patient that this appointment will need to be rescheduled due to a change in the providers schedule. Patient was advised to call [facility] to reschedule.    Appointment was canceled and a [employer] Appt message was sent if applicable.

## 2024-05-06 NOTE — TELEPHONE ENCOUNTER
Appointment Schedule   Who are you speaking with? Patient   If it is not the patient, are they listed on an active communication consent form? N/A   Which provider is the appointment scheduled with? LELAND Felton   At which location is the appointment scheduled for? [place]   When is the appointment scheduled?  Please list date and time 5/21/24 1030   What is the reason for this appointment? F/u   Did patient voice understanding of the details of this appointment? Yes   Was the no show policy reviewed with patient? Yes

## 2024-05-15 DIAGNOSIS — J45.20 MILD INTERMITTENT ASTHMA WITHOUT COMPLICATION: ICD-10-CM

## 2024-05-15 RX ORDER — MONTELUKAST SODIUM 10 MG/1
TABLET ORAL
Qty: 90 TABLET | Refills: 1 | Status: SHIPPED | OUTPATIENT
Start: 2024-05-15

## 2024-05-16 ENCOUNTER — TELEPHONE (OUTPATIENT)
Age: 49
End: 2024-05-16

## 2024-05-16 NOTE — TELEPHONE ENCOUNTER
Spoke to Vernell regarding having lab work completed for upcoming appointment 5/21. She stated she will be going to get Labs drawn.

## 2024-05-20 ENCOUNTER — TELEPHONE (OUTPATIENT)
Dept: HEMATOLOGY ONCOLOGY | Facility: CLINIC | Age: 49
End: 2024-05-20

## 2024-05-20 NOTE — TELEPHONE ENCOUNTER
Appointment Schedule   Who are you speaking with? Patient   If it is not the patient, are they listed on an active communication consent form? N/A   Which provider is the appointment scheduled with? LELAND Felton   At which location is the appointment scheduled for? [place]   When is the appointment scheduled?  Please list date and time 7/18/24   What is the reason for this appointment? Follow up   Did patient voice understanding of the details of this appointment? Yes   Was the no show policy reviewed with patient? Yes

## 2024-05-21 ENCOUNTER — TELEPHONE (OUTPATIENT)
Age: 49
End: 2024-05-21

## 2024-05-21 ENCOUNTER — OFFICE VISIT (OUTPATIENT)
Dept: FAMILY MEDICINE CLINIC | Facility: CLINIC | Age: 49
End: 2024-05-21
Payer: COMMERCIAL

## 2024-05-21 VITALS
HEART RATE: 79 BPM | HEIGHT: 56 IN | SYSTOLIC BLOOD PRESSURE: 124 MMHG | WEIGHT: 185 LBS | DIASTOLIC BLOOD PRESSURE: 66 MMHG | OXYGEN SATURATION: 98 % | RESPIRATION RATE: 16 BRPM | TEMPERATURE: 97.9 F | BODY MASS INDEX: 41.61 KG/M2

## 2024-05-21 DIAGNOSIS — Z12.31 ENCOUNTER FOR SCREENING MAMMOGRAM FOR BREAST CANCER: ICD-10-CM

## 2024-05-21 DIAGNOSIS — L30.9 DERMATITIS: Primary | ICD-10-CM

## 2024-05-21 PROCEDURE — 99213 OFFICE O/P EST LOW 20 MIN: CPT | Performed by: STUDENT IN AN ORGANIZED HEALTH CARE EDUCATION/TRAINING PROGRAM

## 2024-05-21 RX ORDER — BENZOCAINE/MENTHOL 6 MG-10 MG
LOZENGE MUCOUS MEMBRANE 2 TIMES DAILY
Qty: 56 G | Refills: 0 | Status: SHIPPED | OUTPATIENT
Start: 2024-05-21

## 2024-05-21 NOTE — PROGRESS NOTES
Ambulatory Visit  Name: Vernell Darby      : 1975      MRN: [id number]  Encounter Provider: Rika Nugent MD  Encounter Date: 2024   Encounter department: [facility]    Assessment & Plan   1. Dermatitis  -     Ambulatory Referral to Dermatology; Future  -     hydrocortisone 1 % cream; Apply topically 2 (two) times a day  2. Encounter for screening mammogram for breast cancer  -     Mammo screening bilateral w 3d & cad; Future; Expected date: 2024  Discussed with patient to keep area dry and clean  Avoid scented lotions body washes and detergents which may cause further irritation  May use hydrocortisone cream twice a day for 1 week on sites  If no improvement patient to see dermatology for further evaluation    Follow-up as needed     History of Present Illness     Vernell is a 48 yr old female who presents for acute visit. Patient reports over the last week she was noted itchy rash on her bilateral forearms and lower left leg. Patient does not report any changes in medications, body washes, soaps, detergents. Does have pets at home.   No recent illness or sick contacts.     Rash  This is a new problem. The current episode started in the past 7 days. The problem has been waxing and waning since onset. The affected locations include the left arm, right arm and left lower leg. The problem is mild. The rash is characterized by itchiness. It is unknown if there was an exposure to a precipitant. The rash first occurred at home. Pertinent negatives include no congestion, cough or fever. Past treatments include nothing. There were no sick contacts.       Review of Systems   Constitutional:  Negative for appetite change and fever.   HENT:  Negative for congestion.    Respiratory:  Negative for cough.    Skin:  Positive for rash.   Neurological:  Negative for dizziness, light-headedness and headaches.     Past Medical History   Past Medical History:   Diagnosis  Date    Anemia     Arthritis     Asthma     w/acute exacerbation. Last assessed: 10/26/12    Chest pain 02/03/2016    CHF (congestive heart failure) ([facility])     Depression     Diabetes mellitus (HCC)     Diabetic nephropathy (HCC)     Diabetic nephropathy associated with type 1 diabetes mellitus ([facility]) 08/12/2020    Diabetic retinopathy ([facility]) 02/03/2016    Endometriosis     Gastroenteritis 02/03/2016    GERD (gastroesophageal reflux disease)     HTN (hypertension)     Hyperlipidemia 02/03/2016    Hypertension     Obesity     Oligomenorrhea     Polycystic ovaries 02/03/2016    Retinal hemorrhage 02/03/2016    Retinopathy     Thrombocytosis     Type 1 diabetes mellitus with ophthalmic manifestation ([facility]) 02/03/2016     Past Surgical History:   Procedure Laterality Date    CATARACT EXTRACTION Left 10/2020    CATARACT EXTRACTION Right     RETINAL LASER PROCEDURE       Family History   Problem Relation Age of Onset    Heart murmur Mother     Diabetes Mother         Mellitus    Thyroid disease Mother         Disorder    Diabetes Father         Mellitus    Hypertension Father     No Known Problems Maternal Grandmother     Diabetes Maternal Grandfather         Mellitus    Breast cancer Paternal Grandmother 70 - 79    Leukemia Paternal Grandfather 48    Diabetes Maternal Aunt         Mellitus    Diabetes Maternal Uncle         Mellitus    Substance Abuse Neg Hx     Mental illness Neg Hx     Alcohol abuse Neg Hx      Current Outpatient Medications on File Prior to Visit   Medication Sig Dispense Refill    acetaminophen (TYLENOL) 500 mg tablet Take 2 tablets (1,000 mg total) by mouth every 6 (six) hours as needed for mild pain or moderate pain 30 tablet 0    acetone, urine, test strip Use to test urine ketones for high blood sugars. 25 each 6    albuterol (2.5 mg/3 mL) 0.083 % nebulizer solution INHALE CONTENTS OF 1 VIAL BY NEBULIZER EVERY 6 HOURS AS NEEDED FOR WHEEZING OR FOR SHORTNESS OF BREATH. 75 mL 3    albuterol (PROVENTIL  HFA,VENTOLIN HFA) 90 mcg/act inhaler INHALE 2 PUFSS BY MOUTH EVERY 4 HOURS AS NEEDED WHEEZING OR FOR SHORTNESS OF BREATH 25.5 g 0    Ascorbic Acid (VITAMIN C) 500 MG CAPS Take 2 capsules by mouth daily      Aspirin Low Dose 81 MG EC tablet TAKE 1 TABLET BY MOUTH EVERY DAY 30 tablet 0    atorvastatin (LIPITOR) 80 mg tablet TAKE 1 TABLET BY MOUTH EVERY DAY 30 tablet 11    Blood Pressure Monitoring [place] Use 2 (two) times a day Monitor BP twice daily. Call office if BP > 140/90 persistently. 1 Device 0    Continuous Blood Gluc Sensor (Dexcom G7 Sensor) Use 1 Device every 10 days 3 each 11    cyanocobalamin (VITAMIN B-12) 100 mcg tablet Take by mouth daily      furosemide (LASIX) 40 mg tablet TAKE 1 TABLET BY MOUTH EVERY DAY 30 tablet 2    insulin aspart, w/niacinamide, (Fiasp FlexTouch) 100 Units/mL injection pen INJECT 1 UNIT OF NOVOLOG FOR EVERY 1 GRAM OF CARBS, UP  UNITS DAILY 60 mL 3    insulin glargine (Toujeo SoloStar) 300 units/mL CONCENTRATED U-300 injection pen (1-unit dial) Inject 84 Units under the skin daily 18 mL 2    lisinopril (ZESTRIL) 20 mg tablet TAKE 2 TABLETS BY MOUTH EVERY  tablet 1    metFORMIN (GLUCOPHAGE-XR) 500 mg 24 hr tablet Take 1 tablet (500 mg total) by mouth daily with breakfast 90 tablet 1    montelukast (SINGULAIR) 10 mg tablet TAKE 1 TABLET BY MOUTH AT BEDTIME 90 tablet 1    omeprazole (PriLOSEC) 20 mg delayed release capsule Take 20 mg by mouth daily.      OneTouch Ultra test strip Test 3 times daily 300 each 3    PARoxetine (PAXIL) 20 mg tablet TAKE 1 TABLET BY MOUTH EVERY DAY 90 tablet 0    Probiotic Product (ALIGN PO) Take by mouth      Butalbital-APAP-Caffeine (Fioricet) -40 MG CAPS Take 1 tablet by mouth 4 (four) times a day as needed (headahce) (Patient not taking: Reported on 4/11/2024) 30 capsule 0    Continuous Blood Gluc Sensor (Dexcom G6 Sensor) MISC CHANGE EVERY 10 DAYS (Patient not taking: Reported on 5/21/2024) 3 each 12    Continuous Blood Gluc  Transmit (Dexcom G6 Transmitter) MISC USE AS DIRECTED. CHANGE EVERY 90 DAYS. (Patient not taking: Reported on 5/21/2024) 1 each 1    Fluticasone-Salmeterol (Wixela Inhub) 250-50 mcg/dose inhaler Inhale 1 puff 2 (two) times a day Rinse mouth after use. (Patient not taking: Reported on 5/21/2024) 60 blister 2    glucagon (Glucagon Emergency) 1 MG injection Inject 1 mg under the skin once as needed for low blood sugar for up to 1 dose (Patient not taking: Reported on 5/21/2024) 1 kit 3    Glucagon (Gvoke HypoPen 2-Pack) 1 MG/0.2ML SOAJ Use if hypoglycemia prn (Patient not taking: Reported on 5/21/2024) 1 Syringe 6     No current facility-administered medications on file prior to visit.   No Known Allergies   Current Outpatient Medications on File Prior to Visit   Medication Sig Dispense Refill    acetaminophen (TYLENOL) 500 mg tablet Take 2 tablets (1,000 mg total) by mouth every 6 (six) hours as needed for mild pain or moderate pain 30 tablet 0    acetone, urine, test strip Use to test urine ketones for high blood sugars. 25 each 6    albuterol (2.5 mg/3 mL) 0.083 % nebulizer solution INHALE CONTENTS OF 1 VIAL BY NEBULIZER EVERY 6 HOURS AS NEEDED FOR WHEEZING OR FOR SHORTNESS OF BREATH. 75 mL 3    albuterol (PROVENTIL HFA,VENTOLIN HFA) 90 mcg/act inhaler INHALE 2 PUFSS BY MOUTH EVERY 4 HOURS AS NEEDED WHEEZING OR FOR SHORTNESS OF BREATH 25.5 g 0    Ascorbic Acid (VITAMIN C) 500 MG CAPS Take 2 capsules by mouth daily      Aspirin Low Dose 81 MG EC tablet TAKE 1 TABLET BY MOUTH EVERY DAY 30 tablet 0    atorvastatin (LIPITOR) 80 mg tablet TAKE 1 TABLET BY MOUTH EVERY DAY 30 tablet 11    Blood Pressure Monitoring [place] Use 2 (two) times a day Monitor BP twice daily. Call office if BP > 140/90 persistently. 1 Device 0    Continuous Blood Gluc Sensor (Dexcom G7 Sensor) Use 1 Device every 10 days 3 each 11    cyanocobalamin (VITAMIN B-12) 100 mcg tablet Take by mouth daily      furosemide (LASIX) 40 mg tablet TAKE 1 TABLET BY  MOUTH EVERY DAY 30 tablet 2    insulin aspart, w/niacinamide, (Fiasp FlexTouch) 100 Units/mL injection pen INJECT 1 UNIT OF NOVOLOG FOR EVERY 1 GRAM OF CARBS, UP  UNITS DAILY 60 mL 3    insulin glargine (Toujeo SoloStar) 300 units/mL CONCENTRATED U-300 injection pen (1-unit dial) Inject 84 Units under the skin daily 18 mL 2    lisinopril (ZESTRIL) 20 mg tablet TAKE 2 TABLETS BY MOUTH EVERY  tablet 1    metFORMIN (GLUCOPHAGE-XR) 500 mg 24 hr tablet Take 1 tablet (500 mg total) by mouth daily with breakfast 90 tablet 1    montelukast (SINGULAIR) 10 mg tablet TAKE 1 TABLET BY MOUTH AT BEDTIME 90 tablet 1    omeprazole (PriLOSEC) 20 mg delayed release capsule Take 20 mg by mouth daily.      OneTouch Ultra test strip Test 3 times daily 300 each 3    PARoxetine (PAXIL) 20 mg tablet TAKE 1 TABLET BY MOUTH EVERY DAY 90 tablet 0    Probiotic Product (ALIGN PO) Take by mouth      Butalbital-APAP-Caffeine (Fioricet) -40 MG CAPS Take 1 tablet by mouth 4 (four) times a day as needed ([facility]) (Patient not taking: Reported on 4/11/2024) 30 capsule 0    Continuous Blood Gluc Sensor (Dexcom G6 Sensor) MISC CHANGE EVERY 10 DAYS (Patient not taking: Reported on 5/21/2024) 3 each 12    Continuous Blood Gluc Transmit (Dexcom G6 Transmitter) MISC USE AS DIRECTED. CHANGE EVERY 90 DAYS. (Patient not taking: Reported on 5/21/2024) 1 each 1    Fluticasone-Salmeterol (Wixela Inhub) 250-50 mcg/dose inhaler Inhale 1 puff 2 (two) times a day Rinse mouth after use. (Patient not taking: Reported on 5/21/2024) 60 blister 2    glucagon (Glucagon Emergency) 1 MG injection Inject 1 mg under the skin once as needed for low blood sugar for up to 1 dose (Patient not taking: Reported on 5/21/2024) 1 kit 3    Glucagon (Gvoke HypoPen 2-Pack) 1 MG/0.2ML SOAJ Use if hypoglycemia prn (Patient not taking: Reported on 5/21/2024) 1 Syringe 6     No current facility-administered medications on file prior to visit.      Social History  "    Tobacco Use    Smoking status: Never    Smokeless tobacco: Never    Tobacco comments:     Per [employer]: Former smoker. Quit in 1994   Vaping Use    Vaping status: Never Used   Substance and Sexual Activity    Alcohol use: Yes     Comment: Occasional/1 mixed drink twice a month if that    Drug use: No    Sexual activity: Yes     Partners: Male     Birth control/protection: Pill     Objective     /66   Pulse 79   Temp 97.9 °F (36.6 °C) (Temporal)   Resp 16   Ht 4' 8\" (1.422 m)   Wt 83.9 kg (185 lb)   SpO2 98%   BMI 41.48 kg/m²     Physical Exam  Vitals reviewed.   Constitutional:       General: She is not in acute distress.  HENT:      Mouth/Throat:      Mouth: Mucous membranes are moist.      Pharynx: Oropharynx is clear.   Eyes:      Extraocular Movements: Extraocular movements intact.   Skin:     Findings: Rash present. Rash is not macular, papular, pustular, scaling, urticarial or vesicular.      Comments: Flat erythematous area with abrasion from scratching- no drainage or fluctuance    Neurological:      Mental Status: She is alert.       Administrative Statements   I have spent a total time of 20 minutes on 05/23/24 In caring for this patient including Instructions for management, Impressions, Documenting in the medical record, Reviewing / ordering tests, medicine, procedures  , and Obtaining or reviewing history  .    "

## 2024-05-21 NOTE — TELEPHONE ENCOUNTER
Rec'd call from patient requesting CONSULT for DERMATITIS.    Explain first available, wait/cancellation list processes and MyChart notification.      Patient verbalized understanding.    Patient declined to schedule at this time. She'll return call to office if she needs to schedule.

## 2024-05-28 DIAGNOSIS — F32.A DEPRESSION, UNSPECIFIED DEPRESSION TYPE: ICD-10-CM

## 2024-05-28 RX ORDER — PAROXETINE HYDROCHLORIDE 20 MG/1
20 TABLET, FILM COATED ORAL DAILY
Qty: 90 TABLET | Refills: 0 | Status: SHIPPED | OUTPATIENT
Start: 2024-05-28

## 2024-05-28 NOTE — TELEPHONE ENCOUNTER
Reason for call:   [x] Refill   [] Prior Auth  [] Other:     Office:   [x] PCP/Provider - Raffi  [] Specialty/Provider -     Medication: PARoxetine (PAXIL) 20 mg tablet     Dose/Frequency: 20 mg, Oral, Daily     Quantity: 90    Pharmacy: [id number] - [place] - [address]     Does the patient have enough for 3 days?   [] Yes   [x] No - Send as HP to POD

## 2024-06-22 DIAGNOSIS — I50.9 ACUTE CONGESTIVE HEART FAILURE, UNSPECIFIED HEART FAILURE TYPE (HCC): ICD-10-CM

## 2024-06-24 NOTE — TELEPHONE ENCOUNTER
Pharmacy [facility] called in regards to medication furosemide (LASIX) 40 mg tablet. Informed the pharmacy that pt has not been seen in over a yr so medication may not be refillable. Pharmacy indicated that it either needs to be approved or denied.

## 2024-06-24 NOTE — TELEPHONE ENCOUNTER
Requested medication(s) are due for refill today: Yes  Patient has already received a courtesy refill: Yes  Other reason request has been forwarded to provider: Due for f/u. Sent msg to schedule.

## 2024-06-25 RX ORDER — FUROSEMIDE 40 MG/1
TABLET ORAL
Qty: 30 TABLET | Refills: 2 | Status: SHIPPED | OUTPATIENT
Start: 2024-06-25

## 2024-07-09 DIAGNOSIS — J45.30 MILD PERSISTENT ASTHMA WITHOUT COMPLICATION: ICD-10-CM

## 2024-07-09 RX ORDER — FLUTICASONE PROPIONATE AND SALMETEROL 250; 50 UG/1; UG/1
POWDER RESPIRATORY (INHALATION)
Qty: 60 BLISTER | Refills: 5 | Status: SHIPPED | OUTPATIENT
Start: 2024-07-09

## 2024-07-12 ENCOUNTER — TELEPHONE (OUTPATIENT)
Dept: NEPHROLOGY | Facility: CLINIC | Age: 49
End: 2024-07-12

## 2024-07-12 NOTE — TELEPHONE ENCOUNTER
Left voicemail for the patient reminding to please complete labwork prior to 7/26 appointment with Dr. Taylor. Advised patient to call back with any questions or  Concerns.

## 2024-07-12 NOTE — PROGRESS NOTES
HEMATOLOGY / ONCOLOGY CLINIC FOLLOW UP NOTE    Primary Care Provider: Rika Nugent MD  Referring Provider:    MRN: [id number]  : 1975    Reason for Encounter: Follow-up for history of thrombocytosis and iron deficiency anemia       Interval History: Patient returns for follow-up visit for history of anemia of chronic disease, mild thrombocytosis.  Blood work done prior to today's visit demonstrates a chronic findings.  Hemoglobin stable at 10.7, MCV 96.  White count 11.4, platelet count 435, ANC 8.49  Iron panel normal, serum ferritin 97  A1c remains elevated, improved.  She continues to follow closely with endocrinology.  Overall, she states she is feeling well.  Denies any concerning symptoms today.      REVIEW OF SYSTEMS:  Please note that a 14-point review of systems was performed to include Constitutional, HEENT, Respiratory, CVS, GI, , Musculoskeletal, Integumentary, Neurologic, Rheumatologic, Endocrinologic, Psychiatric, Lymphatic, and Hematologic/Oncologic systems were reviewed and are negative unless otherwise stated in HPI. Positive and negative findings pertinent to this evaluation are incorporated into the history of present illness.      ECOG PS: 0    PROBLEM LIST:  Patient Active Problem List   Diagnosis    Type 1 diabetes mellitus with hyperglycemia (HCC)    Asthma    Hypertension    Moderate episode of recurrent major depressive disorder (HCC)    Hyperlipidemia    Polycystic ovarian syndrome    Retinal hemorrhage    Anemia    Asthma, mild persistent    Both eyes affected 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, associated with type 1 diabetes mellitus (HCC)    Nervousness    Thrombocytosis    Severe obesity (BMI 35.0-39.9) with comorbidity (HCC)    Gastroesophageal reflux disease without esophagitis    Other proteinuria    Diabetic polyneuropathy associated with type 1 diabetes mellitus (HCC)    Diabetic nephropathy associated with type 1 diabetes mellitus (HCC)     Nuclear sclerotic cataract of left eye    B12 deficiency    History of COVID-19    Palpitations    Chronic diastolic congestive heart failure (HCC)    Primary localized osteoarthritis of right knee    Hyponatremia       Assessment / Plan:  1. Normocytic anemia    2. Thrombocytosis      Patient is a 48 year-old female with history of type 1 diabetes,  chronic diastolic CHF, HTN who follows with hematology for an elevated platelet count and h/o iron deficiency anemia treated with IV Venofer in the past. Myeloproliferative work up was ordered in past but never completed   She has been on observation   Her most recent CBC with differential remains stable and consistent with anemia of chronic disease.  Stable mild thrombocytosis.  No evidence of recurrent iron deficiency.  Clinically, she is well-appearing and without any concerns today.  She will continue on observation for anemia of chronic disease, reactive thrombocytosis.    She will return for a follow-up visit in 6 months with repeat blood work.  Patient is in agreement with this plan of care.  She knows to call anytime with questions or concerns in the interim    I spent 20 minutes on chart review, face to face counseling time, coordination of care and documentation.    Past Medical History:   has a past medical history of Anemia, Arthritis, Asthma, Chest pain (02/03/2016), CHF (congestive heart failure) ([facility]), Depression, Diabetes mellitus ([facility]), Diabetic nephropathy ([facility]), Diabetic nephropathy associated with type 1 diabetes mellitus ([facility]) (08/12/2020), Diabetic retinopathy ([facility]) (02/03/2016), Endometriosis, Gastroenteritis (02/03/2016), GERD (gastroesophageal reflux disease), HTN (hypertension), Hyperlipidemia (02/03/2016), Hypertension, Obesity, Oligomenorrhea, Polycystic ovaries (02/03/2016), Retinal hemorrhage (02/03/2016), Retinopathy, Thrombocytosis, and Type 1 diabetes mellitus with ophthalmic manifestation ([facility]) (02/03/2016).    PAST SURGICAL  HISTORY:   has a past surgical history that includes Retinal laser procedure; Cataract extraction (Left, 10/2020); and Cataract extraction (Right).    CURRENT MEDICATIONS  Current Outpatient Medications   Medication Sig Dispense Refill    acetone, urine, test strip Use to test urine ketones for high blood sugars. 25 each 6    albuterol (2.5 mg/3 mL) 0.083 % nebulizer solution INHALE CONTENTS OF 1 VIAL BY NEBULIZER EVERY 6 HOURS AS NEEDED FOR WHEEZING OR FOR SHORTNESS OF BREATH. 75 mL 3    albuterol (PROVENTIL HFA,VENTOLIN HFA) 90 mcg/act inhaler INHALE 2 PUFSS BY MOUTH EVERY 4 HOURS AS NEEDED WHEEZING OR FOR SHORTNESS OF BREATH 25.5 g 0    Ascorbic Acid (VITAMIN C) 500 MG CAPS Take 2 capsules by mouth daily      Aspirin Low Dose 81 MG EC tablet TAKE 1 TABLET BY MOUTH EVERY DAY 30 tablet 0    atorvastatin (LIPITOR) 80 mg tablet TAKE 1 TABLET BY MOUTH EVERY DAY 30 tablet 11    Continuous Blood Gluc Sensor (Dexcom G7 Sensor) Use 1 Device every 10 days 3 each 11    cyanocobalamin (VITAMIN B-12) 100 mcg tablet Take by mouth daily      Fluticasone-Salmeterol (Advair) 250-50 mcg/dose inhaler INHALE 1 PUFF BY MOUTH TWO TIMES DAILY RINSE MOUTH AFTER USE 60 blister 5    furosemide (LASIX) 40 mg tablet TAKE 1 TABLET BY MOUTH EVERY DAY 30 tablet 2    insulin aspart, w/niacinamide, (Fiasp FlexTouch) 100 Units/mL injection pen INJECT 1 UNIT OF NOVOLOG FOR EVERY 1 GRAM OF CARBS, UP  UNITS DAILY 60 mL 3    insulin glargine (Toujeo SoloStar) 300 units/mL CONCENTRATED U-300 injection pen (1-unit dial) Inject 84 Units under the skin daily 18 mL 2    lisinopril (ZESTRIL) 20 mg tablet TAKE 2 TABLETS BY MOUTH EVERY  tablet 1    metFORMIN (GLUCOPHAGE-XR) 500 mg 24 hr tablet Take 1 tablet (500 mg total) by mouth daily with breakfast 90 tablet 1    montelukast (SINGULAIR) 10 mg tablet TAKE 1 TABLET BY MOUTH AT BEDTIME 90 tablet 1    omeprazole (PriLOSEC) 20 mg delayed release capsule Take 20 mg by mouth daily.      OneTouch  Ultra test strip Test 3 times daily 300 each 3    PARoxetine (PAXIL) 20 mg tablet Take 1 tablet (20 mg total) by mouth daily 90 tablet 0    Probiotic Product (ALIGN PO) Take by mouth      acetaminophen (TYLENOL) 500 mg tablet Take 2 tablets (1,000 mg total) by mouth every 6 (six) hours as needed for mild pain or moderate pain 30 tablet 0    Blood Pressure Monitoring [place] Use 2 (two) times a day Monitor BP twice daily. Call office if BP > 140/90 persistently. 1 Device 0    Butalbital-APAP-Caffeine (Fioricet) -40 MG CAPS Take 1 tablet by mouth 4 (four) times a day as needed (headahce) (Patient not taking: Reported on 4/11/2024) 30 capsule 0    Continuous Blood Gluc Sensor (Dexcom G6 Sensor) MISC CHANGE EVERY 10 DAYS (Patient not taking: Reported on 5/21/2024) 3 each 12    Continuous Blood Gluc Transmit (Dexcom G6 Transmitter) MISC USE AS DIRECTED. CHANGE EVERY 90 DAYS. (Patient not taking: Reported on 5/21/2024) 1 each 1    glucagon (Glucagon Emergency) 1 MG injection Inject 1 mg under the skin once as needed for low blood sugar for up to 1 dose (Patient not taking: Reported on 5/21/2024) 1 kit 3    Glucagon (Gvoke HypoPen 2-Pack) 1 MG/0.2ML SOAJ Use if hypoglycemia prn (Patient not taking: Reported on 5/21/2024) 1 Syringe 6    hydrocortisone 1 % cream Apply topically 2 (two) times a day 56 g 0     No current facility-administered medications for this visit.     [unfilled]    SOCIAL HISTORY:   reports that she has never smoked. She has never used smokeless tobacco. She reports current alcohol use. She reports that she does not use drugs.     FAMILY HISTORY:  family history includes Breast cancer (age of onset: 70 - 79) in her paternal grandmother; Diabetes in her father, maternal aunt, maternal grandfather, maternal uncle, and mother; Heart murmur in her mother; Hypertension in her father; Leukemia (age of onset: 48) in her paternal grandfather; No Known Problems in her maternal grandmother; Thyroid disease in her  "mother.     ALLERGIES:  has No Known Allergies.      Physical Exam:  Vital Signs:   Visit Vitals  /74 (BP Location: Left arm, Patient Position: Sitting, Cuff Size: Standard)   Pulse 92   Temp 98.6 °F (37 °C) (Temporal)   Resp 16   Ht 4' 8\" (1.422 m)   Wt 85.7 kg (189 lb)   SpO2 95%   BMI 42.37 kg/m²   OB Status Unknown   Smoking Status Never   BSA 1.73 m²     Body mass index is 42.37 kg/m².  Body surface area is 1.73 meters squared.    Physical Exam  Constitutional:       General: She is not in acute distress.     Appearance: Normal appearance.   HENT:      Head: Normocephalic and atraumatic.   Eyes:      General: No scleral icterus.        Right eye: No discharge.         Left eye: No discharge.      Conjunctiva/sclera: Conjunctivae normal.   Cardiovascular:      Rate and Rhythm: Normal rate and regular rhythm.   Pulmonary:      Effort: Pulmonary effort is normal. No respiratory distress.      Breath sounds: Normal breath sounds.   Abdominal:      General: Bowel sounds are normal. There is no distension.      Palpations: Abdomen is soft. There is no mass.      Tenderness: There is no abdominal tenderness.   Musculoskeletal:         General: Normal range of motion.   Lymphadenopathy:      Cervical: No cervical adenopathy.      Upper Body:      Right upper body: No supraclavicular, axillary or pectoral adenopathy.      Left upper body: No supraclavicular, axillary or pectoral adenopathy.   Skin:     General: Skin is warm and dry.   Neurological:      General: No focal deficit present.      Mental Status: She is alert and oriented to person, place, and time.   Psychiatric:         Mood and Affect: Mood normal.         Behavior: Behavior normal.         Labs:  Lab Results   Component Value Date    WBC 11.42 (H) 07/13/2024    HGB 10.7 (L) 07/13/2024    HCT 34.4 (L) 07/13/2024    MCV 96 07/13/2024     (H) 07/13/2024     Lab Results   Component Value Date     01/28/2017    SODIUM 143 07/13/2024    K 5.3 " 07/13/2024     07/13/2024    CO2 27 07/13/2024    AGAP 12 07/13/2024    BUN 42 (H) 07/13/2024    CREATININE 0.91 07/13/2024    GLUC 281 (H) 12/08/2023    GLUF 52 (L) 07/13/2024    CALCIUM 9.6 07/13/2024    AST 17 07/13/2024    ALT 19 07/13/2024    ALKPHOS 89 07/13/2024    PROT 5.9 (L) 01/28/2017    TP 7.0 07/13/2024    BILITOT 0.3 01/28/2017    TBILI 0.31 07/13/2024    EGFR 74 07/13/2024

## 2024-07-13 ENCOUNTER — APPOINTMENT (OUTPATIENT)
Dept: LAB | Facility: CLINIC | Age: 49
End: 2024-07-13
Payer: COMMERCIAL

## 2024-07-13 DIAGNOSIS — N17.9 AKI (ACUTE KIDNEY INJURY) (HCC): ICD-10-CM

## 2024-07-13 DIAGNOSIS — I10 PRIMARY HYPERTENSION: ICD-10-CM

## 2024-07-13 DIAGNOSIS — D47.1 MYELOPROLIFERATIVE DISORDER (HCC): ICD-10-CM

## 2024-07-13 DIAGNOSIS — E10.65 TYPE 1 DIABETES MELLITUS WITH HYPERGLYCEMIA (HCC): ICD-10-CM

## 2024-07-13 LAB
ALBUMIN SERPL BCG-MCNC: 3.9 G/DL (ref 3.5–5)
ALP SERPL-CCNC: 89 U/L (ref 34–104)
ALT SERPL W P-5'-P-CCNC: 19 U/L (ref 7–52)
ANION GAP SERPL CALCULATED.3IONS-SCNC: 12 MMOL/L (ref 4–13)
AST SERPL W P-5'-P-CCNC: 17 U/L (ref 13–39)
BASOPHILS # BLD AUTO: 0.06 THOUSANDS/ÂΜL (ref 0–0.1)
BASOPHILS NFR BLD AUTO: 1 % (ref 0–1)
BILIRUB SERPL-MCNC: 0.31 MG/DL (ref 0.2–1)
BUN SERPL-MCNC: 42 MG/DL (ref 5–25)
CALCIUM SERPL-MCNC: 9.6 MG/DL (ref 8.4–10.2)
CHLORIDE SERPL-SCNC: 104 MMOL/L (ref 96–108)
CO2 SERPL-SCNC: 27 MMOL/L (ref 21–32)
CREAT SERPL-MCNC: 0.91 MG/DL (ref 0.6–1.3)
CREAT UR-MCNC: 54 MG/DL
CREAT UR-MCNC: 54 MG/DL
EOSINOPHIL # BLD AUTO: 0.37 THOUSAND/ÂΜL (ref 0–0.61)
EOSINOPHIL NFR BLD AUTO: 3 % (ref 0–6)
ERYTHROCYTE [DISTWIDTH] IN BLOOD BY AUTOMATED COUNT: 14 % (ref 11.6–15.1)
EST. AVERAGE GLUCOSE BLD GHB EST-MCNC: 203 MG/DL
FERRITIN SERPL-MCNC: 97 NG/ML (ref 11–307)
GFR SERPL CREATININE-BSD FRML MDRD: 74 ML/MIN/1.73SQ M
GLUCOSE P FAST SERPL-MCNC: 52 MG/DL (ref 65–99)
HBA1C MFR BLD: 8.7 %
HCT VFR BLD AUTO: 34.4 % (ref 34.8–46.1)
HGB BLD-MCNC: 10.7 G/DL (ref 11.5–15.4)
IMM GRANULOCYTES # BLD AUTO: 0.04 THOUSAND/UL (ref 0–0.2)
IMM GRANULOCYTES NFR BLD AUTO: 0 % (ref 0–2)
IRON SATN MFR SERPL: 17 % (ref 15–50)
IRON SERPL-MCNC: 58 UG/DL (ref 50–212)
LYMPHOCYTES # BLD AUTO: 1.86 THOUSANDS/ÂΜL (ref 0.6–4.47)
LYMPHOCYTES NFR BLD AUTO: 16 % (ref 14–44)
MAGNESIUM SERPL-MCNC: 1.9 MG/DL (ref 1.9–2.7)
MCH RBC QN AUTO: 29.9 PG (ref 26.8–34.3)
MCHC RBC AUTO-ENTMCNC: 31.1 G/DL (ref 31.4–37.4)
MCV RBC AUTO: 96 FL (ref 82–98)
MICROALBUMIN UR-MCNC: 168.1 MG/L
MICROALBUMIN/CREAT 24H UR: 311 MG/G CREATININE (ref 0–30)
MONOCYTES # BLD AUTO: 0.6 THOUSAND/ÂΜL (ref 0.17–1.22)
MONOCYTES NFR BLD AUTO: 5 % (ref 4–12)
NEUTROPHILS # BLD AUTO: 8.49 THOUSANDS/ÂΜL (ref 1.85–7.62)
NEUTS SEG NFR BLD AUTO: 75 % (ref 43–75)
NRBC BLD AUTO-RTO: 0 /100 WBCS
PHOSPHATE SERPL-MCNC: 5.3 MG/DL (ref 2.7–4.5)
PLATELET # BLD AUTO: 435 THOUSANDS/UL (ref 149–390)
PMV BLD AUTO: 10.7 FL (ref 8.9–12.7)
POTASSIUM SERPL-SCNC: 5.3 MMOL/L (ref 3.5–5.3)
PROT SERPL-MCNC: 7 G/DL (ref 6.4–8.4)
PROT UR-MCNC: 25 MG/DL
PROT/CREAT UR: 0.46 MG/G{CREAT} (ref 0–0.1)
PTH-INTACT SERPL-MCNC: 48.3 PG/ML (ref 12–88)
RBC # BLD AUTO: 3.58 MILLION/UL (ref 3.81–5.12)
SODIUM SERPL-SCNC: 143 MMOL/L (ref 135–147)
TIBC SERPL-MCNC: 345 UG/DL (ref 250–450)
UIBC SERPL-MCNC: 287 UG/DL (ref 155–355)
WBC # BLD AUTO: 11.42 THOUSAND/UL (ref 4.31–10.16)

## 2024-07-13 PROCEDURE — 83540 ASSAY OF IRON: CPT

## 2024-07-13 PROCEDURE — 3066F NEPHROPATHY DOC TX: CPT | Performed by: PHYSICIAN ASSISTANT

## 2024-07-13 PROCEDURE — 83918 ORGANIC ACIDS TOTAL QUANT: CPT

## 2024-07-13 PROCEDURE — 82728 ASSAY OF FERRITIN: CPT

## 2024-07-13 PROCEDURE — 36415 COLL VENOUS BLD VENIPUNCTURE: CPT

## 2024-07-13 PROCEDURE — 80053 COMPREHEN METABOLIC PANEL: CPT

## 2024-07-13 PROCEDURE — 3060F POS MICROALBUMINURIA REV: CPT | Performed by: PHYSICIAN ASSISTANT

## 2024-07-13 PROCEDURE — 83036 HEMOGLOBIN GLYCOSYLATED A1C: CPT

## 2024-07-13 PROCEDURE — 84156 ASSAY OF PROTEIN URINE: CPT

## 2024-07-13 PROCEDURE — 83550 IRON BINDING TEST: CPT

## 2024-07-13 PROCEDURE — 82570 ASSAY OF URINE CREATININE: CPT

## 2024-07-13 PROCEDURE — 82043 UR ALBUMIN QUANTITATIVE: CPT

## 2024-07-15 ENCOUNTER — TELEPHONE (OUTPATIENT)
Dept: ENDOCRINOLOGY | Facility: HOSPITAL | Age: 49
End: 2024-07-15

## 2024-07-15 NOTE — TELEPHONE ENCOUNTER
Left message for patient to call back. What was she taking exactly, how much and how are her blood sugars?

## 2024-07-15 NOTE — TELEPHONE ENCOUNTER
Before I can order toujeo, I need to know how much toujeo she was taking with syringes so I can order the correct dosage.

## 2024-07-15 NOTE — TELEPHONE ENCOUNTER
Patient called back. She reports that she was using regular syringes to draw out of her pens, and was also doing this with the Toujeo.  She spoke with her pharmacist who advised her not to do this and provided her with a new pen and pen needles in the meantime until a new script can be sent in.  She also states that her blood sugars were great when she was taking too much of the Toujeo, now she is taking the 84 units daily properly and her blood sugars are in the 300s. She asks if provider wants to change anything?  She also takes her Fiasp and metformin as per med list.  She has a Dexcom and thinks we should be able to download. Patient will need a refill of Toujeo sent in to [facility].  Please advise and call patient.

## 2024-07-15 NOTE — TELEPHONE ENCOUNTER
Patient called back and asked to expedite the Toujeo refill request. Patient said she does not need Fiasp at this time but she does need pen needles.   She is using 84units of Toujeo. Please review and advise.   Patient uses  [id number] - [place] - [address] [facility]

## 2024-07-15 NOTE — TELEPHONE ENCOUNTER
Patient called into Rx refill line and states that she has been giving herself too much insulin.Previously she had insulin vials and had the syringe and needles for that so when she was switched to the pen she was using them again but did not realize that the insulin she is on currently insulin glargine (Toujeo SoloStar) 300 units/mL  is different than what she has been on in the past. Because of this she is completely out of refills on insulin and needs a new prescription written. She also states she will need the pen needles so she is using the device properly. Patient number [phone]

## 2024-07-15 NOTE — TELEPHONE ENCOUNTER
Clarify how much insulin toujeo and fiasp she was taking when using syringes. Then we can make adjustments.

## 2024-07-16 ENCOUNTER — OFFICE VISIT (OUTPATIENT)
Age: 49
End: 2024-07-16
Payer: COMMERCIAL

## 2024-07-16 VITALS
WEIGHT: 189 LBS | SYSTOLIC BLOOD PRESSURE: 140 MMHG | OXYGEN SATURATION: 95 % | DIASTOLIC BLOOD PRESSURE: 74 MMHG | RESPIRATION RATE: 16 BRPM | HEIGHT: 56 IN | BODY MASS INDEX: 42.52 KG/M2 | HEART RATE: 92 BPM | TEMPERATURE: 98.6 F

## 2024-07-16 DIAGNOSIS — D64.9 NORMOCYTIC ANEMIA: Primary | ICD-10-CM

## 2024-07-16 DIAGNOSIS — D75.839 THROMBOCYTOSIS: ICD-10-CM

## 2024-07-16 DIAGNOSIS — E10.65 TYPE 1 DIABETES MELLITUS WITH HYPERGLYCEMIA (HCC): ICD-10-CM

## 2024-07-16 PROCEDURE — 99213 OFFICE O/P EST LOW 20 MIN: CPT | Performed by: NURSE PRACTITIONER

## 2024-07-16 RX ORDER — INSULIN GLARGINE 300 U/ML
100 INJECTION, SOLUTION SUBCUTANEOUS DAILY
Qty: 13.5 ML | Refills: 6 | Status: SHIPPED | OUTPATIENT
Start: 2024-07-16

## 2024-07-16 NOTE — TELEPHONE ENCOUNTER
Patient called Rx refill line to speak to someone in the office regarding her insulin, see previous notes. Please contact patient at [phone]

## 2024-07-16 NOTE — TELEPHONE ENCOUNTER
Left message for patient to call back. Asked her to request to be transferred to staff in the office so we can resolve this issue.

## 2024-07-16 NOTE — TELEPHONE ENCOUNTER
Okay, that means she is actually been taking 3 times the amount of insulin, for Toujeo insulin that she was supposed to take when she was rolling it up in a syringe.  This point, I would increase her Toujeo insulin but not quite to that much.  Lets try having her go up on her Toujeo insulin to 100 units daily using the pen the correct way.  I would like her to let me know if her blood sugars are still high in the next week and we can increase her Toujeo further.

## 2024-07-18 ENCOUNTER — NURSE TRIAGE (OUTPATIENT)
Age: 49
End: 2024-07-18

## 2024-07-18 DIAGNOSIS — E10.65 TYPE 1 DIABETES MELLITUS WITH HYPERGLYCEMIA (HCC): Primary | ICD-10-CM

## 2024-07-18 NOTE — TELEPHONE ENCOUNTER
Spoke with Ms Darby. She is feeling well. Reports her Dexcom is high but now a horizontal directional arrow rather then directly up. She was at a Tea today with several small sandwiches and pastries. Took 50units of Fiasp but may have under dosed herself. Planning to eat dinner (burger with bun and pasta salad). She will take extra insulin but does not have a correctional scale. I did not make any changes, but suggested a walk to help lower BG and being very careful about estimating her next meal.

## 2024-07-18 NOTE — TELEPHONE ENCOUNTER
"Patient states she increased her Toujeo to 100 units as told on 7/16. She took the 100 units yesterday and today and is still having high blood sugars, currently her Dexcom reads High. She feels ok now but earlier today she felt tired and groggy. She takes fiasp 1 unit for every 1 gram of carb. She is asking if she should take more Fiasp tonight to help with the high blood sugars?  Message sent to on call provider to reach out to the patient.  Please advise                Reason for Disposition   [1] Blood glucose > 300 mg/dL (16.7 mmol/L) AND [2] uses insulin (e.g., insulin-dependent, all people with type 1 diabetes)    Answer Assessment - Initial Assessment Questions  1. REASON FOR CALL or QUESTION: \"What is your reason for calling today?\" or \"How can I best help you?\" or \"What question do you have that I can help answer?\"      *No Answer*    Answer Assessment - Initial Assessment Questions  1. BLOOD GLUCOSE: \"What is your blood glucose level?\"       Unknown   2. ONSET: \"When did you check the blood glucose?\"      Been going on  3. USUAL RANGE: \"What is your glucose level usually?\" (e.g., usual fasting morning value, usual evening value)      Has been in the 200s and 300s recently  5. TYPE 1 or 2:  \"Do you know what type of diabetes you have?\"  (e.g., Type 1, Type 2, Gestational; doesn't know)       Type 1  6. INSULIN: \"Do you take insulin?\" \"What type of insulin(s) do you use? What is the mode of delivery? (syringe, pen; injection or pump)?\"       Toujeo and Fiasp  7. DIABETES PILLS: \"Do you take any pills for your diabetes?\" If yes, ask: \"Have you missed taking any pills recently?\"      metformin  8. OTHER SYMPTOMS: \"Do you have any symptoms?\" (e.g., fever, frequent urination, difficulty breathing, dizziness, weakness, vomiting)      Tired and groggy earlier today    Protocols used: Diabetes - High Blood Sugar-ADULT-, Information Only Call - No Triage-ADULT-    "

## 2024-07-18 NOTE — TELEPHONE ENCOUNTER
Patient called stating she needs a refill of Ultra unifine plus pen tips 4mm   Patient requesting to be sent to [facility] in [place]

## 2024-07-19 LAB — METHYLMALONATE SERPL-SCNC: 166 NMOL/L (ref 0–378)

## 2024-07-19 NOTE — TELEPHONE ENCOUNTER
[place] to know sugars by Monday as she may need more insulin. Can we get a  dexcom download on Monday.

## 2024-07-26 ENCOUNTER — OFFICE VISIT (OUTPATIENT)
Dept: NEPHROLOGY | Facility: HOSPITAL | Age: 49
End: 2024-07-26

## 2024-07-26 VITALS
BODY MASS INDEX: 42.97 KG/M2 | DIASTOLIC BLOOD PRESSURE: 60 MMHG | HEIGHT: 56 IN | WEIGHT: 191 LBS | SYSTOLIC BLOOD PRESSURE: 126 MMHG

## 2024-07-26 DIAGNOSIS — I10 PRIMARY HYPERTENSION: ICD-10-CM

## 2024-07-26 DIAGNOSIS — N18.2 STAGE 2 CHRONIC KIDNEY DISEASE: ICD-10-CM

## 2024-07-26 DIAGNOSIS — I50.32 CHRONIC DIASTOLIC CONGESTIVE HEART FAILURE (HCC): ICD-10-CM

## 2024-07-26 DIAGNOSIS — R80.8 OTHER PROTEINURIA: Primary | ICD-10-CM

## 2024-07-26 DIAGNOSIS — D64.9 ANEMIA, UNSPECIFIED TYPE: ICD-10-CM

## 2024-07-26 DIAGNOSIS — E83.39 HYPERPHOSPHATEMIA: ICD-10-CM

## 2024-07-26 DIAGNOSIS — E10.21 DIABETIC NEPHROPATHY ASSOCIATED WITH TYPE 1 DIABETES MELLITUS (HCC): ICD-10-CM

## 2024-07-26 NOTE — PROGRESS NOTES
NEPHROLOGY OUTPATIENT PROGRESS NOTE   Vernell Darby 48 y.o. female MRN: [id number]  DATE: 7/26/2024  Reason for visit:   Chief Complaint   Patient presents with    Follow-up    Proteinuria        Patient Instructions   1. Proteinuria in setting of DM type 1, concern for diabetic nephropathy in setting of uncontrolled blood sugars as well as CKD stage 2 vs weight related.  -of note, normal renal function with last sCr 0.91 as of 7/13/24  -last UA bland  -UpCr 0.46g as of 7/13/24 - has risen, monitor this  -already on total 40mg lisinopril daily begun 7/10/17  -avoid nonsteroidals including ibuprofen, aleve, advil, motrin, naproxen, indomethacin, celebrex, toradol as these medications can lead to increased protein in the urine  -encourage exercise/weight loss      2. Hypertension - BP well controlled  -continue on lisinopril 20mg twice daily, lasix 40mg daily  -avoid high salt/sodium containing diet  -avoid beverages containing caffeine     3. Anemia -  Hgb 10.7, repeat CBC. Recent iron panel shows normal iron studies with slightly low iron sat. Would follow with hematology. Has had colonoscopy - was normal     4. Mild hyponatremia - resolved, sNa 143 on last bloodwork, may increase water intake by 8oz/day.      5. Uncontrolled DM type 1 with retinopathy - last A1C improved to 8.7 as of July 2024, needs ongoing improved glycemic control to improve proteinuria and decrease risk of CKD developing. On toujeo, metformin, fiasp, using dexcom     6. GERD - on omeprazole 20mg daily     7. Chronic diastolic CHF - monitor daily weight, continue lasix 40mg daily, follows with cardiology     8. Hyperphosphatemia - phos 5.3, cut back on cheese consumption. Repeat phos      RTC in 1 year.  Obtain blood and urine testing in Jan. 2025 and July 2025.      Vernell was seen today for follow-up and proteinuria.    Diagnoses and all orders for this visit:    Other proteinuria  -     Basic metabolic panel; Future  -     Urinalysis with  microscopic; Future  -     Protein / creatinine ratio, urine; Future  -     Albumin / creatinine urine ratio; Future  -     Phosphorus; Future  -     Basic metabolic panel; Future  -     Urinalysis with microscopic; Future  -     Protein / creatinine ratio, urine; Future  -     Albumin / creatinine urine ratio; Future    Chronic diastolic congestive heart failure (HCC)    Primary hypertension    Diabetic nephropathy associated with type 1 diabetes mellitus (HCC)    Anemia, unspecified type    Stage 2 chronic kidney disease  -     Basic metabolic panel; Future  -     Basic metabolic panel; Future  -     Urinalysis with microscopic; Future  -     Protein / creatinine ratio, urine; Future  -     Albumin / creatinine urine ratio; Future    Hyperphosphatemia  -     Phosphorus; Future        Assessment/Plan:  1. Proteinuria in setting of DM type 1, concern for diabetic nephropathy in setting of uncontrolled blood sugars as well as CKD stage 2 vs weight related.  -of note, normal renal function with last sCr 0.91 as of 7/13/24  -last UA bland  -UpCr 0.46g as of 7/13/24 - has risen, monitor this  -already on total 40mg lisinopril daily begun 7/10/17  -avoid nonsteroidals including ibuprofen, aleve, advil, motrin, naproxen, indomethacin, celebrex, toradol as these medications can lead to increased protein in the urine  -encourage exercise/weight loss      2. Hypertension - BP well controlled  -continue on lisinopril 20mg twice daily, lasix 40mg daily  -avoid high salt/sodium containing diet  -avoid beverages containing caffeine     3. Anemia -  Hgb 10.7, repeat CBC. Recent iron panel shows normal iron studies with slightly low iron sat. Would follow with hematology. Has had colonoscopy - was normal     4. Mild hyponatremia - resolved, sNa 143 on last bloodwork, may increase water intake by 8oz/day.      5. Uncontrolled DM type 1 with retinopathy - last A1C improved to 8.7 as of July 2024, needs ongoing improved glycemic  "control to improve proteinuria and decrease risk of CKD developing. On toujeo, metformin, fiasp, using dexcom     6. GERD - on omeprazole 20mg daily     7. Chronic diastolic CHF - monitor daily weight, continue lasix 40mg daily, follows with cardiology     8. Hyperphosphatemia - phos 5.3, cut back on cheese consumption. Repeat phos      RTC in 1 year.  Obtain blood and urine testing in Jan. 2025 and July 2025.      SUBJECTIVE / INTERVAL HISTORY:  48 y.o. female presents in follow up of hyponatremia.     Vernell Darby denies any recent illness/hospitalizations/medication changes since last office visit.    Denies NSAID use.  HTN - does not check BP at home  DM1 - blood sugars are improving with fiasp and on tuojeo.  Weight has risen over the last year. Does not have leg edema on lasix.  Had itchy skin a month ago but this has since resolved. Saw derm. Was given steroid cream.     Review of Systems   Constitutional:  Negative for chills and fever.   HENT:  Negative for sore throat and trouble swallowing.    Eyes:  Negative for visual disturbance.   Respiratory:  Negative for cough and shortness of breath.    Cardiovascular:  Negative for chest pain and leg swelling.   Gastrointestinal:  Negative for abdominal pain, constipation, diarrhea, nausea and vomiting.   Endocrine: Negative for polyuria.   Genitourinary:  Negative for difficulty urinating, dysuria and hematuria.   Musculoskeletal:  Negative for back pain and neck pain.   Skin:  Negative for rash.   Neurological:  Negative for dizziness, light-headedness and numbness.   Psychiatric/Behavioral:  The patient is not nervous/anxious.        OBJECTIVE:  /60 (BP Location: Left arm, Patient Position: Sitting, Cuff Size: Large)   Ht 4' 8\" (1.422 m)   Wt 86.6 kg (191 lb)   BMI 42.82 kg/m²  Body mass index is 42.82 kg/m².    Physical exam:  Physical Exam  Vitals and nursing note reviewed.   Constitutional:       General: She is not in acute distress.     " Appearance: Normal appearance. She is well-developed. She is obese. She is not diaphoretic.   HENT:      Head: Normocephalic and atraumatic.      Nose: Nose normal.      Mouth/Throat:      Pharynx: No oropharyngeal exudate.   Eyes:      General: No scleral icterus.        Right eye: No discharge.         Left eye: No discharge.      Comments: eyeglasses   Neck:      Thyroid: No thyromegaly.   Cardiovascular:      Rate and Rhythm: Normal rate and regular rhythm.      Heart sounds: No murmur heard.  Pulmonary:      Effort: Pulmonary effort is normal. No respiratory distress.      Breath sounds: Normal breath sounds. No wheezing.   Abdominal:      General: Bowel sounds are normal. There is no distension.      Palpations: Abdomen is soft.   Musculoskeletal:         General: No swelling. Normal range of motion.      Cervical back: Normal range of motion and neck supple.   Skin:     General: Skin is warm and dry.      Coloration: Skin is not jaundiced.      Findings: No rash.   Neurological:      General: No focal deficit present.      Mental Status: She is alert.      Motor: No abnormal muscle tone.      Comments: awake   Psychiatric:         Mood and Affect: Mood normal.         Behavior: Behavior normal.         Medications:    Current Outpatient Medications:     acetone, urine, test strip, Use to test urine ketones for high blood sugars., Disp: 25 each, Rfl: 6    albuterol (2.5 mg/3 mL) 0.083 % nebulizer solution, INHALE CONTENTS OF 1 VIAL BY NEBULIZER EVERY 6 HOURS AS NEEDED FOR WHEEZING OR FOR SHORTNESS OF BREATH., Disp: 75 mL, Rfl: 3    albuterol (PROVENTIL HFA,VENTOLIN HFA) 90 mcg/act inhaler, INHALE 2 PUFSS BY MOUTH EVERY 4 HOURS AS NEEDED WHEEZING OR FOR SHORTNESS OF BREATH, Disp: 25.5 g, Rfl: 0    Ascorbic Acid (VITAMIN C) 500 MG CAPS, Take 2 capsules by mouth daily, Disp: , Rfl:     Aspirin Low Dose 81 MG EC tablet, TAKE 1 TABLET BY MOUTH EVERY DAY, Disp: 30 tablet, Rfl: 0    atorvastatin (LIPITOR) 80 mg  tablet, TAKE 1 TABLET BY MOUTH EVERY DAY, Disp: 30 tablet, Rfl: 11    Continuous Blood Gluc Sensor (Dexcom G7 Sensor), Use 1 Device every 10 days, Disp: 3 each, Rfl: 11    cyanocobalamin (VITAMIN B-12) 100 mcg tablet, Take by mouth daily, Disp: , Rfl:     Fluticasone-Salmeterol (Advair) 250-50 mcg/dose inhaler, INHALE 1 PUFF BY MOUTH TWO TIMES DAILY RINSE MOUTH AFTER USE, Disp: 60 blister, Rfl: 5    furosemide (LASIX) 40 mg tablet, TAKE 1 TABLET BY MOUTH EVERY DAY, Disp: 30 tablet, Rfl: 2    insulin aspart, w/niacinamide, (Fiasp FlexTouch) 100 Units/mL injection pen, INJECT 1 UNIT OF NOVOLOG FOR EVERY 1 GRAM OF CARBS, UP  UNITS DAILY, Disp: 60 mL, Rfl: 3    insulin glargine (Toujeo SoloStar) 300 units/mL CONCENTRATED U-300 injection pen (1-unit dial), Inject 100 Units under the skin daily, Disp: 13.5 mL, Rfl: 6    Insulin Pen Needle 33G X 4 MM MISC, Use to inject insulin 4 times a day, Disp: 400 each, Rfl: 2    lisinopril (ZESTRIL) 20 mg tablet, TAKE 2 TABLETS BY MOUTH EVERY DAY, Disp: 180 tablet, Rfl: 1    metFORMIN (GLUCOPHAGE-XR) 500 mg 24 hr tablet, Take 1 tablet (500 mg total) by mouth daily with breakfast, Disp: 90 tablet, Rfl: 1    montelukast (SINGULAIR) 10 mg tablet, TAKE 1 TABLET BY MOUTH AT BEDTIME, Disp: 90 tablet, Rfl: 1    omeprazole (PriLOSEC) 20 mg delayed release capsule, Take 20 mg by mouth daily., Disp: , Rfl:     OneTouch Ultra test strip, Test 3 times daily, Disp: 300 each, Rfl: 3    PARoxetine (PAXIL) 20 mg tablet, Take 1 tablet (20 mg total) by mouth daily, Disp: 90 tablet, Rfl: 0    Probiotic Product (ALIGN PO), Take by mouth, Disp: , Rfl:     acetaminophen (TYLENOL) 500 mg tablet, Take 2 tablets (1,000 mg total) by mouth every 6 (six) hours as needed for mild pain or moderate pain, Disp: 30 tablet, Rfl: 0    Blood Pressure Monitoring [place], Use 2 (two) times a day Monitor BP twice daily. Call office if BP > 140/90 persistently., Disp: 1 Device, Rfl: 0    Butalbital-APAP-Caffeine  "(Fioricet) -40 MG CAPS, Take 1 tablet by mouth 4 (four) times a day as needed ([facility] (Patient not taking: Reported on 4/11/2024), Disp: 30 capsule, Rfl: 0    Continuous Blood Gluc Sensor (Dexcom G6 Sensor) MISC, CHANGE EVERY 10 DAYS (Patient not taking: Reported on 5/21/2024), Disp: 3 each, Rfl: 12    Continuous Blood Gluc Transmit (Dexcom G6 Transmitter) MISC, USE AS DIRECTED. CHANGE EVERY 90 DAYS. (Patient not taking: Reported on 5/21/2024), Disp: 1 each, Rfl: 1    glucagon (Glucagon Emergency) 1 MG injection, Inject 1 mg under the skin once as needed for low blood sugar for up to 1 dose (Patient not taking: Reported on 5/21/2024), Disp: 1 kit, Rfl: 3    Glucagon (Gvoke HypoPen 2-Pack) 1 MG/0.2ML SOAJ, Use if hypoglycemia prn (Patient not taking: Reported on 5/21/2024), Disp: 1 Syringe, Rfl: 6    hydrocortisone 1 % cream, Apply topically 2 (two) times a day, Disp: 56 g, Rfl: 0    Allergies:  Allergies as of 07/26/2024    (No Known Allergies)       The following portions of the patient's history were reviewed and updated as appropriate: past family history, past surgical history and problem list.    Laboratory Results:  Lab Results   Component Value Date    SODIUM 143 07/13/2024    K 5.3 07/13/2024     07/13/2024    CO2 27 07/13/2024    BUN 42 (H) 07/13/2024    CREATININE 0.91 07/13/2024    GLUC 281 (H) 12/08/2023    CALCIUM 9.6 07/13/2024        Lab Results   Component Value Date    PTH 48.3 07/13/2024    CALCIUM 9.6 07/13/2024    PHOS 5.3 (H) 07/13/2024       Portions of the record may have been created with voice recognition software.  Occasional wrong word or \"sound a like\" substitutions may have occurred due to the inherent limitations of voice recognition software.  Read the chart carefully and recognize, using context, where substitutions have occurred.  "

## 2024-07-26 NOTE — PATIENT INSTRUCTIONS
1. Proteinuria in setting of DM type 1, concern for diabetic nephropathy in setting of uncontrolled blood sugars as well as CKD stage 2 vs weight related.  -of note, normal renal function with last sCr 0.91 as of 7/13/24  -last UA bland  -UpCr 0.46g as of 7/13/24 - has risen, monitor this  -already on total 40mg lisinopril daily begun 7/10/17  -avoid nonsteroidals including ibuprofen, aleve, advil, motrin, naproxen, indomethacin, celebrex, toradol as these medications can lead to increased protein in the urine  -encourage exercise/weight loss      2. Hypertension - BP well controlled  -continue on lisinopril 20mg twice daily, lasix 40mg daily  -avoid high salt/sodium containing diet  -avoid beverages containing caffeine     3. Anemia -  Hgb 10.7, repeat CBC. Recent iron panel shows normal iron studies with slightly low iron sat. Would follow with hematology. Has had colonoscopy - was normal     4. Mild hyponatremia - resolved, sNa 143 on last bloodwork, may increase water intake by 8oz/day.      5. Uncontrolled DM type 1 with retinopathy - last A1C improved to 8.7 as of July 2024, needs ongoing improved glycemic control to improve proteinuria and decrease risk of CKD developing. On toujeo, metformin, fiasp, using dexcom     6. GERD - on omeprazole 20mg daily     7. Chronic diastolic CHF - monitor daily weight, continue lasix 40mg daily, follows with cardiology     8. Hyperphosphatemia - phos 5.3, cut back on cheese consumption. Repeat phos      RTC in 1 year.  Obtain blood and urine testing in Jan. 2025 and July 2025.

## 2024-08-01 ENCOUNTER — OFFICE VISIT (OUTPATIENT)
Dept: FAMILY MEDICINE CLINIC | Facility: CLINIC | Age: 49
End: 2024-08-01
Payer: COMMERCIAL

## 2024-08-01 VITALS
RESPIRATION RATE: 16 BRPM | BODY MASS INDEX: 42.69 KG/M2 | TEMPERATURE: 97.5 F | SYSTOLIC BLOOD PRESSURE: 142 MMHG | DIASTOLIC BLOOD PRESSURE: 82 MMHG | HEART RATE: 78 BPM | WEIGHT: 189.8 LBS | HEIGHT: 56 IN | OXYGEN SATURATION: 95 %

## 2024-08-01 DIAGNOSIS — K52.9 GASTROENTERITIS: Primary | ICD-10-CM

## 2024-08-01 PROCEDURE — 99213 OFFICE O/P EST LOW 20 MIN: CPT | Performed by: STUDENT IN AN ORGANIZED HEALTH CARE EDUCATION/TRAINING PROGRAM

## 2024-08-01 PROCEDURE — 3077F SYST BP >= 140 MM HG: CPT | Performed by: STUDENT IN AN ORGANIZED HEALTH CARE EDUCATION/TRAINING PROGRAM

## 2024-08-01 PROCEDURE — 3079F DIAST BP 80-89 MM HG: CPT | Performed by: STUDENT IN AN ORGANIZED HEALTH CARE EDUCATION/TRAINING PROGRAM

## 2024-08-01 RX ORDER — DICYCLOMINE HCL 20 MG
20 TABLET ORAL 2 TIMES DAILY PRN
Qty: 14 TABLET | Refills: 0 | Status: SHIPPED | OUTPATIENT
Start: 2024-08-01 | End: 2024-08-08

## 2024-08-01 NOTE — PROGRESS NOTES
Ambulatory Visit  Name: Vernell Darby      : 1975      MRN: [id number]  Encounter Provider: Rika Nugent MD  Encounter Date: 2024   Encounter department: [facility]    Assessment & Plan   1. Gastroenteritis  -     dicyclomine (BENTYL) 20 mg tablet; Take 1 tablet (20 mg total) by mouth 2 (two) times a day as needed (abdominal cramps) for up to 7 days  Vital signs reviewed with patient, discussed with patient likely gastroenteritis viral in nature, encourage hydration and bland diet to help with vomiting and diarrhea  Will order for Bentyl for abdominal cramps to be taken as directed as needed  If no improvement in diarrhea by mid next week discussed with patient may order for stool studies to evaluate    Follow-up as needed if symptoms worsen or do not improve     History of Present Illness     Mohini is a 48-year-old female who presents to the office today for sick visit.  Patient started to have a stomachache on Monday and then into Tuesday and Wednesday noted vomiting and diarrhea.  Patient denies any blood in vomit or stool.  Denies any fever.  No changes in medication.  Has had 1 episode of diarrhea since this morning but no vomiting today.  Has been hydrating with Gatorade zero and soups.  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  Has not taken anything for this.      Review of Systems   Constitutional:  Positive for appetite change. Negative for chills and fever.   HENT:  Negative for congestion.    Respiratory:  Negative for cough and shortness of breath.    Cardiovascular:  Negative for chest pain.   Gastrointestinal:  Positive for abdominal pain, diarrhea and vomiting. Negative for blood in stool.   Genitourinary:  Negative for dysuria.   Neurological:  Negative for dizziness, light-headedness and headaches.     Past Medical History   Past Medical History:   Diagnosis Date    Anemia     Arthritis     Asthma     w/acute  exacerbation. Last assessed: 10/26/12    Chest pain 02/03/2016    CHF (congestive heart failure) ([facility])     Depression     Diabetes mellitus (HCC)     Diabetic nephropathy (HCC)     Diabetic nephropathy associated with type 1 diabetes mellitus ([facility]) 08/12/2020    Diabetic retinopathy ([facility]) 02/03/2016    Endometriosis     Gastroenteritis 02/03/2016    GERD (gastroesophageal reflux disease)     HTN (hypertension)     Hyperlipidemia 02/03/2016    Hypertension     Obesity     Oligomenorrhea     Polycystic ovaries 02/03/2016    Retinal hemorrhage 02/03/2016    Retinopathy     Thrombocytosis     Type 1 diabetes mellitus with ophthalmic manifestation ([facility]) 02/03/2016     Past Surgical History:   Procedure Laterality Date    CATARACT EXTRACTION Left 10/2020    CATARACT EXTRACTION Right     RETINAL LASER PROCEDURE       Family History   Problem Relation Age of Onset    Heart murmur Mother     Diabetes Mother         Mellitus    Thyroid disease Mother         Disorder    Diabetes Father         Mellitus    Hypertension Father     No Known Problems Maternal Grandmother     Diabetes Maternal Grandfather         Mellitus    Breast cancer Paternal Grandmother 70 - 79    Leukemia Paternal Grandfather 48    Diabetes Maternal Aunt         Mellitus    Diabetes Maternal Uncle         Mellitus    Substance Abuse Neg Hx     Mental illness Neg Hx     Alcohol abuse Neg Hx      Current Outpatient Medications on File Prior to Visit   Medication Sig Dispense Refill    acetone, urine, test strip Use to test urine ketones for high blood sugars. 25 each 6    albuterol (2.5 mg/3 mL) 0.083 % nebulizer solution INHALE CONTENTS OF 1 VIAL BY NEBULIZER EVERY 6 HOURS AS NEEDED FOR WHEEZING OR FOR SHORTNESS OF BREATH. 75 mL 3    albuterol (PROVENTIL HFA,VENTOLIN HFA) 90 mcg/act inhaler INHALE 2 PUFSS BY MOUTH EVERY 4 HOURS AS NEEDED WHEEZING OR FOR SHORTNESS OF BREATH 25.5 g 0    Ascorbic Acid (VITAMIN C) 500 MG CAPS Take 2 capsules by mouth daily       Aspirin Low Dose 81 MG EC tablet TAKE 1 TABLET BY MOUTH EVERY DAY 30 tablet 0    atorvastatin (LIPITOR) 80 mg tablet TAKE 1 TABLET BY MOUTH EVERY DAY 30 tablet 11    Continuous Blood Gluc Sensor (Dexcom G7 Sensor) Use 1 Device every 10 days 3 each 11    cyanocobalamin (VITAMIN B-12) 100 mcg tablet Take by mouth daily      Fluticasone-Salmeterol (Advair) 250-50 mcg/dose inhaler INHALE 1 PUFF BY MOUTH TWO TIMES DAILY RINSE MOUTH AFTER USE 60 blister 5    furosemide (LASIX) 40 mg tablet TAKE 1 TABLET BY MOUTH EVERY DAY 30 tablet 2    glucagon (Glucagon Emergency) 1 MG injection Inject 1 mg under the skin once as needed for low blood sugar for up to 1 dose 1 kit 3    insulin aspart, w/niacinamide, (Fiasp FlexTouch) 100 Units/mL injection pen INJECT 1 UNIT OF NOVOLOG FOR EVERY 1 GRAM OF CARBS, UP  UNITS DAILY 60 mL 3    insulin glargine (Toujeo SoloStar) 300 units/mL CONCENTRATED U-300 injection pen (1-unit dial) Inject 100 Units under the skin daily 13.5 mL 6    Insulin Pen Needle 33G X 4 MM MISC Use to inject insulin 4 times a day 400 each 2    lisinopril (ZESTRIL) 20 mg tablet TAKE 2 TABLETS BY MOUTH EVERY  tablet 1    metFORMIN (GLUCOPHAGE-XR) 500 mg 24 hr tablet Take 1 tablet (500 mg total) by mouth daily with breakfast 90 tablet 1    montelukast (SINGULAIR) 10 mg tablet TAKE 1 TABLET BY MOUTH AT BEDTIME 90 tablet 1    omeprazole (PriLOSEC) 20 mg delayed release capsule Take 20 mg by mouth daily.      OneTouch Ultra test strip Test 3 times daily 300 each 3    PARoxetine (PAXIL) 20 mg tablet Take 1 tablet (20 mg total) by mouth daily 90 tablet 0    Probiotic Product (ALIGN PO) Take by mouth      Glucagon (Gvoke HypoPen 2-Pack) 1 MG/0.2ML SOAJ Use if hypoglycemia prn (Patient not taking: Reported on 5/21/2024) 1 Syringe 6    [DISCONTINUED] acetaminophen (TYLENOL) 500 mg tablet Take 2 tablets (1,000 mg total) by mouth every 6 (six) hours as needed for mild pain or moderate pain 30 tablet 0     [DISCONTINUED] Blood Pressure Monitoring [place] Use 2 (two) times a day Monitor BP twice daily. Call office if BP > 140/90 persistently. 1 Device 0    [DISCONTINUED] Butalbital-APAP-Caffeine (Fioricet) -40 MG CAPS Take 1 tablet by mouth 4 (four) times a day as needed (headahce) (Patient not taking: Reported on 4/11/2024) 30 capsule 0    [DISCONTINUED] Continuous Blood Gluc Sensor (Dexcom G6 Sensor) MISC CHANGE EVERY 10 DAYS (Patient not taking: Reported on 5/21/2024) 3 each 12    [DISCONTINUED] Continuous Blood Gluc Transmit (Dexcom G6 Transmitter) MISC USE AS DIRECTED. CHANGE EVERY 90 DAYS. (Patient not taking: Reported on 5/21/2024) 1 each 1    [DISCONTINUED] hydrocortisone 1 % cream Apply topically 2 (two) times a day 56 g 0     No current facility-administered medications on file prior to visit.   No Known Allergies   Current Outpatient Medications on File Prior to Visit   Medication Sig Dispense Refill    acetone, urine, test strip Use to test urine ketones for high blood sugars. 25 each 6    albuterol (2.5 mg/3 mL) 0.083 % nebulizer solution INHALE CONTENTS OF 1 VIAL BY NEBULIZER EVERY 6 HOURS AS NEEDED FOR WHEEZING OR FOR SHORTNESS OF BREATH. 75 mL 3    albuterol (PROVENTIL HFA,VENTOLIN HFA) 90 mcg/act inhaler INHALE 2 PUFSS BY MOUTH EVERY 4 HOURS AS NEEDED WHEEZING OR FOR SHORTNESS OF BREATH 25.5 g 0    Ascorbic Acid (VITAMIN C) 500 MG CAPS Take 2 capsules by mouth daily      Aspirin Low Dose 81 MG EC tablet TAKE 1 TABLET BY MOUTH EVERY DAY 30 tablet 0    atorvastatin (LIPITOR) 80 mg tablet TAKE 1 TABLET BY MOUTH EVERY DAY 30 tablet 11    Continuous Blood Gluc Sensor (Dexcom G7 Sensor) Use 1 Device every 10 days 3 each 11    cyanocobalamin (VITAMIN B-12) 100 mcg tablet Take by mouth daily      Fluticasone-Salmeterol (Advair) 250-50 mcg/dose inhaler INHALE 1 PUFF BY MOUTH TWO TIMES DAILY RINSE MOUTH AFTER USE 60 blister 5    furosemide (LASIX) 40 mg tablet TAKE 1 TABLET BY MOUTH EVERY DAY 30 tablet 2     glucagon (Glucagon Emergency) 1 MG injection Inject 1 mg under the skin once as needed for low blood sugar for up to 1 dose 1 kit 3    insulin aspart, w/niacinamide, (Fiasp FlexTouch) 100 Units/mL injection pen INJECT 1 UNIT OF NOVOLOG FOR EVERY 1 GRAM OF CARBS, UP  UNITS DAILY 60 mL 3    insulin glargine (Toujeo SoloStar) 300 units/mL CONCENTRATED U-300 injection pen (1-unit dial) Inject 100 Units under the skin daily 13.5 mL 6    Insulin Pen Needle 33G X 4 MM MISC Use to inject insulin 4 times a day 400 each 2    lisinopril (ZESTRIL) 20 mg tablet TAKE 2 TABLETS BY MOUTH EVERY  tablet 1    metFORMIN (GLUCOPHAGE-XR) 500 mg 24 hr tablet Take 1 tablet (500 mg total) by mouth daily with breakfast 90 tablet 1    montelukast (SINGULAIR) 10 mg tablet TAKE 1 TABLET BY MOUTH AT BEDTIME 90 tablet 1    omeprazole (PriLOSEC) 20 mg delayed release capsule Take 20 mg by mouth daily.      OneTouch Ultra test strip Test 3 times daily 300 each 3    PARoxetine (PAXIL) 20 mg tablet Take 1 tablet (20 mg total) by mouth daily 90 tablet 0    Probiotic Product (ALIGN PO) Take by mouth      Glucagon (Gvoke HypoPen 2-Pack) 1 MG/0.2ML SOAJ Use if hypoglycemia prn (Patient not taking: Reported on 5/21/2024) 1 Syringe 6    [DISCONTINUED] acetaminophen (TYLENOL) 500 mg tablet Take 2 tablets (1,000 mg total) by mouth every 6 (six) hours as needed for mild pain or moderate pain 30 tablet 0    [DISCONTINUED] Blood Pressure Monitoring [place] Use 2 (two) times a day Monitor BP twice daily. Call office if BP > 140/90 persistently. 1 Device 0    [DISCONTINUED] Butalbital-APAP-Caffeine (Fioricet) -40 MG CAPS Take 1 tablet by mouth 4 (four) times a day as needed (headahce) (Patient not taking: Reported on 4/11/2024) 30 capsule 0    [DISCONTINUED] Continuous Blood Gluc Sensor (Dexcom G6 Sensor) MISC CHANGE EVERY 10 DAYS (Patient not taking: Reported on 5/21/2024) 3 each 12    [DISCONTINUED] Continuous Blood Gluc Transmit (Dexcom G6  "Transmitter) MISC USE AS DIRECTED. CHANGE EVERY 90 DAYS. (Patient not taking: Reported on 5/21/2024) 1 each 1    [DISCONTINUED] hydrocortisone 1 % cream Apply topically 2 (two) times a day 56 g 0     No current facility-administered medications on file prior to visit.      Social History     Tobacco Use    Smoking status: Never    Smokeless tobacco: Never    Tobacco comments:     Per [employer]: Former smoker. Quit in 1994   Vaping Use    Vaping status: Never Used   Substance and Sexual Activity    Alcohol use: Yes     Comment: Occasional/1 mixed drink twice a month if that    Drug use: No    Sexual activity: Yes     Partners: Male     Birth control/protection: Pill     Objective     /82   Pulse 78   Temp 97.5 °F (36.4 °C)   Resp 16   Ht 4' 8\" (1.422 m)   Wt 86.1 kg (189 lb 12.8 oz)   SpO2 95%   BMI 42.55 kg/m²     Physical Exam  Vitals reviewed.   HENT:      Head: Normocephalic and atraumatic.   Eyes:      Extraocular Movements: Extraocular movements intact.      Conjunctiva/sclera: Conjunctivae normal.   Cardiovascular:      Rate and Rhythm: Normal rate.      Pulses: Normal pulses.      Heart sounds: Normal heart sounds.   Pulmonary:      Effort: Pulmonary effort is normal.   Abdominal:      General: Bowel sounds are normal. There is no distension.      Palpations: Abdomen is soft.      Tenderness: There is no abdominal tenderness. There is no guarding or rebound.   Neurological:      Mental Status: She is alert.       Administrative Statements   I have spent a total time of 20 minutes in caring for this patient on the day of the visit/encounter including Instructions for management, Patient and family education, Documenting in the medical record, Reviewing / ordering tests, medicine, procedures  , and Obtaining or reviewing history  .        "

## 2024-08-02 ENCOUNTER — OFFICE VISIT (OUTPATIENT)
Dept: ENDOCRINOLOGY | Facility: HOSPITAL | Age: 49
End: 2024-08-02
Payer: COMMERCIAL

## 2024-08-02 VITALS
HEART RATE: 89 BPM | WEIGHT: 190 LBS | OXYGEN SATURATION: 97 % | DIASTOLIC BLOOD PRESSURE: 70 MMHG | HEIGHT: 56 IN | SYSTOLIC BLOOD PRESSURE: 144 MMHG | BODY MASS INDEX: 42.74 KG/M2

## 2024-08-02 DIAGNOSIS — E10.65 TYPE 1 DIABETES MELLITUS WITH HYPERGLYCEMIA (HCC): Primary | ICD-10-CM

## 2024-08-02 PROCEDURE — 99214 OFFICE O/P EST MOD 30 MIN: CPT | Performed by: PHYSICIAN ASSISTANT

## 2024-08-02 PROCEDURE — 95251 CONT GLUC MNTR ANALYSIS I&R: CPT | Performed by: PHYSICIAN ASSISTANT

## 2024-08-02 RX ORDER — INSULIN GLARGINE 300 U/ML
95 INJECTION, SOLUTION SUBCUTANEOUS DAILY
Qty: 13.5 ML | Refills: 6 | Status: SHIPPED | OUTPATIENT
Start: 2024-08-02

## 2024-08-02 RX ORDER — METFORMIN HYDROCHLORIDE 500 MG/1
500 TABLET, EXTENDED RELEASE ORAL 2 TIMES DAILY WITH MEALS
Qty: 180 TABLET | Refills: 1 | Status: SHIPPED | OUTPATIENT
Start: 2024-08-02

## 2024-08-02 RX ORDER — INSULIN ASPART INJECTION 100 [IU]/ML
INJECTION, SOLUTION SUBCUTANEOUS
Qty: 60 ML | Refills: 3 | Status: SHIPPED | OUTPATIENT
Start: 2024-08-02

## 2024-08-02 NOTE — PROGRESS NOTES
Vernell Darby 48 y.o. female MRN: [id number]    Encounter: [id number]      Assessment & Plan     Assessment:  This is a 48 y.o.-year-old female with type 1 diabetes with neuropathy, nephropathy, hypertension and hyperlipidemia.    Plan:  1. Type 1 diabetes: Recent hemoglobin A1c was 8.7.  This is the best her A1c has been in years.  I think combination switching insulins and starting metformin has significantly improved her overall glucose levels.  At this time she is experiencing the occasional episodes of hypoglycemia late morning early afternoon, but is still running high in the prior to bed.  At this time we will decrease her Toujeo to 95 units daily and as far as her Fiasp and like to do a 1:2 carb ratio with breakfast, 1: 1 carb ratio with lunch, 1.5: 1 carb ratio with dinner.  I would also like to increase her metformin to 500 mg twice a day.  Continue with lifestyle modifications to help improve glucose levels.  Informed her to continue calling the office every 2 weeks so we can download her Dexcom and continue to make adjustments to her insulin and hopefully improve her A1c and reach target range.  Stressed the importance of getting glucose levels in target range to help protect her kidneys.  Contact the office with any concerns or questions.  Follow-up in 3 months with lab work completed prior to visit.     2. Diabetic neuropathy:  Stable.  Is up-to-date on diabetic foot exam.     3. Diabetic nephropathy:  Some protein noted in her urine previously.  Kidney function doing better at this time.  Continue following up with nephrology.  Will continue to monitor.       4. Hypertension: Slightly elevated in office today but currently asymptomatic.  Kidney function has been improving recently.  Does follow up with nephrology.  Continue with current medications at this time.  Repeat CMP prior to next office visit.     5. Hyperlipidemia: Lipid panel does continue to show elevated triglycerides, but this time  around LDL was slightly elevated.  Lifestyle modification may be able to help improve these numbers, so will not make any adjustments to medication at this time.  Continue with atorvastatin 80 mg daily.    CC: Type 1 diabetes follow-up    History of Present Illness     HPI:  Vernell Darby is a 48 y.o. female with type 1 diabetes with diabetic neuropathy and diabetic nephropathy, hypertension, hyperlipidemia for follow-up visit.   She was diagnosed with type 1 diabetes about 29 years ago.  She utilizes insulin therapy and takes Tresiba insulin 100 units at bedtime and Fiasp insulin 1 unit per 1 g carbohydrate with each meal.  Get a definitive answer on how much NovoLog she is typically using throughout the day.  She denies polyuria, polydipsia, polyphagia, or nocturia.  She denies blurry vision.  She denies numbness or tingling of the feet.  She denies chest pain or shortness of breath.  Diabetic complications include diabetic neuropathy, diabetic nephropathy, diabetic retinopathy.  She denies heart attack, stroke, or claudication.  Overall she is doing well today.  Has noticed significant improvements in her glucose level since switching insulins and also starting metformin.  Denies any significant lifestyle modification since last office visit.     Last diabetic foot exam was performed June 2023.  She reports her last eye exam was March 2024 and there was no change in her retinopathy.      Download of Dexcom from July 20 through August 2, 2024 reveals an average glucose level of 195 with a standard deviation of 89.  She is in target range 47% time, above target range 50% time, below target range 3% time.  Glucose levels typically elevated overnight, and start trending down around 3 AM.  She will continue to trend down prior to lunch and will have the occasional episodes of hypoglycemia around this time.  Will then increase after lunch and remained stable in the afternoon and then there will be a significant  increase with dinner.     She has hypertension and diabetic nephropathy and takes lisinopril 20 mg 2 tablets daily.   She has occasional headaches but no stroke-like symptoms.     She has hyperlipidemia and takes atorvastatin 40 mg daily.  She denies chest pain or shortness of breath.     Review of Systems   Constitutional:  Negative for activity change, appetite change, fatigue and unexpected weight change.   HENT:  Negative for sore throat and trouble swallowing.    Eyes:  Negative for visual disturbance.   Respiratory:  Negative for chest tightness and shortness of breath.    Cardiovascular:  Negative for chest pain, palpitations and leg swelling.   Gastrointestinal:  Negative for abdominal pain, constipation, diarrhea, nausea and vomiting.   Endocrine: Negative for cold intolerance, heat intolerance, polydipsia, polyphagia and polyuria.   Genitourinary:  Negative for frequency.   Skin:  Negative for wound.   Neurological:  Negative for dizziness, weakness, numbness and headaches.   Psychiatric/Behavioral:  Negative for dysphoric mood and sleep disturbance. The patient is not nervous/anxious.        Historical Information   Past Medical History:   Diagnosis Date   • Anemia    • Arthritis    • Asthma     w/acute exacerbation. Last assessed: 10/26/12   • Chest pain 02/03/2016   • CHF (congestive heart failure) (HCC)    • Depression    • Diabetes mellitus (HCC)    • Diabetic nephropathy (HCC)    • Diabetic nephropathy associated with type 1 diabetes mellitus ([facility]) 08/12/2020   • Diabetic retinopathy ([facility]) 02/03/2016   • Endometriosis    • Gastroenteritis 02/03/2016   • GERD (gastroesophageal reflux disease)    • HTN (hypertension)    • Hyperlipidemia 02/03/2016   • Hypertension    • Obesity    • Oligomenorrhea    • Polycystic ovaries 02/03/2016   • Retinal hemorrhage 02/03/2016   • Retinopathy    • Thrombocytosis    • Type 1 diabetes mellitus with ophthalmic manifestation ([facility]) 02/03/2016     Past Surgical History:    Procedure Laterality Date   • CATARACT EXTRACTION Left 10/2020   • CATARACT EXTRACTION Right    • RETINAL LASER PROCEDURE       Social History   Social History     Substance and Sexual Activity   Alcohol Use Yes   • Alcohol/week: 2.0 standard drinks of alcohol   • Types: 2 Standard drinks or equivalent per week    Comment: Occasionally/ sicially     Social History     Substance and Sexual Activity   Drug Use No     Social History     Tobacco Use   Smoking Status Never   Smokeless Tobacco Never   Tobacco Comments    Per [employer]: Former smoker. Quit in 1994     Family History:   Family History   Problem Relation Age of Onset   • Heart murmur Mother    • Diabetes Mother         Mellitus   • Thyroid disease Mother         Disorder   • Diabetes type II Mother    • Diabetes Father         Mellitus   • Hypertension Father    • Diabetes type II Father    • No Known Problems Maternal Grandmother    • Diabetes Maternal Grandfather         Mellitus   • Breast cancer Paternal Grandmother 70 - 79   • Leukemia Paternal Grandfather 48   • Diabetes Maternal Aunt         Mellitus   • Diabetes Maternal Uncle         Mellitus   • Substance Abuse Neg Hx    • Mental illness Neg Hx    • Alcohol abuse Neg Hx        Meds/Allergies   Current Outpatient Medications   Medication Sig Dispense Refill   • acetone, urine, test strip Use to test urine ketones for high blood sugars. 25 each 6   • albuterol (2.5 mg/3 mL) 0.083 % nebulizer solution INHALE CONTENTS OF 1 VIAL BY NEBULIZER EVERY 6 HOURS AS NEEDED FOR WHEEZING OR FOR SHORTNESS OF BREATH. 75 mL 3   • albuterol (PROVENTIL HFA,VENTOLIN HFA) 90 mcg/act inhaler INHALE 2 PUFSS BY MOUTH EVERY 4 HOURS AS NEEDED WHEEZING OR FOR SHORTNESS OF BREATH 25.5 g 0   • Ascorbic Acid (VITAMIN C) 500 MG CAPS Take 2 capsules by mouth daily     • Aspirin Low Dose 81 MG EC tablet TAKE 1 TABLET BY MOUTH EVERY DAY 30 tablet 0   • atorvastatin (LIPITOR) 80 mg tablet TAKE 1 TABLET BY MOUTH EVERY DAY 30 tablet  "11   • Continuous Blood Gluc Sensor (Dexcom G7 Sensor) Use 1 Device every 10 days 3 each 11   • cyanocobalamin (VITAMIN B-12) 100 mcg tablet Take by mouth daily     • dicyclomine (BENTYL) 20 mg tablet Take 1 tablet (20 mg total) by mouth 2 (two) times a day as needed (abdominal cramps) for up to 7 days 14 tablet 0   • Fluticasone-Salmeterol (Advair) 250-50 mcg/dose inhaler INHALE 1 PUFF BY MOUTH TWO TIMES DAILY RINSE MOUTH AFTER USE 60 blister 5   • furosemide (LASIX) 40 mg tablet TAKE 1 TABLET BY MOUTH EVERY DAY 30 tablet 2   • glucagon (Glucagon Emergency) 1 MG injection Inject 1 mg under the skin once as needed for low blood sugar for up to 1 dose 1 kit 3   • Glucagon (Gvoke HypoPen 2-Pack) 1 MG/0.2ML SOAJ Use if hypoglycemia prn 1 Syringe 6   • insulin aspart, w/niacinamide, (Fiasp FlexTouch) 100 Units/mL injection pen Inject 1u:2c with breakfast, 1u:1c with lunch, 1.5u:1c with dinner, up to 150 units daily 60 mL 3   • insulin glargine (Toujeo SoloStar) 300 units/mL CONCENTRATED U-300 injection pen (1-unit dial) Inject 95 Units under the skin daily 13.5 mL 6   • Insulin Pen Needle 33G X 4 MM MISC Use to inject insulin 4 times a day 400 each 2   • lisinopril (ZESTRIL) 20 mg tablet TAKE 2 TABLETS BY MOUTH EVERY  tablet 1   • metFORMIN (GLUCOPHAGE-XR) 500 mg 24 hr tablet Take 1 tablet (500 mg total) by mouth 2 (two) times a day with meals 180 tablet 1   • montelukast (SINGULAIR) 10 mg tablet TAKE 1 TABLET BY MOUTH AT BEDTIME 90 tablet 1   • omeprazole (PriLOSEC) 20 mg delayed release capsule Take 20 mg by mouth daily.     • OneTouch Ultra test strip Test 3 times daily 300 each 3   • PARoxetine (PAXIL) 20 mg tablet Take 1 tablet (20 mg total) by mouth daily 90 tablet 0   • Probiotic Product (ALIGN PO) Take by mouth       No current facility-administered medications for this visit.     No Known Allergies    Objective   Vitals: Blood pressure 144/70, pulse 89, height 4' 8\" (1.422 m), weight 86.2 kg (190 lb), " SpO2 97%, not currently breastfeeding.    Physical Exam  Vitals and nursing note reviewed.   Constitutional:       General: She is not in acute distress.     Appearance: Normal appearance. She is not diaphoretic.   HENT:      Head: Normocephalic and atraumatic.   Eyes:      General: No scleral icterus.     Extraocular Movements: Extraocular movements intact.      Conjunctiva/sclera: Conjunctivae normal.      Pupils: Pupils are equal, round, and reactive to light.   Cardiovascular:      Rate and Rhythm: Normal rate and regular rhythm.      Heart sounds: No murmur heard.  Pulmonary:      Effort: Pulmonary effort is normal. No respiratory distress.      Breath sounds: Normal breath sounds. No wheezing.   Musculoskeletal:      Cervical back: Normal range of motion.      Right lower leg: No edema.      Left lower leg: No edema.   Lymphadenopathy:      Cervical: No cervical adenopathy.   Neurological:      Mental Status: She is alert and oriented to person, place, and time. Mental status is at baseline.      Sensory: No sensory deficit.      Gait: Gait normal.   Psychiatric:         Mood and Affect: Mood normal.         Behavior: Behavior normal.         Thought Content: Thought content normal.         The history was obtained from the review of the chart, patient.    Lab Results:   Lab Results   Component Value Date/Time    Hemoglobin A1C 8.7 (H) 07/13/2024 10:16 AM    Hemoglobin A1C 10.5 (H) 04/10/2024 11:51 AM    Hemoglobin A1C 10.6 (H) 12/18/2023 07:16 AM    WBC 11.42 (H) 07/13/2024 10:16 AM    WBC 11.21 (H) 12/18/2023 07:16 AM    WBC 11.32 (H) 12/08/2023 12:40 AM    Hemoglobin 10.7 (L) 07/13/2024 10:16 AM    Hemoglobin 10.7 (L) 12/18/2023 07:16 AM    Hemoglobin 9.1 (L) 12/08/2023 12:40 AM    Hgb, i-STAT 9.5 (L) 12/08/2023 12:47 AM    Hematocrit 34.4 (L) 07/13/2024 10:16 AM    Hematocrit 32.4 (L) 12/18/2023 07:16 AM    Hematocrit 29.2 (L) 12/08/2023 12:40 AM    Hct, i-STAT 28 (L) 12/08/2023 12:47 AM    MCV 96  "07/13/2024 10:16 AM    MCV 89 12/18/2023 07:16 AM    MCV 91 12/08/2023 12:40 AM    Platelets 435 (H) 07/13/2024 10:16 AM    Platelets 447 (H) 12/18/2023 07:16 AM    Platelets 373 12/08/2023 12:40 AM    BUN 42 (H) 07/13/2024 10:16 AM    BUN 41 (H) 04/10/2024 11:51 AM    BUN 42 (H) 12/18/2023 07:16 AM    Potassium 5.3 07/13/2024 10:16 AM    Potassium 4.6 04/10/2024 11:51 AM    Potassium 4.2 12/18/2023 07:16 AM    Chloride 104 07/13/2024 10:16 AM    Chloride 100 04/10/2024 11:51 AM    Chloride 100 12/18/2023 07:16 AM    CO2 27 07/13/2024 10:16 AM    CO2 27 04/10/2024 11:51 AM    CO2 24 12/18/2023 07:16 AM    CO2, i-STAT 24 12/08/2023 12:47 AM    Creatinine 0.91 07/13/2024 10:16 AM    Creatinine 1.14 04/10/2024 11:51 AM    Creatinine 1.00 12/18/2023 07:16 AM    AST 17 07/13/2024 10:16 AM    AST 14 04/10/2024 11:51 AM    AST 13 12/18/2023 07:16 AM    ALT 19 07/13/2024 10:16 AM    ALT 16 04/10/2024 11:51 AM    ALT 16 12/18/2023 07:16 AM    Total Protein 7.0 07/13/2024 10:16 AM    Total Protein 7.1 04/10/2024 11:51 AM    Total Protein 6.8 12/18/2023 07:16 AM    Albumin 3.9 07/13/2024 10:16 AM    Albumin 4.1 04/10/2024 11:51 AM    Albumin 3.9 12/18/2023 07:16 AM    HDL, Direct 35 (L) 04/10/2024 11:51 AM    Triglycerides 209 (H) 04/10/2024 11:51 AM         Portions of the record may have been created with voice recognition software. Occasional wrong word or \"sound a like\" substitutions may have occurred due to the inherent limitations of voice recognition software. Read the chart carefully and recognize, using context, where substitutions have occurred.    "

## 2024-08-02 NOTE — PATIENT INSTRUCTIONS
Continue monitor diet, and maintain physical activity.  Make sure to drink plenty of water throughout the day.      Decrease Toujeo to 95 units. Change Fiasp to 1:2 ratio at breakfast, 1:1 ratio at lunch and 1.5:1 ratio at dinner.     Increase metformin to 500 mg twice a day.     Call to have us download Dexcom in 2 weeks.    Follow up in 3 months with lab work prior to visit.

## 2024-08-21 DIAGNOSIS — F32.A DEPRESSION, UNSPECIFIED DEPRESSION TYPE: ICD-10-CM

## 2024-08-21 RX ORDER — PAROXETINE 20 MG/1
20 TABLET, FILM COATED ORAL DAILY
Qty: 90 TABLET | Refills: 1 | Status: SHIPPED | OUTPATIENT
Start: 2024-08-21

## 2024-08-23 ENCOUNTER — TELEPHONE (OUTPATIENT)
Age: 49
End: 2024-08-23

## 2024-08-23 ENCOUNTER — TELEPHONE (OUTPATIENT)
Dept: OTHER | Facility: OTHER | Age: 49
End: 2024-08-23

## 2024-08-23 DIAGNOSIS — E10.65 TYPE 1 DIABETES MELLITUS WITH HYPERGLYCEMIA (HCC): Primary | ICD-10-CM

## 2024-08-23 RX ORDER — INSULIN LISPRO 100 [IU]/ML
INJECTION, SOLUTION INTRAVENOUS; SUBCUTANEOUS
Qty: 15 ML | Refills: 0 | Status: SHIPPED | OUTPATIENT
Start: 2024-08-23 | End: 2024-08-26 | Stop reason: SDUPTHER

## 2024-08-23 NOTE — TELEPHONE ENCOUNTER
[facility] Connect to Care Patient Engagement Partner// Jorge L Pharmacist  from [facility] is calling because he needs some direction clarified  and also wants to know if the generic for   HumaLOG KwikPen 100 units/mL injection pen can be prescribed  because pt  insurance will not cover the brand name medication. Please f/u and advise. Thank you in advance. Phone #  [phone]       Please send to   [id number] - [place] [address] [address]  Phone: [phone]  Fax: [phone]

## 2024-08-23 NOTE — TELEPHONE ENCOUNTER
Patient calling to let us know her fiasp medication is on back order, and she is out of medication. Can we send an alternative to the [facility] pharmacy for her? On call provider contacted.

## 2024-08-26 ENCOUNTER — TELEPHONE (OUTPATIENT)
Age: 49
End: 2024-08-26

## 2024-08-26 DIAGNOSIS — E10.65 TYPE 1 DIABETES MELLITUS WITH HYPERGLYCEMIA (HCC): ICD-10-CM

## 2024-08-26 RX ORDER — INSULIN LISPRO 100 [IU]/ML
INJECTION, SOLUTION INTRAVENOUS; SUBCUTANEOUS
Qty: 15 ML | Refills: 0 | Status: SHIPPED | OUTPATIENT
Start: 2024-08-26

## 2024-08-26 NOTE — TELEPHONE ENCOUNTER
PA for insulin lispro (HumaLOG KwikPen) 100 units/mL injection pen  APPROVED     Date(s) approved August 26, 2024 to August 26, 2025     Case #[id number]     Patient advised by          [] WebPesadoshart Message  [] Phone call   [x]LMOM  []L/M to call office as no active Communication consent on file  []Unable to leave detailed message as VM not approved on Communication consent       Pharmacy advised by    [x]Fax  []Phone call    Approval letter scanned into Media Yes

## 2024-08-26 NOTE — TELEPHONE ENCOUNTER
Pharmacist calling to verify rx for Humalog Kwik pen. States rx says mad dose 150 U daily. Made aware it is a typing error and should say max dose. Pharmacist verbalized understanding.

## 2024-08-26 NOTE — TELEPHONE ENCOUNTER
PA for insulin lispro (HumaLOG KwikPen) 100 units/mL injection pen SUBMITTED     via    []CMM-KEY:    [x]Surescripts-Case ID # [id number]  []Faxed to plan   []Other website    []Phone call Case ID #      Office notes sent, clinical questions answered. Awaiting determination    Turnaround time for your insurance to make a decision on your Prior Authorization can take 7-21 business days.

## 2024-08-26 NOTE — TELEPHONE ENCOUNTER
Insurance will not cover brand Humalog. It must be generic. This was sent in place of Fiasp which is on back order.

## 2024-09-02 DIAGNOSIS — J45.909 UNCOMPLICATED ASTHMA, UNSPECIFIED ASTHMA SEVERITY, UNSPECIFIED WHETHER PERSISTENT: ICD-10-CM

## 2024-09-04 RX ORDER — ALBUTEROL SULFATE 90 UG/1
AEROSOL, METERED RESPIRATORY (INHALATION)
Qty: 25.5 G | Refills: 0 | Status: SHIPPED | OUTPATIENT
Start: 2024-09-04

## 2024-09-10 ENCOUNTER — APPOINTMENT (EMERGENCY)
Dept: RADIOLOGY | Facility: HOSPITAL | Age: 49
End: 2024-09-10
Payer: COMMERCIAL

## 2024-09-10 ENCOUNTER — HOSPITAL ENCOUNTER (EMERGENCY)
Facility: HOSPITAL | Age: 49
Discharge: HOME | End: 2024-09-10
Attending: EMERGENCY MEDICINE
Payer: COMMERCIAL

## 2024-09-10 VITALS
HEART RATE: 79 BPM | DIASTOLIC BLOOD PRESSURE: 76 MMHG | TEMPERATURE: 98.4 F | OXYGEN SATURATION: 96 % | RESPIRATION RATE: 19 BRPM | SYSTOLIC BLOOD PRESSURE: 173 MMHG

## 2024-09-10 DIAGNOSIS — I50.9 CHF (CONGESTIVE HEART FAILURE) (HCC): ICD-10-CM

## 2024-09-10 DIAGNOSIS — R06.00 DYSPNEA: Primary | ICD-10-CM

## 2024-09-10 LAB
2HR DELTA HS TROPONIN: -4 NG/L
ALBUMIN SERPL BCG-MCNC: 3.7 G/DL (ref 3.5–5)
ALP SERPL-CCNC: 107 U/L (ref 34–104)
ALT SERPL W P-5'-P-CCNC: 15 U/L (ref 7–52)
ANION GAP SERPL CALCULATED.3IONS-SCNC: 6 MMOL/L (ref 4–13)
AST SERPL W P-5'-P-CCNC: 12 U/L (ref 13–39)
ATRIAL RATE: 88 BPM
BASOPHILS # BLD AUTO: 0.04 THOUSANDS/ÂΜL (ref 0–0.1)
BASOPHILS NFR BLD AUTO: 0 % (ref 0–1)
BILIRUB SERPL-MCNC: 0.29 MG/DL (ref 0.2–1)
BNP SERPL-MCNC: 91 PG/ML (ref 0–100)
BUN SERPL-MCNC: 31 MG/DL (ref 5–25)
CALCIUM SERPL-MCNC: 8.4 MG/DL (ref 8.4–10.2)
CARDIAC TROPONIN I PNL SERPL HS: 21 NG/L
CARDIAC TROPONIN I PNL SERPL HS: 25 NG/L
CHLORIDE SERPL-SCNC: 104 MMOL/L (ref 96–108)
CO2 SERPL-SCNC: 29 MMOL/L (ref 21–32)
CREAT SERPL-MCNC: 0.78 MG/DL (ref 0.6–1.3)
EOSINOPHIL # BLD AUTO: 0.4 THOUSAND/ÂΜL (ref 0–0.61)
EOSINOPHIL NFR BLD AUTO: 3 % (ref 0–6)
ERYTHROCYTE [DISTWIDTH] IN BLOOD BY AUTOMATED COUNT: 12.8 % (ref 11.6–15.1)
FLUAV RNA RESP QL NAA+PROBE: NEGATIVE
FLUBV RNA RESP QL NAA+PROBE: NEGATIVE
GFR SERPL CREATININE-BSD FRML MDRD: 89 ML/MIN/1.73SQ M
GLUCOSE SERPL-MCNC: 129 MG/DL (ref 65–140)
HCT VFR BLD AUTO: 30.8 % (ref 34.8–46.1)
HGB BLD-MCNC: 9.8 G/DL (ref 11.5–15.4)
IMM GRANULOCYTES # BLD AUTO: 0.08 THOUSAND/UL (ref 0–0.2)
IMM GRANULOCYTES NFR BLD AUTO: 1 % (ref 0–2)
LYMPHOCYTES # BLD AUTO: 1.36 THOUSANDS/ÂΜL (ref 0.6–4.47)
LYMPHOCYTES NFR BLD AUTO: 11 % (ref 14–44)
MCH RBC QN AUTO: 29.5 PG (ref 26.8–34.3)
MCHC RBC AUTO-ENTMCNC: 31.8 G/DL (ref 31.4–37.4)
MCV RBC AUTO: 93 FL (ref 82–98)
MONOCYTES # BLD AUTO: 0.69 THOUSAND/ÂΜL (ref 0.17–1.22)
MONOCYTES NFR BLD AUTO: 6 % (ref 4–12)
NEUTROPHILS # BLD AUTO: 9.78 THOUSANDS/ÂΜL (ref 1.85–7.62)
NEUTS SEG NFR BLD AUTO: 79 % (ref 43–75)
NRBC BLD AUTO-RTO: 0 /100 WBCS
P AXIS: 95 DEGREES
PLATELET # BLD AUTO: 432 THOUSANDS/UL (ref 149–390)
PMV BLD AUTO: 9.7 FL (ref 8.9–12.7)
POTASSIUM SERPL-SCNC: 3.9 MMOL/L (ref 3.5–5.3)
PR INTERVAL: 150 MS
PROT SERPL-MCNC: 6.5 G/DL (ref 6.4–8.4)
QRS AXIS: 86 DEGREES
QRSD INTERVAL: 82 MS
QT INTERVAL: 374 MS
QTC INTERVAL: 452 MS
RBC # BLD AUTO: 3.32 MILLION/UL (ref 3.81–5.12)
RSV RNA RESP QL NAA+PROBE: NEGATIVE
SARS-COV-2 RNA RESP QL NAA+PROBE: NEGATIVE
SODIUM SERPL-SCNC: 139 MMOL/L (ref 135–147)
T WAVE AXIS: -41 DEGREES
VENTRICULAR RATE: 88 BPM
WBC # BLD AUTO: 12.35 THOUSAND/UL (ref 4.31–10.16)

## 2024-09-10 PROCEDURE — 83880 ASSAY OF NATRIURETIC PEPTIDE: CPT

## 2024-09-10 PROCEDURE — 0241U HB NFCT DS VIR RESP RNA 4 TRGT: CPT | Performed by: EMERGENCY MEDICINE

## 2024-09-10 PROCEDURE — 80053 COMPREHEN METABOLIC PANEL: CPT

## 2024-09-10 PROCEDURE — 99285 EMERGENCY DEPT VISIT HI MDM: CPT | Performed by: EMERGENCY MEDICINE

## 2024-09-10 PROCEDURE — 93005 ELECTROCARDIOGRAM TRACING: CPT

## 2024-09-10 PROCEDURE — 94640 AIRWAY INHALATION TREATMENT: CPT

## 2024-09-10 PROCEDURE — 36415 COLL VENOUS BLD VENIPUNCTURE: CPT

## 2024-09-10 PROCEDURE — 85025 COMPLETE CBC W/AUTO DIFF WBC: CPT

## 2024-09-10 PROCEDURE — 99285 EMERGENCY DEPT VISIT HI MDM: CPT

## 2024-09-10 PROCEDURE — 71045 X-RAY EXAM CHEST 1 VIEW: CPT

## 2024-09-10 PROCEDURE — 93010 ELECTROCARDIOGRAM REPORT: CPT | Performed by: INTERNAL MEDICINE

## 2024-09-10 PROCEDURE — 84484 ASSAY OF TROPONIN QUANT: CPT

## 2024-09-10 RX ORDER — IPRATROPIUM BROMIDE AND ALBUTEROL SULFATE 2.5; .5 MG/3ML; MG/3ML
3 SOLUTION RESPIRATORY (INHALATION) ONCE
Status: COMPLETED | OUTPATIENT
Start: 2024-09-10 | End: 2024-09-10

## 2024-09-10 RX ADMIN — IPRATROPIUM BROMIDE AND ALBUTEROL SULFATE 3 ML: 2.5; .5 SOLUTION RESPIRATORY (INHALATION) at 09:08

## 2024-09-10 NOTE — Clinical Note
Vernell Darby was seen and treated in our emergency department on 9/10/2024.                Diagnosis:     Vernell  may return to work on return date.    She may return on this date: 09/12/2024         If you have any questions or concerns, please don't hesitate to call.      Jw Odom, DO    ______________________________           _______________          _______________  Hospital Representative                              Date                                Time

## 2024-09-10 NOTE — ED PROVIDER NOTES
History  Chief Complaint   Patient presents with    Shortness of Breath     Patient presents to to the ED with c/o SOB, states was recently in [facility] and it was humid so her asthma flared. States her inhaler isnt helping much but she does not feel it is similar to her asthma flares since yesterday. States hx of diastolic CHF and is on a water pill, denies CP or leg swelling      History from patient and medical records, past medical history CHF asthma obesity anemia diabetes hypertension hyper lipid complains of shortness of breath constant since yesterday no fevers no cough no chest pain.  She has been taking her inhaler without any relief.  She had some trouble breathing while she was in [facility] last week but that resolved.  No new calf swelling or or pain.        Prior to Admission Medications   Prescriptions Last Dose Informant Patient Reported? Taking?   Ascorbic Acid (VITAMIN C) 500 MG CAPS  Self Yes No   Sig: Take 2 capsules by mouth daily   Aspirin Low Dose 81 MG EC tablet  Self No No   Sig: TAKE 1 TABLET BY MOUTH EVERY DAY   Continuous Blood Gluc Sensor (Dexcom G7 Sensor)  Self No No   Sig: Use 1 Device every 10 days   Fluticasone-Salmeterol (Advair) 250-50 mcg/dose inhaler  Self No No   Sig: INHALE 1 PUFF BY MOUTH TWO TIMES DAILY RINSE MOUTH AFTER USE   Glucagon (Gvoke HypoPen 2-Pack) 1 MG/0.2ML SOAJ  Self No No   Sig: Use if hypoglycemia prn   Insulin Pen Needle 33G X 4 MM MISC  Self No No   Sig: Use to inject insulin 4 times a day   OneTouch Ultra test strip  Self No No   Sig: Test 3 times daily   PARoxetine (PAXIL) 20 mg tablet   No No   Sig: TAKE 1 TABLET BY MOUTH EVERY DAY   Probiotic Product (ALIGN PO)  Self Yes No   Sig: Take by mouth   acetone, urine, test strip  Self No No   Sig: Use to test urine ketones for high blood sugars.   albuterol (2.5 mg/3 mL) 0.083 % nebulizer solution  Self No No   Sig: INHALE CONTENTS OF 1 VIAL BY NEBULIZER EVERY 6 HOURS AS NEEDED FOR WHEEZING OR FOR SHORTNESS  OF BREATH.   albuterol (PROVENTIL HFA,VENTOLIN HFA) 90 mcg/act inhaler   No No   Sig: INHALE 2 PUFFS BY MOUTH EVERY 4 HOURS AS NEEDED FOR WHEEZING OR FOR SHORTNESS OF BREATH   atorvastatin (LIPITOR) 80 mg tablet  Self No No   Sig: TAKE 1 TABLET BY MOUTH EVERY DAY   cyanocobalamin (VITAMIN B-12) 100 mcg tablet  Self Yes No   Sig: Take by mouth daily   dicyclomine (BENTYL) 20 mg tablet  Self No No   Sig: Take 1 tablet (20 mg total) by mouth 2 (two) times a day as needed (abdominal cramps) for up to 7 days   furosemide (LASIX) 40 mg tablet  Self No No   Sig: TAKE 1 TABLET BY MOUTH EVERY DAY   glucagon (Glucagon Emergency) 1 MG injection  Self No No   Sig: Inject 1 mg under the skin once as needed for low blood sugar for up to 1 dose   insulin aspart, w/niacinamide, (Fiasp FlexTouch) 100 Units/mL injection pen   No No   Sig: Inject 1u:2c with breakfast, 1u:1c with lunch, 1.5u:1c with dinner, up to 150 units daily   insulin glargine (Toujeo SoloStar) 300 units/mL CONCENTRATED U-300 injection pen (1-unit dial)   No No   Sig: Inject 95 Units under the skin daily   insulin lispro (HumaLOG KwikPen) 100 units/mL injection pen   No No   Sig: Inject per sliding scale of 1u:2c with breakfast, 1u:1c with lunch, 1.5u:1c with dinner, Mad dose 150 units daily   lisinopril (ZESTRIL) 20 mg tablet  Self No No   Sig: TAKE 2 TABLETS BY MOUTH EVERY DAY   metFORMIN (GLUCOPHAGE-XR) 500 mg 24 hr tablet   No No   Sig: Take 1 tablet (500 mg total) by mouth 2 (two) times a day with meals   montelukast (SINGULAIR) 10 mg tablet  Self No No   Sig: TAKE 1 TABLET BY MOUTH AT BEDTIME   omeprazole (PriLOSEC) 20 mg delayed release capsule  Self Yes No   Sig: Take 20 mg by mouth daily.      Facility-Administered Medications: None       Past Medical History:   Diagnosis Date    Anemia     Arthritis     Asthma     w/acute exacerbation. Last assessed: 10/26/12    Chest pain 02/03/2016    CHF (congestive heart failure) (HCC)     Depression     Diabetes  mellitus (HCC)     Diabetic nephropathy (HCC)     Diabetic nephropathy associated with type 1 diabetes mellitus (HCC) 08/12/2020    Diabetic retinopathy (HCC) 02/03/2016    Endometriosis     Gastroenteritis 02/03/2016    GERD (gastroesophageal reflux disease)     HTN (hypertension)     Hyperlipidemia 02/03/2016    Hypertension     Obesity     Oligomenorrhea     Polycystic ovaries 02/03/2016    Retinal hemorrhage 02/03/2016    Retinopathy     Thrombocytosis     Type 1 diabetes mellitus with ophthalmic manifestation (HCC) 02/03/2016       Past Surgical History:   Procedure Laterality Date    CATARACT EXTRACTION Left 10/2020    CATARACT EXTRACTION Right     RETINAL LASER PROCEDURE         Family History   Problem Relation Age of Onset    Heart murmur Mother     Diabetes Mother         Mellitus    Thyroid disease Mother         Disorder    Diabetes type II Mother     Diabetes Father         Mellitus    Hypertension Father     Diabetes type II Father     No Known Problems Maternal Grandmother     Diabetes Maternal Grandfather         Mellitus    Breast cancer Paternal Grandmother 70 - 79    Leukemia Paternal Grandfather 48    Diabetes Maternal Aunt         Mellitus    Diabetes Maternal Uncle         Mellitus    Substance Abuse Neg Hx     Mental illness Neg Hx     Alcohol abuse Neg Hx      I have reviewed and agree with the history as documented.    E-Cigarette/Vaping    E-Cigarette Use Never User      E-Cigarette/Vaping Substances    Nicotine No     THC No     CBD No     Flavoring No      Social History     Tobacco Use    Smoking status: Never    Smokeless tobacco: Never    Tobacco comments:     Per [employer]: Former smoker. Quit in 1994   Vaping Use    Vaping status: Never Used   Substance Use Topics    Alcohol use: Yes     Alcohol/week: 2.0 standard drinks of alcohol     Types: 2 Standard drinks or equivalent per week     Comment: Occasionally/ sicially    Drug use: No       Review of Systems   Constitutional:   Negative for chills and fever.   HENT:  Negative for rhinorrhea and sore throat.    Respiratory:  Positive for shortness of breath.    Cardiovascular:  Negative for chest pain.   Gastrointestinal:  Negative for constipation, diarrhea, nausea and vomiting.   Genitourinary:  Negative for dysuria and frequency.   Skin:  Negative for rash.   All other systems reviewed and are negative.      Physical Exam  Physical Exam  Vitals and nursing note reviewed.   Constitutional:       Appearance: She is well-developed. She is obese.   HENT:      Head: Normocephalic and atraumatic.      Right Ear: External ear normal.      Left Ear: External ear normal.      Nose: Nose normal.   Eyes:      Conjunctiva/sclera: Conjunctivae normal.      Pupils: Pupils are equal, round, and reactive to light.   Cardiovascular:      Rate and Rhythm: Normal rate and regular rhythm.      Heart sounds: Normal heart sounds.   Pulmonary:      Effort: Pulmonary effort is normal. No respiratory distress.      Breath sounds: Normal breath sounds. No wheezing.   Abdominal:      General: Bowel sounds are normal. There is no distension.      Palpations: Abdomen is soft.      Tenderness: There is no abdominal tenderness.   Musculoskeletal:         General: No deformity. Normal range of motion.      Cervical back: Normal range of motion and neck supple. No spinous process tenderness.      Right lower leg: No edema.      Left lower leg: No edema.   Skin:     General: Skin is warm and dry.      Findings: No rash.   Neurological:      General: No focal deficit present.      Mental Status: She is alert.      GCS: GCS eye subscore is 4. GCS verbal subscore is 5. GCS motor subscore is 6.      Sensory: No sensory deficit.   Psychiatric:         Mood and Affect: Mood normal.         Vital Signs  ED Triage Vitals [09/10/24 0840]   Temperature Pulse Respirations Blood Pressure SpO2   98.4 °F (36.9 °C) 87 18 168/75 99 %      Temp Source Heart Rate Source Patient Position -  Orthostatic VS BP Location FiO2 (%)   Oral Monitor Sitting Left arm --      Pain Score       No Pain           Vitals:    09/10/24 0840 09/10/24 1030 09/10/24 1100 09/10/24 1200   BP: 168/75 148/67 169/76 (!) 173/76   Pulse: 87 80 79 79   Patient Position - Orthostatic VS: Sitting            Visual Acuity      ED Medications  Medications   ipratropium-albuterol (DUO-NEB) 0.5-2.5 mg/3 mL inhalation solution 3 mL (3 mL Nebulization Given 9/10/24 0908)       Diagnostic Studies  Results Reviewed       Procedure Component Value Units Date/Time    HS Troponin I 2hr [[id number]]  (Normal) Collected: 09/10/24 1108    Lab Status: Final result Specimen: Blood from Arm, Right Updated: 09/10/24 1135     hs TnI 2hr 21 ng/L      Delta 2hr hsTnI -4 ng/L     COVID/FLU/RSV [[id number]]  (Normal) Collected: 09/10/24 0930    Lab Status: Final result Specimen: Nares from Nose Updated: 09/10/24 1010     SARS-CoV-2 Negative     INFLUENZA A PCR Negative     INFLUENZA B PCR Negative     RSV PCR Negative    Narrative:      This test has been performed using the CoV-2/Flu/RSV plus assay on the [employer] GeneXpert platform. This test has been validated by the  and verified by the performing laboratory.     This test is designed to amplify and detect the following: nucleocapsid (N), envelope (E), and RNA-dependent RNA polymerase (RdRP) genes of the SARS-CoV-2 genome; matrix (M), basic polymerase (PB2), and acidic protein (PA) segments of the influenza A genome; matrix (M) and non-structural protein (NS) segments of the influenza B genome, and the nucleocapsid genes of RSV A and RSV B.     Positive results are indicative of the presence of Flu A, Flu B, RSV, and/or SARS-CoV-2 RNA. Positive results for SARS-CoV-2 or suspected novel influenza should be reported to state, local, or federal health departments according to local reporting requirements.      All results should be assessed in conjunction with clinical presentation and other  laboratory markers for clinical management.     FOR PEDIATRIC PATIENTS - copy/paste COVID Guidelines URL to browser: [email or web address]       B-Type Natriuretic Peptide(BNP) [[id number]]  (Normal) Collected: 09/10/24 0846    Lab Status: Final result Specimen: Blood from Arm, Right Updated: 09/10/24 0925     BNP 91 pg/mL     HS Troponin 0hr (reflex protocol) [[id number]]  (Normal) Collected: 09/10/24 0846    Lab Status: Final result Specimen: Blood from Arm, Right Updated: 09/10/24 0921     hs TnI 0hr 25 ng/L     Comprehensive metabolic panel [[id number]]  (Abnormal) Collected: 09/10/24 0846    Lab Status: Final result Specimen: Blood from Arm, Right Updated: 09/10/24 0918     Sodium 139 mmol/L      Potassium 3.9 mmol/L      Chloride 104 mmol/L      CO2 29 mmol/L      ANION GAP 6 mmol/L      BUN 31 mg/dL      Creatinine 0.78 mg/dL      Glucose 129 mg/dL      Calcium 8.4 mg/dL      AST 12 U/L      ALT 15 U/L      Alkaline Phosphatase 107 U/L      Total Protein 6.5 g/dL      Albumin 3.7 g/dL      Total Bilirubin 0.29 mg/dL      eGFR 89 ml/min/1.73sq m     Narrative:      National Kidney Disease Foundation guidelines for Chronic Kidney Disease (CKD):     Stage 1 with normal or high GFR (GFR > 90 mL/min/1.73 square meters)    Stage 2 Mild CKD (GFR = 60-89 mL/min/1.73 square meters)    Stage 3A Moderate CKD (GFR = 45-59 mL/min/1.73 square meters)    Stage 3B Moderate CKD (GFR = 30-44 mL/min/1.73 square meters)    Stage 4 Severe CKD (GFR = 15-29 mL/min/1.73 square meters)    Stage 5 End Stage CKD (GFR <15 mL/min/1.73 square meters)  Note: GFR calculation is accurate only with a steady state creatinine    CBC and differential [[id number]]  (Abnormal) Collected: 09/10/24 0846    Lab Status: Final result Specimen: Blood from Arm, Right Updated: 09/10/24 0859     WBC 12.35 Thousand/uL      RBC 3.32 Million/uL      Hemoglobin 9.8 g/dL      Hematocrit 30.8 %      MCV 93 fL       MCH 29.5 pg      MCHC 31.8 g/dL      RDW 12.8 %      MPV 9.7 fL      Platelets 432 Thousands/uL      nRBC 0 /100 WBCs      Segmented % 79 %      Immature Grans % 1 %      Lymphocytes % 11 %      Monocytes % 6 %      Eosinophils Relative 3 %      Basophils Relative 0 %      Absolute Neutrophils 9.78 Thousands/µL      Absolute Immature Grans 0.08 Thousand/uL      Absolute Lymphocytes 1.36 Thousands/µL      Absolute Monocytes 0.69 Thousand/µL      Eosinophils Absolute 0.40 Thousand/µL      Basophils Absolute 0.04 Thousands/µL                    XR chest 1 view portable   Final Result by Frandy Foster MD (09/10 1252)      No acute cardiopulmonary disease.            Workstation performed: [id number]                    Procedures  ECG 12 Lead Documentation Only    Date/Time: 9/11/2024 12:31 PM    Performed by: Jw Odom DO  Authorized by: Jw Odom DO    Indications / Diagnosis:  Sob  ECG reviewed by me, the ED Provider: yes    Patient location:  ED  Interpretation:     Interpretation: normal    Rate:     ECG rate:  88    ECG rate assessment: normal    Rhythm:     Rhythm: sinus rhythm    Ectopy:     Ectopy: none    QRS:     QRS axis:  Normal    QRS intervals:  Normal  Conduction:     Conduction: normal    ST segments:     ST segments:  Normal  T waves:     T waves: normal    Comments:      This EKG was interpreted by me.           ED Course     patient had no wheezing in ER, no hypoxia, no resp distress.  She has albuterol to take at home as needed.  CXR appeared to me as mild increased vascular congestion.  I recommended that she take an extra dose of lasix today and tomorrow.          HEART Risk Score      Flowsheet Row Most Recent Value   Heart Score Risk Calculator    History 0 Filed at: 09/10/2024 1152   ECG 0 Filed at: 09/10/2024 1152   Age 1 Filed at: 09/10/2024 1152   Risk Factors 2 Filed at: 09/10/2024 1152   Troponin 1 Filed at: 09/10/2024 1152   HEART Score 4 Filed at: 09/10/2024 1152                   PERC Rule for PE      Flowsheet Row Most Recent Value   PERC Rule for PE    Age >=50 0 Filed at: 09/10/2024 0850   HR >=100 0 Filed at: 09/10/2024 0850   O2 Sat on room air < 95% 0 Filed at: 09/10/2024 0850   History of PE or DVT 0 Filed at: 09/10/2024 0850   Recent trauma or surgery 0 Filed at: 09/10/2024 0850   Hemoptysis 0 Filed at: 09/10/2024 0850   Exogenous estrogen 0 Filed at: 09/10/2024 0850   Unilateral leg swelling 0 Filed at: 09/10/2024 0850   PERC Rule for PE Results 0 Filed at: 09/10/2024 0850                SBIRT 22yo+      Flowsheet Row Most Recent Value   Initial Alcohol Screen: US AUDIT-C     1. How often do you have a drink containing alcohol? 0 Filed at: 09/10/2024 0843   2. How many drinks containing alcohol do you have on a typical day you are drinking?  0 Filed at: 09/10/2024 0843   3a. Male UNDER 65: How often do you have five or more drinks on one occasion? 0 Filed at: 09/10/2024 0843   3b. FEMALE Any Age, or MALE 65+: How often do you have 4 or more drinks on one occassion? 0 Filed at: 09/10/2024 0843   Audit-C Score 0 Filed at: 09/10/2024 0843   MITALI: How many times in the past year have you...    Used an illegal drug or used a prescription medication for non-medical reasons? Never Filed at: 09/10/2024 0843                      Medical Decision Making  Diff includes asthma exacerbation, chf exacerbation, less likely PE, coronary syndrome, pneumonia, anxiety, PTX, pleural effusion    Risk  Prescription drug management.                 Disposition  Final diagnoses:   Dyspnea   CHF (congestive heart failure) (HCC)     Time reflects when diagnosis was documented in both MDM as applicable and the Disposition within this note       Time User Action Codes Description Comment    9/10/2024 11:52 AM Jw Odom [R06.00] Dyspnea     9/10/2024 11:52 AM Jw Odom [I50.9] CHF (congestive heart failure) (HCC)           ED Disposition       ED Disposition   Discharge    Condition    Stable    Date/Time   Tue Sep 10, 2024 1152    Comment   Vernell Darby discharge to home/self care.                   Follow-up Information       Follow up With Specialties Details Why Contact Info Additional Information    [facility] Cardiology Schedule an appointment as soon as possible for a visit   [address]  [facility] [address] [phone] [facility], [address], [place], [place], [address] [phone]            Discharge Medication List as of 9/10/2024 11:54 AM        CONTINUE these medications which have NOT CHANGED    Details   acetone, urine, test strip Use to test urine ketones for high blood sugars., Normal      albuterol (2.5 mg/3 mL) 0.083 % nebulizer solution INHALE CONTENTS OF 1 VIAL BY NEBULIZER EVERY 6 HOURS AS NEEDED FOR WHEEZING OR FOR SHORTNESS OF BREATH., Normal      albuterol (PROVENTIL HFA,VENTOLIN HFA) 90 mcg/act inhaler INHALE 2 PUFFS BY MOUTH EVERY 4 HOURS AS NEEDED FOR WHEEZING OR FOR SHORTNESS OF BREATH, Normal      Ascorbic Acid (VITAMIN C) 500 MG CAPS Take 2 capsules by mouth daily, Historical Med      Aspirin Low Dose 81 MG EC tablet TAKE 1 TABLET BY MOUTH EVERY DAY, Normal      atorvastatin (LIPITOR) 80 mg tablet TAKE 1 TABLET BY MOUTH EVERY DAY, Normal      Continuous Blood Gluc Sensor (Dexcom G7 Sensor) Use 1 Device every 10 days, Starting Thu 4/11/2024, Normal      cyanocobalamin (VITAMIN B-12) 100 mcg tablet Take by mouth daily, Historical Med      dicyclomine (BENTYL) 20 mg tablet Take 1 tablet (20 mg total) by mouth 2 (two) times a day as needed (abdominal cramps) for up to 7 days, Starting Thu 8/1/2024, Until Thu 8/8/2024 at 2359, Normal      Fluticasone-Salmeterol (Advair) 250-50 mcg/dose inhaler INHALE 1 PUFF BY MOUTH TWO TIMES DAILY RINSE MOUTH AFTER USE, Normal      furosemide (LASIX) 40 mg tablet TAKE 1 TABLET BY MOUTH EVERY DAY, Normal      glucagon  (Glucagon Emergency) 1 MG injection Inject 1 mg under the skin once as needed for low blood sugar for up to 1 dose, Starting Wed 12/16/2020, Normal      Glucagon (Gvoke HypoPen 2-Pack) 1 MG/0.2ML SOAJ Use if hypoglycemia prn, Normal      insulin aspart, w/niacinamide, (Fiasp FlexTouch) 100 Units/mL injection pen Inject 1u:2c with breakfast, 1u:1c with lunch, 1.5u:1c with dinner, up to 150 units daily, Fill Later      insulin glargine (Toujeo SoloStar) 300 units/mL CONCENTRATED U-300 injection pen (1-unit dial) Inject 95 Units under the skin daily, Starting Fri 8/2/2024, Fill Later      insulin lispro (HumaLOG KwikPen) 100 units/mL injection pen Inject per sliding scale of 1u:2c with breakfast, 1u:1c with lunch, 1.5u:1c with dinner, Mad dose 150 units daily, Normal      Insulin Pen Needle 33G X 4 MM MISC Use to inject insulin 4 times a day, Normal      lisinopril (ZESTRIL) 20 mg tablet TAKE 2 TABLETS BY MOUTH EVERY DAY, Starting Mon 4/29/2024, Normal      metFORMIN (GLUCOPHAGE-XR) 500 mg 24 hr tablet Take 1 tablet (500 mg total) by mouth 2 (two) times a day with meals, Starting Fri 8/2/2024, Normal      montelukast (SINGULAIR) 10 mg tablet TAKE 1 TABLET BY MOUTH AT BEDTIME, Normal      omeprazole (PriLOSEC) 20 mg delayed release capsule Take 20 mg by mouth daily., Historical Med      OneTouch Ultra test strip Test 3 times daily, Normal      PARoxetine (PAXIL) 20 mg tablet TAKE 1 TABLET BY MOUTH EVERY DAY, Starting Wed 8/21/2024, Normal      Probiotic Product (ALIGN PO) Take by mouth, Starting Mon 6/29/2015, Historical Med                 PDMP Review         Value Time User    PDMP Reviewed  Yes 8/8/2020  8:38 AM Reynaldo Alfred MD            ED Provider  Electronically Signed by             Jw Odom,   09/11/24 4425

## 2024-09-11 ENCOUNTER — TELEPHONE (OUTPATIENT)
Dept: CARDIOLOGY CLINIC | Facility: CLINIC | Age: 49
End: 2024-09-11

## 2024-09-11 ENCOUNTER — NURSE TRIAGE (OUTPATIENT)
Age: 49
End: 2024-09-11

## 2024-09-11 ENCOUNTER — OFFICE VISIT (OUTPATIENT)
Dept: CARDIOLOGY CLINIC | Facility: CLINIC | Age: 49
End: 2024-09-11
Payer: COMMERCIAL

## 2024-09-11 VITALS
HEIGHT: 56 IN | HEART RATE: 98 BPM | OXYGEN SATURATION: 96 % | BODY MASS INDEX: 42.78 KG/M2 | WEIGHT: 190.2 LBS | SYSTOLIC BLOOD PRESSURE: 140 MMHG | DIASTOLIC BLOOD PRESSURE: 60 MMHG

## 2024-09-11 DIAGNOSIS — I10 PRIMARY HYPERTENSION: Chronic | ICD-10-CM

## 2024-09-11 DIAGNOSIS — I50.33 ACUTE ON CHRONIC HEART FAILURE WITH PRESERVED EJECTION FRACTION (HCC): Primary | ICD-10-CM

## 2024-09-11 DIAGNOSIS — I50.9 ACUTE CONGESTIVE HEART FAILURE, UNSPECIFIED HEART FAILURE TYPE (HCC): ICD-10-CM

## 2024-09-11 PROBLEM — I50.32 CHRONIC DIASTOLIC CONGESTIVE HEART FAILURE (HCC): Chronic | Status: ACTIVE | Noted: 2021-09-01

## 2024-09-11 PROBLEM — Z86.16 HISTORY OF COVID-19: Status: RESOLVED | Noted: 2021-02-26 | Resolved: 2024-09-11

## 2024-09-11 PROBLEM — R00.2 PALPITATIONS: Status: RESOLVED | Noted: 2021-03-28 | Resolved: 2024-09-11

## 2024-09-11 PROCEDURE — 99214 OFFICE O/P EST MOD 30 MIN: CPT | Performed by: PHYSICIAN ASSISTANT

## 2024-09-11 RX ORDER — FUROSEMIDE 40 MG
TABLET ORAL
Qty: 94 TABLET | Refills: 0 | Status: SHIPPED | OUTPATIENT
Start: 2024-09-11 | End: 2024-09-13

## 2024-09-11 NOTE — PROGRESS NOTES
General Cardiology Outpatient Visit    Vernell Darby 49 y.o. female   MRN: [id number]  Encounter: [id number]    Assessment:  Patient Active Problem List    Diagnosis Date Noted    Diabetic polyneuropathy associated with type 1 diabetes mellitus (HCC) 08/12/2020    Diabetic nephropathy associated with type 1 diabetes mellitus (HCC) 08/12/2020    Other proteinuria 07/21/2020    Severe obesity (BMI 35.0-39.9) with comorbidity (HCC) 08/10/2019    Gastroesophageal reflux disease without esophagitis 08/10/2019    Asthma, mild persistent 10/25/2017    Anemia 02/15/2017    Thrombocytosis 02/15/2017    Type 1 diabetes mellitus with hyperglycemia (HCC) 02/03/2016    Hyperlipidemia 02/03/2016    Polycystic ovarian syndrome 02/03/2016    Retinal hemorrhage 02/03/2016    Asthma     Hypertension     Moderate episode of recurrent major depressive disorder (HCC)     Nervousness 07/10/2014    Both eyes affected 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, associated with type 1 diabetes mellitus (HCC) 03/22/2012    Stage 2 chronic kidney disease 07/26/2024    Hyperphosphatemia 07/26/2024    Hyponatremia 04/06/2023    Primary localized osteoarthritis of right knee 10/27/2022    Chronic diastolic congestive heart failure (HCC) 09/01/2021    B12 deficiency 01/13/2021    Nuclear sclerotic cataract of left eye 09/11/2020       Today's Plan:  Continue on higher dose Lasix (80 mg daily) for next 3-4 days.   Advised to begin daily weights and keep log.  Will ask office RN to touch base with patient on 09/12.   If symptoms fail to improve with diuresis, can consider CTA/PE study given recent air travel.    Plan:  Acute on chronic HFpEF; LVEF 65%   Weight of 187 lbs on 04/06/23 ([facility] office). Today, weighs 190 lbs.    Most recent BMP from 09/10/2024: sodium 139; potassium 3.9; BUN 31; creatinine 0.78; eGFR 89.    Pharmacotherapies:  --Aldosterone Antagonist: No.  --SGLT2 Inhibitor: No, DM1.     Volume  Management:  --Diuretic: Lasix 40 mg daily.    Hypertension   BP of 140/60 mmHg in office today.   Continues on lisinopril 40 mg daily.    Diabetes mellitus, type I  Hyperlipidemia  Endometriosis  Depression    HPI:   Vernell Darby is a 49-year-old woman with a PMH as above who presents to the office for ED follow-up. Follows with Dr. EMY Whitaker.     Presented to [facility] ED on 09/10/2024 with SOB. /75 mmHg. COVID, flu, and RSV negative. Troponins WNL; BNP 91. Received DuoNeb in ED. CXR with mild increased vascular congestion. Was advised to double home Lasix dose for 2 days, and discharged home.    09/11/2024: Patient presents with her  for acute visit. Primary complaint today of PRICE, mild abdominal bloating, and mild bilateral LE swelling. Recently returned from vacation [place] in [facility] and does endorse increased sodium intake. Soon after returning home, she began to note PRICE. On 9/9 she doubled Lasix on her own. Has not yet noted a significant increase in urine output with this higher dose.    Past Medical History:   Diagnosis Date    Anemia     Arthritis     Asthma     w/acute exacerbation. Last assessed: 10/26/12    Chest pain 02/03/2016    CHF (congestive heart failure) (HCC)     Depression     Diabetes mellitus (HCC)     Diabetic nephropathy (HCC)     Diabetic nephropathy associated with type 1 diabetes mellitus (HCC) 08/12/2020    Diabetic retinopathy (HCC) 02/03/2016    Endometriosis     Gastroenteritis 02/03/2016    GERD (gastroesophageal reflux disease)     History of COVID-19 02/26/2021    Hyperlipidemia 02/03/2016    Hypertension     Obesity     Oligomenorrhea     Polycystic ovaries 02/03/2016    Retinal hemorrhage 02/03/2016    Retinopathy     Thrombocytosis     Type 1 diabetes mellitus with ophthalmic manifestation (HCC) 02/03/2016       Review of Systems   Constitutional:  Positive for unexpected weight change. Negative for activity change, appetite change  and fatigue.   Respiratory:  Positive for shortness of breath. Negative for cough and chest tightness.    Cardiovascular:  Negative for chest pain, palpitations and leg swelling.   Gastrointestinal:  Positive for abdominal distention. Negative for abdominal pain, diarrhea and nausea.   Genitourinary:  Negative for decreased urine volume, dysuria and urgency.   Musculoskeletal: Negative.    Skin: Negative.    Neurological:  Negative for dizziness, syncope, weakness and light-headedness.   Psychiatric/Behavioral:  Negative for confusion and sleep disturbance. The patient is not nervous/anxious.        No Known Allergies      Current Outpatient Medications:     acetone, urine, test strip, Use to test urine ketones for high blood sugars., Disp: 25 each, Rfl: 6    albuterol (2.5 mg/3 mL) 0.083 % nebulizer solution, INHALE CONTENTS OF 1 VIAL BY NEBULIZER EVERY 6 HOURS AS NEEDED FOR WHEEZING OR FOR SHORTNESS OF BREATH., Disp: 75 mL, Rfl: 3    albuterol (PROVENTIL HFA,VENTOLIN HFA) 90 mcg/act inhaler, INHALE 2 PUFFS BY MOUTH EVERY 4 HOURS AS NEEDED FOR WHEEZING OR FOR SHORTNESS OF BREATH, Disp: 25.5 g, Rfl: 0    Ascorbic Acid (VITAMIN C) 500 MG CAPS, Take 2 capsules by mouth daily, Disp: , Rfl:     Aspirin Low Dose 81 MG EC tablet, TAKE 1 TABLET BY MOUTH EVERY DAY, Disp: 30 tablet, Rfl: 0    atorvastatin (LIPITOR) 80 mg tablet, TAKE 1 TABLET BY MOUTH EVERY DAY, Disp: 30 tablet, Rfl: 11    Continuous Blood Gluc Sensor (Dexcom G7 Sensor), Use 1 Device every 10 days, Disp: 3 each, Rfl: 11    cyanocobalamin (VITAMIN B-12) 100 mcg tablet, Take by mouth daily, Disp: , Rfl:     dicyclomine (BENTYL) 20 mg tablet, Take 1 tablet (20 mg total) by mouth 2 (two) times a day as needed (abdominal cramps) for up to 7 days, Disp: 14 tablet, Rfl: 0    Fluticasone-Salmeterol (Advair) 250-50 mcg/dose inhaler, INHALE 1 PUFF BY MOUTH TWO TIMES DAILY RINSE MOUTH AFTER USE, Disp: 60 blister, Rfl: 5    furosemide (LASIX) 40 mg tablet, TAKE 1 TABLET  BY MOUTH EVERY DAY, Disp: 30 tablet, Rfl: 2    glucagon (Glucagon Emergency) 1 MG injection, Inject 1 mg under the skin once as needed for low blood sugar for up to 1 dose, Disp: 1 kit, Rfl: 3    Glucagon (Gvoke HypoPen 2-Pack) 1 MG/0.2ML SOAJ, Use if hypoglycemia prn, Disp: 1 Syringe, Rfl: 6    insulin aspart, w/niacinamide, (Fiasp FlexTouch) 100 Units/mL injection pen, Inject 1u:2c with breakfast, 1u:1c with lunch, 1.5u:1c with dinner, up to 150 units daily, Disp: 60 mL, Rfl: 3    insulin glargine (Toujeo SoloStar) 300 units/mL CONCENTRATED U-300 injection pen (1-unit dial), Inject 95 Units under the skin daily, Disp: 13.5 mL, Rfl: 6    insulin lispro (HumaLOG KwikPen) 100 units/mL injection pen, Inject per sliding scale of 1u:2c with breakfast, 1u:1c with lunch, 1.5u:1c with dinner, Mad dose 150 units daily, Disp: 15 mL, Rfl: 0    Insulin Pen Needle 33G X 4 MM MISC, Use to inject insulin 4 times a day, Disp: 400 each, Rfl: 2    lisinopril (ZESTRIL) 20 mg tablet, TAKE 2 TABLETS BY MOUTH EVERY DAY, Disp: 180 tablet, Rfl: 1    metFORMIN (GLUCOPHAGE-XR) 500 mg 24 hr tablet, Take 1 tablet (500 mg total) by mouth 2 (two) times a day with meals, Disp: 180 tablet, Rfl: 1    montelukast (SINGULAIR) 10 mg tablet, TAKE 1 TABLET BY MOUTH AT BEDTIME, Disp: 90 tablet, Rfl: 1    omeprazole (PriLOSEC) 20 mg delayed release capsule, Take 20 mg by mouth daily., Disp: , Rfl:     PARoxetine (PAXIL) 20 mg tablet, TAKE 1 TABLET BY MOUTH EVERY DAY, Disp: 90 tablet, Rfl: 1    Probiotic Product (ALIGN PO), Take by mouth, Disp: , Rfl:     OneTouch Ultra test strip, Test 3 times daily (Patient not taking: Reported on 9/11/2024), Disp: 300 each, Rfl: 3    Social History     Socioeconomic History    Marital status: /Civil Union     Spouse name: Not on file    Number of children: 0    Years of education: Not on file    Highest education level: High school graduate   Occupational History    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 Quit in 1994   Vaping Use    Vaping status: Never Used   Substance and Sexual Activity    Alcohol use: Yes     Alcohol/week: 2.0 standard drinks of alcohol     Types: 2 Standard drinks or equivalent per week     Comment: Occasionally/ sicially    Drug use: No    Sexual activity: Yes     Partners: Male     Birth control/protection: None   Other Topics Concern    Not on file   Social History Narrative    Always uses seatbelt    Caffeine use: 1-2 cups coffee daily    Has smoke detectors    [employer] Affiliations: [employer]        Currently unemployed; was previously doing home care; continuing job search     Social Determinants of Health     Financial Resource Strain: Not on file   Food Insecurity: Not on file   Transportation Needs: Not on file   Physical Activity: Not on file   Stress: Not on file   Social Connections: Not on file   Intimate Partner Violence: Not on file   Housing Stability: Not on file     Family History   Problem Relation Age of Onset    Heart murmur Mother     Diabetes Mother         Mellitus    Thyroid disease Mother         Disorder    Diabetes type II Mother     Diabetes Father         Mellitus    Hypertension Father     Diabetes type II Father     No Known Problems Maternal Grandmother     Diabetes Maternal Grandfather         Mellitus    Breast cancer Paternal Grandmother 70 - 79    Leukemia Paternal Grandfather 48    Diabetes Maternal Aunt         Mellitus    Diabetes Maternal Uncle         Mellitus    Substance Abuse Neg Hx     Mental illness Neg Hx     Alcohol abuse Neg Hx        Vitals:   Blood pressure 140/60, pulse 98, height 4' 8\" (1.422 m), weight 86.3 kg (190 lb 3.2 oz), SpO2 96%, not currently breastfeeding.    Wt Readings from Last 10 Encounters:   09/11/24 86.3 kg (190 lb 3.2 oz)   08/02/24 86.2 kg (190 lb)   08/01/24 86.1 kg (189 lb 12.8 oz)   07/26/24 86.6 kg (191 lb) " "  24 85.7 kg (189 lb)   24 83.9 kg (185 lb)   24 88 kg (194 lb)   23 87.1 kg (192 lb)   23 88.3 kg (194 lb 9.6 oz)   09/15/23 87.9 kg (193 lb 12.8 oz)     Vitals:    24 1358   BP: 140/60   BP Location: Left arm   Patient Position: Sitting   Cuff Size: Large   Pulse: 98   SpO2: 96%   Weight: 86.3 kg (190 lb 3.2 oz)   Height: 4' 8\" (1.422 m)       Physical Exam  Vitals reviewed.   Constitutional:       General: She is awake. She is not in acute distress.     Appearance: Normal appearance. She is well-developed and overweight. She is not toxic-appearing or diaphoretic.   HENT:      Head: Normocephalic.      Nose: Nose normal.      Mouth/Throat:      Mouth: Mucous membranes are moist.   Eyes:      General: No scleral icterus.     Conjunctiva/sclera: Conjunctivae normal.   Neck:      Vascular: JVD present.      Trachea: No tracheal deviation.   Cardiovascular:      Rate and Rhythm: Normal rate and regular rhythm.   Pulmonary:      Effort: Pulmonary effort is normal. No tachypnea, bradypnea or respiratory distress.      Breath sounds: Normal air entry. No decreased air movement. No wheezing.   Abdominal:      General: There is distension.      Palpations: Abdomen is soft.      Tenderness: There is no abdominal tenderness.   Musculoskeletal:      Cervical back: Neck supple.      Right lower le+ Edema present.      Left lower le+ Edema present.   Skin:     General: Skin is warm and dry.      Coloration: Skin is pale. Skin is not jaundiced.   Neurological:      General: No focal deficit present.      Mental Status: She is alert and oriented to person, place, and time.   Psychiatric:         Attention and Perception: Attention normal.         Mood and Affect: Mood and affect normal.         Speech: Speech normal.         Behavior: Behavior normal. Behavior is cooperative.         Thought Content: Thought content normal.       Labs & Results:  Lab Results   Component Value Date    " WBC 12.35 (H) 09/10/2024    HGB 9.8 (L) 09/10/2024    HCT 30.8 (L) 09/10/2024    MCV 93 09/10/2024     (H) 09/10/2024     Lab Results   Component Value Date    SODIUM 139 09/10/2024    K 3.9 09/10/2024     09/10/2024    CO2 29 09/10/2024    BUN 31 (H) 09/10/2024    CREATININE 0.78 09/10/2024    GLUC 129 09/10/2024    CALCIUM 8.4 09/10/2024     Lab Results   Component Value Date    INR 0.98 08/07/2020    PROTIME 12.9 08/07/2020     Lab Results   Component Value Date    BNP 91 09/10/2024      Demetra Gutierrez PA-C

## 2024-09-11 NOTE — LETTER
September 11, 2024     Patient: Vernell Darby  YOB: 1975  Date of Visit: 9/11/2024      To Whom it May Concern:    Vernell Darby is under my professional care. Vernell was seen in my office on 9/11/2024. Vernell may return to work on 09/17/2024 .    If you have any questions or concerns, please don't hesitate to call.         Sincerely,      Demetra Gutierrez PA-C      CC: No Recipients

## 2024-09-11 NOTE — TELEPHONE ENCOUNTER
----- Message from Demetra Gutierrez PA-C sent at 9/11/2024  2:36 PM EDT -----  Please contact patient for update on symptoms and weights.  Was instructed to double Lasix earlier this week. Estimate that she has 3-5 lbs fluid to lose.    Can we do a deep dive into in their daily fluid and sodium intake, and provide education on this as appropriate?     Thanks!  Demetra

## 2024-09-11 NOTE — TELEPHONE ENCOUNTER
"Pt called in stating that she went to the ED yesterday because she was SOB. She was told to take an additional Lasix last night and today.  She states she still feels SOB-describes the feeling like tightness. She said she does feel like she is urinating anymore than usual. She said this started when she was in [facility] last week. She said that the humidity made her SOB. She was also tested yesterday for covid and RSV- both negative.  Denies any leg swelling. Pt unsure if she has gained any weight.     Scheduled pt for a urgent appointment today at 2pm    Notified Bonnie from [place] office.   Reason for Disposition  • Longstanding difficulty breathing and not responding to usual therapy    Answer Assessment - Initial Assessment Questions  1. RESPIRATORY STATUS: \"Describe your breathing?\" (e.g., wheezing, shortness of breath, unable to speak, severe coughing)       Pt reports it feels tight when she is breathing, before getting out of bed she was wheezing   2. ONSET: \"When did this breathing problem begin?\"       Difficulty breathing while in [facility]- last week. Came home Saturday from [facility]   3. PATTERN \"Does the difficult breathing come and go, or has it been constant since it started?\"       Comes and goes   4. SEVERITY: \"How bad is your breathing?\" (e.g., mild, moderate, severe)     - MILD: No SOB at rest, mild SOB with walking, speaks normally in sentences, can lay down, no retractions, pulse < 100.     - MODERATE: SOB at rest, SOB with minimal exertion and prefers to sit, cannot lie down flat, speaks in phrases, mild retractions, audible wheezing, pulse 100-120.     - SEVERE: Very SOB at rest, speaks in single words, struggling to breathe, sitting hunched forward, retractions, pulse > 120       Moderate   5. RECURRENT SYMPTOM: \"Have you had difficulty breathing before?\" If Yes, ask: \"When was the last time?\" and \"What happened that time?\"       Yes on going   6. CARDIAC HISTORY: \"Do you have any " "history of heart disease?\" (e.g., heart attack, angina, bypass surgery, angioplasty)       CHF  7. LUNG HISTORY: \"Do you have any history of lung disease?\"  (e.g., pulmonary embolus, asthma, emphysema)      asthma  8. CAUSE: \"What do you think is causing the breathing problem?\"       Unsure   9. OTHER SYMPTOMS: \"Do you have any other symptoms? (e.g., dizziness, runny nose, cough, chest pain, fever)      Denies   10. PREGNANCY: \"Is there any chance you are pregnant?\" \"When was your last menstrual period?\"        denies  11. TRAVEL: \"Have you traveled out of the country in the last month?\" (e.g., travel history, exposures)        Johnson Carballo    Protocols used: Breathing Difficulty-ADULT-[place]    "

## 2024-09-11 NOTE — PATIENT INSTRUCTIONS
Continue Lasix 80 mg daily for total of 4-5 days.   Will have office RN touch base on Friday for update.     Please weigh yourself every day (after emptying your bladder) and keep a detailed log of weights.   Contact the Heart Failure program at [phone] if you gain 3+ lbs overnight or 5+ lbs in 5-7 days.  Limit daily sodium/salt intake to 2000 mg daily to prevent fluid retention.  Avoid canned foods, fast food/Chinese food, and processed meats (hot dogs, lunch meat, and sausage etc.). Caution with condiments.  Limit fluid intake to 2000 mL or 2 liters (about 60-65 ounces) daily.  Avoid electrolyte replacement drinks (such as Gatorade, Pedialyte, Propel, Liquid IV, etc.).  Bring complete list of medications and log of daily weights to your follow-up appointment.

## 2024-09-12 DIAGNOSIS — E10.65 TYPE 1 DIABETES MELLITUS WITH HYPERGLYCEMIA (HCC): ICD-10-CM

## 2024-09-13 RX ORDER — INSULIN LISPRO 100 [IU]/ML
INJECTION, SOLUTION INTRAVENOUS; SUBCUTANEOUS
Qty: 15 ML | Refills: 5 | Status: ON HOLD | OUTPATIENT
Start: 2024-09-13

## 2024-09-13 RX ORDER — TORSEMIDE 20 MG/1
TABLET ORAL
Qty: 183 TABLET | Refills: 0 | Status: SHIPPED | OUTPATIENT
Start: 2024-09-13 | End: 2024-09-18

## 2024-09-13 NOTE — TELEPHONE ENCOUNTER
Caller: Vernell Darby     Doctor: Dr. Whitaker    Call back #: [phone]    Reason for call: Patient called back to update clinical team per Demetra. Please call her back to check in with her. Thank you.

## 2024-09-13 NOTE — TELEPHONE ENCOUNTER
Returned call to patient, & read her Demetra Gutierrez PA-C orders & instructions.    Patient verbalized understanding.    Will C/B patient Monday, 9/16 for update on symptoms & weight.

## 2024-09-13 NOTE — TELEPHONE ENCOUNTER
Returned call to patient who stated she had increased the daily Lasix dose to 80 mg daily, starting on Monday evening, 9/9, through Thursday, 9/12.    Patient stated she has not really seen or felt a difference in PRICE or her abdominal bloating.  Stated she has not seen an increase in urine output, even with the increased dose of  Lasix 80 mg daily.    Stated she has been sleeping on the sofa with her head elevated this week due to wheezing when lying in bed.    Stated she feels very tired this week & has been sleeping a lot & has low energy.    Today's Wt - 189 lb 2 oz  9/12 No Weight  9/11 OV Wt - 190 lb 3.2 oz    Stated she does not have a weight from yesterday. Reinforced with patient your OV instructions of daily weights & keeping a detailed log of weights.    Educated & reviewed 2000 mg daily diet, foods high in sodium, etc. Patient stated she is aware of high sodium foods & has been following 2000 mg sodium diet. Also stated she is following fluid restriction of 60 -65 oz of fluid/day.    Stated if a new diuretic is ordered, to please send it to [employer] before 4 PM, since her  works there until 4 PM.    Please advise.

## 2024-09-16 NOTE — TELEPHONE ENCOUNTER
Caller: Vernell Darby    Provide: Demetra Johnson    Call back #: [phone]    Reason for call: Patient called to speak to Deidre Gr regarding a health update. Patient requested a call back from Deidre when she is available.

## 2024-09-17 ENCOUNTER — TELEPHONE (OUTPATIENT)
Age: 49
End: 2024-09-17

## 2024-09-17 ENCOUNTER — OFFICE VISIT (OUTPATIENT)
Dept: FAMILY MEDICINE CLINIC | Facility: CLINIC | Age: 49
End: 2024-09-17
Payer: COMMERCIAL

## 2024-09-17 VITALS
WEIGHT: 182.4 LBS | SYSTOLIC BLOOD PRESSURE: 122 MMHG | HEIGHT: 56 IN | HEART RATE: 87 BPM | RESPIRATION RATE: 18 BRPM | BODY MASS INDEX: 41.03 KG/M2 | TEMPERATURE: 97.7 F | OXYGEN SATURATION: 98 % | DIASTOLIC BLOOD PRESSURE: 70 MMHG

## 2024-09-17 DIAGNOSIS — R14.0 ABDOMINAL BLOATING: ICD-10-CM

## 2024-09-17 DIAGNOSIS — K52.9 GASTROENTERITIS: ICD-10-CM

## 2024-09-17 DIAGNOSIS — R19.7 INTERMITTENT DIARRHEA: Primary | ICD-10-CM

## 2024-09-17 PROCEDURE — 99214 OFFICE O/P EST MOD 30 MIN: CPT | Performed by: STUDENT IN AN ORGANIZED HEALTH CARE EDUCATION/TRAINING PROGRAM

## 2024-09-17 RX ORDER — DICYCLOMINE HCL 20 MG
20 TABLET ORAL 2 TIMES DAILY PRN
Qty: 14 TABLET | Refills: 0 | Status: SHIPPED | OUTPATIENT
Start: 2024-09-17 | End: 2024-09-24

## 2024-09-17 NOTE — TELEPHONE ENCOUNTER
Pt called to give Deidre an update- she is still experiencing SOB  Pt took Torsemide 60 mg for 3 days then 40 mg. The 40 mg started today.  She experienced difficulty sleeping last night  Pt did have a positive output and weight loss over the weekend  Was 190 lbs, yesterday 179.6 and this morning 181 lbs  She is also reporting diarrhea and soft stools, and stomach distress  Pt was to return to work today, but didn't go in and asking for an extension.   Pt hoping to get a call back this morning.   C/b# [phone]

## 2024-09-17 NOTE — PROGRESS NOTES
General Cardiology Outpatient Visit    Vernell Darby 49 y.o. female   MRN: [id number]  Encounter: [id number]    Assessment:  Patient Active Problem List    Diagnosis Date Noted    Diabetic polyneuropathy associated with type 1 diabetes mellitus (HCC) 08/12/2020    Diabetic nephropathy associated with type 1 diabetes mellitus (HCC) 08/12/2020    Other proteinuria 07/21/2020    Severe obesity (BMI 35.0-39.9) with comorbidity (HCC) 08/10/2019    Gastroesophageal reflux disease without esophagitis 08/10/2019    Asthma, mild persistent 10/25/2017    Anemia 02/15/2017    Thrombocytosis 02/15/2017    Type 1 diabetes mellitus with hyperglycemia (HCC) 02/03/2016    Hyperlipidemia 02/03/2016    Polycystic ovarian syndrome 02/03/2016    Retinal hemorrhage 02/03/2016    Asthma     Hypertension     Moderate episode of recurrent major depressive disorder (HCC)     Nervousness 07/10/2014    Both eyes affected 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, associated with type 1 diabetes mellitus (HCC) 03/22/2012    Stage 2 chronic kidney disease 07/26/2024    Hyperphosphatemia 07/26/2024    Hyponatremia 04/06/2023    Primary localized osteoarthritis of right knee 10/27/2022    Chronic diastolic congestive heart failure (HCC) 09/01/2021    B12 deficiency 01/13/2021    Nuclear sclerotic cataract of left eye 09/11/2020       Today's Plan:  Down 9 lbs since last visit. Volume remains mildly up on exam today. Continue current torsemide dose.   Denies any significant improvement in PRICE despite diuresis. Will check CTA chest/PE study given PRICE and recent air travel. If unremarkable, would defer further work-up to patient's PCP.   Will complete time off accomodation form for patient's job to cover time missed (estimate total of 6 weeks).     Plan:  Chronic HFpEF; LVEF 65%   Weight of 190 lbs on 09/11. Today, weighs 181 lbs.    Most recent BMP from 09/10/2024: sodium 139; potassium 3.9; BUN 31; creatinine 0.78; eGFR  89.    Pharmacotherapies:  --Aldosterone Antagonist: No.  --SGLT2 Inhibitor: No, DM1.     Volume Management:  --Diuretic: torsemide 40 mg daily.    Hypertension   BP of 124/68 mmHg in office today.   Continues on lisinopril 40 mg daily.    Diabetes mellitus, type I  Hyperlipidemia  Endometriosis  Depression    HPI:   Vernell Darby is a 49-year-old woman with a PMH as above who presents to the office for ED follow-up. Follows with Dr. EMY Whitaker.     Presented to [facility] ED on 09/10/2024 with SOB. /75 mmHg. COVID, flu, and RSV negative. Troponins WNL; BNP 91. Received DuoNeb in ED. CXR with mild increased vascular congestion. Was advised to double home Lasix dose for 2 days, and discharged home.    09/11/2024: Patient presents with her  for acute visit. Primary complaint today of PRICE, mild abdominal bloating, and mild bilateral LE swelling. Recently returned from vacation [place] in [facility] and does endorse increased sodium intake. Soon after returning home, she began to note PRICE. On 9/9 she doubled Lasix on her own. Has not yet noted a significant increase in urine output with this higher dose.    Due to lackluster response, patient switched to torsemide on 09/13. Advised to take 60 mg for 3 days and then reduce to 40 mg daily.    09/18/2024: Patient presents with her  for follow-up. Down 9 lbs since last visit. LE swelling essentially gone. Continues with PRICE; reports minimal improvement in this. Requesting time off accomodation form be completed for job. 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 Started working there about 2 months ago.     Past Medical History:   Diagnosis Date    Anemia     Arthritis     Asthma     w/acute exacerbation. Last assessed: 10/26/12    Chest pain 02/03/2016    CHF (congestive heart failure) (HCC)     Depression     Diabetes mellitus (HCC)     Diabetic nephropathy (HCC)     Diabetic  nephropathy associated with type 1 diabetes mellitus ([facility]) 08/12/2020    Diabetic retinopathy ([facility]) 02/03/2016    Endometriosis     Gastroenteritis 02/03/2016    GERD (gastroesophageal reflux disease)     History of COVID-19 02/26/2021    Hyperlipidemia 02/03/2016    Hypertension     Obesity     Oligomenorrhea     Polycystic ovaries 02/03/2016    Retinal hemorrhage 02/03/2016    Retinopathy     Thrombocytosis     Type 1 diabetes mellitus with ophthalmic manifestation ([facility]) 02/03/2016     Review of Systems   Constitutional:  Negative for activity change, appetite change, fatigue and unexpected weight change.   Respiratory:  Positive for shortness of breath. Negative for cough and chest tightness.    Cardiovascular:  Negative for chest pain, palpitations and leg swelling.   Gastrointestinal:  Negative for abdominal distention, abdominal pain, constipation and nausea.   Genitourinary:  Negative for decreased urine volume, dysuria and urgency.   Musculoskeletal: Negative.    Skin: Negative.    Neurological:  Negative for dizziness, syncope, weakness and light-headedness.   Psychiatric/Behavioral:  Negative for confusion and sleep disturbance. The patient is not nervous/anxious.        No Known Allergies      Current Outpatient Medications:     acetone, urine, test strip, Use to test urine ketones for high blood sugars., Disp: 25 each, Rfl: 6    albuterol (2.5 mg/3 mL) 0.083 % nebulizer solution, INHALE CONTENTS OF 1 VIAL BY NEBULIZER EVERY 6 HOURS AS NEEDED FOR WHEEZING OR FOR SHORTNESS OF BREATH., Disp: 75 mL, Rfl: 3    albuterol (PROVENTIL HFA,VENTOLIN HFA) 90 mcg/act inhaler, INHALE 2 PUFFS BY MOUTH EVERY 4 HOURS AS NEEDED FOR WHEEZING OR FOR SHORTNESS OF BREATH, Disp: 25.5 g, Rfl: 0    Ascorbic Acid (VITAMIN C) 500 MG CAPS, Take 2 capsules by mouth daily, Disp: , Rfl:     Aspirin Low Dose 81 MG EC tablet, TAKE 1 TABLET BY MOUTH EVERY DAY, Disp: 30 tablet, Rfl: 0    atorvastatin (LIPITOR) 80 mg tablet, TAKE 1 TABLET  BY MOUTH EVERY DAY, Disp: 30 tablet, Rfl: 11    Continuous Blood Gluc Sensor (Dexcom G7 Sensor), Use 1 Device every 10 days, Disp: 3 each, Rfl: 11    cyanocobalamin (VITAMIN B-12) 100 mcg tablet, Take by mouth daily, Disp: , Rfl:     dicyclomine (BENTYL) 20 mg tablet, Take 1 tablet (20 mg total) by mouth 2 (two) times a day as needed (abdominal cramps) for up to 7 days, Disp: 14 tablet, Rfl: 0    Fluticasone-Salmeterol (Advair) 250-50 mcg/dose inhaler, INHALE 1 PUFF BY MOUTH TWO TIMES DAILY RINSE MOUTH AFTER USE, Disp: 60 blister, Rfl: 5    glucagon (Glucagon Emergency) 1 MG injection, Inject 1 mg under the skin once as needed for low blood sugar for up to 1 dose, Disp: 1 kit, Rfl: 3    Glucagon (Gvoke HypoPen 2-Pack) 1 MG/0.2ML SOAJ, Use if hypoglycemia prn, Disp: 1 Syringe, Rfl: 6    insulin aspart, w/niacinamide, (Fiasp FlexTouch) 100 Units/mL injection pen, Inject 1u:2c with breakfast, 1u:1c with lunch, 1.5u:1c with dinner, up to 150 units daily, Disp: 60 mL, Rfl: 3    insulin glargine (Toujeo SoloStar) 300 units/mL CONCENTRATED U-300 injection pen (1-unit dial), Inject 95 Units under the skin daily, Disp: 13.5 mL, Rfl: 6    insulin lispro (HumaLOG) 100 units/mL injection pen, INJECT PER SLIDING SCALE OF 1UNIT PER 2 CARBS WITH BREAKFAST, 1 UNIT PER 1 CARB WITH LUNCH, 1.5 UNITS PER 1CARB WITH DINNER. MAXIMUM DOSE 150 UNITS DAILY., Disp: 15 mL, Rfl: 5    Insulin Pen Needle 33G X 4 MM MISC, Use to inject insulin 4 times a day, Disp: 400 each, Rfl: 2    lisinopril (ZESTRIL) 20 mg tablet, TAKE 2 TABLETS BY MOUTH EVERY DAY, Disp: 180 tablet, Rfl: 1    metFORMIN (GLUCOPHAGE-XR) 500 mg 24 hr tablet, Take 1 tablet (500 mg total) by mouth 2 (two) times a day with meals (Patient taking differently: Take 500 mg by mouth daily), Disp: 180 tablet, Rfl: 1    montelukast (SINGULAIR) 10 mg tablet, TAKE 1 TABLET BY MOUTH AT BEDTIME, Disp: 90 tablet, Rfl: 1    omeprazole (PriLOSEC) 20 mg delayed release capsule, Take 20 mg by  mouth daily., Disp: , Rfl:     PARoxetine (PAXIL) 20 mg tablet, TAKE 1 TABLET BY MOUTH EVERY DAY, Disp: 90 tablet, Rfl: 1    Probiotic Product (ALIGN PO), Take by mouth, Disp: , Rfl:     torsemide (DEMADEX) 20 mg tablet, Take 3 tablets (60 mg total) by mouth daily for 3 days, THEN 2 tablets (40 mg total) daily., Disp: 183 tablet, Rfl: 0    OneTouch Ultra test strip, Test 3 times daily (Patient not taking: Reported on 9/11/2024), Disp: 300 each, Rfl: 3    Social History     Socioeconomic History    Marital status: /Civil Union     Spouse name: Not on file    Number of children: 0    Years of education: Not on file    Highest education level: High school graduate   Occupational History    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 Quit in 1994   Vaping Use    Vaping status: Never Used   Substance and Sexual Activity    Alcohol use: Yes     Alcohol/week: 2.0 standard drinks of alcohol     Types: 2 Standard drinks or equivalent per week     Comment: Occasionally/ sicially    Drug use: No    Sexual activity: Yes     Partners: Male     Birth control/protection: None   Other Topics Concern    Not on file   Social History Narrative    Always uses seatbelt    Caffeine use: 1-2 cups coffee daily    Has smoke detectors    [employer]: [employer]        Currently unemployed; was previously doing home care; continuing job search     Social Determinants of Health     Financial Resource Strain: Not on file   Food Insecurity: Not on file   Transportation Needs: Not on file   Physical Activity: Not on file   Stress: Not on file   Social Connections: Not on file   Intimate Partner Violence: Not on file   Housing Stability: Not on file     Family History   Problem Relation Age of Onset    Heart murmur Mother     Diabetes Mother         Mellitus    Thyroid disease Mother         Disorder    Diabetes type II  "Mother     Diabetes Father         Mellitus    Hypertension Father     Diabetes type II Father     No Known Problems Maternal Grandmother     Diabetes Maternal Grandfather         Mellitus    Breast cancer Paternal Grandmother 70 - 79    Leukemia Paternal Grandfather 48    Diabetes Maternal Aunt         Mellitus    Diabetes Maternal Uncle         Mellitus    Substance Abuse Neg Hx     Mental illness Neg Hx     Alcohol abuse Neg Hx        Vitals:   Blood pressure 124/68, pulse 86, height 4' 8\" (1.422 m), weight 82.2 kg (181 lb 3.5 oz), SpO2 99%, not currently breastfeeding.    Wt Readings from Last 10 Encounters:   09/18/24 82.2 kg (181 lb 3.5 oz)   09/17/24 82.7 kg (182 lb 6.4 oz)   09/11/24 86.3 kg (190 lb 3.2 oz)   08/02/24 86.2 kg (190 lb)   08/01/24 86.1 kg (189 lb 12.8 oz)   07/26/24 86.6 kg (191 lb)   07/16/24 85.7 kg (189 lb)   05/21/24 83.9 kg (185 lb)   04/11/24 88 kg (194 lb)   12/19/23 87.1 kg (192 lb)     Vitals:    09/18/24 1629   BP: 124/68   BP Location: Left arm   Patient Position: Sitting   Cuff Size: Large   Pulse: 86   SpO2: 99%   Weight: 82.2 kg (181 lb 3.5 oz)   Height: 4' 8\" (1.422 m)       Physical Exam  Vitals reviewed.   Constitutional:       General: She is awake. She is not in acute distress.     Appearance: Normal appearance. She is well-developed and overweight. She is not toxic-appearing or diaphoretic.   HENT:      Head: Normocephalic.      Nose: Nose normal.      Mouth/Throat:      Mouth: Mucous membranes are moist.   Eyes:      General: No scleral icterus.     Conjunctiva/sclera: Conjunctivae normal.   Neck:      Vascular: No JVD.   Cardiovascular:      Rate and Rhythm: Normal rate and regular rhythm. No extrasystoles are present.  Pulmonary:      Effort: Pulmonary effort is normal. No tachypnea, bradypnea, accessory muscle usage or respiratory distress.      Breath sounds: Decreased air movement present. No wheezing.   Abdominal:      Palpations: Abdomen is soft.      Tenderness: " There is no abdominal tenderness.   Musculoskeletal:      Cervical back: Neck supple.      Right lower leg: No edema.      Left lower leg: No edema.   Skin:     General: Skin is warm and dry.      Coloration: Skin is not jaundiced or pale.   Neurological:      General: No focal deficit present.      Mental Status: She is alert and oriented to person, place, and time.   Psychiatric:         Attention and Perception: Attention normal.         Mood and Affect: Mood is anxious.         Speech: Speech normal.         Behavior: Behavior is cooperative.       Labs & Results:  Lab Results   Component Value Date    WBC 12.35 (H) 09/10/2024    HGB 9.8 (L) 09/10/2024    HCT 30.8 (L) 09/10/2024    MCV 93 09/10/2024     (H) 09/10/2024     Lab Results   Component Value Date    SODIUM 139 09/10/2024    K 3.9 09/10/2024     09/10/2024    CO2 29 09/10/2024    BUN 31 (H) 09/10/2024    CREATININE 0.78 09/10/2024    GLUC 129 09/10/2024    CALCIUM 8.4 09/10/2024     Lab Results   Component Value Date    INR 0.98 08/07/2020    PROTIME 12.9 08/07/2020     Lab Results   Component Value Date    BNP 91 09/10/2024      Demetra Gutierrez PA-C

## 2024-09-18 ENCOUNTER — OFFICE VISIT (OUTPATIENT)
Dept: CARDIOLOGY CLINIC | Facility: CLINIC | Age: 49
End: 2024-09-18
Payer: COMMERCIAL

## 2024-09-18 VITALS
HEIGHT: 56 IN | BODY MASS INDEX: 40.77 KG/M2 | SYSTOLIC BLOOD PRESSURE: 124 MMHG | DIASTOLIC BLOOD PRESSURE: 68 MMHG | OXYGEN SATURATION: 99 % | WEIGHT: 181.22 LBS | HEART RATE: 86 BPM

## 2024-09-18 DIAGNOSIS — R06.09 DYSPNEA ON EXERTION: ICD-10-CM

## 2024-09-18 DIAGNOSIS — I50.32 CHRONIC HEART FAILURE WITH PRESERVED EJECTION FRACTION (HFPEF) (HCC): Primary | ICD-10-CM

## 2024-09-18 DIAGNOSIS — Z78.9 RECENT TRAVEL ON AIRCRAFT: ICD-10-CM

## 2024-09-18 PROCEDURE — 99214 OFFICE O/P EST MOD 30 MIN: CPT | Performed by: PHYSICIAN ASSISTANT

## 2024-09-18 RX ORDER — TORSEMIDE 20 MG/1
40 TABLET ORAL DAILY
Start: 2024-09-18 | End: 2024-09-28

## 2024-09-19 ENCOUNTER — DOCUMENTATION (OUTPATIENT)
Dept: CARDIOLOGY CLINIC | Facility: CLINIC | Age: 49
End: 2024-09-19

## 2024-09-20 ENCOUNTER — HOSPITAL ENCOUNTER (OUTPATIENT)
Dept: RADIOLOGY | Facility: HOSPITAL | Age: 49
Discharge: HOME | End: 2024-09-20
Payer: COMMERCIAL

## 2024-09-20 ENCOUNTER — TELEPHONE (OUTPATIENT)
Age: 49
End: 2024-09-20

## 2024-09-20 DIAGNOSIS — R06.09 DYSPNEA ON EXERTION: ICD-10-CM

## 2024-09-20 DIAGNOSIS — Z78.9 RECENT TRAVEL ON AIRCRAFT: ICD-10-CM

## 2024-09-20 PROCEDURE — 71275 CT ANGIOGRAPHY CHEST: CPT

## 2024-09-20 RX ADMIN — IOHEXOL 85 ML: 350 INJECTION, SOLUTION INTRAVENOUS at 11:28

## 2024-09-20 NOTE — TELEPHONE ENCOUNTER
Caller: Vernell Darby    Doctor: Tanesha    Reason for call: Pt received note regarding the Nuclear Stress Test and would like to move forward with getting that scheduled.  Please submit order.    Call back#: [phone]

## 2024-09-20 NOTE — PROGRESS NOTES
Ambulatory Visit  Name: Vernell Darby      : 1975      MRN: [id number]  Encounter Provider: Rika Nugent MD  Encounter Date: 2024   Encounter department: [facility]    Assessment & Plan  Gastroenteritis  Discussed with patient to follow a bland diet to help with symptoms, increase hydration along with electrolytes  Prescription for Bentyl provided to help with abdominal cramps may take as needed  Orders:    dicyclomine (BENTYL) 20 mg tablet; Take 1 tablet (20 mg total) by mouth 2 (two) times a day as needed (abdominal cramps) for up to 7 days    Intermittent diarrhea  Given patient's abdominal bloating and intermittent diarrhea occurring over the last few months will refer to GI for further evaluation  Orders:    Ambulatory Referral to Gastroenterology; Future    Abdominal bloating    Orders:    Ambulatory Referral to Gastroenterology; Future       History of Present Illness     Mohini is a 49-year-old female who presents to the office today for an acute visit.  Patient reports over the last week she has noted softer stools initially started with diarrhea and now it has becoming a little more solid but still soft.  Patient denies any changes in diet, no recent illnesses, no fever.  Has been taking Pepto with minimal relief.  Patient reports abdominal bloating symptoms have been on and off over the last few months with gastroenteritis also occurring almost monthly.  Patient request referral for specialist.      History obtained from : patient  Review of Systems   Constitutional:  Negative for appetite change and fever.   HENT:  Negative for congestion.    Respiratory:  Negative for chest tightness.    Gastrointestinal:  Positive for abdominal pain and diarrhea.   Genitourinary:  Negative for dysuria.     Past Medical History   Past Medical History:   Diagnosis Date    Anemia     Arthritis     Asthma     w/acute exacerbation. Last assessed: 10/26/12    Chest  pain 02/03/2016    CHF (congestive heart failure) (HCC)     Depression     Diabetes mellitus (HCC)     Diabetic nephropathy (HCC)     Diabetic nephropathy associated with type 1 diabetes mellitus (HCC) 08/12/2020    Diabetic retinopathy (HCC) 02/03/2016    Endometriosis     Gastroenteritis 02/03/2016    GERD (gastroesophageal reflux disease)     History of COVID-19 02/26/2021    Hyperlipidemia 02/03/2016    Hypertension     Obesity     Oligomenorrhea     Polycystic ovaries 02/03/2016    Retinal hemorrhage 02/03/2016    Retinopathy     Thrombocytosis     Type 1 diabetes mellitus with ophthalmic manifestation ([facility]) 02/03/2016     Past Surgical History:   Procedure Laterality Date    CATARACT EXTRACTION Left 10/2020    CATARACT EXTRACTION Right     RETINAL LASER PROCEDURE       Family History   Problem Relation Age of Onset    Heart murmur Mother     Diabetes Mother         Mellitus    Thyroid disease Mother         Disorder    Diabetes type II Mother     Diabetes Father         Mellitus    Hypertension Father     Diabetes type II Father     No Known Problems Maternal Grandmother     Diabetes Maternal Grandfather         Mellitus    Breast cancer Paternal Grandmother 70 - 79    Leukemia Paternal Grandfather 48    Diabetes Maternal Aunt         Mellitus    Diabetes Maternal Uncle         Mellitus    Substance Abuse Neg Hx     Mental illness Neg Hx     Alcohol abuse Neg Hx      Current Outpatient Medications on File Prior to Visit   Medication Sig Dispense Refill    acetone, urine, test strip Use to test urine ketones for high blood sugars. 25 each 6    albuterol (2.5 mg/3 mL) 0.083 % nebulizer solution INHALE CONTENTS OF 1 VIAL BY NEBULIZER EVERY 6 HOURS AS NEEDED FOR WHEEZING OR FOR SHORTNESS OF BREATH. 75 mL 3    albuterol (PROVENTIL HFA,VENTOLIN HFA) 90 mcg/act inhaler INHALE 2 PUFFS BY MOUTH EVERY 4 HOURS AS NEEDED FOR WHEEZING OR FOR SHORTNESS OF BREATH 25.5 g 0    Ascorbic Acid (VITAMIN C) 500 MG CAPS Take 2  capsules by mouth daily      Aspirin Low Dose 81 MG EC tablet TAKE 1 TABLET BY MOUTH EVERY DAY 30 tablet 0    atorvastatin (LIPITOR) 80 mg tablet TAKE 1 TABLET BY MOUTH EVERY DAY 30 tablet 11    Continuous Blood Gluc Sensor (Dexcom G7 Sensor) Use 1 Device every 10 days 3 each 11    cyanocobalamin (VITAMIN B-12) 100 mcg tablet Take by mouth daily      Fluticasone-Salmeterol (Advair) 250-50 mcg/dose inhaler INHALE 1 PUFF BY MOUTH TWO TIMES DAILY RINSE MOUTH AFTER USE 60 blister 5    glucagon (Glucagon Emergency) 1 MG injection Inject 1 mg under the skin once as needed for low blood sugar for up to 1 dose 1 kit 3    Glucagon (Gvoke HypoPen 2-Pack) 1 MG/0.2ML SOAJ Use if hypoglycemia prn 1 Syringe 6    insulin aspart, w/niacinamide, (Fiasp FlexTouch) 100 Units/mL injection pen Inject 1u:2c with breakfast, 1u:1c with lunch, 1.5u:1c with dinner, up to 150 units daily 60 mL 3    insulin glargine (Toujeo SoloStar) 300 units/mL CONCENTRATED U-300 injection pen (1-unit dial) Inject 95 Units under the skin daily 13.5 mL 6    insulin lispro (HumaLOG) 100 units/mL injection pen INJECT PER SLIDING SCALE OF 1UNIT PER 2 CARBS WITH BREAKFAST, 1 UNIT PER 1 CARB WITH LUNCH, 1.5 UNITS PER 1CARB WITH DINNER. MAXIMUM DOSE 150 UNITS DAILY. 15 mL 5    Insulin Pen Needle 33G X 4 MM MISC Use to inject insulin 4 times a day 400 each 2    lisinopril (ZESTRIL) 20 mg tablet TAKE 2 TABLETS BY MOUTH EVERY  tablet 1    metFORMIN (GLUCOPHAGE-XR) 500 mg 24 hr tablet Take 1 tablet (500 mg total) by mouth 2 (two) times a day with meals (Patient taking differently: Take 500 mg by mouth daily) 180 tablet 1    montelukast (SINGULAIR) 10 mg tablet TAKE 1 TABLET BY MOUTH AT BEDTIME 90 tablet 1    omeprazole (PriLOSEC) 20 mg delayed release capsule Take 20 mg by mouth daily.      PARoxetine (PAXIL) 20 mg tablet TAKE 1 TABLET BY MOUTH EVERY DAY 90 tablet 1    Probiotic Product (ALIGN PO) Take by mouth      OneTouch Ultra test strip Test 3 times daily  (Patient not taking: Reported on 9/11/2024) 300 each 3     No current facility-administered medications on file prior to visit.   No Known Allergies   Current Outpatient Medications on File Prior to Visit   Medication Sig Dispense Refill    acetone, urine, test strip Use to test urine ketones for high blood sugars. 25 each 6    albuterol (2.5 mg/3 mL) 0.083 % nebulizer solution INHALE CONTENTS OF 1 VIAL BY NEBULIZER EVERY 6 HOURS AS NEEDED FOR WHEEZING OR FOR SHORTNESS OF BREATH. 75 mL 3    albuterol (PROVENTIL HFA,VENTOLIN HFA) 90 mcg/act inhaler INHALE 2 PUFFS BY MOUTH EVERY 4 HOURS AS NEEDED FOR WHEEZING OR FOR SHORTNESS OF BREATH 25.5 g 0    Ascorbic Acid (VITAMIN C) 500 MG CAPS Take 2 capsules by mouth daily      Aspirin Low Dose 81 MG EC tablet TAKE 1 TABLET BY MOUTH EVERY DAY 30 tablet 0    atorvastatin (LIPITOR) 80 mg tablet TAKE 1 TABLET BY MOUTH EVERY DAY 30 tablet 11    Continuous Blood Gluc Sensor (Dexcom G7 Sensor) Use 1 Device every 10 days 3 each 11    cyanocobalamin (VITAMIN B-12) 100 mcg tablet Take by mouth daily      Fluticasone-Salmeterol (Advair) 250-50 mcg/dose inhaler INHALE 1 PUFF BY MOUTH TWO TIMES DAILY RINSE MOUTH AFTER USE 60 blister 5    glucagon (Glucagon Emergency) 1 MG injection Inject 1 mg under the skin once as needed for low blood sugar for up to 1 dose 1 kit 3    Glucagon (Gvoke HypoPen 2-Pack) 1 MG/0.2ML SOAJ Use if hypoglycemia prn 1 Syringe 6    insulin aspart, w/niacinamide, (Fiasp FlexTouch) 100 Units/mL injection pen Inject 1u:2c with breakfast, 1u:1c with lunch, 1.5u:1c with dinner, up to 150 units daily 60 mL 3    insulin glargine (Toujeo SoloStar) 300 units/mL CONCENTRATED U-300 injection pen (1-unit dial) Inject 95 Units under the skin daily 13.5 mL 6    insulin lispro (HumaLOG) 100 units/mL injection pen INJECT PER SLIDING SCALE OF 1UNIT PER 2 CARBS WITH BREAKFAST, 1 UNIT PER 1 CARB WITH LUNCH, 1.5 UNITS PER 1CARB WITH DINNER. MAXIMUM DOSE 150 UNITS DAILY. 15 mL 5     "Insulin Pen Needle 33G X 4 MM MISC Use to inject insulin 4 times a day 400 each 2    lisinopril (ZESTRIL) 20 mg tablet TAKE 2 TABLETS BY MOUTH EVERY  tablet 1    metFORMIN (GLUCOPHAGE-XR) 500 mg 24 hr tablet Take 1 tablet (500 mg total) by mouth 2 (two) times a day with meals (Patient taking differently: Take 500 mg by mouth daily) 180 tablet 1    montelukast (SINGULAIR) 10 mg tablet TAKE 1 TABLET BY MOUTH AT BEDTIME 90 tablet 1    omeprazole (PriLOSEC) 20 mg delayed release capsule Take 20 mg by mouth daily.      PARoxetine (PAXIL) 20 mg tablet TAKE 1 TABLET BY MOUTH EVERY DAY 90 tablet 1    Probiotic Product (ALIGN PO) Take by mouth      OneTouch Ultra test strip Test 3 times daily (Patient not taking: Reported on 9/11/2024) 300 each 3     No current facility-administered medications on file prior to visit.      Social History     Tobacco Use    Smoking status: Never    Smokeless tobacco: Never    Tobacco comments:     Per [employer]: Former smoker. Quit in 1994   Vaping Use    Vaping status: Never Used   Substance and Sexual Activity    Alcohol use: Yes     Alcohol/week: 2.0 standard drinks of alcohol     Types: 2 Standard drinks or equivalent per week     Comment: Occasionally/ sicially    Drug use: No    Sexual activity: Yes     Partners: Male     Birth control/protection: None         Objective     /70   Pulse 87   Temp 97.7 °F (36.5 °C)   Resp 18   Ht 4' 8\" (1.422 m)   Wt 82.7 kg (182 lb 6.4 oz)   SpO2 98%   BMI 40.89 kg/m²     Physical Exam  Vitals reviewed.   HENT:      Right Ear: Tympanic membrane and ear canal normal.      Left Ear: Tympanic membrane and ear canal normal.   Cardiovascular:      Rate and Rhythm: Normal rate.      Pulses: Normal pulses.   Pulmonary:      Effort: Pulmonary effort is normal.   Abdominal:      General: Bowel sounds are normal.      Palpations: Abdomen is soft.      Tenderness: There is no abdominal tenderness.   Neurological:      Mental Status: She is " alert.   Psychiatric:         Mood and Affect: Mood normal.       Administrative Statements   I have spent a total time of 30 minutes in caring for this patient on the day of the visit/encounter including Risks and benefits of tx options, Patient and family education, Risk factor reductions, Impressions, Documenting in the medical record, and Obtaining or reviewing history  .

## 2024-09-22 ENCOUNTER — HOSPITAL ENCOUNTER (INPATIENT)
Facility: HOSPITAL | Age: 49
LOS: 6 days | Discharge: HOME | DRG: 637 | End: 2024-09-28
Attending: EMERGENCY MEDICINE | Admitting: INTERNAL MEDICINE
Payer: COMMERCIAL

## 2024-09-22 ENCOUNTER — APPOINTMENT (EMERGENCY)
Dept: RADIOLOGY | Facility: HOSPITAL | Age: 49
DRG: 637 | End: 2024-09-22
Payer: COMMERCIAL

## 2024-09-22 ENCOUNTER — APPOINTMENT (EMERGENCY)
Dept: CT IMAGING | Facility: HOSPITAL | Age: 49
DRG: 637 | End: 2024-09-22
Payer: COMMERCIAL

## 2024-09-22 DIAGNOSIS — I48.0 PAF (PAROXYSMAL ATRIAL FIBRILLATION) (HCC): ICD-10-CM

## 2024-09-22 DIAGNOSIS — I48.91 ATRIAL FIB/FLUTTER, TRANSIENT (HCC): ICD-10-CM

## 2024-09-22 DIAGNOSIS — R73.9 HYPERGLYCEMIA: Primary | ICD-10-CM

## 2024-09-22 DIAGNOSIS — I48.92 ATRIAL FIB/FLUTTER, TRANSIENT (HCC): ICD-10-CM

## 2024-09-22 DIAGNOSIS — N17.9 AKI (ACUTE KIDNEY INJURY) (HCC): ICD-10-CM

## 2024-09-22 DIAGNOSIS — R11.10 VOMITING: ICD-10-CM

## 2024-09-22 DIAGNOSIS — N19 RENAL FAILURE: ICD-10-CM

## 2024-09-22 DIAGNOSIS — E10.65 TYPE 1 DIABETES MELLITUS WITH HYPERGLYCEMIA (HCC): ICD-10-CM

## 2024-09-22 PROBLEM — J02.9 SORE THROAT: Status: ACTIVE | Noted: 2024-09-22

## 2024-09-22 LAB
2HR DELTA HS TROPONIN: 1 NG/L
ALBUMIN SERPL BCG-MCNC: 4 G/DL (ref 3.5–5)
ALP SERPL-CCNC: 124 U/L (ref 34–104)
ALT SERPL W P-5'-P-CCNC: 14 U/L (ref 7–52)
ANION GAP SERPL CALCULATED.3IONS-SCNC: 17 MMOL/L (ref 4–13)
ANION GAP SERPL CALCULATED.3IONS-SCNC: 19 MMOL/L (ref 4–13)
AST SERPL W P-5'-P-CCNC: 9 U/L (ref 13–39)
B-OH-BUTYR SERPL-MCNC: 1.33 MMOL/L (ref 0.02–0.27)
BASE EXCESS BLDA CALC-SCNC: 1 MMOL/L (ref -2–3)
BASE EXCESS BLDA CALC-SCNC: 4 MMOL/L (ref -2–3)
BILIRUB SERPL-MCNC: 0.29 MG/DL (ref 0.2–1)
BNP SERPL-MCNC: 663 PG/ML (ref 0–100)
BUN SERPL-MCNC: 112 MG/DL (ref 5–25)
BUN SERPL-MCNC: 114 MG/DL (ref 5–25)
CA-I BLD-SCNC: 0.96 MMOL/L (ref 1.12–1.32)
CA-I BLD-SCNC: 1.02 MMOL/L (ref 1.12–1.32)
CALCIUM SERPL-MCNC: 8.5 MG/DL (ref 8.4–10.2)
CALCIUM SERPL-MCNC: 9 MG/DL (ref 8.4–10.2)
CARDIAC TROPONIN I PNL SERPL HS: 21 NG/L
CARDIAC TROPONIN I PNL SERPL HS: 22 NG/L
CHLORIDE SERPL-SCNC: 81 MMOL/L (ref 96–108)
CHLORIDE SERPL-SCNC: 85 MMOL/L (ref 96–108)
CO2 SERPL-SCNC: 26 MMOL/L (ref 21–32)
CO2 SERPL-SCNC: 27 MMOL/L (ref 21–32)
CREAT SERPL-MCNC: 7.53 MG/DL (ref 0.6–1.3)
CREAT SERPL-MCNC: 7.53 MG/DL (ref 0.6–1.3)
ERYTHROCYTE [DISTWIDTH] IN BLOOD BY AUTOMATED COUNT: 12.2 % (ref 11.6–15.1)
FLUAV AG UPPER RESP QL IA.RAPID: NEGATIVE
FLUBV AG UPPER RESP QL IA.RAPID: NEGATIVE
GFR SERPL CREATININE-BSD FRML MDRD: 5 ML/MIN/1.73SQ M
GFR SERPL CREATININE-BSD FRML MDRD: 5 ML/MIN/1.73SQ M
GLUCOSE SERPL-MCNC: 509 MG/DL (ref 65–140)
GLUCOSE SERPL-MCNC: 583 MG/DL (ref 65–140)
GLUCOSE SERPL-MCNC: 590 MG/DL (ref 65–140)
GLUCOSE SERPL-MCNC: 627 MG/DL (ref 65–140)
GLUCOSE SERPL-MCNC: 770 MG/DL (ref 65–140)
GLUCOSE SERPL-MCNC: >600 MG/DL (ref 65–140)
HCO3 BLDA-SCNC: 27.1 MMOL/L (ref 24–30)
HCO3 BLDA-SCNC: 29.1 MMOL/L (ref 24–30)
HCT VFR BLD AUTO: 35 % (ref 34.8–46.1)
HCT VFR BLD CALC: 34 % (ref 34.8–46.1)
HCT VFR BLD CALC: 39 % (ref 34.8–46.1)
HGB BLD-MCNC: 12 G/DL (ref 11.5–15.4)
HGB BLDA-MCNC: 11.6 G/DL (ref 11.5–15.4)
HGB BLDA-MCNC: 13.3 G/DL (ref 11.5–15.4)
LACTATE SERPL-SCNC: 0.9 MMOL/L (ref 0.5–2)
LIPASE SERPL-CCNC: 27 U/L (ref 11–82)
MAGNESIUM SERPL-MCNC: 1.3 MG/DL (ref 1.9–2.7)
MCH RBC QN AUTO: 29.3 PG (ref 26.8–34.3)
MCHC RBC AUTO-ENTMCNC: 34.3 G/DL (ref 31.4–37.4)
MCV RBC AUTO: 86 FL (ref 82–98)
PCO2 BLD: 29 MMOL/L (ref 21–32)
PCO2 BLD: 30 MMOL/L (ref 21–32)
PCO2 BLD: 42.6 MM HG (ref 42–50)
PCO2 BLD: 49 MM HG (ref 42–50)
PH BLD: 7.35 [PH] (ref 7.3–7.4)
PH BLD: 7.44 [PH] (ref 7.3–7.4)
PHOSPHATE SERPL-MCNC: 7.1 MG/DL (ref 2.7–4.5)
PLATELET # BLD AUTO: 467 THOUSANDS/UL (ref 149–390)
PMV BLD AUTO: 9.6 FL (ref 8.9–12.7)
PO2 BLD: 30 MM HG (ref 35–45)
PO2 BLD: 42 MM HG (ref 35–45)
POTASSIUM BLD-SCNC: 4.9 MMOL/L (ref 3.5–5.3)
POTASSIUM BLD-SCNC: 5.8 MMOL/L (ref 3.5–5.3)
POTASSIUM SERPL-SCNC: 5 MMOL/L (ref 3.5–5.3)
POTASSIUM SERPL-SCNC: 5.3 MMOL/L (ref 3.5–5.3)
PROT SERPL-MCNC: 7.3 G/DL (ref 6.4–8.4)
RBC # BLD AUTO: 4.09 MILLION/UL (ref 3.81–5.12)
S PYO DNA THROAT QL NAA+PROBE: NOT DETECTED
SAO2 % BLD FROM PO2: 54 % (ref 60–85)
SAO2 % BLD FROM PO2: 79 % (ref 60–85)
SARS-COV+SARS-COV-2 AG RESP QL IA.RAPID: NEGATIVE
SODIUM BLD-SCNC: 123 MMOL/L (ref 136–145)
SODIUM BLD-SCNC: 128 MMOL/L (ref 136–145)
SODIUM SERPL-SCNC: 126 MMOL/L (ref 135–147)
SODIUM SERPL-SCNC: 129 MMOL/L (ref 135–147)
SPECIMEN SOURCE: ABNORMAL
SPECIMEN SOURCE: ABNORMAL
WBC # BLD AUTO: 18.02 THOUSAND/UL (ref 4.31–10.16)

## 2024-09-22 PROCEDURE — 82550 ASSAY OF CK (CPK): CPT | Performed by: PHYSICIAN ASSISTANT

## 2024-09-22 PROCEDURE — 84132 ASSAY OF SERUM POTASSIUM: CPT

## 2024-09-22 PROCEDURE — 71045 X-RAY EXAM CHEST 1 VIEW: CPT

## 2024-09-22 PROCEDURE — 87804 INFLUENZA ASSAY W/OPTIC: CPT | Performed by: EMERGENCY MEDICINE

## 2024-09-22 PROCEDURE — 93005 ELECTROCARDIOGRAM TRACING: CPT

## 2024-09-22 PROCEDURE — 84100 ASSAY OF PHOSPHORUS: CPT | Performed by: EMERGENCY MEDICINE

## 2024-09-22 PROCEDURE — 87811 SARS-COV-2 COVID19 W/OPTIC: CPT | Performed by: EMERGENCY MEDICINE

## 2024-09-22 PROCEDURE — 83880 ASSAY OF NATRIURETIC PEPTIDE: CPT | Performed by: EMERGENCY MEDICINE

## 2024-09-22 PROCEDURE — 82010 KETONE BODYS QUAN: CPT | Performed by: EMERGENCY MEDICINE

## 2024-09-22 PROCEDURE — 83036 HEMOGLOBIN GLYCOSYLATED A1C: CPT | Performed by: EMERGENCY MEDICINE

## 2024-09-22 PROCEDURE — 96361 HYDRATE IV INFUSION ADD-ON: CPT

## 2024-09-22 PROCEDURE — 36415 COLL VENOUS BLD VENIPUNCTURE: CPT | Performed by: EMERGENCY MEDICINE

## 2024-09-22 PROCEDURE — 87651 STREP A DNA AMP PROBE: CPT | Performed by: EMERGENCY MEDICINE

## 2024-09-22 PROCEDURE — 80048 BASIC METABOLIC PNL TOTAL CA: CPT | Performed by: EMERGENCY MEDICINE

## 2024-09-22 PROCEDURE — 82803 BLOOD GASES ANY COMBINATION: CPT

## 2024-09-22 PROCEDURE — 99291 CRITICAL CARE FIRST HOUR: CPT | Performed by: EMERGENCY MEDICINE

## 2024-09-22 PROCEDURE — 85027 COMPLETE CBC AUTOMATED: CPT | Performed by: EMERGENCY MEDICINE

## 2024-09-22 PROCEDURE — 74176 CT ABD & PELVIS W/O CONTRAST: CPT

## 2024-09-22 PROCEDURE — 84295 ASSAY OF SERUM SODIUM: CPT

## 2024-09-22 PROCEDURE — 96366 THER/PROPH/DIAG IV INF ADDON: CPT

## 2024-09-22 PROCEDURE — 82330 ASSAY OF CALCIUM: CPT

## 2024-09-22 PROCEDURE — 83690 ASSAY OF LIPASE: CPT | Performed by: EMERGENCY MEDICINE

## 2024-09-22 PROCEDURE — 83735 ASSAY OF MAGNESIUM: CPT | Performed by: EMERGENCY MEDICINE

## 2024-09-22 PROCEDURE — 99285 EMERGENCY DEPT VISIT HI MDM: CPT

## 2024-09-22 PROCEDURE — 84484 ASSAY OF TROPONIN QUANT: CPT | Performed by: EMERGENCY MEDICINE

## 2024-09-22 PROCEDURE — 83605 ASSAY OF LACTIC ACID: CPT | Performed by: EMERGENCY MEDICINE

## 2024-09-22 PROCEDURE — 82948 REAGENT STRIP/BLOOD GLUCOSE: CPT

## 2024-09-22 PROCEDURE — 85014 HEMATOCRIT: CPT

## 2024-09-22 PROCEDURE — 96375 TX/PRO/DX INJ NEW DRUG ADDON: CPT

## 2024-09-22 PROCEDURE — 96365 THER/PROPH/DIAG IV INF INIT: CPT

## 2024-09-22 PROCEDURE — 80053 COMPREHEN METABOLIC PANEL: CPT | Performed by: EMERGENCY MEDICINE

## 2024-09-22 PROCEDURE — 82947 ASSAY GLUCOSE BLOOD QUANT: CPT

## 2024-09-22 RX ORDER — MONTELUKAST SODIUM 10 MG/1
10 TABLET ORAL
Status: DISCONTINUED | OUTPATIENT
Start: 2024-09-23 | End: 2024-09-28 | Stop reason: HOSPADM

## 2024-09-22 RX ORDER — XYLITOL/YERBA SANTA
5 AEROSOL, SPRAY WITH PUMP (ML) MUCOUS MEMBRANE 4 TIMES DAILY PRN
Status: DISCONTINUED | OUTPATIENT
Start: 2024-09-22 | End: 2024-09-28 | Stop reason: HOSPADM

## 2024-09-22 RX ORDER — MAGNESIUM SULFATE HEPTAHYDRATE 40 MG/ML
2 INJECTION, SOLUTION INTRAVENOUS ONCE
Status: COMPLETED | OUTPATIENT
Start: 2024-09-22 | End: 2024-09-23

## 2024-09-22 RX ORDER — SODIUM CHLORIDE, SODIUM GLUCONATE, SODIUM ACETATE, POTASSIUM CHLORIDE, MAGNESIUM CHLORIDE, SODIUM PHOSPHATE, DIBASIC, AND POTASSIUM PHOSPHATE .53; .5; .37; .037; .03; .012; .00082 G/100ML; G/100ML; G/100ML; G/100ML; G/100ML; G/100ML; G/100ML
1000 INJECTION, SOLUTION INTRAVENOUS ONCE
Status: DISCONTINUED | OUTPATIENT
Start: 2024-09-22 | End: 2024-09-22

## 2024-09-22 RX ORDER — ATORVASTATIN CALCIUM 40 MG/1
80 TABLET, FILM COATED ORAL DAILY
Status: DISCONTINUED | OUTPATIENT
Start: 2024-09-23 | End: 2024-09-28 | Stop reason: HOSPADM

## 2024-09-22 RX ORDER — PANTOPRAZOLE SODIUM 20 MG/1
20 TABLET, DELAYED RELEASE ORAL
Status: DISCONTINUED | OUTPATIENT
Start: 2024-09-23 | End: 2024-09-25

## 2024-09-22 RX ORDER — HEPARIN SODIUM 5000 [USP'U]/ML
5000 INJECTION, SOLUTION INTRAVENOUS; SUBCUTANEOUS EVERY 8 HOURS SCHEDULED
Status: DISCONTINUED | OUTPATIENT
Start: 2024-09-23 | End: 2024-09-24

## 2024-09-22 RX ORDER — ONDANSETRON 2 MG/ML
4 INJECTION INTRAMUSCULAR; INTRAVENOUS ONCE
Status: COMPLETED | OUTPATIENT
Start: 2024-09-22 | End: 2024-09-22

## 2024-09-22 RX ORDER — PAROXETINE 20 MG/1
20 TABLET, FILM COATED ORAL DAILY
Status: DISCONTINUED | OUTPATIENT
Start: 2024-09-23 | End: 2024-09-28 | Stop reason: HOSPADM

## 2024-09-22 RX ORDER — SODIUM CHLORIDE 9 MG/ML
3 INJECTION INTRAVENOUS
Status: DISCONTINUED | OUTPATIENT
Start: 2024-09-22 | End: 2024-09-22

## 2024-09-22 RX ORDER — SODIUM CHLORIDE, SODIUM GLUCONATE, SODIUM ACETATE, POTASSIUM CHLORIDE, MAGNESIUM CHLORIDE, SODIUM PHOSPHATE, DIBASIC, AND POTASSIUM PHOSPHATE .53; .5; .37; .037; .03; .012; .00082 G/100ML; G/100ML; G/100ML; G/100ML; G/100ML; G/100ML; G/100ML
2000 INJECTION, SOLUTION INTRAVENOUS ONCE
Status: COMPLETED | OUTPATIENT
Start: 2024-09-22 | End: 2024-09-23

## 2024-09-22 RX ADMIN — MAGNESIUM SULFATE HEPTAHYDRATE 2 G: 2 INJECTION, SOLUTION INTRAVENOUS at 23:46

## 2024-09-22 RX ADMIN — SODIUM CHLORIDE 12 UNITS/HR: 9 INJECTION, SOLUTION INTRAVENOUS at 20:20

## 2024-09-22 RX ADMIN — SODIUM CHLORIDE 1000 ML: 0.9 INJECTION, SOLUTION INTRAVENOUS at 19:36

## 2024-09-22 RX ADMIN — ONDANSETRON 4 MG: 2 INJECTION, SOLUTION INTRAMUSCULAR; INTRAVENOUS at 19:36

## 2024-09-22 RX ADMIN — Medication 1 SPRAY: at 23:47

## 2024-09-22 RX ADMIN — SODIUM CHLORIDE, SODIUM GLUCONATE, SODIUM ACETATE, POTASSIUM CHLORIDE, MAGNESIUM CHLORIDE, SODIUM PHOSPHATE, DIBASIC, AND POTASSIUM PHOSPHATE 2000 ML: .53; .5; .37; .037; .03; .012; .00082 INJECTION, SOLUTION INTRAVENOUS at 23:46

## 2024-09-23 ENCOUNTER — APPOINTMENT (INPATIENT)
Dept: CT IMAGING | Facility: HOSPITAL | Age: 49
DRG: 637 | End: 2024-09-23
Payer: COMMERCIAL

## 2024-09-23 PROBLEM — J45.30 ASTHMA, MILD PERSISTENT: Status: RESOLVED | Noted: 2017-10-25 | Resolved: 2024-09-23

## 2024-09-23 PROBLEM — N17.0 ATN (ACUTE TUBULAR NECROSIS) (HCC): Status: ACTIVE | Noted: 2024-09-23

## 2024-09-23 PROBLEM — R34 OLIGURIA AND ANURIA: Status: ACTIVE | Noted: 2024-09-23

## 2024-09-23 PROBLEM — R11.2 N&V (NAUSEA AND VOMITING): Status: ACTIVE | Noted: 2024-09-23

## 2024-09-23 PROBLEM — R65.10 SIRS (SYSTEMIC INFLAMMATORY RESPONSE SYNDROME) (HCC): Status: ACTIVE | Noted: 2024-09-23

## 2024-09-23 LAB
ALBUMIN SERPL BCG-MCNC: 3.5 G/DL (ref 3.5–5)
ALP SERPL-CCNC: 86 U/L (ref 34–104)
ALT SERPL W P-5'-P-CCNC: 10 U/L (ref 7–52)
ANION GAP SERPL CALCULATED.3IONS-SCNC: 16 MMOL/L (ref 4–13)
ANION GAP SERPL CALCULATED.3IONS-SCNC: 17 MMOL/L (ref 4–13)
ANION GAP SERPL CALCULATED.3IONS-SCNC: 18 MMOL/L (ref 4–13)
ANION GAP SERPL CALCULATED.3IONS-SCNC: 18 MMOL/L (ref 4–13)
AST SERPL W P-5'-P-CCNC: 8 U/L (ref 13–39)
BACTERIA UR QL AUTO: ABNORMAL /HPF
BASOPHILS # BLD AUTO: 0.05 THOUSANDS/ΜL (ref 0–0.1)
BASOPHILS NFR BLD AUTO: 0 % (ref 0–1)
BILIRUB DIRECT SERPL-MCNC: 0.02 MG/DL (ref 0–0.2)
BILIRUB SERPL-MCNC: 0.25 MG/DL (ref 0.2–1)
BILIRUB UR QL STRIP: NEGATIVE
BUN SERPL-MCNC: 103 MG/DL (ref 5–25)
BUN SERPL-MCNC: 105 MG/DL (ref 5–25)
BUN SERPL-MCNC: 106 MG/DL (ref 5–25)
BUN SERPL-MCNC: 107 MG/DL (ref 5–25)
CALCIUM SERPL-MCNC: 8.1 MG/DL (ref 8.4–10.2)
CALCIUM SERPL-MCNC: 8.1 MG/DL (ref 8.4–10.2)
CALCIUM SERPL-MCNC: 8.3 MG/DL (ref 8.4–10.2)
CALCIUM SERPL-MCNC: 8.4 MG/DL (ref 8.4–10.2)
CHLORIDE SERPL-SCNC: 89 MMOL/L (ref 96–108)
CHLORIDE SERPL-SCNC: 89 MMOL/L (ref 96–108)
CHLORIDE SERPL-SCNC: 90 MMOL/L (ref 96–108)
CHLORIDE SERPL-SCNC: 92 MMOL/L (ref 96–108)
CK SERPL-CCNC: 286 U/L (ref 26–192)
CLARITY UR: ABNORMAL
CO2 SERPL-SCNC: 25 MMOL/L (ref 21–32)
CO2 SERPL-SCNC: 25 MMOL/L (ref 21–32)
CO2 SERPL-SCNC: 27 MMOL/L (ref 21–32)
CO2 SERPL-SCNC: 28 MMOL/L (ref 21–32)
COLOR UR: YELLOW
CREAT SERPL-MCNC: 7.79 MG/DL (ref 0.6–1.3)
CREAT SERPL-MCNC: 7.82 MG/DL (ref 0.6–1.3)
CREAT SERPL-MCNC: 8.44 MG/DL (ref 0.6–1.3)
CREAT SERPL-MCNC: 8.68 MG/DL (ref 0.6–1.3)
EOSINOPHIL # BLD AUTO: 0.07 THOUSAND/ΜL (ref 0–0.61)
EOSINOPHIL NFR BLD AUTO: 0 % (ref 0–6)
ERYTHROCYTE [DISTWIDTH] IN BLOOD BY AUTOMATED COUNT: 12.3 % (ref 11.6–15.1)
EST. AVERAGE GLUCOSE BLD GHB EST-MCNC: 226 MG/DL
GFR SERPL CREATININE-BSD FRML MDRD: 4 ML/MIN/1.73SQ M
GFR SERPL CREATININE-BSD FRML MDRD: 5 ML/MIN/1.73SQ M
GLUCOSE SERPL-MCNC: 120 MG/DL (ref 65–140)
GLUCOSE SERPL-MCNC: 123 MG/DL (ref 65–140)
GLUCOSE SERPL-MCNC: 153 MG/DL (ref 65–140)
GLUCOSE SERPL-MCNC: 161 MG/DL (ref 65–140)
GLUCOSE SERPL-MCNC: 168 MG/DL (ref 65–140)
GLUCOSE SERPL-MCNC: 180 MG/DL (ref 65–140)
GLUCOSE SERPL-MCNC: 181 MG/DL (ref 65–140)
GLUCOSE SERPL-MCNC: 195 MG/DL (ref 65–140)
GLUCOSE SERPL-MCNC: 199 MG/DL (ref 65–140)
GLUCOSE SERPL-MCNC: 201 MG/DL (ref 65–140)
GLUCOSE SERPL-MCNC: 203 MG/DL (ref 65–140)
GLUCOSE SERPL-MCNC: 207 MG/DL (ref 65–140)
GLUCOSE SERPL-MCNC: 213 MG/DL (ref 65–140)
GLUCOSE SERPL-MCNC: 307 MG/DL (ref 65–140)
GLUCOSE SERPL-MCNC: 67 MG/DL (ref 65–140)
GLUCOSE SERPL-MCNC: 75 MG/DL (ref 65–140)
GLUCOSE SERPL-MCNC: 92 MG/DL (ref 65–140)
GLUCOSE UR STRIP-MCNC: ABNORMAL MG/DL
HBA1C MFR BLD: 9.5 %
HCT VFR BLD AUTO: 31.5 % (ref 34.8–46.1)
HGB BLD-MCNC: 10.3 G/DL (ref 11.5–15.4)
HGB UR QL STRIP.AUTO: ABNORMAL
IMM GRANULOCYTES # BLD AUTO: 0.09 THOUSAND/UL (ref 0–0.2)
IMM GRANULOCYTES NFR BLD AUTO: 0 % (ref 0–2)
KETONES UR STRIP-MCNC: NEGATIVE MG/DL
LACTATE SERPL-SCNC: 0.9 MMOL/L (ref 0.5–2)
LEUKOCYTE ESTERASE UR QL STRIP: ABNORMAL
LYMPHOCYTES # BLD AUTO: 2.03 THOUSANDS/ΜL (ref 0.6–4.47)
LYMPHOCYTES NFR BLD AUTO: 10 % (ref 14–44)
MAGNESIUM SERPL-MCNC: 2 MG/DL (ref 1.9–2.7)
MAGNESIUM SERPL-MCNC: 2 MG/DL (ref 1.9–2.7)
MAGNESIUM SERPL-MCNC: 2.1 MG/DL (ref 1.9–2.7)
MAGNESIUM SERPL-MCNC: 2.2 MG/DL (ref 1.9–2.7)
MCH RBC QN AUTO: 28.4 PG (ref 26.8–34.3)
MCHC RBC AUTO-ENTMCNC: 32.7 G/DL (ref 31.4–37.4)
MCV RBC AUTO: 87 FL (ref 82–98)
MONOCYTES # BLD AUTO: 1.85 THOUSAND/ΜL (ref 0.17–1.22)
MONOCYTES NFR BLD AUTO: 9 % (ref 4–12)
MUCOUS THREADS UR QL AUTO: ABNORMAL
NEUTROPHILS # BLD AUTO: 16.24 THOUSANDS/ΜL (ref 1.85–7.62)
NEUTS SEG NFR BLD AUTO: 81 % (ref 43–75)
NITRITE UR QL STRIP: NEGATIVE
NON-SQ EPI CELLS URNS QL MICRO: ABNORMAL /HPF
NRBC BLD AUTO-RTO: 0 /100 WBCS
PH UR STRIP.AUTO: 6 [PH]
PHOSPHATE SERPL-MCNC: 4.9 MG/DL (ref 2.7–4.5)
PLATELET # BLD AUTO: 453 THOUSANDS/UL (ref 149–390)
PMV BLD AUTO: 10 FL (ref 8.9–12.7)
POTASSIUM SERPL-SCNC: 4.3 MMOL/L (ref 3.5–5.3)
POTASSIUM SERPL-SCNC: 4.5 MMOL/L (ref 3.5–5.3)
POTASSIUM SERPL-SCNC: 4.7 MMOL/L (ref 3.5–5.3)
POTASSIUM SERPL-SCNC: 5 MMOL/L (ref 3.5–5.3)
PROCALCITONIN SERPL-MCNC: 0.41 NG/ML
PROT SERPL-MCNC: 6.3 G/DL (ref 6.4–8.4)
PROT UR STRIP-MCNC: ABNORMAL MG/DL
RBC # BLD AUTO: 3.63 MILLION/UL (ref 3.81–5.12)
RBC #/AREA URNS AUTO: ABNORMAL /HPF
SODIUM SERPL-SCNC: 132 MMOL/L (ref 135–147)
SODIUM SERPL-SCNC: 132 MMOL/L (ref 135–147)
SODIUM SERPL-SCNC: 133 MMOL/L (ref 135–147)
SODIUM SERPL-SCNC: 137 MMOL/L (ref 135–147)
SP GR UR STRIP.AUTO: 1.02 (ref 1–1.03)
UROBILINOGEN UR STRIP-ACNC: <2 MG/DL
WBC # BLD AUTO: 20.33 THOUSAND/UL (ref 4.31–10.16)
WBC #/AREA URNS AUTO: ABNORMAL /HPF

## 2024-09-23 PROCEDURE — 99255 IP/OBS CONSLTJ NEW/EST HI 80: CPT | Performed by: INTERNAL MEDICINE

## 2024-09-23 PROCEDURE — 80048 BASIC METABOLIC PNL TOTAL CA: CPT | Performed by: INTERNAL MEDICINE

## 2024-09-23 PROCEDURE — 82948 REAGENT STRIP/BLOOD GLUCOSE: CPT

## 2024-09-23 PROCEDURE — 93005 ELECTROCARDIOGRAM TRACING: CPT

## 2024-09-23 PROCEDURE — 87086 URINE CULTURE/COLONY COUNT: CPT | Performed by: PHYSICIAN ASSISTANT

## 2024-09-23 PROCEDURE — 99223 1ST HOSP IP/OBS HIGH 75: CPT | Performed by: HOSPITALIST

## 2024-09-23 PROCEDURE — 81001 URINALYSIS AUTO W/SCOPE: CPT | Performed by: PHYSICIAN ASSISTANT

## 2024-09-23 PROCEDURE — 83735 ASSAY OF MAGNESIUM: CPT | Performed by: INTERNAL MEDICINE

## 2024-09-23 PROCEDURE — 87040 BLOOD CULTURE FOR BACTERIA: CPT | Performed by: PHYSICIAN ASSISTANT

## 2024-09-23 PROCEDURE — 85025 COMPLETE CBC W/AUTO DIFF WBC: CPT | Performed by: INTERNAL MEDICINE

## 2024-09-23 PROCEDURE — 84145 PROCALCITONIN (PCT): CPT | Performed by: PHYSICIAN ASSISTANT

## 2024-09-23 PROCEDURE — 84100 ASSAY OF PHOSPHORUS: CPT | Performed by: INTERNAL MEDICINE

## 2024-09-23 PROCEDURE — 83605 ASSAY OF LACTIC ACID: CPT | Performed by: PHYSICIAN ASSISTANT

## 2024-09-23 PROCEDURE — 70490 CT SOFT TISSUE NECK W/O DYE: CPT

## 2024-09-23 PROCEDURE — 87147 CULTURE TYPE IMMUNOLOGIC: CPT | Performed by: PHYSICIAN ASSISTANT

## 2024-09-23 PROCEDURE — 80076 HEPATIC FUNCTION PANEL: CPT | Performed by: INTERNAL MEDICINE

## 2024-09-23 RX ORDER — ALBUTEROL SULFATE 0.83 MG/ML
2.5 SOLUTION RESPIRATORY (INHALATION) EVERY 6 HOURS PRN
Status: DISCONTINUED | OUTPATIENT
Start: 2024-09-23 | End: 2024-09-28 | Stop reason: HOSPADM

## 2024-09-23 RX ORDER — METOPROLOL TARTRATE 1 MG/ML
5 INJECTION, SOLUTION INTRAVENOUS ONCE
Status: COMPLETED | OUTPATIENT
Start: 2024-09-23 | End: 2024-09-23

## 2024-09-23 RX ORDER — ONDANSETRON 2 MG/ML
4 INJECTION INTRAMUSCULAR; INTRAVENOUS EVERY 6 HOURS PRN
Status: DISCONTINUED | OUTPATIENT
Start: 2024-09-23 | End: 2024-09-28 | Stop reason: HOSPADM

## 2024-09-23 RX ORDER — INSULIN LISPRO 100 [IU]/ML
30 INJECTION, SOLUTION INTRAVENOUS; SUBCUTANEOUS
Status: DISCONTINUED | OUTPATIENT
Start: 2024-09-23 | End: 2024-09-23

## 2024-09-23 RX ORDER — CEFTRIAXONE 2 G/50ML
2000 INJECTION, SOLUTION INTRAVENOUS EVERY 24 HOURS
Status: COMPLETED | OUTPATIENT
Start: 2024-09-23 | End: 2024-09-27

## 2024-09-23 RX ORDER — SODIUM CHLORIDE, SODIUM GLUCONATE, SODIUM ACETATE, POTASSIUM CHLORIDE, MAGNESIUM CHLORIDE, SODIUM PHOSPHATE, DIBASIC, AND POTASSIUM PHOSPHATE .53; .5; .37; .037; .03; .012; .00082 G/100ML; G/100ML; G/100ML; G/100ML; G/100ML; G/100ML; G/100ML
100 INJECTION, SOLUTION INTRAVENOUS CONTINUOUS
Status: DISCONTINUED | OUTPATIENT
Start: 2024-09-23 | End: 2024-09-23

## 2024-09-23 RX ORDER — FUROSEMIDE 10 MG/ML
80 INJECTION INTRAMUSCULAR; INTRAVENOUS ONCE
Status: COMPLETED | OUTPATIENT
Start: 2024-09-23 | End: 2024-09-23

## 2024-09-23 RX ORDER — INSULIN LISPRO 100 [IU]/ML
1-6 INJECTION, SOLUTION INTRAVENOUS; SUBCUTANEOUS
Status: DISCONTINUED | OUTPATIENT
Start: 2024-09-23 | End: 2024-09-23

## 2024-09-23 RX ORDER — MAGNESIUM SULFATE 1 G/100ML
1 INJECTION INTRAVENOUS ONCE
Status: COMPLETED | OUTPATIENT
Start: 2024-09-23 | End: 2024-09-23

## 2024-09-23 RX ORDER — FLUTICASONE FUROATE AND VILANTEROL 200; 25 UG/1; UG/1
1 POWDER RESPIRATORY (INHALATION)
Status: DISCONTINUED | OUTPATIENT
Start: 2024-09-23 | End: 2024-09-28 | Stop reason: HOSPADM

## 2024-09-23 RX ORDER — DEXTROSE MONOHYDRATE 25 G/50ML
25 INJECTION, SOLUTION INTRAVENOUS ONCE
Status: COMPLETED | OUTPATIENT
Start: 2024-09-23 | End: 2024-09-23

## 2024-09-23 RX ORDER — ACETAMINOPHEN 325 MG/1
650 TABLET ORAL EVERY 6 HOURS PRN
Status: DISCONTINUED | OUTPATIENT
Start: 2024-09-23 | End: 2024-09-28 | Stop reason: HOSPADM

## 2024-09-23 RX ORDER — INSULIN GLARGINE 100 [IU]/ML
60 INJECTION, SOLUTION SUBCUTANEOUS
Status: DISCONTINUED | OUTPATIENT
Start: 2024-09-23 | End: 2024-09-23

## 2024-09-23 RX ORDER — FUROSEMIDE 10 MG/ML
40 SYRINGE (ML) INJECTION CONTINUOUS
Status: DISCONTINUED | OUTPATIENT
Start: 2024-09-23 | End: 2024-09-24

## 2024-09-23 RX ADMIN — ACETAMINOPHEN 650 MG: 325 TABLET ORAL at 05:03

## 2024-09-23 RX ADMIN — HEPARIN SODIUM 5000 UNITS: 5000 INJECTION, SOLUTION INTRAVENOUS; SUBCUTANEOUS at 21:16

## 2024-09-23 RX ADMIN — Medication 5 SPRAY: at 02:09

## 2024-09-23 RX ADMIN — SODIUM CHLORIDE 0.3 UNITS/HR: 9 INJECTION, SOLUTION INTRAVENOUS at 05:10

## 2024-09-23 RX ADMIN — PAROXETINE HYDROCHLORIDE 20 MG: 20 TABLET, FILM COATED ORAL at 08:13

## 2024-09-23 RX ADMIN — ASPIRIN 81 MG: 81 TABLET, COATED ORAL at 08:12

## 2024-09-23 RX ADMIN — CEFTRIAXONE 2000 MG: 2 INJECTION, SOLUTION INTRAVENOUS at 04:51

## 2024-09-23 RX ADMIN — Medication 5 SPRAY: at 09:08

## 2024-09-23 RX ADMIN — PANTOPRAZOLE SODIUM 20 MG: 20 TABLET, DELAYED RELEASE ORAL at 05:03

## 2024-09-23 RX ADMIN — ACETAMINOPHEN 650 MG: 325 TABLET ORAL at 19:59

## 2024-09-23 RX ADMIN — HEPARIN SODIUM 5000 UNITS: 5000 INJECTION, SOLUTION INTRAVENOUS; SUBCUTANEOUS at 13:17

## 2024-09-23 RX ADMIN — SODIUM CHLORIDE, SODIUM GLUCONATE, SODIUM ACETATE, POTASSIUM CHLORIDE, MAGNESIUM CHLORIDE, SODIUM PHOSPHATE, DIBASIC, AND POTASSIUM PHOSPHATE 100 ML/HR: .53; .5; .37; .037; .03; .012; .00082 INJECTION, SOLUTION INTRAVENOUS at 13:14

## 2024-09-23 RX ADMIN — FUROSEMIDE 80 MG: 10 INJECTION, SOLUTION INTRAVENOUS at 14:49

## 2024-09-23 RX ADMIN — Medication 40 MG/HR: at 23:39

## 2024-09-23 RX ADMIN — Medication 5 SPRAY: at 05:04

## 2024-09-23 RX ADMIN — SODIUM CHLORIDE, SODIUM GLUCONATE, SODIUM ACETATE, POTASSIUM CHLORIDE, MAGNESIUM CHLORIDE, SODIUM PHOSPHATE, DIBASIC, AND POTASSIUM PHOSPHATE 100 ML/HR: .53; .5; .37; .037; .03; .012; .00082 INJECTION, SOLUTION INTRAVENOUS at 00:59

## 2024-09-23 RX ADMIN — ATORVASTATIN CALCIUM 80 MG: 40 TABLET, FILM COATED ORAL at 08:13

## 2024-09-23 RX ADMIN — DEXTROSE MONOHYDRATE 25 ML: 25 INJECTION, SOLUTION INTRAVENOUS at 05:03

## 2024-09-23 RX ADMIN — Medication 5 SPRAY: at 17:59

## 2024-09-23 RX ADMIN — MONTELUKAST 10 MG: 10 TABLET, FILM COATED ORAL at 21:48

## 2024-09-23 RX ADMIN — Medication 20 MG/HR: at 15:28

## 2024-09-23 RX ADMIN — MONTELUKAST 10 MG: 10 TABLET, FILM COATED ORAL at 02:09

## 2024-09-23 RX ADMIN — METOPROLOL TARTRATE 5 MG: 5 INJECTION INTRAVENOUS at 21:05

## 2024-09-23 RX ADMIN — MAGNESIUM SULFATE IN DEXTROSE 1 G: 10 INJECTION, SOLUTION INTRAVENOUS at 21:10

## 2024-09-23 RX ADMIN — FLUTICASONE FUROATE AND VILANTEROL TRIFENATATE 1 PUFF: 200; 25 POWDER RESPIRATORY (INHALATION) at 08:13

## 2024-09-23 RX ADMIN — HEPARIN SODIUM 5000 UNITS: 5000 INJECTION, SOLUTION INTRAVENOUS; SUBCUTANEOUS at 05:03

## 2024-09-23 NOTE — PLAN OF CARE
Problem: PAIN - ADULT  Goal: Verbalizes/displays adequate comfort level or baseline comfort level  Description: Interventions:  - Encourage patient to monitor pain and request assistance  - Assess pain using appropriate pain scale  - Administer analgesics based on type and severity of pain and evaluate response  - Implement non-pharmacological measures as appropriate and evaluate response  - Consider cultural and social influences on pain and pain management  - Notify physician/advanced practitioner if interventions unsuccessful or patient reports new pain  Outcome: Progressing     Problem: SAFETY ADULT  Goal: Patient will remain free of falls  Description: INTERVENTIONS:  - Educate patient/family on patient safety including physical limitations  - Instruct patient to call for assistance with activity   - Consult OT/PT to assist with strengthening/mobility   - Keep Call bell within reach  - Keep bed low and locked with side rails adjusted as appropriate  - Keep care items and personal belongings within reach  - Initiate and maintain comfort rounds  - Make Fall Risk Sign visible to staff  - Offer Toileting every 2 Hours, in advance of need  - Initiate/Maintain bed/chair alarm  - Obtain necessary fall risk management equipment:   - Apply yellow socks and bracelet for high fall risk patients  - Consider moving patient to room near nurses station  Outcome: Progressing  Goal: Maintain or return to baseline ADL function  Description: INTERVENTIONS:  -  Assess patient's ability to carry out ADLs; assess patient's baseline for ADL function and identify physical deficits which impact ability to perform ADLs (bathing, care of mouth/teeth, toileting, grooming, dressing, etc.)  - Assess/evaluate cause of self-care deficits   - Assess range of motion  - Assess patient's mobility; develop plan if impaired  - Assess patient's need for assistive devices and provide as appropriate  - Encourage maximum independence but intervene  and supervise when necessary  - Involve family in performance of ADLs  - Assess for home care needs following discharge   - Consider OT consult to assist with ADL evaluation and planning for discharge  - Provide patient education as appropriate  Outcome: Progressing  Goal: Maintains/Returns to pre admission functional level  Description: INTERVENTIONS:  - Perform AM-PAC 6 Click Basic Mobility/ Daily Activity assessment daily.  - Set and communicate daily mobility goal to care team and patient/family/caregiver.   - Collaborate with rehabilitation services on mobility goals if consulted  - Perform Range of Motion 4 times a day.  - Reposition patient every 2 hours.  - Dangle patient 4 times a day  - Stand patient 4 times a day  - Ambulate patient 4 times a day  - Out of bed to chair 4 times a day   - Out of bed for meals 4 times a day  - Out of bed for toileting  - Record patient progress and toleration of activity level   Outcome: Progressing     Problem: CARDIOVASCULAR - ADULT  Goal: Maintains optimal cardiac output and hemodynamic stability  Description: INTERVENTIONS:  - Monitor I/O, vital signs and rhythm  - Monitor for S/S and trends of decreased cardiac output  - Administer and titrate ordered vasoactive medications to optimize hemodynamic stability  - Assess quality of pulses, skin color and temperature  - Assess for signs of decreased coronary artery perfusion  - Instruct patient to report change in severity of symptoms  Outcome: Progressing  Goal: Absence of cardiac dysrhythmias or at baseline rhythm  Description: INTERVENTIONS:  - Continuous cardiac monitoring, vital signs, obtain 12 lead EKG if ordered  - Administer antiarrhythmic and heart rate control medications as ordered  - Monitor electrolytes and administer replacement therapy as ordered  Outcome: Progressing     Problem: RESPIRATORY - ADULT  Goal: Achieves optimal ventilation and oxygenation  Description: INTERVENTIONS:  - Assess for changes in  respiratory status  - Assess for changes in mentation and behavior  - Position to facilitate oxygenation and minimize respiratory effort  - Oxygen administered by appropriate delivery if ordered  - Initiate smoking cessation education as indicated  - Encourage broncho-pulmonary hygiene including cough, deep breathe, Incentive Spirometry  - Assess the need for suctioning and aspirate as needed  - Assess and instruct to report SOB or any respiratory difficulty  - Respiratory Therapy support as indicated  Outcome: Progressing     Problem: GASTROINTESTINAL - ADULT  Goal: Minimal or absence of nausea and/or vomiting  Description: INTERVENTIONS:  - Administer IV fluids if ordered to ensure adequate hydration  - Maintain NPO status until nausea and vomiting are resolved  - Nasogastric tube if ordered  - Administer ordered antiemetic medications as needed  - Provide nonpharmacologic comfort measures as appropriate  - Advance diet as tolerated, if ordered  - Consider nutrition services referral to assist patient with adequate nutrition and appropriate food choices  Outcome: Progressing  Goal: Maintains or returns to baseline bowel function  Description: INTERVENTIONS:  - Assess bowel function  - Encourage oral fluids to ensure adequate hydration  - Administer IV fluids if ordered to ensure adequate hydration  - Administer ordered medications as needed  - Encourage mobilization and activity  - Consider nutritional services referral to assist patient with adequate nutrition and appropriate food choices  Outcome: Progressing  Goal: Maintains adequate nutritional intake  Description: INTERVENTIONS:  - Monitor percentage of each meal consumed  - Identify factors contributing to decreased intake, treat as appropriate  - Assist with meals as needed  - Monitor I&O, weight, and lab values if indicated  - Obtain nutrition services referral as needed  Outcome: Progressing  Goal: Establish and maintain optimal ostomy  function  Description: INTERVENTIONS:  - Assess bowel function  - Encourage oral fluids to ensure adequate hydration  - Administer IV fluids if ordered to ensure adequate hydration   - Administer ordered medications as needed  - Encourage mobilization and activity  - Nutrition services referral to assist patient with appropriate food choices  - Assess stoma site  - Consider wound care consult   Outcome: Progressing  Goal: Oral mucous membranes remain intact  Description: INTERVENTIONS  - Assess oral mucosa and hygiene practices  - Implement preventative oral hygiene regimen  - Implement oral medicated treatments as ordered  - Initiate Nutrition services referral as needed  Outcome: Progressing     Problem: GENITOURINARY - ADULT  Goal: Maintains or returns to baseline urinary function  Description: INTERVENTIONS:  - Assess urinary function  - Encourage oral fluids to ensure adequate hydration if ordered  - Administer IV fluids as ordered to ensure adequate hydration  - Administer ordered medications as needed  - Offer frequent toileting  - Follow urinary retention protocol if ordered  Outcome: Progressing  Goal: Absence of urinary retention  Description: INTERVENTIONS:  - Assess patient’s ability to void and empty bladder  - Monitor I/O  - Bladder scan as needed  - Discuss with physician/AP medications to alleviate retention as needed  - Discuss catheterization for long term situations as appropriate  Outcome: Progressing  Goal: Urinary catheter remains patent  Description: INTERVENTIONS:  - Assess patency of urinary catheter  - If patient has a chronic [place], consider changing catheter if non-functioning  - Follow guidelines for intermittent irrigation of non-functioning urinary catheter  Outcome: Progressing     Problem: METABOLIC, FLUID AND ELECTROLYTES - ADULT  Goal: Electrolytes maintained within normal limits  Description: INTERVENTIONS:  - Monitor labs and assess patient for signs and symptoms of electrolyte  imbalances  - Administer electrolyte replacement as ordered  - Monitor response to electrolyte replacements, including repeat lab results as appropriate  - Instruct patient on fluid and nutrition as appropriate  Outcome: Progressing  Goal: Fluid balance maintained  Description: INTERVENTIONS:  - Monitor labs   - Monitor I/O and WT  - Instruct patient on fluid and nutrition as appropriate  - Assess for signs & symptoms of volume excess or deficit  Outcome: Progressing  Goal: Glucose maintained within target range  Description: INTERVENTIONS:  - Monitor Blood Glucose as ordered  - Assess for signs and symptoms of hyperglycemia and hypoglycemia  - Administer ordered medications to maintain glucose within target range  - Assess nutritional intake and initiate nutrition service referral as needed  Outcome: Progressing

## 2024-09-23 NOTE — ASSESSMENT & PLAN NOTE
Baseline creatinine 0.7-1  Creatinine on admission 7.53  Diuretics up-titrated outpatient due to ongoing SOB. CTA PE study performed 9/20 9/22 CTAP: 1.  Retained contrast within the kidneys from CT performed 2 days prior is indicative of impaired renal function. No hydroureteronephrosis. 2.  Possible mild enterocolitis. No abdominopelvic fluid collection.  Likely secondary to [place] and volume depletion   Pt states that she did not hold her metformin following CTA PE study    Plan:  Start IVF resuscitation  Trend renal indices  Nephrology consulted  No urgent indications for dialysis  Strict I/O  Avoid hypotension and nephrotoxins

## 2024-09-23 NOTE — H&P
H&P - Hospitalist   Name: Vernell Darby 49 y.o. female I MRN: [id number]  Unit/Bed#: -01 SDU I Date of Admission: 9/22/2024   Date of Service: 9/23/2024 I Hospital Day: 1     Assessment & Plan  SABRINA (acute kidney injury) (HCC)  Baseline creatinine 0.7-1  Creatinine on admission 7.53  Diuretics up-titrated outpatient due to ongoing SOB. CTA PE study performed 9/20 9/22 CTAP: 1.  Retained contrast within the kidneys from CT performed 2 days prior is indicative of impaired renal function. No hydroureteronephrosis. 2.  Possible mild enterocolitis. No abdominopelvic fluid collection.  Likely secondary to [place] and volume depletion   Pt states that she did not hold her metformin following CTA PE study    Plan:  Start IVF resuscitation  Trend renal indices  Nephrology consulted  No urgent indications for dialysis  Strict I/O  Avoid hypotension and nephrotoxins  Type 1 diabetes mellitus with hyperglycemia ([facility])  Lab Results   Component Value Date    HGBA1C 8.7 (H) 07/13/2024       Recent Labs     09/22/24 2020 09/22/24  2134 09/22/24  2222 09/23/24  0008   POCGLU >600* 583* 509* 307*       Blood Sugar Average: Last 72 hrs:  (P) 279.8    Hx of T1DM, follows with Dr. Ascencio from Endocrinology  Presented with hyperglycemia, no DKA  Started on regular insulin gtt  Hold home insulin and metformin  Q2h BG monitoring  Maintain -180  Hyponatremia  Pseudohyponatremia in the setting of hyperglycemia  Corrected Na 137    N&V (nausea and vomiting)  Likely secondary to hyperglyemia  CTAP: 1.  Retained contrast within the kidneys from CT performed 2 days prior is indicative of impaired renal function. No hydroureteronephrosis. 2.  Possible mild enterocolitis. No abdominopelvic fluid collection.  Pt denies fevers or chills PTA  Hold off on antibiotics   If ongoing diarrhea, check C. Diff and stool enteric culture  PRN Zofran  Sore throat  Pt reports severe odynophagia following multiple episodes of vomiting  Strep PCR neg,  COVID/Flu/RSV neg  Unremarkable physical exam: no erythema of pharynx, no cervical LAD, no crepitus of neck but tender to palpation  If ongoing pain, consider CT soft tissue neck to rule out esophageal injury from excessive vomiting  Mouthkote and chloraseptic spray ordered for pain control  SIRS (systemic inflammatory response syndrome) (HCC)  SIRS: tachypnea, tachycardia, leukocytosis  No evidence of infection  Unlikely sepsis with no fevers/chills  UA pending  Monitor off abx  If source of infection identified, check blood cultures/lactic  Chronic diastolic congestive heart failure (HCC)  Wt Readings from Last 3 Encounters:   09/22/24 81 kg (178 lb 9.2 oz)   09/18/24 82.2 kg (181 lb 3.5 oz)   09/17/24 82.7 kg (182 lb 6.4 oz)       5/2024 Echo: EF 65%, G2DD  Recently had diuretics up-titrated outpatient due to persistent SOB/PRICE  Currently examines hypovolemic  Hold diuresis  Strict I/O  Daily weights      Asthma  Daily Breo, PRN albuterol neb  Continue home singulair  Hypertension  Hold home lisinopril and diuretics 2/2 SABRINA  Hyperlipidemia  Continue statin  Anemia  Baseline hgb 9-10  Hgb on admission 12  Likely hemoconcentrated  Trend hgb and maintain > 7  Severe obesity (BMI 35.0-39.9) with comorbidity (HCC)  Encourage lifestyle modifications as appropriate  Gastroesophageal reflux disease without esophagitis  Continue home PPI      Disposition: Stepdown Level 2    History of Present Illness   eVrnell Darby is a 49 y.o. female with past medical history significant for T1DM, chronic diastolic heart failure, hyperlipidemia, PCOS, asthma, GERD, and obesity who presented to the emergency department with 2 days of nausea, vomiting, and diarrhea.  Patient states that she was recently seen in the emergency department for shortness of breath and was told to increase her diuretics due to concern for volume overload.  She saw her cardiologist who ordered a CTA PE study due to ongoing shortness of breath despite increased  diuresis which she had done on 9/20.  Shortly after completing the CTA, she began experiencing nausea/vomiting/diarrhea.  She states that over the past 2 days she has been unable to tolerate oral intake and has had decreasing urine output.  She denies any fevers, chills, chest pain, worsening shortness of breath. Laboratory workup in the emergency room significant for severe SABRINA and hyperglycemia without associated DKA.  CT abdomen pelvis revealed retained contrast within the kidneys and mild enterocolitis.  She will be admitted stepdown 2 under internal medicine for further management of severe SABRINA.    History obtained from chart review and the patient.  Review of Systems: See HPI for Review of Systems    Historical Information   Past Medical History:  No date: Anemia  No date: Arthritis  No date: Asthma      Comment:  w/acute exacerbation. Last assessed: 10/26/12  02/03/2016: Chest pain  No date: CHF (congestive heart failure) (formerly [facility])  No date: Depression  No date: Diabetes mellitus (formerly [facility])  No date: Diabetic nephropathy (formerly [facility])  08/12/2020: Diabetic nephropathy associated with type 1 diabetes   mellitus (formerly [facility])  02/03/2016: Diabetic retinopathy (formerly [facility])  No date: Endometriosis  02/03/2016: Gastroenteritis  No date: GERD (gastroesophageal reflux disease)  02/26/2021: History of COVID-19  02/03/2016: Hyperlipidemia  No date: Hypertension  No date: Obesity  No date: Oligomenorrhea  02/03/2016: Polycystic ovaries  02/03/2016: Retinal hemorrhage  No date: Retinopathy  No date: Thrombocytosis  02/03/2016: Type 1 diabetes mellitus with ophthalmic manifestation   (formerly [facility]) Past Surgical History:  10/2020: CATARACT EXTRACTION; Left  No date: CATARACT EXTRACTION; Right  No date: RETINAL LASER PROCEDURE   Current Outpatient Medications   Medication Instructions    acetone, urine, test strip Use to test urine ketones for high blood sugars.    albuterol (2.5 mg/3 mL) 0.083 % nebulizer solution INHALE CONTENTS OF 1 VIAL BY NEBULIZER EVERY 6  HOURS AS NEEDED FOR WHEEZING OR FOR SHORTNESS OF BREATH.    albuterol (PROVENTIL HFA,VENTOLIN HFA) 90 mcg/act inhaler INHALE 2 PUFFS BY MOUTH EVERY 4 HOURS AS NEEDED FOR WHEEZING OR FOR SHORTNESS OF BREATH    Ascorbic Acid (VITAMIN C) 500 MG CAPS 2 capsules, Oral, Daily    Aspirin Low Dose 81 MG EC tablet TAKE 1 TABLET BY MOUTH EVERY DAY    atorvastatin (LIPITOR) 80 mg tablet TAKE 1 TABLET BY MOUTH EVERY DAY    Continuous Blood Gluc Sensor (Dexcom G7 Sensor) 1 Device, Does not apply, Every 10 days    cyanocobalamin (VITAMIN B-12) 100 mcg tablet Oral, Daily    dicyclomine (BENTYL) 20 mg, Oral, 2 times daily PRN    Fluticasone-Salmeterol (Advair) 250-50 mcg/dose inhaler INHALE 1 PUFF BY MOUTH TWO TIMES DAILY RINSE MOUTH AFTER USE    Glucagon (Gvoke HypoPen 2-Pack) 1 MG/0.2ML SOAJ Use if hypoglycemia prn    Glucagon Emergency 1 mg, Subcutaneous, Once as needed    insulin aspart, w/niacinamide, (Fiasp FlexTouch) 100 Units/mL injection pen Inject 1u:2c with breakfast, 1u:1c with lunch, 1.5u:1c with dinner, up to 150 units daily    insulin lispro (HumaLOG) 100 units/mL injection pen INJECT PER SLIDING SCALE OF 1UNIT PER 2 CARBS WITH BREAKFAST, 1 UNIT PER 1 CARB WITH LUNCH, 1.5 UNITS PER 1CARB WITH DINNER. MAXIMUM DOSE 150 UNITS DAILY.    Insulin Pen Needle 33G X 4 MM MISC Use to inject insulin 4 times a day    lisinopril (ZESTRIL) 40 mg, Oral, Daily    metFORMIN (GLUCOPHAGE-XR) 500 mg, Oral, 2 times daily with meals    montelukast (SINGULAIR) 10 mg tablet TAKE 1 TABLET BY MOUTH AT BEDTIME    omeprazole (PRILOSEC) 20 mg, Oral, Daily    OneTouch Ultra test strip Test 3 times daily    PARoxetine (PAXIL) 20 mg, Oral, Daily    Probiotic Product (ALIGN PO) Oral    torsemide (DEMADEX) 40 mg, Oral, Daily    Toujeo SoloStar 95 Units, Subcutaneous, Daily    No Known Allergies   Social History     Tobacco Use    Smoking status: Never    Smokeless tobacco: Never    Tobacco comments:     Per [employer]: Former smoker. Quit in 1994    Vaping Use    Vaping status: Never Used   Substance Use Topics    Alcohol use: Yes     Alcohol/week: 2.0 standard drinks of alcohol     Types: 2 Standard drinks or equivalent per week     Comment: Occasionally/ sicially    Drug use: No    Family History   Problem Relation Age of Onset    Heart murmur Mother     Diabetes Mother         Mellitus    Thyroid disease Mother         Disorder    Diabetes type II Mother     Diabetes Father         Mellitus    Hypertension Father     Diabetes type II Father     No Known Problems Maternal Grandmother     Diabetes Maternal Grandfather         Mellitus    Breast cancer Paternal Grandmother 70 - 79    Leukemia Paternal Grandfather 48    Diabetes Maternal Aunt         Mellitus    Diabetes Maternal Uncle         Mellitus    Substance Abuse Neg Hx     Mental illness Neg Hx     Alcohol abuse Neg Hx           Objective                          Vitals I/O      Most Recent Min/Max in 24hrs   Temp 99.7 °F (37.6 °C) Temp  Min: 98.2 °F (36.8 °C)  Max: 99.7 °F (37.6 °C)   Pulse 91 Pulse  Min: 85  Max: 92   Resp 21 Resp  Min: 16  Max: 21   /58 BP  Min: 126/58  Max: 195/79   O2 Sat 97 % SpO2  Min: 92 %  Max: 98 %      Intake/Output Summary (Last 24 hours) at 9/23/2024 0049  Last data filed at 9/22/2024 2355  Gross per 24 hour   Intake 20 ml   Output 0 ml   Net 20 ml       Diet Vinnie/CHO Controlled; Consistent Carbohydrate Diet Level 2 (5 carb servings/75 grams CHO/meal); Clear Liquid    Invasive Monitoring           Physical Exam   Physical Exam  Vitals reviewed.   Eyes:      Extraocular Movements: Extraocular movements intact.      Pupils: Pupils are equal, round, and reactive to light.   Skin:     General: Skin is warm and dry.      Capillary Refill: Capillary refill takes 2 to 3 seconds.   HENT:      Head: Normocephalic and atraumatic.      Mouth/Throat:      Mouth: Mucous membranes are dry.   Cardiovascular:      Rate and Rhythm: Normal rate and regular rhythm.      Heart sounds:  No murmur heard.     No friction rub. No gallop.   Musculoskeletal:      Right lower leg: No edema.      Left lower leg: No edema.   Abdominal: General: There is no distension.      Palpations: Abdomen is soft.      Tenderness: There is abdominal tenderness. There is no guarding.      Hernia: No hernia is present.      Comments: Epigastric tenderness   Constitutional:       Appearance: She is well-developed and well-nourished. She is obese.   Pulmonary:      Effort: Pulmonary effort is normal.      Breath sounds: Normal breath sounds. No wheezing, rhonchi or rales.   Neurological:      General: No focal deficit present.      Mental Status: She is alert and oriented to person, place and time. Mental status is at baseline.      Motor: Strength full and intact in all extremities.          Diagnostic Studies        Lab Results: I have reviewed the following results:   Results Reviewed       Procedure Component Value Units Date/Time    B-Type Natriuretic Peptide(BNP) [[id number]]  (Abnormal) Collected: 09/22/24 1931    Lab Status: Final result Specimen: Blood from Arm, Right Updated: 09/22/24 2248      pg/mL     Fingerstick Glucose (POCT) [[id number]]  (Abnormal) Collected: 09/22/24 2222    Lab Status: Final result Specimen: Blood Updated: 09/22/24 2223     POC Glucose 509 mg/dl     HS Troponin I 2hr [[id number]]  (Normal) Collected: 09/22/24 2134    Lab Status: Final result Specimen: Blood from Arm, Right Updated: 09/22/24 2202     hs TnI 2hr 22 ng/L      Delta 2hr hsTnI 1 ng/L     Basic metabolic panel [[id number]]  (Abnormal) Collected: 09/22/24 2134    Lab Status: Final result Specimen: Blood from Arm, Right Updated: 09/22/24 2159     Sodium 129 mmol/L      Potassium 5.0 mmol/L      Chloride 85 mmol/L      CO2 27 mmol/L      ANION GAP 17 mmol/L       mg/dL      Creatinine 7.53 mg/dL      Glucose 627 mg/dL      Calcium 8.5 mg/dL      eGFR 5 ml/min/1.73sq m     Narrative:      National Kidney Disease  Foundation guidelines for Chronic Kidney Disease (CKD):     Stage 1 with normal or high GFR (GFR > 90 mL/min/1.73 square meters)    Stage 2 Mild CKD (GFR = 60-89 mL/min/1.73 square meters)    Stage 3A Moderate CKD (GFR = 45-59 mL/min/1.73 square meters)    Stage 3B Moderate CKD (GFR = 30-44 mL/min/1.73 square meters)    Stage 4 Severe CKD (GFR = 15-29 mL/min/1.73 square meters)    Stage 5 End Stage CKD (GFR <15 mL/min/1.73 square meters)  Note: GFR calculation is accurate only with a steady state creatinine    POCT Blood Gas (CG8+) [[id number]]  (Abnormal) Collected: 09/22/24 2137    Lab Status: Final result Specimen: Venous Updated: 09/22/24 2140     ph, Victor Hugo ISTAT 7.351     pCO2, Victor Hugo i-STAT 49.0 mm HG      pO2, Victor Hugo i-STAT 30.0 mm HG      BE, i-STAT 1 mmol/L      HCO3, Victor Hugo i-STAT 27.1 mmol/L      CO2, i-STAT 29 mmol/L      O2 Sat, i-STAT 54 %      SODIUM, I-STAT 128 mmol/l      Potassium, i-STAT 4.9 mmol/L      Calcium, Ionized i-STAT 1.02 mmol/L      Hct, i-STAT 34 %      Hgb, i-STAT 11.6 g/dl      Glucose, i-STAT 590 mg/dl      Specimen Type VENOUS    Fingerstick Glucose (POCT) [[id number]]  (Abnormal) Collected: 09/22/24 2134    Lab Status: Final result Specimen: Blood Updated: 09/22/24 2137     POC Glucose 583 mg/dl     Strep A PCR [[id number]]  (Normal) Collected: 09/22/24 2102    Lab Status: Final result Specimen: Throat Updated: 09/22/24 2137     STREP A PCR Not Detected    COVID-19/ Infleunza A/B Rapid Anitgen(30 min. TAT) [[id number]]  (Normal) Collected: 09/22/24 2102    Lab Status: Final result Specimen: Nares from Nose Updated: 09/22/24 2129     SARS COV Rapid Antigen Negative     Influenza A Rapid Antigen Negative     Influenza B Rapid Antigen Negative    Narrative:      This test has been performed using the [employer] Jocelynn 2 FLU+SARS Antigen test under the Emergency Use Authorization (EUA). This test has been validated by the  and verified by the performing laboratory. The Jocelynn uses lateral flow  immunofluorescent sandwich assay to detect SARS-COV, Influenza A and Influenza B Antigen.     The Quidel Jocelynn 2 SARS Antigen test does not differentiate between SARS-CoV and SARS-CoV-2.     Negative results are presumptive and may be confirmed with a molecular assay, if necessary, for patient management. Negative results do not rule out SARS-CoV-2 or influenza infection and should not be used as the sole basis for treatment or patient management decisions. A negative test result may occur if the level of antigen in a sample is below the limit of detection of this test.     Positive results are indicative of the presence of viral antigens, but do not rule out bacterial infection or co-infection with other viruses.     All test results should be used as an adjunct to clinical observations and other information available to the provider.    FOR PEDIATRIC PATIENTS - copy/paste COVID Guidelines URL to browser: [email or web address]    Fingerstick Glucose (POCT) [[id number]]  (Abnormal) Collected: 09/22/24 2020    Lab Status: Final result Specimen: Blood Updated: 09/22/24 2021     POC Glucose >600 mg/dl     Beta Hydroxybutyrate [[id number]]  (Abnormal) Collected: 09/22/24 1931    Lab Status: Final result Specimen: Blood from Arm, Right Updated: 09/22/24 2021     Beta- Hydroxybutyrate 1.33 mmol/L     HS Troponin 0hr (reflex protocol) [[id number]]  (Normal) Collected: 09/22/24 1931    Lab Status: Final result Specimen: Blood from Arm, Right Updated: 09/22/24 2000     hs TnI 0hr 21 ng/L     Comprehensive metabolic panel [[id number]]  (Abnormal) Collected: 09/22/24 1931    Lab Status: Final result Specimen: Blood from Arm, Right Updated: 09/22/24 1955     Sodium 126 mmol/L      Potassium 5.3 mmol/L      Chloride 81 mmol/L      CO2 26 mmol/L      ANION GAP 19 mmol/L       mg/dL      Creatinine 7.53 mg/dL      Glucose 770 mg/dL      Calcium 9.0 mg/dL      AST 9 U/L       ALT 14 U/L      Alkaline Phosphatase 124 U/L      Total Protein 7.3 g/dL      Albumin 4.0 g/dL      Total Bilirubin 0.29 mg/dL      eGFR 5 ml/min/1.73sq m     Narrative:      National Kidney Disease Foundation guidelines for Chronic Kidney Disease (CKD):     Stage 1 with normal or high GFR (GFR > 90 mL/min/1.73 square meters)    Stage 2 Mild CKD (GFR = 60-89 mL/min/1.73 square meters)    Stage 3A Moderate CKD (GFR = 45-59 mL/min/1.73 square meters)    Stage 3B Moderate CKD (GFR = 30-44 mL/min/1.73 square meters)    Stage 4 Severe CKD (GFR = 15-29 mL/min/1.73 square meters)    Stage 5 End Stage CKD (GFR <15 mL/min/1.73 square meters)  Note: GFR calculation is accurate only with a steady state creatinine    Lipase [[id number]]  (Normal) Collected: 09/22/24 1931    Lab Status: Final result Specimen: Blood from Arm, Right Updated: 09/22/24 1954     Lipase 27 u/L     Magnesium [[id number]]  (Abnormal) Collected: 09/22/24 1931    Lab Status: Final result Specimen: Blood from Arm, Right Updated: 09/22/24 1954     Magnesium 1.3 mg/dL     Phosphorus [[id number]]  (Abnormal) Collected: 09/22/24 1931    Lab Status: Final result Specimen: Blood from Arm, Right Updated: 09/22/24 1954     Phosphorus 7.1 mg/dL     Lactic acid, plasma (w/reflex if result > 2.0) [[id number]]  (Normal) Collected: 09/22/24 1931    Lab Status: Final result Specimen: Blood from Arm, Right Updated: 09/22/24 1952     LACTIC ACID 0.9 mmol/L     Narrative:      Result may be elevated if tourniquet was used during collection.    CBC [[id number]]  (Abnormal) Collected: 09/22/24 1931    Lab Status: Final result Specimen: Blood from Arm, Right Updated: 09/22/24 1937     WBC 18.02 Thousand/uL      RBC 4.09 Million/uL      Hemoglobin 12.0 g/dL      Hematocrit 35.0 %      MCV 86 fL      MCH 29.3 pg      MCHC 34.3 g/dL      RDW 12.2 %      Platelets 467 Thousands/uL      MPV 9.6 fL     Hemoglobin A1c w/EAG Estimation [[id number]] Collected: 09/22/24 1931    Lab Status:  In process Specimen: Blood from Arm, Right Updated: 09/22/24 1935    POCT Blood Gas (CG8+) [[id number]]  (Abnormal) Collected: 09/22/24 1929    Lab Status: Final result Specimen: Venous Updated: 09/22/24 1934     ph, Victor Hugo ISTAT 7.442     pCO2, Victor Hugo i-STAT 42.6 mm HG      pO2, Victor Hugo i-STAT 42.0 mm HG      BE, i-STAT 4 mmol/L      HCO3, Victor Hugo i-STAT 29.1 mmol/L      CO2, i-STAT 30 mmol/L      O2 Sat, i-STAT 79 %      SODIUM, I-STAT 123 mmol/l      Potassium, i-STAT 5.8 mmol/L      Calcium, Ionized i-STAT 0.96 mmol/L      Hct, i-STAT 39 %      Hgb, i-STAT 13.3 g/dl      Glucose, i-STAT >600 mg/dl      Specimen Type VENOUS    UA w Reflex to Microscopic w Reflex to Culture [[id number]]     Lab Status: No result Specimen: Urine     POCT pregnancy, urine [[id number]]     Lab Status: No result Specimen: Urine     Fingerstick Glucose (POCT) [[id number]]  (Abnormal) Collected: 09/22/24 1914    Lab Status: Final result Specimen: Blood Updated: 09/22/24 1914     POC Glucose >600 mg/dl                Medications:  Scheduled PRN   aspirin, 81 mg, Daily  atorvastatin, 80 mg, Daily  fluticasone-vilanterol, 1 puff, Daily  heparin (porcine), 5,000 Units, Q8H KENNY  magnesium sulfate, 2 g, Once  montelukast, 10 mg, HS  pantoprazole, 20 mg, Early Morning  PARoxetine, 20 mg, Daily      albuterol, 2.5 mg, Q6H PRN  ondansetron, 4 mg, Q6H PRN  phenol, 1 spray, Q2H PRN  saliva substitute, 5 spray, 4x Daily PRN       Continuous    insulin regular (HumuLIN R,NovoLIN R) 1 Units/mL in sodium chloride 0.9 % 100 mL infusion, 0.3-21 Units/hr, Last Rate: 11 Units/hr (09/23/24 0009)  multi-electrolyte, 100 mL/hr         Labs:   CBC    Recent Labs     09/22/24 1931 09/22/24 2137   WBC 18.02*  --    HGB 12.0 11.6   HCT 35.0 34*   *  --      BMP    Recent Labs     09/22/24 1931 09/22/24 2134 09/22/24 2137   SODIUM 126* 129*  --    K 5.3 5.0  --    CL 81* 85*  --    CO2 26 27 29   AGAP 19* 17*  --    * 112*  --    CREATININE 7.53* 7.53*  --     CALCIUM 9.0 8.5  --        Coags    No recent results     Additional Electrolytes  Recent Labs     09/22/24 1929 09/22/24 1931 09/22/24 2137   MG  --  1.3*  --    PHOS  --  7.1*  --    CAIONIZED 0.96*  --  1.02*          Blood Gas    No recent results  No recent results LFTs  Recent Labs     09/22/24 1931   ALT 14   AST 9*   ALKPHOS 124*   ALB 4.0   TBILI 0.29       Infectious  No recent results  Glucose  Recent Labs     09/22/24 1931 09/22/24 2134   GLUC 770* 627*

## 2024-09-23 NOTE — UTILIZATION REVIEW
NOTIFICATION OF INPATIENT ADMISSION   AUTHORIZATION REQUEST   SERVICING FACILITY:   [place] [address]  Tax ID: [id number]  NPI: [id number] ATTENDING PROVIDER:  Attending Name and NPI#: Cindi Guillermo Md [[id number]]  Address: [address] [place] [address]  Phone: [phone]   ADMISSION INFORMATION:  Place of Service: Inpatient [facility]  Place of Service Code: 21  Inpatient Admission Date/Time: 9/22/24 10:05 PM  Discharge Date/Time: No discharge date for patient encounter.  Admitting Diagnosis Code/Description:  Vomiting [R11.10]  Renal failure [N19]  Hyperglycemia [R73.9]     UTILIZATION REVIEW CONTACT:  Mariah Martínez Utilization   Network Utilization Review Department  Phone: [phone]  Fax: [phone]  Email: [email or web address]  Contact for approvals/pending authorizations, clinical reviews, and discharge.     PHYSICIAN ADVISORY SERVICES:  Medical Necessity Denial & Kmcq-jh-Cesp Review  Phone: [phone]  Fax: [phone]  Email: [email or web address]     DISCHARGE SUPPORT TEAM:  For Patients Discharge Needs & Updates  Phone: [phone] opt. 2 Fax: [phone]  Email: [email or web address]

## 2024-09-23 NOTE — ED NOTES
Patient with PRICE, mild SOB at rest and hx of HF. Lung sounds diminished at this time. While sleeping SPO2 85% on RA, awake SPO2 90-91% on RA. Patient placed on 2L NC, SPO2 96%.   Fluid bolus given at slower rate per provider verbal order.      Maris Ivy RN  09/22/24 2467

## 2024-09-23 NOTE — UTILIZATION REVIEW
Initial Clinical Review    Admission: Date/Time/Statement:   Admission Orders (From admission, onward)       Ordered        09/22/24 2212  INPATIENT ADMISSION  Once                          Orders Placed This Encounter   Procedures    INPATIENT ADMISSION     Standing Status:   Standing     Number of Occurrences:   1     Order Specific Question:   Level of Care     Answer:   Level 2 Stepdown / HOT [14]     Order Specific Question:   Estimated length of stay     Answer:   More than 2 Midnights     Order Specific Question:   Certification     Answer:   I certify that inpatient services are medically necessary for this patient for a duration of greater than two midnights. See H&P and MD Progress Notes for additional information about the patient's course of treatment.     ED Arrival Information       Expected   -    Arrival   9/22/2024 19:00    Acuity   Emergent              Means of arrival   Walk-In    Escorted by   Family Member    Service   Hospitalist    Admission type   Emergency              Arrival complaint   Vomiting, abdominal pain, throat sore             Chief Complaint   Patient presents with    Abdominal Pain     N/V/D x 48 hours.  Type 1 diabetic, has not had her Dexacom on for the past few days due to illness.        Initial Presentation: 49 y.o. female who presented with family to [facility] ED. Admitted as Inpatient for evaluation and treatment of SABRINA and Hyperglycemia.     PMHx:  has a past medical history of Anemia, Arthritis, Asthma, Chest pain (02/03/2016), CHF (congestive heart failure) (HCC), Depression, Diabetes mellitus (HCC), Diabetic nephropathy (HCC), Diabetic nephropathy associated with type 1 diabetes mellitus (HCC) (08/12/2020), Diabetic retinopathy (HCC) (02/03/2016), Endometriosis, Gastroenteritis (02/03/2016), GERD (gastroesophageal reflux disease), History of COVID-19 (02/26/2021), Hyperlipidemia (02/03/2016), Hypertension, Obesity, Oligomenorrhea, Polycystic ovaries  (02/03/2016), Retinal hemorrhage (02/03/2016), Retinopathy, Thrombocytosis, and Type 1 diabetes mellitus with ophthalmic manifestation (HCC) (02/03/2016).      Presented w/ 2 days of nausea, vomiting, and diarrhea. Patient states that she was recently seen in the emergency department for shortness of breath and was told to increase her diuretics due to concern for volume overload. She saw her cardiologist who ordered a CTA PE study due to ongoing shortness of breath despite increased diuresis which she had done on 9/20. Shortly after completing the CTA, she began experiencing nausea/vomiting/diarrhea. She states that over the past 2 days she has been unable to tolerate oral intake and has had decreasing urine output. Pt reports severe odynophagia following multiple episodes of vomiting. On exam, epigastric tenderness without guarding.no erythema of pharynx.  Labs Na 126, Anion Gap 19, Bun/Creat 114/7.53, Glucose 770. AST 9, Alk phos 124, Phos 7.1, Mg 1.3 , WBC 18.02, Platelet count 467, POC > 600,. Beta-hydroxybutyrate 1.33 Victor Hugo BG: pO2 30.0, O2 Sat 54. CT chest/abd: Possible mild enterocolitis. No abdominopelvic fluid collection. Retained contrast within the kidneys from CT performed 2 days prior is indicative of impaired renal function. No hydroureteronephrosis.    Plan: Initiate insulin gtt. Hold home insulin and Metformin. Q 2 BG monitoring. IV fluids Isolyte @ 100 ml/hr  PRN Zofran. Mouthkote and chloraseptic spray ordered for pain control. Hold diuresis.  Endo and Nephrol consulted.     Date: 9/23/24   Day 2: Pt denies any pain today.   Abnormal labs: Anion gap 18, Bun/Juyxx793/7.82, AST 8, Phos 4.9, Procal 0.41, WBC 20.33, H/H 0.3/31.5, Platelet 453. UA: 300 Glucose, Lg Blood, Lg Leukocytes, 300 Protein, 20-30 WBC, Innum Epithelial cells.  Plan: Continue IV fluids, Trend BMP, Strict I/O, Avoid hypotension and nephrotoxins. Continue Insulin gtt. Hold home insulin and Metformin. Q 2 BG monitoring.  Maintain -180. Start Ceftriaxone IV, PRN Zofran. Mouthkote and chloraseptic spray ordered for pain control. Hold diuresis.    Nephrology consult: SABRINA. Suspect ATN in the setting of original severe prerenal (vomiting, diarrhea, decreased po, increased diuretics, lisinopril, hyperglycemia) + contrast nephropathy (CTA 9/20) + hemodynamic changes. Baseline creatinine 0.8-1.1. Creatinine 7.53 on admission and up to 7.82 today. Plan: Continue IV isolyte fluids  at 100 ml/hr. as BP soft and hyperglycemic. Hold home torsemide and lisinopril for now. Strict I's and O's and daily weights. If no improvement in urine output will trial IV lasix this afternoon. No current acute indication for dialysis. Check BMP.    ED Triage Vitals   Temperature Pulse Respirations Blood Pressure SpO2 Pain Score   09/22/24 1908 09/22/24 1908 09/22/24 1908 09/22/24 1908 09/22/24 1908 09/22/24 2307   98.2 °F (36.8 °C) 87 18 (!) 185/90 96 % 2     Weight (last 2 days)       Date/Time Weight    09/23/24 0503 79.7 (175.71)    09/22/24 2309 81 (178.57)    09/22/24 1935 81.2 (179.01)            Vital Signs (last 3 days)       Date/Time Temp Pulse Resp BP MAP (mmHg) SpO2 Calculated FIO2 (%) - Nasal Cannula Nasal Cannula O2 Flow Rate (L/min) O2 Device Patient Position - Orthostatic VS Angela Coma Scale Score Pain    09/23/24 0800 98.3 °F (36.8 °C) 76 20 104/51 73 98 % 28 2 L/min Nasal cannula -- 15 No Pain    09/23/24 0503 -- -- -- -- -- -- -- -- -- -- -- 3    09/23/24 0400 98.4 °F (36.9 °C) 85 16 112/56 80 99 % 28 2 L/min Nasal cannula Lying 15 2    09/23/24 0000 -- 91 21 128/58 84 97 % 28 2 L/min Nasal cannula -- 15 --    09/22/24 2309 99.7 °F (37.6 °C) 92 17 139/65 -- -- -- -- -- -- -- --    09/22/24 2307 99.7 °F (37.6 °C) 92 17 139/65 93 98 % -- -- Nasal cannula Lying -- 2    09/22/24 2230 -- 88 20 143/67 97 96 % 28 2 L/min Nasal cannula -- -- --    09/22/24 2200 -- 87 16 143/69 99 96 % 28 2 L/min Nasal cannula -- -- --    09/22/24 2144 -- 86 18  -- -- 97 % 28 2 L/min Nasal cannula -- -- --    09/22/24 2130 -- 87 20 143/65 94 92 % -- -- None (Room air) -- -- --    09/22/24 2100 -- 85 19 126/58 84 92 % -- -- -- -- -- --    09/22/24 2043 -- -- -- 195/79 114 92 % -- -- None (Room air) -- -- --    09/22/24 2000 -- 86 20 184/82 118 96 % -- -- -- -- -- --    09/22/24 1908 98.2 °F (36.8 °C) 87 18 185/90 -- 96 % -- -- None (Room air) Sitting -- --              Pertinent Labs/Diagnostic Test Results:   Radiology:  CT abdomen pelvis wo contrast   Final Interpretation by Frandy Foster MD (09/22 2137)      1.  Retained contrast within the kidneys from CT performed 2 days prior is indicative of impaired renal function. No hydroureteronephrosis.   2.  Possible mild enterocolitis. No abdominopelvic fluid collection.      Workstation performed: [id number]         XR chest 1 view portable   Final Interpretation by Elotn Barnard MD (09/23 0921)      No acute cardiopulmonary disease.            Workstation performed: [id number]         CT soft tissue neck wo contrast    (Results Pending)           Results from last 7 days   Lab Units 09/23/24  0440 09/22/24 2137 09/22/24 1931 09/22/24 1929   WBC Thousand/uL 20.33*  --  18.02*  --    HEMOGLOBIN g/dL 10.3*  --  12.0  --    I STAT HEMOGLOBIN g/dl  --  11.6  --  13.3   HEMATOCRIT % 31.5*  --  35.0  --    HEMATOCRIT, ISTAT %  --  34*  --  39   PLATELETS Thousands/uL 453*  --  467*  --    TOTAL NEUT ABS Thousands/µL 16.24*  --   --   --          Results from last 7 days   Lab Units 09/23/24  1012 09/23/24  0440 09/22/24 2137 09/22/24 2134 09/22/24 1931 09/22/24 1929   SODIUM mmol/L 133* 137  --  129* 126*  --    POTASSIUM mmol/L 5.0 4.5  --  5.0 5.3  --    CHLORIDE mmol/L 89* 92*  --  85* 81*  --    CO2 mmol/L 28 27  --  27 26  --    CO2, I-STAT mmol/L  --   --  29  --   --  30   ANION GAP mmol/L 16* 18*  --  17* 19*  --    BUN mg/dL 103* 107*  --  112* 114*  --    CREATININE mg/dL 7.79* 7.82*  --  7.53* 7.53*  --     EGFR ml/min/1.73sq m 5 5  --  5 5  --    CALCIUM mg/dL 8.1* 8.4  --  8.5 9.0  --    CALCIUM, IONIZED, ISTAT mmol/L  --   --  1.02*  --   --  0.96*   MAGNESIUM mg/dL 2.0 2.1  --   --  1.3*  --    PHOSPHORUS mg/dL  --  4.9*  --   --  7.1*  --      Results from last 7 days   Lab Units 09/23/24  0440 09/22/24  1931   AST U/L 8* 9*   ALT U/L 10 14   ALK PHOS U/L 86 124*   TOTAL PROTEIN g/dL 6.3* 7.3   ALBUMIN g/dL 3.5 4.0   TOTAL BILIRUBIN mg/dL 0.25 0.29   BILIRUBIN DIRECT mg/dL 0.02  --      Results from last 7 days   Lab Units 09/23/24  1005 09/23/24  0812 09/23/24  0601 09/23/24  0445 09/23/24  0403 09/23/24  0206 09/23/24  0008 09/22/24 2222 09/22/24 2134 09/22/24 2020 09/22/24  1914   POC GLUCOSE mg/dl 207* 153* 168* 75 92 201* 307* 509* 583* >600* >600*     Results from last 7 days   Lab Units 09/23/24  1012 09/23/24  0440 09/22/24 2134 09/22/24 1931   GLUCOSE RANDOM mg/dL 203* 67 627* 770*             Beta- Hydroxybutyrate   Date Value Ref Range Status   09/22/2024 1.33 (H) 0.02 - 0.27 mmol/L Final     BETA-HYDROXYBUTYRATE   Date Value Ref Range Status   12/08/2023 0.1 <0.6 mmol/L Final   07/21/2023 0.1 <0.6 mmol/L Final              Results from last 7 days   Lab Units 09/22/24 2137 09/22/24 1929   PH, CATHI I-STAT  7.351 7.442*   PCO2, CATHI ISTAT mm HG 49.0 42.6   PO2, CATHI ISTAT mm HG 30.0* 42.0   HCO3, CATHI ISTAT mmol/L 27.1 29.1   I STAT BASE EXC mmol/L 1 4*   I STAT O2 SAT % 54* 79     Results from last 7 days   Lab Units 09/22/24 2134   CK TOTAL U/L 286*     Results from last 7 days   Lab Units 09/22/24 2134 09/22/24 1931   HS TNI 0HR ng/L  --  21   HS TNI 2HR ng/L 22  --    HSTNI D2 ng/L 1  --      Results from last 7 days   Lab Units 09/23/24 0440   PROCALCITONIN ng/ml 0.41*     Results from last 7 days   Lab Units 09/23/24 0440 09/22/24 1931   LACTIC ACID mmol/L 0.9 0.9     Results from last 7 days   Lab Units 09/22/24 1931   BNP pg/mL 663*     Results from last 7 days   Lab Units  09/22/24 1931   LIPASE u/L 27                 Results from last 7 days   Lab Units 09/23/24  0356   CLARITY UA  Slightly Cloudy   COLOR UA  Yellow   SPEC GRAV UA  1.020   PH UA  6.0   GLUCOSE UA mg/dl 300 (3/10%)*   KETONES UA mg/dl Negative   BLOOD UA  Large*   PROTEIN UA mg/dl 300 (3+)*   NITRITE UA  Negative   BILIRUBIN UA  Negative   UROBILINOGEN UA (BE) mg/dl <2.0   LEUKOCYTES UA  Large*   WBC UA /hpf 20-30*   RBC UA /hpf 2-4   BACTERIA UA /hpf Occasional   EPITHELIAL CELLS WET PREP /hpf Innumerable*   MUCUS THREADS  None Seen       ED Treatment-Medication Administration from 09/22/2024 1859 to 09/22/2024 2307         Date/Time Order Dose Route Action     09/22/2024 1936 ondansetron (ZOFRAN) injection 4 mg 4 mg Intravenous Given     09/22/2024 1936 sodium chloride 0.9 % bolus 1,000 mL 1,000 mL Intravenous New Bag     09/22/2024 2101 sodium chloride 0.9 % bolus 1,000 mL -- Intravenous Restarted     09/22/2024 2020 insulin regular (HumuLIN R,NovoLIN R) 1 Units/mL in sodium chloride 0.9 % 100 mL infusion 12 Units/hr Intravenous New Bag            Past Medical History:   Diagnosis Date    Anemia     Arthritis     Asthma     w/acute exacerbation. Last assessed: 10/26/12    Chest pain 02/03/2016    CHF (congestive heart failure) (HCC)     Depression     Diabetes mellitus (HCC)     Diabetic nephropathy (HCC)     Diabetic nephropathy associated with type 1 diabetes mellitus ([facility]) 08/12/2020    Diabetic retinopathy ([facility]) 02/03/2016    Endometriosis     Gastroenteritis 02/03/2016    GERD (gastroesophageal reflux disease)     History of COVID-19 02/26/2021    Hyperlipidemia 02/03/2016    Hypertension     Obesity     Oligomenorrhea     Polycystic ovaries 02/03/2016    Retinal hemorrhage 02/03/2016    Retinopathy     Thrombocytosis     Type 1 diabetes mellitus with ophthalmic manifestation (HCC) 02/03/2016     Present on Admission:   Type 1 diabetes mellitus with hyperglycemia (HCC)   Asthma   Hypertension   Chronic  diastolic congestive heart failure (HCC)   SABRINA (acute kidney injury) (HCC)   Anemia   Hyponatremia   Sore throat   N&V (nausea and vomiting)   (Resolved) Asthma, mild persistent   Hyperlipidemia   Severe obesity (BMI 35.0-39.9) with comorbidity (HCC)   Gastroesophageal reflux disease without esophagitis      Admitting Diagnosis: Vomiting [R11.10]  Renal failure [N19]  Hyperglycemia [R73.9]  Age/Sex: 49 y.o. female  Admission Orders:  Scheduled Medications:  aspirin, 81 mg, Oral, Daily  atorvastatin, 80 mg, Oral, Daily  cefTRIAXone, 2,000 mg, Intravenous, Q24H  fluticasone-vilanterol, 1 puff, Inhalation, Daily  heparin (porcine), 5,000 Units, Subcutaneous, Q8H KENNY  montelukast, 10 mg, Oral, HS  pantoprazole, 20 mg, Oral, Early Morning  PARoxetine, 20 mg, Oral, Daily      Continuous IV Infusions:  insulin regular (HumuLIN R,NovoLIN R) 1 Units/mL in sodium chloride 0.9 % 100 mL infusion, 0.3-21 Units/hr, Intravenous, Titrated  multi-electrolyte, 100 mL/hr, Intravenous, Continuous      PRN Meds:  acetaminophen, 650 mg, Oral, Q6H PRN  albuterol, 2.5 mg, Nebulization, Q6H PRN  ondansetron, 4 mg, Intravenous, Q6H PRN  phenol, 1 spray, Mouth/Throat, Q2H PRN  saliva substitute, 5 spray, Mouth/Throat, 4x Daily PRN        IP CONSULT TO NEPHROLOGY  IP CONSULT TO ENDOCRINOLOGY    Network Utilization Review Department  ATTENTION: Please call with any questions or concerns to [phone] and carefully listen to the prompts so that you are directed to the right person. All voicemails are confidential.   For Discharge needs, contact Care Management DC Support Team at [phone] opt. [phone]  Send all requests for admission clinical reviews, approved or denied determinations and any other requests to dedicated fax number below belonging to the campus where the patient is receiving treatment. List of dedicated fax numbers for the Facilities:  FACILITY NAME UR FAX NUMBER   ADMISSION DENIALS (Administrative/Medical Necessity) [phone]    DISCHARGE SUPPORT TEAM (NETWORK) [phone]   PARENT CHILD HEALTH (Maternity/NICU/Pediatrics) [phone]   [facility] [phone]   [facility] [phone]   [facility] [phone]   [facility] [phone]   [facility] [phone]   [facility] [phone]   [facility] [phone]   [facility] [phone]   [facility] [phone]   [facility] [phone]   [facility] [phone]   [facility] [phone]

## 2024-09-23 NOTE — CONSULTS
Consultation - Nephrology   Vernell Darby 49 y.o. female MRN: [id number]  Unit/Bed#: -01 SDU Encounter: [id number]    ASSESSMENT/PLAN:   Acute kidney injury, POA  Suspect ATN in the setting of original severe prerenal (vomiting, diarrhea, decreased po, increased diuretics, lisinopril, hyperglycemia) + contrast nephropathy (CTA 9/20) + hemodynamic changes   Baseline creatinine 0.8-1.1  Creatinine 7.53 on admission and up to 7.82 today   UA: Large leukocytes, 3+ protein, glucose, 2-4 RBCs, 20-30 WBCs, innumerable epithelial cells  CT: No hydronephrosis but retained contrast within the kidneys from CT 2 days prior  Continue IVF as BP soft and hyperglycemic -- currently on isolyte at 100c/h   If no improvement in urine output would give IV Lasix  Hold home torsemide and lisinopril for now  Strict I's and O's and daily weights  No current acute indication for dialysis  If no improvement consider serologic workup  Nausea and vomiting  Workup per primary team--felt to be related to diabetes  Considering stool studies if ongoing diarrhea  Proteinuria  3+ protein on UA today but this was in the setting of severe SABRINA  Due to type 1 diabetes  Follows as an outpatient with Dr. Taylor   Chronic diastolic CHF   Currently euvolemic  Likely will require Lasix if urine output does not improve  Hyponatremia  Pseudohyponatremia in the setting of hyperglycemia and now has normalized with correction of glucose  Hyperphosphatemia  Improved down to 4.9  DM I    Summary of Plan:   Hold home lisinopril and torsemide  Continue isolyte at 100cc/h   If no improvement in urine output will trial IV lasix this afternoon   Check am BMP   No current indication for dialysis   Discussed above plan with primary team agree with continuing fluids  +/- prn lasix     HISTORY OF PRESENT ILLNESS:  Requesting Physician: Cindi Guillermo MD  Reason for Consult: Acute kidney injury    Vernell Darby is a 49 y.o. year old female who was admitted to  [facility] with nausea, vomiting and diarrhea x 2 days.  On admission she was found to have a creatinine of 7.5 and nephrology was consulted.  Earlier this month patient had some shortness of breath after vacation.  Seen in the ER for shortness of breath on 9/10 and Lasix was doubled.  On 9/13 her Lasix was changed to torsemide.  She lost weight but continued to have shortness of breath so she underwent a CTA on 9/20 (did not hold her lisinopril or diuretics prior to the scan).  For the past 2 days she has had significant vomiting and was unable to keep down any food or medications so she came to the ER.  Initial glucose 770 and creatinine 7.5.    Patient's nausea/vomiting have improved today but her appetite is still very poor.  She feels very weak and tired.  Her urine output is very minimal.  No current chest pain or shortness of breath.  Denies any recent NSAID use.      PAST MEDICAL HISTORY:  Past Medical History:   Diagnosis Date    Anemia     Arthritis     Asthma     w/acute exacerbation. Last assessed: 10/26/12    Chest pain 02/03/2016    CHF (congestive heart failure) (HCC)     Depression     Diabetes mellitus (HCC)     Diabetic nephropathy (HCC)     Diabetic nephropathy associated with type 1 diabetes mellitus (HCC) 08/12/2020    Diabetic retinopathy (HCC) 02/03/2016    Endometriosis     Gastroenteritis 02/03/2016    GERD (gastroesophageal reflux disease)     History of COVID-19 02/26/2021    Hyperlipidemia 02/03/2016    Hypertension     Obesity     Oligomenorrhea     Polycystic ovaries 02/03/2016    Retinal hemorrhage 02/03/2016    Retinopathy     Thrombocytosis     Type 1 diabetes mellitus with ophthalmic manifestation (HCC) 02/03/2016       PAST SURGICAL HISTORY:  Past Surgical History:   Procedure Laterality Date    CATARACT EXTRACTION Left 10/2020    CATARACT EXTRACTION Right     RETINAL LASER PROCEDURE         ALLERGIES:  No Known Allergies    SOCIAL HISTORY:  Social History     Substance and Sexual  Activity   Alcohol Use Yes    Alcohol/week: 2.0 standard drinks of alcohol    Types: 2 Standard drinks or equivalent per week    Comment: Occasionally/ sicially     Social History     Substance and Sexual Activity   Drug Use No     Social History     Tobacco Use   Smoking Status Never   Smokeless Tobacco Never   Tobacco Comments    Per [employer]: Former smoker. Quit in 1994       FAMILY HISTORY:  Family History   Problem Relation Age of Onset    Heart murmur Mother     Diabetes Mother         Mellitus    Thyroid disease Mother         Disorder    Diabetes type II Mother     Diabetes Father         Mellitus    Hypertension Father     Diabetes type II Father     No Known Problems Maternal Grandmother     Diabetes Maternal Grandfather         Mellitus    Breast cancer Paternal Grandmother 70 - 79    Leukemia Paternal Grandfather 48    Diabetes Maternal Aunt         Mellitus    Diabetes Maternal Uncle         Mellitus    Substance Abuse Neg Hx     Mental illness Neg Hx     Alcohol abuse Neg Hx        MEDICATIONS:  Scheduled Meds:  Current Facility-Administered Medications   Medication Dose Route Frequency Provider Last Rate    acetaminophen  650 mg Oral Q6H PRN Gi Bello PA-C      albuterol  2.5 mg Nebulization Q6H PRN Gi Bello PA-C      aspirin  81 mg Oral Daily Gi Bello PA-C      atorvastatin  80 mg Oral Daily Gi Bello PA-C      cefTRIAXone  2,000 mg Intravenous Q24H Gi Bello PA-C Stopped (09/23/24 0700)    fluticasone-vilanterol  1 puff Inhalation Daily Gi Bello PA-C      heparin (porcine)  5,000 Units Subcutaneous Q8H KENNY Gi Bello PA-C      insulin regular (HumuLIN R,NovoLIN R) 1 Units/mL in sodium chloride 0.9 % 100 mL infusion  0.3-21 Units/hr Intravenous Titrated Gi Bello PA-C 1 Units/hr (09/23/24 0812)    montelukast  10 mg Oral HS Gi Bello PA-C      multi-electrolyte  100 mL/hr Intravenous Continuous Gi Bello PA-C 100 mL/hr  (09/23/24 0059)    ondansetron  4 mg Intravenous Q6H PRN Gi Magdalena Bello PA-C      pantoprazole  20 mg Oral Early Morning Gi FELICITAS Tavarez-EITAN      PARoxetine  20 mg Oral Daily Gi Magdalena Bello PA-C      phenol  1 spray Mouth/Throat Q2H PRN Gichuy Lrestrella Bello PA-C      saliva substitute  5 spray Mouth/Throat 4x Daily PRN Gi Magdalena Bello PA-C         PRN Meds:.  acetaminophen    albuterol    ondansetron    phenol    saliva substitute    Continuous Infusions:insulin regular (HumuLIN R,NovoLIN R) 1 Units/mL in sodium chloride 0.9 % 100 mL infusion, 0.3-21 Units/hr, Last Rate: 1 Units/hr (09/23/24 0812)  multi-electrolyte, 100 mL/hr, Last Rate: 100 mL/hr (09/23/24 0059)        REVIEW OF SYSTEMS:  A complete review of systems was done. Pertinent positives and negatives noted in the HPI but otherwise the review of systems is negative.    PHYSICAL EXAM:  Current Weight: Weight - Scale: 79.7 kg (175 lb 11.3 oz)  First Weight: Weight - Scale: 81.2 kg (179 lb 0.2 oz)  Vitals:    09/23/24 0800   BP: 104/51   Pulse: 76   Resp: 20   Temp: 98.3 °F (36.8 °C)   SpO2: 98%       Intake/Output Summary (Last 24 hours) at 9/23/2024 0930  Last data filed at 9/23/2024 0800  Gross per 24 hour   Intake 3133.36 ml   Output 10 ml   Net 3123.36 ml     General:  appears comfortable and in no acute distress   Skin:  No rash  Eyes:  Sclerae anicteric, no periorbital edema   ENT:  Moist mucous membranes  Neck:  Trachea midline, symmetric   Chest:  Clear to auscultation bilaterally with no wheezes, rales or rhonchi  CVS:  Regular rate and rhythm  Abdomen:  Soft, nontender, nondistended  Neuro:  Awake and alert  Psych:  Appropriate affect  Extremities: no lower extremity edema     Lab Results:   Results from last 7 days   Lab Units 09/23/24  0440 09/22/24  2137 09/22/24  2134 09/22/24  1931 09/22/24  1929   WBC Thousand/uL 20.33*  --   --  18.02*  --    HEMOGLOBIN g/dL 10.3*  --   --  12.0  --    I STAT HEMOGLOBIN g/dl  --  11.6  --   --  13.3    HEMATOCRIT % 31.5*  --   --  35.0  --    HEMATOCRIT, ISTAT %  --  34*  --   --  39   PLATELETS Thousands/uL 453*  --   --  467*  --    SODIUM mmol/L 137  --  129* 126*  --    POTASSIUM mmol/L 4.5  --  5.0 5.3  --    CHLORIDE mmol/L 92*  --  85* 81*  --    CO2 mmol/L 27  --  27 26  --    CO2, I-STAT mmol/L  --  29  --   --  30   BUN mg/dL 107*  --  112* 114*  --    CREATININE mg/dL 7.82*  --  7.53* 7.53*  --    CALCIUM mg/dL 8.4  --  8.5 9.0  --    MAGNESIUM mg/dL 2.1  --   --  1.3*  --    PHOSPHORUS mg/dL 4.9*  --   --  7.1*  --    GLUCOSE, ISTAT mg/dl  --  590*  --   --  >600*       Radiology Results:   CT abdomen pelvis wo contrast   Final Result by Frandy Foster MD (09/22 2137)      1.  Retained contrast within the kidneys from CT performed 2 days prior is indicative of impaired renal function. No hydroureteronephrosis.   2.  Possible mild enterocolitis. No abdominopelvic fluid collection.      Workstation performed: [id number]         XR chest 1 view portable   Final Result by Elton Barnard MD (09/23 0921)      No acute cardiopulmonary disease.            Workstation performed: [id number]         CT soft tissue neck wo contrast    (Results Pending)

## 2024-09-23 NOTE — QUICK NOTE
Patient will be seen tomorrow. Chart reviewed. Still has mild AG and hence c/w insulin drip for now. Transition tomorrow

## 2024-09-23 NOTE — ASSESSMENT & PLAN NOTE
Lab Results   Component Value Date    HGBA1C 8.7 (H) 07/13/2024       Recent Labs     09/22/24 2020 09/22/24 2134 09/22/24 2222 09/23/24  0008   POCGLU >600* 583* 509* 307*       Blood Sugar Average: Last 72 hrs:  (P) 279.8    Hx of T1DM, follows with Dr. Ascencio from Endocrinology  Presented with hyperglycemia, no DKA  Started on regular insulin gtt  Hold home insulin and metformin  Q2h BG monitoring  Maintain -180

## 2024-09-23 NOTE — ASSESSMENT & PLAN NOTE
Wt Readings from Last 3 Encounters:   09/22/24 81 kg (178 lb 9.2 oz)   09/18/24 82.2 kg (181 lb 3.5 oz)   09/17/24 82.7 kg (182 lb 6.4 oz)       5/2024 Echo: EF 65%, G2DD  Recently had diuretics up-titrated outpatient due to persistent SOB/PRICE  Currently examines hypovolemic  Hold diuresis  Strict I/O  Daily weights

## 2024-09-23 NOTE — APP STUDENT NOTE
EMI STUDENT  Inpatient Progress Note for TRAINING ONLY  Not Part of Legal Medical Record     Progress Note - Vernell Darby 49 y.o. female MRN: [id number]  Unit/Bed#: -01 SDU Encounter: [id number]         1. SABRINA  Likely related to pre-renal causes including dehydration, diuretic increase, ACE inhibitor use, nausea and vomiting  Creatinine on admission 7.53 and baseline is 0.7-1.0  Hyponatremic on admission, improvement to 131 since  UA showed WBCs, protein, glucose which is likely related to diabetes  Denied urinary symptoms, SIRS criteria not met  Minimal urine made  Strep PCR negative, low suspicion for post-infectious glomerulonephritis  CT abdomen and pelvis showed retained contrast in kidney from CTA performed 2 days ago, patient reported symptoms began after this   No hydroureteronephrosis  Possible mild enterocolitis  No abdominopelvic fluid collection  Plan  Continue IV fluids - currently isolyte 100cc/h  Monitor electrolytes  Strict input and output  Hold home torsemide and lisinopril  Nephrology consult obtained and recommended to consider IV Lasix if minimal output continues  No indication for dialysis at this time    2. Nausea/vomiting/diarrhea  Likely related to hyperglycemia  Denied fever and chills  Antiemetics if N/V returns  If diarrhea persists, obtain stool culture to rule out C. Diff  Patient reported traveling to [facility] and returned 9/7    3. Hyponatremia  Na 126 on admission and has been corrected to 137  Continue to monitor BMP  Resolved    4. Type 1 diabetes mellitus with hyperglycemia  Last POC glucose 207, continue to monitor  Protein in urine in setting of SABRINA  Follows outpatient with Dr. Ascencio    5. Hypertension  Hold home lisinopril    6. Hyperlipidemia  Continue statin  7. Anemia  Baseline hgb 9-10  Hgb on admission 12  Likely hemoconcentrated  Trend hgb and maintain > 7  8. Severe obesity (BMI 35.0-39.9) with comorbidity (HCC)  Encourage lifestyle modifications as  appropriate  9. Gastroesophageal reflux disease without esophagitis  Continue home PPI        VTE Pharmacologic Prophylaxis:   Pharmacologic: Heparin  Mechanical VTE Prophylaxis in Place: Yes    Patient Centered Rounds: I have performed bedside rounds with nursing staff today.    Discussions with Specialists or Other Care Team Provider: Nephrology    Education and Discussions with Family / Patient: Patient    Time Spent for Care: 45 minutes.  More than 50% of total time spent on counseling and coordination of care as described above.    Current Length of Stay: 1 day(s)    Current Patient Status: Inpatient   Certification Statement: The patient will continue to require additional inpatient hospital stay due to unresolved SABRINA    Discharge Plan: No current discharge plan    Code Status: Level 1 - Full Code    Subjective:   Vernell Darby is a 49 y.o. female with past medical history significant for T1DM, chronic diastolic heart failure, hyperlipidemia, PCOS, asthma, GERD, and obesity who presents in the ICU due to an SABRINA. The patient reports a sore throat due to vomiting upon admission and diarrhea after returning from [facility] on . Patient has had abdominal pain, nausea, vomiting for 2 days since undergoing CTA for possible PE due to p[persistent SOB. Patient also reported increased dosage of water pill. Patient denied current chest pain, SOB, nausea, vomiting, abdominal pain, urinary changes, fever, and chills. Patient presented with hyperglycemia without DKA.     Objective:     Vitals:   Temp (24hrs), Av.9 °F (37.2 °C), Min:98.2 °F (36.8 °C), Max:99.7 °F (37.6 °C)    Temp:  [98.2 °F (36.8 °C)-99.7 °F (37.6 °C)] 98.3 °F (36.8 °C)  HR:  [76-92] 76  Resp:  [16-21] 20  BP: (104-195)/(51-90) 104/51  SpO2:  [92 %-99 %] 98 %  Body mass index is 39.39 kg/m².     Input and Output Summary (last 24 hours):       Intake/Output Summary (Last 24 hours) at 2024 1045  Last data filed at 2024 0931  Gross per 24  hour   Intake 3613.36 ml   Output 10 ml   Net 3603.36 ml       Physical Exam:     Physical Exam  Vitals and nursing note reviewed.   Constitutional:       General: She is not in acute distress.     Appearance: She is well-developed. She is obese. She is ill-appearing.   HENT:      Head: Normocephalic and atraumatic.      Mouth/Throat:      Mouth: Mucous membranes are dry.   Eyes:      Conjunctiva/sclera: Conjunctivae normal.   Cardiovascular:      Rate and Rhythm: Normal rate and regular rhythm.      Pulses: Normal pulses.      Heart sounds: Murmur heard.      Systolic murmur is present with a grade of 3/6.   Pulmonary:      Effort: Pulmonary effort is normal. No respiratory distress.      Breath sounds: Normal breath sounds.   Abdominal:      Palpations: Abdomen is soft.      Tenderness: There is abdominal tenderness in the epigastric area.   Musculoskeletal:         General: No swelling.      Cervical back: Neck supple.   Skin:     General: Skin is warm and dry.      Capillary Refill: Capillary refill takes less than 2 seconds.   Neurological:      General: No focal deficit present.      Mental Status: She is alert and oriented to person, place, and time.   Psychiatric:         Mood and Affect: Mood normal.         Behavior: Behavior normal.         Historical Information   Past Medical History:   Diagnosis Date    Anemia     Arthritis     Asthma     w/acute exacerbation. Last assessed: 10/26/12    Chest pain 02/03/2016    CHF (congestive heart failure) (HCC)     Depression     Diabetes mellitus (HCC)     Diabetic nephropathy (HCC)     Diabetic nephropathy associated with type 1 diabetes mellitus (HCC) 08/12/2020    Diabetic retinopathy (HCC) 02/03/2016    Endometriosis     Gastroenteritis 02/03/2016    GERD (gastroesophageal reflux disease)     History of COVID-19 02/26/2021    Hyperlipidemia 02/03/2016    Hypertension     Obesity     Oligomenorrhea     Polycystic ovaries 02/03/2016    Retinal hemorrhage  02/03/2016    Retinopathy     Thrombocytosis     Type 1 diabetes mellitus with ophthalmic manifestation (HCC) 02/03/2016     Past Surgical History:   Procedure Laterality Date    CATARACT EXTRACTION Left 10/2020    CATARACT EXTRACTION Right     RETINAL LASER PROCEDURE       Social History   Social History     Substance and Sexual Activity   Alcohol Use Yes    Alcohol/week: 2.0 standard drinks of alcohol    Types: 2 Standard drinks or equivalent per week    Comment: Occasionally/ sicially     Social History     Substance and Sexual Activity   Drug Use No     Social History     Tobacco Use   Smoking Status Never   Smokeless Tobacco Never   Tobacco Comments    Per [employer]: Former smoker. Quit in 1994     Family History: Family history non-contributory    Meds/Allergies   all medications and allergies reviewed  No Known Allergies    Additional Data:     Labs:    Results from last 7 days   Lab Units 09/23/24  0440   WBC Thousand/uL 20.33*   HEMOGLOBIN g/dL 10.3*   HEMATOCRIT % 31.5*   PLATELETS Thousands/uL 453*   SEGS PCT % 81*   LYMPHO PCT % 10*   MONO PCT % 9   EOS PCT % 0     Results from last 7 days   Lab Units 09/23/24  0440   SODIUM mmol/L 137   POTASSIUM mmol/L 4.5   CHLORIDE mmol/L 92*   CO2 mmol/L 27   BUN mg/dL 107*   CREATININE mg/dL 7.82*   ANION GAP mmol/L 18*   CALCIUM mg/dL 8.4   ALBUMIN g/dL 3.5   TOTAL BILIRUBIN mg/dL 0.25   ALK PHOS U/L 86   ALT U/L 10   AST U/L 8*   GLUCOSE RANDOM mg/dL 67         Results from last 7 days   Lab Units 09/23/24  1005 09/23/24  0812 09/23/24  0601 09/23/24  0445 09/23/24  0403 09/23/24  0206 09/23/24  0008 09/22/24  2222 09/22/24  2134 09/22/24  2020 09/22/24  1914   POC GLUCOSE mg/dl 207* 153* 168* 75 92 201* 307* 509* 583* >600* >600*         Results from last 7 days   Lab Units 09/23/24  0440 09/22/24  1931   LACTIC ACID mmol/L 0.9 0.9   PROCALCITONIN ng/ml 0.41*  --          * I Have Reviewed All Lab Data Listed Above.  * Additional Pertinent Lab Tests Reviewed:  All Labs For Current Hospital Admission Reviewed    Imaging:    Imaging Reports Reviewed Today Include: CXR, CT abdomen and pelvis    Recent Cultures (last 7 days):           Last 24 Hours Medication List:   Current Facility-Administered Medications   Medication Dose Route Frequency Provider Last Rate    acetaminophen  650 mg Oral Q6H PRN Gi Lrestrella Conleyum, PA-C      albuterol  2.5 mg Nebulization Q6H PRN Gi Lrestrella Conleyum, PA-C      aspirin  81 mg Oral Daily Gi Nichols Ace, PA-C      atorvastatin  80 mg Oral Daily Gi Lrestrella Conleyum, PA-C      cefTRIAXone  2,000 mg Intravenous Q24H Gi Magdalena Bello, PA-C Stopped (09/23/24 0700)    fluticasone-vilanterol  1 puff Inhalation Daily Gichuy Lrestrella Bello, PA-C      heparin (porcine)  5,000 Units Subcutaneous Q8H KENNY Gi Lrestrella Bello, PA-C      insulin regular (HumuLIN R,NovoLIN R) 1 Units/mL in sodium chloride 0.9 % 100 mL infusion  0.3-21 Units/hr Intravenous Titrated Gichuy Lrestrella Bello, PA-C 2 Units/hr (09/23/24 1005)    montelukast  10 mg Oral HS Gichuy Lrestrella Bello, PA-C      multi-electrolyte  100 mL/hr Intravenous Continuous Gichuy Lrestrella Bello, PA-C 100 mL/hr (09/23/24 0059)    ondansetron  4 mg Intravenous Q6H PRN Gi Lre Ace, PA-C      pantoprazole  20 mg Oral Early Morning Gi Lrestrella Conleyum, PA-C      PARoxetine  20 mg Oral Daily Gi Lrestrella Conleyum, PA-C      phenol  1 spray Mouth/Throat Q2H PRN Gi Lrestrella Conleyum, PA-C      saliva substitute  5 spray Mouth/Throat 4x Daily PRN Gi Lrestrella Bello, PA-C          Today, Patient Was Seen By: Andrew LEE    ** Please Note: Dictation voice to text software may have been used in the creation of this document. **

## 2024-09-23 NOTE — ASSESSMENT & PLAN NOTE
Pt reports severe odynophagia following multiple episodes of vomiting  Strep PCR neg, COVID/Flu/RSV neg  Unremarkable physical exam: no erythema of pharynx, no cervical LAD, no crepitus of neck but tender to palpation  If ongoing pain, consider CT soft tissue neck to rule out esophageal injury from excessive vomiting  Mouthkote and chloraseptic spray ordered for pain control

## 2024-09-23 NOTE — ASSESSMENT & PLAN NOTE
SIRS: tachypnea, tachycardia, leukocytosis  No evidence of infection  Unlikely sepsis with no fevers/chills  UA pending  Monitor off abx  If source of infection identified, check blood cultures/lactic

## 2024-09-23 NOTE — CASE MANAGEMENT
Case Management Assessment & Discharge Planning Note    Patient name Vernell Darby  Location  SDU/-01 S* MRN [id number]  : 1975 Date 2024       Current Admission Date: 2024  Current Admission Diagnosis:SABRINA (acute kidney injury) ([facility])   Patient Active Problem List    Diagnosis Date Noted Date Diagnosed    N&V (nausea and vomiting) 2024     SIRS (systemic inflammatory response syndrome) ([facility]) 2024     ATN (acute tubular necrosis) ([facility]) 2024     Oliguria and anuria 2024     SABRINA (acute kidney injury) ([facility]) 2024     Sore throat 2024     Stage 2 chronic kidney disease 2024     Hyperphosphatemia 2024     Hyponatremia 2023     Primary localized osteoarthritis of right knee 10/27/2022     Chronic diastolic congestive heart failure ([facility]) 2021     B12 deficiency 2021     Nuclear sclerotic cataract of left eye 2020     Diabetic polyneuropathy associated with type 1 diabetes mellitus ([facility]) 2020     Diabetic nephropathy associated with type 1 diabetes mellitus ([facility]) 2020     Other proteinuria 2020     Severe obesity (BMI 35.0-39.9) with comorbidity ([facility]) 08/10/2019     Gastroesophageal reflux disease without esophagitis 08/10/2019     Anemia 02/15/2017     Thrombocytosis 02/15/2017     Type 1 diabetes mellitus with hyperglycemia ([facility]) 2016     Hyperlipidemia 2016     Polycystic ovarian syndrome 2016     Retinal hemorrhage 2016     Asthma      Hypertension      Moderate episode of recurrent major depressive disorder ([facility])      Nervousness 07/10/2014     Both eyes affected 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, associated with type 1 diabetes mellitus ([facility]) 2012       LOS (days): 1  Geometric Mean LOS (GMLOS) (days):   Days to GMLOS:     OBJECTIVE:    Risk of Unplanned Readmission Score: 24.96         Current admission status: Inpatient        [facility]   [id number] - [place], [place] - [id number] [facility] [address]  Phone: [phone] Fax: [phone]    Primary Care Provider: Rika Nugent MD    Primary Insurance: BLUE CROSS  Secondary Insurance:     ASSESSMENT:  Active Health Care Proxies    There are no active Health Care Proxies on file.       Advance Directives  Does patient have a Health Care POA?: No  Was patient offered paperwork?: Yes  Does patient currently have a Health Care decision maker?: No  Does patient have Advance Directives?: No  Was patient offered paperwork?: Yes  Primary Contact: Kirshna Darby: : [phone]    Readmission Root Cause  30 Day Readmission: No    Patient Information  Admitted from:: Home  Mental Status: Alert  During Assessment patient was accompanied by: Spouse  Assessment information provided by:: Patient  Primary Caregiver: Self  Support Systems: Self, Spouse/significant other, Family members  County [place] Residence: [place]  What city do you live in?: [place]  Home entry access options. Select all that apply.: Stairs  Number of steps to enter home.: 5  Do the steps have railings?: Yes  Type of Current Residence: [facility]  Living Arrangements: Lives w/ Spouse/significant other  Is patient a ?: No    Activities of Daily Living Prior to Admission  Functional Status: Independent  Completes ADLs independently?: Yes  Ambulates independently?: Yes  Does patient use assisted devices?: No  Does patient currently own DME?: Yes  What DME does the patient currently own?: Walker, Wheelchair, Crutches, Nebulizer  Does patient have a history of Outpatient Therapy (PT/OT)?: No  Does the patient have a history of Short-Term Rehab?: No  Does patient have a history of HHC?: No  Does patient currently have HHC?: No    Patient Information Continued  Income Source: Employed  Does patient have prescription coverage?: Yes  Does patient  receive dialysis treatments?: No  Does patient have a history of substance abuse?: No  Does patient have a history of Mental Health Diagnosis?: No    Means of Transportation  Means of Transport to Appts:: Drives Self      Social Determinants of Health (SDOH)      Flowsheet Row Most Recent Value   Housing Stability    In the last 12 months, was there a time when you were not able to pay the mortgage or rent on time? N   In the past 12 months, how many times have you moved where you were living? 0   At any time in the past 12 months, were you homeless or living in a shelter (including now)? N   Transportation Needs    In the past 12 months, has lack of transportation kept you from medical appointments or from getting medications? no   In the past 12 months, has lack of transportation kept you from meetings, work, or from getting things needed for daily living? No   Food Insecurity    Within the past 12 months, you worried that your food would run out before you got the money to buy more. Never true   Within the past 12 months, the food you bought just didn't last and you didn't have money to get more. Never true   Utilities    In the past 12 months has the electric, gas, oil, or water company threatened to shut off services in your home? No            DISCHARGE DETAILS:    Discharge planning discussed with:: patient and patient's   Freedom of Choice: Yes  Comments - Freedom of Choice: Plans to return home, unsure of discharge needs  CM contacted family/caregiver?: No- see comments (Pt's  at bedside)    Contacts  Patient Contacts: Krishna Krishnamurthy:   Relationship to Patient:: Family  Contact Method: Phone  Phone Number: [phone]  Reason/Outcome: Emergency Contact, Discharge Planning      Additional Comments: Met with Pt. Pt's  at bedside. Discussed role of case management. Pt lives with  in 1sh, >5 steps with railings to enter. Independent PTA. Owns crutches, walker, wheelchair,  nebulizer. [facility] [place] and able to afford medications. Emplyed. Drives, grocery shopping. Denies hx of VNA/SNF/MH/D&A. Pt reports her plan is to return home and unsure of discharge needs at this time. CM to follow.

## 2024-09-23 NOTE — ED PROVIDER NOTES
1. Hyperglycemia    2. Vomiting    3. Renal failure      ED Disposition       ED Disposition   Admit    Condition   Stable    Date/Time   Sun Sep 22, 2024 10:05 PM    Comment   Case was discussed with SONIA and the patient's admission status was agreed to be Admission Status: inpatient status to the service of Dr. Dietz .               Assessment & Plan       Medical Decision Making  49-year-old female with type 1 diabetes presenting with vomiting, abdominal pain and elevated blood glucose>600 in triage.  Concern for diabetic ketoacidosis, dehydration, infectious etiology.  Obtain labs, blood gas, CT abdomen pelvis.  Administer fluids slowly given history of CHF.    Initiate insulin drip given elevated glucose.    Amount and/or Complexity of Data Reviewed  Labs: ordered.  Radiology: ordered. Decision-making details documented in ED Course.    Risk  Prescription drug management.  Decision regarding hospitalization.                ED Course as of 09/22/24 2206   [place] Sep 22, 2024   2155 XR chest 1 view portable       Medications   insulin regular (HumuLIN R,NovoLIN R) 1 Units/mL in sodium chloride 0.9 % 100 mL infusion (12 Units/hr Intravenous New Bag 9/22/24 2020)   multi-electrolyte (PLASMALYTE-A/ISOLYTE-S PH 7.4) IV solution 2,000 mL (has no administration in time range)   ondansetron (ZOFRAN) injection 4 mg (4 mg Intravenous Given 9/22/24 1936)   sodium chloride 0.9 % bolus 1,000 mL ( Intravenous Restarted 9/22/24 2101)       History of Present Illness       49-year-old female with history of insulin-dependent diabetes presents for evaluation of nausea, vomiting and abdominal pain that started 2 days ago.  Patient reports she was evaluated here last week for shortness of breath then seen by her cardiologist the following day who changed to her diuretics.  Initially increased dose of Lasix which did not help so then changed to torsemide 1 week ago.  Patient states that was working great but then she also started  with a sore throat a few days ago.        Review of Systems   Gastrointestinal:  Positive for vomiting.           Objective     ED Triage Vitals   Temperature Pulse Blood Pressure Respirations SpO2 Patient Position - Orthostatic VS   09/22/24 1908 09/22/24 1908 09/22/24 1908 09/22/24 1908 09/22/24 1908 09/22/24 1908   98.2 °F (36.8 °C) 87 (!) 185/90 18 96 % Sitting      Temp Source Heart Rate Source BP Location FiO2 (%) Pain Score    09/22/24 1908 09/22/24 2100 09/22/24 1908 -- --    Temporal Monitor Right arm          Physical Exam  Vitals and nursing note reviewed.   Constitutional:       Appearance: She is well-developed.   HENT:      Head: Normocephalic and atraumatic.      Right Ear: External ear normal.      Left Ear: External ear normal.      Nose: Nose normal.   Eyes:      General: No scleral icterus.  Cardiovascular:      Rate and Rhythm: Normal rate.   Pulmonary:      Effort: Pulmonary effort is normal. No respiratory distress.   Abdominal:      General: There is no distension.      Tenderness: There is abdominal tenderness in the epigastric area.   Musculoskeletal:         General: No deformity. Normal range of motion.      Cervical back: Normal range of motion.   Skin:     Findings: No rash.   Neurological:      General: No focal deficit present.      Mental Status: She is alert and oriented to person, place, and time.   Psychiatric:         Mood and Affect: Mood normal.         Labs Reviewed   CBC AND PLATELET - Abnormal       Result Value    WBC 18.02 (*)     RBC 4.09      Hemoglobin 12.0      Hematocrit 35.0      MCV 86      MCH 29.3      MCHC 34.3      RDW 12.2      Platelets 467 (*)     MPV 9.6     COMPREHENSIVE METABOLIC PANEL - Abnormal    Sodium 126 (*)     Potassium 5.3      Chloride 81 (*)     CO2 26      ANION GAP 19 (*)      (*)     Creatinine 7.53 (*)     Glucose 770 (*)     Calcium 9.0      AST 9 (*)     ALT 14      Alkaline Phosphatase 124 (*)     Total Protein 7.3      Albumin 4.0       Total Bilirubin 0.29      eGFR 5      Narrative:     National Kidney Disease Foundation guidelines for Chronic Kidney Disease (CKD):     Stage 1 with normal or high GFR (GFR > 90 mL/min/1.73 square meters)    Stage 2 Mild CKD (GFR = 60-89 mL/min/1.73 square meters)    Stage 3A Moderate CKD (GFR = 45-59 mL/min/1.73 square meters)    Stage 3B Moderate CKD (GFR = 30-44 mL/min/1.73 square meters)    Stage 4 Severe CKD (GFR = 15-29 mL/min/1.73 square meters)    Stage 5 End Stage CKD (GFR <15 mL/min/1.73 square meters)  Note: GFR calculation is accurate only with a steady state creatinine   BETA HYDROXYBUTYRATE - Abnormal    Beta- Hydroxybutyrate 1.33 (*)    MAGNESIUM - Abnormal    Magnesium 1.3 (*)    PHOSPHORUS - Abnormal    Phosphorus 7.1 (*)    BASIC METABOLIC PANEL - Abnormal    Sodium 129 (*)     Potassium 5.0      Chloride 85 (*)     CO2 27      ANION GAP 17 (*)      (*)     Creatinine 7.53 (*)     Glucose 627 (*)     Calcium 8.5      eGFR 5      Narrative:     National Kidney Disease Foundation guidelines for Chronic Kidney Disease (CKD):     Stage 1 with normal or high GFR (GFR > 90 mL/min/1.73 square meters)    Stage 2 Mild CKD (GFR = 60-89 mL/min/1.73 square meters)    Stage 3A Moderate CKD (GFR = 45-59 mL/min/1.73 square meters)    Stage 3B Moderate CKD (GFR = 30-44 mL/min/1.73 square meters)    Stage 4 Severe CKD (GFR = 15-29 mL/min/1.73 square meters)    Stage 5 End Stage CKD (GFR <15 mL/min/1.73 square meters)  Note: GFR calculation is accurate only with a steady state creatinine   POCT GLUCOSE - Abnormal    POC Glucose >600 (*)    POCT BLOOD GAS (CG8+) - Abnormal    ph, Victor Hugo ISTAT 7.442 (*)     pCO2, Victor Hugo i-STAT 42.6      pO2, Victor Hugo i-STAT 42.0      BE, i-STAT 4 (*)     HCO3, Victor Hugo i-STAT 29.1      CO2, i-STAT 30      O2 Sat, i-STAT 79      SODIUM, I-STAT 123 (*)     Potassium, i-STAT 5.8 (*)     Calcium, Ionized i-STAT 0.96 (*)     Hct, i-STAT 39      Hgb, i-STAT 13.3      Glucose, i-STAT >600 (*)      Specimen Type VENOUS     POCT GLUCOSE - Abnormal    POC Glucose >600 (*)    POCT GLUCOSE - Abnormal    POC Glucose 583 (*)    POCT BLOOD GAS (CG8+) - Abnormal    ph, Victor Hugo ISTAT 7.351      pCO2, Victor Hugo i-STAT 49.0      pO2, Victor Hugo i-STAT 30.0 (*)     BE, i-STAT 1      HCO3, Victor Hugo i-STAT 27.1      CO2, i-STAT 29      O2 Sat, i-STAT 54 (*)     SODIUM, I-STAT 128 (*)     Potassium, i-STAT 4.9      Calcium, Ionized i-STAT 1.02 (*)     Hct, i-STAT 34 (*)     Hgb, i-STAT 11.6      Glucose, i-STAT 590 (*)     Specimen Type VENOUS     STREP A PCR - Normal    STREP A PCR Not Detected     COVID-19/INFLUENZA A/B RAPID ANTIGEN (30 MIN.TAT) - Normal    SARS COV Rapid Antigen Negative      Influenza A Rapid Antigen Negative      Influenza B Rapid Antigen Negative      Narrative:     This test has been performed using the Riskclickidel Jocelynn 2 FLU+SARS Antigen test under the Emergency Use Authorization (EUA). This test has been validated by the  and verified by the performing laboratory. The Jocelynn uses lateral flow immunofluorescent sandwich assay to detect SARS-COV, Influenza A and Influenza B Antigen.     The Quidel Jocelynn 2 SARS Antigen test does not differentiate between SARS-CoV and SARS-CoV-2.     Negative results are presumptive and may be confirmed with a molecular assay, if necessary, for patient management. Negative results do not rule out SARS-CoV-2 or influenza infection and should not be used as the sole basis for treatment or patient management decisions. A negative test result may occur if the level of antigen in a sample is below the limit of detection of this test.     Positive results are indicative of the presence of viral antigens, but do not rule out bacterial infection or co-infection with other viruses.     All test results should be used as an adjunct to clinical observations and other information available to the provider.    FOR PEDIATRIC PATIENTS - copy/paste COVID Guidelines URL to browser:  [email or web address]   LIPASE - Normal    Lipase 27     LACTIC ACID, PLASMA (W/REFLEX IF RESULT > 2.0) - Normal    LACTIC ACID 0.9      Narrative:     Result may be elevated if tourniquet was used during collection.   HS TROPONIN I 0HR - Normal    hs TnI 0hr 21     HS TROPONIN I 2HR - Normal    hs TnI 2hr 22      Delta 2hr hsTnI 1     HEMOGLOBIN A1C   UA W REFLEX TO MICROSCOPIC WITH REFLEX TO CULTURE   HS TROPONIN I 4HR   B-TYPE NATRIURETIC PEPTIDE (BNP)   POCT PREGNANCY, URINE     CT abdomen pelvis wo contrast   Final Interpretation by Frandy Foster MD (09/22 2137)      1.  Retained contrast within the kidneys from CT performed 2 days prior is indicative of impaired renal function. No hydroureteronephrosis.   2.  Possible mild enterocolitis. No abdominopelvic fluid collection.      Workstation performed: [id number]         XR chest 1 view portable    (Results Pending)       CriticalCare Time    Date/Time: 9/22/2024 9:00 PM    Performed by: Damian Meelndez DO  Authorized by: Damian Melendez DO    Critical care provider statement:     Critical care time (minutes):  34    Critical care time was exclusive of:  Separately billable procedures and treating other patients and teaching time    Critical care was necessary to treat or prevent imminent or life-threatening deterioration of the following conditions:  Circulatory failure and endocrine crisis    Critical care was time spent personally by me on the following activities:  Blood draw for specimens, obtaining history from patient or surrogate, development of treatment plan with patient or surrogate, discussions with primary provider, evaluation of patient's response to treatment, examination of patient, ordering and performing treatments and interventions, ordering and review of laboratory studies, ordering and review of radiographic studies, re-evaluation of patient's condition and review of old charts    I assumed  direction of critical care for this patient from another provider in my specialty: no        ED Medication and Procedure Management   Prior to Admission Medications   Prescriptions Last Dose Informant Patient Reported? Taking?   Ascorbic Acid (VITAMIN C) 500 MG CAPS  Self Yes No   Sig: Take 2 capsules by mouth daily   Aspirin Low Dose 81 MG EC tablet  Self No No   Sig: TAKE 1 TABLET BY MOUTH EVERY DAY   Continuous Blood Gluc Sensor (Dexcom G7 Sensor)  Self No No   Sig: Use 1 Device every 10 days   Fluticasone-Salmeterol (Advair) 250-50 mcg/dose inhaler  Self No No   Sig: INHALE 1 PUFF BY MOUTH TWO TIMES DAILY RINSE MOUTH AFTER USE   Glucagon (Gvoke HypoPen 2-Pack) 1 MG/0.2ML SOAJ  Self No No   Sig: Use if hypoglycemia prn   Insulin Pen Needle 33G X 4 MM MISC  Self No No   Sig: Use to inject insulin 4 times a day   OneTouch Ultra test strip  Self No No   Sig: Test 3 times daily   Patient not taking: Reported on 9/11/2024   PARoxetine (PAXIL) 20 mg tablet  Self No No   Sig: TAKE 1 TABLET BY MOUTH EVERY DAY   Probiotic Product (ALIGN PO)  Self Yes No   Sig: Take by mouth   acetone, urine, test strip  Self No No   Sig: Use to test urine ketones for high blood sugars.   albuterol (2.5 mg/3 mL) 0.083 % nebulizer solution  Self No No   Sig: INHALE CONTENTS OF 1 VIAL BY NEBULIZER EVERY 6 HOURS AS NEEDED FOR WHEEZING OR FOR SHORTNESS OF BREATH.   albuterol (PROVENTIL HFA,VENTOLIN HFA) 90 mcg/act inhaler  Self No No   Sig: INHALE 2 PUFFS BY MOUTH EVERY 4 HOURS AS NEEDED FOR WHEEZING OR FOR SHORTNESS OF BREATH   atorvastatin (LIPITOR) 80 mg tablet  Self No No   Sig: TAKE 1 TABLET BY MOUTH EVERY DAY   cyanocobalamin (VITAMIN B-12) 100 mcg tablet  Self Yes No   Sig: Take by mouth daily   dicyclomine (BENTYL) 20 mg tablet  Self No No   Sig: Take 1 tablet (20 mg total) by mouth 2 (two) times a day as needed (abdominal cramps) for up to 7 days   glucagon (Glucagon Emergency) 1 MG injection  Self No No   Sig: Inject 1 mg under the  skin once as needed for low blood sugar for up to 1 dose   insulin aspart, w/niacinamide, (Fiasp FlexTouch) 100 Units/mL injection pen  Self No No   Sig: Inject 1u:2c with breakfast, 1u:1c with lunch, 1.5u:1c with dinner, up to 150 units daily   insulin glargine (Toujeo SoloStar) 300 units/mL CONCENTRATED U-300 injection pen (1-unit dial)  Self No No   Sig: Inject 95 Units under the skin daily   insulin lispro (HumaLOG) 100 units/mL injection pen  Self No No   Sig: INJECT PER SLIDING SCALE OF 1UNIT PER 2 CARBS WITH BREAKFAST, 1 UNIT PER 1 CARB WITH LUNCH, 1.5 UNITS PER 1CARB WITH DINNER. MAXIMUM DOSE 150 UNITS DAILY.   lisinopril (ZESTRIL) 20 mg tablet  Self No No   Sig: TAKE 2 TABLETS BY MOUTH EVERY DAY   metFORMIN (GLUCOPHAGE-XR) 500 mg 24 hr tablet  Self No No   Sig: Take 1 tablet (500 mg total) by mouth 2 (two) times a day with meals   Patient taking differently: Take 500 mg by mouth daily   montelukast (SINGULAIR) 10 mg tablet  Self No No   Sig: TAKE 1 TABLET BY MOUTH AT BEDTIME   omeprazole (PriLOSEC) 20 mg delayed release capsule  Self Yes No   Sig: Take 20 mg by mouth daily.   torsemide (DEMADEX) 20 mg tablet   No No   Sig: Take 2 tablets (40 mg total) by mouth daily      Facility-Administered Medications: None     Patient's Medications   Discharge Prescriptions    No medications on file     No discharge procedures on file.     Damian Melendez DO  09/22/24 7560

## 2024-09-23 NOTE — ASSESSMENT & PLAN NOTE
Likely secondary to hyperglyemia  CTAP: 1.  Retained contrast within the kidneys from CT performed 2 days prior is indicative of impaired renal function. No hydroureteronephrosis. 2.  Possible mild enterocolitis. No abdominopelvic fluid collection.  Pt denies fevers or chills PTA  Hold off on antibiotics   If ongoing diarrhea, check C. Diff and stool enteric culture  PRN Zofran

## 2024-09-24 ENCOUNTER — APPOINTMENT (INPATIENT)
Dept: NON INVASIVE DIAGNOSTICS | Facility: HOSPITAL | Age: 49
DRG: 637 | End: 2024-09-24
Payer: COMMERCIAL

## 2024-09-24 PROBLEM — I48.0 PAF (PAROXYSMAL ATRIAL FIBRILLATION) (HCC): Status: ACTIVE | Noted: 2024-09-24

## 2024-09-24 LAB
ALBUMIN SERPL BCG-MCNC: 3.4 G/DL (ref 3.5–5)
ALP SERPL-CCNC: 89 U/L (ref 34–104)
ALT SERPL W P-5'-P-CCNC: 9 U/L (ref 7–52)
ANION GAP SERPL CALCULATED.3IONS-SCNC: 18 MMOL/L (ref 4–13)
ANION GAP SERPL CALCULATED.3IONS-SCNC: 20 MMOL/L (ref 4–13)
ANION GAP SERPL CALCULATED.3IONS-SCNC: 21 MMOL/L (ref 4–13)
AORTIC ROOT: 2.8 CM
AORTIC VALVE MEAN VELOCITY: 9.8 M/S
APICAL FOUR CHAMBER EJECTION FRACTION: 52 %
APTT PPP: 37 SECONDS (ref 23–34)
ASCENDING AORTA: 2.9 CM
AST SERPL W P-5'-P-CCNC: 6 U/L (ref 13–39)
AV AREA BY CONTINUOUS VTI: 4.1 CM2
AV AREA PEAK VELOCITY: 3.7 CM2
AV LVOT MEAN GRADIENT: 3 MMHG
AV LVOT PEAK GRADIENT: 7 MMHG
AV MEAN GRADIENT: 5 MMHG
AV PEAK GRADIENT: 8 MMHG
AV VALVE AREA: 4.05 CM2
BACTERIA UR CULT: ABNORMAL
BACTERIA UR CULT: ABNORMAL
BASOPHILS # BLD AUTO: 0.08 THOUSANDS/ΜL (ref 0–0.1)
BASOPHILS NFR BLD AUTO: 1 % (ref 0–1)
BILIRUB SERPL-MCNC: 0.2 MG/DL (ref 0.2–1)
BSA FOR ECHO PROCEDURE: 1.71 M2
BUN SERPL-MCNC: 101 MG/DL (ref 5–25)
BUN SERPL-MCNC: 102 MG/DL (ref 5–25)
BUN SERPL-MCNC: 107 MG/DL (ref 5–25)
CALCIUM ALBUM COR SERPL-MCNC: 8.7 MG/DL (ref 8.3–10.1)
CALCIUM SERPL-MCNC: 8.2 MG/DL (ref 8.4–10.2)
CALCIUM SERPL-MCNC: 8.2 MG/DL (ref 8.4–10.2)
CALCIUM SERPL-MCNC: 8.7 MG/DL (ref 8.4–10.2)
CHLORIDE SERPL-SCNC: 86 MMOL/L (ref 96–108)
CHLORIDE SERPL-SCNC: 88 MMOL/L (ref 96–108)
CHLORIDE SERPL-SCNC: 89 MMOL/L (ref 96–108)
CO2 SERPL-SCNC: 25 MMOL/L (ref 21–32)
CO2 SERPL-SCNC: 25 MMOL/L (ref 21–32)
CO2 SERPL-SCNC: 27 MMOL/L (ref 21–32)
CREAT SERPL-MCNC: 8.8 MG/DL (ref 0.6–1.3)
CREAT SERPL-MCNC: 8.86 MG/DL (ref 0.6–1.3)
CREAT SERPL-MCNC: 8.93 MG/DL (ref 0.6–1.3)
DOP CALC AO VTI: 29.6 CM
DOP CALC LVOT AREA: 4.15
DOP CALC LVOT CARDIAC OUTPUT: 8.5 L/MIN
DOP CALC LVOT DIAMETER: 2.3 CM
DOP CALC LVOT PEAK VEL VTI: 28.9 CM
DOP CALC LVOT PEAK VEL: 1.29 M/S
DOP CALC LVOT STROKE INDEX: 70.2 ML/M2
DOP CALC LVOT STROKE VOLUME: 120.01
E WAVE DECELERATION TIME: 227 MS
E/A RATIO: 0.87
EOSINOPHIL # BLD AUTO: 0.32 THOUSAND/ΜL (ref 0–0.61)
EOSINOPHIL NFR BLD AUTO: 2 % (ref 0–6)
ERYTHROCYTE [DISTWIDTH] IN BLOOD BY AUTOMATED COUNT: 12.4 % (ref 11.6–15.1)
FRACTIONAL SHORTENING: 36 (ref 28–44)
GFR SERPL CREATININE-BSD FRML MDRD: 4 ML/MIN/1.73SQ M
GLUCOSE SERPL-MCNC: 102 MG/DL (ref 65–140)
GLUCOSE SERPL-MCNC: 113 MG/DL (ref 65–140)
GLUCOSE SERPL-MCNC: 131 MG/DL (ref 65–140)
GLUCOSE SERPL-MCNC: 143 MG/DL (ref 65–140)
GLUCOSE SERPL-MCNC: 146 MG/DL (ref 65–140)
GLUCOSE SERPL-MCNC: 149 MG/DL (ref 65–140)
GLUCOSE SERPL-MCNC: 149 MG/DL (ref 65–140)
GLUCOSE SERPL-MCNC: 171 MG/DL (ref 65–140)
GLUCOSE SERPL-MCNC: 199 MG/DL (ref 65–140)
GLUCOSE SERPL-MCNC: 222 MG/DL (ref 65–140)
GLUCOSE SERPL-MCNC: 231 MG/DL (ref 65–140)
GLUCOSE SERPL-MCNC: 232 MG/DL (ref 65–140)
GLUCOSE SERPL-MCNC: 238 MG/DL (ref 65–140)
GLUCOSE SERPL-MCNC: 88 MG/DL (ref 65–140)
GLUCOSE SERPL-MCNC: 92 MG/DL (ref 65–140)
HCT VFR BLD AUTO: 30.8 % (ref 34.8–46.1)
HGB BLD-MCNC: 10.4 G/DL (ref 11.5–15.4)
IMM GRANULOCYTES # BLD AUTO: 0.05 THOUSAND/UL (ref 0–0.2)
IMM GRANULOCYTES NFR BLD AUTO: 0 % (ref 0–2)
INTERVENTRICULAR SEPTUM IN DIASTOLE (PARASTERNAL SHORT AXIS VIEW): 1.1 CM
INTERVENTRICULAR SEPTUM: 1.1 CM (ref 0.6–1.1)
LA/AORTA RATIO 2D: 1.18
LAAS-AP2: 8.4 CM2
LAAS-AP4: 10.4 CM2
LEFT ATRIUM SIZE: 3.3 CM
LEFT ATRIUM VOLUME (MOD BIPLANE): 20 ML
LEFT ATRIUM VOLUME INDEX (MOD BIPLANE): 11.7 ML/M2
LEFT INTERNAL DIMENSION IN SYSTOLE: 2.3 CM (ref 2.1–4)
LEFT VENTRICLE DIASTOLIC VOLUME (MOD BIPLANE): 64 ML
LEFT VENTRICLE DIASTOLIC VOLUME INDEX (MOD BIPLANE): 37.4 ML/M2
LEFT VENTRICLE SYSTOLIC VOLUME (MOD BIPLANE): 26 ML
LEFT VENTRICLE SYSTOLIC VOLUME INDEX (MOD BIPLANE): 15.2 ML/M2
LEFT VENTRICULAR INTERNAL DIMENSION IN DIASTOLE: 3.6 CM (ref 3.5–6)
LEFT VENTRICULAR POSTERIOR WALL IN END DIASTOLE: 0.8 CM
LEFT VENTRICULAR STROKE VOLUME: 37 ML
LV EF: 60 %
LVSV (TEICH): 37 ML
LYMPHOCYTES # BLD AUTO: 2 THOUSANDS/ΜL (ref 0.6–4.47)
LYMPHOCYTES NFR BLD AUTO: 15 % (ref 14–44)
MAGNESIUM SERPL-MCNC: 2.1 MG/DL (ref 1.9–2.7)
MAGNESIUM SERPL-MCNC: 2.2 MG/DL (ref 1.9–2.7)
MAGNESIUM SERPL-MCNC: 2.3 MG/DL (ref 1.9–2.7)
MCH RBC QN AUTO: 28.3 PG (ref 26.8–34.3)
MCHC RBC AUTO-ENTMCNC: 33.8 G/DL (ref 31.4–37.4)
MCV RBC AUTO: 84 FL (ref 82–98)
MONOCYTES # BLD AUTO: 0.78 THOUSAND/ΜL (ref 0.17–1.22)
MONOCYTES NFR BLD AUTO: 6 % (ref 4–12)
MV E'TISSUE VEL-LAT: 8 CM/S
MV E'TISSUE VEL-SEP: 6 CM/S
MV PEAK A VEL: 0.52 M/S
MV PEAK E VEL: 45 CM/S
MV STENOSIS PRESSURE HALF TIME: 66 MS
MV VALVE AREA P 1/2 METHOD: 3.33
NEUTROPHILS # BLD AUTO: 9.89 THOUSANDS/ΜL (ref 1.85–7.62)
NEUTS SEG NFR BLD AUTO: 76 % (ref 43–75)
NRBC BLD AUTO-RTO: 0 /100 WBCS
PLATELET # BLD AUTO: 416 THOUSANDS/UL (ref 149–390)
PMV BLD AUTO: 9.9 FL (ref 8.9–12.7)
POTASSIUM SERPL-SCNC: 4.2 MMOL/L (ref 3.5–5.3)
POTASSIUM SERPL-SCNC: 4.2 MMOL/L (ref 3.5–5.3)
POTASSIUM SERPL-SCNC: 4.4 MMOL/L (ref 3.5–5.3)
PROCALCITONIN SERPL-MCNC: 0.42 NG/ML
PROT SERPL-MCNC: 6.5 G/DL (ref 6.4–8.4)
RA PRESSURE ESTIMATED: 3 MMHG
RBC # BLD AUTO: 3.67 MILLION/UL (ref 3.81–5.12)
RIGHT VENTRICLE ID DIMENSION: 3.2 CM
SL CV LV EF: 60
SL CV PED ECHO LEFT VENTRICLE DIASTOLIC VOLUME (MOD BIPLANE) 2D: 56 ML
SL CV PED ECHO LEFT VENTRICLE SYSTOLIC VOLUME (MOD BIPLANE) 2D: 18 ML
SODIUM SERPL-SCNC: 131 MMOL/L (ref 135–147)
SODIUM SERPL-SCNC: 134 MMOL/L (ref 135–147)
SODIUM SERPL-SCNC: 134 MMOL/L (ref 135–147)
TRICUSPID ANNULAR PLANE SYSTOLIC EXCURSION: 2.4 CM
WBC # BLD AUTO: 13.12 THOUSAND/UL (ref 4.31–10.16)

## 2024-09-24 PROCEDURE — 85730 THROMBOPLASTIN TIME PARTIAL: CPT

## 2024-09-24 PROCEDURE — 85025 COMPLETE CBC W/AUTO DIFF WBC: CPT | Performed by: HOSPITALIST

## 2024-09-24 PROCEDURE — 99222 1ST HOSP IP/OBS MODERATE 55: CPT | Performed by: INTERNAL MEDICINE

## 2024-09-24 PROCEDURE — 83735 ASSAY OF MAGNESIUM: CPT | Performed by: HOSPITALIST

## 2024-09-24 PROCEDURE — 99232 SBSQ HOSP IP/OBS MODERATE 35: CPT | Performed by: HOSPITALIST

## 2024-09-24 PROCEDURE — 82948 REAGENT STRIP/BLOOD GLUCOSE: CPT

## 2024-09-24 PROCEDURE — 80048 BASIC METABOLIC PNL TOTAL CA: CPT | Performed by: INTERNAL MEDICINE

## 2024-09-24 PROCEDURE — 99232 SBSQ HOSP IP/OBS MODERATE 35: CPT | Performed by: INTERNAL MEDICINE

## 2024-09-24 PROCEDURE — 83735 ASSAY OF MAGNESIUM: CPT | Performed by: INTERNAL MEDICINE

## 2024-09-24 PROCEDURE — 80053 COMPREHEN METABOLIC PANEL: CPT | Performed by: HOSPITALIST

## 2024-09-24 PROCEDURE — 84145 PROCALCITONIN (PCT): CPT | Performed by: PHYSICIAN ASSISTANT

## 2024-09-24 PROCEDURE — 93306 TTE W/DOPPLER COMPLETE: CPT

## 2024-09-24 PROCEDURE — 93306 TTE W/DOPPLER COMPLETE: CPT | Performed by: INTERNAL MEDICINE

## 2024-09-24 PROCEDURE — 99255 IP/OBS CONSLTJ NEW/EST HI 80: CPT | Performed by: STUDENT IN AN ORGANIZED HEALTH CARE EDUCATION/TRAINING PROGRAM

## 2024-09-24 RX ORDER — SODIUM CHLORIDE 9 MG/ML
10 INJECTION, SOLUTION INTRAVENOUS CONTINUOUS
Status: DISCONTINUED | OUTPATIENT
Start: 2024-09-24 | End: 2024-09-27

## 2024-09-24 RX ORDER — HEPARIN SODIUM 1000 [USP'U]/ML
2000 INJECTION, SOLUTION INTRAVENOUS; SUBCUTANEOUS EVERY 6 HOURS PRN
Status: DISCONTINUED | OUTPATIENT
Start: 2024-09-24 | End: 2024-09-25

## 2024-09-24 RX ORDER — HEPARIN SODIUM 1000 [USP'U]/ML
4000 INJECTION, SOLUTION INTRAVENOUS; SUBCUTANEOUS EVERY 6 HOURS PRN
Status: DISCONTINUED | OUTPATIENT
Start: 2024-09-24 | End: 2024-09-25

## 2024-09-24 RX ORDER — HEPARIN SODIUM 10000 [USP'U]/100ML
3-20 INJECTION, SOLUTION INTRAVENOUS
Status: DISCONTINUED | OUTPATIENT
Start: 2024-09-24 | End: 2024-09-25

## 2024-09-24 RX ADMIN — SODIUM CHLORIDE 10 ML/HR: 0.9 INJECTION, SOLUTION INTRAVENOUS at 05:39

## 2024-09-24 RX ADMIN — HEPARIN SODIUM 5000 UNITS: 5000 INJECTION, SOLUTION INTRAVENOUS; SUBCUTANEOUS at 15:08

## 2024-09-24 RX ADMIN — MONTELUKAST 10 MG: 10 TABLET, FILM COATED ORAL at 21:22

## 2024-09-24 RX ADMIN — PANTOPRAZOLE SODIUM 20 MG: 20 TABLET, DELAYED RELEASE ORAL at 05:33

## 2024-09-24 RX ADMIN — ASPIRIN 81 MG: 81 TABLET, COATED ORAL at 08:27

## 2024-09-24 RX ADMIN — ATORVASTATIN CALCIUM 80 MG: 40 TABLET, FILM COATED ORAL at 08:26

## 2024-09-24 RX ADMIN — Medication 5 SPRAY: at 02:45

## 2024-09-24 RX ADMIN — Medication 12.5 MG: at 09:59

## 2024-09-24 RX ADMIN — PAROXETINE HYDROCHLORIDE 20 MG: 20 TABLET, FILM COATED ORAL at 08:28

## 2024-09-24 RX ADMIN — Medication 12.5 MG: at 20:04

## 2024-09-24 RX ADMIN — FLUTICASONE FUROATE AND VILANTEROL TRIFENATATE 1 PUFF: 200; 25 POWDER RESPIRATORY (INHALATION) at 08:28

## 2024-09-24 RX ADMIN — HEPARIN SODIUM 5000 UNITS: 5000 INJECTION, SOLUTION INTRAVENOUS; SUBCUTANEOUS at 05:33

## 2024-09-24 RX ADMIN — HEPARIN SODIUM 12 UNITS/KG/HR: 10000 INJECTION, SOLUTION INTRAVENOUS at 20:22

## 2024-09-24 RX ADMIN — CEFTRIAXONE 2000 MG: 2 INJECTION, SOLUTION INTRAVENOUS at 03:32

## 2024-09-24 RX ADMIN — SODIUM CHLORIDE 13 UNITS/HR: 9 INJECTION, SOLUTION INTRAVENOUS at 20:04

## 2024-09-24 NOTE — PROGRESS NOTES
Progress Note - Hospitalist   Name: Vernell Darby 49 y.o. female I MRN: [id number]  Unit/Bed#: -01 SDU I Date of Admission: 9/22/2024   Date of Service: 9/24/2024 I Hospital Day: 2     Assessment & Plan  SABRINA (acute kidney injury) (HCC)  Baseline creatinine 0.7-1  Creatinine on admission 7.53  Diuretics up-titrated outpatient due to ongoing SOB. CTA PE study performed 9/20 9/22 CTAP: 1.  Retained contrast within the kidneys from CT performed 2 days prior is indicative of impaired renal function. No hydroureteronephrosis. 2.  Possible mild enterocolitis. No abdominopelvic fluid collection.  Likely multifactorial - ATN in setting of contrast, increased diuretic, N/V/D..  Pt states that she did not hold her metformin following CTA PE study    Plan:  Completed IVF resuscitation  Diuretic given to augment urine outpt with improvement. Diuretics currently completed  Trend renal indices  Nephrology consulted  No urgent indications for dialysis  Strict I/O  Avoid hypotension and nephrotoxins  Type 1 diabetes mellitus with hyperglycemia ([facility])  Lab Results   Component Value Date    HGBA1C 9.5 (H) 09/22/2024       Recent Labs     09/24/24  0557 09/24/24  0759 09/24/24  1005 09/24/24  1155   POCGLU 143* 113 238* 171*       Blood Sugar Average: Last 72 hrs:  (P) [id number]    Hx of T1DM, follows with Dr. Ascencio from Endocrinology  Presented with hyperglycemia, no DKA  Cont regular insulin gtt per endo  Hold home insulin and metformin  Q2h BG monitoring  Maintain -180  Hyponatremia  Pseudohyponatremia in the setting of hyperglycemia  Corrected Na 137    N&V (nausea and vomiting)  Likely secondary to hyperglyemia  CTAP: 1.  Retained contrast within the kidneys from CT performed 2 days prior is indicative of impaired renal function. No hydroureteronephrosis. 2.  Possible mild enterocolitis. No abdominopelvic fluid collection.  Pt denies fevers or chills PTA  Hold off on antibiotics   If ongoing diarrhea,  check C. Diff and stool enteric culture  PRN Zofran  Sore throat  Pt reports severe odynophagia following multiple episodes of vomiting  Strep PCR neg, COVID/Flu/RSV neg  Unremarkable physical exam: no erythema of pharynx, no cervical LAD, no crepitus of neck but tender to palpation  Improving  CT neck - secretions in posterior pharnyx but no concerning findings.  Mouthkote and chloraseptic spray ordered for pain control  SIRS (systemic inflammatory response syndrome) ([facility])  SIRS: tachypnea, tachycardia, leukocytosis  Suspect reactive to N/V and SABRINA  Unlikely sepsis with no fevers/chills  On rocephin pending blood and urine cultures  Chronic diastolic congestive heart failure (HCC)  Wt Readings from Last 3 Encounters:   09/24/24 83.2 kg (183 lb 6.8 oz)   09/18/24 82.2 kg (181 lb 3.5 oz)   09/17/24 82.7 kg (182 lb 6.4 oz)       5/2024 Echo: EF 65%, G2DD  Recently had diuretics up-titrated outpatient due to persistent SOB/PRICE  Currently examines hypovolemic  Hold diuresis  Strict I/O  Daily weights      Asthma  Daily Breo, PRN albuterol neb  Continue home singulair  Hypertension  Hold home lisinopril and diuretics 2/2 SABRINA  Hyperlipidemia  Continue statin  Anemia  Baseline hgb 9-10  Hgb on admission 12  Likely hemoconcentrated  Trend hgb and maintain > 7  Severe obesity (BMI 35.0-39.9) with comorbidity ([facility])  Encourage lifestyle modifications as appropriate  Gastroesophageal reflux disease without esophagitis  Continue home PPI  ATN (acute tubular necrosis) ([facility])    Oliguria and anuria    PAF (paroxysmal atrial fibrillation) ([facility])  Resolved w IV lopressor  Cardiology following     VTE  Prophylaxis:   Pharmacologic: in place  Mechanical VTE Prophylaxis in Place: Yes    Patient Centered Rounds: I have performed bedside rounds with nursing staff today.    Discussions with Specialists or Other Care Team Provider: case management    Education and Discussions with Family / Patient: yes    Mobility:   Basic Mobility Inpatient  Raw Score: 24  JH-HLM Goal: 8: Walk 250 feet or more  JH-HLM Achieved: 8: Walk 250 feet ot more      Current Length of Stay: 2 day(s)    Current Patient Status: Inpatient        Code Status: Level 1 - Full Code    Discharge Plan: Pt will require continued inpatient hospitalization.    Subjective:   Pt denies chest pain, sob, fever, chills, n/v/ or abd pain    Patient is seen and examined at bedside.  All other ROS are negative.    Objective:     Vitals:   Temp (24hrs), Av.2 °F (36.8 °C), Min:97.8 °F (36.6 °C), Max:98.5 °F (36.9 °C)    Temp:  [97.8 °F (36.6 °C)-98.5 °F (36.9 °C)] 97.8 °F (36.6 °C)  HR:  [] 71  Resp:  [11-30] 16  BP: (104-156)/(52-85) 105/52  SpO2:  [95 %-99 %] 98 %  Body mass index is 41.12 kg/m².     Input and Output Summary (last 24 hours):       Intake/Output Summary (Last 24 hours) at 2024 1206  Last data filed at 2024 1200  Gross per 24 hour   Intake 1226.15 ml   Output 1875 ml   Net -648.85 ml       Physical Exam:       GEN: No acute distress, comfortable  HEEENT: No JVD, PERRLA, no scleral icterus  RESP: Lungs clear to auscultation bilaterally  CV: RRR, +s1/s2   ABD: SOFT NON TENDER, POSITIVE BOWEL SOUNDS, NO DISTENTION  PSYCH: CALM  NEURO: Mentation baseline, NO FOCAL DEFICITS  SKIN: NO RASH  EXTREM: NO EDEMA    Additional Data:     Labs:    Results from last 7 days   Lab Units 24  0428   WBC Thousand/uL 13.12*   HEMOGLOBIN g/dL 10.4*   HEMATOCRIT % 30.8*   PLATELETS Thousands/uL 416*   SEGS PCT % 76*   LYMPHO PCT % 15   MONO PCT % 6   EOS PCT % 2     Results from last 7 days   Lab Units 24  1016 24  0428   SODIUM mmol/L 131* 134*  134*   POTASSIUM mmol/L 4.4 4.2  4.2   CHLORIDE mmol/L 86* 88*  89*   CO2 mmol/L 27 25  25   BUN mg/dL 107* 101*  102*   CREATININE mg/dL 8.93* 8.80*  8.86*   ANION GAP mmol/L 18* 21*  20*   CALCIUM mg/dL 8.7 8.2*  8.2*   ALBUMIN g/dL  --  3.4*   TOTAL BILIRUBIN mg/dL  --  0.20   ALK PHOS U/L  --  89   ALT U/L  --  9    AST U/L  --  6*   GLUCOSE RANDOM mg/dL 222* 149*  149*         Results from last 7 days   Lab Units 24  1155 24  1005 24  0759 24  0557 24  0404 24  0156 24  0003 24  2200 24  1955 24  1758 24  1545 24  1400   POC GLUCOSE mg/dl 171* 238* 113 143* 146* 92 131 195* 199* 161* 120 180*     Results from last 7 days   Lab Units 24  193   HEMOGLOBIN A1C % 9.5*     Results from last 7 days   Lab Units 24  0428 24  0440 24   LACTIC ACID mmol/L  --  0.9 0.9   PROCALCITONIN ng/ml 0.42* 0.41*  --        Lines/Drains:  Invasive Devices       Peripheral Intravenous Line  Duration             Peripheral IV 24 Right Antecubital 1 day    Peripheral IV 24 Right;Upper;Ventral (anterior) Arm 1 day                    Telemetry:   Telemetry Orders (From admission, onward)               24 Hour Telemetry Monitoring  Continuous x 24 Hours (Telem)           Question:  Reason for 24 Hour Telemetry  Answer:  Metabolic/electrolyte disturbance with high probability of dysrhythmia. K level <3 or >6 OR KCL infusion >10mEq/hr                        * I Have Reviewed All Lab Data Listed Above.           Imaging:     Results for orders placed during the hospital encounter of 24    XR chest 1 view portable    Narrative  XR CHEST PORTABLE    INDICATION: cp.    COMPARISON: CT chest 2024    FINDINGS:    Clear lungs. No pneumothorax or pleural effusion.    Normal cardiomediastinal silhouette.    Bones are unremarkable for age.    Normal upper abdomen.    Impression  No acute cardiopulmonary disease.        Workstation performed: [id number]    Results for orders placed during the hospital encounter of 16    X-ray chest 2 views    Narrative  CHEST    INDICATION:  Left-sided chest pain, history of asthma.    COMPARISON:  None    VIEWS:  Frontal and lateral projections; 2 images    FINDINGS:    Cardiomediastinal  silhouette appears unremarkable.    The lungs are clear.  No pneumothorax or pleural effusion.    Visualized osseous structures appear within normal limits for the patient's age.    Impression  No active pulmonary disease.      *I have reviewed all imaging reports listed above      Recent Cultures (last 7 days):     Results from last 7 days   Lab Units 09/23/24  0439   BLOOD CULTURE  Received in Microbiology Lab. Culture in Progress.  Received in Microbiology Lab. Culture in Progress.       Last 24 Hours Medication List:   Current Facility-Administered Medications   Medication Dose Route Frequency Provider Last Rate    acetaminophen  650 mg Oral Q6H PRN Gi Bello, PA-C      albuterol  2.5 mg Nebulization Q6H PRN Gi Bello, PA-C      aspirin  81 mg Oral Daily Gi Magdalena Bello, PA-C      atorvastatin  80 mg Oral Daily Gi Magdalena Bello, PA-C      cefTRIAXone  2,000 mg Intravenous Q24H Cindi Guillermo MD Stopped (09/24/24 0400)    fluticasone-vilanterol  1 puff Inhalation Daily Gi Bello, PA-C      heparin (porcine)  5,000 Units Subcutaneous Q8H [facility] Gi Bello, PA-C      insulin regular (HumuLIN R,NovoLIN R) 1 Units/mL in sodium chloride 0.9 % 100 mL infusion  0.3-21 Units/hr Intravenous Titrated Cindi Guillermo MD 3 Units/hr (09/24/24 1158)    metoprolol tartrate  12.5 mg Oral Q12H [facility] Cem Gamez, PA-C      montelukast  10 mg Oral HS Gi Bello, PA-C      ondansetron  4 mg Intravenous Q6H PRN Gi Bello, PA-C      pantoprazole  20 mg Oral Early Morning Gi Bello, PA-C      PARoxetine  20 mg Oral Daily Gichuy Bello, PA-C      phenol  1 spray Mouth/Throat Q2H PRN Gi Bello, PA-C      saliva substitute  5 spray Mouth/Throat 4x Daily PRN Gi Bello, PA-C      sodium chloride  10 mL/hr Intravenous Continuous Heidemarie FELICITAS Stevens-C 10 mL/hr (09/24/24 0539)        Today, Patient Was Seen By: Cindi Guillermo MD    ** Please Note: Dictation voice to  text software may have been used in the creation of this document. **

## 2024-09-24 NOTE — PROGRESS NOTES
Progress Note - Nephrology   Name: Vernell Darby 49 y.o. female I MRN: [id number]  Unit/Bed#: -01 SDU I Date of Admission: 9/22/2024   Date of Service: 9/24/2024 I Hospital Day: 2     Assessment & Plan  SABRINA (acute kidney injury) (HCC)  Suspect ATN with recent contrast exposure, could have component of volume depletion with vomiting and diarrhea as well as increase in diuretics, and ACE inhibition.  Presented with creatinine of 7.5.  Repeat creatinine increasing to 8.8.  Baseline creatinine 0.8-1.1.  Holding lisinopril.  Status post IV contrast 9/20/2024.  Currently on normal saline at [facility].   Started on Lasix drip 09/23 to augment urine output. Discontinue today and may give as needed for decreasing urine output.  UA: Large leuks, 3+ protein, glucose, 2-4 RBCs, 20-30 WBCs.  CT negative for hydro, retained contrast present.  Continue to avoid nephrotoxins, hypotension, IV contrast.  Strict I/O.  Currently no emergent indication for dialysis at this time.  Hyponatremia  Pseudohyponatremia in the setting of hyperglycemia.  Will continue to monitor and trend.  Hypertension  Blood pressure remains stable and acceptable.  Home medication: Lisinopril 40 mg daily, torsemide 40 mg daily.  Current medication: Metoprolol 12.5 mg every 12 hours.  Avoid hypotension or high fluctuation in blood pressure.  Anemia  Continue to monitor and transfuse as needed for hemoglobin less than 7.0.  Chronic diastolic congestive heart failure (HCC)  Cardiology team is consulted.  No evidence of volume overload on CT.  Chest x-ray negative for acute cardiopulmonary disease.  Outpatient diuretic: Torsemide 40 mg daily.  Reports recent adjustment, previously was on Lasix and changed to torsemide on 9/13/2024.  Currently on Lasix drip to augment urine output.Discontinue today. Diuretics as needed.  Continue daily weights, fluid restriction, I/O.     N&V (nausea and vomiting)  Management per primary care team.  SIRS (systemic inflammatory  response syndrome) (HCC)    Other: Diabetes, atrial fibrillation, hyperlipidemia.      History of Present Illness   Brief History of Admission -49-year-old female with history of hypertension, diabetes, CHF, asthma, GERD, obesity, who presents with 2 days of nausea, vomiting, and diarrhea.  She was recently evaluated and there was concern for volume overload so her diuretics were increased.  She is also status post CTA and contrast exposure 9/20.  She was found to have severe acute kidney injury.    Subjective: Denies chest pain or shortness of breathe. Reports urinating more. Appetite is fair.    Objective      Temp:  [97.8 °F (36.6 °C)-98.5 °F (36.9 °C)] 97.8 °F (36.6 °C)  HR:  [] 71  Resp:  [11-30] 16  BP: (104-156)/(52-85) 105/52  O2 Device: None (Room air)         Vitals:    09/23/24 0503 09/24/24 0536   Weight: 79.7 kg (175 lb 11.3 oz) 83.2 kg (183 lb 6.8 oz)     I/O last 24 hours:  In: 1865.7 [P.O.:720; I.V.:995.7; IV Piggyback:150]  Out: 1875 [Urine:1875]  Lines/Drains/Airways       Active Status       None                  Physical Exam  Vitals reviewed.   HENT:      Head: Normocephalic.      Nose: Nose normal.      Mouth/Throat:      Mouth: Mucous membranes are moist.   Cardiovascular:      Heart sounds: Normal heart sounds.   Pulmonary:      Breath sounds: Normal breath sounds.   Abdominal:      Palpations: Abdomen is soft.   Musculoskeletal:      Right lower leg: No edema.      Left lower leg: No edema.   Skin:     General: Skin is warm.   Neurological:      General: No focal deficit present.      Mental Status: She is alert.   Psychiatric:         Mood and Affect: Mood normal.        Medications:    Current Facility-Administered Medications:     acetaminophen (TYLENOL) tablet 650 mg, 650 mg, Oral, Q6H PRN, Gi Bello PA-C, 650 mg at 09/23/24 1959    albuterol inhalation solution 2.5 mg, 2.5 mg, Nebulization, Q6H PRN, Gi Bello PA-C    aspirin (ECOTRIN LOW STRENGTH) EC tablet 81 mg, 81  mg, Oral, Daily, Gi Bello PA-C, 81 mg at 09/24/24 0827    atorvastatin (LIPITOR) tablet 80 mg, 80 mg, Oral, Daily, Gi Bello PA-C, 80 mg at 09/24/24 0826    cefTRIAXone (ROCEPHIN) IVPB (premix in dextrose) 2,000 mg 50 mL, 2,000 mg, Intravenous, Q24H, Cindi Guillermo MD, Stopped at 09/24/24 0400    fluticasone-vilanterol 200-25 mcg/actuation 1 puff, 1 puff, Inhalation, Daily, Gi Bello PA-C, 1 puff at 09/24/24 0828    furosemide (LASIX) 500 mg infusion 50 mL, 40 mg/hr, Intravenous, Continuous, Callum Chowdhury MD, Last Rate: 4 mL/hr at 09/23/24 2339, 40 mg/hr at 09/23/24 2339    heparin (porcine) subcutaneous injection 5,000 Units, 5,000 Units, Subcutaneous, Q8H KENNY, Gi Bello PA-C, 5,000 Units at 09/24/24 0533    insulin regular (HumuLIN R,NovoLIN R) 1 Units/mL in sodium chloride 0.9 % 100 mL infusion, 0.3-21 Units/hr, Intravenous, Titrated, Cindi Guillermo MD, Last Rate: 1.5 mL/hr at 09/24/24 0803, 1.5 Units/hr at 09/24/24 0803    metoprolol tartrate (LOPRESSOR) partial tablet 12.5 mg, 12.5 mg, Oral, Q12H KENNY, Cem Gamez PA-C    montelukast (SINGULAIR) tablet 10 mg, 10 mg, Oral, HS, Gi Bello PA-C, 10 mg at 09/23/24 2148    ondansetron (ZOFRAN) injection 4 mg, 4 mg, Intravenous, Q6H PRN, Gi Bello PA-C    pantoprazole (PROTONIX) EC tablet 20 mg, 20 mg, Oral, Early Morning, Gi Bello PA-C, 20 mg at 09/24/24 0533    PARoxetine (PAXIL) tablet 20 mg, 20 mg, Oral, Daily, Gi Bello PA-C, 20 mg at 09/24/24 0828    phenol (CHLORASEPTIC) 1.4 % mucosal liquid 1 spray, 1 spray, Mouth/Throat, Q2H PRN, Gi Bello PA-C, 1 spray at 09/22/24 2347    saliva substitute (MOUTH KOTE) mucosal solution 5 spray, 5 spray, Mouth/Throat, 4x Daily PRN, Gi Bello PA-C, 5 spray at 09/24/24 0245    sodium chloride 0.9 % infusion, 10 mL/hr, Intravenous, Continuous, Alyssa Evans PA-C, Last Rate: 10 mL/hr at 09/24/24 0539, 10 mL/hr at 09/24/24 0539      Lab  "Results: I have reviewed the following results:   Results from last 7 days   Lab Units 09/24/24 0428 09/23/24  2159 09/23/24  1553 09/23/24  1012 09/23/24 0440 09/22/24 2137 09/22/24 2134 09/22/24 1931 09/22/24 1931 09/22/24  1929   WBC Thousand/uL 13.12*  --   --   --  20.33*  --   --   --  18.02*  --    HEMOGLOBIN g/dL 10.4*  --   --   --  10.3*  --   --   --  12.0  --    I STAT HEMOGLOBIN g/dl  --   --   --   --   --  11.6  --   --   --  13.3   HEMATOCRIT % 30.8*  --   --   --  31.5*  --   --   --  35.0  --    HEMATOCRIT, ISTAT %  --   --   --   --   --  34*  --   --   --  39   PLATELETS Thousands/uL 416*  --   --   --  453*  --   --   --  467*  --    POTASSIUM mmol/L 4.2  4.2 4.3 4.7 5.0 4.5  --  5.0  --  5.3  --    CHLORIDE mmol/L 88*  89* 89* 90* 89* 92*  --  85*  --  81*  --    CO2 mmol/L 25  25 25 25 28 27  --  27   < > 26  --    CO2, I-STAT mmol/L  --   --   --   --   --  29  --   --   --  30   BUN mg/dL 101*  102* 105* 106* 103* 107*  --  112*  --  114*  --    CREATININE mg/dL 8.80*  8.86* 8.68* 8.44* 7.79* 7.82*  --  7.53*  --  7.53*  --    CALCIUM mg/dL 8.2*  8.2* 8.3* 8.1* 8.1* 8.4  --  8.5  --  9.0  --    MAGNESIUM mg/dL 2.2  2.3 2.2 2.0 2.0 2.1  --   --   --  1.3*  --    PHOSPHORUS mg/dL  --   --   --   --  4.9*  --   --   --  7.1*  --    ALBUMIN g/dL 3.4*  --   --   --  3.5  --   --   --  4.0  --    GLUCOSE, ISTAT mg/dl  --   --   --   --   --  590*  --   --   --  >600*    < > = values in this interval not displayed.       Administrative Statements     Portions of the record may have been created with voice recognition software. Occasional wrong word or \"sound a like\" substitutions may have occurred due to the inherent limitations of voice recognition software. Read the chart carefully and recognize, using context, where substitutions have occurred.If you have any questions, please contact the dictating provider.  "

## 2024-09-24 NOTE — ASSESSMENT & PLAN NOTE
Wt Readings from Last 3 Encounters:   09/24/24 83.2 kg (183 lb 6.8 oz)   09/18/24 82.2 kg (181 lb 3.5 oz)   09/17/24 82.7 kg (182 lb 6.4 oz)   Prehospital diuretic: Torsemide 40 daily  Currently nephrology managing diuretics given her severe acute kidney injury     -Diuretics per nephrology

## 2024-09-24 NOTE — ASSESSMENT & PLAN NOTE
SIRS: tachypnea, tachycardia, leukocytosis  Suspect reactive to N/V and SABRINA  Unlikely sepsis with no fevers/chills  On rocephin pending blood and urine cultures

## 2024-09-24 NOTE — ASSESSMENT & PLAN NOTE
This is the reason for the inpatient consultation  New diagnosis of atrial fibrillation occurring in the setting of acute illness  9/23/2024 patient had approximately 20 minutes of atrial fibrillation  9/23/2024 conversion to NSR with IV metoprolol     -Continue metoprolol 12.5 twice daily   -Outpatient rhythm monitor for AF surveillance

## 2024-09-24 NOTE — ASSESSMENT & PLAN NOTE
Suspect ATN with recent contrast exposure, could have component of volume depletion with vomiting and diarrhea as well as increase in diuretics, and ACE inhibition.  Presented with creatinine of 7.5.  Repeat creatinine increasing to 8.8.  Baseline creatinine 0.8-1.1.  Holding lisinopril.  Status post IV contrast 9/20/2024.  Currently on normal saline at [facility].   Started on Lasix drip 09/23 to augment urine output. Discontinue today and may give as needed for decreasing urine output.  UA: Large leuks, 3+ protein, glucose, 2-4 RBCs, 20-30 WBCs.  CT negative for hydro, retained contrast present.  Continue to avoid nephrotoxins, hypotension, IV contrast.  Strict I/O.  Currently no emergent indication for dialysis at this time.

## 2024-09-24 NOTE — ASSESSMENT & PLAN NOTE
Previous LDL was 104 in April 2024  She is on high-dose Lipitor 80 mg daily as outpatient     -Outpatient follow-up

## 2024-09-24 NOTE — ASSESSMENT & PLAN NOTE
Pt reports severe odynophagia following multiple episodes of vomiting  Strep PCR neg, COVID/Flu/RSV neg  Unremarkable physical exam: no erythema of pharynx, no cervical LAD, no crepitus of neck but tender to palpation  Improving  CT neck - secretions in posterior pharnyx but no concerning findings.  Mouthkote and chloraseptic spray ordered for pain control

## 2024-09-24 NOTE — ASSESSMENT & PLAN NOTE
Blood pressure remains stable and acceptable.  Home medication: Lisinopril 40 mg daily, torsemide 40 mg daily.  Current medication: Metoprolol 12.5 mg every 12 hours.  Avoid hypotension or high fluctuation in blood pressure.

## 2024-09-24 NOTE — CONSULTS
Consultation - Vernell Darby 49 y.o. female MRN: [id number]    Unit/Bed#: -01 SDU Encounter: [id number]      Assessment & Plan     Assessment:  This is a 49 y.o.-year-old female with diabetes with hyperglycemia admitted for sabrina on CKD d/t ATN possibly from N/V/ dehydration, contrast use while on metformin. Nephrology following. Did have AG and mild beta hydroxy on admission d/t SABRINA but without DKA/HHNS. Remains on non DKA drip.   Patient with high insulin resistance at baseline but currently given SABRINA rat risk of low BG if resume home dose. Given SABRINA still not resolved, recommend c/w insulin drip until SABRINA improved and then will transition to MDI based on drip req     Plan:  C/w non DKA drip     Inpatient consult to Endocrinology  Consult performed by: Michela Gamboa MD  Consult ordered by: Cindi Guillermo MD        CC: T1DM    History of Present Illness     HPI: Vernell Darby is a 49 y.o. year old female with type 1 DM with neuropathy, nephropathy, PCOS, htn, hlp who presented with N/V/Diarrhea. Patient recently had issues with SOB and got a CTA to rule out PE. Was told she had fluid retention and hence her lasix's were increased. Reports after this started having N/V/Diarrhea which lead to ER visit an found to have SABRINA on CKD with Cr 7.53. Positive AG with beta hydroxy 1.33 without ketouria or acidosis. Started on non DKA drip. Endocrine consulted for BG management.   Patient does have high insulin resistance at baseline.   Home regime- tuojeo 95u, Fiasp 1:2 carb ratio with breakfast, 1: 1 carb ratio with lunch, 1.5: 1 carb ratio with dinner. Metformin 500mg BID  BG- reports her BG have been better recently. Does have dexcom    Last A1C 9.5%    Currently reports feeling better with N/V diarrhea improved but still having mild abdominal discomfort. Having her first meal right now but doesn't have much appetite. Cr this AM 8.93 and AG 18. -200 range on drip with ~3-5u/hr rate    Review of Systems    Constitutional:  Negative for diaphoresis, fatigue and unexpected weight change.   Eyes:  Negative for photophobia and visual disturbance.   Gastrointestinal:  Negative for constipation, diarrhea and vomiting.   Endocrine: Negative for polydipsia and polyuria.   Skin: Negative.        Historical Information   Past Medical History:   Diagnosis Date    Anemia     Arthritis     Asthma     w/acute exacerbation. Last assessed: 10/26/12    Chest pain 02/03/2016    CHF (congestive heart failure) ([facility])     Depression     Diabetes mellitus ([facility])     Diabetic nephropathy ([facility])     Diabetic nephropathy associated with type 1 diabetes mellitus ([facility]) 08/12/2020    Diabetic retinopathy ([facility]) 02/03/2016    Endometriosis     Gastroenteritis 02/03/2016    GERD (gastroesophageal reflux disease)     History of COVID-19 02/26/2021    Hyperlipidemia 02/03/2016    Hypertension     Obesity     Oligomenorrhea     Polycystic ovaries 02/03/2016    Retinal hemorrhage 02/03/2016    Retinopathy     Thrombocytosis     Type 1 diabetes mellitus with ophthalmic manifestation ([facility]) 02/03/2016     Past Surgical History:   Procedure Laterality Date    CATARACT EXTRACTION Left 10/2020    CATARACT EXTRACTION Right     RETINAL LASER PROCEDURE       Social History   Social History     Substance and Sexual Activity   Alcohol Use Yes    Alcohol/week: 2.0 standard drinks of alcohol    Types: 2 Standard drinks or equivalent per week    Comment: Occasionally/ sicially     Social History     Substance and Sexual Activity   Drug Use No     Social History     Tobacco Use   Smoking Status Never   Smokeless Tobacco Never   Tobacco Comments    Per [employer]: Former smoker. Quit in 1994     Family History:   Family History   Problem Relation Age of Onset    Heart murmur Mother     Diabetes Mother         Mellitus    Thyroid disease Mother         Disorder    Diabetes type II Mother     Diabetes Father         Mellitus    Hypertension Father     Diabetes type II  Father     No Known Problems Maternal Grandmother     Diabetes Maternal Grandfather         Mellitus    Breast cancer Paternal Grandmother 70 - 79    Leukemia Paternal Grandfather 48    Diabetes Maternal Aunt         Mellitus    Diabetes Maternal Uncle         Mellitus    Substance Abuse Neg Hx     Mental illness Neg Hx     Alcohol abuse Neg Hx        Meds/Allergies   Current Facility-Administered Medications   Medication Dose Route Frequency Provider Last Rate Last Admin    acetaminophen (TYLENOL) tablet 650 mg  650 mg Oral Q6H PRN Gi Bello PA-C   650 mg at 09/23/24 1959    albuterol inhalation solution 2.5 mg  2.5 mg Nebulization Q6H PRN Gi Bello PA-C        aspirin (ECOTRIN LOW STRENGTH) EC tablet 81 mg  81 mg Oral Daily Gi Bello PA-C   81 mg at 09/24/24 0827    atorvastatin (LIPITOR) tablet 80 mg  80 mg Oral Daily Gi Bello PA-C   80 mg at 09/24/24 0826    cefTRIAXone (ROCEPHIN) IVPB (premix in dextrose) 2,000 mg 50 mL  2,000 mg Intravenous Q24H Gi Bello PA-C   Stopped at 09/24/24 0400    fluticasone-vilanterol 200-25 mcg/actuation 1 puff  1 puff Inhalation Daily Gi Bello PA-C   1 puff at 09/24/24 0828    furosemide (LASIX) 500 mg infusion 50 mL  40 mg/hr Intravenous Continuous Callum Chowdhury MD 4 mL/hr at 09/23/24 2339 40 mg/hr at 09/23/24 2339    heparin (porcine) subcutaneous injection 5,000 Units  5,000 Units Subcutaneous Q8H [facility] Gi Bello PA-C   5,000 Units at 09/24/24 0533    insulin regular (HumuLIN R,NovoLIN R) 1 Units/mL in sodium chloride 0.9 % 100 mL infusion  0.3-21 Units/hr Intravenous Titrated Michela Gamboa MD 1.5 mL/hr at 09/24/24 0803 1.5 Units/hr at 09/24/24 0803    metoprolol tartrate (LOPRESSOR) partial tablet 12.5 mg  12.5 mg Oral Q12H [facility] Cem Gamez PA-C        montelukast (SINGULAIR) tablet 10 mg  10 mg Oral HS Gi Bello PA-C   10 mg at 09/23/24 2148    ondansetron (ZOFRAN) injection 4 mg  4 mg Intravenous Q6H PRN Gi Nichols  "MCKAY Bello        pantoprazole (PROTONIX) EC tablet 20 mg  20 mg Oral Early Morning Gi Nichols MCKAY Bello   20 mg at 09/24/24 0533    PARoxetine (PAXIL) tablet 20 mg  20 mg Oral Daily Gi Nichols MCKAY Bello   20 mg at 09/24/24 0828    phenol (CHLORASEPTIC) 1.4 % mucosal liquid 1 spray  1 spray Mouth/Throat Q2H PRN Gi Nichols MCKAY Blelo   1 spray at 09/22/24 2347    saliva substitute (MOUTH KOTE) mucosal solution 5 spray  5 spray Mouth/Throat 4x Daily PRN Gi Nichols MCKAY Bello   5 spray at 09/24/24 0245    sodium chloride 0.9 % infusion  10 mL/hr Intravenous Continuous Alyssa Evans PA-C 10 mL/hr at 09/24/24 0539 10 mL/hr at 09/24/24 0539     No Known Allergies    Objective   Vitals: Blood pressure 105/52, pulse 71, temperature 97.8 °F (36.6 °C), temperature source Oral, resp. rate 16, height 4' 8\" (1.422 m), weight 83.2 kg (183 lb 6.8 oz), SpO2 98%, not currently breastfeeding.    Intake/Output Summary (Last 24 hours) at 9/24/2024 0858  Last data filed at 9/24/2024 0800  Gross per 24 hour   Intake 1663.74 ml   Output 1875 ml   Net -211.26 ml     Invasive Devices       Peripheral Intravenous Line  Duration             Peripheral IV 09/22/24 Right Antecubital 1 day    Peripheral IV 09/22/24 Right;Upper;Ventral (anterior) Arm 1 day                    Physical Exam  Constitutional:       Appearance: Normal appearance. She is obese.   Cardiovascular:      Rate and Rhythm: Normal rate and regular rhythm.      Pulses: Normal pulses.   Pulmonary:      Effort: Pulmonary effort is normal.   Abdominal:      General: Abdomen is flat. Bowel sounds are normal.      Palpations: Abdomen is soft.   Skin:     General: Skin is warm and dry.      Capillary Refill: Capillary refill takes less than 2 seconds.   Neurological:      General: No focal deficit present.      Mental Status: She is alert and oriented to person, place, and time.   Psychiatric:         Mood and Affect: Mood normal.         The history was obtained from the " "review of the chart, patient.    Lab Results:   Results from last 7 days   Lab Units 09/22/24  1931   HEMOGLOBIN A1C % 9.5*       No results for input(s): \"CHOL\", \"HDL\", \"LDL\", \"TRIG\", \"VLDL\" in the last 72 hours.  No results found for: \"MICROALBUR\", \"[id number]\"  POC Glucose   Date Value   09/24/2024 113 mg/dl   09/24/2024 143 mg/dl (H)   08/02/2016 287         Portions of the record may have been created with voice recognition software.    "

## 2024-09-24 NOTE — ASSESSMENT & PLAN NOTE
"Encounter Date: 5/24/2022       History     Chief Complaint   Patient presents with    Toe Pain     Pt presents with minor injury an pain to r second toe after stubbing her toe on a door at home. No bruising or swelling noted.     This is a 3-year-old  female with noncontributory past medical history who presents to the emergency department with her mother with concerns regarding a toe injury that occurred prior to arrival.  Mom reports that the patient's 2nd toe on the right foot was "brushed" by an electronic door at a store. Mom reports pt with mild pain to toe with minimal bleeding from toenail controlled prior to arrival. Mom denies difficulty ambulating, or any other injuries/concerns at this time.         Review of patient's allergies indicates:  No Known Allergies  No past medical history on file.  No past surgical history on file.  Family History   Problem Relation Age of Onset    Diabetes Maternal Grandmother     Diabetes Paternal Grandmother     No Known Problems Mother     No Known Problems Father     No Known Problems Sister     No Known Problems Brother     No Known Problems Maternal Aunt     No Known Problems Maternal Uncle     No Known Problems Paternal Aunt     No Known Problems Paternal Uncle     No Known Problems Maternal Grandfather     No Known Problems Paternal Grandfather     ADD / ADHD Neg Hx     Alcohol abuse Neg Hx     Allergies Neg Hx     Asthma Neg Hx     Autism spectrum disorder Neg Hx     Behavior problems Neg Hx     Birth defects Neg Hx     Cancer Neg Hx     Chromosomal disorder Neg Hx     Cleft lip Neg Hx     Congenital heart disease Neg Hx     Depression Neg Hx     Early death Neg Hx     Eczema Neg Hx     Hearing loss Neg Hx     Heart disease Neg Hx     Hyperlipidemia Neg Hx     Hypertension Neg Hx     Kidney disease Neg Hx     Learning disabilities Neg Hx     Mental illness Neg Hx     Migraines Neg Hx     Neurodegenerative disease Neg Hx  " Her baseline creatinine is actually normal, she has no history of CKD  Her creatinine was 7.5 on admission  Nephrology is following and she has been placed on a Lasix drip     -Diuretics per nephrology      Obesity Neg Hx     Seizures Neg Hx     SIDS Neg Hx     Thyroid disease Neg Hx     Other Neg Hx      Social History     Tobacco Use    Smoking status: Never Smoker    Smokeless tobacco: Never Used     Review of Systems   Musculoskeletal: Negative.    Skin: Positive for wound.   Neurological: Negative for weakness.       Physical Exam     Initial Vitals [05/24/22 1436]   BP Pulse Resp Temp SpO2   -- 97 (!) 17 100.1 °F (37.8 °C) 99 %      MAP       --         Physical Exam    Nursing note and vitals reviewed.  Constitutional: She appears well-developed and well-nourished. She is not diaphoretic. No distress.   HENT:   Head: No signs of injury.   Mouth/Throat: Mucous membranes are moist.   Eyes: EOM are normal. Pupils are equal, round, and reactive to light.   Neck: Neck supple.   Normal range of motion.  Cardiovascular: Normal rate. Pulses are strong.    Pulmonary/Chest: Effort normal. No respiratory distress.   Abdominal: Bowel sounds are normal.   Musculoskeletal:         General: Normal range of motion.      Cervical back: Normal range of motion and neck supple.     Neurological: She is alert. GCS score is 15. GCS eye subscore is 4. GCS verbal subscore is 5. GCS motor subscore is 6.   Skin: Skin is warm and dry. Capillary refill takes less than 2 seconds.   Right 2nd toenail appears normal with scant bleeding around base/lateral edges of nail; appears intact, no subungual hematoma. Cap refill <2sec         ED Course   Procedures  Labs Reviewed - No data to display       Imaging Results    None          Medications - No data to display                       Clinical Impression:   Final diagnoses:  [S99.926V] Toe injury, right, initial encounter (Primary)          ED Disposition Condition    Discharge Stable        ED Prescriptions     Medication Sig Dispense Start Date End Date Auth. Provider    mupirocin (BACTROBAN) 2 % ointment Apply topically 3 (three) times daily. for 7 days 15 g 5/24/2022 5/31/2022  Liz Anaya NP        Follow-up Information     Follow up With Specialties Details Why Contact Info    PCP Follow UP  Schedule an appointment as soon as possible for a visit in 2 days for follow-up, for re-evaluation of today's complaint            Liz Anaya NP  05/24/22 5404

## 2024-09-24 NOTE — PROGRESS NOTES
Progress Note - Critical Care/ICU   Name: Vernell Darby 49 y.o. female I MRN: [id number]  Unit/Bed#: -01 SDU I Date of Admission: 9/22/2024   Date of Service: 9/23/2024 I Hospital Day: 1       Interval Events:   Patient is a 49-year-old female with a medical history of diastolic CHF with preserved ejection fraction and acute kidney injury with a creatinine of 9 on a Lasix drip patient went into rapid A-fib rates 130s to 140s.   Received Lopressor 5 mg IV x 1 converted back into normal sinus rhythm, most likely transient A-fib      Assessment and Plan  Diagnosis: A-fib with RVR  Plan: Lopressor 5 mg IV x 1 converted patient back into normal sinus  Start metoprolol 12.5 twice daily  Cardiology count  Repeat echocardiogram        Alyssa Evans PA-C

## 2024-09-24 NOTE — PLAN OF CARE
Problem: Potential for Falls  Goal: Patient will remain free of falls  Description: INTERVENTIONS:  - Educate patient/family on patient safety including physical limitations  - Instruct patient to call for assistance with activity   - Consult OT/PT to assist with strengthening/mobility   - Keep Call bell within reach  - Keep bed low and locked with side rails adjusted as appropriate  - Keep care items and personal belongings within reach  - Initiate and maintain comfort rounds  - Make Fall Risk Sign visible to staff  - Apply yellow socks and bracelet for high fall risk patients  - Consider moving patient to room near nurses station  9/23/2024 2214 by Sangeeta Ivey  Outcome: Progressing  9/23/2024 2214 by Sangeeta Ivey  Outcome: Progressing     Problem: PAIN - ADULT  Goal: Verbalizes/displays adequate comfort level or baseline comfort level  Description: Interventions:  - Encourage patient to monitor pain and request assistance  - Assess pain using appropriate pain scale  - Administer analgesics based on type and severity of pain and evaluate response  - Implement non-pharmacological measures as appropriate and evaluate response  - Consider cultural and social influences on pain and pain management  - Notify physician/advanced practitioner if interventions unsuccessful or patient reports new pain  9/23/2024 2214 by Sangeeta Ivey  Outcome: Progressing  9/23/2024 2214 by Sangeeta Ivey  Outcome: Progressing     Problem: INFECTION - ADULT  Goal: Absence or prevention of progression during hospitalization  Description: INTERVENTIONS:  - Assess and monitor for signs and symptoms of infection  - Monitor lab/diagnostic results  - Monitor all insertion sites, i.e. indwelling lines, tubes, and drains  - Monitor endotracheal if appropriate and nasal secretions for changes in amount and color  - [place] appropriate cooling/warming therapies per order  - Administer medications as ordered  - Instruct and  encourage patient and family to use good hand hygiene technique  - Identify and instruct in appropriate isolation precautions for identified infection/condition  9/23/2024 2214 by Sangeeta Ivey  Outcome: Progressing  9/23/2024 2214 by Sangeeta Ivey  Outcome: Progressing  Goal: Absence of fever/infection during neutropenic period  Description: INTERVENTIONS:  - Monitor WBC    9/23/2024 2214 by Sangeeta Ivey  Outcome: Progressing  9/23/2024 2214 by Sangeeta Ivey  Outcome: Progressing     Problem: SAFETY ADULT  Goal: Patient will remain free of falls  Description: INTERVENTIONS:  - Educate patient/family on patient safety including physical limitations  - Instruct patient to call for assistance with activity   - Consult OT/PT to assist with strengthening/mobility   - Keep Call bell within reach  - Keep bed low and locked with side rails adjusted as appropriate  - Keep care items and personal belongings within reach  - Initiate and maintain comfort rounds  - Make Fall Risk Sign visible to staff  - Apply yellow socks and bracelet for high fall risk patients  - Consider moving patient to room near nurses station  9/23/2024 2214 by Sangeeta Ivey  Outcome: Progressing  9/23/2024 2214 by Sangeeta Ivey  Outcome: Progressing  Goal: Maintain or return to baseline ADL function  Description: INTERVENTIONS:  -  Assess patient's ability to carry out ADLs; assess patient's baseline for ADL function and identify physical deficits which impact ability to perform ADLs (bathing, care of mouth/teeth, toileting, grooming, dressing, etc.)  - Assess/evaluate cause of self-care deficits   - Assess range of motion  - Assess patient's mobility; develop plan if impaired  - Assess patient's need for assistive devices and provide as appropriate  - Encourage maximum independence but intervene and supervise when necessary  - Involve family in performance of ADLs  - Assess for home care needs following discharge   -  Consider OT consult to assist with ADL evaluation and planning for discharge  - Provide patient education as appropriate  9/23/2024 2214 by Sangeeta Ivey  Outcome: Progressing  9/23/2024 2214 by Sangeeta Ivey  Outcome: Progressing  Goal: Maintains/Returns to pre admission functional level  Description: INTERVENTIONS:  - Perform AM-PAC 6 Click Basic Mobility/ Daily Activity assessment daily.  - Set and communicate daily mobility goal to care team and patient/family/caregiver.   - Collaborate with rehabilitation services on mobility goals if consulted  - Out of bed for toileting  - Record patient progress and toleration of activity level   9/23/2024 2214 by Sangeeta Ivey  Outcome: Progressing  9/23/2024 2214 by Sangeeta Ivey  Outcome: Progressing     Problem: DISCHARGE PLANNING  Goal: Discharge to home or other facility with appropriate resources  Description: INTERVENTIONS:  - Identify barriers to discharge w/patient and caregiver  - Arrange for needed discharge resources and transportation as appropriate  - Identify discharge learning needs (meds, wound care, etc.)  - Arrange for interpretive services to assist at discharge as needed  - Refer to Case Management Department for coordinating discharge planning if the patient needs post-hospital services based on physician/advanced practitioner order or complex needs related to functional status, cognitive ability, or social support system  9/23/2024 2214 by Sangeeta Ivey  Outcome: Progressing  9/23/2024 2214 by Sangeeta Ivey  Outcome: Progressing     Problem: Knowledge Deficit  Goal: Patient/family/caregiver demonstrates understanding of disease process, treatment plan, medications, and discharge instructions  Description: Complete learning assessment and assess knowledge base.  Interventions:  - Provide teaching at level of understanding  - Provide teaching via preferred learning methods  9/23/2024 2214 by Sangeeta Ivey  Outcome:  Progressing  9/23/2024 2214 by Sangeeta Ivey  Outcome: Progressing     Problem: CARDIOVASCULAR - ADULT  Goal: Maintains optimal cardiac output and hemodynamic stability  Description: INTERVENTIONS:  - Monitor I/O, vital signs and rhythm  - Monitor for S/S and trends of decreased cardiac output  - Administer and titrate ordered vasoactive medications to optimize hemodynamic stability  - Assess quality of pulses, skin color and temperature  - Assess for signs of decreased coronary artery perfusion  - Instruct patient to report change in severity of symptoms  9/23/2024 2214 by Sangeeta Ivey  Outcome: Progressing  9/23/2024 2214 by Sangeeta Ivey  Outcome: Progressing  Goal: Absence of cardiac dysrhythmias or at baseline rhythm  Description: INTERVENTIONS:  - Continuous cardiac monitoring, vital signs, obtain 12 lead EKG if ordered  - Administer antiarrhythmic and heart rate control medications as ordered  - Monitor electrolytes and administer replacement therapy as ordered  9/23/2024 2214 by Sangeeta Ivey  Outcome: Progressing  9/23/2024 2214 by Sangeeta Ivey  Outcome: Progressing     Problem: RESPIRATORY - ADULT  Goal: Achieves optimal ventilation and oxygenation  Description: INTERVENTIONS:  - Assess for changes in respiratory status  - Assess for changes in mentation and behavior  - Position to facilitate oxygenation and minimize respiratory effort  - Oxygen administered by appropriate delivery if ordered  - Initiate smoking cessation education as indicated  - Encourage broncho-pulmonary hygiene including cough, deep breathe, Incentive Spirometry  - Assess the need for suctioning and aspirate as needed  - Assess and instruct to report SOB or any respiratory difficulty  - Respiratory Therapy support as indicated  9/23/2024 2214 by Sangeeta Ivey  Outcome: Progressing  9/23/2024 2214 by Sangeeta Ivey  Outcome: Progressing     Problem: GASTROINTESTINAL - ADULT  Goal: Minimal or absence  of nausea and/or vomiting  Description: INTERVENTIONS:  - Administer IV fluids if ordered to ensure adequate hydration  - Maintain NPO status until nausea and vomiting are resolved  - Nasogastric tube if ordered  - Administer ordered antiemetic medications as needed  - Provide nonpharmacologic comfort measures as appropriate  - Advance diet as tolerated, if ordered  - Consider nutrition services referral to assist patient with adequate nutrition and appropriate food choices  Outcome: Progressing  Goal: Maintains or returns to baseline bowel function  Description: INTERVENTIONS:  - Assess bowel function  - Encourage oral fluids to ensure adequate hydration  - Administer IV fluids if ordered to ensure adequate hydration  - Administer ordered medications as needed  - Encourage mobilization and activity  - Consider nutritional services referral to assist patient with adequate nutrition and appropriate food choices  Outcome: Progressing  Goal: Maintains adequate nutritional intake  Description: INTERVENTIONS:  - Monitor percentage of each meal consumed  - Identify factors contributing to decreased intake, treat as appropriate  - Assist with meals as needed  - Monitor I&O, weight, and lab values if indicated  - Obtain nutrition services referral as needed  Outcome: Progressing  Goal: Establish and maintain optimal ostomy function  Description: INTERVENTIONS:  - Assess bowel function  - Encourage oral fluids to ensure adequate hydration  - Administer IV fluids if ordered to ensure adequate hydration   - Administer ordered medications as needed  - Encourage mobilization and activity  - Nutrition services referral to assist patient with appropriate food choices  - Assess stoma site  - Consider wound care consult   Outcome: Progressing  Goal: Oral mucous membranes remain intact  Description: INTERVENTIONS  - Assess oral mucosa and hygiene practices  - Implement preventative oral hygiene regimen  - Implement oral medicated  treatments as ordered  - Initiate Nutrition services referral as needed  Outcome: Progressing     Problem: GENITOURINARY - ADULT  Goal: Maintains or returns to baseline urinary function  Description: INTERVENTIONS:  - Assess urinary function  - Encourage oral fluids to ensure adequate hydration if ordered  - Administer IV fluids as ordered to ensure adequate hydration  - Administer ordered medications as needed  - Offer frequent toileting  - Follow urinary retention protocol if ordered  Outcome: Progressing  Goal: Absence of urinary retention  Description: INTERVENTIONS:  - Assess patient’s ability to void and empty bladder  - Monitor I/O  - Bladder scan as needed  - Discuss with physician/AP medications to alleviate retention as needed  - Discuss catheterization for long term situations as appropriate  Outcome: Progressing  Goal: Urinary catheter remains patent  Description: INTERVENTIONS:  - Assess patency of urinary catheter  - If patient has a chronic [place], consider changing catheter if non-functioning  - Follow guidelines for intermittent irrigation of non-functioning urinary catheter  Outcome: Progressing     Problem: METABOLIC, FLUID AND ELECTROLYTES - ADULT  Goal: Electrolytes maintained within normal limits  Description: INTERVENTIONS:  - Monitor labs and assess patient for signs and symptoms of electrolyte imbalances  - Administer electrolyte replacement as ordered  - Monitor response to electrolyte replacements, including repeat lab results as appropriate  - Instruct patient on fluid and nutrition as appropriate  Outcome: Progressing  Goal: Fluid balance maintained  Description: INTERVENTIONS:  - Monitor labs   - Monitor I/O and WT  - Instruct patient on fluid and nutrition as appropriate  - Assess for signs & symptoms of volume excess or deficit  Outcome: Progressing  Goal: Glucose maintained within target range  Description: INTERVENTIONS:  - Monitor Blood Glucose as ordered  - Assess for signs and  symptoms of hyperglycemia and hypoglycemia  - Administer ordered medications to maintain glucose within target range  - Assess nutritional intake and initiate nutrition service referral as needed  Outcome: Progressing     Problem: MUSCULOSKELETAL - ADULT  Goal: Maintain or return mobility to safest level of function  Description: INTERVENTIONS:  - Assess patient's ability to carry out ADLs; assess patient's baseline for ADL function and identify physical deficits which impact ability to perform ADLs (bathing, care of mouth/teeth, toileting, grooming, dressing, etc.)  - Assess/evaluate cause of self-care deficits   - Assess range of motion  - Assess patient's mobility  - Assess patient's need for assistive devices and provide as appropriate  - Encourage maximum independence but intervene and supervise when necessary  - Involve family in performance of ADLs  - Assess for home care needs following discharge   - Consider OT consult to assist with ADL evaluation and planning for discharge  - Provide patient education as appropriate  Outcome: Progressing  Goal: Maintain proper alignment of affected body part  Description: INTERVENTIONS:  - Support, maintain and protect limb and body alignment  - Provide patient/ family with appropriate education  Outcome: Progressing     Problem: Nutrition/Hydration-ADULT  Goal: Nutrient/Hydration intake appropriate for improving, restoring or maintaining nutritional needs  Description: Monitor and assess patient's nutrition/hydration status for malnutrition. Collaborate with interdisciplinary team and initiate plan and interventions as ordered.  Monitor patient's weight and dietary intake as ordered or per policy. Utilize nutrition screening tool and intervene as necessary. Determine patient's food preferences and provide high-protein, high-caloric foods as appropriate.     INTERVENTIONS:  - Monitor oral intake, urinary output, labs, and treatment plans  - Assess nutrition and hydration  status and recommend course of action  - Evaluate amount of meals eaten  - Assist patient with eating if necessary   - Allow adequate time for meals  - Recommend/ encourage appropriate diets, oral nutritional supplements, and vitamin/mineral supplements  - Order, calculate, and assess calorie counts as needed  - Recommend, monitor, and adjust tube feedings and TPN/PPN based on assessed needs  - Assess need for intravenous fluids  - Provide specific nutrition/hydration education as appropriate  - Include patient/family/caregiver in decisions related to nutrition  Outcome: Progressing     Problem: Decreased Cardiac Output  Goal: Cardiac output adequate for individual needs  Description: INTERVENTIONS: Monitor for signs and symptoms of decreased cardiac output   - Monitor for dyspnea with exertion and at rest  - Monitor for orthopnea  - Monitor for signs of tachycardia. Place patient on telemetry monitoring.  - Assess patient for jugular vein distention  - Assess patient for lower extremity edema and poor peripheral perfusion   - Auscultate lung sound for Fine bibasilar crackles   - Monitor for cardiac arrythmias   - Administer beta blockers, antiarrhythmic, and blood pressure medications as ordered    Outcome: Progressing     Problem: Impaired Gas Exchange  Goal: Optimize oxygenation and ensure adequate ventilation  Description: INTERVENTIONS: Monitor for signs and symptoms of respiratory distress                - Elevate HOB or use high fowlers to promote lung expansion                - Administer oxygen as ordered to maintain adequate oxygenation                - Encourage use of IS to promote lung expansion and prevent PN                - Monitor ABGs to assess oxygenation status                - Monitor blood oxygen level to maintain adequate oxygenation                - Encourage cough and deep breathing exercises to promote lung expansion                - Monitor patient's mental status for increased  confusion    Outcome: Progressing     Problem: Excess Fluid Volume  Goal: Patient is able to achieve and maintain homeostasis  Description: INTERVENTIONS: Monitor for sign and symptoms of fluid overload  - Evaluate LE edema every shift  - Elevate LE to prevent dependent edema  - Apply LIO stockings as ordered   - Monitor ankle circumference daily  - Assess for jugular vein distention  - Evaluate provider orders for the CHF diuretic algorithm. Administer diuretics as ordered  - Weigh the patient daily at 0600 and report a weight gain of five pounds or more   - Strict intake and output  - Monitor fluid intake and adhere to fluid restrictions  - Assess lung sounds every shift and as needed  - Monitor vital signs and lab values (CBC, chem, BUN, BNP)  - Measure and document urine output    Outcome: Progressing     Problem: Activity Intolerance  Goal: Patient is able to perform activities within their limitations  Description: INTERVENTIONS:                       -   Alternate periods of activity with periods of rest                 -   Patients is able to maintain normal vitals heart rhythm during activity                 -   Gradually increase activity and exercise as patient can tolerate                 -   Monitor blood pressure and heart before and after exercise                  -   Monitor blood oxygen saturation during activity and apply oxygen as needed    Outcome: Progressing     Problem: Knowledge Deficit  Goal: Patient is able to verbalize understanding of Heart Failure after education  Description: INTERVENTIONS:  - Educate the patient and family on signs and symptoms of HF  - Provide the patient with HF education and HF zone tool  - Educate on the importance of daily weight in the AM and reporting a weight gain               of 3 or more pounds to their primary care physician  - Monitor for SOB  - Maintain and sodium and fluid restriction  - Educate the patient on the importance of medications such as:  diuretics, betablockers,               antiarrhythmics and their purpose, dose, route, side effects and labs               if they are needed    Outcome: Progressing

## 2024-09-24 NOTE — CONSULTS
Consult - Cardiology   Vernell Darby 49 y.o. female MRN: [id number]  Unit/Bed#: -01 SDU Encounter: [id number]        Reason For Consult:    Outpatient Cardiologist:               Assessment & Plan  PAF (paroxysmal atrial fibrillation) ([facility])  This is the reason for the inpatient consultation  New diagnosis of atrial fibrillation occurring in the setting of acute illness  9/23/2024 patient had approximately 20 minutes of atrial fibrillation  9/23/2024 conversion to NSR with IV metoprolol     -Continue metoprolol 12.5 twice daily   -Outpatient rhythm monitor for AF surveillance  Chronic diastolic congestive heart failure ([facility])  Wt Readings from Last 3 Encounters:   09/24/24 83.2 kg (183 lb 6.8 oz)   09/18/24 82.2 kg (181 lb 3.5 oz)   09/17/24 82.7 kg (182 lb 6.4 oz)   Prehospital diuretic: Torsemide 40 daily  Currently nephrology managing diuretics given her severe acute kidney injury     -Diuretics per nephrology    SABRINA (acute kidney injury) ([facility])  Her baseline creatinine is actually normal, she has no history of CKD  Her creatinine was 7.5 on admission  Nephrology is following and she has been placed on a Lasix drip     -Diuretics per nephrology  Hypertension  Prehospital Rx: Lisinopril 40 daily     -Off lisinopril due to acute kidney injury   -Monitor blood pressure with starting metoprolol  Hyperlipidemia  Previous LDL was 104 in April 2024  She is on high-dose Lipitor 80 mg daily as outpatient     -Outpatient follow-up  Type 1 diabetes mellitus with hyperglycemia ([facility])  Lab Results   Component Value Date    HGBA1C 9.5 (H) 09/22/2024       Recent Labs     09/24/24  0156 09/24/24  0404 09/24/24  0557 09/24/24  0759   POCGLU 92 146* 143* 113       Blood Sugar Average: Last 72 hrs:  (P) [id number]  She has required an insulin drip this admission  Endocrinology is following as well as internal medicine     -Management per endocrinology and internal medicine       History Of Present Illness:  This is a  pleasant 49-year-old female coming to the hospital with persistent nausea and vomiting.  She had recent CT scan as an outpatient and developed nausea and vomiting afterwards.  On arrival to the hospital she had a severe acute kidney injury with her creatinine greater than 7.  She is also hyperglycemic requiring an insulin drip.  She was admitted to the stepdown unit.  Nephrology is following and she has been placed on a Lasix drip.  On the evening of September 23, 2024 she went to atrial fibrillation.  She tells me that she was symptomatic with palpitations and feeling her heart beat quickly.  This lasted approximately 20 minutes.  She was given IV metoprolol and converted back to sinus rhythm.  We are asked see her in consultation to assist with management of her A-fib.    Past Medical History:        Past Medical History:   Diagnosis Date    Anemia     Arthritis     Asthma     w/acute exacerbation. Last assessed: 10/26/12    Chest pain 02/03/2016    CHF (congestive heart failure) ([facility])     Depression     Diabetes mellitus ([facility])     Diabetic nephropathy ([facility])     Diabetic nephropathy associated with type 1 diabetes mellitus ([facility]) 08/12/2020    Diabetic retinopathy ([facility]) 02/03/2016    Endometriosis     Gastroenteritis 02/03/2016    GERD (gastroesophageal reflux disease)     History of COVID-19 02/26/2021    Hyperlipidemia 02/03/2016    Hypertension     Obesity     Oligomenorrhea     Polycystic ovaries 02/03/2016    Retinal hemorrhage 02/03/2016    Retinopathy     Thrombocytosis     Type 1 diabetes mellitus with ophthalmic manifestation ([facility]) 02/03/2016      Past Surgical History:   Procedure Laterality Date    CATARACT EXTRACTION Left 10/2020    CATARACT EXTRACTION Right     RETINAL LASER PROCEDURE          Allergy:        No Known Allergies    Medications:       Prior to Admission medications    Medication Sig Start Date End Date Taking? Authorizing Provider   acetone, urine, test strip Use to test urine ketones  for high blood sugars. 12/9/20   Stacia Ascencio MD   albuterol (2.5 mg/3 mL) 0.083 % nebulizer solution INHALE CONTENTS OF 1 VIAL BY NEBULIZER EVERY 6 HOURS AS NEEDED FOR WHEEZING OR FOR SHORTNESS OF BREATH. 12/7/23   Katie Laguna MD   albuterol (PROVENTIL HFA,VENTOLIN HFA) 90 mcg/act inhaler INHALE 2 PUFFS BY MOUTH EVERY 4 HOURS AS NEEDED FOR WHEEZING OR FOR SHORTNESS OF BREATH 9/4/24   Katie Laguna MD   Ascorbic Acid (VITAMIN C) 500 MG CAPS Take 2 capsules by mouth daily    Historical Provider, MD   Aspirin Low Dose 81 MG EC tablet TAKE 1 TABLET BY MOUTH EVERY DAY 3/8/22   Rhona Lopez PA-C   atorvastatin (LIPITOR) 80 mg tablet TAKE 1 TABLET BY MOUTH EVERY DAY 10/30/23   Wayne Whitaker MD   Continuous Blood Gluc Sensor (Dexcom G7 Sensor) Use 1 Device every 10 days 4/11/24   Edu Gonzales PA-C   cyanocobalamin (VITAMIN B-12) 100 mcg tablet Take by mouth daily    Historical Provider, MD   dicyclomine (BENTYL) 20 mg tablet Take 1 tablet (20 mg total) by mouth 2 (two) times a day as needed (abdominal cramps) for up to 7 days 9/17/24 9/24/24  Rika Nugent MD   Fluticasone-Salmeterol (Advair) 250-50 mcg/dose inhaler INHALE 1 PUFF BY MOUTH TWO TIMES DAILY RINSE MOUTH AFTER USE 7/9/24   Katie Laguna MD   glucagon (Glucagon Emergency) 1 MG injection Inject 1 mg under the skin once as needed for low blood sugar for up to 1 dose 12/16/20   Stacia Ascencio MD   Glucagon (Gvoke HypoPen 2-Pack) 1 MG/0.2ML SOAJ Use if hypoglycemia prn 12/9/20   Stacia Ascencio MD   insulin aspart, w/niacinamide, (Fiasp FlexTouch) 100 Units/mL injection pen Inject 1u:2c with breakfast, 1u:1c with lunch, 1.5u:1c with dinner, up to 150 units daily 8/2/24   Edu Gonzales PA-C   insulin glargine (Toujeo SoloStar) 300 units/mL CONCENTRATED U-300 injection pen (1-unit dial) Inject 95 Units under the skin daily 8/2/24   Edu Gonzales PA-C   insulin lispro (HumaLOG) 100 units/mL injection pen INJECT PER  SLIDING SCALE OF 1UNIT PER 2 CARBS WITH BREAKFAST, 1 UNIT PER 1 CARB WITH LUNCH, 1.5 UNITS PER 1CARB WITH DINNER. MAXIMUM DOSE 150 UNITS DAILY. 9/13/24   Edu Gonzales PA-C   Insulin Pen Needle 33G X 4 MM MISC Use to inject insulin 4 times a day 7/18/24   Stacia Ascencio MD   lisinopril (ZESTRIL) 20 mg tablet TAKE 2 TABLETS BY MOUTH EVERY DAY 4/29/24   Katie Laguna MD   metFORMIN (GLUCOPHAGE-XR) 500 mg 24 hr tablet Take 1 tablet (500 mg total) by mouth 2 (two) times a day with meals  Patient taking differently: Take 500 mg by mouth daily 8/2/24   Edu Gonzales PA-C   montelukast (SINGULAIR) 10 mg tablet TAKE 1 TABLET BY MOUTH AT BEDTIME 5/15/24   Katie Laguna MD   omeprazole (PriLOSEC) 20 mg delayed release capsule Take 20 mg by mouth daily.    Historical Provider, MD   OneTouch Ultra test strip Test 3 times daily  Patient not taking: Reported on 9/11/2024 8/11/20   LELAND Blas   PARoxetine (PAXIL) 20 mg tablet TAKE 1 TABLET BY MOUTH EVERY DAY 8/21/24   Rika Nugent MD   Probiotic Product (ALIGN PO) Take by mouth 6/29/15   Historical Provider, MD   torsemide (DEMADEX) 20 mg tablet Take 2 tablets (40 mg total) by mouth daily 9/18/24   Demetra Gutierrez PA-C       Family History:     Family History   Problem Relation Age of Onset    Heart murmur Mother     Diabetes Mother         Mellitus    Thyroid disease Mother         Disorder    Diabetes type II Mother     Diabetes Father         Mellitus    Hypertension Father     Diabetes type II Father     No Known Problems Maternal Grandmother     Diabetes Maternal Grandfather         Mellitus    Breast cancer Paternal Grandmother 70 - 79    Leukemia Paternal Grandfather 48    Diabetes Maternal Aunt         Mellitus    Diabetes Maternal Uncle         Mellitus    Substance Abuse Neg Hx     Mental illness Neg Hx     Alcohol abuse Neg Hx         Social History:       Social History     Socioeconomic History    Marital status:  /Civil Union     Spouse name: None    Number of children: 0    Years of education: None    Highest education level: High school graduate   Occupational History    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 Quit in 1994   Vaping Use    Vaping status: Never Used   Substance and Sexual Activity    Alcohol use: Yes     Alcohol/week: 2.0 standard drinks of alcohol     Types: 2 Standard drinks or equivalent per week     Comment: Occasionally/ sicially    Drug use: No    Sexual activity: Yes     Partners: Male     Birth control/protection: None   Other Topics Concern    None   Social History Narrative    Always uses seatbelt    Caffeine use: 1-2 cups coffee daily    Has smoke detectors    Alevism Affiliations: Congregation        Currently unemployed; was previously doing home care; continuing job search     Social Determinants of Health     Financial Resource Strain: Not on file   Food Insecurity: No Food Insecurity (9/23/2024)    Hunger Vital Sign     Worried About Running Out of Food in the Last Year: Never true     Ran Out of Food in the Last Year: Never true   Transportation Needs: No Transportation Needs (9/23/2024)    PRAPARE - Transportation     Lack of Transportation (Medical): No     Lack of Transportation (Non-Medical): No   Physical Activity: Not on file   Stress: Not on file   Social Connections: Not on file   Intimate Partner Violence: Not on file   Housing Stability: Low Risk  (9/23/2024)    Housing Stability Vital Sign     Unable to Pay for Housing in the Last Year: No     Number of Times Moved in the Last Year: 0     Homeless in the Last Year: No       ROS:  14 point ROS negative except as outlined above  Remainder review of systems is negative    Exam:  General:  alert, oriented and in no distress, cooperative  Head: Normocephalic, atraumatic.  Eyes:  EOMI. Pupils - equal, round, reactive to  accomodation.  No icterus.  Normal Conjunctiva.   Oropharynx: moist and normal-appearing mucosa  Neck: supple, symmetrical, trachea midline and no JVD  Heart:  RRR, No: murmer, rub or gallop, S1 & S2 normal   Respiratory effort / Chest Inspection: unlabored  Lungs:  normal air entry, lungs clear to auscultation and no rales, rhonchi or wheezing   Abdomen: flat, normal findings: bowel sounds normal and soft, non-tender  Lower Limbs:  no pitting edema  Pulses::  RLE - DP: present 2+                 LLE - DP: present 2+  Musculoskeletal: ROM grossly normal        DATA:      ECG:                     Telemetry: Normal sinus rhythm, . HR 70s          Echocardiogram:           Ischemic Testing:         Weights:    Wt Readings from Last 3 Encounters:   09/24/24 83.2 kg (183 lb 6.8 oz)   09/18/24 82.2 kg (181 lb 3.5 oz)   09/17/24 82.7 kg (182 lb 6.4 oz)   , Body mass index is 41.12 kg/m².         Lab Studies:    Results from last 7 days   Lab Units 09/22/24 2134   CK TOTAL U/L 286*          Results from last 7 days   Lab Units 09/24/24 0428 09/23/24 0440 09/22/24 2137 09/22/24 1931   WBC Thousand/uL 13.12* 20.33*  --  18.02*   HEMOGLOBIN g/dL 10.4* 10.3*  --  12.0   I STAT HEMOGLOBIN g/dl  --   --  11.6  --    HEMATOCRIT % 30.8* 31.5*  --  35.0   HEMATOCRIT, ISTAT %  --   --  34*  --    PLATELETS Thousands/uL 416* 453*  --  467*   ,   Results from last 7 days   Lab Units 09/24/24  0428 09/23/24 2159 09/23/24  1553 09/23/24  1012 09/23/24  0440 09/22/24 2137 09/22/24 2134 09/22/24 1931 09/22/24 1929   POTASSIUM mmol/L 4.2  4.2 4.3 4.7   < > 4.5  --    < > 5.3  --    CHLORIDE mmol/L 88*  89* 89* 90*   < > 92*  --    < > 81*  --    CO2 mmol/L 25  25 25 25   < > 27  --    < > 26  --    CO2, I-STAT mmol/L  --   --   --   --   --  29  --   --  30   BUN mg/dL 101*  102* 105* 106*   < > 107*  --    < > 114*  --    CREATININE mg/dL 8.80*  8.86* 8.68* 8.44*   < > 7.82*  --    < > 7.53*  --    CALCIUM mg/dL 8.2*  8.2*  8.3* 8.1*   < > 8.4  --    < > 9.0  --    ALK PHOS U/L 89  --   --   --  86  --   --  124*  --    ALT U/L 9  --   --   --  10  --   --  14  --    AST U/L 6*  --   --   --  8*  --   --  9*  --    GLUCOSE, ISTAT mg/dl  --   --   --   --   --  590*  --   --  >600*    < > = values in this interval not displayed.

## 2024-09-24 NOTE — ASSESSMENT & PLAN NOTE
Cardiology team is consulted.  No evidence of volume overload on CT.  Chest x-ray negative for acute cardiopulmonary disease.  Outpatient diuretic: Torsemide 40 mg daily.  Reports recent adjustment, previously was on Lasix and changed to torsemide on 9/13/2024.  Currently on Lasix drip to augment urine output.Discontinue today. Diuretics as needed.  Continue daily weights, fluid restriction, I/O.

## 2024-09-24 NOTE — APP STUDENT NOTE
EMI STUDENT  Inpatient Progress Note for TRAINING ONLY  Not Part of Legal Medical Record     Progress Note - Vernell Darby 49 y.o. female MRN: [id number]  Unit/Bed#: -01 SDU Encounter: [id number]      SABRINA (acute kidney injury) ([facility])  Worsening to 8.8  Nephrology started on Lasix 40mg/hr to help urine output  Monitor BMP and Mg Q6 hours  On normal saline  Strict I/O  Avoid hypotension and nephrotoxins, holding lisinopril  Paroxysmal atrial fibrillation  New diagnosis yesterday in setting of acute illness  Had 20 minutes of afib yesterday converted to sinus rhythm with IV metoprolol  Cardio recommends metoprolol 12.5 mg TID  Monitor telemetry and outpatient rhythm monitor  Continue to monitor BP after starting metoprolol: last 105/52 without symptoms  SIRS (systemic inflammatory response syndrome) ([facility])  SIRS criteria met on admission  No longer tachycardia or tachypneic  UA showed WBC, epithelial cells, protein, glucose  Continue on IV ceftriaxone for UTI suspicion  Type 1 diabetes mellitus with hyperglycemia ([facility])  Last glucose 238  Endocrinology consult obtained and recommended regular insulin drip  Continue to monitor  Hyponatremia  Na 134 most recently  On normal saline currently, continue to monitor BMP for changes  N&V (nausea and vomiting)  Was vomiting a lot prior to admission and denies N/V since being admitted  PRN Zofran  Sore throat  Pt reports sore throat following multiple episodes of vomiting  Strep PCR neg, COVID/Flu/RSV neg  Unremarkable physical exam  CT soft tissue neck wo contrast showed:  Small amount of secretions in posterior pharyngeal wall extending to hypopharynx and partially aerated bilateral piriform sinuses. Consider direct visualization by ENT for further evaluation given provided history.   Chronic diastolic congestive heart failure (HCC)   Recently had diuretics up-titrated outpatient due to persistent SOB/PRICE  Strict I/O  Daily weights  Echo ordered if  "indicated  Hypertension  Hold home lisinopril an diuretic  Hyperlipidemia  Continue statin  Anemia  Most recent hemoglobin 10.4  Baseline 10-12  Continue to monitor for need of transfusion  Gastroesophageal reflux disease without esophagitis  Continue home PPI       VTE Pharmacologic Prophylaxis:   Pharmacologic: Heparin  Mechanical VTE Prophylaxis in Place: Yes    Patient Centered Rounds: I have performed bedside rounds with nursing staff today.    Discussions with Specialists or Other Care Team Provider: Cardiology, endocrinology, nephrology    Education and Discussions with Family / Patient: Patient    Time Spent for Care: 1 hour.  More than 50% of total time spent on counseling and coordination of care as described above.    Current Length of Stay: 2 day(s)    Current Patient Status: Inpatient   Certification Statement: The patient will continue to require additional inpatient hospital stay due to monitoring of SABRINA.    Discharge Plan: No current discharge plan    Code Status: Level 1 - Full Code    Subjective:   Vernell Darby is a 49 y.o. female with past medical history significant for T1DM, chronic diastolic heart failure, hyperlipidemia, PCOS, asthma, GERD, and obesity who presents to the ICU for monitoring of an SABRINA. The patient stated that she has been making urine which caused her to awake multiple times over night to urinate, she claimed it was yellowish urine. The patient denied SOB upon exertion or rest but complained of wheezing when lying down. The patient stated she feels \"not as drained as yesterday\" and that she slept poor getting up to urinate. Patient complained of a mild headache last night ans nasal drip when lying down. Patient denied N/V, palpitations, abdominal pain, dizziness, lightheadedness, leg swelling. Yesterday the patient had new onset of paroxysmal atrial fibrillation which cardiology was consulted and gave the patient IV metoprolol in which the patient then converted back to sinus " rhythm. No other changes overnight.    Objective:     Vitals:   Temp (24hrs), Av.2 °F (36.8 °C), Min:97.8 °F (36.6 °C), Max:98.5 °F (36.9 °C)    Temp:  [97.8 °F (36.6 °C)-98.5 °F (36.9 °C)] 97.8 °F (36.6 °C)  HR:  [] 71  Resp:  [11-30] 16  BP: (104-156)/(52-85) 105/52  SpO2:  [95 %-99 %] 98 %  Body mass index is 41.12 kg/m².     Input and Output Summary (last 24 hours):       Intake/Output Summary (Last 24 hours) at 2024 1001  Last data filed at 2024 0800  Gross per 24 hour   Intake 1183.74 ml   Output 1875 ml   Net -691.26 ml       Physical Exam:     Physical Exam  Vitals and nursing note reviewed.   Constitutional:       General: She is not in acute distress.     Appearance: She is well-developed. She is obese.   HENT:      Head: Normocephalic and atraumatic.   Eyes:      Conjunctiva/sclera: Conjunctivae normal.   Cardiovascular:      Rate and Rhythm: Normal rate and regular rhythm.      Heart sounds: Murmur heard.      Systolic murmur is present with a grade of 2/6.   Pulmonary:      Effort: Pulmonary effort is normal. No respiratory distress.      Breath sounds: Normal breath sounds. No wheezing.   Chest:      Chest wall: No tenderness.   Abdominal:      Palpations: Abdomen is soft.      Tenderness: There is no abdominal tenderness.   Musculoskeletal:         General: No swelling.      Cervical back: Neck supple.   Skin:     General: Skin is warm and dry.      Capillary Refill: Capillary refill takes less than 2 seconds.   Neurological:      Mental Status: She is alert and oriented to person, place, and time. Mental status is at baseline.   Psychiatric:         Mood and Affect: Mood normal.       Historical Information   Past Medical History:   Diagnosis Date    Anemia     Arthritis     Asthma     w/acute exacerbation. Last assessed: 10/26/12    Chest pain 2016    CHF (congestive heart failure) (HCC)     Depression     Diabetes mellitus (HCC)     Diabetic nephropathy (HCC)     Diabetic  nephropathy associated with type 1 diabetes mellitus (HCC) 08/12/2020    Diabetic retinopathy (HCC) 02/03/2016    Endometriosis     Gastroenteritis 02/03/2016    GERD (gastroesophageal reflux disease)     History of COVID-19 02/26/2021    Hyperlipidemia 02/03/2016    Hypertension     Obesity     Oligomenorrhea     Polycystic ovaries 02/03/2016    Retinal hemorrhage 02/03/2016    Retinopathy     Thrombocytosis     Type 1 diabetes mellitus with ophthalmic manifestation (HCC) 02/03/2016     Past Surgical History:   Procedure Laterality Date    CATARACT EXTRACTION Left 10/2020    CATARACT EXTRACTION Right     RETINAL LASER PROCEDURE       Social History   Social History     Substance and Sexual Activity   Alcohol Use Yes    Alcohol/week: 2.0 standard drinks of alcohol    Types: 2 Standard drinks or equivalent per week    Comment: Occasionally/ sicially     Social History     Substance and Sexual Activity   Drug Use No     Social History     Tobacco Use   Smoking Status Never   Smokeless Tobacco Never   Tobacco Comments    Per [employer]: Former smoker. Quit in 1994     Family History: Family history non-contributory    Meds/Allergies   all medications and allergies reviewed  No Known Allergies    Additional Data:     Labs:    Results from last 7 days   Lab Units 09/24/24  0428   WBC Thousand/uL 13.12*   HEMOGLOBIN g/dL 10.4*   HEMATOCRIT % 30.8*   PLATELETS Thousands/uL 416*   SEGS PCT % 76*   LYMPHO PCT % 15   MONO PCT % 6   EOS PCT % 2     Results from last 7 days   Lab Units 09/24/24  0428   SODIUM mmol/L 134*  134*   POTASSIUM mmol/L 4.2  4.2   CHLORIDE mmol/L 88*  89*   CO2 mmol/L 25  25   BUN mg/dL 101*  102*   CREATININE mg/dL 8.80*  8.86*   ANION GAP mmol/L 21*  20*   CALCIUM mg/dL 8.2*  8.2*   ALBUMIN g/dL 3.4*   TOTAL BILIRUBIN mg/dL 0.20   ALK PHOS U/L 89   ALT U/L 9   AST U/L 6*   GLUCOSE RANDOM mg/dL 149*  149*         Results from last 7 days   Lab Units 09/24/24  0759 09/24/24  0557  09/24/24  0404 09/24/24  0156 09/24/24  0003 09/23/24  2200 09/23/24  1955 09/23/24  1758 09/23/24  1545 09/23/24  1400 09/23/24  1211 09/23/24  1005   POC GLUCOSE mg/dl 113 143* 146* 92 131 195* 199* 161* 120 180* 213* 207*     Results from last 7 days   Lab Units 09/22/24  1931   HEMOGLOBIN A1C % 9.5*     Results from last 7 days   Lab Units 09/24/24  0428 09/23/24  0440 09/22/24  1931   LACTIC ACID mmol/L  --  0.9 0.9   PROCALCITONIN ng/ml 0.42* 0.41*  --          * I Have Reviewed All Lab Data Listed Above.  * Additional Pertinent Lab Tests Reviewed: All Labs Within Last 24 Hours Reviewed    Imaging:    Imaging Reports Reviewed Today Include: N/A    Recent Cultures (last 7 days):     Results from last 7 days   Lab Units 09/23/24  0439   BLOOD CULTURE  Received in Microbiology Lab. Culture in Progress.  Received in Microbiology Lab. Culture in Progress.       Last 24 Hours Medication List:   Current Facility-Administered Medications   Medication Dose Route Frequency Provider Last Rate    acetaminophen  650 mg Oral Q6H PRN Gi Bello PA-C      albuterol  2.5 mg Nebulization Q6H PRN Gi Bello PA-C      aspirin  81 mg Oral Daily Gi Bello PA-C      atorvastatin  80 mg Oral Daily Gi Bello PA-C      cefTRIAXone  2,000 mg Intravenous Q24H Cindi Guillermo MD Stopped (09/24/24 0400)    fluticasone-vilanterol  1 puff Inhalation Daily Gi Bello PA-C      furosemide  40 mg/hr Intravenous Continuous Callum Chowdhury MD 40 mg/hr (09/23/24 2339)    heparin (porcine)  5,000 Units Subcutaneous Q8H [facility] Gi Bello PA-C      insulin regular (HumuLIN R,NovoLIN R) 1 Units/mL in sodium chloride 0.9 % 100 mL infusion  0.3-21 Units/hr Intravenous Titrated Cindi Guillermo MD 1.5 Units/hr (09/24/24 0803)    metoprolol tartrate  12.5 mg Oral Q12H KENNY Gamez PA-C      montelukast  10 mg Oral HS Gi Bello PA-C      ondansetron  4 mg Intravenous Q6H PRN Gi Bello PA-C       pantoprazole  20 mg Oral Early Morning Gi Bello PA-C      PARoxetine  20 mg Oral Daily Gi Bello PA-C      phenol  1 spray Mouth/Throat Q2H PRN Gi Bello PA-C      saliva substitute  5 spray Mouth/Throat 4x Daily PRN Gi Bello PA-C      sodium chloride  10 mL/hr Intravenous Continuous Alyssa Evans PA-C 10 mL/hr (09/24/24 0539)        Today, Patient Was Seen By: Andrew LEE    ** Please Note: Dictation voice to text software may have been used in the creation of this document. **

## 2024-09-24 NOTE — ASSESSMENT & PLAN NOTE
Lab Results   Component Value Date    HGBA1C 9.5 (H) 09/22/2024       Recent Labs     09/24/24  0557 09/24/24  0759 09/24/24  1005 09/24/24  1155   POCGLU 143* 113 238* 171*       Blood Sugar Average: Last 72 hrs:  (P) [id number]    Hx of T1DM, follows with Dr. Ascencio from Endocrinology  Presented with hyperglycemia, no DKA  Cont regular insulin gtt per endo  Hold home insulin and metformin  Q2h BG monitoring  Maintain -180

## 2024-09-24 NOTE — ASSESSMENT & PLAN NOTE
Baseline creatinine 0.7-1  Creatinine on admission 7.53  Diuretics up-titrated outpatient due to ongoing SOB. CTA PE study performed 9/20 9/22 CTAP: 1.  Retained contrast within the kidneys from CT performed 2 days prior is indicative of impaired renal function. No hydroureteronephrosis. 2.  Possible mild enterocolitis. No abdominopelvic fluid collection.  Likely multifactorial - ATN in setting of contrast, increased diuretic, N/V/D..  Pt states that she did not hold her metformin following CTA PE study    Plan:  Completed IVF resuscitation  Diuretic given to augment urine outpt with improvement. Diuretics currently completed  Trend renal indices  Nephrology consulted  No urgent indications for dialysis  Strict I/O  Avoid hypotension and nephrotoxins

## 2024-09-24 NOTE — ASSESSMENT & PLAN NOTE
Wt Readings from Last 3 Encounters:   09/24/24 83.2 kg (183 lb 6.8 oz)   09/18/24 82.2 kg (181 lb 3.5 oz)   09/17/24 82.7 kg (182 lb 6.4 oz)       5/2024 Echo: EF 65%, G2DD  Recently had diuretics up-titrated outpatient due to persistent SOB/PRICE  Currently examines hypovolemic  Hold diuresis  Strict I/O  Daily weights

## 2024-09-24 NOTE — ASSESSMENT & PLAN NOTE
Lab Results   Component Value Date    HGBA1C 9.5 (H) 09/22/2024       Recent Labs     09/24/24  0156 09/24/24  0404 09/24/24  0557 09/24/24  0759   POCGLU 92 146* 143* 113       Blood Sugar Average: Last 72 hrs:  (P) 181.6275673030309294  She has required an insulin drip this admission  Endocrinology is following as well as internal medicine     -Management per endocrinology and internal medicine

## 2024-09-25 LAB
ALBUMIN SERPL BCG-MCNC: 3.8 G/DL (ref 3.5–5)
ALP SERPL-CCNC: 101 U/L (ref 34–104)
ALT SERPL W P-5'-P-CCNC: 7 U/L (ref 7–52)
ANION GAP SERPL CALCULATED.3IONS-SCNC: 22 MMOL/L (ref 4–13)
APTT PPP: 55 SECONDS (ref 23–34)
AST SERPL W P-5'-P-CCNC: 8 U/L (ref 13–39)
BASOPHILS # BLD AUTO: 0.09 THOUSANDS/ΜL (ref 0–0.1)
BASOPHILS NFR BLD AUTO: 1 % (ref 0–1)
BILIRUB SERPL-MCNC: 0.24 MG/DL (ref 0.2–1)
BUN SERPL-MCNC: 96 MG/DL (ref 5–25)
CALCIUM SERPL-MCNC: 8.7 MG/DL (ref 8.4–10.2)
CHLORIDE SERPL-SCNC: 86 MMOL/L (ref 96–108)
CO2 SERPL-SCNC: 25 MMOL/L (ref 21–32)
CREAT SERPL-MCNC: 8.03 MG/DL (ref 0.6–1.3)
EOSINOPHIL # BLD AUTO: 0.18 THOUSAND/ΜL (ref 0–0.61)
EOSINOPHIL NFR BLD AUTO: 1 % (ref 0–6)
ERYTHROCYTE [DISTWIDTH] IN BLOOD BY AUTOMATED COUNT: 12.2 % (ref 11.6–15.1)
GFR SERPL CREATININE-BSD FRML MDRD: 5 ML/MIN/1.73SQ M
GLUCOSE SERPL-MCNC: 108 MG/DL (ref 65–140)
GLUCOSE SERPL-MCNC: 119 MG/DL (ref 65–140)
GLUCOSE SERPL-MCNC: 121 MG/DL (ref 65–140)
GLUCOSE SERPL-MCNC: 124 MG/DL (ref 65–140)
GLUCOSE SERPL-MCNC: 155 MG/DL (ref 65–140)
GLUCOSE SERPL-MCNC: 185 MG/DL (ref 65–140)
GLUCOSE SERPL-MCNC: 192 MG/DL (ref 65–140)
GLUCOSE SERPL-MCNC: 205 MG/DL (ref 65–140)
GLUCOSE SERPL-MCNC: 214 MG/DL (ref 65–140)
GLUCOSE SERPL-MCNC: 219 MG/DL (ref 65–140)
GLUCOSE SERPL-MCNC: 221 MG/DL (ref 65–140)
GLUCOSE SERPL-MCNC: 236 MG/DL (ref 65–140)
GLUCOSE SERPL-MCNC: 258 MG/DL (ref 65–140)
GLUCOSE SERPL-MCNC: 309 MG/DL (ref 65–140)
GLUCOSE SERPL-MCNC: 311 MG/DL (ref 65–140)
HCT VFR BLD AUTO: 34.7 % (ref 34.8–46.1)
HGB BLD-MCNC: 11.4 G/DL (ref 11.5–15.4)
IMM GRANULOCYTES # BLD AUTO: 0.12 THOUSAND/UL (ref 0–0.2)
IMM GRANULOCYTES NFR BLD AUTO: 1 % (ref 0–2)
LYMPHOCYTES # BLD AUTO: 2.08 THOUSANDS/ΜL (ref 0.6–4.47)
LYMPHOCYTES NFR BLD AUTO: 16 % (ref 14–44)
MCH RBC QN AUTO: 28.7 PG (ref 26.8–34.3)
MCHC RBC AUTO-ENTMCNC: 32.9 G/DL (ref 31.4–37.4)
MCV RBC AUTO: 87 FL (ref 82–98)
MONOCYTES # BLD AUTO: 0.55 THOUSAND/ΜL (ref 0.17–1.22)
MONOCYTES NFR BLD AUTO: 4 % (ref 4–12)
NEUTROPHILS # BLD AUTO: 9.63 THOUSANDS/ΜL (ref 1.85–7.62)
NEUTS SEG NFR BLD AUTO: 77 % (ref 43–75)
NRBC BLD AUTO-RTO: 0 /100 WBCS
PLATELET # BLD AUTO: 441 THOUSANDS/UL (ref 149–390)
PMV BLD AUTO: 10.4 FL (ref 8.9–12.7)
POTASSIUM SERPL-SCNC: 3.7 MMOL/L (ref 3.5–5.3)
PROT SERPL-MCNC: 7 G/DL (ref 6.4–8.4)
RBC # BLD AUTO: 3.97 MILLION/UL (ref 3.81–5.12)
SODIUM SERPL-SCNC: 133 MMOL/L (ref 135–147)
WBC # BLD AUTO: 12.65 THOUSAND/UL (ref 4.31–10.16)

## 2024-09-25 PROCEDURE — 99232 SBSQ HOSP IP/OBS MODERATE 35: CPT | Performed by: HOSPITALIST

## 2024-09-25 PROCEDURE — 82948 REAGENT STRIP/BLOOD GLUCOSE: CPT

## 2024-09-25 PROCEDURE — 85730 THROMBOPLASTIN TIME PARTIAL: CPT | Performed by: INTERNAL MEDICINE

## 2024-09-25 PROCEDURE — 99232 SBSQ HOSP IP/OBS MODERATE 35: CPT | Performed by: INTERNAL MEDICINE

## 2024-09-25 PROCEDURE — 85025 COMPLETE CBC W/AUTO DIFF WBC: CPT | Performed by: INTERNAL MEDICINE

## 2024-09-25 PROCEDURE — 80053 COMPREHEN METABOLIC PANEL: CPT | Performed by: INTERNAL MEDICINE

## 2024-09-25 RX ORDER — CALCIUM CARBONATE 500 MG/1
500 TABLET, CHEWABLE ORAL ONCE
Status: COMPLETED | OUTPATIENT
Start: 2024-09-25 | End: 2024-09-25

## 2024-09-25 RX ORDER — PANTOPRAZOLE SODIUM 40 MG/1
40 TABLET, DELAYED RELEASE ORAL
Status: DISCONTINUED | OUTPATIENT
Start: 2024-09-26 | End: 2024-09-28 | Stop reason: HOSPADM

## 2024-09-25 RX ADMIN — CALCIUM CARBONATE (ANTACID) CHEW TAB 500 MG 500 MG: 500 CHEW TAB at 22:30

## 2024-09-25 RX ADMIN — Medication 12.5 MG: at 08:21

## 2024-09-25 RX ADMIN — PANTOPRAZOLE SODIUM 20 MG: 20 TABLET, DELAYED RELEASE ORAL at 06:15

## 2024-09-25 RX ADMIN — SODIUM CHLORIDE 10 ML/HR: 0.9 INJECTION, SOLUTION INTRAVENOUS at 19:22

## 2024-09-25 RX ADMIN — HEPARIN SODIUM 2000 UNITS: 1000 INJECTION, SOLUTION INTRAVENOUS; SUBCUTANEOUS at 06:15

## 2024-09-25 RX ADMIN — APIXABAN 5 MG: 5 TABLET, FILM COATED ORAL at 21:48

## 2024-09-25 RX ADMIN — SODIUM CHLORIDE 11 UNITS/HR: 9 INJECTION, SOLUTION INTRAVENOUS at 17:07

## 2024-09-25 RX ADMIN — FLUTICASONE FUROATE AND VILANTEROL TRIFENATATE 1 PUFF: 200; 25 POWDER RESPIRATORY (INHALATION) at 08:36

## 2024-09-25 RX ADMIN — ASPIRIN 81 MG: 81 TABLET, COATED ORAL at 08:21

## 2024-09-25 RX ADMIN — PAROXETINE HYDROCHLORIDE 20 MG: 20 TABLET, FILM COATED ORAL at 08:21

## 2024-09-25 RX ADMIN — ATORVASTATIN CALCIUM 80 MG: 40 TABLET, FILM COATED ORAL at 08:21

## 2024-09-25 RX ADMIN — Medication 12.5 MG: at 21:48

## 2024-09-25 RX ADMIN — CEFTRIAXONE 2000 MG: 2 INJECTION, SOLUTION INTRAVENOUS at 04:40

## 2024-09-25 RX ADMIN — MONTELUKAST 10 MG: 10 TABLET, FILM COATED ORAL at 21:48

## 2024-09-25 RX ADMIN — APIXABAN 5 MG: 5 TABLET, FILM COATED ORAL at 12:05

## 2024-09-25 NOTE — ASSESSMENT & PLAN NOTE
Lab Results   Component Value Date    HGBA1C 9.5 (H) 09/22/2024       Recent Labs     09/25/24  0721 09/25/24  0833 09/25/24  0911 09/25/24  1044   POCGLU 121 124 155* 311*       Blood Sugar Average: Last 72 hrs:  (P) 182.6999027577668108    Hx of T1DM, follows with Dr. Ascencio from Endocrinology  Presented with hyperglycemia, no DKA  Cont regular insulin gtt per endo  Hold home insulin and metformin  Q2h BG monitoring  Maintain -180

## 2024-09-25 NOTE — APP STUDENT NOTE
EMI STUDENT  Inpatient Progress Note for TRAINING ONLY  Not Part of Legal Medical Record     Progress Note - Vernell Darby 49 y.o. female MRN: [id number]  Unit/Bed#: -01 Encounter: [id number]         SABRINA (acute kidney injury) (Formerly [facility])  Improving to 8.0 from 8.8 yesterday  Nephrology discontinued IV Lasix and are monitoring for recovery vs need for dialysis  Patient reports still making urine and getting up at night to urinate  Monitor BMP and Mg Q6 hours  On normal saline  Strict I/O  Avoid hypotension and nephrotoxins, holding lisinopril  Paroxysmal atrial fibrillation  New diagnosis in setting of acute illness  Had 20 minutes of afib 9/23 converted to sinus rhythm with IV metoprolol  Cardio recommends metoprolol 12.5 mg TID  Monitor telemetry and outpatient rhythm monitor  Continue to monitor BP after starting metoprolol: last 101/53 without symptoms  [id number] of 4. Recommend oral anticoagulation  Patient complained of having palpitations last night  SIRS (systemic inflammatory response syndrome) (Formerly [facility])  SIRS criteria met on admission  No longer tachycardia or tachypneic  UA showed WBC, epithelial cells, protein, glucose  Urine culture positive for Beta Hemolytic Group B Strep without symptoms  Continue on IV ceftriaxone for UTI suspicion  Type 1 diabetes mellitus with hyperglycemia (Formerly [facility])  Last glucose 124  Endocrinology consult obtained and recommended regular insulin drip  Continue to monitor  Hyponatremia  Na 133 most recently  On normal saline currently, continue to monitor BMP for changes  N&V (nausea and vomiting)  Was vomiting a lot prior to admission and denies N/V since being admitted  PRN Zofran  Sore throat  Pt reports sore throat following multiple episodes of vomiting  Strep PCR neg, COVID/Flu/RSV neg  Unremarkable physical exam  CT soft tissue neck wo contrast showed:  Small amount of secretions in posterior pharyngeal wall extending to hypopharynx and partially aerated bilateral piriform  sinuses. Consider direct visualization by ENT for further evaluation given provided history.   Chronic diastolic congestive heart failure (HCC)   Recently had diuretics up-titrated outpatient due to persistent SOB/PRICE  Strict I/O  Daily weights  Echo ordered and showed 60% ejection fraction without other concerning findings  Hypertension  Hold home lisinopril an diuretic  Hyperlipidemia  Continue statin  Anemia  Most recent hemoglobin 10.4  Baseline 10-12  Continue to monitor for need of transfusion  Gastroesophageal reflux disease without esophagitis  Continue home PPI    VTE Pharmacologic Prophylaxis:   Pharmacologic: Heparin  Mechanical VTE Prophylaxis in Place: Yes    Patient Centered Rounds: I have performed bedside rounds with nursing staff today.    Discussions with Specialists or Other Care Team Provider: Cardio, endocrine, nephro    Education and Discussions with Family / Patient: Patient    Time Spent for Care: 1 hour.  More than 50% of total time spent on counseling and coordination of care as described above.    Current Length of Stay: 3 day(s)    Current Patient Status: Inpatient   Certification Statement: The patient will continue to require additional inpatient hospital stay due to SABRINA monitoring    Discharge Plan: No current plan    Code Status: Level 1 - Full Code    Subjective:   Vernell Darby is a 49 y.o. female with past medical history significant for T1DM, chronic diastolic heart failure, hyperlipidemia, PCOS, asthma, GERD, and obesity who presents inpatient for monitoring of an SABRINA. Patient was moved from the ICU last night and claimed she did not sleep well again and was getting up a lot to pee. Patient complained of intermittent palpitations last night. Patient denied chest pain, SOB, abd pain, dizziness, lightheadedness, cough, leg swelling. No other changes overnight.    Objective:     Vitals:   Temp (24hrs), Av.1 °F (36.7 °C), Min:97.8 °F (36.6 °C), Max:98.5 °F (36.9 °C)    Temp:   [97.8 °F (36.6 °C)-98.5 °F (36.9 °C)] 98.1 °F (36.7 °C)  HR:  [72-78] 72  Resp:  [15-20] 15  BP: (101-156)/(53-68) 101/53  SpO2:  [85 %-96 %] 85 %  Body mass index is 39.26 kg/m².     Input and Output Summary (last 24 hours):       Intake/Output Summary (Last 24 hours) at 9/25/2024 0905  Last data filed at 9/25/2024 0621  Gross per 24 hour   Intake 2342.14 ml   Output 4600 ml   Net -2257.86 ml       Physical Exam:     Physical Exam  Vitals and nursing note reviewed.   Constitutional:       General: She is not in acute distress.     Appearance: She is well-developed.   HENT:      Head: Normocephalic and atraumatic.   Eyes:      Conjunctiva/sclera: Conjunctivae normal.   Cardiovascular:      Rate and Rhythm: Normal rate and regular rhythm.      Heart sounds: Murmur heard.      Systolic murmur is present with a grade of 2/6.   Pulmonary:      Effort: Pulmonary effort is normal. No accessory muscle usage or respiratory distress.      Breath sounds: Normal breath sounds.   Abdominal:      Palpations: Abdomen is soft.      Tenderness: There is no abdominal tenderness.   Musculoskeletal:         General: No swelling.      Cervical back: Neck supple.   Skin:     General: Skin is warm and dry.      Capillary Refill: Capillary refill takes less than 2 seconds.   Neurological:      Mental Status: She is alert.   Psychiatric:         Mood and Affect: Mood normal.         Historical Information   Past Medical History:   Diagnosis Date    Anemia     Arthritis     Asthma     w/acute exacerbation. Last assessed: 10/26/12    Chest pain 02/03/2016    CHF (congestive heart failure) (HCC)     Depression     Diabetes mellitus (HCC)     Diabetic nephropathy (HCC)     Diabetic nephropathy associated with type 1 diabetes mellitus (HCC) 08/12/2020    Diabetic retinopathy (HCC) 02/03/2016    Endometriosis     Gastroenteritis 02/03/2016    GERD (gastroesophageal reflux disease)     History of COVID-19 02/26/2021    Hyperlipidemia 02/03/2016     Hypertension     Obesity     Oligomenorrhea     Polycystic ovaries 02/03/2016    Retinal hemorrhage 02/03/2016    Retinopathy     Thrombocytosis     Type 1 diabetes mellitus with ophthalmic manifestation (HCC) 02/03/2016     Past Surgical History:   Procedure Laterality Date    CATARACT EXTRACTION Left 10/2020    CATARACT EXTRACTION Right     RETINAL LASER PROCEDURE       Social History   Social History     Substance and Sexual Activity   Alcohol Use Yes    Alcohol/week: 2.0 standard drinks of alcohol    Types: 2 Standard drinks or equivalent per week    Comment: Occasionally/ sicially     Social History     Substance and Sexual Activity   Drug Use No     Social History     Tobacco Use   Smoking Status Never   Smokeless Tobacco Never   Tobacco Comments    Per [employer]: Former smoker. Quit in 1994     Family History: Family history non-contributory    Meds/Allergies   all medications and allergies reviewed  No Known Allergies    Additional Data:     Labs:    Results from last 7 days   Lab Units 09/25/24  0324   WBC Thousand/uL 12.65*   HEMOGLOBIN g/dL 11.4*   HEMATOCRIT % 34.7*   PLATELETS Thousands/uL 441*   SEGS PCT % 77*   LYMPHO PCT % 16   MONO PCT % 4   EOS PCT % 1     Results from last 7 days   Lab Units 09/25/24  0324   SODIUM mmol/L 133*   POTASSIUM mmol/L 3.7   CHLORIDE mmol/L 86*   CO2 mmol/L 25   BUN mg/dL 96*   CREATININE mg/dL 8.03*   ANION GAP mmol/L 22*   CALCIUM mg/dL 8.7   ALBUMIN g/dL 3.8   TOTAL BILIRUBIN mg/dL 0.24   ALK PHOS U/L 101   ALT U/L 7   AST U/L 8*   GLUCOSE RANDOM mg/dL 214*         Results from last 7 days   Lab Units 09/25/24  0833 09/25/24  0721 09/25/24  0456 09/25/24  0238 09/25/24  0108 09/24/24  2313 09/24/24  2236 09/24/24  2111 09/24/24  1631 09/24/24  1453 09/24/24  1155 09/24/24  1005   POC GLUCOSE mg/dl 124 121 185* 219* 205* 88 102 199* 231* 232* 171* 238*     Results from last 7 days   Lab Units 09/22/24  1931   HEMOGLOBIN A1C % 9.5*     Results from last 7 days   Lab  Units 09/24/24  0428 09/23/24  0440 09/22/24  1931   LACTIC ACID mmol/L  --  0.9 0.9   PROCALCITONIN ng/ml 0.42* 0.41*  --          * I Have Reviewed All Lab Data Listed Above.  * Additional Pertinent Lab Tests Reviewed: All Labs Within Last 24 Hours Reviewed    Imaging:    Imaging Reports Reviewed Today Include: Echo      Recent Cultures (last 7 days):     Results from last 7 days   Lab Units 09/23/24  0439 09/23/24  0356   BLOOD CULTURE  No Growth at 24 hrs.  No Growth at 24 hrs.  --    URINE CULTURE   --  >100,000 cfu/ml Beta Hemolytic Streptococcus Group B*  >100,000 cfu/ml       Last 24 Hours Medication List:   Current Facility-Administered Medications   Medication Dose Route Frequency Provider Last Rate    acetaminophen  650 mg Oral Q6H PRN Christina Saunders PA-C      albuterol  2.5 mg Nebulization Q6H PRN Christina Saunders PA-C      aspirin  81 mg Oral Daily Christina Saunders PA-C      atorvastatin  80 mg Oral Daily Christina Saunders PA-C      cefTRIAXone  2,000 mg Intravenous Q24H Christina Saunders PA-C 2,000 mg (09/25/24 0440)    fluticasone-vilanterol  1 puff Inhalation Daily Christina Saunders PA-C      heparin (porcine)  3-20 Units/kg/hr (Order-Specific) Intravenous Titrated Christina Saunders PA-C 14 Units/kg/hr (09/25/24 0600)    heparin (porcine)  2,000 Units Intravenous Q6H PRN Christina Saunders PA-C      heparin (porcine)  4,000 Units Intravenous Q6H PRN Christina Saunders PA-C      insulin regular (HumuLIN R,NovoLIN R) 1 Units/mL in sodium chloride 0.9 % 100 mL infusion  0.3-21 Units/hr Intravenous Titrated Cindi Guillermo MD 1.5 Units/hr (09/25/24 0731)    metoprolol tartrate  12.5 mg Oral Q12H [facility] Christina Saunders PA-C      montelukast  10 mg Oral HS Christina Saunders PA-C      ondansetron  4 mg Intravenous Q6H PRN Christina Saunders PA-C      pantoprazole  20 mg Oral Early Morning Christina Saunders PA-C      PARoxetine  20 mg Oral Daily Christina Saunders PA-C      phenol  1 spray Mouth/Throat Q2H PRN Christina Saunders PA-C      saliva substitute  5 spray Mouth/Throat 4x Daily PRN  Christina Saunders PA-C      sodium chloride  10 mL/hr Intravenous Continuous Christina Saunders PA-C 10 mL/hr (09/24/24 0392)        Today, Patient Was Seen By: Andrew LEE    ** Please Note: Dictation voice to text software may have been used in the creation of this document. **

## 2024-09-25 NOTE — PROGRESS NOTES
Progress Note - Cardiology   Name: Vernell Darby 49 y.o. female I MRN: [id number]  Unit/Bed#: -01 I Date of Admission: 9/22/2024   Date of Service: 9/25/2024 I Hospital Day: 3     Assessment & Plan  PAF (paroxysmal atrial fibrillation) (HCC)  9/23/2024 patient had approximately 20 minutes of atrial fibrillation  9/23/2024 conversion to NSR with IV metoprolol  9/24/2024 echo: EF 60%, normal biatrial size, no hemodynamically significant valvular disease    AC:   AV blocking Rx: Metoprolol 25 twice daily     -Begin Eliquis prior to discharge   -Continue metoprolol   -28-day ZIO monitor ordered   -Message sent for follow-up  Chronic diastolic congestive heart failure (HCC)  Wt Readings from Last 3 Encounters:   09/25/24 79.4 kg (175 lb 1.6 oz)   09/18/24 82.2 kg (181 lb 3.5 oz)   09/17/24 82.7 kg (182 lb 6.4 oz)   Prehospital diuretic: Torsemide 40 daily    SABRINA (acute kidney injury) (HCC)  Creatinine improving having peaked > 8.0  Nephrology following  Hypertension  Prehospital Rx: Lisinopril 40 daily     -Off lisinopril due to acute kidney injury   -Monitor blood pressure with starting metoprolol  Hyperlipidemia  Previous LDL was 104 in April 2024  She is on high-dose Lipitor 80 mg daily as outpatient     -Outpatient follow-up  Type 1 diabetes mellitus with hyperglycemia ([facility])  Lab Results   Component Value Date    HGBA1C 9.5 (H) 09/22/2024       Recent Labs     09/25/24  0721 09/25/24  0833 09/25/24  0911 09/25/24  1044   POCGLU 121 124 155* 311*       Blood Sugar Average: Last 72 hrs:  (P) 182.4569402834753389  She has required an insulin drip this admission  Endocrinology is following as well as internal medicine     -Management per endocrinology and internal medicine    No further inpatient cardiac workup  Will sign off, please call if needed    History of Present Illness   Chief Complaint:     Subjective: Feeling better today. Breathing is fine. No palpitations     Objective      Temp:  [97.8 °F (36.6  °C)-98.5 °F (36.9 °C)] 98.1 °F (36.7 °C)  HR:  [72-75] 72  Resp:  [15-20] 15  BP: (101-131)/(53-68) 101/53  O2 Device: None (Room air)   Vitals:    09/24/24 1500 09/25/24 0613   Weight: 83 kg (183 lb) 79.4 kg (175 lb 1.6 oz)     Orthostatic Blood Pressures      Flowsheet Row Most Recent Value   Blood Pressure 101/53 filed at 09/25/2024 0756   Patient Position - Orthostatic VS Lying filed at 09/25/2024 0756             I/O last 24 hours:  In: 2822.1 [P.O.:2620; I.V.:202.1]  Out: 6200 [Urine:6200]  Lines/Drains/Airways       Active Status       None                  Physical Exam  Vitals and nursing note reviewed.   Constitutional:       General: She is not in acute distress.     Appearance: She is well-developed.   HENT:      Head: Normocephalic and atraumatic.   Eyes:      Conjunctiva/sclera: Conjunctivae normal.   Cardiovascular:      Rate and Rhythm: Normal rate and regular rhythm.      Heart sounds: No murmur heard.  Pulmonary:      Effort: Pulmonary effort is normal. No respiratory distress.      Breath sounds: Normal breath sounds.   Abdominal:      Palpations: Abdomen is soft.      Tenderness: There is no abdominal tenderness.   Musculoskeletal:         General: No swelling.      Cervical back: Neck supple.   Skin:     General: Skin is warm and dry.      Capillary Refill: Capillary refill takes less than 2 seconds.   Neurological:      Mental Status: She is alert.   Psychiatric:         Mood and Affect: Mood normal.          Lab Results: I have reviewed the following results:   Imaging Review:   Other Studies:     VTE Pharmacologic Prophylaxis:   VTE Mechanical Prophylaxis:

## 2024-09-25 NOTE — ASSESSMENT & PLAN NOTE
9/23/2024 patient had approximately 20 minutes of atrial fibrillation  9/23/2024 conversion to NSR with IV metoprolol  9/24/2024 echo: EF 60%, normal biatrial size, no hemodynamically significant valvular disease    AC:   AV blocking Rx: Metoprolol 25 twice daily     -Begin Eliquis prior to discharge   -Continue metoprolol   -28-day ZIO monitor ordered   -Message sent for follow-up

## 2024-09-25 NOTE — ASSESSMENT & PLAN NOTE
Lab Results   Component Value Date    HGBA1C 9.5 (H) 09/22/2024       Recent Labs     09/25/24  0721 09/25/24  0833 09/25/24  0911 09/25/24  1044   POCGLU 121 124 155* 311*       Blood Sugar Average: Last 72 hrs:  (P) 182.8961359039417967  She has required an insulin drip this admission  Endocrinology is following as well as internal medicine     -Management per endocrinology and internal medicine

## 2024-09-25 NOTE — ASSESSMENT & PLAN NOTE
Baseline creatinine 0.7-1  Creatinine on admission 7.53  Diuretics up-titrated outpatient due to ongoing SOB. CTA PE study performed 9/20 9/22 CTAP: 1.  Retained contrast within the kidneys from CT performed 2 days prior is indicative of impaired renal function. No hydroureteronephrosis. 2.  Possible mild enterocolitis. No abdominopelvic fluid collection.  Likely multifactorial - ATN in setting of contrast, increased diuretic, N/V/D..  Pt states that she did not hold her metformin following CTA PE study    Plan:  Completed IVF resuscitation  Diuretic given to augment urine outpt with improvement. Diuretics currently completed  Trend renal indices. Cr improving.   Nephrology consulted  No urgent indications for dialysis  Strict I/O  Avoid hypotension and nephrotoxins

## 2024-09-25 NOTE — PLAN OF CARE
Problem: Potential for Falls  Goal: Patient will remain free of falls  Description: INTERVENTIONS:  - Educate patient/family on patient safety including physical limitations  - Instruct patient to call for assistance with activity   - Consult OT/PT to assist with strengthening/mobility   - Keep Call bell within reach  - Keep bed low and locked with side rails adjusted as appropriate  - Keep care items and personal belongings within reach  - Initiate and maintain comfort rounds  - Make Fall Risk Sign visible to staff  - Offer Toileting every 2 Hours, in advance of need  - Initiate/Maintain bed alarm  - Obtain necessary fall risk management equipment:   - Apply yellow socks and bracelet for high fall risk patients  - Consider moving patient to room near nurses station  Outcome: Progressing     Problem: PAIN - ADULT  Goal: Verbalizes/displays adequate comfort level or baseline comfort level  Description: Interventions:  - Encourage patient to monitor pain and request assistance  - Assess pain using appropriate pain scale  - Administer analgesics based on type and severity of pain and evaluate response  - Implement non-pharmacological measures as appropriate and evaluate response  - Consider cultural and social influences on pain and pain management  - Notify physician/advanced practitioner if interventions unsuccessful or patient reports new pain  Outcome: Progressing     Problem: INFECTION - ADULT  Goal: Absence or prevention of progression during hospitalization  Description: INTERVENTIONS:  - Assess and monitor for signs and symptoms of infection  - Monitor lab/diagnostic results  - Monitor all insertion sites, i.e. indwelling lines, tubes, and drains  - Monitor endotracheal if appropriate and nasal secretions for changes in amount and color  - [place] appropriate cooling/warming therapies per order  - Administer medications as ordered  - Instruct and encourage patient and family to use good hand hygiene  technique  - Identify and instruct in appropriate isolation precautions for identified infection/condition  Outcome: Progressing  Goal: Absence of fever/infection during neutropenic period  Description: INTERVENTIONS:  - Monitor WBC    Outcome: Progressing     Problem: SAFETY ADULT  Goal: Patient will remain free of falls  Description: INTERVENTIONS:  - Educate patient/family on patient safety including physical limitations  - Instruct patient to call for assistance with activity   - Consult OT/PT to assist with strengthening/mobility   - Keep Call bell within reach  - Keep bed low and locked with side rails adjusted as appropriate  - Keep care items and personal belongings within reach  - Initiate and maintain comfort rounds  - Make Fall Risk Sign visible to staff  - Offer Toileting every 2 Hours, in advance of need  - Initiate/Maintain bed alarm  - Obtain necessary fall risk management equipment:   - Apply yellow socks and bracelet for high fall risk patients  - Consider moving patient to room near nurses station  Outcome: Progressing  Goal: Maintain or return to baseline ADL function  Description: INTERVENTIONS:  -  Assess patient's ability to carry out ADLs; assess patient's baseline for ADL function and identify physical deficits which impact ability to perform ADLs (bathing, care of mouth/teeth, toileting, grooming, dressing, etc.)  - Assess/evaluate cause of self-care deficits   - Assess range of motion  - Assess patient's mobility; develop plan if impaired  - Assess patient's need for assistive devices and provide as appropriate  - Encourage maximum independence but intervene and supervise when necessary  - Involve family in performance of ADLs  - Assess for home care needs following discharge   - Consider OT consult to assist with ADL evaluation and planning for discharge  - Provide patient education as appropriate  Outcome: Progressing  Goal: Maintains/Returns to pre admission functional  level  Description: INTERVENTIONS:  - Perform AM-PAC 6 Click Basic Mobility/ Daily Activity assessment daily.  - Set and communicate daily mobility goal to care team and patient/family/caregiver.   - Collaborate with rehabilitation services on mobility goals if consulted  - Perform Range of Motion 3 times a day.  - Reposition patient every 2 hours.  - Dangle patient 3 times a day  - Stand patient 3 times a day  - Ambulate patient 3 times a day  - Out of bed to chair 3 times a day   - Out of bed for meals 3 times a day  - Out of bed for toileting  - Record patient progress and toleration of activity level   Outcome: Progressing     Problem: DISCHARGE PLANNING  Goal: Discharge to home or other facility with appropriate resources  Description: INTERVENTIONS:  - Identify barriers to discharge w/patient and caregiver  - Arrange for needed discharge resources and transportation as appropriate  - Identify discharge learning needs (meds, wound care, etc.)  - Arrange for interpretive services to assist at discharge as needed  - Refer to Case Management Department for coordinating discharge planning if the patient needs post-hospital services based on physician/advanced practitioner order or complex needs related to functional status, cognitive ability, or social support system  Outcome: Progressing     Problem: Knowledge Deficit  Goal: Patient/family/caregiver demonstrates understanding of disease process, treatment plan, medications, and discharge instructions  Description: Complete learning assessment and assess knowledge base.  Interventions:  - Provide teaching at level of understanding  - Provide teaching via preferred learning methods  Outcome: Progressing     Problem: CARDIOVASCULAR - ADULT  Goal: Maintains optimal cardiac output and hemodynamic stability  Description: INTERVENTIONS:  - Monitor I/O, vital signs and rhythm  - Monitor for S/S and trends of decreased cardiac output  - Administer and titrate ordered  vasoactive medications to optimize hemodynamic stability  - Assess quality of pulses, skin color and temperature  - Assess for signs of decreased coronary artery perfusion  - Instruct patient to report change in severity of symptoms  Outcome: Progressing  Goal: Absence of cardiac dysrhythmias or at baseline rhythm  Description: INTERVENTIONS:  - Continuous cardiac monitoring, vital signs, obtain 12 lead EKG if ordered  - Administer antiarrhythmic and heart rate control medications as ordered  - Monitor electrolytes and administer replacement therapy as ordered  Outcome: Progressing     Problem: RESPIRATORY - ADULT  Goal: Achieves optimal ventilation and oxygenation  Description: INTERVENTIONS:  - Assess for changes in respiratory status  - Assess for changes in mentation and behavior  - Position to facilitate oxygenation and minimize respiratory effort  - Oxygen administered by appropriate delivery if ordered  - Initiate smoking cessation education as indicated  - Encourage broncho-pulmonary hygiene including cough, deep breathe, Incentive Spirometry  - Assess the need for suctioning and aspirate as needed  - Assess and instruct to report SOB or any respiratory difficulty  - Respiratory Therapy support as indicated  Outcome: Progressing     Problem: GASTROINTESTINAL - ADULT  Goal: Minimal or absence of nausea and/or vomiting  Description: INTERVENTIONS:  - Administer IV fluids if ordered to ensure adequate hydration  - Maintain NPO status until nausea and vomiting are resolved  - Nasogastric tube if ordered  - Administer ordered antiemetic medications as needed  - Provide nonpharmacologic comfort measures as appropriate  - Advance diet as tolerated, if ordered  - Consider nutrition services referral to assist patient with adequate nutrition and appropriate food choices  Outcome: Progressing  Goal: Maintains or returns to baseline bowel function  Description: INTERVENTIONS:  - Assess bowel function  - Encourage oral  fluids to ensure adequate hydration  - Administer IV fluids if ordered to ensure adequate hydration  - Administer ordered medications as needed  - Encourage mobilization and activity  - Consider nutritional services referral to assist patient with adequate nutrition and appropriate food choices  Outcome: Progressing  Goal: Maintains adequate nutritional intake  Description: INTERVENTIONS:  - Monitor percentage of each meal consumed  - Identify factors contributing to decreased intake, treat as appropriate  - Assist with meals as needed  - Monitor I&O, weight, and lab values if indicated  - Obtain nutrition services referral as needed  Outcome: Progressing  Goal: Establish and maintain optimal ostomy function  Description: INTERVENTIONS:  - Assess bowel function  - Encourage oral fluids to ensure adequate hydration  - Administer IV fluids if ordered to ensure adequate hydration   - Administer ordered medications as needed  - Encourage mobilization and activity  - Nutrition services referral to assist patient with appropriate food choices  - Assess stoma site  - Consider wound care consult   Outcome: Progressing  Goal: Oral mucous membranes remain intact  Description: INTERVENTIONS  - Assess oral mucosa and hygiene practices  - Implement preventative oral hygiene regimen  - Implement oral medicated treatments as ordered  - Initiate Nutrition services referral as needed  Outcome: Progressing     Problem: GENITOURINARY - ADULT  Goal: Maintains or returns to baseline urinary function  Description: INTERVENTIONS:  - Assess urinary function  - Encourage oral fluids to ensure adequate hydration if ordered  - Administer IV fluids as ordered to ensure adequate hydration  - Administer ordered medications as needed  - Offer frequent toileting  - Follow urinary retention protocol if ordered  Outcome: Progressing  Goal: Absence of urinary retention  Description: INTERVENTIONS:  - Assess patient’s ability to void and empty  bladder  - Monitor I/O  - Bladder scan as needed  - Discuss with physician/AP medications to alleviate retention as needed  - Discuss catheterization for long term situations as appropriate  Outcome: Progressing  Goal: Urinary catheter remains patent  Description: INTERVENTIONS:  - Assess patency of urinary catheter  - If patient has a chronic [place], consider changing catheter if non-functioning  - Follow guidelines for intermittent irrigation of non-functioning urinary catheter  Outcome: Progressing     Problem: METABOLIC, FLUID AND ELECTROLYTES - ADULT  Goal: Electrolytes maintained within normal limits  Description: INTERVENTIONS:  - Monitor labs and assess patient for signs and symptoms of electrolyte imbalances  - Administer electrolyte replacement as ordered  - Monitor response to electrolyte replacements, including repeat lab results as appropriate  - Instruct patient on fluid and nutrition as appropriate  Outcome: Progressing  Goal: Fluid balance maintained  Description: INTERVENTIONS:  - Monitor labs   - Monitor I/O and WT  - Instruct patient on fluid and nutrition as appropriate  - Assess for signs & symptoms of volume excess or deficit  Outcome: Progressing  Goal: Glucose maintained within target range  Description: INTERVENTIONS:  - Monitor Blood Glucose as ordered  - Assess for signs and symptoms of hyperglycemia and hypoglycemia  - Administer ordered medications to maintain glucose within target range  - Assess nutritional intake and initiate nutrition service referral as needed  Outcome: Progressing     Problem: MUSCULOSKELETAL - ADULT  Goal: Maintain or return mobility to safest level of function  Description: INTERVENTIONS:  - Assess patient's ability to carry out ADLs; assess patient's baseline for ADL function and identify physical deficits which impact ability to perform ADLs (bathing, care of mouth/teeth, toileting, grooming, dressing, etc.)  - Assess/evaluate cause of self-care deficits   -  Assess range of motion  - Assess patient's mobility  - Assess patient's need for assistive devices and provide as appropriate  - Encourage maximum independence but intervene and supervise when necessary  - Involve family in performance of ADLs  - Assess for home care needs following discharge   - Consider OT consult to assist with ADL evaluation and planning for discharge  - Provide patient education as appropriate  Outcome: Progressing  Goal: Maintain proper alignment of affected body part  Description: INTERVENTIONS:  - Support, maintain and protect limb and body alignment  - Provide patient/ family with appropriate education  Outcome: Progressing     Problem: Nutrition/Hydration-ADULT  Goal: Nutrient/Hydration intake appropriate for improving, restoring or maintaining nutritional needs  Description: Monitor and assess patient's nutrition/hydration status for malnutrition. Collaborate with interdisciplinary team and initiate plan and interventions as ordered.  Monitor patient's weight and dietary intake as ordered or per policy. Utilize nutrition screening tool and intervene as necessary. Determine patient's food preferences and provide high-protein, high-caloric foods as appropriate.     INTERVENTIONS:  - Monitor oral intake, urinary output, labs, and treatment plans  - Assess nutrition and hydration status and recommend course of action  - Evaluate amount of meals eaten  - Assist patient with eating if necessary   - Allow adequate time for meals  - Recommend/ encourage appropriate diets, oral nutritional supplements, and vitamin/mineral supplements  - Order, calculate, and assess calorie counts as needed  - Recommend, monitor, and adjust tube feedings and TPN/PPN based on assessed needs  - Assess need for intravenous fluids  - Provide specific nutrition/hydration education as appropriate  - Include patient/family/caregiver in decisions related to nutrition  Outcome: Progressing     Problem: Decreased Cardiac  Output  Goal: Cardiac output adequate for individual needs  Description: INTERVENTIONS: Monitor for signs and symptoms of decreased cardiac output   - Monitor for dyspnea with exertion and at rest  - Monitor for orthopnea  - Monitor for signs of tachycardia. Place patient on telemetry monitoring.  - Assess patient for jugular vein distention  - Assess patient for lower extremity edema and poor peripheral perfusion   - Auscultate lung sound for Fine bibasilar crackles   - Monitor for cardiac arrythmias   - Administer beta blockers, antiarrhythmic, and blood pressure medications as ordered    Outcome: Progressing     Problem: Impaired Gas Exchange  Goal: Optimize oxygenation and ensure adequate ventilation  Description: INTERVENTIONS: Monitor for signs and symptoms of respiratory distress                - Elevate HOB or use high fowlers to promote lung expansion                - Administer oxygen as ordered to maintain adequate oxygenation                - Encourage use of IS to promote lung expansion and prevent PN                - Monitor ABGs to assess oxygenation status                - Monitor blood oxygen level to maintain adequate oxygenation                - Encourage cough and deep breathing exercises to promote lung expansion                - Monitor patient's mental status for increased confusion    Outcome: Progressing     Problem: Excess Fluid Volume  Goal: Patient is able to achieve and maintain homeostasis  Description: INTERVENTIONS: Monitor for sign and symptoms of fluid overload  - Evaluate LE edema every shift  - Elevate LE to prevent dependent edema  - Apply LIO stockings as ordered   - Monitor ankle circumference daily  - Assess for jugular vein distention  - Evaluate provider orders for the CHF diuretic algorithm. Administer diuretics as ordered  - Weigh the patient daily at 0600 and report a weight gain of five pounds or more   - Strict intake and output  - Monitor fluid intake and adhere to  fluid restrictions  - Assess lung sounds every shift and as needed  - Monitor vital signs and lab values (CBC, chem, BUN, BNP)  - Measure and document urine output    Outcome: Progressing     Problem: Activity Intolerance  Goal: Patient is able to perform activities within their limitations  Description: INTERVENTIONS:                       -   Alternate periods of activity with periods of rest                 -   Patients is able to maintain normal vitals heart rhythm during activity                 -   Gradually increase activity and exercise as patient can tolerate                 -   Monitor blood pressure and heart before and after exercise                  -   Monitor blood oxygen saturation during activity and apply oxygen as needed    Outcome: Progressing     Problem: Knowledge Deficit  Goal: Patient is able to verbalize understanding of Heart Failure after education  Description: INTERVENTIONS:  - Educate the patient and family on signs and symptoms of HF  - Provide the patient with HF education and HF zone tool  - Educate on the importance of daily weight in the AM and reporting a weight gain               of 3 or more pounds to their primary care physician  - Monitor for SOB  - Maintain and sodium and fluid restriction  - Educate the patient on the importance of medications such as: diuretics, betablockers,               antiarrhythmics and their purpose, dose, route, side effects and labs               if they are needed    Outcome: Progressing

## 2024-09-25 NOTE — PROGRESS NOTES
Progress Note - Hospitalist   Name: Vernell Darby 49 y.o. female I MRN: [id number]  Unit/Bed#: -01 I Date of Admission: 9/22/2024   Date of Service: 9/25/2024 I Hospital Day: 3     Assessment & Plan  SABRINA (acute kidney injury) (HCC)  Baseline creatinine 0.7-1  Creatinine on admission 7.53  Diuretics up-titrated outpatient due to ongoing SOB. CTA PE study performed 9/20 9/22 CTAP: 1.  Retained contrast within the kidneys from CT performed 2 days prior is indicative of impaired renal function. No hydroureteronephrosis. 2.  Possible mild enterocolitis. No abdominopelvic fluid collection.  Likely multifactorial - ATN in setting of contrast, increased diuretic, N/V/D..  Pt states that she did not hold her metformin following CTA PE study    Plan:  Completed IVF resuscitation  Diuretic given to augment urine outpt with improvement. Diuretics currently completed  Trend renal indices. Cr improving.   Nephrology consulted  No urgent indications for dialysis  Strict I/O  Avoid hypotension and nephrotoxins  Type 1 diabetes mellitus with hyperglycemia (HCC)  Lab Results   Component Value Date    HGBA1C 9.5 (H) 09/22/2024       Recent Labs     09/25/24  0721 09/25/24  0833 09/25/24  0911 09/25/24  1044   POCGLU 121 124 155* 311*       Blood Sugar Average: Last 72 hrs:  (P) [id number]    Hx of T1DM, follows with Dr. Ascencio from Endocrinology  Presented with hyperglycemia, no DKA  Cont regular insulin gtt per endo  Hold home insulin and metformin  Q2h BG monitoring  Maintain -180  Hyponatremia  Pseudohyponatremia in the setting of hyperglycemia  Corrected Na 137    N&V (nausea and vomiting)  Likely secondary to hyperglyemia  CTAP: 1.  Retained contrast within the kidneys from CT performed 2 days prior is indicative of impaired renal function. No hydroureteronephrosis. 2.  Possible mild enterocolitis. No abdominopelvic fluid collection.  Pt denies fevers or chills PTA  Hold off on antibiotics   RESOLVED  Sore  throat  Pt reports severe odynophagia following multiple episodes of vomiting  Strep PCR neg, COVID/Flu/RSV neg  Unremarkable physical exam: no erythema of pharynx, no cervical LAD, no crepitus of neck but tender to palpation  RESOLVED.  CT neck - secretions in posterior pharnyx but no concerning findings.  Mouthkote and chloraseptic spray ordered for pain control  SIRS (systemic inflammatory response syndrome) (Formerly [facility])  SIRS: tachypnea, tachycardia, leukocytosis  Suspect reactive to N/V and SABRINA  Unlikely sepsis with no fevers/chills  On rocephin for >100k cfu hemolytic strep group b  Chronic diastolic congestive heart failure (HCC)  Wt Readings from Last 3 Encounters:   09/25/24 79.4 kg (175 lb 1.6 oz)   09/18/24 82.2 kg (181 lb 3.5 oz)   09/17/24 82.7 kg (182 lb 6.4 oz)       5/2024 Echo: EF 65%, G2DD  Recently had diuretics up-titrated outpatient due to persistent SOB/PRICE  Currently examines hypovolemic  Hold diuresis  Strict I/O  Daily weights      Asthma  Daily Breo, PRN albuterol neb  Continue home singulair  Hypertension  Hold home lisinopril and diuretics 2/2 SABRINA  Hyperlipidemia  Continue statin  Anemia  Baseline hgb 9-10  Hgb on admission 12  Likely hemoconcentrated  Trend hgb and maintain > 7  Severe obesity (BMI 35.0-39.9) with comorbidity (Formerly [facility])  Encourage lifestyle modifications as appropriate  Gastroesophageal reflux disease without esophagitis  Continue home PPI  ATN (acute tubular necrosis) (Former[facility])    Oliguria and anuria    PAF (paroxysmal atrial fibrillation) (Formerly [facility])  Resolved w IV lopressor  Cardiology following  Pt initiated on eliquis.         VTE  Prophylaxis:   Pharmacologic: in place  Mechanical VTE Prophylaxis in Place: Yes    Patient Centered Rounds: I have performed bedside rounds with nursing staff today.    Discussions with Specialists or Other Care Team Provider: case management    Education and Discussions with Family / Patient: yes    Mobility:   Basic Mobility Inpatient Raw Score: 24  JH-HLM  Goal: 8: Walk 250 feet or more  -[facility] Achieved: 8: Walk 250 feet ot more      Current Length of Stay: 3 day(s)    Current Patient Status: Inpatient        Code Status: Level 1 - Full Code    Discharge Plan: Pt will require continued inpatient hospitalization.    Subjective:   Pt continues to urinate   Came out of stepdown unit  Hemodynamically stable overnight  No GI sx    Patient is seen and examined at bedside.  All other ROS are negative.    Objective:     Vitals:   Temp (24hrs), Av.9 °F (36.6 °C), Min:97.8 °F (36.6 °C), Max:98.1 °F (36.7 °C)    Temp:  [97.8 °F (36.6 °C)-98.1 °F (36.7 °C)] 98.1 °F (36.7 °C)  HR:  [72-75] 72  Resp:  [15-20] 15  BP: (101-131)/(53-68) 101/53  SpO2:  [85 %-94 %] 85 %  Body mass index is 39.26 kg/m².     Input and Output Summary (last 24 hours):       Intake/Output Summary (Last 24 hours) at 2024 1257  Last data filed at 2024 1115  Gross per 24 hour   Intake 2299.73 ml   Output 4100 ml   Net -1800.27 ml       Physical Exam:       GEN: No acute distress, comfortable  HEEENT: No JVD, PERRLA, no scleral icterus  RESP: Lungs clear to auscultation bilaterally  CV: RRR, +s1/s2   ABD: SOFT NON TENDER, POSITIVE BOWEL SOUNDS, NO DISTENTION  PSYCH: CALM  NEURO: Mentation baseline, NO FOCAL DEFICITS  SKIN: NO RASH  EXTREM: NO EDEMA    Additional Data:     Labs:    Results from last 7 days   Lab Units 24  0324   WBC Thousand/uL 12.65*   HEMOGLOBIN g/dL 11.4*   HEMATOCRIT % 34.7*   PLATELETS Thousands/uL 441*   SEGS PCT % 77*   LYMPHO PCT % 16   MONO PCT % 4   EOS PCT % 1     Results from last 7 days   Lab Units 24  0324   SODIUM mmol/L 133*   POTASSIUM mmol/L 3.7   CHLORIDE mmol/L 86*   CO2 mmol/L 25   BUN mg/dL 96*   CREATININE mg/dL 8.03*   ANION GAP mmol/L 22*   CALCIUM mg/dL 8.7   ALBUMIN g/dL 3.8   TOTAL BILIRUBIN mg/dL 0.24   ALK PHOS U/L 101   ALT U/L 7   AST U/L 8*   GLUCOSE RANDOM mg/dL 214*         Results from last 7 days   Lab Units 24  2392  24  0911 24  0833 24  0721 24  0456 24  0238 24  0108 24  2313 24  2236 24  2111 24  1631 24  1453   POC GLUCOSE mg/dl 311* 155* 124 121 185* 219* 205* 88 102 199* 231* 232*     Results from last 7 days   Lab Units 24  193   HEMOGLOBIN A1C % 9.5*     Results from last 7 days   Lab Units 24  0428 24  0440 24  1931   LACTIC ACID mmol/L  --  0.9 0.9   PROCALCITONIN ng/ml 0.42* 0.41*  --        Lines/Drains:  Invasive Devices       Peripheral Intravenous Line  Duration             Peripheral IV 24 Right Antecubital 2 days    Peripheral IV 24 Right;Upper;Ventral (anterior) Arm 2 days                    Telemetry:   Telemetry Orders (From admission, onward)               24 Hour Telemetry Monitoring  Continuous x 24 Hours (Telem)           Question:  Reason for 24 Hour Telemetry  Answer:  Metabolic/electrolyte disturbance with high probability of dysrhythmia. K level <3 or >6 OR KCL infusion >10mEq/hr                        * I Have Reviewed All Lab Data Listed Above.           Imaging:     Results for orders placed during the hospital encounter of 24    XR chest 1 view portable    Narrative  XR CHEST PORTABLE    INDICATION: cp.    COMPARISON: CT chest 2024    FINDINGS:    Clear lungs. No pneumothorax or pleural effusion.    Normal cardiomediastinal silhouette.    Bones are unremarkable for age.    Normal upper abdomen.    Impression  No acute cardiopulmonary disease.        Workstation performed: [id number]    Results for orders placed during the hospital encounter of 16    X-ray chest 2 views    Narrative  CHEST    INDICATION:  Left-sided chest pain, history of asthma.    COMPARISON:  None    VIEWS:  Frontal and lateral projections; 2 images    FINDINGS:    Cardiomediastinal silhouette appears unremarkable.    The lungs are clear.  No pneumothorax or pleural effusion.    Visualized osseous  structures appear within normal limits for the patient's age.    Impression  No active pulmonary disease.      *I have reviewed all imaging reports listed above      Recent Cultures (last 7 days):     Results from last 7 days   Lab Units 09/23/24  0439 09/23/24  0356   BLOOD CULTURE  No Growth at 24 hrs.  No Growth at 24 hrs.  --    URINE CULTURE   --  >100,000 cfu/ml Beta Hemolytic Streptococcus Group B*  >100,000 cfu/ml       Last 24 Hours Medication List:   Current Facility-Administered Medications   Medication Dose Route Frequency Provider Last Rate    acetaminophen  650 mg Oral Q6H PRN Christina Saunders PA-C      albuterol  2.5 mg Nebulization Q6H PRN Christina Saunders PA-C      apixaban  5 mg Oral BID Cem Gamez PA-C      aspirin  81 mg Oral Daily Christina Saunders PA-C      atorvastatin  80 mg Oral Daily Christina Saunders PA-C      cefTRIAXone  2,000 mg Intravenous Q24H ASHLEY GaleanaC 2,000 mg (09/25/24 0440)    fluticasone-vilanterol  1 puff Inhalation Daily Christina Saunders PA-C      insulin regular (HumuLIN R,NovoLIN R) 1 Units/mL in sodium chloride 0.9 % 100 mL infusion  0.3-21 Units/hr Intravenous Titrated Cindi Guillermo MD 8 Units/hr (09/25/24 1115)    metoprolol tartrate  12.5 mg Oral Q12H [facility] Christina Saunders PA-C      montelukast  10 mg Oral HS Christina Saunders PA-C      ondansetron  4 mg Intravenous Q6H PRN Christina Saunders PA-C      pantoprazole  20 mg Oral Early Morning Christina Saunders PA-C      PARoxetine  20 mg Oral Daily Christina Saunders PA-C      phenol  1 spray Mouth/Throat Q2H PRN Christina Saunders PA-C      saliva substitute  5 spray Mouth/Throat 4x Daily PRN Christina Saunders PA-C      sodium chloride  10 mL/hr Intravenous Continuous Christina Saunders PA-C 10 mL/hr (09/24/24 0539)        Today, Patient Was Seen By: Cindi Guillermo MD    ** Please Note: Dictation voice to text software may have been used in the creation of this document. **

## 2024-09-25 NOTE — ASSESSMENT & PLAN NOTE
Prehospital Rx: Lisinopril 40 daily     -Off lisinopril due to acute kidney injury   -Monitor blood pressure with starting metoprolol

## 2024-09-25 NOTE — ASSESSMENT & PLAN NOTE
Wt Readings from Last 3 Encounters:   09/25/24 79.4 kg (175 lb 1.6 oz)   09/18/24 82.2 kg (181 lb 3.5 oz)   09/17/24 82.7 kg (182 lb 6.4 oz)       5/2024 Echo: EF 65%, G2DD  Recently had diuretics up-titrated outpatient due to persistent SOB/PRICE  Currently examines hypovolemic  Hold diuresis  Strict I/O  Daily weights

## 2024-09-25 NOTE — PROGRESS NOTES
NEPHROLOGY PROGRESS NOTE    Vernell Darby 49 y.o. female MRN: [id number]  Unit/Bed#: -01 Encounter: [id number]  Reason for Consult: Acute renal failure    The patient is awake alert feeling well eating no nausea vomiting or GI symptoms.  No confusion  is in the room.  States she is continuing to urinate with no difficulty.  Did not sleep well.    ASSESSMENT/PLAN:    1.  Renal    The patient has baseline creatinine 0.7 developed ATN in the setting of increased diuretic some vomiting visit to the emergency room received CAT scan with contrast then presented feeling worse and had severe renal failure.  Initially was oliguric placed on diuretic infusion and had a very nice response with improvement of urine output.  Diuretics have now been off and urine output continues normal.    Labs today show a creatinine that slightly lower at 8.  At this point I get a sense she is entering the early recovery phase from ATN.  She is hemodynamically stable eating euvolemic clinically so at this point just continue to monitor with labs in the morning to make sure renal function continues to improve.    BMP a.m.    2.  Acid base    In looking at her chemistry she has significant elevation of anion gap at 22.  With the bicarbonate that is normal at 25 this would imply a gap acidosis and metabolic alkalosis being present.  She has been in the hospital she is clinically well I do not see any reason other than potentially renal failure resulting in an increase Versus erroneous in the lab as we have seen this recently.  Continue to monitor with no changes.  I doubt there is a ketoacidosis but always remotely possible.        SUBJECTIVE:  Review of Systems   Constitutional: Negative for chills, decreased appetite and fever.   HENT: Negative.     Eyes: Negative.    Cardiovascular:  Negative for chest pain, dyspnea on exertion and orthopnea.   Respiratory:  Negative for cough, shortness of breath and sputum production.   "  Gastrointestinal:  Negative for abdominal pain, diarrhea, nausea and vomiting.   Genitourinary: Negative.    Neurological:  Negative for dizziness and headaches.   Psychiatric/Behavioral:  Negative for altered mental status.        OBJECTIVE:  Current Weight: Weight - Scale: 79.4 kg (175 lb 1.6 oz) (pt has had an 8 lb weight change from  to . Pt was being diuresed in the ICU. RN aware of significant weight change.)  Vitals:Temp (24hrs), Av.9 °F (36.6 °C), Min:97.8 °F (36.6 °C), Max:98.1 °F (36.7 °C)  Current: Temperature: 98.1 °F (36.7 °C)   Blood pressure 101/53, pulse 72, temperature 98.1 °F (36.7 °C), temperature source Oral, resp. rate 15, height 4' 8\" (1.422 m), weight 79.4 kg (175 lb 1.6 oz), SpO2 (!) 85%, not currently breastfeeding. , Body mass index is 39.26 kg/m².      Intake/Output Summary (Last 24 hours) at 2024 1357  Last data filed at 2024 1115  Gross per 24 hour   Intake 2059.73 ml   Output 4100 ml   Net -2040.27 ml       Physical Exam: /53 (BP Location: Left arm)   Pulse 72   Temp 98.1 °F (36.7 °C) (Oral)   Resp 15   Ht 4' 8\" (1.422 m)   Wt 79.4 kg (175 lb 1.6 oz) Comment: pt has had an 8 lb weight change from  to . Pt was being diuresed in the ICU. RN aware of significant weight change.  SpO2 (!) 85%   BMI 39.26 kg/m²   Physical Exam  Constitutional:       General: She is not in acute distress.     Appearance: She is not toxic-appearing.   HENT:      Head: Normocephalic and atraumatic.      Nose: Nose normal.      Mouth/Throat:      Mouth: Mucous membranes are moist.   Eyes:      General: No scleral icterus.     Extraocular Movements: Extraocular movements intact.   Cardiovascular:      Rate and Rhythm: Normal rate and regular rhythm.      Heart sounds:      No friction rub. No gallop.   Pulmonary:      Effort: Pulmonary effort is normal. No respiratory distress.      Breath sounds: No wheezing or rales.   Abdominal:      General: Bowel sounds are normal. " There is no distension.      Palpations: Abdomen is soft.      Tenderness: There is no abdominal tenderness.   Musculoskeletal:      Cervical back: Normal range of motion and neck supple.   Neurological:      Mental Status: She is alert and oriented to person, place, and time. Mental status is at baseline.   Psychiatric:         Mood and Affect: Mood normal.         Behavior: Behavior normal.         Medications:    Current Facility-Administered Medications:     acetaminophen (TYLENOL) tablet 650 mg, 650 mg, Oral, Q6H PRN, Christina Saunders PA-C, 650 mg at 09/23/24 1959    albuterol inhalation solution 2.5 mg, 2.5 mg, Nebulization, Q6H PRN, Christina Saunders PA-C    apixaban (ELIQUIS) tablet 5 mg, 5 mg, Oral, BID, Cem Gamez PA-C, 5 mg at 09/25/24 1205    aspirin (ECOTRIN LOW STRENGTH) EC tablet 81 mg, 81 mg, Oral, Daily, Christina Saunders PA-C, 81 mg at 09/25/24 0821    atorvastatin (LIPITOR) tablet 80 mg, 80 mg, Oral, Daily, Christina Saunders PA-C, 80 mg at 09/25/24 0821    cefTRIAXone (ROCEPHIN) IVPB (premix in dextrose) 2,000 mg 50 mL, 2,000 mg, Intravenous, Q24H, Christina Saunders PA-C, Last Rate: 100 mL/hr at 09/25/24 0440, 2,000 mg at 09/25/24 0440    fluticasone-vilanterol 200-25 mcg/actuation 1 puff, 1 puff, Inhalation, Daily, Christina Saunders PA-C, 1 puff at 09/25/24 0836    insulin regular (HumuLIN R,NovoLIN R) 1 Units/mL in sodium chloride 0.9 % 100 mL infusion, 0.3-21 Units/hr, Intravenous, Titrated, Cindi Guillermo MD, Last Rate: 9 mL/hr at 09/25/24 1321, 9 Units/hr at 09/25/24 1321    metoprolol tartrate (LOPRESSOR) partial tablet 12.5 mg, 12.5 mg, Oral, Q12H KENNY, Christina Saunders PA-C, 12.5 mg at 09/25/24 0821    montelukast (SINGULAIR) tablet 10 mg, 10 mg, Oral, HS, Christina Saunders PA-C, 10 mg at 09/24/24 2122    ondansetron (ZOFRAN) injection 4 mg, 4 mg, Intravenous, Q6H PRN, Christina Saunders PA-C    pantoprazole (PROTONIX) EC tablet 20 mg, 20 mg, Oral, Early Morning, Christina Saunders PA-C, 20 mg at 09/25/24 0615    PARoxetine (PAXIL)  "tablet 20 mg, 20 mg, Oral, Daily, Christina Saunders PA-C, 20 mg at 09/25/24 0821    phenol (CHLORASEPTIC) 1.4 % mucosal liquid 1 spray, 1 spray, Mouth/Throat, Q2H PRN, Christina Saunders PA-C, 1 spray at 09/22/24 2347    saliva substitute (MOUTH KOTE) mucosal solution 5 spray, 5 spray, Mouth/Throat, 4x Daily PRN, Christina Saunders PA-C, 5 spray at 09/24/24 0245    sodium chloride 0.9 % infusion, 10 mL/hr, Intravenous, Continuous, Christina Saunders PA-C, Last Rate: 10 mL/hr at 09/24/24 0539, 10 mL/hr at 09/24/24 0539    Laboratory Results:  Lab Results   Component Value Date    WBC 12.65 (H) 09/25/2024    HGB 11.4 (L) 09/25/2024    HCT 34.7 (L) 09/25/2024    MCV 87 09/25/2024     (H) 09/25/2024     Lab Results   Component Value Date    SODIUM 133 (L) 09/25/2024    K 3.7 09/25/2024    CL 86 (L) 09/25/2024    CO2 25 09/25/2024    BUN 96 (H) 09/25/2024    CREATININE 8.03 (H) 09/25/2024    GLUC 214 (H) 09/25/2024    CALCIUM 8.7 09/25/2024     Lab Results   Component Value Date    CALCIUM 8.7 09/25/2024    PHOS 4.9 (H) 09/23/2024     No results found for: \"LABPROT\"    "

## 2024-09-25 NOTE — ASSESSMENT & PLAN NOTE
Likely secondary to hyperglyemia  CTAP: 1.  Retained contrast within the kidneys from CT performed 2 days prior is indicative of impaired renal function. No hydroureteronephrosis. 2.  Possible mild enterocolitis. No abdominopelvic fluid collection.  Pt denies fevers or chills PTA  Hold off on antibiotics   RESOLVED

## 2024-09-25 NOTE — PLAN OF CARE
Problem: PAIN - ADULT  Goal: Verbalizes/displays adequate comfort level or baseline comfort level  Description: Interventions:  - Encourage patient to monitor pain and request assistance  - Assess pain using appropriate pain scale  - Administer analgesics based on type and severity of pain and evaluate response  - Implement non-pharmacological measures as appropriate and evaluate response  - Consider cultural and social influences on pain and pain management  - Notify physician/advanced practitioner if interventions unsuccessful or patient reports new pain  Outcome: Progressing     Problem: INFECTION - ADULT  Goal: Absence or prevention of progression during hospitalization  Description: INTERVENTIONS:  - Assess and monitor for signs and symptoms of infection  - Monitor lab/diagnostic results  - Monitor all insertion sites, i.e. indwelling lines, tubes, and drains  - Monitor endotracheal if appropriate and nasal secretions for changes in amount and color  - [place] appropriate cooling/warming therapies per order  - Administer medications as ordered  - Instruct and encourage patient and family to use good hand hygiene technique  - Identify and instruct in appropriate isolation precautions for identified infection/condition  Outcome: Progressing  Goal: Absence of fever/infection during neutropenic period  Description: INTERVENTIONS:  - Monitor WBC    Outcome: Progressing

## 2024-09-25 NOTE — ASSESSMENT & PLAN NOTE
Pt reports severe odynophagia following multiple episodes of vomiting  Strep PCR neg, COVID/Flu/RSV neg  Unremarkable physical exam: no erythema of pharynx, no cervical LAD, no crepitus of neck but tender to palpation  RESOLVED.  CT neck - secretions in posterior pharnyx but no concerning findings.  Mouthkote and chloraseptic spray ordered for pain control

## 2024-09-25 NOTE — ASSESSMENT & PLAN NOTE
SIRS: tachypnea, tachycardia, leukocytosis  Suspect reactive to N/V and SABRINA  Unlikely sepsis with no fevers/chills  On rocephin for >100k cfu hemolytic strep group b

## 2024-09-26 LAB
ALBUMIN SERPL BCG-MCNC: 3.6 G/DL (ref 3.5–5)
ALP SERPL-CCNC: 88 U/L (ref 34–104)
ALT SERPL W P-5'-P-CCNC: 10 U/L (ref 7–52)
ANION GAP SERPL CALCULATED.3IONS-SCNC: 17 MMOL/L (ref 4–13)
AST SERPL W P-5'-P-CCNC: 13 U/L (ref 13–39)
ATRIAL RATE: 159 BPM
ATRIAL RATE: 86 BPM
ATRIAL RATE: 93 BPM
BASOPHILS # BLD AUTO: 0.11 THOUSANDS/ΜL (ref 0–0.1)
BASOPHILS NFR BLD AUTO: 1 % (ref 0–1)
BILIRUB SERPL-MCNC: 0.24 MG/DL (ref 0.2–1)
BUN SERPL-MCNC: 91 MG/DL (ref 5–25)
CALCIUM SERPL-MCNC: 8.7 MG/DL (ref 8.4–10.2)
CHLORIDE SERPL-SCNC: 87 MMOL/L (ref 96–108)
CO2 SERPL-SCNC: 29 MMOL/L (ref 21–32)
CREAT SERPL-MCNC: 5.67 MG/DL (ref 0.6–1.3)
EOSINOPHIL # BLD AUTO: 0.28 THOUSAND/ΜL (ref 0–0.61)
EOSINOPHIL NFR BLD AUTO: 3 % (ref 0–6)
ERYTHROCYTE [DISTWIDTH] IN BLOOD BY AUTOMATED COUNT: 12.4 % (ref 11.6–15.1)
GFR SERPL CREATININE-BSD FRML MDRD: 8 ML/MIN/1.73SQ M
GLUCOSE SERPL-MCNC: 156 MG/DL (ref 65–140)
GLUCOSE SERPL-MCNC: 169 MG/DL (ref 65–140)
GLUCOSE SERPL-MCNC: 177 MG/DL (ref 65–140)
GLUCOSE SERPL-MCNC: 187 MG/DL (ref 65–140)
GLUCOSE SERPL-MCNC: 188 MG/DL (ref 65–140)
GLUCOSE SERPL-MCNC: 195 MG/DL (ref 65–140)
GLUCOSE SERPL-MCNC: 210 MG/DL (ref 65–140)
GLUCOSE SERPL-MCNC: 240 MG/DL (ref 65–140)
GLUCOSE SERPL-MCNC: 251 MG/DL (ref 65–140)
GLUCOSE SERPL-MCNC: 269 MG/DL (ref 65–140)
GLUCOSE SERPL-MCNC: 320 MG/DL (ref 65–140)
GLUCOSE SERPL-MCNC: 96 MG/DL (ref 65–140)
HCT VFR BLD AUTO: 34.5 % (ref 34.8–46.1)
HGB BLD-MCNC: 11.4 G/DL (ref 11.5–15.4)
IMM GRANULOCYTES # BLD AUTO: 0.07 THOUSAND/UL (ref 0–0.2)
IMM GRANULOCYTES NFR BLD AUTO: 1 % (ref 0–2)
LYMPHOCYTES # BLD AUTO: 2.07 THOUSANDS/ΜL (ref 0.6–4.47)
LYMPHOCYTES NFR BLD AUTO: 20 % (ref 14–44)
MCH RBC QN AUTO: 29.4 PG (ref 26.8–34.3)
MCHC RBC AUTO-ENTMCNC: 33 G/DL (ref 31.4–37.4)
MCV RBC AUTO: 89 FL (ref 82–98)
MONOCYTES # BLD AUTO: 0.6 THOUSAND/ΜL (ref 0.17–1.22)
MONOCYTES NFR BLD AUTO: 6 % (ref 4–12)
NEUTROPHILS # BLD AUTO: 7.51 THOUSANDS/ΜL (ref 1.85–7.62)
NEUTS SEG NFR BLD AUTO: 69 % (ref 43–75)
NRBC BLD AUTO-RTO: 0 /100 WBCS
P AXIS: 69 DEGREES
PLATELET # BLD AUTO: 411 THOUSANDS/UL (ref 149–390)
PMV BLD AUTO: 10.3 FL (ref 8.9–12.7)
POTASSIUM SERPL-SCNC: 3.5 MMOL/L (ref 3.5–5.3)
PR INTERVAL: 142 MS
PROT SERPL-MCNC: 6.7 G/DL (ref 6.4–8.4)
QRS AXIS: 75 DEGREES
QRS AXIS: 83 DEGREES
QRS AXIS: 86 DEGREES
QRSD INTERVAL: 82 MS
QRSD INTERVAL: 84 MS
QRSD INTERVAL: 86 MS
QT INTERVAL: 308 MS
QT INTERVAL: 322 MS
QT INTERVAL: 370 MS
QTC INTERVAL: 442 MS
QTC INTERVAL: 475 MS
QTC INTERVAL: 476 MS
RBC # BLD AUTO: 3.88 MILLION/UL (ref 3.81–5.12)
SODIUM SERPL-SCNC: 133 MMOL/L (ref 135–147)
T WAVE AXIS: 27 DEGREES
T WAVE AXIS: 3 DEGREES
T WAVE AXIS: 58 DEGREES
VENTRICULAR RATE: 131 BPM
VENTRICULAR RATE: 144 BPM
VENTRICULAR RATE: 86 BPM
WBC # BLD AUTO: 10.64 THOUSAND/UL (ref 4.31–10.16)

## 2024-09-26 PROCEDURE — 99232 SBSQ HOSP IP/OBS MODERATE 35: CPT | Performed by: INTERNAL MEDICINE

## 2024-09-26 PROCEDURE — 82948 REAGENT STRIP/BLOOD GLUCOSE: CPT

## 2024-09-26 PROCEDURE — 99232 SBSQ HOSP IP/OBS MODERATE 35: CPT | Performed by: HOSPITALIST

## 2024-09-26 PROCEDURE — 85025 COMPLETE CBC W/AUTO DIFF WBC: CPT | Performed by: HOSPITALIST

## 2024-09-26 PROCEDURE — 93010 ELECTROCARDIOGRAM REPORT: CPT | Performed by: INTERNAL MEDICINE

## 2024-09-26 PROCEDURE — 80053 COMPREHEN METABOLIC PANEL: CPT | Performed by: HOSPITALIST

## 2024-09-26 RX ORDER — INSULIN GLARGINE 100 [IU]/ML
45 INJECTION, SOLUTION SUBCUTANEOUS
Status: DISCONTINUED | OUTPATIENT
Start: 2024-09-26 | End: 2024-09-27

## 2024-09-26 RX ADMIN — INSULIN GLARGINE 45 UNITS: 100 INJECTION, SOLUTION SUBCUTANEOUS at 21:03

## 2024-09-26 RX ADMIN — SODIUM CHLORIDE 8 UNITS/HR: 9 INJECTION, SOLUTION INTRAVENOUS at 23:18

## 2024-09-26 RX ADMIN — ASPIRIN 81 MG: 81 TABLET, COATED ORAL at 09:11

## 2024-09-26 RX ADMIN — APIXABAN 5 MG: 5 TABLET, FILM COATED ORAL at 21:03

## 2024-09-26 RX ADMIN — FLUTICASONE FUROATE AND VILANTEROL TRIFENATATE 1 PUFF: 200; 25 POWDER RESPIRATORY (INHALATION) at 09:12

## 2024-09-26 RX ADMIN — Medication 12.5 MG: at 21:03

## 2024-09-26 RX ADMIN — PAROXETINE HYDROCHLORIDE 20 MG: 20 TABLET, FILM COATED ORAL at 09:11

## 2024-09-26 RX ADMIN — Medication 12.5 MG: at 09:11

## 2024-09-26 RX ADMIN — CEFTRIAXONE 2000 MG: 2 INJECTION, SOLUTION INTRAVENOUS at 05:16

## 2024-09-26 RX ADMIN — MONTELUKAST 10 MG: 10 TABLET, FILM COATED ORAL at 21:03

## 2024-09-26 RX ADMIN — ATORVASTATIN CALCIUM 80 MG: 40 TABLET, FILM COATED ORAL at 09:11

## 2024-09-26 RX ADMIN — SODIUM CHLORIDE 11 UNITS/HR: 9 INJECTION, SOLUTION INTRAVENOUS at 14:16

## 2024-09-26 RX ADMIN — PANTOPRAZOLE SODIUM 40 MG: 40 TABLET, DELAYED RELEASE ORAL at 06:07

## 2024-09-26 RX ADMIN — SODIUM CHLORIDE 10 ML/HR: 0.9 INJECTION, SOLUTION INTRAVENOUS at 17:43

## 2024-09-26 RX ADMIN — APIXABAN 5 MG: 5 TABLET, FILM COATED ORAL at 09:11

## 2024-09-26 NOTE — PLAN OF CARE
Problem: Potential for Falls  Goal: Patient will remain free of falls  Description: INTERVENTIONS:  - Educate patient/family on patient safety including physical limitations  - Instruct patient to call for assistance with activity   - Consult OT/PT to assist with strengthening/mobility   - Keep Call bell within reach  - Keep bed low and locked with side rails adjusted as appropriate  - Keep care items and personal belongings within reach  - Initiate and maintain comfort rounds  - Make Fall Risk Sign visible to staff  Problem: Knowledge Deficit  Goal: Patient/family/caregiver demonstrates understanding of disease process, treatment plan, medications, and discharge instructions  Description: Complete learning assessment and assess knowledge base.  Interventions:  - Provide teaching at level of understanding  - Provide teaching via preferred learning methods  Outcome: Progressing     - Apply yellow socks and bracelet for high fall risk patients  - Consider moving patient to room near nurses station  Outcome: Progressing     Problem: PAIN - ADULT  Goal: Verbalizes/displays adequate comfort level or baseline comfort level  Description: Interventions:  - Encourage patient to monitor pain and request assistance  - Assess pain using appropriate pain scale  - Administer analgesics based on type and severity of pain and evaluate response  - Implement non-pharmacological measures as appropriate and evaluate response  - Consider cultural and social influences on pain and pain management  - Notify physician/advanced practitioner if interventions unsuccessful or patient reports new pain  Outcome: Progressing

## 2024-09-26 NOTE — PROGRESS NOTES
NEPHROLOGY PROGRESS NOTE    Vernell Darby 49 y.o. female MRN: [id number]  Unit/Bed#: -01 Encounter: [id number]  Reason for Consult: Acute renal failure    The patient is awake alert eating has no complaints except just feels tired and she is a little anxious about what happening with her but she is feeling better no abdominal pain she is eating no nausea vomiting.  Urinating well with no difficulty.    ASSESSMENT/PLAN:    1.  Renal    Patient has baseline renal function is normal at 0.7 she developed ATN.  She had had an increase in her diuretic had some vomiting then got a CAT scan with contrast in the ER and I suspect the creatinine was above her baseline when she received the contrast and the resulting in ATN.  Initially she was oliguric placed on diuretic infusion and had beautiful response with increased urine output.  This is now been discontinued she still maintains normal urine output.    She now is in the recovery phase from ATN as her creatinine significantly improved down to 5.6 today.  I expect if she remains stable that should decline over the next few days back to her normal baseline.  There is mild hyponatremia but as her renal function improves water excretion will normalize and this will correct itself.  No changes.    BMP a.m.    There are still some mild increase in anion gap potentially from retention of sulfates and phosphate some renal failure will monitor.    SUBJECTIVE:  Review of Systems   Constitutional: Negative for chills, diaphoresis and fever.   HENT: Negative.     Eyes: Negative.    Cardiovascular:  Negative for chest pain, dyspnea on exertion, leg swelling and orthopnea.   Respiratory: Negative.  Negative for cough, shortness of breath, sputum production and wheezing.    Gastrointestinal:  Negative for abdominal pain, diarrhea, nausea and vomiting.   Genitourinary: Negative.    Neurological:  Negative for dizziness and headaches.   Psychiatric/Behavioral:  Negative for altered  "mental status.        OBJECTIVE:  Current Weight: Weight - Scale: 79.5 kg (175 lb 4.3 oz)  Vitals:Temp (24hrs), Av.2 °F (36.8 °C), Min:98 °F (36.7 °C), Max:98.3 °F (36.8 °C)  Current: Temperature: 98.2 °F (36.8 °C)   Blood pressure 133/62, pulse 68, temperature 98.2 °F (36.8 °C), resp. rate 18, height 4' 8\" (1.422 m), weight 79.5 kg (175 lb 4.3 oz), SpO2 96%, not currently breastfeeding. , Body mass index is 39.29 kg/m².      Intake/Output Summary (Last 24 hours) at 2024 1354  Last data filed at 2024 1205  Gross per 24 hour   Intake 840 ml   Output 3100 ml   Net -2260 ml       Physical Exam: /62   Pulse 68   Temp 98.2 °F (36.8 °C)   Resp 18   Ht 4' 8\" (1.422 m)   Wt 79.5 kg (175 lb 4.3 oz)   SpO2 96%   BMI 39.29 kg/m²   Physical Exam  Constitutional:       General: She is not in acute distress.     Appearance: She is not ill-appearing or toxic-appearing.   HENT:      Head: Normocephalic and atraumatic.      Nose: Nose normal.      Mouth/Throat:      Mouth: Mucous membranes are moist.   Eyes:      General: No scleral icterus.     Extraocular Movements: Extraocular movements intact.   Cardiovascular:      Rate and Rhythm: Normal rate and regular rhythm.      Heart sounds:      No friction rub. No gallop.   Pulmonary:      Effort: Pulmonary effort is normal. No respiratory distress.      Breath sounds: No wheezing or rales.   Abdominal:      General: Bowel sounds are normal. There is no distension.      Palpations: Abdomen is soft.      Tenderness: There is no abdominal tenderness. There is no rebound.   Musculoskeletal:      Cervical back: Normal range of motion and neck supple.   Neurological:      Mental Status: She is alert and oriented to person, place, and time. Mental status is at baseline.   Psychiatric:         Mood and Affect: Mood normal.         Behavior: Behavior normal.         Thought Content: Thought content normal.         Medications:    Current Facility-Administered " Medications:     acetaminophen (TYLENOL) tablet 650 mg, 650 mg, Oral, Q6H PRN, Christina Saunders PA-C, 650 mg at 09/23/24 1959    albuterol inhalation solution 2.5 mg, 2.5 mg, Nebulization, Q6H PRN, Christina Saunders PA-C    apixaban (ELIQUIS) tablet 5 mg, 5 mg, Oral, BID, Cem Gamez PA-C, 5 mg at 09/26/24 0911    aspirin (ECOTRIN LOW STRENGTH) EC tablet 81 mg, 81 mg, Oral, Daily, Christina Saunders PA-C, 81 mg at 09/26/24 0911    atorvastatin (LIPITOR) tablet 80 mg, 80 mg, Oral, Daily, Christina Saunders PA-C, 80 mg at 09/26/24 0911    cefTRIAXone (ROCEPHIN) IVPB (premix in dextrose) 2,000 mg 50 mL, 2,000 mg, Intravenous, Q24H, Christina Saunders PA-C, Last Rate: 100 mL/hr at 09/26/24 0516, 2,000 mg at 09/26/24 0516    fluticasone-vilanterol 200-25 mcg/actuation 1 puff, 1 puff, Inhalation, Daily, Christina Saunders PA-C, 1 puff at 09/26/24 0912    insulin glargine (LANTUS) subcutaneous injection 45 Units 0.45 mL, 45 Units, Subcutaneous, HS, Michela Gamboa MD    insulin regular (HumuLIN R,NovoLIN R) 1 Units/mL in sodium chloride 0.9 % 100 mL infusion, 0.3-21 Units/hr, Intravenous, Titrated, Cindi Guillermo MD, Last Rate: 11 mL/hr at 09/26/24 1348, 11 Units/hr at 09/26/24 1348    metoprolol tartrate (LOPRESSOR) partial tablet 12.5 mg, 12.5 mg, Oral, Q12H KENNY, Christina Saunders PA-C, 12.5 mg at 09/26/24 0911    montelukast (SINGULAIR) tablet 10 mg, 10 mg, Oral, HS, Christina Saunders PA-C, 10 mg at 09/25/24 2148    ondansetron (ZOFRAN) injection 4 mg, 4 mg, Intravenous, Q6H PRN, Christina Saunders PA-C    pantoprazole (PROTONIX) EC tablet 40 mg, 40 mg, Oral, Early Morning, Yamileth Naik PA-C, 40 mg at 09/26/24 0607    PARoxetine (PAXIL) tablet 20 mg, 20 mg, Oral, Daily, Christina Saunders PA-C, 20 mg at 09/26/24 0911    phenol (CHLORASEPTIC) 1.4 % mucosal liquid 1 spray, 1 spray, Mouth/Throat, Q2H PRN, Christina Saunders PA-C, 1 spray at 09/22/24 2347    saliva substitute (MOUTH KOTE) mucosal solution 5 spray, 5 spray, Mouth/Throat, 4x Daily PRN, Christina Saunders PA-C, 5 spray at  "09/24/24 0245    sodium chloride 0.9 % infusion, 10 mL/hr, Intravenous, Continuous, Christina Saunders PA-C, Last Rate: 10 mL/hr at 09/25/24 1922, 10 mL/hr at 09/25/24 1922    Laboratory Results:  Lab Results   Component Value Date    WBC 10.64 (H) 09/26/2024    HGB 11.4 (L) 09/26/2024    HCT 34.5 (L) 09/26/2024    MCV 89 09/26/2024     (H) 09/26/2024     Lab Results   Component Value Date    SODIUM 133 (L) 09/26/2024    K 3.5 09/26/2024    CL 87 (L) 09/26/2024    CO2 29 09/26/2024    BUN 91 (H) 09/26/2024    CREATININE 5.67 (H) 09/26/2024    GLUC 169 (H) 09/26/2024    CALCIUM 8.7 09/26/2024     Lab Results   Component Value Date    CALCIUM 8.7 09/26/2024    PHOS 4.9 (H) 09/23/2024     No results found for: \"LABPROT\"    "

## 2024-09-26 NOTE — APP STUDENT NOTE
EMI STUDENT  Inpatient Progress Note for TRAINING ONLY  Not Part of Legal Medical Record     Progress Note - Vernell Darby 49 y.o. female MRN: [id number]  Unit/Bed#: -01 Encounter: [id number]        SABRINA (acute kidney injury) (HCC)  Improving to 5.6 from 8.0 yesterday  Nephrology discontinued IV Lasix and are monitoring for recovery vs need for dialysis  Patient reports still making urine and getting up at night to urinate  Do not anticipate need for dialysis, making urine on own  Monitor BMP and Mg Q6 hours  On normal saline  Strict I/O  Avoid hypotension and nephrotoxins, holding lisinopril  Paroxysmal atrial fibrillation  New diagnosis in setting of acute illness  Patient denied palpitations, SOB  Had 20 minutes of afib 9/23 converted to sinus rhythm with IV metoprolol  Cardio recommends metoprolol 25 mg TID for AV blocking rx  Monitor telemetry and outpatient rhythm monitor  Continue to monitor BP after starting metoprolol: last 101/53 without symptoms  [id number] of 4. Recommend oral anticoagulation  SIRS (systemic inflammatory response syndrome) ([facility])  SIRS criteria met on admission  No longer tachycardia or tachypneic  UA showed WBC, epithelial cells, protein, glucose  Urine culture positive for Beta Hemolytic Group B Strep without symptoms  Continue on IV ceftriaxone for UTI   Type 1 diabetes mellitus with hyperglycemia ([facility])  Last glucose 187  Endocrinology consult obtained and recommended regular insulin drip  Continue to monitor  Hyponatremia  Na 133 most recently  On normal saline currently, continue to monitor BMP for changes  N&V (nausea and vomiting)  Was vomiting a lot prior to admission and denies N/V since being admitted  PRN Zofran  Sore throat  Pt reports sore throat following multiple episodes of vomiting  Strep PCR neg, COVID/Flu/RSV neg  Unremarkable physical exam  CT soft tissue neck wo contrast showed:  Small amount of secretions in posterior pharyngeal wall extending to hypopharynx  and partially aerated bilateral piriform sinuses. Consider direct visualization by ENT for further evaluation given provided history.   Chronic diastolic congestive heart failure (HCC)   Recently had diuretics up-titrated outpatient due to persistent SOB/PRICE  Strict I/O  Daily weights  Echo ordered and showed 60% ejection fraction without other concerning findings  Heart monitor ordered for 28 days outpatient  Hypertension  Hold home lisinopril an diuretic  Hyperlipidemia  Continue statin  Anemia  Most recent hemoglobin 11.4  Baseline 10-12  Continue to monitor for need of transfusion  Gastroesophageal reflux disease without esophagitis  Continue home PPI    VTE Pharmacologic Prophylaxis:   Pharmacologic: {VTE Prophylaxis Meds:63419}  Mechanical VTE Prophylaxis in Place: {Yes or No:72750}    Patient Centered Rounds: I have performed bedside rounds with nursing staff today.    Discussions with Specialists or Other Care Team Provider: Nephrology, cardio    Education and Discussions with Family / Patient: patient    Time Spent for Care: 1 hour.  More than 50% of total time spent on counseling and coordination of care as described above.    Current Length of Stay: 4 day(s)    Current Patient Status: Inpatient   Certification Statement: The patient will continue to require additional inpatient hospital stay due to monitoring of SABRINA    Discharge Plan: Likely within 72 hours    Code Status: Level 1 - Full Code    Subjective:   Vernell Darby is a 49 y.o. female with past medical history significant for T1DM, chronic diastolic heart failure, hyperlipidemia, PCOS, asthma, GERD, and obesity who presents inpatient for monitoring of an SABRINA. Patient was awake and eating breakfast when examined. The patient stated that she is still being often and is awaking during the night multiple times to urinate. The patient denied chest pain, palpitations, SOB, abdominal pain, dizziness, leg swelling. There were no other changes  overnight.    Objective:     Vitals:   Temp (24hrs), Av.2 °F (36.8 °C), Min:98 °F (36.7 °C), Max:98.3 °F (36.8 °C)    Temp:  [98 °F (36.7 °C)-98.3 °F (36.8 °C)] 98.2 °F (36.8 °C)  HR:  [68-88] 68  Resp:  [14-18] 18  BP: (100-143)/(58-75) 133/62  SpO2:  [87 %-98 %] 87 %  Body mass index is 39.29 kg/m².     Input and Output Summary (last 24 hours):       Intake/Output Summary (Last 24 hours) at 2024 0905  Last data filed at 2024 0506  Gross per 24 hour   Intake 600 ml   Output 2400 ml   Net -1800 ml       Physical Exam:     Physical Exam  Vitals and nursing note reviewed.   Constitutional:       General: She is not in acute distress.     Appearance: She is well-developed.   HENT:      Head: Normocephalic and atraumatic.   Eyes:      Conjunctiva/sclera: Conjunctivae normal.   Cardiovascular:      Rate and Rhythm: Normal rate and regular rhythm.      Heart sounds: Murmur heard.      Systolic murmur is present with a grade of 2/6.   Pulmonary:      Effort: Pulmonary effort is normal. No respiratory distress.      Breath sounds: Normal breath sounds.   Abdominal:      Palpations: Abdomen is soft.      Tenderness: There is no abdominal tenderness.   Musculoskeletal:         General: No swelling.      Cervical back: Neck supple.   Skin:     General: Skin is warm and dry.      Capillary Refill: Capillary refill takes less than 2 seconds.   Neurological:      Mental Status: She is alert and oriented to person, place, and time. Mental status is at baseline.   Psychiatric:         Mood and Affect: Mood normal.         Behavior: Behavior normal.         Thought Content: Thought content normal.         Historical Information   Past Medical History:   Diagnosis Date    Anemia     Arthritis     Asthma     w/acute exacerbation. Last assessed: 10/26/12    Chest pain 2016    CHF (congestive heart failure) (HCC)     Depression     Diabetes mellitus (HCC)     Diabetic nephropathy (HCC)     Diabetic nephropathy  associated with type 1 diabetes mellitus (HCC) 08/12/2020    Diabetic retinopathy (HCC) 02/03/2016    Endometriosis     Gastroenteritis 02/03/2016    GERD (gastroesophageal reflux disease)     History of COVID-19 02/26/2021    Hyperlipidemia 02/03/2016    Hypertension     Obesity     Oligomenorrhea     Polycystic ovaries 02/03/2016    Retinal hemorrhage 02/03/2016    Retinopathy     Thrombocytosis     Type 1 diabetes mellitus with ophthalmic manifestation (HCC) 02/03/2016     Past Surgical History:   Procedure Laterality Date    CATARACT EXTRACTION Left 10/2020    CATARACT EXTRACTION Right     RETINAL LASER PROCEDURE       Social History   Social History     Substance and Sexual Activity   Alcohol Use Yes    Alcohol/week: 2.0 standard drinks of alcohol    Types: 2 Standard drinks or equivalent per week    Comment: Occasionally/ sicially     Social History     Substance and Sexual Activity   Drug Use No     Social History     Tobacco Use   Smoking Status Never   Smokeless Tobacco Never   Tobacco Comments    Per [employer]: Former smoker. Quit in 1994     Family History: Family history non-contributory    Meds/Allergies   all medications and allergies reviewed  No Known Allergies    Additional Data:     Labs:    Results from last 7 days   Lab Units 09/26/24  0649   WBC Thousand/uL 10.64*   HEMOGLOBIN g/dL 11.4*   HEMATOCRIT % 34.5*   PLATELETS Thousands/uL 411*   SEGS PCT % 69   LYMPHO PCT % 20   MONO PCT % 6   EOS PCT % 3     Results from last 7 days   Lab Units 09/26/24  0649   SODIUM mmol/L 133*   POTASSIUM mmol/L 3.5   CHLORIDE mmol/L 87*   CO2 mmol/L 29   BUN mg/dL 91*   CREATININE mg/dL 5.67*   ANION GAP mmol/L 17*   CALCIUM mg/dL 8.7   ALBUMIN g/dL 3.6   TOTAL BILIRUBIN mg/dL 0.24   ALK PHOS U/L 88   ALT U/L 10   AST U/L 13   GLUCOSE RANDOM mg/dL 169*         Results from last 7 days   Lab Units 09/26/24  0837 09/26/24  0546 09/26/24  0412 09/26/24  0158 09/25/24  2354 09/25/24  2232 09/25/24 2056  09/25/24  1957 09/25/24  1716 09/25/24  1545 09/25/24  1317 09/25/24  1044   POC GLUCOSE mg/dl 187* 177* 188* 96 108 119 192* 221* 236* 309* 258* 311*     Results from last 7 days   Lab Units 09/22/24  1931   HEMOGLOBIN A1C % 9.5*     Results from last 7 days   Lab Units 09/24/24  0428 09/23/24  0440 09/22/24  1931   LACTIC ACID mmol/L  --  0.9 0.9   PROCALCITONIN ng/ml 0.42* 0.41*  --          * I Have Reviewed All Lab Data Listed Above.  * Additional Pertinent Lab Tests Reviewed: All Labs Within Last 24 Hours Reviewed    Imaging:        Recent Cultures (last 7 days):     Results from last 7 days   Lab Units 09/23/24  0439 09/23/24  0356   BLOOD CULTURE  No Growth at 48 hrs.  No Growth at 48 hrs.  --    URINE CULTURE   --  >100,000 cfu/ml Beta Hemolytic Streptococcus Group B*  >100,000 cfu/ml       Last 24 Hours Medication List:   Current Facility-Administered Medications   Medication Dose Route Frequency Provider Last Rate    acetaminophen  650 mg Oral Q6H PRN Christina Saunders PA-C      albuterol  2.5 mg Nebulization Q6H PRN Christina Saunders PA-C      apixaban  5 mg Oral BID Cem Gamez PA-C      aspirin  81 mg Oral Daily Christina Saunders PA-C      atorvastatin  80 mg Oral Daily Christina Saunders PA-C      cefTRIAXone  2,000 mg Intravenous Q24H Christina Saunders PA-C 2,000 mg (09/26/24 0516)    fluticasone-vilanterol  1 puff Inhalation Daily Christina Saunders PA-C      insulin regular (HumuLIN R,NovoLIN R) 1 Units/mL in sodium chloride 0.9 % 100 mL infusion  0.3-21 Units/hr Intravenous Titrated Cindi Guillermo MD 3 Units/hr (09/26/24 0653)    metoprolol tartrate  12.5 mg Oral Q12H [facility] Christina Saunders PA-C      montelukast  10 mg Oral HS Christina Saunders PA-C      ondansetron  4 mg Intravenous Q6H PRN Christina Saunders PA-C      pantoprazole  40 mg Oral Early Morning Yamileth L Naik, PA-C      PARoxetine  20 mg Oral Daily Christina Saunders PA-C      phenol  1 spray Mouth/Throat Q2H PRN Christina Saunders PA-C      saliva substitute  5 spray Mouth/Throat 4x  Daily PRN Christina Saunders PA-C      sodium chloride  10 mL/hr Intravenous Continuous Christina Saunders PA-C 10 mL/hr (09/25/24 1922)        Today, Patient Was Seen By: Andrew LEE    ** Please Note: Dictation voice to text software may have been used in the creation of this document. **

## 2024-09-26 NOTE — UTILIZATION REVIEW
Continued Stay Review    Date: 9/24/24 - 9/26/24                           Current Patient Class: Inpatient  Current Level of Care: MS/tele    HPI:49 y.o. female initially admitted on 9/22/24      Assessment/Plan:   9/24/24 sore throat.    Net -648.  No focal deficits.   Wbc 13.12.   na 134.  Bun 102.  Creatinine 8.86.  IVF resuscitation and diuretics completed.  Trend BMP.  No urgent dialysis. Hold insulin and metformin on hold.  Continue insulin drip.   Glucose every 2 hours.   As needed antiemetics.    Mouthkote and chloraseptic spray ordered for pain control.  Continue rocephin and follow cultures.     9/24/24 per Cardiology -  Atrial fib, new diagnosis, [id number] of 4/Chronic CHF/SABRINA/hpt/hyperlipidemia.  Has converted to sinus.  Recommend oral anticoagulation.   Continue Lopressor.   Outpatient monitor.   Resume oral diuretics per nephrology.      9/24/24 per Endocrine  - diabetes with hyperglycemia.  Has high insulin resistance.  Recommend insulin drip until SABRINA improved.       9/24/24 per nephrology - SABRINA, baseline creatinine of 0.7.  The patient developed ATN related to volume depletion and contrast exposure and had significant increase in her creatinine. She is entering the diuretic phase of ATN and she was diuretic responsive so hopefully will see the improvement of her renal function and begin to see the creatinine plateau and decline in the next day or so. Discontinue IV diuretic infusion.   Monitor renal function with BMP a.m.  Monitor for recovery versus need for dialysis    9/25/24 feels better today.  No sore throat or nausea and vomiting.    Continue insulin gtt and glucose every 2 hours.    Continue rocephin.   Diuresis on hold.  Started on eliquis.  Heparin gtt dc.       9/26/2024 .  Patient presents with  sore throat, painful to swallow.     On exam diminished breath sounds.  On oxygen 2 liters  telemetry sinus.    Abnormal labs or imaging:  na 133.  Bun 91. Creatinine 5.67.   Diagnosis/Plan     SABRINA/Type 1 DM/SIRS/CHF/PAF resolved with IV Lopressor.   Continue insulin gtt until creatinine at baseline, consider starting lantus today. Continue  Eliquis and metoprolol.   Continue Rocephin for UTI.      Medications:   Scheduled Medications:  apixaban, 5 mg, Oral, BID  aspirin, 81 mg, Oral, Daily  atorvastatin, 80 mg, Oral, Daily  cefTRIAXone, 2,000 mg, Intravenous, Q24H  fluticasone-vilanterol, 1 puff, Inhalation, Daily  insulin glargine, 45 Units, Subcutaneous, HS  metoprolol tartrate, 12.5 mg, Oral, Q12H KENNY  montelukast, 10 mg, Oral, HS  pantoprazole, 40 mg, Oral, Early Morning  PARoxetine, 20 mg, Oral, Daily      Continuous IV Infusions:  insulin regular (HumuLIN R,NovoLIN R) 1 Units/mL in sodium chloride 0.9 % 100 mL infusion, 0.3-21 Units/hr, Intravenous, Titrated  sodium chloride, 10 mL/hr, Intravenous, Continuous    heparin (porcine) 25,000 units in 0.45% NaCl 250 mL infusion (premix)  Rate: 2.4-16 mL/hr Dose: 3-20 Units/kg/hr  Weight Dosing Info: 80 kg (Order-Specific)  Freq: Titrated Route: IV  Last Dose: Stopped (09/25/24 1206)  Start: 09/24/24 2015 End: 09/25/24 1157    PRN Meds:  acetaminophen, 650 mg, Oral, Q6H PRN  albuterol, 2.5 mg, Nebulization, Q6H PRN  ondansetron, 4 mg, Intravenous, Q6H PRN  phenol, 1 spray, Mouth/Throat, Q2H PRN  saliva substitute, 5 spray, Mouth/Throat, 4x Daily PRN x 1 9/24    heparin (porcine) injection 2,000 Units x 1 9/25  Dose: 2,000 Units  Freq: Every 6 hours PRN Route: IV  PRN Comment: PTT 43- 59 seconds  Start: 09/24/24 2005 End: 09/25/24 1157    Discharge Plan:  to be determined.     Vital Signs (last 3 days)       Date/Time Temp Pulse Resp BP MAP (mmHg) SpO2 Calculated FIO2 (%) - Nasal Cannula Nasal Cannula O2 Flow Rate (L/min) O2 Device Patient Position - Orthostatic VS [place] Coma Scale Score Pain    09/26/24 0900 -- -- -- -- -- 96 % -- -- None (Room air) -- -- --    09/26/24 07:55:54 98.2 °F (36.8 °C) 68 18 133/62 86 87 % -- -- -- -- -- --    09/26/24  01:59:43 98 °F (36.7 °C) 69 18 143/63 90 94 % -- -- -- -- -- --    09/25/24 22:33:47 98.3 °F (36.8 °C) 77 18 127/58 81 98 % -- -- -- -- -- --    09/25/24 21:50:46 -- 88 14 142/65 91 95 % -- -- -- -- -- --    09/25/24 2148 -- 86 -- 142/66 -- -- -- -- -- -- -- --    09/25/24 2025 -- -- -- -- -- 91 % 28 2 L/min None (Room air) -- 15 No Pain    09/25/24 1700 98.1 °F (36.7 °C) 72 18 100/75 83 92 % -- -- None (Room air) Lying -- --    09/25/24 0756 98.1 °F (36.7 °C) 72 -- 101/53 69 85 % -- -- None (Room air) Lying -- --    09/25/24 0751 -- -- -- -- -- -- -- -- None (Room air) -- 15 No Pain    09/24/24 2000 -- 75 15 131/68 94 93 % -- -- -- -- 15 No Pain    09/24/24 1959 97.9 °F (36.6 °C) 75 18 131/68 94 94 % -- -- None (Room air) Sitting -- --    09/24/24 1600 -- -- -- -- -- -- -- -- -- -- 15 --    09/24/24 1500 97.8 °F (36.6 °C) 74 20 127/60 86 92 % -- -- None (Room air) Lying -- No Pain    09/24/24 1200 98.5 °F (36.9 °C) 72 18 -- -- 96 % -- -- -- -- 15 --    09/24/24 1003 -- 78 18 156/67 97 96 % -- -- None (Room air) -- -- No Pain    09/24/24 0800 -- -- -- -- -- -- -- -- -- -- 15 No Pain    09/24/24 0710 97.8 °F (36.6 °C) 71 16 105/52 74 98 % -- -- None (Room air) Lying -- No Pain    09/24/24 0400 98.5 °F (36.9 °C) 75 12 130/67 93 96 % 28 2 L/min Nasal cannula Lying 15 No Pain    09/24/24 0300 -- 71 18 134/61 88 95 % -- -- -- -- -- --    09/24/24 0100 -- 68 18 128/57 82 98 % -- -- -- -- -- --    09/24/24 0000 98.2 °F (36.8 °C) 68 22 104/53 74 99 % 28 2 L/min Nasal cannula Lying 15 No Pain    09/23/24 2300 -- 73 15 127/60 87 99 % -- -- -- -- -- --    09/23/24 2200 -- 73 13 137/66 95 95 % -- -- -- -- -- --    09/23/24 2130 -- 74 19 -- -- -- -- -- -- -- -- --    09/23/24 2050 -- 132 11 133/85 105 97 % -- -- None (Room air) Lying -- --    09/23/24 2000 -- -- -- -- -- -- -- -- -- -- 15 3    09/23/24 1959 -- -- -- -- -- -- -- -- -- -- -- 3    09/23/24 1920 98.5 °F (36.9 °C) 89 19 138/68 97 98 % 28 2 L/min Nasal cannula Lying  -- --    09/23/24 1800 -- 84 16 153/72 104 98 % -- -- -- -- -- --    09/23/24 1700 -- 85 30 156/74 107 99 % -- -- -- -- -- --    09/23/24 1600 97.8 °F (36.6 °C) 78 15 136/67 95 98 % -- -- -- -- -- --    09/23/24 1536 -- -- -- -- -- -- -- -- -- -- 15 No Pain    09/23/24 1500 -- 80 17 128/60 86 98 % -- -- -- -- -- --    09/23/24 1450 -- 79 19 130/58 84 98 % -- -- -- -- -- --    09/23/24 0800 98.3 °F (36.8 °C) 76 20 104/51 73 98 % 28 2 L/min Nasal cannula -- 15 No Pain    09/23/24 0503 -- -- -- -- -- -- -- -- -- -- -- 3    09/23/24 0400 98.4 °F (36.9 °C) 85 16 112/56 80 99 % 28 2 L/min Nasal cannula Lying 15 2    09/23/24 0000 -- 91 21 128/58 84 97 % 28 2 L/min Nasal cannula -- 15 --          Weight (last 2 days)       Date/Time Weight    09/26/24 0549 79.5 (175.27)    09/25/24 0613 79.4 (175.1)     Weight: pt has had an 8 lb weight change from 9/24 to 9/25. Pt was being diuresed in the ICU. RN aware of significant weight change. at 09/25/24 0613    09/24/24 1500 83 (183)    09/24/24 0536 83.2 (183.42)     Weight: reweighed provider aware at 09/24/24 0536               09/24 0701 09/25 0700 09/25 0701 09/26 0700 09/26 0701 09/27 0700   P.O. 2620 600 240   I.V. (mL/kg) 202.1 (2.5)     IV Piggyback      Total Intake(mL/kg) 2822.1 (35.5) 600 (7.5) 240 (3)   Urine (mL/kg/hr) 5400 (2.8) 2400 (1.3) 1500 (3.3)   Total Output 5400 2400 1500   Net -2577.9 -1800 -1260     Pertinent Labs/Diagnostic Results:   Radiology:  CT soft tissue neck wo contrast   Final Interpretation by Eric Faith MD (09/23 1217)      Small amount of secretions in posterior pharyngeal wall extending to hypopharynx and partially aerated bilateral piriform sinuses. Consider direct visualization by ENT for further evaluation given provided history.      Additional chronic/incidental findings as detailed above.      The study was marked in EPIC for immediate notification.                     Workstation performed: [id number]         CT abdomen  pelvis wo contrast   Final Interpretation by Frandy Foster MD (09/22 2137)      1.  Retained contrast within the kidneys from CT performed 2 days prior is indicative of impaired renal function. No hydroureteronephrosis.   2.  Possible mild enterocolitis. No abdominopelvic fluid collection.      Workstation performed: [id number]         XR chest 1 view portable   Final Interpretation by Elton Barnard MD (09/23 0921)      No acute cardiopulmonary disease.            Workstation performed: [id number]           Cardiology:  Echo complete w/ contrast if indicated   Final Result by Naun Mejía MD (09/24 1643)        Left Ventricle: Left ventricular cavity size is normal. Wall thickness    is normal. The left ventricular ejection fraction is 60% by biplane    measurement. Systolic function is normal. Wall motion is normal. Diastolic    function is mildly abnormal, consistent with grade I (abnormal)    relaxation.     Right Ventricle: Right ventricle is not well visualized. Systolic    function is normal.     Left Atrium: The atrium is normal in size.     Right Atrium: The atrium is normal in size.     Mitral Valve: There is mild annular calcification.         ECG 12 lead    by Interface, Ris Results In (09/23 2054)      ECG 12 lead    by Interface, Ris Results In (09/23 2051)          Results from last 7 days   Lab Units 09/26/24  0649 09/25/24 0324 09/24/24  0428 09/23/24  0440 09/22/24 2137 09/22/24  1931   WBC Thousand/uL 10.64* 12.65* 13.12* 20.33*  --   --    HEMOGLOBIN g/dL 11.4* 11.4* 10.4* 10.3*  --   --    I STAT HEMOGLOBIN g/dl  --   --   --   --  11.6  --    HEMATOCRIT % 34.5* 34.7* 30.8* 31.5*  --   --    HEMATOCRIT, ISTAT %  --   --   --   --  34*  --    PLATELETS Thousands/uL 411* 441* 416* 453*  --   --    TOTAL NEUT ABS Thousands/µL 7.51 9.63* 9.89* 16.24*  --    < >    < > = values in this interval not displayed.     Results from last 7 days   Lab Units 09/26/24  0649 09/25/24 0324  09/24/24  1016 09/24/24  0428 09/23/24  2159 09/23/24  1553 09/23/24  1012 09/23/24  0440 09/22/24 2137 09/22/24 2134 09/22/24 1931 09/22/24 1929   0000   SODIUM mmol/L 133* 133* 131* 134*  134* 132* 132* 133* 137  --    < > 126*  --   --    POTASSIUM mmol/L 3.5 3.7 4.4 4.2  4.2 4.3 4.7 5.0 4.5  --    < > 5.3  --   --    CHLORIDE mmol/L 87* 86* 86* 88*  89* 89* 90* 89* 92*  --    < > 81*  --   --    CO2 mmol/L 29 25 27 25  25 25 25 28 27  --    < > 26  --   --    CO2, I-STAT mmol/L  --   --   --   --   --   --   --   --  29  --   --  30  --    ANION GAP mmol/L 17* 22* 18* 21*  20* 18* 17* 16* 18*  --    < > 19*  --   --    BUN mg/dL 91* 96* 107* 101*  102* 105* 106* 103* 107*  --    < > 114*  --   --    CREATININE mg/dL 5.67* 8.03* 8.93* 8.80*  8.86* 8.68* 8.44* 7.79* 7.82*  --    < > 7.53*  --   --    EGFR ml/min/1.73sq m 8 5 4 4  4 4 5 5 5  --    < > 5  --   --    CALCIUM mg/dL 8.7 8.7 8.7 8.2*  8.2* 8.3* 8.1* 8.1* 8.4  --    < > 9.0  --   --    CALCIUM, IONIZED, ISTAT mmol/L  --   --   --   --   --   --   --   --  1.02*  --   --  0.96*  --    MAGNESIUM mg/dL  --   --  2.1 2.2  2.3 2.2 2.0 2.0 2.1  --   --  1.3*  --    < >   PHOSPHORUS mg/dL  --   --   --   --   --   --   --  4.9*  --   --  7.1*  --   --     < > = values in this interval not displayed.     Results from last 7 days   Lab Units 09/26/24  0649 09/25/24  0324 09/24/24  0428 09/23/24  0440 09/22/24  1931   AST U/L 13 8* 6* 8* 9*   ALT U/L 10 7 9 10 14   ALK PHOS U/L 88 101 89 86 124*   TOTAL PROTEIN g/dL 6.7 7.0 6.5 6.3* 7.3   ALBUMIN g/dL 3.6 3.8 3.4* 3.5 4.0   TOTAL BILIRUBIN mg/dL 0.24 0.24 0.20 0.25 0.29   BILIRUBIN DIRECT mg/dL  --   --   --  0.02  --      Results from last 7 days   Lab Units 09/26/24  1102 09/26/24  0837 09/26/24  0546 09/26/24  0412 09/26/24  0158 09/25/24  2354 09/25/24  2232 09/25/24  2056 09/25/24  1957 09/25/24  1716 09/25/24  1545 09/25/24  1317   POC GLUCOSE mg/dl 269* 187* 177* 188* 96 108 119 192* 221* 236*  309* 258*     Results from last 7 days   Lab Units 09/26/24  0649 09/25/24  0324 09/24/24  1016 09/24/24  0428 09/23/24  2159 09/23/24  1553 09/23/24  1012 09/23/24  0440 09/22/24 2134 09/22/24 1931   GLUCOSE RANDOM mg/dL 169* 214* 222* 149*  149* 181* 123 203* 67 627* 770*     Results from last 7 days   Lab Units 09/22/24 1931   HEMOGLOBIN A1C % 9.5*   EAG mg/dl 226     Beta- Hydroxybutyrate   Date Value Ref Range Status   09/22/2024 1.33 (H) 0.02 - 0.27 mmol/L Final     BETA-HYDROXYBUTYRATE   Date Value Ref Range Status   12/08/2023 0.1 <0.6 mmol/L Final   07/21/2023 0.1 <0.6 mmol/L Final      Results from last 7 days   Lab Units 09/22/24 2137 09/22/24 1929   PH, CATHI I-STAT  7.351 7.442*   PCO2, CATHI ISTAT mm HG 49.0 42.6   PO2, CATHI ISTAT mm HG 30.0* 42.0   HCO3, CATHI ISTAT mmol/L 27.1 29.1   I STAT BASE EXC mmol/L 1 4*   I STAT O2 SAT % 54* 79     Results from last 7 days   Lab Units 09/22/24 2134   CK TOTAL U/L 286*     Results from last 7 days   Lab Units 09/22/24 2134 09/22/24 1931   HS TNI 0HR ng/L  --  21   HS TNI 2HR ng/L 22  --    HSTNI D2 ng/L 1  --      Results from last 7 days   Lab Units 09/25/24 0324 09/24/24 2120   PTT seconds 55* 37*     Results from last 7 days   Lab Units 09/24/24 0428 09/23/24 0440   PROCALCITONIN ng/ml 0.42* 0.41*     Results from last 7 days   Lab Units 09/23/24  0440 09/22/24 1931   LACTIC ACID mmol/L 0.9 0.9     Results from last 7 days   Lab Units 09/22/24 1931   BNP pg/mL 663*     Results from last 7 days   Lab Units 09/22/24  1931   LIPASE u/L 27     Results from last 7 days   Lab Units 09/23/24  0356   CLARITY UA  Slightly Cloudy   COLOR UA  Yellow   SPEC GRAV UA  1.020   PH UA  6.0   GLUCOSE UA mg/dl 300 (3/10%)*   KETONES UA mg/dl Negative   BLOOD UA  Large*   PROTEIN UA mg/dl 300 (3+)*   NITRITE UA  Negative   BILIRUBIN UA  Negative   UROBILINOGEN UA (BE) mg/dl <2.0   LEUKOCYTES UA  Large*   WBC UA /hpf 20-30*   RBC UA /hpf 2-4   BACTERIA UA /hpf  Occasional   EPITHELIAL CELLS WET PREP /hpf Innumerable*   MUCUS THREADS  None Seen       Results from last 7 days   Lab Units 09/23/24  0439 09/23/24  0356   BLOOD CULTURE  No Growth at 48 hrs.  No Growth at 48 hrs.  --    URINE CULTURE   --  >100,000 cfu/ml Beta Hemolytic Streptococcus Group B*  >100,000 cfu/ml         Network Utilization Review Department  ATTENTION: Please call with any questions or concerns to [phone] and carefully listen to the prompts so that you are directed to the right person. All voicemails are confidential.   For Discharge needs, contact Care Management DC Support Team at [phone] opt. 2  Send all requests for admission clinical reviews, approved or denied determinations and any other requests to dedicated fax number below belonging to the campus where the patient is receiving treatment. List of dedicated fax numbers for the Facilities:  FACILITY NAME UR FAX NUMBER   ADMISSION DENIALS (Administrative/Medical Necessity) [phone]   DISCHARGE SUPPORT TEAM (NETWORK) [phone]   PARENT CHILD HEALTH (Maternity/NICU/Pediatrics) [phone]   [facility] [phone]   [facility] [phone]   [facility] [phone]   General acute hospital [phone]   [facility] [phone]   [facility] [phone]   [facility] [phone]   [facility] [phone]   [facility] [phone]   [facility] [phone]   [facility] [phone]   [facility] [phone]

## 2024-09-26 NOTE — PROGRESS NOTES
Progress Note - Hospitalist   Name: Vernell Darby 49 y.o. female I MRN: [id number]  Unit/Bed#: -01 I Date of Admission: 9/22/2024   Date of Service: 9/26/2024 I Hospital Day: 4     Assessment & Plan  SABRINA (acute kidney injury) (HCC)  Baseline creatinine 0.7-1  Creatinine on admission 7.53  Diuretics up-titrated outpatient due to ongoing SOB. CTA PE study performed 9/20 9/22 CTAP: 1.  Retained contrast within the kidneys from CT performed 2 days prior is indicative of impaired renal function. No hydroureteronephrosis. 2.  Possible mild enterocolitis. No abdominopelvic fluid collection.  Likely multifactorial - ATN in setting of contrast, increased diuretic, N/V/D..  Pt states that she did not hold her metformin following CTA PE study    Plan:  Completed IVF resuscitation  Diuretic given to augment urine outpt with improvement. Diuretics currently completed  Trend renal indices. Cr improving.   Nephrology consulted  No urgent indications for dialysis  Strict I/O  Avoid hypotension and nephrotoxins  Type 1 diabetes mellitus with hyperglycemia (HCC)  Lab Results   Component Value Date    HGBA1C 9.5 (H) 09/22/2024       Recent Labs     09/26/24  0546 09/26/24  0837 09/26/24  1102 09/26/24  1331   POCGLU 177* 187* 269* 320*       Blood Sugar Average: Last 72 hrs:  (P) [id number]    Hx of T1DM, follows with Dr. Ascencio from Endocrinology  Presented with hyperglycemia, no DKA  Cont regular insulin gtt per endo - weaning off - lantus to resume 9/26 pm  Q2h BG monitoring  Maintain -180  Hyponatremia  Pseudohyponatremia in the setting of hyperglycemia  Corrected Na 137    N&V (nausea and vomiting)  Likely secondary to hyperglyemia  CTAP: 1.  Retained contrast within the kidneys from CT performed 2 days prior is indicative of impaired renal function. No hydroureteronephrosis. 2.  Possible mild enterocolitis. No abdominopelvic fluid collection.  Pt denies fevers or chills PTA  Hold off on antibiotics    RESOLVED  Sore throat  Pt reports severe odynophagia following multiple episodes of vomiting  Strep PCR neg, COVID/Flu/RSV neg  Unremarkable physical exam: no erythema of pharynx, no cervical LAD, no crepitus of neck but tender to palpation  RESOLVED.  CT neck - secretions in posterior pharnyx but no concerning findings.  Mouthkote and chloraseptic spray ordered for pain control  SIRS (systemic inflammatory response syndrome) ([facility])  SIRS: tachypnea, tachycardia, leukocytosis  Suspect reactive to N/V and SABRINA  Unlikely sepsis with no fevers/chills  On rocephin for >100k cfu hemolytic strep group b  Chronic diastolic congestive heart failure (HCC)  Wt Readings from Last 3 Encounters:   09/26/24 79.5 kg (175 lb 4.3 oz)   09/18/24 82.2 kg (181 lb 3.5 oz)   09/17/24 82.7 kg (182 lb 6.4 oz)       5/2024 Echo: EF 65%, G2DD  Recently had diuretics up-titrated outpatient due to persistent SOB/PRICE  Currently examines hypovolemic  Hold diuresis  Strict I/O  Daily weights      Asthma  Daily Breo, PRN albuterol neb  Continue home singulair  Hypertension  Hold home lisinopril and diuretics 2/2 SABRINA  Hyperlipidemia  Continue statin  Anemia  Baseline hgb 9-10  Hgb on admission 12  Likely hemoconcentrated  Trend hgb and maintain > 7  Severe obesity (BMI 35.0-39.9) with comorbidity ([facility])  Encourage lifestyle modifications as appropriate  Gastroesophageal reflux disease without esophagitis  Continue home PPI  ATN (acute tubular necrosis) ([facility])    Oliguria and anuria    PAF (paroxysmal atrial fibrillation) ([facility])  Resolved w IV lopressor  Cardiology following  Pt initiated on eliquis.          VTE  Prophylaxis:   Pharmacologic: in place  Mechanical VTE Prophylaxis in Place: Yes    Patient Centered Rounds: I have performed bedside rounds with nursing staff today.    Discussions with Specialists or Other Care Team Provider: case management    Education and Discussions with Family / Patient: yes spouse    Mobility:   Basic Mobility  Inpatient Raw Score: 24  JH-HLM Goal: 8: Walk 250 feet or more  JH-HLM Achieved: 8: Walk 250 feet ot more      Current Length of Stay: 4 day(s)    Current Patient Status: Inpatient        Code Status: Level 1 - Full Code    Discharge Plan: Pt will require continued inpatient hospitalization.    Subjective:     Pt requests regular diet d/t resolution of sore throat   No acute complaints reported      Patient is seen and examined at bedside.  All other ROS are negative.    Objective:     Vitals:   Temp (24hrs), Av.2 °F (36.8 °C), Min:98 °F (36.7 °C), Max:98.3 °F (36.8 °C)    Temp:  [98 °F (36.7 °C)-98.3 °F (36.8 °C)] 98.2 °F (36.8 °C)  HR:  [68-88] 68  Resp:  [14-18] 18  BP: (100-143)/(58-75) 133/62  SpO2:  [87 %-98 %] 96 %  Body mass index is 39.29 kg/m².     Input and Output Summary (last 24 hours):       Intake/Output Summary (Last 24 hours) at 2024 1352  Last data filed at 2024 1205  Gross per 24 hour   Intake 840 ml   Output 3100 ml   Net -2260 ml       Physical Exam:       GEN: No acute distress, comfortable  HEEENT: No JVD, PERRLA, no scleral icterus  RESP: Lungs clear to auscultation bilaterally  CV: RRR, +s1/s2   ABD: SOFT NON TENDER, POSITIVE BOWEL SOUNDS, NO DISTENTION  PSYCH: CALM  NEURO: Mentation baseline, NO FOCAL DEFICITS  SKIN: NO RASH  EXTREM: NO EDEMA    Additional Data:     Labs:    Results from last 7 days   Lab Units 24  0649   WBC Thousand/uL 10.64*   HEMOGLOBIN g/dL 11.4*   HEMATOCRIT % 34.5*   PLATELETS Thousands/uL 411*   SEGS PCT % 69   LYMPHO PCT % 20   MONO PCT % 6   EOS PCT % 3     Results from last 7 days   Lab Units 24  0649   SODIUM mmol/L 133*   POTASSIUM mmol/L 3.5   CHLORIDE mmol/L 87*   CO2 mmol/L 29   BUN mg/dL 91*   CREATININE mg/dL 5.67*   ANION GAP mmol/L 17*   CALCIUM mg/dL 8.7   ALBUMIN g/dL 3.6   TOTAL BILIRUBIN mg/dL 0.24   ALK PHOS U/L 88   ALT U/L 10   AST U/L 13   GLUCOSE RANDOM mg/dL 169*         Results from last 7 days   Lab Units  09/26/24  1331 09/26/24  1102 09/26/24  0837 09/26/24  0546 09/26/24  0412 09/26/24  0158 09/25/24  2354 09/25/24  2232 09/25/24  2056 09/25/24  1957 09/25/24  1716 09/25/24  1545   POC GLUCOSE mg/dl 320* 269* 187* 177* 188* 96 108 119 192* 221* 236* 309*     Results from last 7 days   Lab Units 09/22/24  1931   HEMOGLOBIN A1C % 9.5*     Results from last 7 days   Lab Units 09/24/24  0428 09/23/24  0440 09/22/24  1931   LACTIC ACID mmol/L  --  0.9 0.9   PROCALCITONIN ng/ml 0.42* 0.41*  --        Lines/Drains:  Invasive Devices       Peripheral Intravenous Line  Duration             Peripheral IV 09/22/24 Right Antecubital 3 days    Peripheral IV 09/22/24 Right;Upper;Ventral (anterior) Arm 3 days                    Telemetry:        * I Have Reviewed All Lab Data Listed Above.           Imaging:     Results for orders placed during the hospital encounter of 09/22/24    XR chest 1 view portable    Narrative  XR CHEST PORTABLE    INDICATION: cp.    COMPARISON: CT chest 9/20/2024    FINDINGS:    Clear lungs. No pneumothorax or pleural effusion.    Normal cardiomediastinal silhouette.    Bones are unremarkable for age.    Normal upper abdomen.    Impression  No acute cardiopulmonary disease.        Workstation performed: [id number]    Results for orders placed during the hospital encounter of 02/03/16    X-ray chest 2 views    Narrative  CHEST    INDICATION:  Left-sided chest pain, history of asthma.    COMPARISON:  None    VIEWS:  Frontal and lateral projections; 2 images    FINDINGS:    Cardiomediastinal silhouette appears unremarkable.    The lungs are clear.  No pneumothorax or pleural effusion.    Visualized osseous structures appear within normal limits for the patient's age.    Impression  No active pulmonary disease.      *I have reviewed all imaging reports listed above      Recent Cultures (last 7 days):     Results from last 7 days   Lab Units 09/23/24  0439 09/23/24  0356   BLOOD CULTURE  No Growth at 72  hrs.  No Growth at 72 hrs.  --    URINE CULTURE   --  >100,000 cfu/ml Beta Hemolytic Streptococcus Group B*  >100,000 cfu/ml       Last 24 Hours Medication List:   Current Facility-Administered Medications   Medication Dose Route Frequency Provider Last Rate    acetaminophen  650 mg Oral Q6H PRN Christina Saunders PA-C      albuterol  2.5 mg Nebulization Q6H PRN Christina Saunders PA-C      apixaban  5 mg Oral BID Cem Gamez PA-C      aspirin  81 mg Oral Daily Christina Saunders PA-C      atorvastatin  80 mg Oral Daily Christina Saunders PA-C      cefTRIAXone  2,000 mg Intravenous Q24H Christina Saunders PA-C 2,000 mg (09/26/24 0516)    fluticasone-vilanterol  1 puff Inhalation Daily Christina Saunders PA-C      insulin glargine  45 Units Subcutaneous HS Michela Gamboa MD      insulin regular (HumuLIN R,NovoLIN R) 1 Units/mL in sodium chloride 0.9 % 100 mL infusion  0.3-21 Units/hr Intravenous Titrated Cindi Guillermo MD 11 Units/hr (09/26/24 1348)    metoprolol tartrate  12.5 mg Oral Q12H [facility] Christina Saunders PA-C      montelukast  10 mg Oral HS Christina Saunders PA-C      ondansetron  4 mg Intravenous Q6H PRN Christina Saunders PA-C      pantoprazole  40 mg Oral Early Morning Yamileth Naik PA-C      PARoxetine  20 mg Oral Daily Christina Saunders PA-C      phenol  1 spray Mouth/Throat Q2H PRN Christina Saunders PA-C      saliva substitute  5 spray Mouth/Throat 4x Daily PRN Christina Saunders PA-C      sodium chloride  10 mL/hr Intravenous Continuous Christina Saunders PA-C 10 mL/hr (09/25/24 1922)        Today, Patient Was Seen By: Cindi Guillermo MD    ** Please Note: Dictation voice to text software may have been used in the creation of this document. **

## 2024-09-26 NOTE — QUICK NOTE
Patient not seen, BG logs reviewed. Cr improving slowly, 5.67 today.   Patient on high insulin drip rates at times. To reduce burden and ease in transition to MDI when Cr at baseline, will start on lantus 45u for now and c/w insulin drip non DKA.     Transition to MDI with basal.bolus  in 1-2 days. D/c on MDI and hold metformin. Dose TBD

## 2024-09-26 NOTE — ASSESSMENT & PLAN NOTE
Wt Readings from Last 3 Encounters:   09/26/24 79.5 kg (175 lb 4.3 oz)   09/18/24 82.2 kg (181 lb 3.5 oz)   09/17/24 82.7 kg (182 lb 6.4 oz)       5/2024 Echo: EF 65%, G2DD  Recently had diuretics up-titrated outpatient due to persistent SOB/PRICE  Currently examines hypovolemic  Hold diuresis  Strict I/O  Daily weights

## 2024-09-26 NOTE — ASSESSMENT & PLAN NOTE
Lab Results   Component Value Date    HGBA1C 9.5 (H) 09/22/2024       Recent Labs     09/26/24  0546 09/26/24  0837 09/26/24  1102 09/26/24  1331   POCGLU 177* 187* 269* 320*       Blood Sugar Average: Last 72 hrs:  (P) 181.6529848876872803    Hx of T1DM, follows with Dr. Ascencio from Endocrinology  Presented with hyperglycemia, no DKA  Cont regular insulin gtt per endo - weaning off - lantus to resume 9/26 pm  Q2h BG monitoring  Maintain -180

## 2024-09-26 NOTE — CASE MANAGEMENT
Case Management Discharge Planning Note    Patient name Vernell Darby  Location /-01 MRN [id number]  : 1975 Date 2024       Current Admission Date: 2024  Current Admission Diagnosis:SABRINA (acute kidney injury) ([facility])   Patient Active Problem List    Diagnosis Date Noted Date Diagnosed    PAF (paroxysmal atrial fibrillation) ([facility]) 2024     N&V (nausea and vomiting) 2024     SIRS (systemic inflammatory response syndrome) ([facility]) 2024     ATN (acute tubular necrosis) ([facility]) 2024     Oliguria and anuria 2024     SABRINA (acute kidney injury) ([facility]) 2024     Sore throat 2024     Stage 2 chronic kidney disease 2024     Hyperphosphatemia 2024     Hyponatremia 2023     Primary localized osteoarthritis of right knee 10/27/2022     Chronic diastolic congestive heart failure ([facility]) 2021     B12 deficiency 2021     Nuclear sclerotic cataract of left eye 2020     Diabetic polyneuropathy associated with type 1 diabetes mellitus ([facility]) 2020     Diabetic nephropathy associated with type 1 diabetes mellitus ([facility]) 2020     Other proteinuria 2020     Severe obesity (BMI 35.0-39.9) with comorbidity ([facility]) 08/10/2019     Gastroesophageal reflux disease without esophagitis 08/10/2019     Anemia 02/15/2017     Thrombocytosis 02/15/2017     Type 1 diabetes mellitus with hyperglycemia ([facility]) 2016     Hyperlipidemia 2016     Polycystic ovarian syndrome 2016     Retinal hemorrhage 2016     Asthma      Hypertension      Moderate episode of recurrent major depressive disorder ([facility])      Nervousness 07/10/2014     Both eyes affected 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, associated with type 1 diabetes mellitus ([facility]) 2012       LOS (days): 4  Geometric Mean LOS (GMLOS) (days): 3.1  Days to GMLOS:-0.6     OBJECTIVE:  Risk of Unplanned Readmission Score: 20.17         Current  admission status: Inpatient   Pref[facility] Pharmacy:   [id number] - [place], [place] - [address] [address] [facility] [address]  Phone: [phone] Fax: [phone]    Primary Care Provider: Rika Nugent MD    Primary Insurance: BLUE CROSS  Secondary Insurance:     DISCHARGE DETAILS:    Additional Comments: Patient reviewed during care coordination rounds with Dr. Guillermo who informed that pt remains on an insulin drip and, while creatinine is downtrending, it's currently at 5.67 and baseline is 0.7-1. Pt will likely require an additional 48 hours inpatient. Plan remains for discharge home with OP follow up at time of discharge. CM department to remain available for additional discharge concerns or needs.

## 2024-09-27 DIAGNOSIS — N17.9 AKI (ACUTE KIDNEY INJURY) (HCC): Primary | ICD-10-CM

## 2024-09-27 LAB
ALBUMIN SERPL BCG-MCNC: 3.8 G/DL (ref 3.5–5)
ALP SERPL-CCNC: 92 U/L (ref 34–104)
ALT SERPL W P-5'-P-CCNC: 33 U/L (ref 7–52)
ANION GAP SERPL CALCULATED.3IONS-SCNC: 12 MMOL/L (ref 4–13)
AST SERPL W P-5'-P-CCNC: 39 U/L (ref 13–39)
BASOPHILS # BLD AUTO: 0.1 THOUSANDS/ΜL (ref 0–0.1)
BASOPHILS NFR BLD AUTO: 1 % (ref 0–1)
BILIRUB SERPL-MCNC: 0.22 MG/DL (ref 0.2–1)
BUN SERPL-MCNC: 83 MG/DL (ref 5–25)
CALCIUM SERPL-MCNC: 8.8 MG/DL (ref 8.4–10.2)
CHLORIDE SERPL-SCNC: 92 MMOL/L (ref 96–108)
CO2 SERPL-SCNC: 31 MMOL/L (ref 21–32)
CREAT SERPL-MCNC: 3.49 MG/DL (ref 0.6–1.3)
EOSINOPHIL # BLD AUTO: 0.3 THOUSAND/ΜL (ref 0–0.61)
EOSINOPHIL NFR BLD AUTO: 2 % (ref 0–6)
ERYTHROCYTE [DISTWIDTH] IN BLOOD BY AUTOMATED COUNT: 12.3 % (ref 11.6–15.1)
GFR SERPL CREATININE-BSD FRML MDRD: 14 ML/MIN/1.73SQ M
GLUCOSE SERPL-MCNC: 106 MG/DL (ref 65–140)
GLUCOSE SERPL-MCNC: 146 MG/DL (ref 65–140)
GLUCOSE SERPL-MCNC: 154 MG/DL (ref 65–140)
GLUCOSE SERPL-MCNC: 172 MG/DL (ref 65–140)
GLUCOSE SERPL-MCNC: 172 MG/DL (ref 65–140)
GLUCOSE SERPL-MCNC: 175 MG/DL (ref 65–140)
GLUCOSE SERPL-MCNC: 179 MG/DL (ref 65–140)
GLUCOSE SERPL-MCNC: 185 MG/DL (ref 65–140)
GLUCOSE SERPL-MCNC: 197 MG/DL (ref 65–140)
GLUCOSE SERPL-MCNC: 244 MG/DL (ref 65–140)
GLUCOSE SERPL-MCNC: 245 MG/DL (ref 65–140)
GLUCOSE SERPL-MCNC: 249 MG/DL (ref 65–140)
GLUCOSE SERPL-MCNC: 264 MG/DL (ref 65–140)
GLUCOSE SERPL-MCNC: 68 MG/DL (ref 65–140)
GLUCOSE SERPL-MCNC: 95 MG/DL (ref 65–140)
HCT VFR BLD AUTO: 34.2 % (ref 34.8–46.1)
HGB BLD-MCNC: 10.9 G/DL (ref 11.5–15.4)
IMM GRANULOCYTES # BLD AUTO: 0.08 THOUSAND/UL (ref 0–0.2)
IMM GRANULOCYTES NFR BLD AUTO: 1 % (ref 0–2)
LYMPHOCYTES # BLD AUTO: 2.44 THOUSANDS/ΜL (ref 0.6–4.47)
LYMPHOCYTES NFR BLD AUTO: 20 % (ref 14–44)
MCH RBC QN AUTO: 28.6 PG (ref 26.8–34.3)
MCHC RBC AUTO-ENTMCNC: 31.9 G/DL (ref 31.4–37.4)
MCV RBC AUTO: 90 FL (ref 82–98)
MONOCYTES # BLD AUTO: 0.66 THOUSAND/ΜL (ref 0.17–1.22)
MONOCYTES NFR BLD AUTO: 5 % (ref 4–12)
NEUTROPHILS # BLD AUTO: 8.81 THOUSANDS/ΜL (ref 1.85–7.62)
NEUTS SEG NFR BLD AUTO: 71 % (ref 43–75)
NRBC BLD AUTO-RTO: 0 /100 WBCS
PLATELET # BLD AUTO: 392 THOUSANDS/UL (ref 149–390)
PMV BLD AUTO: 11 FL (ref 8.9–12.7)
POTASSIUM SERPL-SCNC: 3.7 MMOL/L (ref 3.5–5.3)
PROT SERPL-MCNC: 7.3 G/DL (ref 6.4–8.4)
RBC # BLD AUTO: 3.81 MILLION/UL (ref 3.81–5.12)
SODIUM SERPL-SCNC: 135 MMOL/L (ref 135–147)
WBC # BLD AUTO: 12.39 THOUSAND/UL (ref 4.31–10.16)

## 2024-09-27 PROCEDURE — 82948 REAGENT STRIP/BLOOD GLUCOSE: CPT

## 2024-09-27 PROCEDURE — 85025 COMPLETE CBC W/AUTO DIFF WBC: CPT | Performed by: HOSPITALIST

## 2024-09-27 PROCEDURE — 99232 SBSQ HOSP IP/OBS MODERATE 35: CPT | Performed by: HOSPITALIST

## 2024-09-27 PROCEDURE — 80053 COMPREHEN METABOLIC PANEL: CPT | Performed by: HOSPITALIST

## 2024-09-27 PROCEDURE — 99232 SBSQ HOSP IP/OBS MODERATE 35: CPT | Performed by: INTERNAL MEDICINE

## 2024-09-27 RX ORDER — CALCIUM CARBONATE 500 MG/1
500 TABLET, CHEWABLE ORAL DAILY PRN
Status: DISCONTINUED | OUTPATIENT
Start: 2024-09-27 | End: 2024-09-28 | Stop reason: HOSPADM

## 2024-09-27 RX ORDER — INSULIN LISPRO 100 [IU]/ML
1-6 INJECTION, SOLUTION INTRAVENOUS; SUBCUTANEOUS
Status: DISCONTINUED | OUTPATIENT
Start: 2024-09-27 | End: 2024-09-28 | Stop reason: HOSPADM

## 2024-09-27 RX ORDER — INSULIN GLARGINE 100 [IU]/ML
45 INJECTION, SOLUTION SUBCUTANEOUS
Status: DISCONTINUED | OUTPATIENT
Start: 2024-09-27 | End: 2024-09-28 | Stop reason: HOSPADM

## 2024-09-27 RX ORDER — INSULIN LISPRO 100 [IU]/ML
15 INJECTION, SOLUTION INTRAVENOUS; SUBCUTANEOUS
Status: DISCONTINUED | OUTPATIENT
Start: 2024-09-27 | End: 2024-09-28 | Stop reason: HOSPADM

## 2024-09-27 RX ADMIN — Medication 12.5 MG: at 21:05

## 2024-09-27 RX ADMIN — CEFTRIAXONE 2000 MG: 2 INJECTION, SOLUTION INTRAVENOUS at 04:23

## 2024-09-27 RX ADMIN — PANTOPRAZOLE SODIUM 40 MG: 40 TABLET, DELAYED RELEASE ORAL at 06:05

## 2024-09-27 RX ADMIN — INSULIN GLARGINE 45 UNITS: 100 INJECTION, SOLUTION SUBCUTANEOUS at 22:01

## 2024-09-27 RX ADMIN — APIXABAN 5 MG: 5 TABLET, FILM COATED ORAL at 08:16

## 2024-09-27 RX ADMIN — APIXABAN 5 MG: 5 TABLET, FILM COATED ORAL at 21:05

## 2024-09-27 RX ADMIN — PAROXETINE HYDROCHLORIDE 20 MG: 20 TABLET, FILM COATED ORAL at 08:16

## 2024-09-27 RX ADMIN — ATORVASTATIN CALCIUM 80 MG: 40 TABLET, FILM COATED ORAL at 08:16

## 2024-09-27 RX ADMIN — ASPIRIN 81 MG: 81 TABLET, COATED ORAL at 08:16

## 2024-09-27 RX ADMIN — MONTELUKAST 10 MG: 10 TABLET, FILM COATED ORAL at 21:05

## 2024-09-27 RX ADMIN — SODIUM CHLORIDE 1 UNITS/HR: 9 INJECTION, SOLUTION INTRAVENOUS at 10:30

## 2024-09-27 RX ADMIN — CALCIUM CARBONATE (ANTACID) CHEW TAB 500 MG 500 MG: 500 CHEW TAB at 09:40

## 2024-09-27 RX ADMIN — FLUTICASONE FUROATE AND VILANTEROL TRIFENATATE 1 PUFF: 200; 25 POWDER RESPIRATORY (INHALATION) at 08:15

## 2024-09-27 RX ADMIN — Medication 12.5 MG: at 08:16

## 2024-09-27 RX ADMIN — SODIUM CHLORIDE 1 UNITS/HR: 9 INJECTION, SOLUTION INTRAVENOUS at 08:23

## 2024-09-27 NOTE — ASSESSMENT & PLAN NOTE
Lab Results   Component Value Date    HGBA1C 9.5 (H) 09/22/2024       Recent Labs     09/27/24  0603 09/27/24  0822 09/27/24  1045 09/27/24  1204   POCGLU 154* 172* 197* 245*       Blood Sugar Average: Last 72 hrs:  (P) [id number]    Hx of T1DM, follows with Dr. Ascencio from Endocrinology  Presented with hyperglycemia, no DKA  Cont regular insulin gtt per endo - weaning off - lantus to resume 9/26 pm  Q2h BG monitoring  Maintain -180

## 2024-09-27 NOTE — PROGRESS NOTES
Clarified with Dr. Lund with Endocrinology to start sliding scale in AM, D/C Heparin gtt tonight an hour after Lantus given. Pt made aware of plan. Will continue to monitor.

## 2024-09-27 NOTE — NURSING NOTE
Patient's BG was 68 while running 1.5 un/hr on insulin gtt on Algorithm 4. SLIM PA notified. Patient given peanut butter and laquita crackers, and insulin gtt dropped to 0.3 unit/hr per SLIM. RN will recheck BG in 20 min.

## 2024-09-27 NOTE — UTILIZATION REVIEW
Continued Stay Review    Date: 9/27                          Current Patient Class: Inpatient    9/22 - 9/24  Level 2 stepdown  9/24 - current   Medsurg     HPI:49 y.o. female initially admitted on 9/22  w/SABRINA  (contrast ATN)   new onset Afib in setting of  severe hyperglycemia which can cause volume depletion + N/V.  Treated w/IVF and diuretics/ nephrology consult.      9/27 Assessment/Plan:  crt continues to improve. regular insulin gtt being slowly weaned  to  off  (expected tonight @ 2300)  Cont IV rocephin     Medications:   Scheduled Medications:  apixaban, 5 mg, Oral, BID  aspirin, 81 mg, Oral, Daily  atorvastatin, 80 mg, Oral, Daily  fluticasone-vilanterol, 1 puff, Inhalation, Daily  insulin glargine, 45 Units, Subcutaneous, HS  insulin lispro, 1-6 Units, Subcutaneous, TID AC  insulin lispro, 15 Units, Subcutaneous, TID With Meals  metoprolol tartrate, 12.5 mg, Oral, Q12H KENNY  montelukast, 10 mg, Oral, HS  pantoprazole, 40 mg, Oral, Early Morning  PARoxetine, 20 mg, Oral, Daily      Continuous IV Infusions:  insulin regular (HumuLIN R,NovoLIN R) 1 Units/mL in sodium chloride 0.9 % 100 mL infusion, 0.3-21 Units/hr, Intravenous, Titrated  sodium chloride, 10 mL/hr, Intravenous, Continuous      PRN Meds:  acetaminophen, 650 mg, Oral, Q6H PRN  albuterol, 2.5 mg, Nebulization, Q6H PRN  calcium carbonate, 500 mg, Oral, Daily PRN  ondansetron, 4 mg, Intravenous, Q6H PRN  phenol, 1 spray, Mouth/Throat, Q2H PRN  saliva substitute, 5 spray, Mouth/Throat, 4x Daily PRN      Discharge Plan: tbd    Vital Signs (last 3 days)       Date/Time Temp Pulse Resp BP MAP (mmHg) SpO2 O2 Device Angela Coma Scale Score Pain    09/27/24 15:53:41 98.1 °F (36.7 °C) -- 16 116/57 77 -- -- -- --    09/27/24 13:43:32 97.4 °F (36.3 °C) 73 18 124/60 81 94 % -- -- --    09/26/24 21:58:31 98.3 °F (36.8 °C) 75 -- 110/55 73 94 % -- -- --    09/26/24 20:07:21 98.2 °F (36.8 °C) 75 -- 109/56 74 95 % None (Room air) 15 No Pain    09/26/24 15:27:13  97.4 °F (36.3 °C) 72 17 129/59 82 97 % -- -- --    09/26/24 1205 97.2 °F (36.2 °C) -- 18 129/61 -- -- -- -- --          Weight (last 2 days)       Date/Time Weight    09/27/24 0453 81.6 (179.8)    09/26/24 0549 79.5 (175.27)    09/25/24 0613 79.4 (175.1)     Weight: pt has had an 8 lb weight change from 9/24 to 9/25. Pt was being diuresed in the ICU. RN aware of significant weight change. at 09/25/24 0613            Pertinent Labs/Diagnostic Results:       Results from last 7 days   Lab Units 09/27/24  0440 09/26/24  0649 09/25/24  0324 09/24/24  0428 09/23/24  0440   WBC Thousand/uL 12.39* 10.64* 12.65* 13.12* 20.33*   HEMOGLOBIN g/dL 10.9* 11.4* 11.4* 10.4* 10.3*   HEMATOCRIT % 34.2* 34.5* 34.7* 30.8* 31.5*   PLATELETS Thousands/uL 392* 411* 441* 416* 453*   TOTAL NEUT ABS Thousands/µL 8.81* 7.51 9.63* 9.89* 16.24*         Results from last 7 days   Lab Units 09/27/24  1000 09/26/24  0649 09/25/24  0324 09/24/24  1016 09/24/24  0428 09/23/24  2159 09/23/24  1553 09/23/24  1012 09/23/24  0440 09/22/24  2137 09/22/24  2134 09/22/24  1931 09/22/24  1929   0000   SODIUM mmol/L 135 133* 133* 131* 134*  134* 132* 132* 133* 137  --    < > 126*  --   --    POTASSIUM mmol/L 3.7 3.5 3.7 4.4 4.2  4.2 4.3 4.7 5.0 4.5  --    < > 5.3  --   --    CHLORIDE mmol/L 92* 87* 86* 86* 88*  89* 89* 90* 89* 92*  --    < > 81*  --   --    CO2 mmol/L 31 29 25 27 25  25 25 25 28 27  --    < > 26  --   --    CO2, I-STAT mmol/L  --   --   --   --   --   --   --   --   --  29  --   --  30  --    ANION GAP mmol/L 12 17* 22* 18* 21*  20* 18* 17* 16* 18*  --    < > 19*  --   --    BUN mg/dL 83* 91* 96* 107* 101*  102* 105* 106* 103* 107*  --    < > 114*  --   --    CREATININE mg/dL 3.49* 5.67* 8.03* 8.93* 8.80*  8.86* 8.68* 8.44* 7.79* 7.82*  --    < > 7.53*  --   --    EGFR ml/min/1.73sq m 14 8 5 4 4  4 4 5 5 5  --    < > 5  --   --    CALCIUM mg/dL 8.8 8.7 8.7 8.7 8.2*  8.2* 8.3* 8.1* 8.1* 8.4  --    < > 9.0  --   --    CALCIUM,  IONIZED, ISTAT mmol/L  --   --   --   --   --   --   --   --   --  1.02*  --   --  0.96*  --    MAGNESIUM mg/dL  --   --   --  2.1 2.2  2.3 2.2 2.0 2.0 2.1  --   --  1.3*  --    < >   PHOSPHORUS mg/dL  --   --   --   --   --   --   --   --  4.9*  --   --  7.1*  --   --     < > = values in this interval not displayed.     Results from last 7 days   Lab Units 09/27/24  1000 09/26/24  0649 09/25/24  0324 09/24/24  0428 09/23/24  0440   AST U/L 39 13 8* 6* 8*   ALT U/L 33 10 7 9 10   ALK PHOS U/L 92 88 101 89 86   TOTAL PROTEIN g/dL 7.3 6.7 7.0 6.5 6.3*   ALBUMIN g/dL 3.8 3.6 3.8 3.4* 3.5   TOTAL BILIRUBIN mg/dL 0.22 0.24 0.24 0.20 0.25   BILIRUBIN DIRECT mg/dL  --   --   --   --  0.02     Results from last 7 days   Lab Units 09/27/24  1626 09/27/24  1425 09/27/24  1204 09/27/24  1045 09/27/24  0822 09/27/24  0603 09/27/24  0358 09/27/24  0244 09/27/24  0202 09/27/24  0004 09/26/24  2200 09/26/24  1946   POC GLUCOSE mg/dl 185* 264* 245* 197* 172* 154* 146* 106 68 95 156* 210*     Results from last 7 days   Lab Units 09/27/24  1000 09/26/24  0649 09/25/24  0324 09/24/24  1016 09/24/24  0428 09/23/24  2159 09/23/24  1553 09/23/24  1012 09/23/24  0440 09/22/24  2134 09/22/24  1931   GLUCOSE RANDOM mg/dL 175* 169* 214* 222* 149*  149* 181* 123 203* 67 627* 770*       Network Utilization Review Department  ATTENTION: Please call with any questions or concerns to [phone] and carefully listen to the prompts so that you are directed to the right person. All voicemails are confidential.   For Discharge needs, contact Care Management DC Support Team at [phone] opt. 2  Send all requests for admission clinical reviews, approved or denied determinations and any other requests to dedicated fax number below belonging to the campus where the patient is receiving treatment. List of dedicated fax numbers for the Facilities:  FACILITY NAME UR FAX NUMBER   ADMISSION DENIALS (Administrative/Medical Necessity) [phone]    DISCHARGE SUPPORT TEAM (NETWORK) [phone]   PARENT CHILD HEALTH (Maternity/NICU/Pediatrics) [phone]   [facility] [phone]   [facility] [phone]   [facility] [phone]   [facility] [phone]   [facility] [phone]   [facility] [phone]   [facility] [phone]   [facility] [phone]   [facility] [phone]   [facility] [phone]   [facility] [phone]   [facility] [phone]

## 2024-09-27 NOTE — PROGRESS NOTES
Progress Note - Hospitalist   Name: Vernell Darby 49 y.o. female I MRN: [id number]  Unit/Bed#: -01 I Date of Admission: 9/22/2024   Date of Service: 9/27/2024 I Hospital Day: 5     Assessment & Plan  SABRINA (acute kidney injury) (HCC)  Baseline creatinine 0.7-1  Creatinine on admission 7.53  Diuretics up-titrated outpatient due to ongoing SOB. CTA PE study performed 9/20 9/22 CTAP: 1.  Retained contrast within the kidneys from CT performed 2 days prior is indicative of impaired renal function. No hydroureteronephrosis. 2.  Possible mild enterocolitis. No abdominopelvic fluid collection.  Likely multifactorial - ATN in setting of contrast, increased diuretic, N/V/D..  Pt states that she did not hold her metformin following CTA PE study    Plan:  Completed IVF resuscitation  Diuretic given to augment urine outpt with improvement. Diuretics currently completed  Trend renal indices. Cr improving.   Nephrology consulted  No urgent indications for dialysis  Strict I/O  Avoid hypotension and nephrotoxins  Type 1 diabetes mellitus with hyperglycemia (HCC)  Lab Results   Component Value Date    HGBA1C 9.5 (H) 09/22/2024       Recent Labs     09/27/24  0603 09/27/24  0822 09/27/24  1045 09/27/24  1204   POCGLU 154* 172* 197* 245*       Blood Sugar Average: Last 72 hrs:  (P) [id number]    Hx of T1DM, follows with Dr. Ascencio from Endocrinology  Presented with hyperglycemia, no DKA  Cont regular insulin gtt per endo - weaning off - lantus to resume 9/26 pm  Q2h BG monitoring  Maintain -180  Hyponatremia  Pseudohyponatremia in the setting of hyperglycemia  Corrected Na 137    N&V (nausea and vomiting)  Likely secondary to hyperglyemia  CTAP: 1.  Retained contrast within the kidneys from CT performed 2 days prior is indicative of impaired renal function. No hydroureteronephrosis. 2.  Possible mild enterocolitis. No abdominopelvic fluid collection.  Pt denies fevers or chills PTA  Hold off on antibiotics    RESOLVED  Sore throat  Pt reports severe odynophagia following multiple episodes of vomiting  Strep PCR neg, COVID/Flu/RSV neg  Unremarkable physical exam: no erythema of pharynx, no cervical LAD, no crepitus of neck but tender to palpation  RESOLVED.  CT neck - secretions in posterior pharnyx but no concerning findings.  Mouthkote and chloraseptic spray ordered for pain control  SIRS (systemic inflammatory response syndrome) ([facility])  SIRS: tachypnea, tachycardia, leukocytosis  Suspect reactive to N/V and SABRINA  Unlikely sepsis with no fevers/chills  On rocephin for >100k cfu hemolytic strep group b  Chronic diastolic congestive heart failure (HCC)  Wt Readings from Last 3 Encounters:   09/27/24 81.6 kg (179 lb 12.8 oz)   09/18/24 82.2 kg (181 lb 3.5 oz)   09/17/24 82.7 kg (182 lb 6.4 oz)       5/2024 Echo: EF 65%, G2DD  Recently had diuretics up-titrated outpatient due to persistent SOB/PRICE  Currently examines hypovolemic  Hold diuresis  Strict I/O  Daily weights      Asthma  Daily Breo, PRN albuterol neb  Continue home singulair  Hypertension  Hold home lisinopril and diuretics 2/2 SABRINA  Hyperlipidemia  Continue statin  Anemia  Baseline hgb 9-10  Hgb on admission 12  Likely hemoconcentrated  Trend hgb and maintain > 7  Severe obesity (BMI 35.0-39.9) with comorbidity ([facility])  Encourage lifestyle modifications as appropriate  Gastroesophageal reflux disease without esophagitis  Continue home PPI  ATN (acute tubular necrosis) ([facility])    Oliguria and anuria    PAF (paroxysmal atrial fibrillation) ([facility])  Resolved w IV lopressor  Cardiology following  Pt initiated on eliquis.          VTE  Prophylaxis:   Pharmacologic: in place  Mechanical VTE Prophylaxis in Place: Yes    Patient Centered Rounds: I have performed bedside rounds with nursing staff today.    Discussions with Specialists or Other Care Team Provider: case management    Education and Discussions with Family / Patient: yes    Mobility:   Basic Mobility Inpatient Raw  Score: 24  JH-HLM Goal: 8: Walk 250 feet or more  JH-HLM Achieved: 7: Walk 25 feet or more      Current Length of Stay: 5 day(s)    Current Patient Status: Inpatient        Code Status: Level 1 - Full Code    Discharge Plan: Pt will require continued inpatient hospitalization.    Subjective:   Pt without abdominal pain, chest pain or other such complaints    Patient is seen and examined at bedside.  All other ROS are negative.    Objective:     Vitals:   Temp (24hrs), Av °F (36.7 °C), Min:97.4 °F (36.3 °C), Max:98.3 °F (36.8 °C)    Temp:  [97.4 °F (36.3 °C)-98.3 °F (36.8 °C)] 98.3 °F (36.8 °C)  HR:  [72-75] 75  Resp:  [17] 17  BP: (109-129)/(55-59) 111/57  SpO2:  [94 %-97 %] 94 %  Body mass index is 40.31 kg/m².     Input and Output Summary (last 24 hours):       Intake/Output Summary (Last 24 hours) at 2024 1308  Last data filed at 2024 1250  Gross per 24 hour   Intake 2100 ml   Output 1950 ml   Net 150 ml       Physical Exam:       GEN: No acute distress, comfortable  HEEENT: No JVD, PERRLA, no scleral icterus  RESP: Lungs clear to auscultation bilaterally  CV: RRR, +s1/s2   ABD: SOFT NON TENDER, POSITIVE BOWEL SOUNDS, NO DISTENTION  PSYCH: CALM  NEURO: Mentation baseline, NO FOCAL DEFICITS  SKIN: NO RASH  EXTREM: NO EDEMA    Additional Data:     Labs:    Results from last 7 days   Lab Units 24  0440   WBC Thousand/uL 12.39*   HEMOGLOBIN g/dL 10.9*   HEMATOCRIT % 34.2*   PLATELETS Thousands/uL 392*   SEGS PCT % 71   LYMPHO PCT % 20   MONO PCT % 5   EOS PCT % 2     Results from last 7 days   Lab Units 24  1000   SODIUM mmol/L 135   POTASSIUM mmol/L 3.7   CHLORIDE mmol/L 92*   CO2 mmol/L 31   BUN mg/dL 83*   CREATININE mg/dL 3.49*   ANION GAP mmol/L 12   CALCIUM mg/dL 8.8   ALBUMIN g/dL 3.8   TOTAL BILIRUBIN mg/dL 0.22   ALK PHOS U/L 92   ALT U/L 33   AST U/L 39   GLUCOSE RANDOM mg/dL 175*         Results from last 7 days   Lab Units 24  1204 24  1045 24  0822  09/27/24  0603 09/27/24  0358 09/27/24  0244 09/27/24  0202 09/27/24  0004 09/26/24  2200 09/26/24  1946 09/26/24  1745 09/26/24  1640   POC GLUCOSE mg/dl 245* 197* 172* 154* 146* 106 68 95 156* 210* 195* 240*     Results from last 7 days   Lab Units 09/22/24  1931   HEMOGLOBIN A1C % 9.5*     Results from last 7 days   Lab Units 09/24/24  0428 09/23/24  0440 09/22/24  1931   LACTIC ACID mmol/L  --  0.9 0.9   PROCALCITONIN ng/ml 0.42* 0.41*  --        Lines/Drains:  Invasive Devices       Peripheral Intravenous Line  Duration             Peripheral IV 09/27/24 Left;Ventral (anterior) Forearm <1 day                    Telemetry:        * I Have Reviewed All Lab Data Listed Above.           Imaging:     Results for orders placed during the hospital encounter of 09/22/24    XR chest 1 view portable    Narrative  XR CHEST PORTABLE    INDICATION: cp.    COMPARISON: CT chest 9/20/2024    FINDINGS:    Clear lungs. No pneumothorax or pleural effusion.    Normal cardiomediastinal silhouette.    Bones are unremarkable for age.    Normal upper abdomen.    Impression  No acute cardiopulmonary disease.        Workstation performed: [id number]    Results for orders placed during the hospital encounter of 02/03/16    X-ray chest 2 views    Narrative  CHEST    INDICATION:  Left-sided chest pain, history of asthma.    COMPARISON:  None    VIEWS:  Frontal and lateral projections; 2 images    FINDINGS:    Cardiomediastinal silhouette appears unremarkable.    The lungs are clear.  No pneumothorax or pleural effusion.    Visualized osseous structures appear within normal limits for the patient's age.    Impression  No active pulmonary disease.      *I have reviewed all imaging reports listed above      Recent Cultures (last 7 days):     Results from last 7 days   Lab Units 09/23/24  0439 09/23/24  0356   BLOOD CULTURE  No Growth After 4 Days.  No Growth After 4 Days.  --    URINE CULTURE   --  >100,000 cfu/ml Beta Hemolytic  Streptococcus Group B*  >100,000 cfu/ml       Last 24 Hours Medication List:   Current Facility-Administered Medications   Medication Dose Route Frequency Provider Last Rate    acetaminophen  650 mg Oral Q6H PRN Christina Saunders PA-C      albuterol  2.5 mg Nebulization Q6H PRN Christina Saunders PA-C      apixaban  5 mg Oral BID Cem Gamez PA-C      aspirin  81 mg Oral Daily Christina Saunders PA-C      atorvastatin  80 mg Oral Daily Christina Saunders PA-C      calcium carbonate  500 mg Oral Daily PRN Cindi Guillermo MD      fluticasone-vilanterol  1 puff Inhalation Daily Christina Saunders PA-C      insulin glargine  45 Units Subcutaneous HS Michela Gamboa MD      insulin regular (HumuLIN R,NovoLIN R) 1 Units/mL in sodium chloride 0.9 % 100 mL infusion  0.3-21 Units/hr Intravenous Titrated Cindi Guillermo MD 6 Units/hr (09/27/24 1211)    metoprolol tartrate  12.5 mg Oral Q12H [facility] Christina Saunders PA-C      montelukast  10 mg Oral HS Christina Saunders PA-C      ondansetron  4 mg Intravenous Q6H PRN Christina Saunders PA-C      pantoprazole  40 mg Oral Early Morning Yamileth Naik PA-C      PARoxetine  20 mg Oral Daily Christina Saunders PA-C      phenol  1 spray Mouth/Throat Q2H PRN Christina Saunders PA-C      saliva substitute  5 spray Mouth/Throat 4x Daily PRN Christina Saunders PA-C      sodium chloride  10 mL/hr Intravenous Continuous Christina Saunders PA-C 10 mL/hr (09/26/24 1743)        Today, Patient Was Seen By: Cindi Guillermo MD    ** Please Note: Dictation voice to text software may have been used in the creation of this document. **

## 2024-09-27 NOTE — ASSESSMENT & PLAN NOTE
Wt Readings from Last 3 Encounters:   09/27/24 81.6 kg (179 lb 12.8 oz)   09/18/24 82.2 kg (181 lb 3.5 oz)   09/17/24 82.7 kg (182 lb 6.4 oz)       5/2024 Echo: EF 65%, G2DD  Recently had diuretics up-titrated outpatient due to persistent SOB/PRICE  Currently examines hypovolemic  Hold diuresis  Strict I/O  Daily weights

## 2024-09-27 NOTE — PROGRESS NOTES
"NEPHROLOGY PROGRESS NOTE    Vernell Darby 49 y.o. female MRN: [id number]  Unit/Bed#: -01 Encounter: [id number]  Reason for Consult: Acute renal failure    Patient is awake and alert laying in bed says she is going home today.  Other than that eating well no confusion no fever chills and she is feeling well.    ASSESSMENT/PLAN:      1.  Renal    Patient is baseline normal renal function creatinine 0.7.  She developed ATN related to contrast initially was oliguric responded to diuretics.  She is now been off diuretics for couple days urine outputs been normal and creatinine is improving significantly and is down to 3.4 today.  Anion gap is normal.  Yesterday patient made 2.5 L of urine.    The patient is stable for discharge from a renal standpoint.  I am entering a BMP order into the system and I told her to get it done Monday and I will look it up and follow her until her baseline creatinine is reached and patient is aware.    BMP Monday, order placed in [facility] system.        SUBJECTIVE:  Review of Systems   Constitutional: Negative for chills, diaphoresis and fever.   HENT: Negative.     Eyes: Negative.    Cardiovascular:  Negative for chest pain, dyspnea on exertion and orthopnea.   Respiratory: Negative.  Negative for cough, shortness of breath and sputum production.    Gastrointestinal:  Negative for abdominal pain, diarrhea, nausea and vomiting.   Genitourinary: Negative.    Psychiatric/Behavioral:  Negative for altered mental status.        OBJECTIVE:  Current Weight: Weight - Scale: 81.6 kg (179 lb 12.8 oz)  Vitals:Temp (24hrs), Av °F (36.7 °C), Min:97.4 °F (36.3 °C), Max:98.3 °F (36.8 °C)  Current: Temperature: 98.3 °F (36.8 °C)   Blood pressure 111/57, pulse 75, temperature 98.3 °F (36.8 °C), resp. rate 17, height 4' 8\" (1.422 m), weight 81.6 kg (179 lb 12.8 oz), SpO2 94%, not currently breastfeeding. , Body mass index is 40.31 kg/m².      Intake/Output Summary (Last 24 hours) at 2024 " "1219  Last data filed at 9/27/2024 0702  Gross per 24 hour   Intake 2100 ml   Output 1050 ml   Net 1050 ml       Physical Exam: /57   Pulse 75   Temp 98.3 °F (36.8 °C)   Resp 17   Ht 4' 8\" (1.422 m)   Wt 81.6 kg (179 lb 12.8 oz)   SpO2 94%   BMI 40.31 kg/m²   Physical Exam  Constitutional:       General: She is not in acute distress.     Appearance: She is not toxic-appearing.   HENT:      Head: Normocephalic and atraumatic.      Nose: Nose normal.      Mouth/Throat:      Mouth: Mucous membranes are moist.   Eyes:      General: No scleral icterus.     Extraocular Movements: Extraocular movements intact.   Cardiovascular:      Rate and Rhythm: Normal rate and regular rhythm.      Heart sounds:      No friction rub. No gallop.   Pulmonary:      Effort: Pulmonary effort is normal. No respiratory distress.      Breath sounds: No wheezing or rales.   Abdominal:      General: Bowel sounds are normal. There is no distension.      Palpations: Abdomen is soft.      Tenderness: There is no abdominal tenderness. There is no rebound.   Musculoskeletal:      Cervical back: Normal range of motion and neck supple.   Neurological:      Mental Status: She is alert and oriented to person, place, and time.   Psychiatric:         Mood and Affect: Mood normal.         Behavior: Behavior normal.         Medications:    Current Facility-Administered Medications:     acetaminophen (TYLENOL) tablet 650 mg, 650 mg, Oral, Q6H PRN, Christina Saunders PA-C, 650 mg at 09/23/24 1959    albuterol inhalation solution 2.5 mg, 2.5 mg, Nebulization, Q6H PRN, Christina Saunders PA-C    apixaban (ELIQUIS) tablet 5 mg, 5 mg, Oral, BID, Cem Gamez PA-C, 5 mg at 09/27/24 0816    aspirin (ECOTRIN LOW STRENGTH) EC tablet 81 mg, 81 mg, Oral, Daily, Christina Saunders PA-C, 81 mg at 09/27/24 0816    atorvastatin (LIPITOR) tablet 80 mg, 80 mg, Oral, Daily, Christina Saunders PA-C, 80 mg at 09/27/24 0816    calcium carbonate (TUMS) chewable tablet 500 mg, 500 mg, Oral, " Daily PRN, Cindi Guillermo MD, 500 mg at 09/27/24 0940    fluticasone-vilanterol 200-25 mcg/actuation 1 puff, 1 puff, Inhalation, Daily, Christina Saunders PA-C, 1 puff at 09/27/24 0815    insulin glargine (LANTUS) subcutaneous injection 45 Units 0.45 mL, 45 Units, Subcutaneous, HS, Michela Gamboa MD, 45 Units at 09/26/24 2103    insulin regular (HumuLIN R,NovoLIN R) 1 Units/mL in sodium chloride 0.9 % 100 mL infusion, 0.3-21 Units/hr, Intravenous, Titrated, Cindi Guillermo MD, Last Rate: 6 mL/hr at 09/27/24 1211, 6 Units/hr at 09/27/24 1211    metoprolol tartrate (LOPRESSOR) partial tablet 12.5 mg, 12.5 mg, Oral, Q12H KENNY, Christina Saunders PA-C, 12.5 mg at 09/27/24 0816    montelukast (SINGULAIR) tablet 10 mg, 10 mg, Oral, HS, Christina Saunders PA-C, 10 mg at 09/26/24 2103    ondansetron (ZOFRAN) injection 4 mg, 4 mg, Intravenous, Q6H PRN, Christina Saunders PA-C    pantoprazole (PROTONIX) EC tablet 40 mg, 40 mg, Oral, Early Morning, Yamileth Naik PA-C, 40 mg at 09/27/24 0605    PARoxetine (PAXIL) tablet 20 mg, 20 mg, Oral, Daily, Christina Saunders PA-C, 20 mg at 09/27/24 0816    phenol (CHLORASEPTIC) 1.4 % mucosal liquid 1 spray, 1 spray, Mouth/Throat, Q2H PRN, Christina Saunders PA-C, 1 spray at 09/22/24 2347    saliva substitute (MOUTH KOTE) mucosal solution 5 spray, 5 spray, Mouth/Throat, 4x Daily PRN, Christina Saunders PA-C, 5 spray at 09/24/24 0245    sodium chloride 0.9 % infusion, 10 mL/hr, Intravenous, Continuous, Christina Hantz, PA-C, Last Rate: 10 mL/hr at 09/26/24 1743, 10 mL/hr at 09/26/24 1743    Laboratory Results:  Lab Results   Component Value Date    WBC 12.39 (H) 09/27/2024    HGB 10.9 (L) 09/27/2024    HCT 34.2 (L) 09/27/2024    MCV 90 09/27/2024     (H) 09/27/2024     Lab Results   Component Value Date    SODIUM 135 09/27/2024    K 3.7 09/27/2024    CL 92 (L) 09/27/2024    CO2 31 09/27/2024    BUN 83 (H) 09/27/2024    CREATININE 3.49 (H) 09/27/2024    GLUC 175 (H) 09/27/2024    CALCIUM 8.8 09/27/2024     Lab Results  "  Component Value Date    CALCIUM 8.8 09/27/2024    PHOS 4.9 (H) 09/23/2024     No results found for: \"LABPROT\"    "

## 2024-09-27 NOTE — PLAN OF CARE
Problem: Potential for Falls  Goal: Patient will remain free of falls  Description: INTERVENTIONS:  - Educate patient/family on patient safety including physical limitations  - Instruct patient to call for assistance with activity   - Consult OT/PT to assist with strengthening/mobility   - Keep Call bell within reach  - Keep bed low and locked with side rails adjusted as appropriate  - Keep care items and personal belongings within reach  - Initiate and maintain comfort rounds  - Make Fall Risk Sign visible to staff  - Offer Toileting every 2 Hours, in advance of need  - Apply yellow socks and bracelet for high fall risk patients  - Consider moving patient to room near nurses station  Outcome: Progressing     Problem: PAIN - ADULT  Goal: Verbalizes/displays adequate comfort level or baseline comfort level  Description: Interventions:  - Encourage patient to monitor pain and request assistance  - Assess pain using appropriate pain scale  - Administer analgesics based on type and severity of pain and evaluate response  - Implement non-pharmacological measures as appropriate and evaluate response  - Consider cultural and social influences on pain and pain management  - Notify physician/advanced practitioner if interventions unsuccessful or patient reports new pain  Outcome: Progressing     Problem: INFECTION - ADULT  Goal: Absence or prevention of progression during hospitalization  Description: INTERVENTIONS:  - Assess and monitor for signs and symptoms of infection  - Monitor lab/diagnostic results  - Monitor all insertion sites, i.e. indwelling lines, tubes, and drains  - Monitor endotracheal if appropriate and nasal secretions for changes in amount and color  - [place] appropriate cooling/warming therapies per order  - Administer medications as ordered  - Instruct and encourage patient and family to use good hand hygiene technique  - Identify and instruct in appropriate isolation precautions for identified  infection/condition  Outcome: Progressing  Goal: Absence of fever/infection during neutropenic period  Description: INTERVENTIONS:  - Monitor WBC    Outcome: Progressing     Problem: SAFETY ADULT  Goal: Patient will remain free of falls  Description: INTERVENTIONS:  - Educate patient/family on patient safety including physical limitations  - Instruct patient to call for assistance with activity   - Consult OT/PT to assist with strengthening/mobility   - Keep Call bell within reach  - Keep bed low and locked with side rails adjusted as appropriate  - Keep care items and personal belongings within reach  - Initiate and maintain comfort rounds  - Make Fall Risk Sign visible to staff  - Offer Toileting every 2 Hours, in advance of need  - Apply yellow socks and bracelet for high fall risk patients  - Consider moving patient to room near nurses station  Outcome: Progressing  Goal: Maintain or return to baseline ADL function  Description: INTERVENTIONS:  -  Assess patient's ability to carry out ADLs; assess patient's baseline for ADL function and identify physical deficits which impact ability to perform ADLs (bathing, care of mouth/teeth, toileting, grooming, dressing, etc.)  - Assess/evaluate cause of self-care deficits   - Assess range of motion  - Assess patient's mobility; develop plan if impaired  - Assess patient's need for assistive devices and provide as appropriate  - Encourage maximum independence but intervene and supervise when necessary  - Involve family in performance of ADLs  - Assess for home care needs following discharge   - Consider OT consult to assist with ADL evaluation and planning for discharge  - Provide patient education as appropriate  Outcome: Progressing  Goal: Maintains/Returns to pre admission functional level  Description: INTERVENTIONS:  - Perform AM-PAC 6 Click Basic Mobility/ Daily Activity assessment daily.  - Set and communicate daily mobility goal to care team and  patient/family/caregiver.   - Collaborate with rehabilitation services on mobility goals if consulted  - Perform Range of Motion 3 times a day.  - Reposition patient every 2 hours.  - Dangle patient 5 times a day  - Stand patient 5 times a day  - Ambulate patient 5 times a day  - Out of bed to chair 5 times a day   - Out of bed for meals 3 times a day  - Out of bed for toileting  - Record patient progress and toleration of activity level   Outcome: Progressing     Problem: DISCHARGE PLANNING  Goal: Discharge to home or other facility with appropriate resources  Description: INTERVENTIONS:  - Identify barriers to discharge w/patient and caregiver  - Arrange for needed discharge resources and transportation as appropriate  - Identify discharge learning needs (meds, wound care, etc.)  - Arrange for interpretive services to assist at discharge as needed  - Refer to Case Management Department for coordinating discharge planning if the patient needs post-hospital services based on physician/advanced practitioner order or complex needs related to functional status, cognitive ability, or social support system  Outcome: Progressing     Problem: Knowledge Deficit  Goal: Patient/family/caregiver demonstrates understanding of disease process, treatment plan, medications, and discharge instructions  Description: Complete learning assessment and assess knowledge base.  Interventions:  - Provide teaching at level of understanding  - Provide teaching via preferred learning methods  Outcome: Progressing     Problem: CARDIOVASCULAR - ADULT  Goal: Maintains optimal cardiac output and hemodynamic stability  Description: INTERVENTIONS:  - Monitor I/O, vital signs and rhythm  - Monitor for S/S and trends of decreased cardiac output  - Administer and titrate ordered vasoactive medications to optimize hemodynamic stability  - Assess quality of pulses, skin color and temperature  - Assess for signs of decreased coronary artery perfusion  -  Instruct patient to report change in severity of symptoms  Outcome: Progressing  Goal: Absence of cardiac dysrhythmias or at baseline rhythm  Description: INTERVENTIONS:  - Continuous cardiac monitoring, vital signs, obtain 12 lead EKG if ordered  - Administer antiarrhythmic and heart rate control medications as ordered  - Monitor electrolytes and administer replacement therapy as ordered  Outcome: Progressing     Problem: RESPIRATORY - ADULT  Goal: Achieves optimal ventilation and oxygenation  Description: INTERVENTIONS:  - Assess for changes in respiratory status  - Assess for changes in mentation and behavior  - Position to facilitate oxygenation and minimize respiratory effort  - Oxygen administered by appropriate delivery if ordered  - Initiate smoking cessation education as indicated  - Encourage broncho-pulmonary hygiene including cough, deep breathe, Incentive Spirometry  - Assess the need for suctioning and aspirate as needed  - Assess and instruct to report SOB or any respiratory difficulty  - Respiratory Therapy support as indicated  Outcome: Progressing     Problem: GASTROINTESTINAL - ADULT  Goal: Minimal or absence of nausea and/or vomiting  Description: INTERVENTIONS:  - Administer IV fluids if ordered to ensure adequate hydration  - Maintain NPO status until nausea and vomiting are resolved  - Nasogastric tube if ordered  - Administer ordered antiemetic medications as needed  - Provide nonpharmacologic comfort measures as appropriate  - Advance diet as tolerated, if ordered  - Consider nutrition services referral to assist patient with adequate nutrition and appropriate food choices  Outcome: Progressing  Goal: Maintains or returns to baseline bowel function  Description: INTERVENTIONS:  - Assess bowel function  - Encourage oral fluids to ensure adequate hydration  - Administer IV fluids if ordered to ensure adequate hydration  - Administer ordered medications as needed  - Encourage mobilization and  activity  - Consider nutritional services referral to assist patient with adequate nutrition and appropriate food choices  Outcome: Progressing  Goal: Maintains adequate nutritional intake  Description: INTERVENTIONS:  - Monitor percentage of each meal consumed  - Identify factors contributing to decreased intake, treat as appropriate  - Assist with meals as needed  - Monitor I&O, weight, and lab values if indicated  - Obtain nutrition services referral as needed  Outcome: Progressing  Goal: Establish and maintain optimal ostomy function  Description: INTERVENTIONS:  - Assess bowel function  - Encourage oral fluids to ensure adequate hydration  - Administer IV fluids if ordered to ensure adequate hydration   - Administer ordered medications as needed  - Encourage mobilization and activity  - Nutrition services referral to assist patient with appropriate food choices  - Assess stoma site  - Consider wound care consult   Outcome: Progressing  Goal: Oral mucous membranes remain intact  Description: INTERVENTIONS  - Assess oral mucosa and hygiene practices  - Implement preventative oral hygiene regimen  - Implement oral medicated treatments as ordered  - Initiate Nutrition services referral as needed  Outcome: Progressing     Problem: GENITOURINARY - ADULT  Goal: Maintains or returns to baseline urinary function  Description: INTERVENTIONS:  - Assess urinary function  - Encourage oral fluids to ensure adequate hydration if ordered  - Administer IV fluids as ordered to ensure adequate hydration  - Administer ordered medications as needed  - Offer frequent toileting  - Follow urinary retention protocol if ordered  Outcome: Progressing  Goal: Absence of urinary retention  Description: INTERVENTIONS:  - Assess patient’s ability to void and empty bladder  - Monitor I/O  - Bladder scan as needed  - Discuss with physician/AP medications to alleviate retention as needed  - Discuss catheterization for long term situations as  appropriate  Outcome: Progressing  Goal: Urinary catheter remains patent  Description: INTERVENTIONS:  - Assess patency of urinary catheter  - If patient has a chronic [place], consider changing catheter if non-functioning  - Follow guidelines for intermittent irrigation of non-functioning urinary catheter  Outcome: Progressing     Problem: METABOLIC, FLUID AND ELECTROLYTES - ADULT  Goal: Electrolytes maintained within normal limits  Description: INTERVENTIONS:  - Monitor labs and assess patient for signs and symptoms of electrolyte imbalances  - Administer electrolyte replacement as ordered  - Monitor response to electrolyte replacements, including repeat lab results as appropriate  - Instruct patient on fluid and nutrition as appropriate  Outcome: Progressing  Goal: Fluid balance maintained  Description: INTERVENTIONS:  - Monitor labs   - Monitor I/O and WT  - Instruct patient on fluid and nutrition as appropriate  - Assess for signs & symptoms of volume excess or deficit  Outcome: Progressing  Goal: Glucose maintained within target range  Description: INTERVENTIONS:  - Monitor Blood Glucose as ordered  - Assess for signs and symptoms of hyperglycemia and hypoglycemia  - Administer ordered medications to maintain glucose within target range  - Assess nutritional intake and initiate nutrition service referral as needed  Outcome: Progressing     Problem: MUSCULOSKELETAL - ADULT  Goal: Maintain or return mobility to safest level of function  Description: INTERVENTIONS:  - Assess patient's ability to carry out ADLs; assess patient's baseline for ADL function and identify physical deficits which impact ability to perform ADLs (bathing, care of mouth/teeth, toileting, grooming, dressing, etc.)  - Assess/evaluate cause of self-care deficits   - Assess range of motion  - Assess patient's mobility  - Assess patient's need for assistive devices and provide as appropriate  - Encourage maximum independence but intervene and  supervise when necessary  - Involve family in performance of ADLs  - Assess for home care needs following discharge   - Consider OT consult to assist with ADL evaluation and planning for discharge  - Provide patient education as appropriate  Outcome: Progressing  Goal: Maintain proper alignment of affected body part  Description: INTERVENTIONS:  - Support, maintain and protect limb and body alignment  - Provide patient/ family with appropriate education  Outcome: Progressing     Problem: Nutrition/Hydration-ADULT  Goal: Nutrient/Hydration intake appropriate for improving, restoring or maintaining nutritional needs  Description: Monitor and assess patient's nutrition/hydration status for malnutrition. Collaborate with interdisciplinary team and initiate plan and interventions as ordered.  Monitor patient's weight and dietary intake as ordered or per policy. Utilize nutrition screening tool and intervene as necessary. Determine patient's food preferences and provide high-protein, high-caloric foods as appropriate.     INTERVENTIONS:  - Monitor oral intake, urinary output, labs, and treatment plans  - Assess nutrition and hydration status and recommend course of action  - Evaluate amount of meals eaten  - Assist patient with eating if necessary   - Allow adequate time for meals  - Recommend/ encourage appropriate diets, oral nutritional supplements, and vitamin/mineral supplements  - Order, calculate, and assess calorie counts as needed  - Recommend, monitor, and adjust tube feedings and TPN/PPN based on assessed needs  - Assess need for intravenous fluids  - Provide specific nutrition/hydration education as appropriate  - Include patient/family/caregiver in decisions related to nutrition  Outcome: Progressing     Problem: Decreased Cardiac Output  Goal: Cardiac output adequate for individual needs  Description: INTERVENTIONS: Monitor for signs and symptoms of decreased cardiac output   - Monitor for dyspnea with  exertion and at rest  - Monitor for orthopnea  - Monitor for signs of tachycardia. Place patient on telemetry monitoring.  - Assess patient for jugular vein distention  - Assess patient for lower extremity edema and poor peripheral perfusion   - Auscultate lung sound for Fine bibasilar crackles   - Monitor for cardiac arrythmias   - Administer beta blockers, antiarrhythmic, and blood pressure medications as ordered    Outcome: Progressing     Problem: Impaired Gas Exchange  Goal: Optimize oxygenation and ensure adequate ventilation  Description: INTERVENTIONS: Monitor for signs and symptoms of respiratory distress                - Elevate HOB or use high fowlers to promote lung expansion                - Administer oxygen as ordered to maintain adequate oxygenation                - Encourage use of IS to promote lung expansion and prevent PN                - Monitor ABGs to assess oxygenation status                - Monitor blood oxygen level to maintain adequate oxygenation                - Encourage cough and deep breathing exercises to promote lung expansion                - Monitor patient's mental status for increased confusion    Outcome: Progressing     Problem: Excess Fluid Volume  Goal: Patient is able to achieve and maintain homeostasis  Description: INTERVENTIONS: Monitor for sign and symptoms of fluid overload  - Evaluate LE edema every shift  - Elevate LE to prevent dependent edema  - Apply LIO stockings as ordered   - Monitor ankle circumference daily  - Assess for jugular vein distention  - Evaluate provider orders for the CHF diuretic algorithm. Administer diuretics as ordered  - Weigh the patient daily at 0600 and report a weight gain of five pounds or more   - Strict intake and output  - Monitor fluid intake and adhere to fluid restrictions  - Assess lung sounds every shift and as needed  - Monitor vital signs and lab values (CBC, chem, BUN, BNP)  - Measure and document urine output    Outcome:  Progressing     Problem: Activity Intolerance  Goal: Patient is able to perform activities within their limitations  Description: INTERVENTIONS:                       -   Alternate periods of activity with periods of rest                 -   Patients is able to maintain normal vitals heart rhythm during activity                 -   Gradually increase activity and exercise as patient can tolerate                 -   Monitor blood pressure and heart before and after exercise                  -   Monitor blood oxygen saturation during activity and apply oxygen as needed    Outcome: Progressing     Problem: Knowledge Deficit  Goal: Patient is able to verbalize understanding of Heart Failure after education  Description: INTERVENTIONS:  - Educate the patient and family on signs and symptoms of HF  - Provide the patient with HF education and HF zone tool  - Educate on the importance of daily weight in the AM and reporting a weight gain               of 3 or more pounds to their primary care physician  - Monitor for SOB  - Maintain and sodium and fluid restriction  - Educate the patient on the importance of medications such as: diuretics, betablockers,               antiarrhythmics and their purpose, dose, route, side effects and labs               if they are needed    Outcome: Progressing

## 2024-09-28 VITALS
HEIGHT: 56 IN | WEIGHT: 177.8 LBS | OXYGEN SATURATION: 94 % | DIASTOLIC BLOOD PRESSURE: 52 MMHG | BODY MASS INDEX: 40 KG/M2 | RESPIRATION RATE: 16 BRPM | TEMPERATURE: 98.2 F | SYSTOLIC BLOOD PRESSURE: 116 MMHG | HEART RATE: 69 BPM

## 2024-09-28 LAB
ALBUMIN SERPL BCG-MCNC: 3.5 G/DL (ref 3.5–5)
ALP SERPL-CCNC: 88 U/L (ref 34–104)
ALT SERPL W P-5'-P-CCNC: 63 U/L (ref 7–52)
ANION GAP SERPL CALCULATED.3IONS-SCNC: 10 MMOL/L (ref 4–13)
AST SERPL W P-5'-P-CCNC: 71 U/L (ref 13–39)
BACTERIA BLD CULT: NORMAL
BACTERIA BLD CULT: NORMAL
BASOPHILS # BLD AUTO: 0.09 THOUSANDS/ΜL (ref 0–0.1)
BASOPHILS NFR BLD AUTO: 1 % (ref 0–1)
BILIRUB SERPL-MCNC: 0.21 MG/DL (ref 0.2–1)
BUN SERPL-MCNC: 83 MG/DL (ref 5–25)
CALCIUM SERPL-MCNC: 8.7 MG/DL (ref 8.4–10.2)
CHLORIDE SERPL-SCNC: 96 MMOL/L (ref 96–108)
CO2 SERPL-SCNC: 32 MMOL/L (ref 21–32)
CREAT SERPL-MCNC: 2.77 MG/DL (ref 0.6–1.3)
EOSINOPHIL # BLD AUTO: 0.45 THOUSAND/ΜL (ref 0–0.61)
EOSINOPHIL NFR BLD AUTO: 4 % (ref 0–6)
ERYTHROCYTE [DISTWIDTH] IN BLOOD BY AUTOMATED COUNT: 12.3 % (ref 11.6–15.1)
GFR SERPL CREATININE-BSD FRML MDRD: 19 ML/MIN/1.73SQ M
GLUCOSE SERPL-MCNC: 154 MG/DL (ref 65–140)
GLUCOSE SERPL-MCNC: 160 MG/DL (ref 65–140)
GLUCOSE SERPL-MCNC: 162 MG/DL (ref 65–140)
GLUCOSE SERPL-MCNC: 278 MG/DL (ref 65–140)
HCT VFR BLD AUTO: 34.2 % (ref 34.8–46.1)
HGB BLD-MCNC: 11 G/DL (ref 11.5–15.4)
IMM GRANULOCYTES # BLD AUTO: 0.06 THOUSAND/UL (ref 0–0.2)
IMM GRANULOCYTES NFR BLD AUTO: 1 % (ref 0–2)
LYMPHOCYTES # BLD AUTO: 2.31 THOUSANDS/ΜL (ref 0.6–4.47)
LYMPHOCYTES NFR BLD AUTO: 21 % (ref 14–44)
MCH RBC QN AUTO: 29.2 PG (ref 26.8–34.3)
MCHC RBC AUTO-ENTMCNC: 32.2 G/DL (ref 31.4–37.4)
MCV RBC AUTO: 91 FL (ref 82–98)
MONOCYTES # BLD AUTO: 0.82 THOUSAND/ΜL (ref 0.17–1.22)
MONOCYTES NFR BLD AUTO: 7 % (ref 4–12)
NEUTROPHILS # BLD AUTO: 7.55 THOUSANDS/ΜL (ref 1.85–7.62)
NEUTS SEG NFR BLD AUTO: 66 % (ref 43–75)
NRBC BLD AUTO-RTO: 0 /100 WBCS
PLATELET # BLD AUTO: 380 THOUSANDS/UL (ref 149–390)
PMV BLD AUTO: 10.9 FL (ref 8.9–12.7)
POTASSIUM SERPL-SCNC: 4 MMOL/L (ref 3.5–5.3)
PROT SERPL-MCNC: 6.7 G/DL (ref 6.4–8.4)
RBC # BLD AUTO: 3.77 MILLION/UL (ref 3.81–5.12)
SODIUM SERPL-SCNC: 138 MMOL/L (ref 135–147)
WBC # BLD AUTO: 11.28 THOUSAND/UL (ref 4.31–10.16)

## 2024-09-28 PROCEDURE — 80053 COMPREHEN METABOLIC PANEL: CPT | Performed by: HOSPITALIST

## 2024-09-28 PROCEDURE — 85025 COMPLETE CBC W/AUTO DIFF WBC: CPT | Performed by: HOSPITALIST

## 2024-09-28 PROCEDURE — 99232 SBSQ HOSP IP/OBS MODERATE 35: CPT | Performed by: INTERNAL MEDICINE

## 2024-09-28 PROCEDURE — 99239 HOSP IP/OBS DSCHRG MGMT >30: CPT | Performed by: HOSPITALIST

## 2024-09-28 PROCEDURE — 82948 REAGENT STRIP/BLOOD GLUCOSE: CPT

## 2024-09-28 RX ORDER — INSULIN GLARGINE 300 U/ML
45 INJECTION, SOLUTION SUBCUTANEOUS DAILY
Start: 2024-09-28

## 2024-09-28 RX ORDER — METOPROLOL TARTRATE 25 MG/1
12.5 TABLET, FILM COATED ORAL EVERY 12 HOURS SCHEDULED
Qty: 30 TABLET | Refills: 0 | Status: SHIPPED | OUTPATIENT
Start: 2024-09-28 | End: 2024-10-11

## 2024-09-28 RX ORDER — INSULIN ASPART INJECTION 100 [IU]/ML
INJECTION, SOLUTION SUBCUTANEOUS
Start: 2024-09-28

## 2024-09-28 RX ORDER — METFORMIN HCL 500 MG
500 TABLET, EXTENDED RELEASE 24 HR ORAL DAILY
Start: 2024-09-28

## 2024-09-28 RX ADMIN — PAROXETINE HYDROCHLORIDE 20 MG: 20 TABLET, FILM COATED ORAL at 08:46

## 2024-09-28 RX ADMIN — INSULIN LISPRO 1 UNITS: 100 INJECTION, SOLUTION INTRAVENOUS; SUBCUTANEOUS at 08:49

## 2024-09-28 RX ADMIN — INSULIN LISPRO 15 UNITS: 100 INJECTION, SOLUTION INTRAVENOUS; SUBCUTANEOUS at 11:28

## 2024-09-28 RX ADMIN — PANTOPRAZOLE SODIUM 40 MG: 40 TABLET, DELAYED RELEASE ORAL at 05:49

## 2024-09-28 RX ADMIN — ATORVASTATIN CALCIUM 80 MG: 40 TABLET, FILM COATED ORAL at 08:46

## 2024-09-28 RX ADMIN — INSULIN LISPRO 4 UNITS: 100 INJECTION, SOLUTION INTRAVENOUS; SUBCUTANEOUS at 11:27

## 2024-09-28 RX ADMIN — Medication 12.5 MG: at 08:46

## 2024-09-28 RX ADMIN — ASPIRIN 81 MG: 81 TABLET, COATED ORAL at 08:46

## 2024-09-28 RX ADMIN — APIXABAN 5 MG: 5 TABLET, FILM COATED ORAL at 08:46

## 2024-09-28 RX ADMIN — INSULIN LISPRO 15 UNITS: 100 INJECTION, SOLUTION INTRAVENOUS; SUBCUTANEOUS at 08:49

## 2024-09-28 RX ADMIN — FLUTICASONE FUROATE AND VILANTEROL TRIFENATATE 1 PUFF: 200; 25 POWDER RESPIRATORY (INHALATION) at 08:46

## 2024-09-28 NOTE — DISCHARGE SUMMARY
Discharge Summary - Hospitalist   Name: Vernell Darby 49 y.o. female I MRN: [id number]  Unit/Bed#: -01 I Date of Admission: 9/22/2024   Date of Service: 9/28/2024 I Hospital Day: 6     Assessment & Plan  SABRINA (acute kidney injury) (HCC)  Baseline creatinine 0.7-1  Creatinine on admission 7.53  Diuretics up-titrated outpatient due to ongoing SOB. CTA PE study performed 9/20 9/22 CTAP: 1.  Retained contrast within the kidneys from CT performed 2 days prior is indicative of impaired renal function. No hydroureteronephrosis. 2.  Possible mild enterocolitis. No abdominopelvic fluid collection.  Likely multifactorial - ATN in setting of contrast, increased diuretic, N/V/D..  Pt states that she did not hold her metformin following CTA PE study    Plan:  Completed IVF resuscitation  Diuretic given to augment urine outpt with improvement. Diuretics currently completed  Trend renal indices. Cr improving.   Nephrology consulted  No urgent indications for dialysis  Strict I/O  Avoid hypotension and nephrotoxins  Type 1 diabetes mellitus with hyperglycemia (HCC)  Lab Results   Component Value Date    HGBA1C 9.5 (H) 09/22/2024       Recent Labs     09/27/24  2159 09/27/24  2355 09/28/24  0207 09/28/24  0721   POCGLU 249* 179* 160* 162*       Blood Sugar Average: Last 72 hrs:  (P) [id number]    Hx of T1DM, follows with Dr. Ascencio from Endocrinology  Presented with hyperglycemia, no DKA  Cont regular insulin gtt per endo - weaning off - lantus to resume 9/26 pm  Q2h BG monitoring  Maintain -180  Hyponatremia  Pseudohyponatremia in the setting of hyperglycemia  Corrected Na 137    N&V (nausea and vomiting)  Likely secondary to hyperglyemia  CTAP: 1.  Retained contrast within the kidneys from CT performed 2 days prior is indicative of impaired renal function. No hydroureteronephrosis. 2.  Possible mild enterocolitis. No abdominopelvic fluid collection.  Pt denies fevers or chills PTA  Hold off on antibiotics    RESOLVED  Sore throat  Pt reports severe odynophagia following multiple episodes of vomiting  Strep PCR neg, COVID/Flu/RSV neg  Unremarkable physical exam: no erythema of pharynx, no cervical LAD, no crepitus of neck but tender to palpation  RESOLVED.  CT neck - secretions in posterior pharnyx but no concerning findings.  Mouthkote and chloraseptic spray ordered for pain control  SIRS (systemic inflammatory response syndrome) ([facility])  SIRS: tachypnea, tachycardia, leukocytosis  Suspect reactive to N/V and SABRINA  Unlikely sepsis with no fevers/chills  On rocephin for >100k cfu hemolytic strep group b  Chronic diastolic congestive heart failure (HCC)  Wt Readings from Last 3 Encounters:   09/28/24 80.7 kg (177 lb 12.8 oz)   09/18/24 82.2 kg (181 lb 3.5 oz)   09/17/24 82.7 kg (182 lb 6.4 oz)       5/2024 Echo: EF 65%, G2DD  Recently had diuretics up-titrated outpatient due to persistent SOB/PRICE  Currently examines hypovolemic  Hold diuresis  Strict I/O  Daily weights      Asthma  Daily Breo, PRN albuterol neb  Continue home singulair  Hypertension  Hold home lisinopril and diuretics 2/2 SABRINA  Hyperlipidemia  Continue statin  Anemia  Baseline hgb 9-10  Hgb on admission 12  Likely hemoconcentrated  Trend hgb and maintain > 7  Severe obesity (BMI 35.0-39.9) with comorbidity ([facility])  Encourage lifestyle modifications as appropriate  Gastroesophageal reflux disease without esophagitis  Continue home PPI  ATN (acute tubular necrosis) ([facility])    Oliguria and anuria    PAF (paroxysmal atrial fibrillation) ([facility])  Resolved w IV lopressor  Cardiology following  Pt initiated on eliquis.           Hospital Course:     Vernell Darby is a 49 y.o. female patient who originally presented to the hospital on   Admission Orders (From admission, onward)       Ordered        09/22/24 2212  INPATIENT ADMISSION  Once                         due to profound acute kidney injury related to increased diuretics, prerenal volume losses in setting of  decreased oral intake and nausea vomiting, contrast exposure.  Patient evaluated by nephrology.  She has had marked improvement of renal function without any renal complaints.  She did complete insulin drip to control her diabetes while she was here.    Please see above list of diagnoses and related plan for additional information.     Physical Exam:    GEN: No acute distress, comfortable  HEEENT: No JVD, PERRLA, no scleral icterus  RESP: Lungs clear to auscultation bilaterally  CV: RRR, +s1/s2   ABD: SOFT NON TENDER, POSITIVE BOWEL SOUNDS, NO DISTENTION  PSYCH: CALM  NEURO: Mentation baseline, NO FOCAL DEFICITS  SKIN: NO RASH  EXTREM: NO EDEMA    CONSULTING PROVIDERS   IP CONSULT TO NEPHROLOGY  IP CONSULT TO ENDOCRINOLOGY  IP CONSULT TO CARDIOLOGY    PROCEDURES PERFORMED  * No surgery found *    RADIOLOGY RESULTS  Echo complete w/ contrast if indicated    Result Date: 9/24/2024  Narrative:   Left Ventricle: Left ventricular cavity size is normal. Wall thickness is normal. The left ventricular ejection fraction is 60% by biplane measurement. Systolic function is normal. Wall motion is normal. Diastolic function is mildly abnormal, consistent with grade I (abnormal) relaxation.   Right Ventricle: Right ventricle is not well visualized. Systolic function is normal.   Left Atrium: The atrium is normal in size.   Right Atrium: The atrium is normal in size.   Mitral Valve: There is mild annular calcification.     CT soft tissue neck wo contrast    Result Date: 9/23/2024  Narrative: CT SOFT TISSUE NECK WITHOUT CONTRAST INDICATION:   Severe odynophagia, increased oral secretions. COMPARISON: CTA chest PE study 9/20/2024, 8/14/2020. TECHNIQUE:  Axial, sagittal, and coronal 2D reformatted images were created from the axial source data and submitted for interpretation. Radiation dose length product (DLP) for this visit:  562.32 mGy-cm .  This examination, like all CT scans performed in the [facility], was  performed utilizing techniques to minimize radiation dose exposure, including the use of iterative  reconstruction and automated exposure control. IMAGE QUALITY:  Diagnostic. FINDINGS: BRAIN PARENCHYMA: No acute intracranial abnormality. ORBITS: Sequela of bilateral cataract surgery. 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 Otherwise, clear sinuses. NASAL CAVITY AND NASOPHARYNX:  Normal. SUPRAHYOID NECK: Dental amalgam with beam hardening artifact slightly limits evaluation of anterior oral cavity; otherwise, normal visualized oral cavity. Normal oropharynx. Normal parapharyngeal and retropharyngeal spaces.  Normal epiglottis.  Normal infratemporal space. INFRAHYOID NECK: Small amount of secretions in posterior pharyngeal wall extending to hypopharynx and partially aerated bilateral piriform sinuses. Aryepiglottic folds are normal bilaterally. Normal larynx and subglottic airway. THYROID GLAND:  Unremarkable. PAROTID AND SUBMANDIBULAR GLANDS: Normal. LYMPH NODES:  No pathologic or enlarged adenopathy. VASCULAR STRUCTURES:  Limited without contrast. Mild scattered calcified atherosclerotic disease. THORACIC INLET: Subsegmental atelectasis in visualized bilateral upper lobes. No focal consolidation in visualized upper lung zones. BONY STRUCTURES: No acute fracture or destructive osseous lesion. Mild spinal degenerative changes.     Impression: Small amount of secretions in posterior pharyngeal wall extending to hypopharynx and partially aerated bilateral piriform sinuses. Consider direct visualization by ENT for further evaluation given provided history. Additional chronic/incidental findings as detailed above. The study was marked in EPIC for immediate notification. Workstation performed: [id number]     XR chest 1 view portable    Result Date: 9/23/2024  Narrative: XR CHEST PORTABLE INDICATION: cp. COMPARISON: CT chest 9/20/2024  FINDINGS: Clear lungs. No pneumothorax or pleural effusion. Normal cardiomediastinal silhouette. Bones are unremarkable for age. Normal upper abdomen.     Impression: No acute cardiopulmonary disease. Workstation performed: [id number]     CT abdomen pelvis wo contrast    Result Date: 9/22/2024  Narrative: CT ABDOMEN AND PELVIS WITHOUT IV CONTRAST INDICATION: epigastric pain, renal failure. COMPARISON: No prior CT abdomen pelvis. TECHNIQUE: CT examination of the abdomen and pelvis was performed without intravenous contrast. Multiplanar 2D reformatted images were created from the source data. This examination, like all CT scans performed in the [facility], was performed utilizing techniques to minimize radiation dose exposure, including the use of iterative reconstruction and automated exposure control. Radiation dose length product (DLP) for this visit: 840.17 mGy-cm Enteric Contrast: Not administered. FINDINGS: ABDOMEN LOWER CHEST: No clinically significant abnormality in the visualized lower chest. LIVER/BILIARY TREE: Unremarkable. GALLBLADDER: No calcified gallstones. No pericholecystic inflammatory change. Vicarious contrast in the gallbladder lumen from prior CT. SPLEEN: Unremarkable. PANCREAS: Unremarkable. ADRENAL GLANDS: Unremarkable. KIDNEYS/URETERS: Retained contrast from CT performed 2 days prior throughout the renal parenchyma. No hydroureteronephrosis. STOMACH AND BOWEL: Possible wall thickening of the mid small bowel with mild surrounding fat stranding (for example series 2, image 100). Possible wall thickening of the descending colon versus underdistention. No abnormal luminal dilation. APPENDIX: Normal. ABDOMINOPELVIC CAVITY: No ascites. No pneumoperitoneum. No lymphadenopathy. VESSELS: Atherosclerosis without abdominal aortic aneurysm. PELVIS REPRODUCTIVE ORGANS: Unremarkable for patient's age. URINARY BLADDER: Unremarkable. ABDOMINAL WALL/INGUINAL REGIONS: Unremarkable. BONES:  No acute fracture or suspicious osseous lesion. Spinal degenerative changes.     Impression: 1.  Retained contrast within the kidneys from CT performed 2 days prior is indicative of impaired renal function. No hydroureteronephrosis. 2.  Possible mild enterocolitis. No abdominopelvic fluid collection. Workstation performed: [id number]     CTA chest pe study    Result Date: 9/20/2024  Narrative: CTA - CHEST WITH IV CONTRAST - PULMONARY ANGIOGRAM INDICATION: R06.09: Other forms of dyspnea Z78.9: Other specified health status. Dyspnea on exertion not improving with diuresis. COMPARISON: CTA chest 8/14/2020 TECHNIQUE: CTA examination of the chest was performed using angiographic technique according to a protocol specifically tailored to evaluate for pulmonary embolism. Multiplanar 2D reformatted images were created from the source data. In addition, coronal  3D MIP postprocessing was performed on the acquisition scanner. Radiation dose length product (DLP) for this visit: 504.39 mGy-cm . This examination, like all CT scans performed in the [facility], was performed utilizing techniques to minimize radiation dose exposure, including the use of iterative reconstruction and automated exposure control. IV Contrast: 85 mL of iohexol (OMNIPAQUE) FINDINGS: PULMONARY ARTERIAL TREE:  No pulmonary embolus. LUNGS: Mild bilateral upper lobe predominant mosaic attenuation could indicate small airway disease. No consolidation. There is no tracheal or endobronchial lesion. No suspicious pulmonary nodules. Right lower lobe granuloma. PLEURA: Unremarkable. HEART/GREAT VESSELS: Mild cardiomegaly. Moderate coronary artery calcifications. Aortic valve and mitral annular calcifications. No thoracic aortic aneurysm. MEDIASTINUM AND TIMI: Probable small sliding-type hiatal hernia. Mild thickening of the wall of the esophagus in the chest, nonspecific but suggesting possible esophagitis. No mediastinal or hilar lymphadenopathy.  CHEST WALL AND LOWER NECK: Unremarkable. VISUALIZED STRUCTURES IN THE UPPER ABDOMEN: Unremarkable. OSSEOUS STRUCTURES: Spinal degenerative changes are noted. No acute fracture or destructive osseous lesion.     Impression: No pulmonary embolus. Mild bilateral upper lobe predominant mosaic attenuation could indicate small airway disease. Probable small sliding-type hiatal hernia. Mild thickening of the wall of the esophagus in the chest, nonspecific but suggesting possible esophagitis. Resident: Doug Ye I, the attending radiologist, have reviewed the images and agree with the final report above. Workstation performed: [id number]     XR chest 1 view portable    Result Date: 9/10/2024  Narrative: XR CHEST PORTABLE INDICATION: SOB. COMPARISON: Chest radiograph December 8, 2023 FINDINGS: Clear lungs. No pneumothorax or pleural effusion. Normal cardiomediastinal silhouette. Bones are unremarkable for age. Normal upper abdomen.     Impression: No acute cardiopulmonary disease. Workstation performed: [id number]       LABS  Results from last 7 days   Lab Units 09/28/24  0354 09/27/24  0440 09/26/24  0649 09/25/24  0324 09/24/24  0428 09/23/24  0440 09/22/24  2137 09/22/24  1931 09/22/24  1929   WBC Thousand/uL 11.28* 12.39* 10.64* 12.65* 13.12* 20.33*  --  18.02*  --    HEMOGLOBIN g/dL 11.0* 10.9* 11.4* 11.4* 10.4* 10.3*  --  12.0  --    I STAT HEMOGLOBIN g/dl  --   --   --   --   --   --  11.6  --  13.3   HEMATOCRIT % 34.2* 34.2* 34.5* 34.7* 30.8* 31.5*  --  35.0  --    HEMATOCRIT, ISTAT %  --   --   --   --   --   --  34*  --  39   MCV fL 91 90 89 87 84 87  --  86  --    PLATELETS Thousands/uL 380 392* 411* 441* 416* 453*  --  467*  --      Results from last 7 days   Lab Units 09/28/24  0354 09/27/24  1000 09/26/24  0649 09/25/24  0324 09/24/24  1016 09/24/24  0428 09/23/24  2159 09/23/24  1553 09/23/24  1012 09/23/24  0440 09/22/24  2134 09/22/24  1931   SODIUM mmol/L 138 135 133* 133* 131* 134*  134* 132* 132*  133* 137   < > 126*   POTASSIUM mmol/L 4.0 3.7 3.5 3.7 4.4 4.2  4.2 4.3 4.7 5.0 4.5   < > 5.3   CHLORIDE mmol/L 96 92* 87* 86* 86* 88*  89* 89* 90* 89* 92*   < > 81*   CO2 mmol/L 32 31 29 25 27 25  25 25 25 28 27   < > 26   CO2, I-STAT   --   --   --   --   --   --   --   --   --   --    < >  --    BUN mg/dL 83* 83* 91* 96* 107* 101*  102* 105* 106* 103* 107*   < > 114*   CREATININE mg/dL 2.77* 3.49* 5.67* 8.03* 8.93* 8.80*  8.86* 8.68* 8.44* 7.79* 7.82*   < > 7.53*   CALCIUM mg/dL 8.7 8.8 8.7 8.7 8.7 8.2*  8.2* 8.3* 8.1* 8.1* 8.4   < > 9.0   ALBUMIN g/dL 3.5 3.8 3.6 3.8  --  3.4*  --   --   --  3.5  --  4.0   TOTAL BILIRUBIN mg/dL 0.21 0.22 0.24 0.24  --  0.20  --   --   --  0.25  --  0.29   ALK PHOS U/L 88 92 88 101  --  89  --   --   --  86  --  124*   ALT U/L 63* 33 10 7  --  9  --   --   --  10  --  14   AST U/L 71* 39 13 8*  --  6*  --   --   --  8*  --  9*   EGFR ml/min/1.73sq m 19 14 8 5 4 4  4 4 5 5 5   < > 5   GLUCOSE RANDOM mg/dL 154* 175* 169* 214* 222* 149*  149* 181* 123 203* 67   < > 770*    < > = values in this interval not displayed.     Results from last 7 days   Lab Units 09/22/24 2134 09/22/24  1931   HS TNI 0HR ng/L  --  21   HS TNI 2HR ng/L 22  --               Results from last 7 days   Lab Units 09/28/24  0721 09/28/24  0207 09/27/24  2355 09/27/24  2159 09/27/24  1956 09/27/24  1758 09/27/24  1626 09/27/24  1425 09/27/24  1204 09/27/24  1045   POC GLUCOSE mg/dl 162* 160* 179* 249* 244* 172* 185* 264* 245* 197*     Results from last 7 days   Lab Units 09/22/24  1931   HEMOGLOBIN A1C % 9.5*         Results from last 7 days   Lab Units 09/24/24  0428 09/23/24  0440 09/23/24  0440 09/22/24  1931   LACTIC ACID mmol/L  --   --  0.9 0.9   PROCALCITONIN ng/ml 0.42*   < > 0.41*  --     < > = values in this interval not displayed.           Cultures:   Results from last 7 days   Lab Units 09/23/24  0356   COLOR UA  Yellow   CLARITY UA  Slightly Cloudy   SPEC GRAV UA  1.020   PH UA  6.0    LEUKOCYTES UA  Large*   NITRITE UA  Negative   GLUCOSE UA mg/dl 300 (3/10%)*   KETONES UA mg/dl Negative   BILIRUBIN UA  Negative   BLOOD UA  Large*      Results from last 7 days   Lab Units 09/23/24  0356   RBC UA /hpf 2-4   WBC UA /hpf 20-30*   EPITHELIAL CELLS WET PREP /hpf Innumerable*   BACTERIA UA /hpf Occasional      Results from last 7 days   Lab Units 09/23/24  0439 09/23/24  0356   BLOOD CULTURE  No Growth After 4 Days.  No Growth After 4 Days.  --    URINE CULTURE   --  >100,000 cfu/ml Beta Hemolytic Streptococcus Group B*  >100,000 cfu/ml         Condition at Discharge:  good      Discharge instructions/Information to patient and family:   See after visit summary for information provided to patient and family.  The above hospital course, results, incidental findings, need for follow up was discussed in detail with the patient and/or family.    Provisions for Follow-Up Care:  See after visit summary for information related to follow-up care and any pertinent home health orders.      Disposition:     Home       Discharge Statement:  I spent 42 minutes discharging the patient. This time was spent on the day of discharge. I had direct contact with the patient on the day of discharge. Greater than 50% of the total time was spent examining patient, answering all patient questions, arranging and discussing plan of care with patient as well as directly providing post-discharge instructions.  Additional time then spent on discharge activities.    Discharge Medications:  See after visit summary for reconciled discharge medications provided to patient and family.      ** Please Note: This note has been constructed using a voice recognition system **

## 2024-09-28 NOTE — ASSESSMENT & PLAN NOTE
Cardiology team is consulted.  No evidence of volume overload on CT.  Chest x-ray negative for acute cardiopulmonary disease.  Outpatient diuretic: Torsemide 40 mg daily.  Reports recent adjustment, previously was on Lasix and changed to torsemide on 9/13/2024.  Previously on Lasix infusion to force urine output.  Resume diuretics as needed.  Recommend torsemide 20 mg daily instead of 40.  I instructed Mohini to check her weight when she gets home today so that she has a baseline weight which would be likely near euvolemia or at euvolemia.

## 2024-09-28 NOTE — ASSESSMENT & PLAN NOTE
Wt Readings from Last 3 Encounters:   09/28/24 80.7 kg (177 lb 12.8 oz)   09/18/24 82.2 kg (181 lb 3.5 oz)   09/17/24 82.7 kg (182 lb 6.4 oz)       5/2024 Echo: EF 65%, G2DD  Recently had diuretics up-titrated outpatient due to persistent SOB/PRICE  Currently examines hypovolemic  Hold diuresis  Strict I/O  Daily weights

## 2024-09-28 NOTE — PROGRESS NOTES
Progress Note - Nephrology   Name: Vernell Darby 49 y.o. female I MRN: [id number]  Unit/Bed#: -01 I Date of Admission: 9/22/2024   Date of Service: 9/28/2024 I Hospital Day: 6     Assessment & Plan  SABRINA (acute kidney injury) ([facility])  Suspect ATN with recent contrast exposure, could have component of volume depletion with vomiting and diarrhea as well as increase in diuretics, and ACE inhibition.  Had CTA on 9/20 prior to admission  Baseline creatinine 0.8 to 1.1 mg/dL  Presented with creatinine of 7.5.  Lisinopril on hold.  Peak creatinine 8.86..  Patient was on a Lasix infusion to force diuresis which was discontinued.  Appears to be euvolemic on physical exam today.  Weight 177.  I instructed her to weigh herself when she gets home so she has a baseline weight.  UA: Large leuks, 3+ protein, glucose, 2-4 RBCs, 20-30 WBCs.  CT negative for hydro, retained contrast present.  Current status: Renal function improving.  Creatinine down to 2.77.  Volume status acceptable.  Stable from a renal standpoint.  Cleared for discharge  Continue to avoid nephrotoxins, hypotension, IV contrast  Follow-up labs in 5 to 7 days  When torsemide resumed recommend 20 mg daily rather than 40 mg.  Hyponatremia  In the setting of hyperglycemia  Current sodium level 38  Hypertension  Blood pressure remains stable and acceptable.  Home medication: Lisinopril 40 mg daily, torsemide 40 mg daily.  Current medication: Metoprolol 12.5 mg every 12 hours.  Avoid hypotension or high fluctuation in blood pressure.  Anemia  Continue to monitor and transfuse as needed for hemoglobin less than 7.0.  Chronic diastolic congestive heart failure (HCC)  Cardiology team is consulted.  No evidence of volume overload on CT.  Chest x-ray negative for acute cardiopulmonary disease.  Outpatient diuretic: Torsemide 40 mg daily.  Reports recent adjustment, previously was on Lasix and changed to torsemide on 9/13/2024.  Previously on Lasix infusion to force urine  output.  Resume diuretics as needed.  Recommend torsemide 20 mg daily instead of 40.  I instructed Mohini to check her weight when she gets home today so that she has a baseline weight which would be likely near euvolemia or at euvolemia.    N&V (nausea and vomiting)  Management per primary care team.  Resolved  SIRS (systemic inflammatory response syndrome) (HCC)  Resolved    I have reviewed the nephrology recommendations including follow-up labs/restart diuretic, with primary service, and we are in agreement with renal plan including the information outlined above. Ok for discharge from Nephrology service perspective.    History of Present Illness   Brief History of Admission -49-year-old female who is admitted with 2 days of nausea, vomiting and diarrhea.  She had a recent increase in diuretic because of shortness of breath.  She had an outpatient CTA which was negative for pulmonary emboli.  After the CTA she began experiencing the aforementioned symptoms and also decreased urine output.  On admission creatinine >7 prompting nephrology consult    Feeling better.  Denies shortness of breath at this time.  Feels ready to go home.  No edema.    Objective      Temp:  [97.4 °F (36.3 °C)-98.3 °F (36.8 °C)] 98.3 °F (36.8 °C)  HR:  [70-78] 70  Resp:  [16-18] 18  BP: (113-124)/(56-63) 124/63  O2 Device: None (Room air)         Vitals:    09/28/24 0353 09/28/24 0537   Weight: 80.6 kg (177 lb 12.8 oz) 80.7 kg (177 lb 12.8 oz)     I/O last 24 hours:  In: 2280 [P.O.:2280]  Out: 2550 [Urine:2550]  Lines/Drains/Airways       Active Status       None                  Physical Exam  Vitals and nursing note reviewed.   Constitutional:       General: She is not in acute distress.     Appearance: She is well-developed.   HENT:      Head: Normocephalic and atraumatic.      Nose: Nose normal.      Mouth/Throat:      Mouth: Mucous membranes are moist.   Eyes:      Extraocular Movements: Extraocular movements intact.       Conjunctiva/sclera: Conjunctivae normal.   Cardiovascular:      Rate and Rhythm: Normal rate and regular rhythm.      Heart sounds: No murmur heard.  Pulmonary:      Effort: Pulmonary effort is normal. No respiratory distress.      Breath sounds: Normal breath sounds. No stridor. No wheezing, rhonchi or rales.   Abdominal:      General: There is no distension.      Palpations: Abdomen is soft.      Tenderness: There is no abdominal tenderness.   Musculoskeletal:         General: No swelling, tenderness or signs of injury.      Cervical back: Neck supple. No rigidity or tenderness.      Right lower leg: No edema.      Left lower leg: No edema.   Skin:     General: Skin is warm and dry.      Capillary Refill: Capillary refill takes less than 2 seconds.      Coloration: Skin is not pale.      Findings: No erythema, lesion or rash.   Neurological:      Mental Status: She is alert and oriented to person, place, and time.   Psychiatric:         Mood and Affect: Mood normal.         Behavior: Behavior normal.         Thought Content: Thought content normal.         Judgment: Judgment normal.        Medications:    Current Facility-Administered Medications:     acetaminophen (TYLENOL) tablet 650 mg, 650 mg, Oral, Q6H PRN, Christina Saunders PA-C, 650 mg at 09/23/24 1959    albuterol inhalation solution 2.5 mg, 2.5 mg, Nebulization, Q6H PRN, Christina Saunders PA-C    apixaban (ELIQUIS) tablet 5 mg, 5 mg, Oral, BID, Cem Gamez PA-C, 5 mg at 09/27/24 2105    aspirin (ECOTRIN LOW STRENGTH) EC tablet 81 mg, 81 mg, Oral, Daily, Christina Saunders PA-C, 81 mg at 09/27/24 0816    atorvastatin (LIPITOR) tablet 80 mg, 80 mg, Oral, Daily, Christina Saunders PA-C, 80 mg at 09/27/24 0816    calcium carbonate (TUMS) chewable tablet 500 mg, 500 mg, Oral, Daily PRN, Cindi Guillermo MD, 500 mg at 09/27/24 0940    fluticasone-vilanterol 200-25 mcg/actuation 1 puff, 1 puff, Inhalation, Daily, Christina Saunders PA-C, 1 puff at 09/27/24 0815    insulin glargine  (LANTUS) subcutaneous injection 45 Units 0.45 mL, 45 Units, Subcutaneous, HS, Michela Gamboa MD, 45 Units at 09/27/24 2201    insulin lispro (HumALOG/ADMELOG) 100 units/mL subcutaneous injection 1-6 Units, 1-6 Units, Subcutaneous, TID AC **AND** Fingerstick Glucose (POCT), , , TID AC, Michela Gamboa MD    insulin lispro (HumALOG/ADMELOG) 100 units/mL subcutaneous injection 15 Units, 15 Units, Subcutaneous, TID With Meals, Michela Gamboa MD    metoprolol tartrate (LOPRESSOR) partial tablet 12.5 mg, 12.5 mg, Oral, Q12H KENNY, Christina Saunders PA-C, 12.5 mg at 09/27/24 2105    montelukast (SINGULAIR) tablet 10 mg, 10 mg, Oral, HS, Christina Saunders PA-C, 10 mg at 09/27/24 2105    ondansetron (ZOFRAN) injection 4 mg, 4 mg, Intravenous, Q6H PRN, Christina Saunders PA-C    pantoprazole (PROTONIX) EC tablet 40 mg, 40 mg, Oral, Early Morning, Yamileth Naik PA-C, 40 mg at 09/28/24 0549    PARoxetine (PAXIL) tablet 20 mg, 20 mg, Oral, Daily, Christina Saunders PA-C, 20 mg at 09/27/24 0816    phenol (CHLORASEPTIC) 1.4 % mucosal liquid 1 spray, 1 spray, Mouth/Throat, Q2H PRN, Christina Saunders PA-C, 1 spray at 09/22/24 2347    saliva substitute (MOUTH KOTE) mucosal solution 5 spray, 5 spray, Mouth/Throat, 4x Daily PRN, Christina Saunders PA-C, 5 spray at 09/24/24 0245      Lab Results: I have reviewed the following results:   Results from last 7 days   Lab Units 09/28/24  0354 09/27/24  1000 09/27/24  0440 09/26/24  0649 09/25/24  0324 09/24/24  1016 09/24/24  0428 09/23/24  2159 09/23/24  1553 09/23/24  1012 09/23/24  0440 09/22/24 2137 09/22/24 2134 09/22/24  1931 09/22/24  1929   WBC Thousand/uL 11.28*  --  12.39* 10.64* 12.65*  --  13.12*  --   --   --  20.33*  --   --  18.02*  --    HEMOGLOBIN g/dL 11.0*  --  10.9* 11.4* 11.4*  --  10.4*  --   --   --  10.3*  --   --  12.0  --    I STAT HEMOGLOBIN g/dl  --   --   --   --   --   --   --   --   --   --   --  11.6  --   --  13.3   HEMATOCRIT % 34.2*  --  34.2* 34.5* 34.7*  --  30.8*  --   --   --  31.5*  --   --   "35.0  --    HEMATOCRIT, ISTAT %  --   --   --   --   --   --   --   --   --   --   --  34*  --   --  39   PLATELETS Thousands/uL 380  --  392* 411* 441*  --  416*  --   --   --  453*  --   --  467*  --    POTASSIUM mmol/L 4.0 3.7  --  3.5 3.7 4.4 4.2  4.2 4.3 4.7 5.0 4.5  --    < > 5.3  --    CHLORIDE mmol/L 96 92*  --  87* 86* 86* 88*  89* 89* 90* 89* 92*  --    < > 81*  --    CO2 mmol/L 32 31  --  29 25 27 25  25 25 25 28 27  --    < > 26  --    CO2, I-STAT mmol/L  --   --   --   --   --   --   --   --   --   --   --  29  --   --  30   BUN mg/dL 83* 83*  --  91* 96* 107* 101*  102* 105* 106* 103* 107*  --    < > 114*  --    CREATININE mg/dL 2.77* 3.49*  --  5.67* 8.03* 8.93* 8.80*  8.86* 8.68* 8.44* 7.79* 7.82*  --    < > 7.53*  --    CALCIUM mg/dL 8.7 8.8  --  8.7 8.7 8.7 8.2*  8.2* 8.3* 8.1* 8.1* 8.4  --    < > 9.0  --    MAGNESIUM mg/dL  --   --   --   --   --  2.1 2.2  2.3 2.2 2.0 2.0 2.1  --   --  1.3*  --    PHOSPHORUS mg/dL  --   --   --   --   --   --   --   --   --   --  4.9*  --   --  7.1*  --    ALBUMIN g/dL 3.5 3.8  --  3.6 3.8  --  3.4*  --   --   --  3.5  --   --  4.0  --    GLUCOSE, ISTAT mg/dl  --   --   --   --   --   --   --   --   --   --   --  590*  --   --  >600*    < > = values in this interval not displayed.       Administrative Statements     Portions of the record may have been created with voice recognition software. Occasional wrong word or \"sound a like\" substitutions may have occurred due to the inherent limitations of voice recognition software. Read the chart carefully and recognize, using context, where substitutions have occurred.If you have any questions, please contact the dictating provider.  "

## 2024-09-28 NOTE — CASE MANAGEMENT
Case Management Discharge Planning Note    Patient name Vernell Darby  Location /-01 MRN [id number]  : 1975 Date 2024       Current Admission Date: 2024  Current Admission Diagnosis:SABRINA (acute kidney injury) ([facility])   Patient Active Problem List    Diagnosis Date Noted Date Diagnosed    PAF (paroxysmal atrial fibrillation) ([facility]) 2024     N&V (nausea and vomiting) 2024     SIRS (systemic inflammatory response syndrome) ([facility]) 2024     ATN (acute tubular necrosis) ([facility]) 2024     Oliguria and anuria 2024     SABRINA (acute kidney injury) ([facility]) 2024     Sore throat 2024     Stage 2 chronic kidney disease 2024     Hyperphosphatemia 2024     Hyponatremia 2023     Primary localized osteoarthritis of right knee 10/27/2022     Chronic diastolic congestive heart failure ([facility]) 2021     B12 deficiency 2021     Nuclear sclerotic cataract of left eye 2020     Diabetic polyneuropathy associated with type 1 diabetes mellitus ([facility]) 2020     Diabetic nephropathy associated with type 1 diabetes mellitus ([facility]) 2020     Other proteinuria 2020     Severe obesity (BMI 35.0-39.9) with comorbidity ([facility]) 08/10/2019     Gastroesophageal reflux disease without esophagitis 08/10/2019     Anemia 02/15/2017     Thrombocytosis 02/15/2017     Type 1 diabetes mellitus with hyperglycemia ([facility]) 2016     Hyperlipidemia 2016     Polycystic ovarian syndrome 2016     Retinal hemorrhage 2016     Asthma      Hypertension      Moderate episode of recurrent major depressive disorder ([facility])      Nervousness 07/10/2014     Both eyes affected 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, associated with type 1 diabetes mellitus ([facility]) 2012       LOS (days): 6  Geometric Mean LOS (GMLOS) (days): 4.1  Days to GMLOS:-1.4     OBJECTIVE:  Risk of Unplanned Readmission Score: 20.9         Current  admission status: Inpatient   Preferred Pharmacy:   [id number] - [place], [place] - [address] [address] [facility] [place] [address]  Phone: [phone] Fax: [phone]    Primary Care Provider: Rika Nugent MD    Primary Insurance: BLUE CROSS  Secondary Insurance:     DISCHARGE DETAILS:                                          Other Referral/Resources/Interventions Provided:  Interventions: Prescription Price Check  Referral Comments: Valentino checked pt's new Eliquis. Per Pharmacist, Eliquis is covered under pt's insurance, and the 30-day supply is $25. CM informed SLIM and pt, and pt is agreeable to cost.

## 2024-09-28 NOTE — ASSESSMENT & PLAN NOTE
Lab Results   Component Value Date    HGBA1C 9.5 (H) 09/22/2024       Recent Labs     09/27/24  2159 09/27/24  2355 09/28/24  0207 09/28/24  0721   POCGLU 249* 179* 160* 162*       Blood Sugar Average: Last 72 hrs:  (P) [id number]    Hx of T1DM, follows with Dr. Ascencio from Endocrinology  Presented with hyperglycemia, no DKA  Cont regular insulin gtt per endo - weaning off - lantus to resume 9/26 pm  Q2h BG monitoring  Maintain -180

## 2024-09-28 NOTE — ASSESSMENT & PLAN NOTE
Suspect ATN with recent contrast exposure, could have component of volume depletion with vomiting and diarrhea as well as increase in diuretics, and ACE inhibition.  Had CTA on 9/20 prior to admission  Baseline creatinine 0.8 to 1.1 mg/dL  Presented with creatinine of 7.5.  Lisinopril on hold.  Peak creatinine 8.86..  Patient was on a Lasix infusion to force diuresis which was discontinued.  Appears to be euvolemic on physical exam today.  Weight 177.  I instructed her to weigh herself when she gets home so she has a baseline weight.  UA: Large leuks, 3+ protein, glucose, 2-4 RBCs, 20-30 WBCs.  CT negative for hydro, retained contrast present.  Current status: Renal function improving.  Creatinine down to 2.77.  Volume status acceptable.  Stable from a renal standpoint.  Cleared for discharge  Continue to avoid nephrotoxins, hypotension, IV contrast  Follow-up labs in 5 to 7 days  When torsemide resumed recommend 20 mg daily rather than 40 mg.

## 2024-09-28 NOTE — PLAN OF CARE
Problem: Potential for Falls  Goal: Patient will remain free of falls  Description: INTERVENTIONS:  - Educate patient/family on patient safety including physical limitations  - Instruct patient to call for assistance with activity   - Consult OT/PT to assist with strengthening/mobility   - Keep Call bell within reach  - Keep bed low and locked with side rails adjusted as appropriate  - Keep care items and personal belongings within reach  - Initiate and maintain comfort rounds  - Make Fall Risk Sign visible to staff  - Offer Toileting every 2 Hours, in advance of need  - Initiate/Maintain bed/chair alarm  - Obtain necessary fall risk management equipment  - Apply yellow socks and bracelet for high fall risk patients  - Consider moving patient to room near nurses station  Outcome: Progressing     Problem: PAIN - ADULT  Goal: Verbalizes/displays adequate comfort level or baseline comfort level  Description: Interventions:  - Encourage patient to monitor pain and request assistance  - Assess pain using appropriate pain scale  - Administer analgesics based on type and severity of pain and evaluate response  - Implement non-pharmacological measures as appropriate and evaluate response  - Consider cultural and social influences on pain and pain management  - Notify physician/advanced practitioner if interventions unsuccessful or patient reports new pain  Outcome: Progressing     Problem: INFECTION - ADULT  Goal: Absence or prevention of progression during hospitalization  Description: INTERVENTIONS:  - Assess and monitor for signs and symptoms of infection  - Monitor lab/diagnostic results  - Monitor all insertion sites, i.e. indwelling lines, tubes, and drains  - Monitor endotracheal if appropriate and nasal secretions for changes in amount and color  - [place] appropriate cooling/warming therapies per order  - Administer medications as ordered  - Instruct and encourage patient and family to use good hand hygiene  technique  - Identify and instruct in appropriate isolation precautions for identified infection/condition  Outcome: Progressing  Goal: Absence of fever/infection during neutropenic period  Description: INTERVENTIONS:  - Monitor WBC    Outcome: Progressing     Problem: SAFETY ADULT  Goal: Patient will remain free of falls  Description: INTERVENTIONS:  - Educate patient/family on patient safety including physical limitations  - Instruct patient to call for assistance with activity   - Consult OT/PT to assist with strengthening/mobility   - Keep Call bell within reach  - Keep bed low and locked with side rails adjusted as appropriate  - Keep care items and personal belongings within reach  - Initiate and maintain comfort rounds  - Make Fall Risk Sign visible to staff  - Offer Toileting every 2 Hours, in advance of need  - Initiate/Maintain bed/chair alarm  - Obtain necessary fall risk management equipment  - Apply yellow socks and bracelet for high fall risk patients  - Consider moving patient to room near nurses station  Outcome: Progressing  Goal: Maintain or return to baseline ADL function  Description: INTERVENTIONS:  -  Assess patient's ability to carry out ADLs; assess patient's baseline for ADL function and identify physical deficits which impact ability to perform ADLs (bathing, care of mouth/teeth, toileting, grooming, dressing, etc.)  - Assess/evaluate cause of self-care deficits   - Assess range of motion  - Assess patient's mobility; develop plan if impaired  - Assess patient's need for assistive devices and provide as appropriate  - Encourage maximum independence but intervene and supervise when necessary  - Involve family in performance of ADLs  - Assess for home care needs following discharge   - Consider OT consult to assist with ADL evaluation and planning for discharge  - Provide patient education as appropriate  Outcome: Progressing  Goal: Maintains/Returns to pre admission functional  level  Description: INTERVENTIONS:  - Perform AM-PAC 6 Click Basic Mobility/ Daily Activity assessment daily.  - Set and communicate daily mobility goal to care team and patient/family/caregiver.   - Collaborate with rehabilitation services on mobility goals if consulted  - Perform Range of Motion 3 times a day.  - Reposition patient every 2 hours.  - Dangle patient 3 times a day  - Stand patient 3 times a day  - Ambulate patient 3 times a day  - Out of bed to chair 3 times a day   - Out of bed for meals 3 times a day  - Out of bed for toileting  - Record patient progress and toleration of activity level   Outcome: Progressing     Problem: DISCHARGE PLANNING  Goal: Discharge to home or other facility with appropriate resources  Description: INTERVENTIONS:  - Identify barriers to discharge w/patient and caregiver  - Arrange for needed discharge resources and transportation as appropriate  - Identify discharge learning needs (meds, wound care, etc.)  - Arrange for interpretive services to assist at discharge as needed  - Refer to Case Management Department for coordinating discharge planning if the patient needs post-hospital services based on physician/advanced practitioner order or complex needs related to functional status, cognitive ability, or social support system  Outcome: Progressing     Problem: Knowledge Deficit  Goal: Patient/family/caregiver demonstrates understanding of disease process, treatment plan, medications, and discharge instructions  Description: Complete learning assessment and assess knowledge base.  Interventions:  - Provide teaching at level of understanding  - Provide teaching via preferred learning methods  Outcome: Progressing     Problem: CARDIOVASCULAR - ADULT  Goal: Maintains optimal cardiac output and hemodynamic stability  Description: INTERVENTIONS:  - Monitor I/O, vital signs and rhythm  - Monitor for S/S and trends of decreased cardiac output  - Administer and titrate ordered  vasoactive medications to optimize hemodynamic stability  - Assess quality of pulses, skin color and temperature  - Assess for signs of decreased coronary artery perfusion  - Instruct patient to report change in severity of symptoms  Outcome: Progressing  Goal: Absence of cardiac dysrhythmias or at baseline rhythm  Description: INTERVENTIONS:  - Continuous cardiac monitoring, vital signs, obtain 12 lead EKG if ordered  - Administer antiarrhythmic and heart rate control medications as ordered  - Monitor electrolytes and administer replacement therapy as ordered  Outcome: Progressing     Problem: RESPIRATORY - ADULT  Goal: Achieves optimal ventilation and oxygenation  Description: INTERVENTIONS:  - Assess for changes in respiratory status  - Assess for changes in mentation and behavior  - Position to facilitate oxygenation and minimize respiratory effort  - Oxygen administered by appropriate delivery if ordered  - Initiate smoking cessation education as indicated  - Encourage broncho-pulmonary hygiene including cough, deep breathe, Incentive Spirometry  - Assess the need for suctioning and aspirate as needed  - Assess and instruct to report SOB or any respiratory difficulty  - Respiratory Therapy support as indicated  Outcome: Progressing     Problem: GASTROINTESTINAL - ADULT  Goal: Minimal or absence of nausea and/or vomiting  Description: INTERVENTIONS:  - Administer IV fluids if ordered to ensure adequate hydration  - Maintain NPO status until nausea and vomiting are resolved  - Nasogastric tube if ordered  - Administer ordered antiemetic medications as needed  - Provide nonpharmacologic comfort measures as appropriate  - Advance diet as tolerated, if ordered  - Consider nutrition services referral to assist patient with adequate nutrition and appropriate food choices  Outcome: Progressing  Goal: Maintains or returns to baseline bowel function  Description: INTERVENTIONS:  - Assess bowel function  - Encourage oral  fluids to ensure adequate hydration  - Administer IV fluids if ordered to ensure adequate hydration  - Administer ordered medications as needed  - Encourage mobilization and activity  - Consider nutritional services referral to assist patient with adequate nutrition and appropriate food choices  Outcome: Progressing  Goal: Maintains adequate nutritional intake  Description: INTERVENTIONS:  - Monitor percentage of each meal consumed  - Identify factors contributing to decreased intake, treat as appropriate  - Assist with meals as needed  - Monitor I&O, weight, and lab values if indicated  - Obtain nutrition services referral as needed  Outcome: Progressing  Goal: Establish and maintain optimal ostomy function  Description: INTERVENTIONS:  - Assess bowel function  - Encourage oral fluids to ensure adequate hydration  - Administer IV fluids if ordered to ensure adequate hydration   - Administer ordered medications as needed  - Encourage mobilization and activity  - Nutrition services referral to assist patient with appropriate food choices  - Assess stoma site  - Consider wound care consult   Outcome: Progressing  Goal: Oral mucous membranes remain intact  Description: INTERVENTIONS  - Assess oral mucosa and hygiene practices  - Implement preventative oral hygiene regimen  - Implement oral medicated treatments as ordered  - Initiate Nutrition services referral as needed  Outcome: Progressing     Problem: GENITOURINARY - ADULT  Goal: Maintains or returns to baseline urinary function  Description: INTERVENTIONS:  - Assess urinary function  - Encourage oral fluids to ensure adequate hydration if ordered  - Administer IV fluids as ordered to ensure adequate hydration  - Administer ordered medications as needed  - Offer frequent toileting  - Follow urinary retention protocol if ordered  Outcome: Progressing  Goal: Absence of urinary retention  Description: INTERVENTIONS:  - Assess patient’s ability to void and empty  bladder  - Monitor I/O  - Bladder scan as needed  - Discuss with physician/AP medications to alleviate retention as needed  - Discuss catheterization for long term situations as appropriate  Outcome: Progressing  Goal: Urinary catheter remains patent  Description: INTERVENTIONS:  - Assess patency of urinary catheter  - If patient has a chronic [place], consider changing catheter if non-functioning  - Follow guidelines for intermittent irrigation of non-functioning urinary catheter  Outcome: Progressing     Problem: METABOLIC, FLUID AND ELECTROLYTES - ADULT  Goal: Electrolytes maintained within normal limits  Description: INTERVENTIONS:  - Monitor labs and assess patient for signs and symptoms of electrolyte imbalances  - Administer electrolyte replacement as ordered  - Monitor response to electrolyte replacements, including repeat lab results as appropriate  - Instruct patient on fluid and nutrition as appropriate  Outcome: Progressing  Goal: Fluid balance maintained  Description: INTERVENTIONS:  - Monitor labs   - Monitor I/O and WT  - Instruct patient on fluid and nutrition as appropriate  - Assess for signs & symptoms of volume excess or deficit  Outcome: Progressing  Goal: Glucose maintained within target range  Description: INTERVENTIONS:  - Monitor Blood Glucose as ordered  - Assess for signs and symptoms of hyperglycemia and hypoglycemia  - Administer ordered medications to maintain glucose within target range  - Assess nutritional intake and initiate nutrition service referral as needed  Outcome: Progressing     Problem: MUSCULOSKELETAL - ADULT  Goal: Maintain or return mobility to safest level of function  Description: INTERVENTIONS:  - Assess patient's ability to carry out ADLs; assess patient's baseline for ADL function and identify physical deficits which impact ability to perform ADLs (bathing, care of mouth/teeth, toileting, grooming, dressing, etc.)  - Assess/evaluate cause of self-care deficits   -  Assess range of motion  - Assess patient's mobility  - Assess patient's need for assistive devices and provide as appropriate  - Encourage maximum independence but intervene and supervise when necessary  - Involve family in performance of ADLs  - Assess for home care needs following discharge   - Consider OT consult to assist with ADL evaluation and planning for discharge  - Provide patient education as appropriate  Outcome: Progressing  Goal: Maintain proper alignment of affected body part  Description: INTERVENTIONS:  - Support, maintain and protect limb and body alignment  - Provide patient/ family with appropriate education  Outcome: Progressing     Problem: Nutrition/Hydration-ADULT  Goal: Nutrient/Hydration intake appropriate for improving, restoring or maintaining nutritional needs  Description: Monitor and assess patient's nutrition/hydration status for malnutrition. Collaborate with interdisciplinary team and initiate plan and interventions as ordered.  Monitor patient's weight and dietary intake as ordered or per policy. Utilize nutrition screening tool and intervene as necessary. Determine patient's food preferences and provide high-protein, high-caloric foods as appropriate.     INTERVENTIONS:  - Monitor oral intake, urinary output, labs, and treatment plans  - Assess nutrition and hydration status and recommend course of action  - Evaluate amount of meals eaten  - Assist patient with eating if necessary   - Allow adequate time for meals  - Recommend/ encourage appropriate diets, oral nutritional supplements, and vitamin/mineral supplements  - Order, calculate, and assess calorie counts as needed  - Recommend, monitor, and adjust tube feedings and TPN/PPN based on assessed needs  - Assess need for intravenous fluids  - Provide specific nutrition/hydration education as appropriate  - Include patient/family/caregiver in decisions related to nutrition  Outcome: Progressing     Problem: Decreased Cardiac  Output  Goal: Cardiac output adequate for individual needs  Description: INTERVENTIONS: Monitor for signs and symptoms of decreased cardiac output   - Monitor for dyspnea with exertion and at rest  - Monitor for orthopnea  - Monitor for signs of tachycardia. Place patient on telemetry monitoring.  - Assess patient for jugular vein distention  - Assess patient for lower extremity edema and poor peripheral perfusion   - Auscultate lung sound for Fine bibasilar crackles   - Monitor for cardiac arrythmias   - Administer beta blockers, antiarrhythmic, and blood pressure medications as ordered    Outcome: Progressing     Problem: Impaired Gas Exchange  Goal: Optimize oxygenation and ensure adequate ventilation  Description: INTERVENTIONS: Monitor for signs and symptoms of respiratory distress                - Elevate HOB or use high fowlers to promote lung expansion                - Administer oxygen as ordered to maintain adequate oxygenation                - Encourage use of IS to promote lung expansion and prevent PN                - Monitor ABGs to assess oxygenation status                - Monitor blood oxygen level to maintain adequate oxygenation                - Encourage cough and deep breathing exercises to promote lung expansion                - Monitor patient's mental status for increased confusion    Outcome: Progressing     Problem: Excess Fluid Volume  Goal: Patient is able to achieve and maintain homeostasis  Description: INTERVENTIONS: Monitor for sign and symptoms of fluid overload  - Evaluate LE edema every shift  - Elevate LE to prevent dependent edema  - Apply LIO stockings as ordered   - Monitor ankle circumference daily  - Assess for jugular vein distention  - Evaluate provider orders for the CHF diuretic algorithm. Administer diuretics as ordered  - Weigh the patient daily at 0600 and report a weight gain of five pounds or more   - Strict intake and output  - Monitor fluid intake and adhere to  fluid restrictions  - Assess lung sounds every shift and as needed  - Monitor vital signs and lab values (CBC, chem, BUN, BNP)  - Measure and document urine output    Outcome: Progressing     Problem: Activity Intolerance  Goal: Patient is able to perform activities within their limitations  Description: INTERVENTIONS:                       -   Alternate periods of activity with periods of rest                 -   Patients is able to maintain normal vitals heart rhythm during activity                 -   Gradually increase activity and exercise as patient can tolerate                 -   Monitor blood pressure and heart before and after exercise                  -   Monitor blood oxygen saturation during activity and apply oxygen as needed    Outcome: Progressing     Problem: Knowledge Deficit  Goal: Patient is able to verbalize understanding of Heart Failure after education  Description: INTERVENTIONS:  - Educate the patient and family on signs and symptoms of HF  - Provide the patient with HF education and HF zone tool  - Educate on the importance of daily weight in the AM and reporting a weight gain               of 3 or more pounds to their primary care physician  - Monitor for SOB  - Maintain and sodium and fluid restriction  - Educate the patient on the importance of medications such as: diuretics, betablockers,               antiarrhythmics and their purpose, dose, route, side effects and labs               if they are needed    Outcome: Progressing

## 2024-09-28 NOTE — PLAN OF CARE
Problem: Potential for Falls  Goal: Patient will remain free of falls  Description: INTERVENTIONS:  - Educate patient/family on patient safety including physical limitations  - Instruct patient to call for assistance with activity   - Consult OT/PT to assist with strengthening/mobility   - Keep Call bell within reach  - Keep bed low and locked with side rails adjusted as appropriate  - Keep care items and personal belongings within reach  - Initiate and maintain comfort rounds  - Make Fall Risk Sign visible to staff  - Offer Toileting every 2 Hours, in advance of need  - Apply yellow socks and bracelet for high fall risk patients  - Consider moving patient to room near nurses station  Outcome: Progressing     Problem: PAIN - ADULT  Goal: Verbalizes/displays adequate comfort level or baseline comfort level  Description: Interventions:  - Encourage patient to monitor pain and request assistance  - Assess pain using appropriate pain scale  - Administer analgesics based on type and severity of pain and evaluate response  - Implement non-pharmacological measures as appropriate and evaluate response  - Consider cultural and social influences on pain and pain management  - Notify physician/advanced practitioner if interventions unsuccessful or patient reports new pain  Outcome: Progressing     Problem: INFECTION - ADULT  Goal: Absence or prevention of progression during hospitalization  Description: INTERVENTIONS:  - Assess and monitor for signs and symptoms of infection  - Monitor lab/diagnostic results  - Monitor all insertion sites, i.e. indwelling lines, tubes, and drains  - Monitor endotracheal if appropriate and nasal secretions for changes in amount and color  - [place] appropriate cooling/warming therapies per order  - Administer medications as ordered  - Instruct and encourage patient and family to use good hand hygiene technique  - Identify and instruct in appropriate isolation precautions for identified  infection/condition  Outcome: Progressing  Goal: Absence of fever/infection during neutropenic period  Description: INTERVENTIONS:  - Monitor WBC    Outcome: Progressing     Problem: SAFETY ADULT  Goal: Patient will remain free of falls  Description: INTERVENTIONS:  - Educate patient/family on patient safety including physical limitations  - Instruct patient to call for assistance with activity   - Consult OT/PT to assist with strengthening/mobility   - Keep Call bell within reach  - Keep bed low and locked with side rails adjusted as appropriate  - Keep care items and personal belongings within reach  - Initiate and maintain comfort rounds  - Make Fall Risk Sign visible to staff  - Offer Toileting every 2 Hours, in advance of need  - Apply yellow socks and bracelet for high fall risk patients  - Consider moving patient to room near nurses station  Outcome: Progressing  Goal: Maintain or return to baseline ADL function  Description: INTERVENTIONS:  -  Assess patient's ability to carry out ADLs; assess patient's baseline for ADL function and identify physical deficits which impact ability to perform ADLs (bathing, care of mouth/teeth, toileting, grooming, dressing, etc.)  - Assess/evaluate cause of self-care deficits   - Assess range of motion  - Assess patient's mobility; develop plan if impaired  - Assess patient's need for assistive devices and provide as appropriate  - Encourage maximum independence but intervene and supervise when necessary  - Involve family in performance of ADLs  - Assess for home care needs following discharge   - Consider OT consult to assist with ADL evaluation and planning for discharge  - Provide patient education as appropriate  Outcome: Progressing  Goal: Maintains/Returns to pre admission functional level  Description: INTERVENTIONS:  - Perform AM-PAC 6 Click Basic Mobility/ Daily Activity assessment daily.  - Set and communicate daily mobility goal to care team and  patient/family/caregiver.   - Collaborate with rehabilitation services on mobility goals if consulted  - Perform Range of Motion 3 times a day.  - Reposition patient every 2 hours.  - Dangle patient 5 times a day  - Stand patient 5 times a day  - Ambulate patient 5 times a day  - Out of bed to chair 5 times a day   - Out of bed for meals 3 times a day  - Out of bed for toileting  - Record patient progress and toleration of activity level   Outcome: Progressing     Problem: DISCHARGE PLANNING  Goal: Discharge to home or other facility with appropriate resources  Description: INTERVENTIONS:  - Identify barriers to discharge w/patient and caregiver  - Arrange for needed discharge resources and transportation as appropriate  - Identify discharge learning needs (meds, wound care, etc.)  - Arrange for interpretive services to assist at discharge as needed  - Refer to Case Management Department for coordinating discharge planning if the patient needs post-hospital services based on physician/advanced practitioner order or complex needs related to functional status, cognitive ability, or social support system  Outcome: Progressing     Problem: Knowledge Deficit  Goal: Patient/family/caregiver demonstrates understanding of disease process, treatment plan, medications, and discharge instructions  Description: Complete learning assessment and assess knowledge base.  Interventions:  - Provide teaching at level of understanding  - Provide teaching via preferred learning methods  Outcome: Progressing     Problem: CARDIOVASCULAR - ADULT  Goal: Maintains optimal cardiac output and hemodynamic stability  Description: INTERVENTIONS:  - Monitor I/O, vital signs and rhythm  - Monitor for S/S and trends of decreased cardiac output  - Administer and titrate ordered vasoactive medications to optimize hemodynamic stability  - Assess quality of pulses, skin color and temperature  - Assess for signs of decreased coronary artery perfusion  -  Instruct patient to report change in severity of symptoms  Outcome: Progressing  Goal: Absence of cardiac dysrhythmias or at baseline rhythm  Description: INTERVENTIONS:  - Continuous cardiac monitoring, vital signs, obtain 12 lead EKG if ordered  - Administer antiarrhythmic and heart rate control medications as ordered  - Monitor electrolytes and administer replacement therapy as ordered  Outcome: Progressing     Problem: RESPIRATORY - ADULT  Goal: Achieves optimal ventilation and oxygenation  Description: INTERVENTIONS:  - Assess for changes in respiratory status  - Assess for changes in mentation and behavior  - Position to facilitate oxygenation and minimize respiratory effort  - Oxygen administered by appropriate delivery if ordered  - Initiate smoking cessation education as indicated  - Encourage broncho-pulmonary hygiene including cough, deep breathe, Incentive Spirometry  - Assess the need for suctioning and aspirate as needed  - Assess and instruct to report SOB or any respiratory difficulty  - Respiratory Therapy support as indicated  Outcome: Progressing     Problem: GASTROINTESTINAL - ADULT  Goal: Minimal or absence of nausea and/or vomiting  Description: INTERVENTIONS:  - Administer IV fluids if ordered to ensure adequate hydration  - Maintain NPO status until nausea and vomiting are resolved  - Nasogastric tube if ordered  - Administer ordered antiemetic medications as needed  - Provide nonpharmacologic comfort measures as appropriate  - Advance diet as tolerated, if ordered  - Consider nutrition services referral to assist patient with adequate nutrition and appropriate food choices  Outcome: Progressing  Goal: Maintains or returns to baseline bowel function  Description: INTERVENTIONS:  - Assess bowel function  - Encourage oral fluids to ensure adequate hydration  - Administer IV fluids if ordered to ensure adequate hydration  - Administer ordered medications as needed  - Encourage mobilization and  activity  - Consider nutritional services referral to assist patient with adequate nutrition and appropriate food choices  Outcome: Progressing  Goal: Maintains adequate nutritional intake  Description: INTERVENTIONS:  - Monitor percentage of each meal consumed  - Identify factors contributing to decreased intake, treat as appropriate  - Assist with meals as needed  - Monitor I&O, weight, and lab values if indicated  - Obtain nutrition services referral as needed  Outcome: Progressing  Goal: Establish and maintain optimal ostomy function  Description: INTERVENTIONS:  - Assess bowel function  - Encourage oral fluids to ensure adequate hydration  - Administer IV fluids if ordered to ensure adequate hydration   - Administer ordered medications as needed  - Encourage mobilization and activity  - Nutrition services referral to assist patient with appropriate food choices  - Assess stoma site  - Consider wound care consult   Outcome: Progressing  Goal: Oral mucous membranes remain intact  Description: INTERVENTIONS  - Assess oral mucosa and hygiene practices  - Implement preventative oral hygiene regimen  - Implement oral medicated treatments as ordered  - Initiate Nutrition services referral as needed  Outcome: Progressing     Problem: GENITOURINARY - ADULT  Goal: Maintains or returns to baseline urinary function  Description: INTERVENTIONS:  - Assess urinary function  - Encourage oral fluids to ensure adequate hydration if ordered  - Administer IV fluids as ordered to ensure adequate hydration  - Administer ordered medications as needed  - Offer frequent toileting  - Follow urinary retention protocol if ordered  Outcome: Progressing  Goal: Absence of urinary retention  Description: INTERVENTIONS:  - Assess patient’s ability to void and empty bladder  - Monitor I/O  - Bladder scan as needed  - Discuss with physician/AP medications to alleviate retention as needed  - Discuss catheterization for long term situations as  appropriate  Outcome: Progressing  Goal: Urinary catheter remains patent  Description: INTERVENTIONS:  - Assess patency of urinary catheter  - If patient has a chronic [place], consider changing catheter if non-functioning  - Follow guidelines for intermittent irrigation of non-functioning urinary catheter  Outcome: Progressing     Problem: METABOLIC, FLUID AND ELECTROLYTES - ADULT  Goal: Electrolytes maintained within normal limits  Description: INTERVENTIONS:  - Monitor labs and assess patient for signs and symptoms of electrolyte imbalances  - Administer electrolyte replacement as ordered  - Monitor response to electrolyte replacements, including repeat lab results as appropriate  - Instruct patient on fluid and nutrition as appropriate  Outcome: Progressing  Goal: Fluid balance maintained  Description: INTERVENTIONS:  - Monitor labs   - Monitor I/O and WT  - Instruct patient on fluid and nutrition as appropriate  - Assess for signs & symptoms of volume excess or deficit  Outcome: Progressing  Goal: Glucose maintained within target range  Description: INTERVENTIONS:  - Monitor Blood Glucose as ordered  - Assess for signs and symptoms of hyperglycemia and hypoglycemia  - Administer ordered medications to maintain glucose within target range  - Assess nutritional intake and initiate nutrition service referral as needed  Outcome: Progressing     Problem: MUSCULOSKELETAL - ADULT  Goal: Maintain or return mobility to safest level of function  Description: INTERVENTIONS:  - Assess patient's ability to carry out ADLs; assess patient's baseline for ADL function and identify physical deficits which impact ability to perform ADLs (bathing, care of mouth/teeth, toileting, grooming, dressing, etc.)  - Assess/evaluate cause of self-care deficits   - Assess range of motion  - Assess patient's mobility  - Assess patient's need for assistive devices and provide as appropriate  - Encourage maximum independence but intervene and  supervise when necessary  - Involve family in performance of ADLs  - Assess for home care needs following discharge   - Consider OT consult to assist with ADL evaluation and planning for discharge  - Provide patient education as appropriate  Outcome: Progressing  Goal: Maintain proper alignment of affected body part  Description: INTERVENTIONS:  - Support, maintain and protect limb and body alignment  - Provide patient/ family with appropriate education  Outcome: Progressing     Problem: Nutrition/Hydration-ADULT  Goal: Nutrient/Hydration intake appropriate for improving, restoring or maintaining nutritional needs  Description: Monitor and assess patient's nutrition/hydration status for malnutrition. Collaborate with interdisciplinary team and initiate plan and interventions as ordered.  Monitor patient's weight and dietary intake as ordered or per policy. Utilize nutrition screening tool and intervene as necessary. Determine patient's food preferences and provide high-protein, high-caloric foods as appropriate.     INTERVENTIONS:  - Monitor oral intake, urinary output, labs, and treatment plans  - Assess nutrition and hydration status and recommend course of action  - Evaluate amount of meals eaten  - Assist patient with eating if necessary   - Allow adequate time for meals  - Recommend/ encourage appropriate diets, oral nutritional supplements, and vitamin/mineral supplements  - Order, calculate, and assess calorie counts as needed  - Recommend, monitor, and adjust tube feedings and TPN/PPN based on assessed needs  - Assess need for intravenous fluids  - Provide specific nutrition/hydration education as appropriate  - Include patient/family/caregiver in decisions related to nutrition  Outcome: Progressing     Problem: Decreased Cardiac Output  Goal: Cardiac output adequate for individual needs  Description: INTERVENTIONS: Monitor for signs and symptoms of decreased cardiac output   - Monitor for dyspnea with  exertion and at rest  - Monitor for orthopnea  - Monitor for signs of tachycardia. Place patient on telemetry monitoring.  - Assess patient for jugular vein distention  - Assess patient for lower extremity edema and poor peripheral perfusion   - Auscultate lung sound for Fine bibasilar crackles   - Monitor for cardiac arrythmias   - Administer beta blockers, antiarrhythmic, and blood pressure medications as ordered    Outcome: Progressing     Problem: Impaired Gas Exchange  Goal: Optimize oxygenation and ensure adequate ventilation  Description: INTERVENTIONS: Monitor for signs and symptoms of respiratory distress                - Elevate HOB or use high fowlers to promote lung expansion                - Administer oxygen as ordered to maintain adequate oxygenation                - Encourage use of IS to promote lung expansion and prevent PN                - Monitor ABGs to assess oxygenation status                - Monitor blood oxygen level to maintain adequate oxygenation                - Encourage cough and deep breathing exercises to promote lung expansion                - Monitor patient's mental status for increased confusion    Outcome: Progressing     Problem: Excess Fluid Volume  Goal: Patient is able to achieve and maintain homeostasis  Description: INTERVENTIONS: Monitor for sign and symptoms of fluid overload  - Evaluate LE edema every shift  - Elevate LE to prevent dependent edema  - Apply LIO stockings as ordered   - Monitor ankle circumference daily  - Assess for jugular vein distention  - Evaluate provider orders for the CHF diuretic algorithm. Administer diuretics as ordered  - Weigh the patient daily at 0600 and report a weight gain of five pounds or more   - Strict intake and output  - Monitor fluid intake and adhere to fluid restrictions  - Assess lung sounds every shift and as needed  - Monitor vital signs and lab values (CBC, chem, BUN, BNP)  - Measure and document urine output    Outcome:  Progressing     Problem: Activity Intolerance  Goal: Patient is able to perform activities within their limitations  Description: INTERVENTIONS:                       -   Alternate periods of activity with periods of rest                 -   Patients is able to maintain normal vitals heart rhythm during activity                 -   Gradually increase activity and exercise as patient can tolerate                 -   Monitor blood pressure and heart before and after exercise                  -   Monitor blood oxygen saturation during activity and apply oxygen as needed    Outcome: Progressing     Problem: Knowledge Deficit  Goal: Patient is able to verbalize understanding of Heart Failure after education  Description: INTERVENTIONS:  - Educate the patient and family on signs and symptoms of HF  - Provide the patient with HF education and HF zone tool  - Educate on the importance of daily weight in the AM and reporting a weight gain               of 3 or more pounds to their primary care physician  - Monitor for SOB  - Maintain and sodium and fluid restriction  - Educate the patient on the importance of medications such as: diuretics, betablockers,               antiarrhythmics and their purpose, dose, route, side effects and labs               if they are needed    Outcome: Progressing

## 2024-09-30 ENCOUNTER — TELEPHONE (OUTPATIENT)
Dept: NEPHROLOGY | Facility: CLINIC | Age: 49
End: 2024-09-30

## 2024-09-30 ENCOUNTER — NURSE TRIAGE (OUTPATIENT)
Age: 49
End: 2024-09-30

## 2024-09-30 ENCOUNTER — TELEPHONE (OUTPATIENT)
Dept: CARDIOLOGY CLINIC | Facility: CLINIC | Age: 49
End: 2024-09-30

## 2024-09-30 ENCOUNTER — TELEPHONE (OUTPATIENT)
Age: 49
End: 2024-09-30

## 2024-09-30 ENCOUNTER — TRANSITIONAL CARE MANAGEMENT (OUTPATIENT)
Dept: FAMILY MEDICINE CLINIC | Facility: CLINIC | Age: 49
End: 2024-09-30

## 2024-09-30 DIAGNOSIS — N17.9 AKI (ACUTE KIDNEY INJURY) (HCC): Primary | ICD-10-CM

## 2024-09-30 NOTE — TELEPHONE ENCOUNTER
Called patient back and left another voicemail. The first available appointment in [place] would be with Huyen Bolivar 11/8.

## 2024-09-30 NOTE — TELEPHONE ENCOUNTER
"Please advise if any recommendations prior to appointment tomorrow at [phone].            Chief Complaint: post discharge question    History of Present Illness (HPI): Discharged Saturday from hospital, then stopped the Torsemide. Am I to stay off of it?  Also wanted to report weights.    VS/Weight: 9/29/24 175.2lbs, 9-30-24  176lbs    Pain: No    Risk Factors: Heart Failure and Diabetic    Recent Testing: Echo and Labs    Medicine: Torsemide stopped     Upcoming Office Visit: Yes 10-1-2024    Last Office Visit: 9-18-24    Additional Comments: advised patient provider will assess and evaluate everything tomorrow and make a determination if diuretics need to be restarted and when. Patient denies any shortness of breath and swelling at time of call.   Reason for Disposition   Nursing judgment     Advised she will be evaluated and assessed tomorrow by provider and decisions will made regarding diuretics at this time.    Answer Assessment - Initial Assessment Questions  1. REASON FOR CALL or QUESTION: \"What is your reason for calling today?\" or \"How can I best help you?\" or \"What question do you have that I can help answer?\"      I was just discharge on Saturday from the hospital and they stopped the diuretics. Do I continue to stay off them?     I also wanted to report my weights: yesterday  was 175.2lbs, today was 176lbs. No shortness of breath, no swelling.    Protocols used: Information Only Call - No Triage-ADULT-[place]    "

## 2024-09-30 NOTE — TELEPHONE ENCOUNTER
Patient called back. I scheduled her with Huyen Bolivar on 11/8. She also wanted to be put on a W/L as she said she would like to be seen sooner if possible.    Patient also wanted to see what labs needed to be done and if they can be added to her chart. Patient would like a call back once added.   No

## 2024-09-30 NOTE — TELEPHONE ENCOUNTER
Called patient and left a voicemail.   Schedule hospital follow up- acute kidney injury during hospitalization -patient goes to  [place] with  or mariaelena.

## 2024-09-30 NOTE — TELEPHONE ENCOUNTER
BMP in system and mailed to patient:    ----- Message from LELAND Wright sent at 9/29/2024  7:24 AM EDT -----  I may have sent a message yesterday if I did please forgive me.  We were seeing this lady for acute kidney injury during hospitalization at [facility].  She will need a follow-up appointment and she will need labs in approximately 5 to 7 days.  Thank you.  Please obtain BMP in 5 to 7 days.  Thank you

## 2024-09-30 NOTE — TELEPHONE ENCOUNTER
Patient called back to schedule hospital Follow up appointment. Unable to find an appointment within the appropriate amont of time.     Unable to reach a team member at this time.    Please call patient back to schedule hospital Follow up.     Patient also wondering if she will need labs done prior to coming in.

## 2024-09-30 NOTE — TELEPHONE ENCOUNTER
LM for patient about the following, asked her to call back if she has any questions:    she will need labs in approximately 5 to 7 days.

## 2024-09-30 NOTE — TELEPHONE ENCOUNTER
AMB Extended Holter Monitor ([id number]) and ([id number]):    No Authorization Required per Chitra LOPES  (Benefits) on 9/30/2024 at 11:13am    Reference  #[email or web address] LOPES On 9/30/2024 at 11:13am

## 2024-09-30 NOTE — UTILIZATION REVIEW
NOTIFICATION OF ADMISSION DISCHARGE   This is a Notification of Discharge from [facility]. Please be advised that this patient has been discharge from our facility. Below you will find the admission and discharge date and time including the patient’s disposition.   UTILIZATION REVIEW CONTACT:  Mariah Martínez  Utilization   Network Utilization Review Department  Phone: [phone] x carefully listen to the prompts. All voicemails are confidential.  Email: [email or web address]     ADMISSION INFORMATION  PRESENTATION DATE: 9/22/2024  7:14 PM  OBERVATION ADMISSION DATE: N/A  INPATIENT ADMISSION DATE: 9/22/24 10:05 PM   DISCHARGE DATE: 9/28/2024 12:16 PM   DISPOSITION:Home/Self Care    Network Utilization Review Department  ATTENTION: Please call with any questions or concerns to [phone] and carefully listen to the prompts so that you are directed to the right person. All voicemails are confidential.   For Discharge needs, contact Care Management DC Support Team at [phone] opt. 2  Send all requests for admission clinical reviews, approved or denied determinations and any other requests to dedicated fax number below belonging to the campus where the patient is receiving treatment. List of dedicated fax numbers for the Facilities:  FACILITY NAME UR FAX NUMBER   ADMISSION DENIALS (Administrative/Medical Necessity) [phone]   DISCHARGE SUPPORT TEAM ([facility]) [phone]   PARENT CHILD HEALTH (Maternity/NICU/Pediatrics) [phone]   [facility] [phone]   [facility] [phone]   [facility] [phone]   [facility] [phone]   [facility] [phone]   [facility] [phone]   General acute hospital [phone]   [facility] [phone]    [facility] [phone]   [facility] [phone]   [facility] [phone]   [facility] [phone]

## 2024-10-01 ENCOUNTER — TELEPHONE (OUTPATIENT)
Dept: NEPHROLOGY | Facility: CLINIC | Age: 49
End: 2024-10-01

## 2024-10-01 ENCOUNTER — TELEPHONE (OUTPATIENT)
Dept: CARDIOLOGY CLINIC | Facility: CLINIC | Age: 49
End: 2024-10-01

## 2024-10-01 ENCOUNTER — OFFICE VISIT (OUTPATIENT)
Dept: CARDIOLOGY CLINIC | Facility: CLINIC | Age: 49
End: 2024-10-01
Payer: COMMERCIAL

## 2024-10-01 VITALS
BODY MASS INDEX: 40.26 KG/M2 | DIASTOLIC BLOOD PRESSURE: 66 MMHG | HEART RATE: 71 BPM | OXYGEN SATURATION: 98 % | SYSTOLIC BLOOD PRESSURE: 124 MMHG | HEIGHT: 56 IN | WEIGHT: 179 LBS

## 2024-10-01 DIAGNOSIS — I50.32 CHRONIC HEART FAILURE WITH PRESERVED EJECTION FRACTION (HCC): ICD-10-CM

## 2024-10-01 DIAGNOSIS — Z09 HOSPITAL DISCHARGE FOLLOW-UP: Primary | ICD-10-CM

## 2024-10-01 DIAGNOSIS — N17.9 AKI (ACUTE KIDNEY INJURY) (HCC): ICD-10-CM

## 2024-10-01 DIAGNOSIS — I48.0 PAROXYSMAL ATRIAL FIBRILLATION (HCC): ICD-10-CM

## 2024-10-01 PROCEDURE — 99214 OFFICE O/P EST MOD 30 MIN: CPT | Performed by: PHYSICIAN ASSISTANT

## 2024-10-01 RX ORDER — TORSEMIDE 20 MG/1
20 TABLET ORAL AS NEEDED
Start: 2024-10-01

## 2024-10-01 NOTE — TELEPHONE ENCOUNTER
Called patient and left a voicemail stating the following information:     Call pt and ask if she did blood work yesterday as I do not see it and she needs to go.    I asked the patient please call back with further questions.

## 2024-10-01 NOTE — PROGRESS NOTES
General Cardiology Outpatient Visit    Vernell Darby 49 y.o. female   MRN: [id number]  Encounter: [id number]    Assessment:  Patient Active Problem List    Diagnosis Date Noted    Diabetic polyneuropathy associated with type 1 diabetes mellitus ([facility]) 08/12/2020    Diabetic nephropathy associated with type 1 diabetes mellitus ([facility]) 08/12/2020    Other proteinuria 07/21/2020    Severe obesity (BMI 35.0-39.9) with comorbidity ([facility]) 08/10/2019    Gastroesophageal reflux disease without esophagitis 08/10/2019    Anemia 02/15/2017    Thrombocytosis 02/15/2017    Type 1 diabetes mellitus with hyperglycemia ([facility]) 02/03/2016    Hyperlipidemia 02/03/2016    Polycystic ovarian syndrome 02/03/2016    Retinal hemorrhage 02/03/2016    Asthma     Hypertension     Moderate episode of recurrent major depressive disorder ([facility])     Nervousness 07/10/2014    Both eyes affected 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, associated with type 1 diabetes mellitus ([facility]) 03/22/2012    PAF (paroxysmal atrial fibrillation) ([facility]) 09/24/2024    N&V (nausea and vomiting) 09/23/2024    SIRS (systemic inflammatory response syndrome) ([facility]) 09/23/2024    ATN (acute tubular necrosis) ([facility]) 09/23/2024    Oliguria and anuria 09/23/2024    SABRINA (acute kidney injury) ([facility]) 09/22/2024    Sore throat 09/22/2024    Stage 2 chronic kidney disease 07/26/2024    Hyperphosphatemia 07/26/2024    Hyponatremia 04/06/2023    Primary localized osteoarthritis of right knee 10/27/2022    Chronic diastolic congestive heart failure ([facility]) 09/01/2021    B12 deficiency 01/13/2021    Nuclear sclerotic cataract of left eye 09/11/2020       Today's Plan:  Continue off standing diuretics. Will add on PRN torsemide; provided education on when to take.   Discontinue ASA now that patient on NOAC.   ZioPatch as per inpatient cardiology.  Nephrology on board s/p SABRINA admission. Advised to complete blood work this week as per their notes.     Plan:  Chronic  HFpEF; LVEF 65%   Weight of 177 lbs on 09/28. Today, weighs 179 lbs.    Most recent BMP from 09/28/2024: sodium 138; potassium 4.0; BUN 83; creatinine 2.77; eGFR 19.     Pharmacotherapies:  --Aldosterone Antagonist: No.  --SGLT2 Inhibitor: No, DM1.     Volume Management:  --Diuretic: PRN torsemide 20 mg .    Atrial fibrillation, paroxysmal   [id number] = 4 (sex, HF, HTN, DM).   Anticoagulation on Eliquis.    Rate control: metoprolol tartrate 12.5 mg q12 hours. .   Rhythm control: none.    Acute kidney injury    Baseline creatinine of 0.8-1.1.   Most recent BMP from 09/28/2024: sodium 138; potassium 4.0; BUN 83; creatinine 2.77; eGFR 19.     Hypertension  Diabetes mellitus, type I  Hyperlipidemia  Endometriosis  Depression    HPI:   Vernell Darby is a 49-year-old woman with a PMH as above who presents to the office for ED follow-up. Follows with Dr. EMY Whitaker.     Presented to [facility] ED on 09/10/2024 with SOB. /75 mmHg. COVID, flu, and RSV negative. Troponins WNL; BNP 91. Received DuoNeb in ED. CXR with mild increased vascular congestion. Was advised to double home Lasix dose for 2 days, and discharged home.    09/11/2024: Patient presents with her  for acute visit. Primary complaint today of PRICE, mild abdominal bloating, and mild bilateral LE swelling. [place] and does endorse increased sodium intake. Soon after returning home, she began to note PRICE. On 9/9 she doubled Lasix on her own. Has not yet noted a significant increase in urine output with this higher dose.    Due to lackluster response, patient switched to torsemide on 09/13. Advised to take 60 mg for 3 days and then reduce to 40 mg daily.    09/18/2024: Patient presents with her  for follow-up. Down 9 lbs since last visit. LE swelling essentially gone. Continues with PRICE; reports minimal improvement in this. Requesting time off accomodation form be completed for  job. 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 Started working there about 2 months ago.     Admitted to [facility] from 09/22 to 09/28/2924 after presenting with nausea and vomiting. Notably hyperglycemic with SABRINA in ED. SABRINA felt to be in setting of recent contrast + decreased oral intake. Started on IV fluids and insulin drip and nephrology consulted. Concern for ATN; given IV Lasix on 09/23. New Afib noted on telemetry on 09/25; cardiology consulted and BB and NOAC started. PRN torsemide recommended at time of discharge.     10/01/2024: Presents with her  for hospital follow-up. No current complaints. Reports nausea, vomiting, and PRICE now resolved. Has noted in the last few days that her urine output has gradually increased. No acute bleeding events with NOAC start. Also denies lightheadedness, dizziness, chest pain, SOB at rest, LE swelling, PND, and orthopnea.    Past Medical History:   Diagnosis Date    Anemia     Arthritis     Asthma     w/acute exacerbation. Last assessed: 10/26/12    Chest pain 02/03/2016    CHF (congestive heart failure) (HCC)     Depression     Diabetes mellitus (HCC)     Diabetic nephropathy (HCC)     Diabetic nephropathy associated with type 1 diabetes mellitus ([facility]CC) 08/12/2020    Diabetic retinopathy (HCC) 02/03/2016    Endometriosis     Gastroenteritis 02/03/2016    GERD (gastroesophageal reflux disease)     History of COVID-19 02/26/2021    Hyperlipidemia 02/03/2016    Hypertension     Obesity     Oligomenorrhea     Polycystic ovaries 02/03/2016    Retinal hemorrhage 02/03/2016    Retinopathy     Thrombocytosis     Type 1 diabetes mellitus with ophthalmic manifestation (HCC) 02/03/2016     Review of Systems   Constitutional:  Negative for activity change, appetite change, fatigue and unexpected weight change.   HENT:  Negative for nosebleeds.    Respiratory:  Negative for cough, chest tightness and shortness of breath.     Cardiovascular:  Negative for chest pain, palpitations and leg swelling.   Gastrointestinal:  Negative for abdominal distention, abdominal pain, blood in stool, diarrhea, nausea and vomiting.   Genitourinary:  Negative for decreased urine volume, dysuria, hematuria and urgency.   Neurological:  Negative for dizziness, syncope, weakness and light-headedness.   Psychiatric/Behavioral:  Negative for confusion and sleep disturbance. The patient is not nervous/anxious.        No Known Allergies      Current Outpatient Medications:     acetone, urine, test strip, Use to test urine ketones for high blood sugars., Disp: 25 each, Rfl: 6    albuterol (2.5 mg/3 mL) 0.083 % nebulizer solution, INHALE CONTENTS OF 1 VIAL BY NEBULIZER EVERY 6 HOURS AS NEEDED FOR WHEEZING OR FOR SHORTNESS OF BREATH., Disp: 75 mL, Rfl: 3    albuterol (PROVENTIL HFA,VENTOLIN HFA) 90 mcg/act inhaler, INHALE 2 PUFFS BY MOUTH EVERY 4 HOURS AS NEEDED FOR WHEEZING OR FOR SHORTNESS OF BREATH, Disp: 25.5 g, Rfl: 0    apixaban (ELIQUIS) 5 mg, Take 1 tablet (5 mg total) by mouth 2 (two) times a day, Disp: 60 tablet, Rfl: 0    Ascorbic Acid (VITAMIN C) 500 MG CAPS, Take 2 capsules by mouth daily, Disp: , Rfl:     atorvastatin (LIPITOR) 80 mg tablet, TAKE 1 TABLET BY MOUTH EVERY DAY, Disp: 30 tablet, Rfl: 11    Continuous Blood Gluc Sensor (Dexcom G7 Sensor), Use 1 Device every 10 days, Disp: 3 each, Rfl: 11    cyanocobalamin (VITAMIN B-12) 100 mcg tablet, Take by mouth daily, Disp: , Rfl:     Fluticasone-Salmeterol (Advair) 250-50 mcg/dose inhaler, INHALE 1 PUFF BY MOUTH TWO TIMES DAILY RINSE MOUTH AFTER USE, Disp: 60 blister, Rfl: 5    glucagon (Glucagon Emergency) 1 MG injection, Inject 1 mg under the skin once as needed for low blood sugar for up to 1 dose, Disp: 1 kit, Rfl: 3    Glucagon (Gvoke HypoPen 2-Pack) 1 MG/0.2ML SOAJ, Use if hypoglycemia prn, Disp: 1 Syringe, Rfl: 6    insulin aspart, w/niacinamide, (Fiasp FlexTouch) 100 Units/mL injection pen,  15u TID with meals., Disp: , Rfl:     insulin glargine (Toujeo SoloStar) 300 units/mL CONCENTRATED U-300 injection pen (1-unit dial), Inject 45 Units under the skin daily, Disp: , Rfl:     insulin lispro (HumaLOG) 100 units/mL injection pen, INJECT PER SLIDING SCALE OF 1UNIT PER 2 CARBS WITH BREAKFAST, 1 UNIT PER 1 CARB WITH LUNCH, 1.5 UNITS PER 1CARB WITH DINNER. MAXIMUM DOSE 150 UNITS DAILY., Disp: 15 mL, Rfl: 5    Insulin Pen Needle 33G X 4 MM MISC, Use to inject insulin 4 times a day, Disp: 400 each, Rfl: 2    metFORMIN (GLUCOPHAGE-XR) 500 mg 24 hr tablet, Take 1 tablet (500 mg total) by mouth daily, Disp: , Rfl:     metoprolol tartrate (LOPRESSOR) 25 mg tablet, Take 0.5 tablets (12.5 mg total) by mouth every 12 (twelve) hours, Disp: 30 tablet, Rfl: 0    montelukast (SINGULAIR) 10 mg tablet, TAKE 1 TABLET BY MOUTH AT BEDTIME, Disp: 90 tablet, Rfl: 1    omeprazole (PriLOSEC) 20 mg delayed release capsule, Take 20 mg by mouth daily., Disp: , Rfl:     PARoxetine (PAXIL) 20 mg tablet, TAKE 1 TABLET BY MOUTH EVERY DAY, Disp: 90 tablet, Rfl: 1    Probiotic Product (ALIGN PO), Take by mouth, Disp: , Rfl:     torsemide (DEMADEX) 20 mg tablet, Take 1 tablet (20 mg total) by mouth if needed (rapid weight gain (3+ lbs overnight or 5+ lbs in 5-7 days)), Disp: , Rfl:     dicyclomine (BENTYL) 20 mg tablet, Take 1 tablet (20 mg total) by mouth 2 (two) times a day as needed (abdominal cramps) for up to 7 days, Disp: 14 tablet, Rfl: 0    Social History     Socioeconomic History    Marital status: /Civil Union     Spouse name: Not on file    Number of children: 0    Years of education: Not on file    Highest education level: High school graduate   Occupational History    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 Quit in 1994   Vaping Use    Vaping status: Never Used   Substance and Sexual Activity    Alcohol use:  Yes     Alcohol/week: 2.0 standard drinks of alcohol     Types: 2 Standard drinks or equivalent per week     Comment: Occasionally/ sicially    Drug use: No    Sexual activity: Yes     Partners: Male     Birth control/protection: None   Other Topics Concern    Not on file   Social History Narrative    Always uses seatbelt    Caffeine use: 1-2 cups coffee daily    Has smoke detectors    [employer]: [employer]        Currently unemployed; was previously doing home care; continuing job search     Social Determinants of Health     Financial Resource Strain: Not on file   Food Insecurity: No Food Insecurity (9/23/2024)    Hunger Vital Sign     Worried About Running Out of Food in the Last Year: Never true     Ran Out of Food in the Last Year: Never true   Transportation Needs: No Transportation Needs (9/23/2024)    PRAPARE - Transportation     Lack of Transportation (Medical): No     Lack of Transportation (Non-Medical): No   Physical Activity: Not on file   Stress: Not on file   Social Connections: Not on file   Intimate Partner Violence: Not on file   Housing Stability: Low Risk  (9/23/2024)    Housing Stability Vital Sign     Unable to Pay for Housing in the Last Year: No     Number of Times Moved in the Last Year: 0     Homeless in the Last Year: No     Family History   Problem Relation Age of Onset    Heart murmur Mother     Diabetes Mother         Mellitus    Thyroid disease Mother         Disorder    Diabetes type II Mother     Diabetes Father         Mellitus    Hypertension Father     Diabetes type II Father     No Known Problems Maternal Grandmother     Diabetes Maternal Grandfather         Mellitus    Breast cancer Paternal Grandmother 70 - 79    Leukemia Paternal Grandfather 48    Diabetes Maternal Aunt         Mellitus    Diabetes Maternal Uncle         Mellitus    Substance Abuse Neg Hx     Mental illness Neg Hx     Alcohol abuse Neg Hx        Vitals:   Blood pressure 124/66, pulse 71, height 4'  "8\" (1.422 m), weight 81.2 kg (179 lb), SpO2 98%, not currently breastfeeding.    Wt Readings from Last 10 Encounters:   10/01/24 81.2 kg (179 lb)   09/28/24 80.7 kg (177 lb 12.8 oz)   09/18/24 82.2 kg (181 lb 3.5 oz)   09/17/24 82.7 kg (182 lb 6.4 oz)   09/11/24 86.3 kg (190 lb 3.2 oz)   08/02/24 86.2 kg (190 lb)   08/01/24 86.1 kg (189 lb 12.8 oz)   07/26/24 86.6 kg (191 lb)   07/16/24 85.7 kg (189 lb)   05/21/24 83.9 kg (185 lb)     Vitals:    10/01/24 1007   BP: 124/66   Pulse: 71   SpO2: 98%   Weight: 81.2 kg (179 lb)   Height: 4' 8\" (1.422 m)       Physical Exam    Labs & Results:  Lab Results   Component Value Date    WBC 11.28 (H) 09/28/2024    HGB 11.0 (L) 09/28/2024    HCT 34.2 (L) 09/28/2024    MCV 91 09/28/2024     09/28/2024     Lab Results   Component Value Date    SODIUM 138 09/28/2024    K 4.0 09/28/2024    CL 96 09/28/2024    CO2 32 09/28/2024    BUN 83 (H) 09/28/2024    CREATININE 2.77 (H) 09/28/2024    GLUC 154 (H) 09/28/2024    CALCIUM 8.7 09/28/2024     Lab Results   Component Value Date    INR 0.98 08/07/2020    PROTIME 12.9 08/07/2020     Lab Results   Component Value Date     (H) 09/22/2024      Demetra Gutierrez PA-C  "

## 2024-10-01 NOTE — TELEPHONE ENCOUNTER
----- Message from Callum Chowdhury MD sent at 10/1/2024  6:14 AM EDT -----  Call pt and ask if she did blood work yesterday as I do not see it and she needs to go.

## 2024-10-01 NOTE — TELEPHONE ENCOUNTER
LVM instructing pt to call back for verification on husbands  since  is the primary portillo of insurance. Needed to successfully register pt for heart monitor (ZIO) and send it to home on time.

## 2024-10-01 NOTE — PATIENT INSTRUCTIONS
Stop aspirin.  Will add back torsemide as needed for rapid weight gain.   Will get heart monitor ordering process started.   Check blood work by end of week.

## 2024-10-02 NOTE — TELEPHONE ENCOUNTER
Caller: Vernell Darby    Doctor: Sherman/EMY Gutierrez    Reason for call: 's (Krishna Darby)  73.    Call back#: [phone]

## 2024-10-03 NOTE — PROGRESS NOTES
Addended by: MAKSIM XIE on: 1/31/2022 02:18 PM     Modules accepted: Orders     Orthopaedic Surgery Note    CC: Right Knee Pain      HPI:  Ms Vernell Huston is a 39 y  o female with a history of right knee pain  States that this started Weds after trying to get up into a big truck  Went to ED  Xrays negative for fracture - told to come see ortho  Tried tylenol  Otherwise has not tried any treatment  Describes pain as sharp, popping, clicking  Pain is moderate and is 5 of 10 numerically  Worse with balancing and nothing makes it better  It is improving  ALLERGIES:  No Known Allergies    CURRENT MEDICATIONS:  Current Outpatient Medications   Medication Sig Dispense Refill    acetaminophen (TYLENOL) 500 mg tablet Take 2 tablets (1,000 mg total) by mouth every 6 (six) hours as needed for mild pain or moderate pain 30 tablet 0    acetone, urine, test strip Use to test urine ketones for high blood sugars  25 each 6    albuterol (2 5 mg/3 mL) 0 083 % nebulizer solution Take 1 vial (2 5 mg total) by nebulization every 6 (six) hours as needed for wheezing or shortness of breath 25 vial 4    albuterol (PROVENTIL HFA,VENTOLIN HFA) 90 mcg/act inhaler INHALE 2 PUFFS BY MOUTH EVERY 4 HOURS AS NEEDED FOR WHEEZING OR FOR SHORTNESS OF BREATH 25 5 g 0    Ascorbic Acid (VITAMIN C) 500 MG CAPS Take 2 capsules by mouth daily      atorvastatin (LIPITOR) 80 mg tablet Take 1 tablet (80 mg total) by mouth daily 30 tablet 5    Biotin 10 MG TABS Take 1 tablet by mouth daily      Blood Pressure Monitoring [place] Use 2 (two) times a day Monitor BP twice daily  Call office if BP > 140/90 persistently  1 Device 0    Butalbital-APAP-Caffeine (Fioricet) -40 MG CAPS Take 1 tablet by mouth 4 (four) times a day as needed (headahce) 30 capsule 0    Continuous Blood Gluc Sensor (Dexcom G6 Sensor) MISC CHANGE SENSOR EVERY 10 DAYS   3 each 2    cyanocobalamin (VITAMIN B-12) 100 mcg tablet Take by mouth daily      fluticasone-salmeterol (Advair Diskus) 250-50 mcg/dose inhaler Inhale 1 puff 2 (two) times a Patient needs a new prescription order for pregabalin, old prescription order is from [place] an pharmacy wont accept it.    day Rinse mouth after use  3 Inhaler 3    glucagon (Glucagon Emergency) 1 MG injection Inject 1 mg under the skin once as needed for low blood sugar for up to 1 dose 1 kit 3    Glucagon (Gvoke HypoPen 2-Pack) 1 MG/0 2ML SOAJ Use if hypoglycemia prn 1 Syringe 6    insulin aspart (NovoLOG) 100 units/mL injection Take 1 unit of novolog for every 1 g of carb, up to 150 units daily 50 mL 6    insulin glargine (Lantus SoloStar) 100 units/mL injection pen Inject 78 Units under the skin daily at bedtime 10 pen 3    lisinopril (ZESTRIL) 20 mg tablet TAKE 2 TABLETS BY MOUTH EVERY DAY 60 tablet 0    montelukast (SINGULAIR) 10 mg tablet TAKE 1 TABLET BY MOUTH AT BEDTIME 90 tablet 0    omeprazole (PriLOSEC) 20 mg delayed release capsule Take 20 mg by mouth daily   OneTouch Ultra test strip Test 3 times daily 300 each 3    PARoxetine (PAXIL) 20 mg tablet TAKE 1 TABLET BY MOUTH EVERY DAY 90 tablet 0    Probiotic Product (ALIGN PO) Take by mouth      aspirin (ECOTRIN LOW STRENGTH) 81 mg EC tablet Take 1 tablet (81 mg total) by mouth daily 30 tablet 6    furosemide (LASIX) 40 mg tablet Take 1 tablet (40 mg total) by mouth daily Take 1/2 tablet daily (Patient taking differently: Take 40 mg by mouth daily ) 30 tablet 3    Na Sulfate-K Sulfate-Mg Sulf (Suprep Bowel Prep Kit) 17 5-3 13-1 6 GM/177ML SOLN Take 177 mL by mouth once for 1 dose 2 Bottle 0     No current facility-administered medications for this visit  PAST MEDICAL HISTORY  Past Medical History:   Diagnosis Date    Anemia     Asthma     w/acute exacerbation   Last assessed: 10/26/12    Chest pain 2/3/2016    [facility]est[facility] [address] [facility]     Depression     Diabetes mellitus ([facility]     Diabetic nephropathy ([facility] [facility]     Diabetic nephropathy associated with type 1 diabetes mellitus ([facility] [address] ) 8/12/2020    Diabetic retinopathy ([facility] [address] ) 2/3/2016    Gastroenteritis 02/03/2016    GERD (gastroesophageal reflux disease)     HTN (hypertension)     Hyperlipidemia 2/3/2016    Oligomenorrhea     Polycystic ovaries 2/3/2016    Retinal hemorrhage 2/3/2016    Retinopathy     Thrombocytosis (HCC)     Type 1 diabetes mellitus with ophthalmic manifestation ([facility] [address] ) 2/3/2016       SURGICAL HISTORY  Past Surgical History:   Procedure Laterality Date    CATARACT EXTRACTION Left 10/2020    CATARACT EXTRACTION Right     RETINAL LASER PROCEDURE         FAMILY HISTORY  Family History   Problem Relation Age of Onset    Heart murmur Mother     Diabetes Mother         Mellitus    Thyroid disease Mother         Disorder    Diabetes Father         Mellitus    Hypertension Father     Diabetes Maternal Grandfather         Mellitus    Breast cancer Paternal Grandmother     Diabetes Maternal Aunt         Mellitus    Diabetes Maternal Uncle         Mellitus    No Known Problems Maternal Grandmother     Leukemia Paternal Grandfather     Substance Abuse Neg Hx     Mental illness Neg Hx     Alcohol abuse Neg Hx        SOCIAL HISTORY  Social History     Socioeconomic History    Marital status: /Civil Union     Spouse name: Not on file    Number of children: Not on file    Years of education: Not on file    Highest education level: Not on file   Occupational History    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   Social Needs    Financial resource strain: Not on file    Food insecurity     Worry: Not on file     Inability: Not on file    Transportation needs     Medical: Not on file     Non-medical: Not on file   Tobacco Use    Smoking status: Never Smoker    Smokeless tobacco: Never Used    Tobacco comment: Per [employer]: Former smoker   Quit in 1994   Substance and Sexual Activity    Alcohol use: Not Currently     Frequency: Monthly or less     Comment: Occasional/1 mixed drink twice a month if that    Drug use: No    Sexual activity: Yes     Partners: Male     Birth control/protection: Pill   Lifestyle    Physical activity     Days per week: Not on file Minutes per session: Not on file    Stress: Not on file   Relationships    Social connections     Talks on phone: Not on file     Gets together: Not on file     Attends Latter day service: Not on file     Active member of club or organization: Not on file     Attends meetings of clubs or organizations: Not on file     Relationship status: Not on file    Intimate partner violence     Fear of current or ex partner: Not on file     Emotionally abused: Not on file     Physically abused: Not on file     Forced sexual activity: Not on file   Other Topics Concern    Not on file   Social History Narrative    Always uses seatbelt    Caffeine use: 1-2 cups coffee daily    Has smoke detectors    Pro[employer] Affiliations: [employer]         Review of Systems   Otherwise negative except per above and HPI  Physical Exam    Vitals  Vitals:    04/30/21 0859   BP: 127/80       BMI  Body mass index is 42 77 kg/m²  GENERAL: No acute distress  Alert and oriented  Well nourished and well hydrated  Appears stated age  HEENT : Normocephalic, atraumatic  Extraocular movements intact  Mask in place  NECK: Supple, trachea midline  LUNGS: Adequate and symmetric respiratory effort  No intercostal retractions or accessory muscle use  HEART: Extremities warm and perfused  ABDOMEN: Nondistended  SKIN: Warm and dry, no rash  Right Knee   Inspection/Appearance:       Swelling: No      Patella is midline  Alignment:  Knee is in neutral  Palpation - Soft Tissue: normal without effusion  ROM:       Extension - 0          Flexion - 120      extensor lag: no    Stability:  demonstrates no varus, valgus, anterior drawer or posterior drawer  Patella: stable, tracks normally  Sensation Intact to Light Touch in Sural, Saphenous, Tibial, Superficial Peroneal, and Deep Peroneal Nerve Distribution     Motor function 5 out of 5 strength in Tibialis Anterior, Gastrocnemius, Soleus, Extensor Hallucis Longus, and Flexor Hallucis Longus Muscles  Extremity Warm and Well Perfused  Imaging  A) Imaging modality available  Radiographs: yes  MRI scan: no  CT scan: no    B) Imaging findings  Subchondral cysts: no  Subchondral sclerosis: yes  Periarticular osteophytes: no  Joint subluxation: no  Joint space narrowing: yes  Bone-on-bone articulations: no  Avascular necrosis: no      Assessment and Plan  Right Knee Arthritis    - discussed with patient that symptoms are consistent with arthritis flare or possible meniscal tear/injury  The initial treatment pathway for either injury is CSI and PT  We also reviewed general guidelines for knee OA as summarized below  - Knee Pain / Arthritis Treatment Recommendations    Strengthening Exercises  10 repetitions each leg Monday, Wednesday, Friday OR Tuesday, Thursday, Saturday  Increase 10 repetitions per week  1  Straight leg raises (SLR)  2  VMO Modification SLR - (Rotate hip out externally) Do if SLR becomes easy    Exercise - 5 minutes per day Monday, Wednesday, Friday OR Tuesday, Thursday, Saturday  Increase 5 minutes per day per week  May use bike (non-impact) or walk (impact) or swim or alternate  Goal is to get up to at least 30 minutes per day  1  Bicycle  2  Walking    Weight loss   Goal to lose 1 pound per week  Portion control, limit sweets, limit carbs    Medications  Anti-inflammatories (NSAIDs)  1  Ibuprofen (Motrin or Advil) 600 mg (3 pills) with food 3 times a day for 3 - 7 days  OR  2  Naprosyn (Aleve) 1 - 2 pills twice a day with food for 3 - 7 days  Stop if you develop upset stomach or bleeding occurs  We discussed that scheduled NSAIDs for greater than 1 week should be discussed with PCP to monitor for potential side effects  Pain reliever  1   Acetaminophen (Tylenol) 2 pills (regular or extra-strength) 3 times a day for 3 - 7 days    Taken together (Acetaminophen with one of the NSAIDs (ibuprofen OR naprosyn)), the combination may work better than either one alone for more acute pain    Other  Braces, wraps, ice, heat, topical ointments may all be used/tried if they help pain/symptoms      If not beter in 2-3 weeks, would recommend CSI  Ova Eddy  Adrienne Rosario MD  Adult Reconstruction Surgery  Department [address]  9:12 AM

## 2024-10-04 ENCOUNTER — APPOINTMENT (OUTPATIENT)
Dept: LAB | Facility: CLINIC | Age: 49
End: 2024-10-04
Payer: COMMERCIAL

## 2024-10-04 DIAGNOSIS — E10.65 TYPE 1 DIABETES MELLITUS WITH HYPERGLYCEMIA (HCC): ICD-10-CM

## 2024-10-04 LAB
ALBUMIN SERPL BCG-MCNC: 3.6 G/DL (ref 3.5–5)
ALP SERPL-CCNC: 128 U/L (ref 34–104)
ALT SERPL W P-5'-P-CCNC: 34 U/L (ref 7–52)
ANION GAP SERPL CALCULATED.3IONS-SCNC: 9 MMOL/L (ref 4–13)
AST SERPL W P-5'-P-CCNC: 16 U/L (ref 13–39)
BACTERIA UR QL AUTO: ABNORMAL /HPF
BASOPHILS # BLD AUTO: 0.09 THOUSANDS/ΜL (ref 0–0.1)
BASOPHILS NFR BLD AUTO: 1 % (ref 0–1)
BILIRUB SERPL-MCNC: 0.2 MG/DL (ref 0.2–1)
BILIRUB UR QL STRIP: NEGATIVE
BUN SERPL-MCNC: 41 MG/DL (ref 5–25)
CALCIUM SERPL-MCNC: 8.6 MG/DL (ref 8.4–10.2)
CHLORIDE SERPL-SCNC: 102 MMOL/L (ref 96–108)
CLARITY UR: CLEAR
CO2 SERPL-SCNC: 25 MMOL/L (ref 21–32)
COLOR UR: COLORLESS
CREAT SERPL-MCNC: 1.11 MG/DL (ref 0.6–1.3)
EOSINOPHIL # BLD AUTO: 0.49 THOUSAND/ΜL (ref 0–0.61)
EOSINOPHIL NFR BLD AUTO: 5 % (ref 0–6)
ERYTHROCYTE [DISTWIDTH] IN BLOOD BY AUTOMATED COUNT: 12.7 % (ref 11.6–15.1)
EST. AVERAGE GLUCOSE BLD GHB EST-MCNC: 226 MG/DL
GFR SERPL CREATININE-BSD FRML MDRD: 58 ML/MIN/1.73SQ M
GLUCOSE P FAST SERPL-MCNC: 285 MG/DL (ref 65–99)
GLUCOSE UR STRIP-MCNC: ABNORMAL MG/DL
HBA1C MFR BLD: 9.5 %
HCT VFR BLD AUTO: 32.2 % (ref 34.8–46.1)
HGB BLD-MCNC: 10 G/DL (ref 11.5–15.4)
HGB UR QL STRIP.AUTO: NEGATIVE
IMM GRANULOCYTES # BLD AUTO: 0.05 THOUSAND/UL (ref 0–0.2)
IMM GRANULOCYTES NFR BLD AUTO: 1 % (ref 0–2)
KETONES UR STRIP-MCNC: NEGATIVE MG/DL
LEUKOCYTE ESTERASE UR QL STRIP: ABNORMAL
LYMPHOCYTES # BLD AUTO: 2.28 THOUSANDS/ΜL (ref 0.6–4.47)
LYMPHOCYTES NFR BLD AUTO: 24 % (ref 14–44)
MCH RBC QN AUTO: 29 PG (ref 26.8–34.3)
MCHC RBC AUTO-ENTMCNC: 31.1 G/DL (ref 31.4–37.4)
MCV RBC AUTO: 93 FL (ref 82–98)
MONOCYTES # BLD AUTO: 0.69 THOUSAND/ΜL (ref 0.17–1.22)
MONOCYTES NFR BLD AUTO: 7 % (ref 4–12)
NEUTROPHILS # BLD AUTO: 6.05 THOUSANDS/ΜL (ref 1.85–7.62)
NEUTS SEG NFR BLD AUTO: 62 % (ref 43–75)
NITRITE UR QL STRIP: NEGATIVE
NON-SQ EPI CELLS URNS QL MICRO: ABNORMAL /HPF
NRBC BLD AUTO-RTO: 0 /100 WBCS
PH UR STRIP.AUTO: 6 [PH]
PLATELET # BLD AUTO: 383 THOUSANDS/UL (ref 149–390)
PMV BLD AUTO: 11.4 FL (ref 8.9–12.7)
POTASSIUM SERPL-SCNC: 4.8 MMOL/L (ref 3.5–5.3)
PROT SERPL-MCNC: 6.3 G/DL (ref 6.4–8.4)
PROT UR STRIP-MCNC: ABNORMAL MG/DL
RBC # BLD AUTO: 3.45 MILLION/UL (ref 3.81–5.12)
RBC #/AREA URNS AUTO: ABNORMAL /HPF
SODIUM SERPL-SCNC: 136 MMOL/L (ref 135–147)
SP GR UR STRIP.AUTO: 1.01 (ref 1–1.03)
UROBILINOGEN UR STRIP-ACNC: <2 MG/DL
WBC # BLD AUTO: 9.65 THOUSAND/UL (ref 4.31–10.16)
WBC #/AREA URNS AUTO: ABNORMAL /HPF

## 2024-10-04 PROCEDURE — 80053 COMPREHEN METABOLIC PANEL: CPT

## 2024-10-04 PROCEDURE — 85025 COMPLETE CBC W/AUTO DIFF WBC: CPT

## 2024-10-04 PROCEDURE — 83036 HEMOGLOBIN GLYCOSYLATED A1C: CPT

## 2024-10-04 PROCEDURE — 83918 ORGANIC ACIDS TOTAL QUANT: CPT

## 2024-10-04 PROCEDURE — 81001 URINALYSIS AUTO W/SCOPE: CPT

## 2024-10-04 PROCEDURE — 36415 COLL VENOUS BLD VENIPUNCTURE: CPT

## 2024-10-07 NOTE — TELEPHONE ENCOUNTER
Called patient to see if she wanted to come in sooner. [place] with Huyen Bolivar 10/11, she has some openings.

## 2024-10-08 ENCOUNTER — NURSE TRIAGE (OUTPATIENT)
Age: 49
End: 2024-10-08

## 2024-10-08 ENCOUNTER — OFFICE VISIT (OUTPATIENT)
Dept: FAMILY MEDICINE CLINIC | Facility: CLINIC | Age: 49
End: 2024-10-08
Payer: COMMERCIAL

## 2024-10-08 VITALS
HEIGHT: 56 IN | HEART RATE: 89 BPM | DIASTOLIC BLOOD PRESSURE: 78 MMHG | BODY MASS INDEX: 41.61 KG/M2 | TEMPERATURE: 98.1 F | OXYGEN SATURATION: 98 % | RESPIRATION RATE: 16 BRPM | SYSTOLIC BLOOD PRESSURE: 128 MMHG | WEIGHT: 185 LBS

## 2024-10-08 DIAGNOSIS — K52.9 GASTROENTERITIS: Primary | ICD-10-CM

## 2024-10-08 PROCEDURE — 99495 TRANSJ CARE MGMT MOD F2F 14D: CPT | Performed by: STUDENT IN AN ORGANIZED HEALTH CARE EDUCATION/TRAINING PROGRAM

## 2024-10-08 NOTE — PROGRESS NOTES
"Transition of Care Visit  Name: Vernell Darby      : 1975      MRN: [id number]  Encounter Provider: Rika Nugent MD  Encounter Date: 10/8/2024   Encounter department: [facility]    Assessment & Plan  Gastroenteritis  Resolved  Continues hydration with no acute complaints            History of Present Illness     Transitional Care Management Review:   Vernell Darby is a 49 y.o. female here for TCM follow up.     During the TCM phone call patient stated:  TCM Call       Date and time call was made  2024 12:08 PM    Hospital care reviewed  Records reviewed    Patient was hospitialized at  [facility]    Date of Admission  24    Date of discharge  24    Diagnosis  SABRINA (acute kidney injury)    Disposition  Home    Were the patients medications reviewed and updated  Yes    Current Symptoms  None          TCM Call       Post hospital issues  None    Should patient be enrolled in anticoag monitoring?  No    Scheduled for follow up?  Yes    Did you obtain your prescribed medications  Yes    Do you need help managing your prescriptions or medications  No    Is transportation to your appointment needed  No    I have advised the patient to call PCP with any new or worsening symptoms  Angelica Can SLATER          Patient reports to office today for hospital followup TCM. She was noted to have SABRINA secondary to uncontrolled sugar and dehydration. Reports feeling well today with no acute complaints or concerns.       Review of Systems   Constitutional:  Negative for appetite change.   Respiratory:  Negative for chest tightness and shortness of breath.    Cardiovascular:  Negative for chest pain.   Neurological:  Negative for dizziness and headaches.     Objective     /78   Pulse 89   Temp 98.1 °F (36.7 °C)   Resp 16   Ht 4' 8\" (1.422 m)   Wt 83.9 kg (185 lb)   SpO2 98%   BMI 41.48 kg/m²     Physical Exam  Eyes:      Extraocular Movements: " Extraocular movements intact.   Cardiovascular:      Rate and Rhythm: Normal rate.      Pulses: Normal pulses.   Pulmonary:      Effort: Pulmonary effort is normal.   Neurological:      Mental Status: She is alert.   Psychiatric:         Mood and Affect: Mood normal.       Medications have been reviewed by provider in current encounter    Administrative Statements   I have spent a total time of 30 minutes in caring for this patient on the day of the visit/encounter including Risks and benefits of tx options, Instructions for management, Patient and family education, Impressions, and Obtaining or reviewing history  .

## 2024-10-08 NOTE — TELEPHONE ENCOUNTER
"Reason for Disposition   Nursing judgment    Answer Assessment - Initial Assessment Questions  1. REASON FOR CALL or QUESTION: \"What is your reason for calling today?\" or \"How can I best help you?\" or \"What question do you have that I can help answer?\"        Vernell received two zio amb monitor in the mail, she had applied the 1st one.   It only lasted 3 days, had applied on Sunday and had to take off due to having a rash on her chest area, and very itchy, along with bumps. Pt feels she might be reacting to the adhesive on the monitor.     Advised that should mail that one back, cause that has 3 days worth of information. Pt verbally understood.     Please advise    Protocols used: Information Only Call - No Triage-ADULT-[place]    "

## 2024-10-10 ENCOUNTER — TELEPHONE (OUTPATIENT)
Dept: NEPHROLOGY | Facility: CLINIC | Age: 49
End: 2024-10-10

## 2024-10-10 DIAGNOSIS — I10 PRIMARY HYPERTENSION: ICD-10-CM

## 2024-10-10 DIAGNOSIS — N18.2 STAGE 2 CHRONIC KIDNEY DISEASE: ICD-10-CM

## 2024-10-10 DIAGNOSIS — N17.9 AKI (ACUTE KIDNEY INJURY) (HCC): Primary | ICD-10-CM

## 2024-10-10 DIAGNOSIS — I48.92 ATRIAL FIB/FLUTTER, TRANSIENT (HCC): ICD-10-CM

## 2024-10-10 DIAGNOSIS — I48.91 ATRIAL FIB/FLUTTER, TRANSIENT (HCC): ICD-10-CM

## 2024-10-10 LAB — METHYLMALONATE SERPL-SCNC: 163 NMOL/L (ref 0–378)

## 2024-10-10 NOTE — TELEPHONE ENCOUNTER
Called to see if patient wanted to reschedule to a sooner appointment that opened with Huyen Bolivar in [place] office Friday 10/11.

## 2024-10-10 NOTE — TELEPHONE ENCOUNTER
Pt declined sooner visit. But pt would like more labs ordered for prior to her visit.     Please update pt with .

## 2024-10-11 DIAGNOSIS — I48.92 ATRIAL FIB/FLUTTER, TRANSIENT (HCC): ICD-10-CM

## 2024-10-11 DIAGNOSIS — I48.91 ATRIAL FIB/FLUTTER, TRANSIENT (HCC): ICD-10-CM

## 2024-10-11 RX ORDER — APIXABAN 5 MG/1
5 TABLET, FILM COATED ORAL 2 TIMES DAILY
Qty: 180 TABLET | Refills: 0 | Status: SHIPPED | OUTPATIENT
Start: 2024-10-11

## 2024-10-11 RX ORDER — METOPROLOL TARTRATE 25 MG/1
12.5 TABLET, FILM COATED ORAL EVERY 12 HOURS SCHEDULED
Qty: 30 TABLET | Refills: 0 | OUTPATIENT
Start: 2024-10-11

## 2024-10-11 RX ORDER — METOPROLOL TARTRATE 25 MG/1
12.5 TABLET, FILM COATED ORAL EVERY 12 HOURS
Qty: 90 TABLET | Refills: 0 | Status: SHIPPED | OUTPATIENT
Start: 2024-10-11

## 2024-10-11 NOTE — TELEPHONE ENCOUNTER
Medication: Apixaban    Dose/Frequency: 5mg 2 times daily    Quantity: 30    Pharmacy: [facility]    Office:   [] PCP/Provider -   [x] Speciality/Provider -     Does the patient have enough for 3 days?   [x] Yes   [] No - Send as HP to POD        Medication: Metoprolol tartrate    Dose/Frequency: 25mg q12    Quantity: 30    Pharmacy: [facility]    Office:   [] PCP/Provider -   [x] Speciality/Provider -     Does the patient have enough for 3 days?   [x] Yes   [] No - Send as HP to POD        Medications were originally ordered during recent hospital admission.

## 2024-10-14 ENCOUNTER — CLINICAL SUPPORT (OUTPATIENT)
Dept: CARDIOLOGY CLINIC | Facility: CLINIC | Age: 49
End: 2024-10-14
Payer: COMMERCIAL

## 2024-10-14 DIAGNOSIS — I48.0 PAF (PAROXYSMAL ATRIAL FIBRILLATION) (HCC): ICD-10-CM

## 2024-10-14 DIAGNOSIS — J45.909 UNCOMPLICATED ASTHMA, UNSPECIFIED ASTHMA SEVERITY, UNSPECIFIED WHETHER PERSISTENT: ICD-10-CM

## 2024-10-14 PROCEDURE — 93248 EXT ECG>7D<15D REV&INTERPJ: CPT | Performed by: INTERNAL MEDICINE

## 2024-10-14 NOTE — TELEPHONE ENCOUNTER
Left a VM to patient letting her know that October labs are okay for her next appt on 11/8. No need to repeat labs. Advised to please call our office to let us know she received the message and if have any other questions or concerns.

## 2024-10-14 NOTE — TELEPHONE ENCOUNTER
Patient returning call and asking if she can have labs ordered. States she would like to see if kidney function has gotten better in a month. Made aware she just had labs completed on 10-4 so I am not sure if provider will order again but I can send a message. Patient verbalized understanding. Please advise, thank you.

## 2024-10-15 RX ORDER — ALBUTEROL SULFATE 90 UG/1
INHALANT RESPIRATORY (INHALATION)
Qty: 25.5 G | Refills: 1 | Status: SHIPPED | OUTPATIENT
Start: 2024-10-15

## 2024-10-16 ENCOUNTER — TELEPHONE (OUTPATIENT)
Age: 49
End: 2024-10-16

## 2024-10-16 ENCOUNTER — TELEPHONE (OUTPATIENT)
Dept: CARDIOLOGY CLINIC | Facility: CLINIC | Age: 49
End: 2024-10-16

## 2024-10-16 NOTE — TELEPHONE ENCOUNTER
Left VM to patient with the following information: Her creatinine `10/4 was back to normal but if she would like we can do 1 more BMP 1 week prior to our Nov appointment . Advise patient to please call our office to let us know she received the message and if have any other questions or concerns.

## 2024-10-16 NOTE — TELEPHONE ENCOUNTER
Patient calling back, she states she would like to proceed with another BMP prior to her November appointment. She will go to a [facility]'s lab.  Just an ISACI

## 2024-10-16 NOTE — TELEPHONE ENCOUNTER
LVM that the return to work form that was dropped off for Demetra to complete is missing the attached job description.  Please provide the missing attachment so that it can be completed.  Patient can send it thru [employer], fax it to [phone] or take it to [place] office today and ask them to fax it to [place] office.

## 2024-10-16 NOTE — TELEPHONE ENCOUNTER
Pt returned call.  She will contact job to get copy of job description and see if they can fax it over, if not will drop it off.

## 2024-10-16 NOTE — TELEPHONE ENCOUNTER
Received call from pt.  She will be dropping off a form for Demetra Gutierrez PA-C to complete for her return to work next week.

## 2024-10-17 NOTE — TELEPHONE ENCOUNTER
Pt called back and asked for fax number for [facility] to fax over her job description form for Demetra Gutierrez PA-C to complete.  She states previous number given did not work.  Pt given alternate number to try [phone].

## 2024-10-18 NOTE — TELEPHONE ENCOUNTER
Pt called asking if the form for the job description has been received? She is anxious to return to work.  Please call when completed

## 2024-10-21 ENCOUNTER — TELEPHONE (OUTPATIENT)
Age: 49
End: 2024-10-21

## 2024-10-21 NOTE — TELEPHONE ENCOUNTER
Caller: Krishna Darby,spouse    Doctor: Dr. Whitaker    Reason for call: Patient's spouse dropped over return to work form for the patient at the [facility]th [facility] office.  Patient would like a copy of the paperwork when completed and is requesting a call back when this can be picked up.    Call back#: [phone]

## 2024-10-22 NOTE — TELEPHONE ENCOUNTER
Received call from pt stating her work only received the front of the page via fax. Pt stated there was a front and a back that needs to be filled out and faxed back. Please re-fax form as a front and back. Thanks!

## 2024-10-23 ENCOUNTER — HOSPITAL ENCOUNTER (OUTPATIENT)
Dept: MAMMOGRAPHY | Facility: IMAGING CENTER | Age: 49
Discharge: HOME | End: 2024-10-23
Payer: COMMERCIAL

## 2024-10-23 VITALS — WEIGHT: 175 LBS | HEIGHT: 56 IN | BODY MASS INDEX: 39.37 KG/M2

## 2024-10-23 DIAGNOSIS — E78.2 MIXED HYPERLIPIDEMIA: ICD-10-CM

## 2024-10-23 DIAGNOSIS — Z12.31 ENCOUNTER FOR SCREENING MAMMOGRAM FOR BREAST CANCER: ICD-10-CM

## 2024-10-23 PROCEDURE — 77063 BREAST TOMOSYNTHESIS BI: CPT

## 2024-10-23 PROCEDURE — 77067 SCR MAMMO BI INCL CAD: CPT

## 2024-10-23 RX ORDER — ATORVASTATIN CALCIUM 80 MG/1
TABLET, FILM COATED ORAL
Qty: 30 TABLET | Refills: 5 | Status: SHIPPED | OUTPATIENT
Start: 2024-10-23

## 2024-10-24 ENCOUNTER — NURSE TRIAGE (OUTPATIENT)
Age: 49
End: 2024-10-24

## 2024-10-24 ENCOUNTER — OFFICE VISIT (OUTPATIENT)
Dept: FAMILY MEDICINE CLINIC | Facility: CLINIC | Age: 49
End: 2024-10-24
Payer: COMMERCIAL

## 2024-10-24 VITALS
DIASTOLIC BLOOD PRESSURE: 74 MMHG | HEART RATE: 88 BPM | BODY MASS INDEX: 40.4 KG/M2 | TEMPERATURE: 98 F | WEIGHT: 179.6 LBS | HEIGHT: 56 IN | RESPIRATION RATE: 16 BRPM | OXYGEN SATURATION: 98 % | SYSTOLIC BLOOD PRESSURE: 126 MMHG

## 2024-10-24 DIAGNOSIS — I48.91 ATRIAL FIB/FLUTTER, TRANSIENT (HCC): ICD-10-CM

## 2024-10-24 DIAGNOSIS — Z23 NEED FOR COVID-19 VACCINE: ICD-10-CM

## 2024-10-24 DIAGNOSIS — E78.2 MIXED HYPERLIPIDEMIA: ICD-10-CM

## 2024-10-24 DIAGNOSIS — Z23 ENCOUNTER FOR IMMUNIZATION: ICD-10-CM

## 2024-10-24 DIAGNOSIS — E10.65 TYPE 1 DIABETES MELLITUS WITH HYPERGLYCEMIA (HCC): ICD-10-CM

## 2024-10-24 DIAGNOSIS — I10 PRIMARY HYPERTENSION: ICD-10-CM

## 2024-10-24 DIAGNOSIS — Z00.00 ANNUAL PHYSICAL EXAM: Primary | ICD-10-CM

## 2024-10-24 DIAGNOSIS — Z23 INFLUENZA VACCINE NEEDED: ICD-10-CM

## 2024-10-24 DIAGNOSIS — I48.92 ATRIAL FIB/FLUTTER, TRANSIENT (HCC): ICD-10-CM

## 2024-10-24 PROCEDURE — 90673 RIV3 VACCINE NO PRESERV IM: CPT

## 2024-10-24 PROCEDURE — 90480 ADMN SARSCOV2 VAC 1/ONLY CMP: CPT

## 2024-10-24 PROCEDURE — 91320 SARSCV2 VAC 30MCG TRS-SUC IM: CPT

## 2024-10-24 PROCEDURE — 99396 PREV VISIT EST AGE 40-64: CPT | Performed by: STUDENT IN AN ORGANIZED HEALTH CARE EDUCATION/TRAINING PROGRAM

## 2024-10-24 PROCEDURE — 90471 IMMUNIZATION ADMIN: CPT

## 2024-10-24 NOTE — TELEPHONE ENCOUNTER
"Chief Complaint: 3 questions.    History of Present Illness (HPI): Calling to report a 5lb weight gain in her upper abdomen since yesterday. Denies any other symptoms. Advised to take Torsemide 20mg as ordered. Has a follow up on Monday.    VS/Weight: weight gain 5lbs Wednesday to Thursday    Pain: No    Risk Factors: Hypertension, Heart Failure, and Diabetic    Recent Testing: Echo and Labs    Medicine: Eliquis 5mg BID; Torsemide 20mg prn weight gain    Upcoming Office Visit: Yes 10-28-24    Last Office Visit: 9-18-24        Reason for Disposition   Health information question, no triage required and triager able to answer question     New prescription for Eliquis 30 day supply sent to [facility].    Advised per order, if weight gain 3+ lbs/24hrs to take Torsemide 20mg. Continue to monitor weight and report as needed; contact office if any questions.    Advised Vernell to mail the 2nd Zio back, to not try to place it due to previous skin reaction with the 1st one.    Answer Assessment - Initial Assessment Questions  1. REASON FOR CALL: \"What is the main reason for your call?\" or \"How can I best help you?\"      I have 3 questions. Can the prescription for Eliquis be sent in as a 30 day supply due to cost? I have an appointment on Monday but I was told that if I gained weight to call, and I need to know about taking the Torsemide?  Previously was ordered a 30 day Zio, 2 separate monitors. After placing the first one I broke out in a rash and removed it after 3 days, I sent it back but I did not send the 2nd one back. I was going to try it again.    2. SYMPTOMS : \"Do you have any symptoms?\"       5lb weight gain Wednesday to Thursday, upper stomache like before. Previous rash with Zio    3. OTHER QUESTIONS: \"Do you have any other questions?\"      Can the prescription for Eliquis be sent in as a 30 day supply due to cost? I have an appointment on Monday but I was told that if I gained weight to call, and " I need to know about taking the Torsemide?  Previously was ordered a 30 day Zio, 2 separate monitors. After placing the first one I broke out in a rash and removed it after 3 days, I sent it back but I did not send the 2nd one back. I was going to try it again.    Protocols used: Information Only Call - No Triage-Adult-[place]

## 2024-10-24 NOTE — TELEPHONE ENCOUNTER
Medication: Eliquis    Dose/Frequency: 5mg 2x/day    Quantity: 30 R6    Pharmacy: [facility]    Office:   [] PCP/Provider -   [x] Speciality/Provider -Cem Gamez PA-C     Does the patient have enough for 3 days?   [] Yes   [x] No - Send as HP to POD        **patient requesting only a 30 day supply due to cost**

## 2024-10-24 NOTE — PROGRESS NOTES
Adult Annual Physical  Name: Vernell Darby      : 1975      MRN: [id number]  Encounter Provider: Rika Nugent MD  Encounter Date: 10/24/2024   Encounter department: [facility]    Assessment & Plan  Annual physical exam         Influenza vaccine needed         Need for COVID-19 vaccine    Orders:    COVID-19 Pfizer mRNA vaccine 12 yr and older (Comirnaty pre-filled syringe)    Encounter for immunization    Orders:    influenza vaccine, recombinant, PF, 0.5 mL IM (Flublok)    Mixed hyperlipidemia  Continue atorvastatin 80 mg, tolerating well       Primary hypertension  Blood pressure controlled well on current regimen continue management       Type 1 diabetes mellitus with hyperglycemia (HCC)  Continue compliance with medication regimen  Managed by endocrinology, has appointment coming up in a few weeks    Lab Results   Component Value Date    HGBA1C 9.5 (H) 10/04/2024            Immunizations and preventive care screenings were discussed with patient today. Appropriate education was printed on patient's after visit summary.    Counseling:  Exercise: the importance of regular exercise/physical activity was discussed. Recommend exercise 3-5 times per week for at least 30 minutes.          History of Present Illness     Adult Annual Physical:  Patient presents for annual physical. Vernell presents today for annual physical exam denies any acute complaints or concerns at this time..     Depression Screening:    - PHQ-9 Score: 0    Review of Systems   Constitutional:  Negative for fever.   HENT:  Negative for congestion.    Respiratory:  Negative for chest tightness and shortness of breath.    Cardiovascular:  Negative for chest pain.   Gastrointestinal:  Negative for abdominal pain.   Genitourinary:  Negative for dysuria.   Neurological:  Negative for dizziness and light-headedness.     Past Medical History   Past Medical History:   Diagnosis Date    Anemia      Arthritis     Asthma     w/acute exacerbation. Last assessed: 10/26/12    Chest pain 02/03/2016    CHF (congestive heart failure) ([facility])     Coronary artery disease 2021    Depression     Diabetes mellitus ([facility])     Diabetic nephropathy ([facility])     Diabetic nephropathy associated with type 1 diabetes mellitus ([facility]) 08/12/2020    Diabetic retinopathy ([facility]) 02/03/2016    Endometriosis     Gastroenteritis 02/03/2016    GERD (gastroesophageal reflux disease)     History of COVID-19 02/26/2021    Hyperlipidemia 02/03/2016    Hypertension     Obesity     Oligomenorrhea     Polycystic ovaries 02/03/2016    Retinal hemorrhage 02/03/2016    Retinopathy     Thrombocytosis     Type 1 diabetes mellitus with ophthalmic manifestation ([facility]) 02/03/2016     Past Surgical History:   Procedure Laterality Date    CATARACT EXTRACTION Left 10/2020    CATARACT EXTRACTION Right     RETINAL LASER PROCEDURE       Family History   Problem Relation Age of Onset    Heart murmur Mother     Diabetes Mother         Type 2    Thyroid disease Mother         Disorder    Diabetes type II Mother     Mental illness Mother         Chemical imbalance    Diabetes Father         Type 2    Hypertension Father     Diabetes type II Father     No Known Problems Maternal Grandmother     Diabetes Maternal Grandfather         Mellitus    Breast cancer Paternal Grandmother 70 - 79    Leukemia Paternal Grandfather 48    Cancer Paternal Grandfather         Leaukemia    Diabetes Maternal Aunt         Type 2    Diabetes Maternal Uncle         Mellitus    Diabetes Paternal Uncle         Type 2    Substance Abuse Neg Hx     Alcohol abuse Neg Hx      Current Outpatient Medications on File Prior to Visit   Medication Sig Dispense Refill    acetone, urine, test strip Use to test urine ketones for high blood sugars. 25 each 6    albuterol (2.5 mg/3 mL) 0.083 % nebulizer solution INHALE CONTENTS OF 1 VIAL BY NEBULIZER EVERY 6 HOURS AS NEEDED FOR WHEEZING OR FOR SHORTNESS OF  BREATH. 75 mL 3    albuterol (PROVENTIL HFA,VENTOLIN HFA) 90 mcg/act inhaler INHALE 2 PUFFS BY MOUTH EVERY 4 HOURS AS NEEDED FOR WQHEEZING OR FOR SHORTNESS OF BREATH 25.5 g 1    Ascorbic Acid (VITAMIN C) 500 MG CAPS Take 2 capsules by mouth daily      atorvastatin (LIPITOR) 80 mg tablet TAKE 1 TABLET BY MOUTH EVERY DAY 30 tablet 5    Continuous Blood Gluc Sensor (Dexcom G7 Sensor) Use 1 Device every 10 days 3 each 11    cyanocobalamin (VITAMIN B-12) 100 mcg tablet Take by mouth daily      Fluticasone-Salmeterol (Advair) 250-50 mcg/dose inhaler INHALE 1 PUFF BY MOUTH TWO TIMES DAILY RINSE MOUTH AFTER USE 60 blister 5    glucagon (Glucagon Emergency) 1 MG injection Inject 1 mg under the skin once as needed for low blood sugar for up to 1 dose 1 kit 3    Glucagon (Gvoke HypoPen 2-Pack) 1 MG/0.2ML SOAJ Use if hypoglycemia prn 1 Syringe 6    insulin aspart, w/niacinamide, (Fiasp FlexTouch) 100 Units/mL injection pen 15u TID with meals.      insulin glargine (Toujeo SoloStar) 300 units/mL CONCENTRATED U-300 injection pen (1-unit dial) Inject 45 Units under the skin daily      Insulin Pen Needle 33G X 4 MM MISC Use to inject insulin 4 times a day 400 each 2    metFORMIN (GLUCOPHAGE-XR) 500 mg 24 hr tablet Take 1 tablet (500 mg total) by mouth daily      metoprolol tartrate (LOPRESSOR) 25 mg tablet TAKE 1/2 TABLET BY MOUTH EVERY 12 HOURS 90 tablet 0    montelukast (SINGULAIR) 10 mg tablet TAKE 1 TABLET BY MOUTH AT BEDTIME 90 tablet 1    omeprazole (PriLOSEC) 20 mg delayed release capsule Take 20 mg by mouth daily.      PARoxetine (PAXIL) 20 mg tablet TAKE 1 TABLET BY MOUTH EVERY DAY 90 tablet 1    Probiotic Product (ALIGN PO) Take by mouth      torsemide (DEMADEX) 20 mg tablet Take 1 tablet (20 mg total) by mouth if needed (rapid weight gain (3+ lbs overnight or 5+ lbs in 5-7 days))      dicyclomine (BENTYL) 20 mg tablet Take 1 tablet (20 mg total) by mouth 2 (two) times a day as needed (abdominal cramps) for up to 7 days  14 tablet 0     No current facility-administered medications on file prior to visit.   No Known Allergies   Current Outpatient Medications on File Prior to Visit   Medication Sig Dispense Refill    acetone, urine, test strip Use to test urine ketones for high blood sugars. 25 each 6    albuterol (2.5 mg/3 mL) 0.083 % nebulizer solution INHALE CONTENTS OF 1 VIAL BY NEBULIZER EVERY 6 HOURS AS NEEDED FOR WHEEZING OR FOR SHORTNESS OF BREATH. 75 mL 3    albuterol (PROVENTIL HFA,VENTOLIN HFA) 90 mcg/act inhaler INHALE 2 PUFFS BY MOUTH EVERY 4 HOURS AS NEEDED FOR WQHEEZING OR FOR SHORTNESS OF BREATH 25.5 g 1    Ascorbic Acid (VITAMIN C) 500 MG CAPS Take 2 capsules by mouth daily      atorvastatin (LIPITOR) 80 mg tablet TAKE 1 TABLET BY MOUTH EVERY DAY 30 tablet 5    Continuous Blood Gluc Sensor (Dexcom G7 Sensor) Use 1 Device every 10 days 3 each 11    cyanocobalamin (VITAMIN B-12) 100 mcg tablet Take by mouth daily      Fluticasone-Salmeterol (Advair) 250-50 mcg/dose inhaler INHALE 1 PUFF BY MOUTH TWO TIMES DAILY RINSE MOUTH AFTER USE 60 blister 5    glucagon (Glucagon Emergency) 1 MG injection Inject 1 mg under the skin once as needed for low blood sugar for up to 1 dose 1 kit 3    Glucagon (Gvoke HypoPen 2-Pack) 1 MG/0.2ML SOAJ Use if hypoglycemia prn 1 Syringe 6    insulin aspart, w/niacinamide, (Fiasp FlexTouch) 100 Units/mL injection pen 15u TID with meals.      insulin glargine (Toujeo SoloStar) 300 units/mL CONCENTRATED U-300 injection pen (1-unit dial) Inject 45 Units under the skin daily      Insulin Pen Needle 33G X 4 MM MISC Use to inject insulin 4 times a day 400 each 2    metFORMIN (GLUCOPHAGE-XR) 500 mg 24 hr tablet Take 1 tablet (500 mg total) by mouth daily      metoprolol tartrate (LOPRESSOR) 25 mg tablet TAKE 1/2 TABLET BY MOUTH EVERY 12 HOURS 90 tablet 0    montelukast (SINGULAIR) 10 mg tablet TAKE 1 TABLET BY MOUTH AT BEDTIME 90 tablet 1    omeprazole (PriLOSEC) 20 mg delayed release capsule Take 20 mg  "by mouth daily.      PARoxetine (PAXIL) 20 mg tablet TAKE 1 TABLET BY MOUTH EVERY DAY 90 tablet 1    Probiotic Product (ALIGN PO) Take by mouth      torsemide (DEMADEX) 20 mg tablet Take 1 tablet (20 mg total) by mouth if needed (rapid weight gain (3+ lbs overnight or 5+ lbs in 5-7 days))      dicyclomine (BENTYL) 20 mg tablet Take 1 tablet (20 mg total) by mouth 2 (two) times a day as needed (abdominal cramps) for up to 7 days 14 tablet 0     No current facility-administered medications on file prior to visit.      Social History     Tobacco Use    Smoking status: Never    Smokeless tobacco: Never    Tobacco comments:     Per Allscripts: Former smoker. Quit in 1994   Vaping Use    Vaping status: Never Used   Substance and Sexual Activity    Alcohol use: Yes     Alcohol/week: 2.0 standard drinks of alcohol     Types: 1 Glasses of wine, 1 Standard drinks or equivalent per week     Comment: Occasionally. Several weeks can go by with no alcohol.    Drug use: No    Sexual activity: Yes     Partners: Male     Birth control/protection: None       Objective     /74   Pulse 88   Temp 98 °F (36.7 °C)   Resp 16   Ht 4' 8\" (1.422 m)   Wt 81.5 kg (179 lb 9.6 oz)   LMP 05/03/2023 (Approximate) Comment: ncop per pt  SpO2 98%   BMI 40.27 kg/m²     Physical Exam  Vitals reviewed.   Constitutional:       Appearance: She is obese.   HENT:      Right Ear: Tympanic membrane and ear canal normal.      Left Ear: Tympanic membrane and ear canal normal.      Nose: Nose normal.      Mouth/Throat:      Mouth: Mucous membranes are moist.      Pharynx: Oropharynx is clear.   Eyes:      Extraocular Movements: Extraocular movements intact.   Cardiovascular:      Rate and Rhythm: Normal rate.      Pulses: Normal pulses. no weak pulses.           Dorsalis pedis pulses are 2+ on the right side and 2+ on the left side.        Posterior tibial pulses are 2+ on the right side and 2+ on the left side.   Pulmonary:      Effort: Pulmonary " effort is normal. No respiratory distress.      Breath sounds: No wheezing.   Abdominal:      Palpations: Abdomen is soft.      Tenderness: There is no abdominal tenderness.   Musculoskeletal:      Right lower leg: No edema.      Left lower leg: No edema.   Feet:      Right foot:      Skin integrity: Dry skin present. No ulcer, skin breakdown, erythema, warmth or callus.      Left foot:      Skin integrity: Dry skin present. No ulcer, skin breakdown, erythema, warmth or callus.   Neurological:      Mental Status: She is alert.   Psychiatric:         Mood and Affect: Mood normal.       Administrative Statements   I have spent a total time of 30 minutes in caring for this patient on the day of the visit/encounter including Risks and benefits of tx options, Patient and family education, Risk factor reductions, Counseling / Coordination of care, and Reviewing / ordering tests, medicine, procedures  .    Diabetic Foot Exam    Patient's shoes and socks removed.    Right Foot/Ankle   Right Foot Inspection  Skin Exam: skin normal, skin intact and dry skin. No warmth, no callus, no erythema, no maceration, no abnormal color, no pre-ulcer, no ulcer and no callus.     Toe Exam: ROM and strength within normal limits.     Sensory   Vibration: intact  Proprioception: intact  Monofilament testing: intact    Vascular  Capillary refills: elevated  The right DP pulse is 2+. The right PT pulse is 2+.     Left Foot/Ankle  Left Foot Inspection  Skin Exam: skin normal, skin intact and dry skin. No warmth, no erythema, no maceration, normal color, no pre-ulcer, no ulcer and no callus.     Toe Exam: ROM and strength within normal limits.     Sensory   Vibration: intact  Proprioception: intact  Monofilament testing: intact    Vascular  Capillary refills: elevated  The left DP pulse is 2+. The left PT pulse is 2+.     Assign Risk Category  No deformity present  No loss of protective sensation  No weak pulses  Risk: 0

## 2024-10-25 NOTE — TELEPHONE ENCOUNTER
Pt returning Rosalba's call.  Attempted to transfer pt but was unsuccessful.  Pt reporting she lost 1.2lbs today from yesterday.  She states the swelling in her abdomen is a little better and she is not experiencing any SOB.  She is questioning if she should continue taking the torsemide.  Please review and advise, thank you.

## 2024-10-25 NOTE — ASSESSMENT & PLAN NOTE
Continue compliance with medication regimen  Managed by endocrinology, has appointment coming up in a few weeks    Lab Results   Component Value Date    HGBA1C 9.5 (H) 10/04/2024

## 2024-10-29 ENCOUNTER — TELEPHONE (OUTPATIENT)
Dept: FAMILY MEDICINE CLINIC | Facility: CLINIC | Age: 49
End: 2024-10-29

## 2024-10-29 NOTE — TELEPHONE ENCOUNTER
----- Message from Rika Nugent MD sent at 10/29/2024  7:44 AM EDT -----  Please inform patient her mammogram shows some calcified areas on the left breast for which a diagnostic mammogram has been ordered for patient to schedule; she will be receiving a call from the nurse from the breast dept also to inform of same to help coordinate  ----- Message -----  From: Ayleen Ferrer MD  Sent: 10/27/2024  12:56 PM EDT  To: Rika Nugent MD    Attempted to call pt to inform of results. Phone kept ringing could not leave vm.     Will try again later.

## 2024-10-30 ENCOUNTER — TELEPHONE (OUTPATIENT)
Dept: NEPHROLOGY | Facility: CLINIC | Age: 49
End: 2024-10-30

## 2024-10-30 NOTE — TELEPHONE ENCOUNTER
Left voicemail for the patient reminding to please complete labwork prior to 11/8 appointment with Huyen. Advised patient to call back with any questions or concerns.

## 2024-11-07 ENCOUNTER — TELEPHONE (OUTPATIENT)
Dept: CARDIOLOGY CLINIC | Facility: CLINIC | Age: 49
End: 2024-11-07

## 2024-11-07 ENCOUNTER — OFFICE VISIT (OUTPATIENT)
Dept: CARDIOLOGY CLINIC | Facility: CLINIC | Age: 49
End: 2024-11-07
Payer: COMMERCIAL

## 2024-11-07 VITALS
HEART RATE: 77 BPM | SYSTOLIC BLOOD PRESSURE: 124 MMHG | BODY MASS INDEX: 40.94 KG/M2 | HEIGHT: 56 IN | WEIGHT: 182 LBS | DIASTOLIC BLOOD PRESSURE: 66 MMHG | OXYGEN SATURATION: 96 %

## 2024-11-07 DIAGNOSIS — J45.20 MILD INTERMITTENT ASTHMA WITHOUT COMPLICATION: ICD-10-CM

## 2024-11-07 DIAGNOSIS — I50.32 CHRONIC HEART FAILURE WITH PRESERVED EJECTION FRACTION (HCC): ICD-10-CM

## 2024-11-07 DIAGNOSIS — I48.0 PAF (PAROXYSMAL ATRIAL FIBRILLATION) (HCC): Primary | ICD-10-CM

## 2024-11-07 PROCEDURE — 99214 OFFICE O/P EST MOD 30 MIN: CPT | Performed by: PHYSICIAN ASSISTANT

## 2024-11-07 RX ORDER — MONTELUKAST SODIUM 10 MG/1
TABLET ORAL
Qty: 90 TABLET | Refills: 1 | Status: SHIPPED | OUTPATIENT
Start: 2024-11-07

## 2024-11-07 NOTE — LETTER
"   11/14/2024      MRN: [id number]        Vernell Darby  [address] [place] [address]      You have been referred to our Cardiology department to be scheduled for a LOOP Recorder Implant procedure.     I have been unable to contact you.     Please call me at [phone] to schedule your procedure.       Thank you,   Jessenia \"Abena\" Triston    [facility]   [address] [place] [id number]  Teams: [phone]    "

## 2024-11-07 NOTE — PROGRESS NOTES
Cardiology Office Follow Up  Vernell Darby  1975  [id number]      ASSESSMENT:  Paroxysmal atrial fibrillation  Chronic diastolic CHF  Hypertension  Hyperlipidemia  Type I DM-A1c 9.5%    PLAN:  She was ordered for 28-day ZIO event monitor but developed skin rash associated with adhesive with the patch. Returned after 1 day. No evidence of A-fib or a flutter noted during that time. Will arrange for ILR placement.   She remains on Lopressor 12.5 mg twice daily with Eliquis 5 mg twice daily.   Continue Lipitor 80 mg daily.  in 4/2024.  RTO in 6 months or sooner if needed    Interval History/ HPI:   Mohini Darby is a 49-year-old female with history of paroxysmal atrial fibrillation, chronic diastolic CHF, hypertension, hyperlipidemia, type I DM who presents for hospital follow-up visit.  Patient was noted with new onset atrial fibrillation lasting approximately 20 minutes and self converted on her own.  She was seen by cardiology as inpatient.  She underwent TTE which showed preserved LVEF without significant valvular abnormalities.  She was started on Eliquis 5 mg twice daily prior to discharge.  She was started on Lopressor 12.5 mg twice daily for rate control.  She was instructed to follow-up with a 28-day Zio monitor to assess AF burden however due to skin reaction with adhesive had returned Zio monitor after 1 day which not show evidence of recurrent A-fib.  Patient presents for follow-up and recommendations regarding further workup.     Vitals:  124/66  77  96%  182lbs    Past Medical History:   Diagnosis Date    Anemia     Arthritis     Asthma     w/acute exacerbation. Last assessed: 10/26/12    Chest pain 02/03/2016    CHF (congestive heart failure) (HCC)     Coronary artery disease 2021    Depression     Diabetes mellitus (HCC)     Diabetic nephropathy (HCC)     Diabetic nephropathy associated with type 1 diabetes mellitus (HCC) 08/12/2020    Diabetic retinopathy (HCC) 02/03/2016    Endometriosis      Gastroenteritis 02/03/2016    GERD (gastroesophageal reflux disease)     History of COVID-19 02/26/2021    Hyperlipidemia 02/03/2016    Hypertension     Obesity     Oligomenorrhea     Polycystic ovaries 02/03/2016    Retinal hemorrhage 02/03/2016    Retinopathy     Thrombocytosis     Type 1 diabetes mellitus with ophthalmic manifestation (HCC) 02/03/2016     Social History     Socioeconomic History    Marital status: /Civil Union     Spouse name: Not on file    Number of children: 0    Years of education: Not on file    Highest education level: High school graduate   Occupational History    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 Quit in 1994   Vaping Use    Vaping status: Never Used   Substance and Sexual Activity    Alcohol use: Yes     Alcohol/week: 2.0 standard drinks of alcohol     Types: 1 Glasses of wine, 1 Standard drinks or equivalent per week     Comment: Occasionally. Several weeks can go by with no alcohol.    Drug use: No    Sexual activity: Yes     Partners: Male     Birth control/protection: None   Other Topics Concern    Not on file   Social History Narrative    Always uses seatbelt    Caffeine use: 1-2 cups coffee daily    Has smoke detectors    Judaism Affiliations: [employer]        Currently unemployed; was previously doing home care; continuing job search     Social Determinants of Health     Financial Resource Strain: Not on file   Food Insecurity: No Food Insecurity (9/23/2024)    Nursing - Inadequate Food Risk Classification     Worried About Running Out of Food in the Last Year: Never true     Ran Out of Food in the Last Year: Never true     Ran Out of Food in the Last Year: Not on file   Transportation Needs: No Transportation Needs (9/23/2024)    PRAPARE - Transportation     Lack of Transportation (Medical): No     Lack of Transportation (Non-Medical): No   Physical  Activity: Not on file   Stress: Not on file   Social Connections: Not on file   Intimate Partner Violence: Not on file   Housing Stability: Low Risk  (9/23/2024)    Housing Stability Vital Sign     Unable to Pay for Housing in the Last Year: No     Number of Times Moved in the Last Year: 0     Homeless in the Last Year: No      Family History   Problem Relation Age of Onset    Heart murmur Mother     Diabetes Mother         Type 2    Thyroid disease Mother         Disorder    Diabetes type II Mother     Mental illness Mother         Chemical imbalance    Diabetes Father         Type 2    Hypertension Father     Diabetes type II Father     No Known Problems Maternal Grandmother     Diabetes Maternal Grandfather         Mellitus    Breast cancer Paternal Grandmother 70 - 79    Leukemia Paternal Grandfather 48    Cancer Paternal Grandfather         Leaukemia    Diabetes Maternal Aunt         Type 2    Diabetes Maternal Uncle         Mellitus    Diabetes Paternal Uncle         Type 2    Substance Abuse Neg Hx     Alcohol abuse Neg Hx      Past Surgical History:   Procedure Laterality Date    CATARACT EXTRACTION Left 10/2020    CATARACT EXTRACTION Right     RETINAL LASER PROCEDURE         Current Outpatient Medications:     acetone, urine, test strip, Use to test urine ketones for high blood sugars., Disp: 25 each, Rfl: 6    albuterol (2.5 mg/3 mL) 0.083 % nebulizer solution, INHALE CONTENTS OF 1 VIAL BY NEBULIZER EVERY 6 HOURS AS NEEDED FOR WHEEZING OR FOR SHORTNESS OF BREATH., Disp: 75 mL, Rfl: 3    albuterol (PROVENTIL HFA,VENTOLIN HFA) 90 mcg/act inhaler, INHALE 2 PUFFS BY MOUTH EVERY 4 HOURS AS NEEDED FOR WQHEEZING OR FOR SHORTNESS OF BREATH, Disp: 25.5 g, Rfl: 1    apixaban (Eliquis) 5 mg, Take 1 tablet (5 mg total) by mouth 2 (two) times a day, Disp: 60 tablet, Rfl: 5    Ascorbic Acid (VITAMIN C) 500 MG CAPS, Take 2 capsules by mouth daily, Disp: , Rfl:     atorvastatin (LIPITOR) 80 mg tablet, TAKE 1 TABLET BY  MOUTH EVERY DAY, Disp: 30 tablet, Rfl: 5    Continuous Blood Gluc Sensor (Dexcom G7 Sensor), Use 1 Device every 10 days, Disp: 3 each, Rfl: 11    cyanocobalamin (VITAMIN B-12) 100 mcg tablet, Take by mouth daily, Disp: , Rfl:     Fluticasone-Salmeterol (Advair) 250-50 mcg/dose inhaler, INHALE 1 PUFF BY MOUTH TWO TIMES DAILY RINSE MOUTH AFTER USE, Disp: 60 blister, Rfl: 5    glucagon (Glucagon Emergency) 1 MG injection, Inject 1 mg under the skin once as needed for low blood sugar for up to 1 dose, Disp: 1 kit, Rfl: 3    Glucagon (Gvoke HypoPen 2-Pack) 1 MG/0.2ML SOAJ, Use if hypoglycemia prn, Disp: 1 Syringe, Rfl: 6    insulin aspart, w/niacinamide, (Fiasp FlexTouch) 100 Units/mL injection pen, 15u TID with meals., Disp: , Rfl:     insulin glargine (Toujeo SoloStar) 300 units/mL CONCENTRATED U-300 injection pen (1-unit dial), Inject 45 Units under the skin daily, Disp: , Rfl:     Insulin Pen Needle 33G X 4 MM MISC, Use to inject insulin 4 times a day, Disp: 400 each, Rfl: 2    metoprolol tartrate (LOPRESSOR) 25 mg tablet, TAKE 1/2 TABLET BY MOUTH EVERY 12 HOURS, Disp: 90 tablet, Rfl: 0    montelukast (SINGULAIR) 10 mg tablet, TAKE 1 TABLET BY MOUTH AT BEDTIME, Disp: 90 tablet, Rfl: 1    omeprazole (PriLOSEC) 20 mg delayed release capsule, Take 20 mg by mouth daily., Disp: , Rfl:     PARoxetine (PAXIL) 20 mg tablet, TAKE 1 TABLET BY MOUTH EVERY DAY, Disp: 90 tablet, Rfl: 1    Probiotic Product (ALIGN PO), Take by mouth, Disp: , Rfl:     torsemide (DEMADEX) 20 mg tablet, Take 1 tablet (20 mg total) by mouth if needed (rapid weight gain (3+ lbs overnight or 5+ lbs in 5-7 days)), Disp: , Rfl:     dicyclomine (BENTYL) 20 mg tablet, Take 1 tablet (20 mg total) by mouth 2 (two) times a day as needed (abdominal cramps) for up to 7 days (Patient not taking: Reported on 11/7/2024), Disp: 14 tablet, Rfl: 0    metFORMIN (GLUCOPHAGE-XR) 500 mg 24 hr tablet, Take 1 tablet (500 mg total) by mouth daily (Patient not taking:  Reported on 11/7/2024), Disp: , Rfl:       Review of Systems:  Review of Systems   Constitutional:  Negative for appetite change, chills, diaphoresis, fatigue and fever.   Respiratory:  Negative for cough, chest tightness and shortness of breath.    Cardiovascular:  Negative for chest pain, palpitations and leg swelling.   Gastrointestinal:  Negative for diarrhea, nausea and vomiting.   Endocrine: Negative for cold intolerance and heat intolerance.   Genitourinary:  Negative for difficulty urinating, dysuria and enuresis.   Musculoskeletal:  Negative for arthralgias, back pain and gait problem.   Allergic/Immunologic: Negative for environmental allergies and food allergies.   Neurological:  Negative for dizziness, facial asymmetry and headaches.   Hematological:  Negative for adenopathy. Does not bruise/bleed easily.   Psychiatric/Behavioral:  Negative for agitation, behavioral problems and confusion.          Physical Exam:  Physical Exam  Constitutional:       Appearance: She is well-developed.   HENT:      Right Ear: External ear normal.      Left Ear: External ear normal.   Eyes:      Pupils: Pupils are equal, round, and reactive to light.   Cardiovascular:      Rate and Rhythm: Normal rate and regular rhythm.      Heart sounds: Normal heart sounds. No murmur heard.     No friction rub. No gallop.   Pulmonary:      Effort: Pulmonary effort is normal.      Breath sounds: Normal breath sounds.   Abdominal:      Palpations: Abdomen is soft.   Musculoskeletal:         General: Normal range of motion.      Cervical back: Normal range of motion.   Skin:     General: Skin is warm and dry.   Neurological:      Mental Status: She is alert and oriented to person, place, and time.      Deep Tendon Reflexes: Reflexes are normal and symmetric.   Psychiatric:         Behavior: Behavior normal.         Thought Content: Thought content normal.         Judgment: Judgment normal.         This note was completed in part utilizing  M-Modal Fluency Direct Software.  Grammatical errors, random word insertions, spelling mistakes, and incomplete sentences can be an occasional consequence of this system secondary to software limitations, ambient noise, and hardware issues.  If you have any questions or concerns about the content, text, or information contained within the body of this dictation, please contact the provider for clarification.

## 2024-11-07 NOTE — TELEPHONE ENCOUNTER
"Left voicemail on machine informing patient to call and schedule a LOOP Recorder Implant procedure.     Thanks,  Jessenia \"Abena\" Triston    "

## 2024-11-07 NOTE — TELEPHONE ENCOUNTER
----- Message from Uriah Dubose PA-C sent at 11/7/2024  2:31 PM EST -----  Regarding: ILR  Can we arrange ILR placement for this patient next available at [facility]? please and thank you!  Hx of brief AF but cannot tolerate ZIO due to skin reaction with the adhesive.

## 2024-11-08 ENCOUNTER — OFFICE VISIT (OUTPATIENT)
Dept: NEPHROLOGY | Facility: HOSPITAL | Age: 49
End: 2024-11-08
Payer: COMMERCIAL

## 2024-11-08 ENCOUNTER — TELEPHONE (OUTPATIENT)
Age: 49
End: 2024-11-08

## 2024-11-08 ENCOUNTER — APPOINTMENT (OUTPATIENT)
Dept: LAB | Facility: CLINIC | Age: 49
End: 2024-11-08
Payer: COMMERCIAL

## 2024-11-08 VITALS
SYSTOLIC BLOOD PRESSURE: 110 MMHG | WEIGHT: 183 LBS | HEIGHT: 56 IN | HEART RATE: 80 BPM | DIASTOLIC BLOOD PRESSURE: 70 MMHG | BODY MASS INDEX: 41.17 KG/M2

## 2024-11-08 DIAGNOSIS — I10 PRIMARY HYPERTENSION: ICD-10-CM

## 2024-11-08 DIAGNOSIS — N17.9 AKI (ACUTE KIDNEY INJURY) (HCC): Primary | ICD-10-CM

## 2024-11-08 DIAGNOSIS — E87.1 HYPONATREMIA: ICD-10-CM

## 2024-11-08 DIAGNOSIS — N18.2 STAGE 2 CHRONIC KIDNEY DISEASE: ICD-10-CM

## 2024-11-08 DIAGNOSIS — D64.9 ANEMIA, UNSPECIFIED TYPE: ICD-10-CM

## 2024-11-08 DIAGNOSIS — I50.32 CHRONIC DIASTOLIC CONGESTIVE HEART FAILURE (HCC): ICD-10-CM

## 2024-11-08 DIAGNOSIS — E83.39 HYPERPHOSPHATEMIA: ICD-10-CM

## 2024-11-08 DIAGNOSIS — R80.8 OTHER PROTEINURIA: ICD-10-CM

## 2024-11-08 LAB
ANION GAP SERPL CALCULATED.3IONS-SCNC: 9 MMOL/L (ref 4–13)
BUN SERPL-MCNC: 72 MG/DL (ref 5–25)
CALCIUM SERPL-MCNC: 9.3 MG/DL (ref 8.4–10.2)
CHLORIDE SERPL-SCNC: 98 MMOL/L (ref 96–108)
CO2 SERPL-SCNC: 28 MMOL/L (ref 21–32)
CREAT SERPL-MCNC: 1.22 MG/DL (ref 0.6–1.3)
GFR SERPL CREATININE-BSD FRML MDRD: 52 ML/MIN/1.73SQ M
GLUCOSE P FAST SERPL-MCNC: 469 MG/DL (ref 65–99)
POTASSIUM SERPL-SCNC: 5 MMOL/L (ref 3.5–5.3)
SODIUM SERPL-SCNC: 135 MMOL/L (ref 135–147)

## 2024-11-08 PROCEDURE — 80048 BASIC METABOLIC PNL TOTAL CA: CPT

## 2024-11-08 PROCEDURE — 99214 OFFICE O/P EST MOD 30 MIN: CPT | Performed by: PHYSICIAN ASSISTANT

## 2024-11-08 PROCEDURE — 36415 COLL VENOUS BLD VENIPUNCTURE: CPT

## 2024-11-08 NOTE — ASSESSMENT & PLAN NOTE
ATN from contrast CT on 9/20 + volume depletion with vomiting , diarrhea, increased diuretics, ACE-I  Creatinine peaked at 8.86. Initially on IVF and then placed on lasix drip when she became oliguric . Lisinopril and torsemide were held  Creatinine at discharged down to 2.77  Repeat BMP with creatinine 1.11 on 10/4   Today's BMP pending

## 2024-11-08 NOTE — PROGRESS NOTES
OFFICE FOLLOW UP - Nephrology   Vernell Darby 49 y.o. female MRN: [id number]       Assessment & Plan  SABRINA (acute kidney injury) (HCC)  ATN from contrast CT on 9/20 + volume depletion with vomiting , diarrhea, increased diuretics, ACE-I  Creatinine peaked at 8.86. Initially on IVF and then placed on lasix drip when she became oliguric . Lisinopril and torsemide were held  Creatinine at discharged down to 2.77  Repeat BMP with creatinine 1.11 on 10/4   Today's BMP pending   Stage 2 chronic kidney disease  Baseline creatinine 0.8-1.1   We discussed the importance of good diabetes control to help prevent the progression of CKD   Primary hypertension  BP well controlled   On metoprolol 12.5mg bid and torsemide 20mg prn   Other proteinuria  Last UCP ratio 0.46g on 7/13/24   Resume lisinopril if creatinine today stable   Due to diabetes  Chronic diastolic congestive heart failure (HCC)  CXR clear on admission   Was on torsemide 40mg daily prior to admission but was decreased to 20mg daily as needed at discharged   Currently euvolemic       Plan:   Follow up today's labs -- if creatinine stable consider resuming lisinopril   Has repeat labs for January and July as ordered at last visit and following up in July with Dr Taylor     HPI: Vernell Darby is a 49 y.o. female who is here for hospital follow up .    Patient recently admitted to [facility] with N/V/D and creatinine found to be 7.5. This was felt to be prerenal + ATN with contrast. She was oliguric but output improved with lasix drip and she did not require dialysis.     Currently feeling ok.Taking her prn lasix about 1-2 days a week . No current edema or shortness of breath. No more vomiting or diarrhea.     ROS:   A complete 10 point review of systems was done. Pertinent positives and negatives as noted in the HPI, otherwise the review of systems is negative.    Allergies: Patient has no known allergies.    Medications:   Current Outpatient Medications:     acetone,  urine, test strip, Use to test urine ketones for high blood sugars., Disp: 25 each, Rfl: 6    albuterol (2.5 mg/3 mL) 0.083 % nebulizer solution, INHALE CONTENTS OF 1 VIAL BY NEBULIZER EVERY 6 HOURS AS NEEDED FOR WHEEZING OR FOR SHORTNESS OF BREATH., Disp: 75 mL, Rfl: 3    albuterol (PROVENTIL HFA,VENTOLIN HFA) 90 mcg/act inhaler, INHALE 2 PUFFS BY MOUTH EVERY 4 HOURS AS NEEDED FOR WQHEEZING OR FOR SHORTNESS OF BREATH, Disp: 25.5 g, Rfl: 1    apixaban (Eliquis) 5 mg, Take 1 tablet (5 mg total) by mouth 2 (two) times a day, Disp: 60 tablet, Rfl: 5    Ascorbic Acid (VITAMIN C) 500 MG CAPS, Take 2 capsules by mouth daily, Disp: , Rfl:     atorvastatin (LIPITOR) 80 mg tablet, TAKE 1 TABLET BY MOUTH EVERY DAY, Disp: 30 tablet, Rfl: 5    Continuous Blood Gluc Sensor (Dexcom G7 Sensor), Use 1 Device every 10 days, Disp: 3 each, Rfl: 11    cyanocobalamin (VITAMIN B-12) 100 mcg tablet, Take by mouth daily, Disp: , Rfl:     Fluticasone-Salmeterol (Advair) 250-50 mcg/dose inhaler, INHALE 1 PUFF BY MOUTH TWO TIMES DAILY RINSE MOUTH AFTER USE, Disp: 60 blister, Rfl: 5    glucagon (Glucagon Emergency) 1 MG injection, Inject 1 mg under the skin once as needed for low blood sugar for up to 1 dose, Disp: 1 kit, Rfl: 3    Glucagon (Gvoke HypoPen 2-Pack) 1 MG/0.2ML SOAJ, Use if hypoglycemia prn, Disp: 1 Syringe, Rfl: 6    insulin aspart, w/niacinamide, (Fiasp FlexTouch) 100 Units/mL injection pen, 15u TID with meals., Disp: , Rfl:     insulin glargine (Toujeo SoloStar) 300 units/mL CONCENTRATED U-300 injection pen (1-unit dial), Inject 45 Units under the skin daily (Patient taking differently: Inject 95 Units under the skin daily), Disp: , Rfl:     Insulin Pen Needle 33G X 4 MM MISC, Use to inject insulin 4 times a day, Disp: 400 each, Rfl: 2    metoprolol tartrate (LOPRESSOR) 25 mg tablet, TAKE 1/2 TABLET BY MOUTH EVERY 12 HOURS, Disp: 90 tablet, Rfl: 0    montelukast (SINGULAIR) 10 mg tablet, TAKE 1 TABLET BY MOUTH AT BEDTIME, Disp:  90 tablet, Rfl: 1    omeprazole (PriLOSEC) 20 mg delayed release capsule, Take 20 mg by mouth daily., Disp: , Rfl:     PARoxetine (PAXIL) 20 mg tablet, TAKE 1 TABLET BY MOUTH EVERY DAY, Disp: 90 tablet, Rfl: 1    Probiotic Product (ALIGN PO), Take by mouth, Disp: , Rfl:     torsemide (DEMADEX) 20 mg tablet, Take 1 tablet (20 mg total) by mouth if needed (rapid weight gain (3+ lbs overnight or 5+ lbs in 5-7 days)), Disp: , Rfl:     dicyclomine (BENTYL) 20 mg tablet, Take 1 tablet (20 mg total) by mouth 2 (two) times a day as needed (abdominal cramps) for up to 7 days (Patient not taking: Reported on 11/7/2024), Disp: 14 tablet, Rfl: 0    metFORMIN (GLUCOPHAGE-XR) 500 mg 24 hr tablet, Take 1 tablet (500 mg total) by mouth daily (Patient not taking: Reported on 11/7/2024), Disp: , Rfl:     Past Medical History:   Diagnosis Date    Anemia     Arthritis     Asthma     w/acute exacerbation. Last assessed: 10/26/12    Chest pain 02/03/2016    CHF (congestive heart failure) (HCC)     Coronary artery disease 2021    Depression     Diabetes mellitus (HCC)     Diabetic nephropathy (HCC)     Diabetic nephropathy associated with type 1 diabetes mellitus (HCC) 08/12/2020    Diabetic retinopathy (HCC) 02/03/2016    Endometriosis     Gastroenteritis 02/03/2016    GERD (gastroesophageal reflux disease)     History of COVID-19 02/26/2021    Hyperlipidemia 02/03/2016    Hypertension     Obesity     Oligomenorrhea     Polycystic ovaries 02/03/2016    Retinal hemorrhage 02/03/2016    Retinopathy     Thrombocytosis     Type 1 diabetes mellitus with ophthalmic manifestation (HCC) 02/03/2016     Past Surgical History:   Procedure Laterality Date    CATARACT EXTRACTION Left 10/2020    CATARACT EXTRACTION Right     RETINAL LASER PROCEDURE       Family History   Problem Relation Age of Onset    Heart murmur Mother     Diabetes Mother         Type 2    Thyroid disease Mother         Disorder    Diabetes type II Mother     Mental illness Mother  "        Chemical imbalance    Diabetes Father         Type 2    Hypertension Father     Diabetes type II Father     No Known Problems Maternal Grandmother     Diabetes Maternal Grandfather         Mellitus    Breast cancer Paternal Grandmother 70 - 79    Leukemia Paternal Grandfather 48    Cancer Paternal Grandfather         Leaukemia    Diabetes Maternal Aunt         Type 2    Diabetes Maternal Uncle         Mellitus    Diabetes Paternal Uncle         Type 2    Substance Abuse Neg Hx     Alcohol abuse Neg Hx       reports that she has never smoked. She has never used smokeless tobacco. She reports current alcohol use of about 2.0 standard drinks of alcohol per week. She reports that she does not use drugs.      Physical Exam:   Vitals:    11/08/24 1436   BP: 110/70   Pulse: 80   Weight: 83 kg (183 lb)   Height: 4' 8\" (1.422 m)     Body mass index is 41.03 kg/m².    General: no acute distress   Eyes: conjunctivae pink, anicteric sclerae  ENT: mucous membranes moist  Neck: supple, no JVD  Chest: clear to auscultation bilaterally with no wheezes, rale or rhochi  CVS: regular rate and rhythm   Abdomen: soft, non-tender, non-distended  Extremities: no lower extremity edema   Skin: no rash  Neuro: awake and alert       Lab Results:  Results for orders placed or performed in visit on 11/08/24   Basic metabolic panel    Collection Time: 11/08/24  7:17 AM   Result Value Ref Range    Sodium 135 135 - 147 mmol/L    Potassium 5.0 3.5 - 5.3 mmol/L    Chloride 98 96 - 108 mmol/L    CO2 28 21 - 32 mmol/L    ANION GAP 9 4 - 13 mmol/L    BUN 72 (H) 5 - 25 mg/dL    Creatinine 1.22 0.60 - 1.30 mg/dL    Glucose, Fasting 469 (HH) 65 - 99 mg/dL    Calcium 9.3 8.4 - 10.2 mg/dL    eGFR 52 ml/min/1.73sq m       Results from last 7 days   Lab Units 11/08/24  0717   SODIUM mmol/L 135   POTASSIUM mmol/L 5.0   CHLORIDE mmol/L 98   CO2 mmol/L 28   BUN mg/dL 72*   CREATININE mg/dL 1.22   CALCIUM mg/dL 9.3         Portions of the record may have " "been created with voice recognition software. Occasional wrong word or \"sound a like\" substitutions may have occurred due to the inherent limitations of voice recognition software. Read the chart carefully and recognize, using context, where substitutions have occurred.If you have any questions, please contact the dictating provider.  "

## 2024-11-08 NOTE — ASSESSMENT & PLAN NOTE
CXR clear on admission   Was on torsemide 40mg daily prior to admission but was decreased to 20mg daily as needed at discharged   Currently euvolemic

## 2024-11-08 NOTE — TELEPHONE ENCOUNTER
LVM for pt regarding the following message per Huyen     Attempted to call patient regarding critical labs but no answer. Please call her and let her know glucose very high >400. I discussed with her endocrine team who recommended she restart her metformin. Her creatinine was stable. Thanks      Asked pt to give us a call back with any questions or concerns.

## 2024-11-08 NOTE — ASSESSMENT & PLAN NOTE
Baseline creatinine 0.8-1.1   We discussed the importance of good diabetes control to help prevent the progression of CKD

## 2024-11-08 NOTE — TELEPHONE ENCOUNTER
Lab called and was transferred over to me. Pt's lab came back with a significant value in the Glucose of 469. Please advise.

## 2024-11-13 ENCOUNTER — OFFICE VISIT (OUTPATIENT)
Dept: ENDOCRINOLOGY | Facility: HOSPITAL | Age: 49
End: 2024-11-13
Payer: COMMERCIAL

## 2024-11-13 VITALS
HEIGHT: 56 IN | SYSTOLIC BLOOD PRESSURE: 118 MMHG | BODY MASS INDEX: 41.35 KG/M2 | OXYGEN SATURATION: 99 % | DIASTOLIC BLOOD PRESSURE: 60 MMHG | HEART RATE: 80 BPM | WEIGHT: 183.8 LBS

## 2024-11-13 DIAGNOSIS — E10.65 TYPE 1 DIABETES MELLITUS WITH HYPERGLYCEMIA (HCC): Primary | ICD-10-CM

## 2024-11-13 DIAGNOSIS — I10 PRIMARY HYPERTENSION: Chronic | ICD-10-CM

## 2024-11-13 DIAGNOSIS — E78.2 MIXED HYPERLIPIDEMIA: Chronic | ICD-10-CM

## 2024-11-13 PROCEDURE — 95251 CONT GLUC MNTR ANALYSIS I&R: CPT | Performed by: PHYSICIAN ASSISTANT

## 2024-11-13 PROCEDURE — 99214 OFFICE O/P EST MOD 30 MIN: CPT | Performed by: PHYSICIAN ASSISTANT

## 2024-11-13 NOTE — PATIENT INSTRUCTIONS
Continue monitor diet, and maintain physical activity.  Make sure to drink plenty of water throughout the day.      Decrease Toujeo to 95 units. Change Fiasp to 1:1 ratio at breakfast, 1:1 ratio at lunch and 1.5:1 ratio at dinner.    Need to take more insulin with dinner to help bring blood sugars down.     Restart metformin 500 mg a day.     Call to have us download Dexcom in 2 weeks.     Follow up in 3 months with lab work prior to visit.

## 2024-11-13 NOTE — PROGRESS NOTES
Vernell LEN Darby 49 y.o. female MRN: [id number]    Encounter: [id number]      Assessment & Plan     Assessment:  This is a 49 y.o.-year-old female with type 1 diabetes with neuropathy, nephropathy, hypertension and hyperlipidemia.    Plan:  1. Type 1 diabetes: Recent hemoglobin A1c was 9.5.  Review of her Dexcom shows significant increase in glucose levels typically around dinner.  She has not been on metformin since discharge from the hospital, so I like her to restart metformin 500 mg daily.  Stressed the importance of taking more insulin with dinner as this is the major concern at this point in time.  If blood sugars start dropping significantly in the morning, we will have to back off on her Toujeo.  At this point she will continue with 95 units daily.  Continue with Fiasp 1: 1 carb ratio but stressed the importance of taking extra insulin with dinner.  Continue utilizing the Dexcom to monitor glucose levels.  Contact the office with any concerns or questions.  Follow-up in 3 months with labwork completed prior to visit.     2. Diabetic neuropathy:  Stable.  Is up-to-date on diabetic foot exam.     3. Diabetic nephropathy:  Some protein noted in her urine previously.  Kidney function doing better at this time.  Continue following up with nephrology.  Will continue to monitor.       4. Hypertension: Normotensive in the office today.  Kidney function has been improving recently.  Does follow up with nephrology.  Continue with current medications at this time.  Repeat CMP prior to next office visit.     5. Hyperlipidemia: Lipid panel does continue to show elevated triglycerides, but this time around LDL was slightly elevated.  Lifestyle modification may be able to help improve these numbers, so will not make any adjustments to medication at this time.  Continue with atorvastatin 80 mg daily.  Repeat lipid panel prior to next office visit.    CC: Type 1 diabetes follow-up    History of Present Illness     HPI:  Vernell  LEN Darby is a 49 y.o. female with type 1 diabetes with diabetic neuropathy and diabetic nephropathy, hypertension, hyperlipidemia for follow-up visit.   She was diagnosed with type 1 diabetes about 30 years ago.  She utilizes insulin therapy and takes Tresiba insulin 100 units at bedtime and Fiasp insulin 1 unit per 1 g carbohydrate with each meal. She denies polyuria, polydipsia, polyphagia, or nocturia.  She denies blurry vision.  She denies numbness or tingling of the feet.  She denies chest pain or shortness of breath.  Diabetic complications include diabetic neuropathy, diabetic nephropathy, diabetic retinopathy.  She denies heart attack, stroke, or claudication.  Overall she is doing well today.  Was admitted to the hospital September 22 through September 28, 2024 due to SABRINA.  This was likely due to increase in her diuretic.  In her hospitalization her metformin was discontinued, and was not restarted.  Has noticed glucose levels are running higher at this time.     Last diabetic foot exam was performed October 2024.  She reports her last eye exam was March 2024 and there was no change in her retinopathy.      Downloaded Dexcom from October 31 through November 13, 2024 reveals an average glucose level of 259 with a standard deviation of 98.  She is in target range 25% of time, and above target range 75% time.  Glucose levels typically trend down significantly first thing in the morning on average is doing well late morning into early afternoon, and then starts increasing in the afternoon and usually significant increase with dinner.     She has hypertension and diabetic nephropathy and takes Toprol 12.5 mg twice a day and torsemide 20 mg as needed.   She has occasional headaches but no stroke-like symptoms.     She has hyperlipidemia and takes atorvastatin 80 mg daily.  She denies chest pain or shortness of breath.     Review of Systems   Constitutional:  Negative for activity change, appetite change, fatigue  and unexpected weight change.   HENT:  Negative for sore throat and trouble swallowing.    Eyes:  Negative for visual disturbance.   Respiratory:  Negative for chest tightness and shortness of breath.    Cardiovascular:  Negative for chest pain, palpitations and leg swelling.   Gastrointestinal:  Negative for abdominal pain, constipation, diarrhea, nausea and vomiting.   Endocrine: Negative for cold intolerance, heat intolerance, polydipsia, polyphagia and polyuria.   Genitourinary:  Negative for frequency.   Skin:  Negative for wound.   Neurological:  Negative for dizziness, weakness, numbness and headaches.   Psychiatric/Behavioral:  Negative for dysphoric mood and sleep disturbance. The patient is not nervous/anxious.        Historical Information   Past Medical History:   Diagnosis Date    Anemia     Arthritis     Asthma     w/acute exacerbation. Last assessed: 10/26/12    Chest pain 02/03/2016    CHF (congestive heart failure) ([facility])     Coronary artery disease 2021    Depression     Diabetes mellitus ([facility])     Diabetic nephropathy ([facility])     Diabetic nephropathy associated with type 1 diabetes mellitus ([facility]) 08/12/2020    Diabetic retinopathy ([facility]) 02/03/2016    Endometriosis     Gastroenteritis 02/03/2016    GERD (gastroesophageal reflux disease)     History of COVID-19 02/26/2021    Hyperlipidemia 02/03/2016    Hypertension     Obesity     Oligomenorrhea     Polycystic ovaries 02/03/2016    Retinal hemorrhage 02/03/2016    Retinopathy     Thrombocytosis     Type 1 diabetes mellitus with ophthalmic manifestation ([facility]) 02/03/2016     Past Surgical History:   Procedure Laterality Date    CATARACT EXTRACTION Left 10/2020    CATARACT EXTRACTION Right     RETINAL LASER PROCEDURE       Social History   Social History     Substance and Sexual Activity   Alcohol Use Yes    Alcohol/week: 2.0 standard drinks of alcohol    Types: 1 Glasses of wine, 1 Standard drinks or equivalent per week    Comment: Occasionally. Several  weeks can go by with no alcohol.     Social History     Substance and Sexual Activity   Drug Use No     Social History     Tobacco Use   Smoking Status Never   Smokeless Tobacco Never   Tobacco Comments    Per [employer]: Former smoker. Quit in 1994     Family History:   Family History   Problem Relation Age of Onset    Heart murmur Mother     Diabetes Mother         Type 2    Thyroid disease Mother         Disorder    Diabetes type II Mother     Mental illness Mother         Chemical imbalance    Diabetes Father         Type 2    Hypertension Father     Diabetes type II Father     No Known Problems Maternal Grandmother     Diabetes Maternal Grandfather         Mellitus    Breast cancer Paternal Grandmother 70 - 79    Leukemia Paternal Grandfather 48    Cancer Paternal Grandfather         Leaukemia    Diabetes Maternal Aunt         Type 2    Diabetes Maternal Uncle         Mellitus    Diabetes Paternal Uncle         Type 2    Substance Abuse Neg Hx     Alcohol abuse Neg Hx        Meds/Allergies   Current Outpatient Medications   Medication Sig Dispense Refill    acetone, urine, test strip Use to test urine ketones for high blood sugars. 25 each 6    albuterol (2.5 mg/3 mL) 0.083 % nebulizer solution INHALE CONTENTS OF 1 VIAL BY NEBULIZER EVERY 6 HOURS AS NEEDED FOR WHEEZING OR FOR SHORTNESS OF BREATH. 75 mL 3    albuterol (PROVENTIL HFA,VENTOLIN HFA) 90 mcg/act inhaler INHALE 2 PUFFS BY MOUTH EVERY 4 HOURS AS NEEDED FOR WQHEEZING OR FOR SHORTNESS OF BREATH 25.5 g 1    apixaban (Eliquis) 5 mg Take 1 tablet (5 mg total) by mouth 2 (two) times a day 60 tablet 5    Ascorbic Acid (VITAMIN C) 500 MG CAPS Take 2 capsules by mouth daily      atorvastatin (LIPITOR) 80 mg tablet TAKE 1 TABLET BY MOUTH EVERY DAY 30 tablet 5    Continuous Blood Gluc Sensor (Dexcom G7 Sensor) Use 1 Device every 10 days 3 each 11    cyanocobalamin (VITAMIN B-12) 100 mcg tablet Take by mouth daily      Fluticasone-Salmeterol (Advair) 250-50  "mcg/dose inhaler INHALE 1 PUFF BY MOUTH TWO TIMES DAILY RINSE MOUTH AFTER USE 60 blister 5    glucagon (Glucagon Emergency) 1 MG injection Inject 1 mg under the skin once as needed for low blood sugar for up to 1 dose 1 kit 3    Glucagon (Gvoke HypoPen 2-Pack) 1 MG/0.2ML SOAJ Use if hypoglycemia prn 1 Syringe 6    insulin aspart, w/niacinamide, (Fiasp FlexTouch) 100 Units/mL injection pen 15u TID with meals.      insulin glargine (Toujeo SoloStar) 300 units/mL CONCENTRATED U-300 injection pen (1-unit dial) Inject 45 Units under the skin daily (Patient taking differently: Inject 95 Units under the skin daily)      Insulin Pen Needle 33G X 4 MM MISC Use to inject insulin 4 times a day 400 each 2    metoprolol tartrate (LOPRESSOR) 25 mg tablet TAKE 1/2 TABLET BY MOUTH EVERY 12 HOURS 90 tablet 0    montelukast (SINGULAIR) 10 mg tablet TAKE 1 TABLET BY MOUTH AT BEDTIME 90 tablet 1    omeprazole (PriLOSEC) 20 mg delayed release capsule Take 20 mg by mouth daily.      PARoxetine (PAXIL) 20 mg tablet TAKE 1 TABLET BY MOUTH EVERY DAY 90 tablet 1    Probiotic Product (ALIGN PO) Take by mouth      torsemide (DEMADEX) 20 mg tablet Take 1 tablet (20 mg total) by mouth if needed (rapid weight gain (3+ lbs overnight or 5+ lbs in 5-7 days))      dicyclomine (BENTYL) 20 mg tablet Take 1 tablet (20 mg total) by mouth 2 (two) times a day as needed (abdominal cramps) for up to 7 days (Patient not taking: Reported on 11/7/2024) 14 tablet 0    metFORMIN (GLUCOPHAGE-XR) 500 mg 24 hr tablet Take 1 tablet (500 mg total) by mouth daily (Patient not taking: Reported on 11/7/2024)       No current facility-administered medications for this visit.     No Known Allergies    Objective   Vitals: Blood pressure 118/60, pulse 80, height 4' 8\" (1.422 m), weight 83.4 kg (183 lb 12.8 oz), last menstrual period 05/03/2023, SpO2 99%, not currently breastfeeding.    Physical Exam  Vitals and nursing note reviewed.   Constitutional:       General: She is " not in acute distress.     Appearance: Normal appearance. She is not diaphoretic.   HENT:      Head: Normocephalic and atraumatic.   Eyes:      General: No scleral icterus.     Extraocular Movements: Extraocular movements intact.      Conjunctiva/sclera: Conjunctivae normal.      Pupils: Pupils are equal, round, and reactive to light.   Cardiovascular:      Rate and Rhythm: Normal rate and regular rhythm.      Heart sounds: No murmur heard.  Pulmonary:      Effort: Pulmonary effort is normal. No respiratory distress.      Breath sounds: Normal breath sounds. No wheezing.   Musculoskeletal:      Cervical back: Normal range of motion.      Right lower leg: No edema.      Left lower leg: No edema.   Lymphadenopathy:      Cervical: No cervical adenopathy.   Neurological:      Mental Status: She is alert and oriented to person, place, and time. Mental status is at baseline.      Sensory: No sensory deficit.      Gait: Gait normal.   Psychiatric:         Mood and Affect: Mood normal.         Behavior: Behavior normal.         Thought Content: Thought content normal.         The history was obtained from the review of the chart, patient.    Lab Results:   Lab Results   Component Value Date/Time    Hemoglobin A1C 9.5 (H) 10/04/2024 08:05 AM    Hemoglobin A1C 9.5 (H) 09/22/2024 07:31 PM    Hemoglobin A1C 8.7 (H) 07/13/2024 10:16 AM    WBC 9.65 10/04/2024 08:05 AM    WBC 11.28 (H) 09/28/2024 03:54 AM    WBC 12.39 (H) 09/27/2024 04:40 AM    Hemoglobin 10.0 (L) 10/04/2024 08:05 AM    Hemoglobin 11.0 (L) 09/28/2024 03:54 AM    Hemoglobin 10.9 (L) 09/27/2024 04:40 AM    Hematocrit 32.2 (L) 10/04/2024 08:05 AM    Hematocrit 34.2 (L) 09/28/2024 03:54 AM    Hematocrit 34.2 (L) 09/27/2024 04:40 AM    MCV 93 10/04/2024 08:05 AM    MCV 91 09/28/2024 03:54 AM    MCV 90 09/27/2024 04:40 AM    Platelets 383 10/04/2024 08:05 AM    Platelets 380 09/28/2024 03:54 AM    Platelets 392 (H) 09/27/2024 04:40 AM    BUN 72 (H) 11/08/2024 07:17 AM  "   BUN 41 (H) 10/04/2024 08:05 AM    BUN 83 (H) 09/28/2024 03:54 AM    Potassium 5.0 11/08/2024 07:17 AM    Potassium 4.8 10/04/2024 08:05 AM    Potassium 4.0 09/28/2024 03:54 AM    Chloride 98 11/08/2024 07:17 AM    Chloride 102 10/04/2024 08:05 AM    Chloride 96 09/28/2024 03:54 AM    CO2 28 11/08/2024 07:17 AM    CO2 25 10/04/2024 08:05 AM    CO2 32 09/28/2024 03:54 AM    CO2, i-STAT 29 09/22/2024 09:37 PM    CO2, i-STAT 30 09/22/2024 07:29 PM    CO2, i-STAT 24 12/08/2023 12:47 AM    Creatinine 1.22 11/08/2024 07:17 AM    Creatinine 1.11 10/04/2024 08:05 AM    Creatinine 2.77 (H) 09/28/2024 03:54 AM    AST 16 10/04/2024 08:05 AM    AST 71 (H) 09/28/2024 03:54 AM    AST 39 09/27/2024 10:00 AM    ALT 34 10/04/2024 08:05 AM    ALT 63 (H) 09/28/2024 03:54 AM    ALT 33 09/27/2024 10:00 AM    Total Protein 6.3 (L) 10/04/2024 08:05 AM    Total Protein 6.7 09/28/2024 03:54 AM    Total Protein 7.3 09/27/2024 10:00 AM    Albumin 3.6 10/04/2024 08:05 AM    Albumin 3.5 09/28/2024 03:54 AM    Albumin 3.8 09/27/2024 10:00 AM    HDL, Direct 35 (L) 04/10/2024 11:51 AM    Triglycerides 209 (H) 04/10/2024 11:51 AM           Imaging Studies: Results Review Statement: No pertinent imaging studies reviewed.    Portions of the record may have been created with voice recognition software. Occasional wrong word or \"sound a like\" substitutions may have occurred due to the inherent limitations of voice recognition software. Read the chart carefully and recognize, using context, where substitutions have occurred.    "

## 2024-11-14 ENCOUNTER — PREP FOR PROCEDURE (OUTPATIENT)
Dept: CARDIOLOGY CLINIC | Facility: CLINIC | Age: 49
End: 2024-11-14

## 2024-11-14 DIAGNOSIS — I48.0 PAF (PAROXYSMAL ATRIAL FIBRILLATION) (HCC): Primary | ICD-10-CM

## 2024-11-14 NOTE — TELEPHONE ENCOUNTER
"Patient is scheduled for LOOP Recorder Implant on 11/20/24 at [facility] with .     Patient aware of all general instructions.    Instructions sent to patient through [employer].      Medication holds:   Torsemide - Do not take morning of procedure.    Blood Thinners:   Eliquis - Keep taking and do not stop.      Blood work to be done on:  N/A  (Patient did CMP / CBC on 10/4/24)    PEC: Please process auth.      Thank you,  Jessenia \"Abena\" Triston      "

## 2024-11-14 NOTE — TELEPHONE ENCOUNTER
"Nataly,     Please mail letter to patient's home address on file.     Thanks,  Jessenia \"Abena\" Triston    "

## 2024-11-14 NOTE — TELEPHONE ENCOUNTER
"Left voicemail on machine informing patient to call and schedule a LOOP Recorder Implant procedure. Sent MyChart/mailed letter.     Thanks,  Jessenia \"Abena\" Triston     "

## 2024-12-18 ENCOUNTER — TELEPHONE (OUTPATIENT)
Dept: NEPHROLOGY | Facility: CLINIC | Age: 49
End: 2024-12-18

## 2024-12-18 NOTE — TELEPHONE ENCOUNTER
LVM with next appt - 8/05/25 at 2pm with Dr. Taylor in the [facility]. Mailed appt reminder card to home. (Recall list)

## 2024-12-24 ENCOUNTER — PATIENT MESSAGE (OUTPATIENT)
Dept: CARDIOLOGY CLINIC | Facility: CLINIC | Age: 49
End: 2024-12-24

## 2024-12-27 DIAGNOSIS — E10.65 TYPE 1 DIABETES MELLITUS WITH HYPERGLYCEMIA (HCC): ICD-10-CM

## 2024-12-27 RX ORDER — INSULIN ASPART INJECTION 100 [IU]/ML
INJECTION, SOLUTION SUBCUTANEOUS
Qty: 60 ML | Refills: 2 | Status: SHIPPED | OUTPATIENT
Start: 2024-12-27

## 2024-12-31 ENCOUNTER — OFFICE VISIT (OUTPATIENT)
Dept: URGENT CARE | Facility: CLINIC | Age: 49
End: 2024-12-31
Payer: COMMERCIAL

## 2024-12-31 VITALS
HEART RATE: 82 BPM | OXYGEN SATURATION: 97 % | TEMPERATURE: 97.8 F | DIASTOLIC BLOOD PRESSURE: 86 MMHG | SYSTOLIC BLOOD PRESSURE: 132 MMHG | RESPIRATION RATE: 18 BRPM

## 2024-12-31 DIAGNOSIS — J06.9 VIRAL UPPER RESPIRATORY INFECTION: Primary | ICD-10-CM

## 2024-12-31 PROCEDURE — G0382 LEV 3 HOSP TYPE B ED VISIT: HCPCS | Performed by: NURSE PRACTITIONER

## 2024-12-31 PROCEDURE — S9083 URGENT CARE CENTER GLOBAL: HCPCS | Performed by: NURSE PRACTITIONER

## 2024-12-31 NOTE — LETTER
December 31, 2024     Patient: Vernell Darby   YOB: 1975   Date of Visit: 12/31/2024       To Whom it May Concern:    Vernell Darby was seen in my clinic on 12/31/2024. She may return to work on 01/02/2024 .    If you have any questions or concerns, please don't hesitate to call.         Sincerely,          LELAND Carvajal        CC: No Recipients

## 2025-01-01 NOTE — PROGRESS NOTES
[facility]        NAME: Vernell Darby is a 49 y.o. female  : 1975    MRN: [id number]  DATE: 2024  TIME: 7:24 PM    Assessment and Plan   Viral upper respiratory infection [J06.9]  1. Viral upper respiratory infection              Patient Instructions       Cont taking Mucinex DM  Out of work today and tomorrow  Follow up with PCP in 3-5 days.  Proceed to  ER if symptoms worsen.    If tests have been performed at [facility], our office will contact you with results if changes need to be made to the care plan discussed with you at the visit.  You can review your full results on [facility].    Chief Complaint     Chief Complaint   Patient presents with    Cold Like Symptoms     Patient started with a cold over the weekend she now has chest congestion, nasal congestion and body aches          History of Present Illness       HPI  Reports cold symptoms that started 3 days ago. Also some chest congestion, nose congestion and bodyaches. Also fatigue. Denies fever or shortness of breath. Says she missed work today ahd her bodyaches and severe that she does not think she will be able to go to work tomorrow. Requesting for a work note.       Review of Systems   Review of Systems   Constitutional:  Negative for fever.   HENT:  Positive for congestion and rhinorrhea. Negative for ear pain and sore throat.    Respiratory:  Positive for cough. Negative for chest tightness, shortness of breath and wheezing.    Cardiovascular:  Negative for chest pain.   Gastrointestinal:  Negative for diarrhea and vomiting.   Neurological:  Negative for headaches.         Current Medications       Current Outpatient Medications:     acetone, urine, test strip, Use to test urine ketones for high blood sugars., Disp: 25 each, Rfl: 6    albuterol (2.5 mg/3 mL) 0.083 % nebulizer solution, INHALE CONTENTS OF 1 VIAL BY NEBULIZER EVERY 6 HOURS AS NEEDED FOR WHEEZING OR FOR SHORTNESS OF BREATH., Disp: 75 mL, Rfl: 3     albuterol (PROVENTIL HFA,VENTOLIN HFA) 90 mcg/act inhaler, INHALE 2 PUFFS BY MOUTH EVERY 4 HOURS AS NEEDED FOR WQHEEZING OR FOR SHORTNESS OF BREATH, Disp: 25.5 g, Rfl: 1    apixaban (Eliquis) 5 mg, Take 1 tablet (5 mg total) by mouth 2 (two) times a day, Disp: 60 tablet, Rfl: 5    Ascorbic Acid (VITAMIN C) 500 MG CAPS, Take 2 capsules by mouth daily, Disp: , Rfl:     atorvastatin (LIPITOR) 80 mg tablet, TAKE 1 TABLET BY MOUTH EVERY DAY, Disp: 30 tablet, Rfl: 5    Continuous Blood Gluc Sensor (Dexcom G7 Sensor), Use 1 Device every 10 days, Disp: 3 each, Rfl: 11    cyanocobalamin (VITAMIN B-12) 100 mcg tablet, Take by mouth daily, Disp: , Rfl:     dicyclomine (BENTYL) 20 mg tablet, Take 1 tablet (20 mg total) by mouth 2 (two) times a day as needed (abdominal cramps) for up to 7 days (Patient not taking: Reported on 11/7/2024), Disp: 14 tablet, Rfl: 0    Fluticasone-Salmeterol (Advair) 250-50 mcg/dose inhaler, INHALE 1 PUFF BY MOUTH TWO TIMES DAILY RINSE MOUTH AFTER USE, Disp: 60 blister, Rfl: 5    glucagon (Glucagon Emergency) 1 MG injection, Inject 1 mg under the skin once as needed for low blood sugar for up to 1 dose, Disp: 1 kit, Rfl: 3    Glucagon (Gvoke HypoPen 2-Pack) 1 MG/0.2ML SOAJ, Use if hypoglycemia prn, Disp: 1 Syringe, Rfl: 6    insulin aspart, w/niacinamide, (Fiasp FlexTouch) 100 Units/mL injection pen, INJECT 1 UNIT OF INSULIN ASPART FOR EVERY 1 GRAM OF CARBS UP  UNITS DAILY, Disp: 60 mL, Rfl: 2    insulin glargine (Toujeo SoloStar) 300 units/mL CONCENTRATED U-300 injection pen (1-unit dial), Inject 45 Units under the skin daily (Patient taking differently: Inject 95 Units under the skin daily), Disp: , Rfl:     Insulin Pen Needle 33G X 4 MM MISC, Use to inject insulin 4 times a day, Disp: 400 each, Rfl: 2    metFORMIN (GLUCOPHAGE-XR) 500 mg 24 hr tablet, Take 1 tablet (500 mg total) by mouth daily (Patient not taking: Reported on 11/7/2024), Disp: , Rfl:     metoprolol tartrate (LOPRESSOR) 25 mg  tablet, TAKE 1/2 TABLET BY MOUTH EVERY 12 HOURS, Disp: 90 tablet, Rfl: 0    montelukast (SINGULAIR) 10 mg tablet, TAKE 1 TABLET BY MOUTH AT BEDTIME, Disp: 90 tablet, Rfl: 1    omeprazole (PriLOSEC) 20 mg delayed release capsule, Take 20 mg by mouth daily., Disp: , Rfl:     PARoxetine (PAXIL) 20 mg tablet, TAKE 1 TABLET BY MOUTH EVERY DAY, Disp: 90 tablet, Rfl: 1    Probiotic Product (ALIGN PO), Take by mouth, Disp: , Rfl:     torsemide (DEMADEX) 20 mg tablet, Take 1 tablet (20 mg total) by mouth if needed (rapid weight gain (3+ lbs overnight or 5+ lbs in 5-7 days)), Disp: , Rfl:     Current Allergies     Allergies as of 12/31/2024    (No Known Allergies)            The following portions of the patient's history were reviewed and updated as appropriate: allergies, current medications, past family history, past medical history, past social history, past surgical history and problem list.     Past Medical History:   Diagnosis Date    Anemia     Arthritis     Asthma     w/acute exacerbation. Last assessed: 10/26/12    Chest pain 02/03/2016    CHF (congestive heart failure) (HCC)     Coronary artery disease 2021    Depression     Diabetes mellitus (HCC)     Diabetic nephropathy (HCC)     Diabetic nephropathy associated with type 1 diabetes mellitus (HCC) 08/12/2020    Diabetic retinopathy (HCC) 02/03/2016    Endometriosis     Gastroenteritis 02/03/2016    GERD (gastroesophageal reflux disease)     History of COVID-19 02/26/2021    Hyperlipidemia 02/03/2016    Hypertension     Obesity     Oligomenorrhea     Polycystic ovaries 02/03/2016    Retinal hemorrhage 02/03/2016    Retinopathy     Thrombocytosis     Type 1 diabetes mellitus with ophthalmic manifestation (HCC) 02/03/2016       Past Surgical History:   Procedure Laterality Date    CATARACT EXTRACTION Left 10/2020    CATARACT EXTRACTION Right     RETINAL LASER PROCEDURE         Family History   Problem Relation Age of Onset    Heart murmur Mother     Diabetes Mother          Type 2    Thyroid disease Mother         Disorder    Diabetes type II Mother     Mental illness Mother         Chemical imbalance    Diabetes Father         Type 2    Hypertension Father     Diabetes type II Father     No Known Problems Maternal Grandmother     Diabetes Maternal Grandfather         Mellitus    Breast cancer Paternal Grandmother 70 - 79    Leukemia Paternal Grandfather 48    Cancer Paternal Grandfather         Leaukemia    Diabetes Maternal Aunt         Type 2    Diabetes Maternal Uncle         Mellitus    Diabetes Paternal Uncle         Type 2    Substance Abuse Neg Hx     Alcohol abuse Neg Hx          Medications have been verified.        Objective   /86   Pulse 82   Temp 97.8 °F (36.6 °C)   Resp 18   LMP 05/03/2023 (Approximate) Comment: ncop per pt  SpO2 97%   Patient's last menstrual period was 05/03/2023 (approximate).       Physical Exam     Physical Exam  Constitutional:       Appearance: She is not ill-appearing.   HENT:      Right Ear: Tympanic membrane normal.      Left Ear: Tympanic membrane normal.      Nose: Rhinorrhea present.      Mouth/Throat:      Pharynx: No posterior oropharyngeal erythema.   Cardiovascular:      Rate and Rhythm: Regular rhythm.      Heart sounds: Normal heart sounds.   Pulmonary:      Effort: Pulmonary effort is normal.      Breath sounds: Normal breath sounds. No wheezing.   Lymphadenopathy:      Cervical: No cervical adenopathy.                    [Midline] : trachea located in midline position [Normal] : no rashes

## 2025-01-02 ENCOUNTER — OFFICE VISIT (OUTPATIENT)
Dept: FAMILY MEDICINE CLINIC | Facility: CLINIC | Age: 50
End: 2025-01-02
Payer: COMMERCIAL

## 2025-01-02 VITALS
HEIGHT: 56 IN | RESPIRATION RATE: 20 BRPM | HEART RATE: 89 BPM | SYSTOLIC BLOOD PRESSURE: 140 MMHG | TEMPERATURE: 98.2 F | BODY MASS INDEX: 41.66 KG/M2 | WEIGHT: 185.2 LBS | OXYGEN SATURATION: 99 % | DIASTOLIC BLOOD PRESSURE: 90 MMHG

## 2025-01-02 DIAGNOSIS — R05.2 SUBACUTE COUGH: Primary | ICD-10-CM

## 2025-01-02 LAB
SARS-COV-2 AG UPPER RESP QL IA: NEGATIVE
VALID CONTROL: NORMAL

## 2025-01-02 PROCEDURE — 87811 SARS-COV-2 COVID19 W/OPTIC: CPT | Performed by: STUDENT IN AN ORGANIZED HEALTH CARE EDUCATION/TRAINING PROGRAM

## 2025-01-02 PROCEDURE — 99213 OFFICE O/P EST LOW 20 MIN: CPT | Performed by: STUDENT IN AN ORGANIZED HEALTH CARE EDUCATION/TRAINING PROGRAM

## 2025-01-02 RX ORDER — AZITHROMYCIN 250 MG/1
TABLET, FILM COATED ORAL
Qty: 6 TABLET | Refills: 0 | Status: SHIPPED | OUTPATIENT
Start: 2025-01-02 | End: 2025-01-07

## 2025-01-02 RX ORDER — PREDNISONE 20 MG/1
20 TABLET ORAL DAILY
Qty: 5 TABLET | Refills: 0 | Status: SHIPPED | OUTPATIENT
Start: 2025-01-02 | End: 2025-01-07

## 2025-01-02 NOTE — PROGRESS NOTES
"Name: Vernell Darby      : 1975      MRN: [id number]  Encounter Provider: Rika Nugent MD  Encounter Date: 2025   Encounter department: [facility]  :  Assessment & Plan  Subacute cough  Rapid covid neg in office  Will start patient on azithromycin to be taken as directed   Also prednisone to help with congestion short course    Orders:    POCT Rapid Covid Ag    azithromycin (Zithromax) 250 mg tablet; Take 2 tablets (500 mg total) by mouth daily for 1 day, THEN 1 tablet (250 mg total) daily for 4 days.    predniSONE 20 mg tablet; Take 1 tablet (20 mg total) by mouth daily for 5 days    F/u prn       History of Present Illness     Patient reports cold symptoms last week- started using Zicam with minimal relief, mucinex. Cough congestion. Went to work Monday and left within an hour since she felt unwell.   Went to urgent care on  coughing, runny nose stuffy etc - however felt chills/ hot flashes  No fever  Works with elderly patients at [employer]      Review of Systems   Constitutional:  Negative for chills and fever.   HENT:  Positive for congestion and sore throat.    Respiratory:  Positive for cough.    Cardiovascular:  Negative for chest pain.   Gastrointestinal:  Negative for abdominal pain.   Neurological:  Negative for dizziness.       Objective   /90 (Patient Position: Sitting, Cuff Size: Standard)   Pulse 89   Temp 98.2 °F (36.8 °C) (Temporal)   Resp 20   Ht 4' 8\" (1.422 m)   Wt 84 kg (185 lb 3.2 oz)   LMP 2023 (Approximate) Comment: ncop per pt  SpO2 99%   BMI 41.52 kg/m²      Physical Exam  Vitals reviewed.   HENT:      Right Ear: Tympanic membrane and ear canal normal.      Left Ear: Tympanic membrane and ear canal normal.      Nose: Congestion present.      Mouth/Throat:      Pharynx: Posterior oropharyngeal erythema present.   Cardiovascular:      Rate and Rhythm: Normal rate.      Pulses: Normal pulses. "   Pulmonary:      Effort: Pulmonary effort is normal.      Breath sounds: Normal breath sounds. No wheezing.   Neurological:      Mental Status: She is alert.       Administrative Statements   I have spent a total time of 20 minutes in caring for this patient on the day of the visit/encounter including Risks and benefits of tx options, Instructions for management, Importance of tx compliance, Impressions, Documenting in the medical record, and Obtaining or reviewing history  .

## 2025-01-07 DIAGNOSIS — I48.92 ATRIAL FIB/FLUTTER, TRANSIENT (HCC): ICD-10-CM

## 2025-01-07 DIAGNOSIS — I48.91 ATRIAL FIB/FLUTTER, TRANSIENT (HCC): ICD-10-CM

## 2025-01-07 NOTE — TELEPHONE ENCOUNTER
Pharmacy called to request a refill for their Metoprolol Tartrate 12.5 mg  advised a refill was requested on 1/17/2024 and is pending approval. Pharmacy verbalized understanding and is in agreement.

## 2025-01-09 RX ORDER — METOPROLOL TARTRATE 25 MG/1
12.5 TABLET, FILM COATED ORAL EVERY 12 HOURS
Qty: 90 TABLET | Refills: 1 | Status: SHIPPED | OUTPATIENT
Start: 2025-01-09

## 2025-01-13 ENCOUNTER — OFFICE VISIT (OUTPATIENT)
Dept: CARDIOLOGY CLINIC | Facility: CLINIC | Age: 50
End: 2025-01-13
Payer: COMMERCIAL

## 2025-01-13 VITALS
WEIGHT: 184 LBS | BODY MASS INDEX: 41.39 KG/M2 | HEART RATE: 76 BPM | DIASTOLIC BLOOD PRESSURE: 62 MMHG | SYSTOLIC BLOOD PRESSURE: 130 MMHG | HEIGHT: 56 IN

## 2025-01-13 DIAGNOSIS — I10 PRIMARY HYPERTENSION: Chronic | ICD-10-CM

## 2025-01-13 DIAGNOSIS — E10.65 TYPE 1 DIABETES MELLITUS WITH HYPERGLYCEMIA (HCC): ICD-10-CM

## 2025-01-13 DIAGNOSIS — I50.32 CHRONIC DIASTOLIC (CONGESTIVE) HEART FAILURE (HCC): Primary | ICD-10-CM

## 2025-01-13 DIAGNOSIS — I48.0 PAF (PAROXYSMAL ATRIAL FIBRILLATION) (HCC): ICD-10-CM

## 2025-01-13 PROCEDURE — 99214 OFFICE O/P EST MOD 30 MIN: CPT | Performed by: INTERNAL MEDICINE

## 2025-01-13 NOTE — PATIENT INSTRUCTIONS
"Patient Education     Low-sodium diet   The Basics   Written by the doctors and editors at [facility]   What is sodium? -- This is the main ingredient in table salt. It is also found in lots of foods. The body needs a very small amount of sodium to work normally, but most people eat much more sodium than their body needs.  Who should eat less sodium? -- Nearly everyone eats too much sodium. The average American takes in 3400 milligrams of sodium each day. Experts say that most people should have no more than 2300 milligrams a day.  Some people with certain health conditions should follow a low-sodium diet. Ask your doctor how much sodium you should have.  Why should I eat less sodium? -- Reducing the amount of sodium you eat can have lots of health benefits:   It can lower your blood pressure. This means that it can help lower your risk of stroke, heart attack, kidney damage, and lots of other health problems.   It can reduce the amount of fluid in your body, which means that your heart doesn't have to work as hard.   It can keep the kidneys from having to work too hard. This is especially important in people who have kidney disease.   It can reduce swelling in the ankles and belly, which can be uncomfortable and make it hard to move.   It can lower the chances of forming kidney stones.   It can help keep your bones strong.  Which foods have the most sodium? -- Processed foods have the most sodium. These foods usually come in cans, boxes, jars, and bags. They tend to have a lot of sodium even if they don't taste salty. In fact, many sweet foods have a lot of sodium in them. The only way to know for sure how much sodium is in a food is to check the label (figure 1).  Here are some examples of foods that often have too much sodium:   Canned soups   Rice and noodle mixes   Sauces, dressings, and condiments (such as ketchup and mustard)   Pre-made frozen meals (also called \"TV dinners\")   Deli meats, hot dogs, and " "cheeses   Smoked, cured, or pickled foods   Salted snack foods and nuts   Restaurant meals  What should I do to reduce the amount of sodium in my diet? -- Many people think that eating a low-sodium diet just means not adding salt to their food. But this is not true. Not adding salt at the table or when cooking will help a little. But almost all of the sodium you eat is already in the food you buy at the grocery store or at restaurants (figure 2).  Here are some tips to help you eat less sodium:   Avoid processed foods when possible. This is the most important thing you can do to eat less sodium. Processed foods include most foods that are sold in cans, boxes, jars, and bags.   Instead of buying pre-made, processed foods, buy fresh or fresh-frozen fruits and vegetables. (\"Fresh-frozen\" means that the food is frozen without anything added to it.)   Buy meats, fish, chicken, and turkey that are fresh instead of canned or sold at the deli counter. (Meats sold at the deli counter are high in sodium.)   Try to eat at restaurants less often.   When possible, try to make meals from scratch at home using fresh ingredients.   If you do buy canned or packaged foods, choose ones that are labeled \"sodium free\" or \"very low sodium\" (table 1). Or choose foods that have less than 400 milligrams of sodium in each serving. The amount of sodium in each serving appears on the nutrition label (figure 1).  The table has some examples of foods to avoid and foods to choose instead (table 2).  Whatever changes you make, make them slowly. Choose 1 thing to do differently, and do that for a while. If you can keep doing that change easily, add another change. For instance, if you usually eat green beans from a can, try buying fresh or fresh-frozen green beans and cooking them at home without adding salt. If that works for you, keep doing it. Then, choose another thing to change.  If you try making a change and it doesn't work right away, don't " "give up. See if you can reduce sodium in other ways. The important thing is to take small steps and to keep doing the changes that work for you.  Can I still eat out at restaurants sometimes? -- One of best ways to limit your sodium is to only eat out at restaurants every once in a while. When you do eat out, try to choose places that offer healthier choices and fresh ingredients.  No matter where you eat, when choosing your food:   Ask your  if your meal can be made without salt.   Avoid foods that come with sauces or dips.   Choose plain grilled meats or fish and steamed vegetables.   Ask for oil and vinegar for your salad, rather than dressing.   If a meal you really want has more sodium than you should have, consider saving half of it to eat another day.  What if food just does not taste as good without sodium? -- Starting a low-sodium diet can be hard. The good news is that your taste buds can get used to having less sodium. But you have to give them some time to adjust.  It can also help to try other ways to add flavor to your foods. Try things like herbs, spices, lemon juice, and vinegar.  What about salt substitutes? -- Flavoring your food with a salt substitute is a good way to reduce how much salt you eat. But check with your doctor or nurse before trying this. Some salt substitutes can be dangerous if you have certain health problems or take certain medicines.  Do medicines have sodium? -- Yes, some medicines contain sodium. If you are buying medicines you can get without a prescription, look to see how much sodium they have. Avoid products that have \"sodium carbonate\" or \"sodium bicarbonate\" unless your doctor prescribes them. (Sodium bicarbonate is baking soda.)  All topics are updated as new evidence becomes available and our peer review process is complete.  This topic retrieved from [employer] on: Mar 22, 2024.  Topic 09293 Version 14.0  Release: 32.2.4 - C32.80  © 2024 [employer] and/or its " "affiliates. All rights reserved.  figure 1: Food labels can be tricky     To figure out how much sodium you are eating, check the label to find out how much sodium is in 1 serving. If you are having more than 1 serving, multiply that amount by the number of servings you plan to eat. For instance, if you are going to eat this whole can of soup, you should multiply 850 by 2. That means that you will be having 1700 milligrams of sodium. That's more sodium than many people are supposed to have in 1 day.  Graphic 99741 Version 8.0  figure 2: Sources of sodium in your diet     Graphic 06739 Version 2.0  table 1: A guide to common nutrient claims and what they mean  Salt/sodium-free  Less than 5 mg of sodium per serving   Very low sodium  35 mg or less of sodium per serving   Low sodium  140 mg or less of sodium per serving   Reduced sodium  At least 25% less sodium than the regular product   Light or lite in sodium  At least 50% less sodium than the regular product   No salt added or unsalted  No salt is added during processing, but these products may not be salt/sodium-free unless stated   mg: milligram; %: percent.  Graphic 12882 Version 7.0  table 2: Ways to cut down on salt (sodium)  Avoid these foods  Try these foods instead    Cured and smoked foods like grewal, sausage, smoked fish and meats, hot dogs, ham, lunch meats, corned beef, and pickles Fresh turkey, chicken, and lean beef   Canned fish (tuna, sardines) Unsalted tuna or sardines   Canned meats Fresh unprocessed meats, vegetable protein, and fish  Frozen and canned meats, vegetable protein, and fish that are labeled \"low-sodium\"   Salted pretzels, crackers, potato chips, tortilla chips, and nuts Low-sodium and unsalted versions of these foods   Most cheeses Low-sodium cheeses (check label for actual sodium content)   Sauces (tomato and cream, etc), tomato juices Low-sodium versions of these foods, such as low-sodium tomato juice   Processed, instant, and " "convenience foods like frozen dinners, packaged meals, canned soups, and boxed pasta blends Cook and freeze your own low-sodium meals, soups, and broths    If you do need to use convenience or processed foods, read the labels. Choose items with 140 to 200 mg of sodium per serving.    For an entire convenience meal (frozen dinner), try to find options with less than 500 to 600 mg of sodium.    If you used canned foods, look for those labeled \"sodium-free.\" Or you can rinse the canned food under water to lower the sodium content.   Fast foods and foods prepared at restaurants (unless without cheese, sauces, or added salt) Fresh foods and foods with sauces on the side  Request that food be prepared without cheese or added salt   Graphic 38961 Version 10.0  Consumer Information Use and Disclaimer   Disclaimer: This generalized information is a limited summary of diagnosis, treatment, and/or medication information. It is not meant to be comprehensive and should be used as a tool to help the user understand and/or assess potential diagnostic and treatment options. It does NOT include all information about conditions, treatments, medications, side effects, or risks that may apply to a specific patient. It is not intended to be medical advice or a substitute for the medical advice, diagnosis, or treatment of a health care provider based on the health care provider's examination and assessment of a patient's specific and unique circumstances. Patients must speak with a health care provider for complete information about their health, medical questions, and treatment options, including any risks or benefits regarding use of medications. This information does not endorse any treatments or medications as safe, effective, or approved for treating a specific patient. [employer] and its affiliates disclaim any warranty or liability relating to this information or the use thereof.The use of this information is governed by the " Terms of Use, available at https://www.woltersNetnui.comuwer.com/en/know/clinical-effectiveness-terms. 2024© [employer] and its affiliates and/or licensors. All rights reserved.  Copyright   © 2024 [employer] and/or its affiliates. All rights reserved.

## 2025-01-13 NOTE — PROGRESS NOTES
Cardiology Follow Up    Vernell Darby  1975  [id number]  [facility] [place] [id number]  [facility]  [facility] [id number] [phone]    1. Chronic diastolic (congestive) heart failure (HCC)        2. PAF (paroxysmal atrial fibrillation) (HCC)        3. Primary hypertension        4. Type 1 diabetes mellitus with hyperglycemia (HCC)            Discussion/Summary:    1.  Chronic diastolic CHF - Paty is doing well on torsemide 20 mg daily.  As stated she was in the hospital with SABRINA and contrast nephropathy requiring an IV Lasix drip this past September.  She has been stable from a volume standpoint since.  Blood work is followed closely.  Low-sodium diet recommended.    2.  Hyperlipidemia - Her atorvastatin was increased to 80 mg daily last year.  Her LDL is now at goal.  Blood work is ordered and she will be getting this in the near future.    3.  Hypertension - Her blood pressure is under good control.  After her hospitalization her lisinopril was changed over to metoprolol given her atrial fibrillation.  She should periodically check her blood pressure at home.    4.  Paroxysmal atrial fibrillation - This was detected in September for 20 minutes while in the hospital with acute kidney injury.  She converted back to sinus rhythm with IV metoprolol and seemingly has remained there since.  She tried to wear an extended ambulatory Holter but reactive to the adhesive.  A loop recorder was ordered and she is awaiting to schedule this.  This will be useful long-term as she just had this 1 episode of atrial fibrillation during the acute illness, and if it does not return over a significant amount of time while being monitored by her loop, we could consider stopping anticoagulation.  She will remain on the same dose of metoprolol and is on Eliquis for stroke prevention.  We will see her back in 6 months.      Interval History:      Mrs. Darby comes in for follow-up given her history of chronic diastolic CHF and now paroxysmal atrial fibrillation.  I met Mohini during a hospitalization in June of 2020 in which she presented with acute CHF.  This was the 1st time she ever had any issues with CHF.  Echocardiogram showed normal LV systolic function but grade 2 diastolic dysfunction.  She responded well to IV Lasix and was placed on Lasix daily.  She has been on this since.  In follow-up she had a stress echocardiogram that was normal.  She has also been treated for hypertension and hyperlipidemia.    I last saw Mohini close to 2 years ago.  In September of last year she was in the hospital with nausea/vomiting and was noted to be in severe SABRINA.  It appeared this was a combination of dehydration and contrast nephropathy and was started on an IV Lasix drip.  During this hospitalization she went into atrial fibrillation.  She had about 20 minutes of this and converted with IV and metoprolol.  She had an updated echocardiogram that did not show any significant change.  She attempted to wear an outpatient extended ambulatory Holter but only tolerated it for 1 day due to a reaction to the adhesive.  In follow-up a loop recorder was ordered but she has yet to get this done.  Her outpatient diuretic was changed to torsemide 20 mg daily.    Mohini for the most part is felt well.  She does get intermittent lower extremity edema, but it has been stable and improved since her hospitalization.  She denies chest pain or any symptoms of angina.  No worsening shortness of breath.  She denies orthopnea/PND.  No palpitations, lightheadedness or syncope.  She has been trying to follow a low-sodium diet.      Patient Active Problem List   Diagnosis    Type 1 diabetes mellitus with hyperglycemia (HCC)    Asthma    Hypertension    Moderate episode of recurrent major depressive disorder (HCC)    Hyperlipidemia    Polycystic ovarian syndrome    Retinal hemorrhage     Anemia    Both eyes affected 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, associated with type 1 diabetes mellitus (HCC)    Nervousness    Thrombocytosis    Severe obesity (BMI 35.0-39.9) with comorbidity (HCC)    Gastroesophageal reflux disease without esophagitis    Other proteinuria    Diabetic polyneuropathy associated with type 1 diabetes mellitus (HCC)    Diabetic nephropathy associated with type 1 diabetes mellitus ([facility])    Nuclear sclerotic cataract of left eye    B12 deficiency    Chronic diastolic congestive heart failure (HCC)    Primary localized osteoarthritis of right knee    Hyponatremia    Stage 2 chronic kidney disease    Hyperphosphatemia    SABRINA (acute kidney injury) ([facility])    Sore throat    N&V (nausea and vomiting)    SIRS (systemic inflammatory response syndrome) ([facility])    ATN (acute tubular necrosis) ([facility])    Oliguria and anuria    PAF (paroxysmal atrial fibrillation) ([facility])     Past Medical History:   Diagnosis Date    Anemia     Arthritis     Asthma     w/acute exacerbation. Last assessed: 10/26/12    Chest pain 02/03/2016    CHF (congestive heart failure) ([facility])     Coronary artery disease 2021    Depression     Diabetes mellitus ([facility])     Diabetic nephropathy ([facility])     Diabetic nephropathy associated with type 1 diabetes mellitus ([facility]) 08/12/2020    Diabetic retinopathy ([facility]) 02/03/2016    Endometriosis     Gastroenteritis 02/03/2016    GERD (gastroesophageal reflux disease)     History of COVID-19 02/26/2021    Hyperlipidemia 02/03/2016    Hypertension     Obesity     Oligomenorrhea     Polycystic ovaries 02/03/2016    Retinal hemorrhage 02/03/2016    Retinopathy     Thrombocytosis     Type 1 diabetes mellitus with ophthalmic manifestation ([facility]) 02/03/2016     Social History     Socioeconomic History    Marital status: /Civil Union     Spouse name: Not on file    Number of children: 0    Years of education: Not on file    Highest education level: High school  graduate   Occupational History    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 Quit in 1994   Vaping Use    Vaping status: Never Used   Substance and Sexual Activity    Alcohol use: Yes     Alcohol/week: 2.0 standard drinks of alcohol     Types: 1 Glasses of wine, 1 Standard drinks or equivalent per week     Comment: Occasionally. Several weeks can go by with no alcohol.    Drug use: No    Sexual activity: Yes     Partners: Male     Birth control/protection: None   Other Topics Concern    Not on file   Social History Narrative    Always uses seatbelt    Caffeine use: 1-2 cups coffee daily    Has smoke detectors    [employer]: [employer]        Currently unemployed; was previously doing home care; continuing job search     Social Drivers of Health     Financial Resource Strain: Not on file   Food Insecurity: No Food Insecurity (9/23/2024)    Nursing - Inadequate Food Risk Classification     Worried About Running Out of Food in the Last Year: Never true     Ran Out of Food in the Last Year: Never true     Ran Out of Food in the Last Year: Not on file   Transportation Needs: No Transportation Needs (9/23/2024)    PRAPARE - Transportation     Lack of Transportation (Medical): No     Lack of Transportation (Non-Medical): No   Physical Activity: Not on file   Stress: Not on file   Social Connections: Not on file   Intimate Partner Violence: Not on file   Housing Stability: Low Risk  (9/23/2024)    Housing Stability Vital Sign     Unable to Pay for Housing in the Last Year: No     Number of Times Moved in the Last Year: 0     Homeless in the Last Year: No      Family History   Problem Relation Age of Onset    Heart murmur Mother     Diabetes Mother         Type 2    Thyroid disease Mother         Disorder    Diabetes type II Mother     Mental illness Mother         Chemical imbalance    Diabetes Father          Type 2    Hypertension Father     Diabetes type II Father     No Known Problems Maternal Grandmother     Diabetes Maternal Grandfather         Mellitus    Breast cancer Paternal Grandmother 70 - 79    Leukemia Paternal Grandfather 48    Cancer Paternal Grandfather         Leaukemia    Diabetes Maternal Aunt         Type 2    Diabetes Maternal Uncle         Mellitus    Diabetes Paternal Uncle         Type 2    Substance Abuse Neg Hx     Alcohol abuse Neg Hx      Past Surgical History:   Procedure Laterality Date    CATARACT EXTRACTION Left 10/2020    CATARACT EXTRACTION Right     RETINAL LASER PROCEDURE         Current Outpatient Medications:     acetone, urine, test strip, Use to test urine ketones for high blood sugars., Disp: 25 each, Rfl: 6    albuterol (2.5 mg/3 mL) 0.083 % nebulizer solution, INHALE CONTENTS OF 1 VIAL BY NEBULIZER EVERY 6 HOURS AS NEEDED FOR WHEEZING OR FOR SHORTNESS OF BREATH., Disp: 75 mL, Rfl: 3    albuterol (PROVENTIL HFA,VENTOLIN HFA) 90 mcg/act inhaler, INHALE 2 PUFFS BY MOUTH EVERY 4 HOURS AS NEEDED FOR WQHEEZING OR FOR SHORTNESS OF BREATH, Disp: 25.5 g, Rfl: 1    apixaban (Eliquis) 5 mg, Take 1 tablet (5 mg total) by mouth 2 (two) times a day, Disp: 60 tablet, Rfl: 5    Ascorbic Acid (VITAMIN C) 500 MG CAPS, Take 2 capsules by mouth daily, Disp: , Rfl:     atorvastatin (LIPITOR) 80 mg tablet, TAKE 1 TABLET BY MOUTH EVERY DAY, Disp: 30 tablet, Rfl: 5    Continuous Blood Gluc Sensor (Dexcom G7 Sensor), Use 1 Device every 10 days, Disp: 3 each, Rfl: 11    cyanocobalamin (VITAMIN B-12) 100 mcg tablet, Take by mouth daily, Disp: , Rfl:     Fluticasone-Salmeterol (Advair) 250-50 mcg/dose inhaler, INHALE 1 PUFF BY MOUTH TWO TIMES DAILY RINSE MOUTH AFTER USE, Disp: 60 blister, Rfl: 5    glucagon (Glucagon Emergency) 1 MG injection, Inject 1 mg under the skin once as needed for low blood sugar for up to 1 dose, Disp: 1 kit, Rfl: 3    Glucagon (Gvoke HypoPen 2-Pack) 1 MG/0.2ML SOAJ, Use if  hypoglycemia prn, Disp: 1 Syringe, Rfl: 6    insulin aspart, w/niacinamide, (Fiasp FlexTouch) 100 Units/mL injection pen, INJECT 1 UNIT OF INSULIN ASPART FOR EVERY 1 GRAM OF CARBS UP  UNITS DAILY, Disp: 60 mL, Rfl: 2    insulin glargine (Toujeo SoloStar) 300 units/mL CONCENTRATED U-300 injection pen (1-unit dial), Inject 45 Units under the skin daily (Patient taking differently: Inject 95 Units under the skin daily), Disp: , Rfl:     Insulin Pen Needle 33G X 4 MM MISC, Use to inject insulin 4 times a day, Disp: 400 each, Rfl: 2    metFORMIN (GLUCOPHAGE-XR) 500 mg 24 hr tablet, Take 1 tablet (500 mg total) by mouth daily (Patient taking differently: Take 500 mg by mouth 2 (two) times a day with meals), Disp: , Rfl:     metoprolol tartrate (LOPRESSOR) 25 mg tablet, TAKE 1/2 TABLET BY MOUTH EVERY 12 HOURS, Disp: 90 tablet, Rfl: 1    montelukast (SINGULAIR) 10 mg tablet, TAKE 1 TABLET BY MOUTH AT BEDTIME, Disp: 90 tablet, Rfl: 1    omeprazole (PriLOSEC) 20 mg delayed release capsule, Take 20 mg by mouth daily., Disp: , Rfl:     Probiotic Product (ALIGN PO), Take by mouth, Disp: , Rfl:     torsemide (DEMADEX) 20 mg tablet, Take 1 tablet (20 mg total) by mouth if needed (rapid weight gain (3+ lbs overnight or 5+ lbs in 5-7 days)), Disp: , Rfl:     PARoxetine (PAXIL) 20 mg tablet, TAKE 1 TABLET BY MOUTH EVERY DAY, Disp: 90 tablet, Rfl: 1  No Known Allergies    Labs:  Lab Results   Component Value Date     01/28/2017    K 5.0 11/08/2024    K 4.5 09/01/2021    CL 98 11/08/2024    CL 99 09/01/2021    CO2 28 11/08/2024    CO2 29 09/22/2024    CO2 29 09/01/2021    BUN 72 (H) 11/08/2024    BUN 27 (H) 09/01/2021    CREATININE 1.22 11/08/2024    CREATININE 0.57 01/28/2017    GLUCOSE 590 (HH) 09/22/2024    CALCIUM 9.3 11/08/2024    CALCIUM 8.6 09/01/2021     Lab Results   Component Value Date    WBC 9.65 10/04/2024    WBC 6.4 01/28/2017    HGB 10.0 (L) 10/04/2024    HGB 11.3 (L) 01/28/2017    HCT 32.2 (L) 10/04/2024     HCT 34.8 (L) 01/28/2017    MCV 93 10/04/2024    MCV 90.6 01/28/2017     10/04/2024     (H) 01/28/2017     Lab Results   Component Value Date    CHOL 192 01/28/2017    TRIG 209 (H) 04/10/2024    TRIG 219 (H) 05/20/2021    HDL 35 (L) 04/10/2024    HDL 34 (L) 05/20/2021     Imaging: ECG shows sinus rhythm and is within normal limits.    STRESS ECHO (11/2020):  IMPRESSIONS:  Normal study after maximal exercise without reproduction of symptoms.    STRESS RESULTS:  Duration of exercise was 6 min and 39 sec.  Maximal work rate was 7.1 METs.  Maximal heart rate during stress was 155 bpm ( 88 % of maximal predicted heart rate).  Target heart rate was achieved.  There was no chest pain during stress.     ECG CONCLUSIONS:  The stress ECG was equivocal for ischemia. Upsloping to horizontal inferior ST segment change noted at peak with gradual resolution in recovery.     BASELINE:  There were no regional wall motion abnormalities.  Overall left ventricular systolic function was normal.     PEAK STRESS:  There were no regional wall motion abnormalities.  There was an appropriate augmentation in LV function.     ECHO CONCLUSIONS:  There was no echocardiographic evidence for stress-induced ischemia.      ECHO (8/2020):    Left Ventricle: Left ventricular cavity size is normal. Wall thickness is normal. The left ventricular ejection fraction is 60% by biplane measurement. Systolic function is normal. Wall motion is normal. Diastolic function is mildly abnormal, consistent with grade I (abnormal) relaxation.    Right Ventricle: Right ventricle is not well visualized. Systolic function is normal.    Left Atrium: The atrium is normal in size.    Right Atrium: The atrium is normal in size.    Mitral Valve: There is mild annular calcification.    Review of Systems:  Review of Systems   Constitutional:  Positive for fatigue.   HENT: Negative.     Eyes: Negative.    Respiratory: Negative.     Cardiovascular:  Positive for leg  "swelling.   Gastrointestinal: Negative.    Musculoskeletal: Negative.    Skin: Negative.    Allergic/Immunologic: Negative.    Neurological: Negative.    Hematological: Negative.    Psychiatric/Behavioral: Negative.     All other systems reviewed and are negative.    Vitals:    01/13/25 1349   BP: 130/62   BP Location: Left arm   Patient Position: Sitting   Cuff Size: Standard   Pulse: 76   Weight: 83.5 kg (184 lb)   Height: 4' 8\" (1.422 m)       Physical Exam  Vitals and nursing note reviewed.   Constitutional:       Appearance: She is well-developed.   HENT:      Head: Normocephalic and atraumatic.   Eyes:      General: No scleral icterus.        Right eye: No discharge.         Left eye: No discharge.      Pupils: Pupils are equal, round, and reactive to light.   Neck:      Thyroid: No thyromegaly.      Vascular: No JVD.   Cardiovascular:      Rate and Rhythm: Normal rate and regular rhythm. No extrasystoles are present.     Pulses: Normal pulses. No decreased pulses.      Heart sounds: Normal heart sounds, S1 normal and S2 normal. No murmur heard.     No friction rub. No gallop.   Pulmonary:      Effort: Pulmonary effort is normal. No respiratory distress.      Breath sounds: Normal breath sounds. No wheezing or rales.   Abdominal:      General: Bowel sounds are normal. There is no distension.      Palpations: Abdomen is soft.      Tenderness: There is no abdominal tenderness.   Musculoskeletal:         General: No tenderness or deformity. Normal range of motion.      Cervical back: Normal range of motion and neck supple.   Skin:     General: Skin is warm and dry.      Findings: No rash.   Neurological:      Mental Status: She is alert and oriented to person, place, and time.      Cranial Nerves: No cranial nerve deficit.   Psychiatric:         Thought Content: Thought content normal.         Judgment: Judgment normal.       Counseling / Coordination of Care  Total office time spent today 30 minutes.  Greater " than 50% of total time was spent with the patient and / or family counseling and / or coordination of care.

## 2025-01-14 ENCOUNTER — APPOINTMENT (OUTPATIENT)
Dept: LAB | Facility: CLINIC | Age: 50
End: 2025-01-14
Payer: COMMERCIAL

## 2025-01-14 ENCOUNTER — TRANSCRIBE ORDERS (OUTPATIENT)
Dept: LAB | Facility: CLINIC | Age: 50
End: 2025-01-14

## 2025-01-14 ENCOUNTER — HOSPITAL ENCOUNTER (OUTPATIENT)
Dept: MAMMOGRAPHY | Facility: CLINIC | Age: 50
Discharge: HOME | End: 2025-01-14
Payer: COMMERCIAL

## 2025-01-14 VITALS — HEIGHT: 56 IN | WEIGHT: 184 LBS | BODY MASS INDEX: 41.39 KG/M2

## 2025-01-14 DIAGNOSIS — D64.9 ANEMIA, UNSPECIFIED TYPE: Primary | ICD-10-CM

## 2025-01-14 DIAGNOSIS — D64.9 NORMOCYTIC ANEMIA: ICD-10-CM

## 2025-01-14 DIAGNOSIS — D75.839 THROMBOCYTOSIS: ICD-10-CM

## 2025-01-14 DIAGNOSIS — D75.839 THROMBOCYTHEMIA: ICD-10-CM

## 2025-01-14 DIAGNOSIS — D64.9 ANEMIA, UNSPECIFIED TYPE: ICD-10-CM

## 2025-01-14 DIAGNOSIS — R92.8 ABNORMAL MAMMOGRAM: ICD-10-CM

## 2025-01-14 LAB
ALBUMIN SERPL BCG-MCNC: 4 G/DL (ref 3.5–5)
ALP SERPL-CCNC: 102 U/L (ref 34–104)
ALT SERPL W P-5'-P-CCNC: 17 U/L (ref 7–52)
ANION GAP SERPL CALCULATED.3IONS-SCNC: 11 MMOL/L (ref 4–13)
AST SERPL W P-5'-P-CCNC: 18 U/L (ref 13–39)
BASOPHILS # BLD AUTO: 0.13 THOUSANDS/ΜL (ref 0–0.1)
BASOPHILS NFR BLD AUTO: 1 % (ref 0–1)
BILIRUB SERPL-MCNC: 0.33 MG/DL (ref 0.2–1)
BUN SERPL-MCNC: 61 MG/DL (ref 5–25)
CALCIUM SERPL-MCNC: 8.4 MG/DL (ref 8.4–10.2)
CHLORIDE SERPL-SCNC: 96 MMOL/L (ref 96–108)
CO2 SERPL-SCNC: 30 MMOL/L (ref 21–32)
CREAT SERPL-MCNC: 1.06 MG/DL (ref 0.6–1.3)
EOSINOPHIL # BLD AUTO: 0.53 THOUSAND/ΜL (ref 0–0.61)
EOSINOPHIL NFR BLD AUTO: 5 % (ref 0–6)
ERYTHROCYTE [DISTWIDTH] IN BLOOD BY AUTOMATED COUNT: 14.2 % (ref 11.6–15.1)
FERRITIN SERPL-MCNC: 48 NG/ML (ref 11–307)
GFR SERPL CREATININE-BSD FRML MDRD: 61 ML/MIN/1.73SQ M
GLUCOSE P FAST SERPL-MCNC: 145 MG/DL (ref 65–99)
HCT VFR BLD AUTO: 36.4 % (ref 34.8–46.1)
HGB BLD-MCNC: 11.6 G/DL (ref 11.5–15.4)
IMM GRANULOCYTES # BLD AUTO: 0.04 THOUSAND/UL (ref 0–0.2)
IMM GRANULOCYTES NFR BLD AUTO: 0 % (ref 0–2)
IRON SATN MFR SERPL: 15 % (ref 15–50)
IRON SERPL-MCNC: 54 UG/DL (ref 50–212)
LYMPHOCYTES # BLD AUTO: 2.11 THOUSANDS/ΜL (ref 0.6–4.47)
LYMPHOCYTES NFR BLD AUTO: 19 % (ref 14–44)
MCH RBC QN AUTO: 27.6 PG (ref 26.8–34.3)
MCHC RBC AUTO-ENTMCNC: 31.9 G/DL (ref 31.4–37.4)
MCV RBC AUTO: 87 FL (ref 82–98)
MONOCYTES # BLD AUTO: 0.52 THOUSAND/ΜL (ref 0.17–1.22)
MONOCYTES NFR BLD AUTO: 5 % (ref 4–12)
NEUTROPHILS # BLD AUTO: 7.9 THOUSANDS/ΜL (ref 1.85–7.62)
NEUTS SEG NFR BLD AUTO: 70 % (ref 43–75)
NRBC BLD AUTO-RTO: 0 /100 WBCS
PLATELET # BLD AUTO: 442 THOUSANDS/UL (ref 149–390)
PMV BLD AUTO: 10.7 FL (ref 8.9–12.7)
POTASSIUM SERPL-SCNC: 4.2 MMOL/L (ref 3.5–5.3)
PROT SERPL-MCNC: 7.3 G/DL (ref 6.4–8.4)
RBC # BLD AUTO: 4.2 MILLION/UL (ref 3.81–5.12)
SODIUM SERPL-SCNC: 137 MMOL/L (ref 135–147)
TIBC SERPL-MCNC: 368.2 UG/DL (ref 250–450)
TRANSFERRIN SERPL-MCNC: 263 MG/DL (ref 203–362)
UIBC SERPL-MCNC: 314 UG/DL (ref 155–355)
WBC # BLD AUTO: 11.23 THOUSAND/UL (ref 4.31–10.16)

## 2025-01-14 PROCEDURE — 85025 COMPLETE CBC W/AUTO DIFF WBC: CPT

## 2025-01-14 PROCEDURE — 82728 ASSAY OF FERRITIN: CPT

## 2025-01-14 PROCEDURE — 83550 IRON BINDING TEST: CPT

## 2025-01-14 PROCEDURE — 77065 DX MAMMO INCL CAD UNI: CPT

## 2025-01-14 PROCEDURE — 80053 COMPREHEN METABOLIC PANEL: CPT

## 2025-01-14 PROCEDURE — 83540 ASSAY OF IRON: CPT

## 2025-01-14 PROCEDURE — 36415 COLL VENOUS BLD VENIPUNCTURE: CPT

## 2025-01-15 NOTE — PROGRESS NOTES
Name: Vernell Darby      : 1975      MRN: [id number]  Encounter Provider: LELAND Echevarria  Encounter Date: 2025   Encounter department: [facility]  :  Assessment & Plan  Anemia, unspecified type  Patient with a history of anemia, multifactorial secondary to chronic disease iron deficiency treated with parenteral iron replacement in the past  Most recent blood work shows normal hemoglobin, normal iron studies  We will continue to monitor    Orders:    CBC and differential; Future    Comprehensive metabolic panel; Future    Iron Panel (Includes Ferritin, Iron Sat%, Iron, and TIBC); Future    Thrombocytosis  History of intermittent mildly elevated platelet count dating back to at least   Hematologic workup in past resulted with negative findings.  Suspect reactive as her platelet count does fluctuate  Most recent platelet count 442    Will continue to monitor  Follow-up in 6 months with repeat blood work.    Orders:    CBC and differential; Future    Comprehensive metabolic panel; Future    Iron Panel (Includes Ferritin, Iron Sat%, Iron, and TIBC); Future        History of Present Illness   Chief Complaint   Patient presents with    Follow-up     Pertinent Medical History   25: Mohini presents for routine six month follow-up for history of anemia of chronic disease, intermittent thrombocytosis  She was hospitalized in 2024 with SABRINA.  Creatinine was 7.5 on admission, reached a high of 8.  She had significant hyperglycemia presentation, no DKA.  She developed episode of A-fib during her acute illness.  She is currently on metoprolol and Eliquis per cardiology for stroke prevention  She continues to follow closely with nephrology and endocrinology    She recently completed a course of antibiotic and prednisone for URI  Most recent blood work does show mild leukocytosis, white count 11.2, hemoglobin 11.6, MCV 87, platelets 442, ANC  "7.9  Iron panel normal  Creatinine at baseline 1.06  She feels well        Review of Systems   All other systems reviewed and are negative.    Medical History Reviewed by provider this encounter: history of type 1 diabetes,  chronic diastolic CHF, HTN       Objective   /70 (BP Location: Left arm, Patient Position: Sitting, Cuff Size: Standard)   Pulse 78   Temp 97.9 °F (36.6 °C) (Temporal)   Resp 18   Ht 4' 8\" (1.422 m)   Wt 83 kg (183 lb)   LMP 05/03/2023 (Approximate) Comment: ncop per pt  SpO2 97%   BMI 41.03 kg/m²     Physical Exam  Constitutional:       General: She is not in acute distress.     Appearance: Normal appearance.   HENT:      Head: Normocephalic and atraumatic.   Eyes:      General: No scleral icterus.        Right eye: No discharge.         Left eye: No discharge.      Conjunctiva/sclera: Conjunctivae normal.   Cardiovascular:      Rate and Rhythm: Normal rate and regular rhythm.   Pulmonary:      Effort: Pulmonary effort is normal. No respiratory distress.      Breath sounds: Normal breath sounds.   Abdominal:      General: Bowel sounds are normal. There is no distension.      Palpations: Abdomen is soft. There is no mass.      Tenderness: There is no abdominal tenderness.   Musculoskeletal:         General: Normal range of motion.   Lymphadenopathy:      Cervical: No cervical adenopathy.      Upper Body:      Right upper body: No supraclavicular, axillary or pectoral adenopathy.      Left upper body: No supraclavicular, axillary or pectoral adenopathy.   Skin:     General: Skin is warm and dry.   Neurological:      General: No focal deficit present.      Mental Status: She is alert and oriented to person, place, and time.   Psychiatric:         Mood and Affect: Mood normal.         Behavior: Behavior normal.         Labs: I have reviewed the following labs:  Results for orders placed or performed in visit on 01/14/25   CBC and differential   Result Value Ref Range    WBC 11.23 (H) " 4.31 - 10.16 Thousand/uL    RBC 4.20 3.81 - 5.12 Million/uL    Hemoglobin 11.6 11.5 - 15.4 g/dL    Hematocrit 36.4 34.8 - 46.1 %    MCV 87 82 - 98 fL    MCH 27.6 26.8 - 34.3 pg    MCHC 31.9 31.4 - 37.4 g/dL    RDW 14.2 11.6 - 15.1 %    MPV 10.7 8.9 - 12.7 fL    Platelets 442 (H) 149 - 390 Thousands/uL    nRBC 0 /100 WBCs    Segmented % 70 43 - 75 %    Immature Grans % 0 0 - 2 %    Lymphocytes % 19 14 - 44 %    Monocytes % 5 4 - 12 %    Eosinophils Relative 5 0 - 6 %    Basophils Relative 1 0 - 1 %    Absolute Neutrophils 7.90 (H) 1.85 - 7.62 Thousands/µL    Absolute Immature Grans 0.04 0.00 - 0.20 Thousand/uL    Absolute Lymphocytes 2.11 0.60 - 4.47 Thousands/µL    Absolute Monocytes 0.52 0.17 - 1.22 Thousand/µL    Eosinophils Absolute 0.53 0.00 - 0.61 Thousand/µL    Basophils Absolute 0.13 (H) 0.00 - 0.10 Thousands/µL   Comprehensive metabolic panel   Result Value Ref Range    Sodium 137 135 - 147 mmol/L    Potassium 4.2 3.5 - 5.3 mmol/L    Chloride 96 96 - 108 mmol/L    CO2 30 21 - 32 mmol/L    ANION GAP 11 4 - 13 mmol/L    BUN 61 (H) 5 - 25 mg/dL    Creatinine 1.06 0.60 - 1.30 mg/dL    Glucose, Fasting 145 (H) 65 - 99 mg/dL    Calcium 8.4 8.4 - 10.2 mg/dL    AST 18 13 - 39 U/L    ALT 17 7 - 52 U/L    Alkaline Phosphatase 102 34 - 104 U/L    Total Protein 7.3 6.4 - 8.4 g/dL    Albumin 4.0 3.5 - 5.0 g/dL    Total Bilirubin 0.33 0.20 - 1.00 mg/dL    eGFR 61 ml/min/1.73sq m   TIBC Panel (incl. Iron, TIBC, % Iron Saturation)   Result Value Ref Range    Iron Saturation 15 15 - 50 %    TIBC 368.2 250 - 450 ug/dL    Iron 54 50 - 212 ug/dL    Transferrin 263 203 - 362 mg/dL    UIBC 314 155 - 355 ug/dL   Result Value Ref Range    Ferritin 48 11 - 307 ng/mL     *Note: Due to a large number of results and/or encounters for the requested time period, some results have not been displayed. A complete set of results can be found in Results Review.             Administrative Statements   I have spent a total time of 25 minutes in  caring for this patient on the day of the visit/encounter including Diagnostic results, Instructions for management, Documenting in the medical record, Reviewing / ordering tests, medicine, procedures  , and Obtaining or reviewing history  .

## 2025-01-16 ENCOUNTER — PREP FOR PROCEDURE (OUTPATIENT)
Dept: CARDIOLOGY CLINIC | Facility: CLINIC | Age: 50
End: 2025-01-16

## 2025-01-16 ENCOUNTER — OFFICE VISIT (OUTPATIENT)
Age: 50
End: 2025-01-16
Payer: COMMERCIAL

## 2025-01-16 VITALS
DIASTOLIC BLOOD PRESSURE: 70 MMHG | HEART RATE: 78 BPM | OXYGEN SATURATION: 97 % | TEMPERATURE: 97.9 F | SYSTOLIC BLOOD PRESSURE: 130 MMHG | WEIGHT: 183 LBS | BODY MASS INDEX: 41.17 KG/M2 | RESPIRATION RATE: 18 BRPM | HEIGHT: 56 IN

## 2025-01-16 DIAGNOSIS — D75.839 THROMBOCYTOSIS: ICD-10-CM

## 2025-01-16 DIAGNOSIS — D64.9 ANEMIA, UNSPECIFIED TYPE: Primary | ICD-10-CM

## 2025-01-16 DIAGNOSIS — I48.0 PAF (PAROXYSMAL ATRIAL FIBRILLATION) (HCC): Primary | ICD-10-CM

## 2025-01-16 PROCEDURE — 99213 OFFICE O/P EST LOW 20 MIN: CPT | Performed by: NURSE PRACTITIONER

## 2025-01-16 NOTE — ASSESSMENT & PLAN NOTE
Patient with a history of anemia, multifactorial secondary to chronic disease iron deficiency treated with parenteral iron replacement in the past  Most recent blood work shows normal hemoglobin, normal iron studies  We will continue to monitor    Orders:    CBC and differential; Future    Comprehensive metabolic panel; Future    Iron Panel (Includes Ferritin, Iron Sat%, Iron, and TIBC); Future

## 2025-01-16 NOTE — ASSESSMENT & PLAN NOTE
History of intermittent mildly elevated platelet count dating back to at least 2016  Hematologic workup in past resulted with negative findings.  Suspect reactive as her platelet count does fluctuate  Most recent platelet count 442    Will continue to monitor  Follow-up in 6 months with repeat blood work.    Orders:    CBC and differential; Future    Comprehensive metabolic panel; Future    Iron Panel (Includes Ferritin, Iron Sat%, Iron, and TIBC); Future

## 2025-01-20 DIAGNOSIS — J45.909 UNCOMPLICATED ASTHMA, UNSPECIFIED ASTHMA SEVERITY, UNSPECIFIED WHETHER PERSISTENT: ICD-10-CM

## 2025-01-21 RX ORDER — ALBUTEROL SULFATE 90 UG/1
INHALANT RESPIRATORY (INHALATION)
Qty: 25.5 G | Refills: 1 | Status: SHIPPED | OUTPATIENT
Start: 2025-01-21

## 2025-02-04 ENCOUNTER — OFFICE VISIT (OUTPATIENT)
Dept: FAMILY MEDICINE CLINIC | Facility: CLINIC | Age: 50
End: 2025-02-04
Payer: COMMERCIAL

## 2025-02-04 VITALS
SYSTOLIC BLOOD PRESSURE: 130 MMHG | TEMPERATURE: 98.6 F | HEIGHT: 56 IN | HEART RATE: 91 BPM | DIASTOLIC BLOOD PRESSURE: 82 MMHG | OXYGEN SATURATION: 98 % | RESPIRATION RATE: 18 BRPM | WEIGHT: 182.4 LBS | BODY MASS INDEX: 41.03 KG/M2

## 2025-02-04 DIAGNOSIS — M17.11 PRIMARY LOCALIZED OSTEOARTHRITIS OF RIGHT KNEE: Primary | ICD-10-CM

## 2025-02-04 PROCEDURE — 99213 OFFICE O/P EST LOW 20 MIN: CPT | Performed by: STUDENT IN AN ORGANIZED HEALTH CARE EDUCATION/TRAINING PROGRAM

## 2025-02-06 DIAGNOSIS — J45.31 MILD PERSISTENT ASTHMA WITH ACUTE EXACERBATION: ICD-10-CM

## 2025-02-07 DIAGNOSIS — E10.65 TYPE 1 DIABETES MELLITUS WITH HYPERGLYCEMIA (HCC): ICD-10-CM

## 2025-02-07 RX ORDER — ALBUTEROL SULFATE 0.83 MG/ML
SOLUTION RESPIRATORY (INHALATION)
Qty: 75 ML | Refills: 3 | Status: SHIPPED | OUTPATIENT
Start: 2025-02-07

## 2025-02-07 RX ORDER — METFORMIN HYDROCHLORIDE 500 MG/1
500 TABLET, EXTENDED RELEASE ORAL 2 TIMES DAILY WITH MEALS
Qty: 180 TABLET | Refills: 3 | Status: SHIPPED | OUTPATIENT
Start: 2025-02-07

## 2025-02-08 ENCOUNTER — APPOINTMENT (OUTPATIENT)
Dept: RADIOLOGY | Facility: CLINIC | Age: 50
End: 2025-02-08
Payer: COMMERCIAL

## 2025-02-08 ENCOUNTER — OFFICE VISIT (OUTPATIENT)
Dept: URGENT CARE | Facility: CLINIC | Age: 50
End: 2025-02-08
Payer: COMMERCIAL

## 2025-02-08 VITALS
TEMPERATURE: 97.9 F | SYSTOLIC BLOOD PRESSURE: 158 MMHG | DIASTOLIC BLOOD PRESSURE: 68 MMHG | HEART RATE: 82 BPM | RESPIRATION RATE: 20 BRPM | OXYGEN SATURATION: 96 %

## 2025-02-08 DIAGNOSIS — M17.11 PRIMARY LOCALIZED OSTEOARTHRITIS OF RIGHT KNEE: ICD-10-CM

## 2025-02-08 DIAGNOSIS — R05.1 ACUTE COUGH: ICD-10-CM

## 2025-02-08 DIAGNOSIS — J40 BRONCHITIS: ICD-10-CM

## 2025-02-08 DIAGNOSIS — J01.00 ACUTE MAXILLARY SINUSITIS, RECURRENCE NOT SPECIFIED: Primary | ICD-10-CM

## 2025-02-08 PROCEDURE — G0382 LEV 3 HOSP TYPE B ED VISIT: HCPCS | Performed by: PHYSICIAN ASSISTANT

## 2025-02-08 PROCEDURE — 71046 X-RAY EXAM CHEST 2 VIEWS: CPT

## 2025-02-08 PROCEDURE — 73562 X-RAY EXAM OF KNEE 3: CPT

## 2025-02-08 PROCEDURE — S9083 URGENT CARE CENTER GLOBAL: HCPCS | Performed by: PHYSICIAN ASSISTANT

## 2025-02-08 PROCEDURE — 87636 SARSCOV2 & INF A&B AMP PRB: CPT | Performed by: PHYSICIAN ASSISTANT

## 2025-02-08 RX ORDER — PREDNISONE 10 MG/1
TABLET ORAL
Qty: 9 TABLET | Refills: 0 | Status: SHIPPED | OUTPATIENT
Start: 2025-02-08

## 2025-02-08 RX ORDER — AZITHROMYCIN 250 MG/1
TABLET, FILM COATED ORAL
Qty: 6 TABLET | Refills: 0 | Status: SHIPPED | OUTPATIENT
Start: 2025-02-08 | End: 2025-02-12

## 2025-02-08 RX ORDER — BENZONATATE 100 MG/1
100 CAPSULE ORAL 3 TIMES DAILY PRN
Qty: 20 CAPSULE | Refills: 0 | Status: SHIPPED | OUTPATIENT
Start: 2025-02-08

## 2025-02-08 NOTE — LETTER
February 8, 2025     Patient: Vernell Darby   YOB: 1975   Date of Visit: 2/8/2025       To Whom It May Concern:    It is my medical opinion that Vernell Darby may return to work on 2/10/2025 .    If you have any questions or concerns, please don't hesitate to call.         Sincerely,        Edu Olmedo Jr, MCKAY    CC: No Recipients   Dressing: pressure dressing

## 2025-02-08 NOTE — PROGRESS NOTES
[facility]        NAME: Vernell Darby is a 49 y.o. female  : 1975    MRN: [id number]  DATE: 2025  TIME: 12:36 PM    /68   Pulse 82   Temp 97.9 °F (36.6 °C)   Resp 20   LMP 2023 (Approximate) Comment: ncop per pt  SpO2 96%     Assessment and Plan   Acute maxillary sinusitis, recurrence not specified [J01.00]  1. Acute maxillary sinusitis, recurrence not specified  XR chest pa and lateral    azithromycin (ZITHROMAX) 250 mg tablet    benzonatate (TESSALON PERLES) 100 mg capsule    predniSONE 10 mg tablet      2. Bronchitis  azithromycin (ZITHROMAX) 250 mg tablet    benzonatate (TESSALON PERLES) 100 mg capsule    predniSONE 10 mg tablet            Patient Instructions       Follow up with PCP in 3-5 days.  Proceed to  ER if symptoms worsen.    Chief Complaint     Chief Complaint   Patient presents with    Cough     Pt has had a productive cough and chest congestion starting Tuesday. Has hx of asthma been using nebulizer at home but it does not seem to help as much as it usually does          History of Present Illness       Pt with productive cough and congestoin x 5 days  wheezing starting yesterday     Cough  Associated symptoms include wheezing.       Review of Systems   Review of Systems   Constitutional: Negative.    HENT:  Positive for congestion, sinus pressure and sinus pain.    Eyes: Negative.    Respiratory:  Positive for cough and wheezing.    Cardiovascular: Negative.    Gastrointestinal: Negative.    Endocrine: Negative.    Genitourinary: Negative.    Musculoskeletal: Negative.    Skin: Negative.    Allergic/Immunologic: Negative.    Neurological: Negative.    Hematological: Negative.    Psychiatric/Behavioral: Negative.     All other systems reviewed and are negative.        Current Medications       Current Outpatient Medications:     azithromycin (ZITHROMAX) 250 mg tablet, Take 2 tablets today then 1 tablet daily x 4 days, Disp: 6 tablet, Rfl: 0     benzonatate (TESSALON PERLES) 100 mg capsule, Take 1 capsule (100 mg total) by mouth 3 (three) times a day as needed for cough, Disp: 20 capsule, Rfl: 0    predniSONE 10 mg tablet, 2 tabs po qd x 3 days then 1 tab po qd x 3 days, Disp: 9 tablet, Rfl: 0    acetone, urine, test strip, Use to test urine ketones for high blood sugars., Disp: 25 each, Rfl: 6    albuterol (2.5 mg/3 mL) 0.083 % nebulizer solution, INHALE THE CONTENTS OF 1 VIAL VIA NEBULIZER EVERY 6 HOURS AS NEEDED FOR WHEEZING OR SHORTNESS OF BREATH, Disp: 75 mL, Rfl: 3    albuterol (PROVENTIL HFA,VENTOLIN HFA) 90 mcg/act inhaler, INHALE 2 PUFFS BY MOUTH EVERY 4 HOURS AS NEEDED FOR WHEEZING OR FOR SHORTNESS OF BREATH, Disp: 25.5 g, Rfl: 1    apixaban (Eliquis) 5 mg, Take 1 tablet (5 mg total) by mouth 2 (two) times a day, Disp: 60 tablet, Rfl: 5    Ascorbic Acid (VITAMIN C) 500 MG CAPS, Take 2 capsules by mouth daily, Disp: , Rfl:     atorvastatin (LIPITOR) 80 mg tablet, TAKE 1 TABLET BY MOUTH EVERY DAY, Disp: 30 tablet, Rfl: 5    Continuous Blood Gluc Sensor (Dexcom G7 Sensor), Use 1 Device every 10 days, Disp: 3 each, Rfl: 11    cyanocobalamin (VITAMIN B-12) 100 mcg tablet, Take by mouth daily, Disp: , Rfl:     Fluticasone-Salmeterol (Advair) 250-50 mcg/dose inhaler, INHALE 1 PUFF BY MOUTH TWO TIMES DAILY RINSE MOUTH AFTER USE, Disp: 60 blister, Rfl: 5    glucagon (Glucagon Emergency) 1 MG injection, Inject 1 mg under the skin once as needed for low blood sugar for up to 1 dose, Disp: 1 kit, Rfl: 3    Glucagon (Gvoke HypoPen 2-Pack) 1 MG/0.2ML SOAJ, Use if hypoglycemia prn, Disp: 1 Syringe, Rfl: 6    insulin aspart, w/niacinamide, (Fiasp FlexTouch) 100 Units/mL injection pen, INJECT 1 UNIT OF INSULIN ASPART FOR EVERY 1 GRAM OF CARBS UP  UNITS DAILY, Disp: 60 mL, Rfl: 2    insulin glargine (Toujeo SoloStar) 300 units/mL CONCENTRATED U-300 injection pen (1-unit dial), Inject 45 Units under the skin daily, Disp: , Rfl:     Insulin Pen Needle 33G X 4  MM MISC, Use to inject insulin 4 times a day, Disp: 400 each, Rfl: 2    metFORMIN (GLUCOPHAGE-XR) 500 mg 24 hr tablet, TAKE 1 TABLET BY MOUTH TWO TIMES DAILY WITH MEALS, Disp: 180 tablet, Rfl: 3    metoprolol tartrate (LOPRESSOR) 25 mg tablet, TAKE 1/2 TABLET BY MOUTH EVERY 12 HOURS, Disp: 90 tablet, Rfl: 1    montelukast (SINGULAIR) 10 mg tablet, TAKE 1 TABLET BY MOUTH AT BEDTIME, Disp: 90 tablet, Rfl: 1    omeprazole (PriLOSEC) 20 mg delayed release capsule, Take 20 mg by mouth daily., Disp: , Rfl:     PARoxetine (PAXIL) 20 mg tablet, TAKE 1 TABLET BY MOUTH EVERY DAY, Disp: 90 tablet, Rfl: 1    Probiotic Product (ALIGN PO), Take by mouth, Disp: , Rfl:     torsemide (DEMADEX) 20 mg tablet, Take 1 tablet (20 mg total) by mouth if needed (rapid weight gain (3+ lbs overnight or 5+ lbs in 5-7 days)), Disp: , Rfl:     Current Allergies     Allergies as of 02/08/2025    (No Known Allergies)            The following portions of the patient's history were reviewed and updated as appropriate: allergies, current medications, past family history, past medical history, past social history, past surgical history and problem list.     Past Medical History:   Diagnosis Date    Anemia 2017?    Arthritis     Asthma     w/acute exacerbation. Last assessed: 10/26/12    Chest pain 02/03/2016    CHF (congestive heart failure) (HCC) 08/2020    Coronary artery disease 2021    Depression     Diabetes mellitus (HCC) 10/1992    Diabetic nephropathy (HCC)     Diabetic nephropathy associated with type 1 diabetes mellitus (HCC) 08/12/2020    Diabetic retinopathy (HCC) 02/03/2016    Endometriosis     Gastroenteritis 02/03/2016    GERD (gastroesophageal reflux disease)     History of COVID-19 02/26/2021    Hyperlipidemia 02/03/2016    Hypertension 20+yrs.    Obesity     Oligomenorrhea     Polycystic ovaries 02/03/2016    Retinal hemorrhage 02/03/2016    Retinopathy     Thrombocytosis     Type 1 diabetes mellitus with ophthalmic manifestation  ([facility]) 02/03/2016       Past Surgical History:   Procedure Laterality Date    CATARACT EXTRACTION Left 10/2020    CATARACT EXTRACTION Right     RETINAL LASER PROCEDURE         Family History   Problem Relation Age of Onset    Heart murmur Mother     Diabetes Mother         Type 2    Thyroid disease Mother         Disorder    Diabetes type II Mother     Mental illness Mother         Chemical imbalance    Diabetes Father         Type 2    Hypertension Father     Diabetes type II Father     No Known Problems Maternal Grandmother     Diabetes Maternal Grandfather         Mellitus    Breast cancer Paternal Grandmother 70 - 79    Leukemia Paternal Grandfather 48    Cancer Paternal Grandfather         Leaukemia    Diabetes Maternal Aunt         Type 2    Diabetes Maternal Uncle         Mellitus    Diabetes Paternal Uncle         Type 2    Substance Abuse Neg Hx     Alcohol abuse Neg Hx          Medications have been verified.        Objective   /68   Pulse 82   Temp 97.9 °F (36.6 °C)   Resp 20   LMP 05/03/2023 (Approximate) Comment: ncop per pt  SpO2 96%        Physical Exam     Physical Exam  Vitals and nursing note reviewed.   Constitutional:       Appearance: Normal appearance. She is normal weight.   HENT:      Head: Normocephalic and atraumatic.      Right Ear: Tympanic membrane, ear canal and external ear normal.      Left Ear: Tympanic membrane, ear canal and external ear normal.      Nose: Congestion and rhinorrhea present.      Mouth/Throat:      Pharynx: Oropharynx is clear.   Eyes:      Extraocular Movements: Extraocular movements intact.      Conjunctiva/sclera: Conjunctivae normal.      Pupils: Pupils are equal, round, and reactive to light.   Cardiovascular:      Rate and Rhythm: Normal rate and regular rhythm.      Pulses: Normal pulses.      Heart sounds: Normal heart sounds.   Pulmonary:      Effort: Pulmonary effort is normal.      Comments: Minor coarse sounds minor wheeze all fields    Abdominal:      Palpations: Abdomen is soft.   Musculoskeletal:         General: Normal range of motion.      Cervical back: Normal range of motion and neck supple.   Skin:     General: Skin is warm.      Capillary Refill: Capillary refill takes less than 2 seconds.   Neurological:      Mental Status: She is alert and oriented to person, place, and time.   Psychiatric:         Behavior: Behavior normal.

## 2025-02-09 LAB
FLUAV RNA RESP QL NAA+PROBE: NEGATIVE
FLUBV RNA RESP QL NAA+PROBE: NEGATIVE
SARS-COV-2 RNA RESP QL NAA+PROBE: NEGATIVE

## 2025-02-10 ENCOUNTER — DOCUMENTATION (OUTPATIENT)
Dept: ADMINISTRATIVE | Facility: OTHER | Age: 50
End: 2025-02-10

## 2025-02-10 NOTE — PROGRESS NOTES
Blood pressure elevated  Appointment department: [facility]  Appointment provider: Edu Olmedo Jr., MCKAY  Blood pressure   02/08/25 1217 158/68   02/08/25 1212 158/68     Coopr mir vall fp    02/10/25 12:37 PM    Patient was called after the Urgent Care visit ; a message was left for the patient to return the call    Thank you.  Dari Gr  [facility]

## 2025-02-11 NOTE — PROGRESS NOTES
"Name: Vernell Darby      : 1975      MRN: [id number]  Encounter Provider: Rika Nugent MD  Encounter Date: 2025   Encounter department: [facility]  :  Assessment & Plan  Primary localized osteoarthritis of right knee  Will order for right knee x-ray evaluate  Patient interested in possible steroid injection refer to pain management for further evaluation and treatment  Orders:    XR knee 3 vw right non injury; Future    Ambulatory referral to Spine & Pain Management; Future         History of Present Illness   Patient presents to the office today with ongoing right knee pain.  Patient reports history of osteoarthritis has had steroid shots in the past with significant improvement.  Patient denies any recent trauma or falls.  Has not been using anything for this.  Denies any tingling or numbness      Review of Systems   Musculoskeletal:         Right knee pain   Skin:  Negative for rash.   Neurological:  Negative for weakness.       Objective   /82 (Patient Position: Sitting, Cuff Size: Standard)   Pulse 91   Temp 98.6 °F (37 °C) (Temporal)   Resp 18   Ht 4' 8\" (1.422 m)   Wt 82.7 kg (182 lb 6.4 oz)   LMP 2023 (Approximate) Comment: ncop per pt  SpO2 98%   BMI 40.89 kg/m²      Physical Exam  Vitals reviewed.   Musculoskeletal:      Right knee: No effusion, erythema or crepitus. Normal range of motion.      Left knee: No erythema or crepitus. Normal range of motion.   Neurological:      Mental Status: She is alert.       Administrative Statements   I have spent a total time of 20 minutes in caring for this patient on the day of the visit/encounter including Patient and family education, Importance of tx compliance, Impressions, Documenting in the medical record, and Obtaining or reviewing history  .   "

## 2025-02-11 NOTE — ASSESSMENT & PLAN NOTE
Will order for right knee x-ray evaluate  Patient interested in possible steroid injection refer to pain management for further evaluation and treatment  Orders:    XR knee 3 vw right non injury; Future    Ambulatory referral to Spine & Pain Management; Future

## 2025-02-18 ENCOUNTER — RESULTS FOLLOW-UP (OUTPATIENT)
Dept: FAMILY MEDICINE CLINIC | Facility: CLINIC | Age: 50
End: 2025-02-18

## 2025-02-18 NOTE — TELEPHONE ENCOUNTER
----- Message from Rika Nugent MD sent at 2/18/2025  8:18 AM EST -----  Inform patient her xray shows arthritic changes especially on the medial aspect which is resulting in her discomfort  ----- Message -----  From: Interface, Radiology Results In  Sent: 2/16/2025   8:24 AM EST  To: Rika Nugent MD

## 2025-02-24 DIAGNOSIS — F32.A DEPRESSION, UNSPECIFIED DEPRESSION TYPE: ICD-10-CM

## 2025-02-24 RX ORDER — PAROXETINE 20 MG/1
20 TABLET, FILM COATED ORAL DAILY
Qty: 90 TABLET | Refills: 1 | Status: SHIPPED | OUTPATIENT
Start: 2025-02-24

## 2025-02-26 ENCOUNTER — HOSPITAL ENCOUNTER (OUTPATIENT)
Facility: HOSPITAL | Age: 50
Setting detail: OUTPATIENT SURGERY
Discharge: HOME | End: 2025-02-26
Attending: INTERNAL MEDICINE | Admitting: INTERNAL MEDICINE
Payer: COMMERCIAL

## 2025-02-26 VITALS
BODY MASS INDEX: 40.94 KG/M2 | SYSTOLIC BLOOD PRESSURE: 112 MMHG | TEMPERATURE: 98.4 F | WEIGHT: 182 LBS | HEIGHT: 56 IN | OXYGEN SATURATION: 98 % | HEART RATE: 80 BPM | RESPIRATION RATE: 16 BRPM | DIASTOLIC BLOOD PRESSURE: 54 MMHG

## 2025-02-26 DIAGNOSIS — I48.0 PAF (PAROXYSMAL ATRIAL FIBRILLATION) (HCC): ICD-10-CM

## 2025-02-26 PROCEDURE — C1764 EVENT RECORDER, CARDIAC: HCPCS | Performed by: INTERNAL MEDICINE

## 2025-02-26 PROCEDURE — 33285 INSJ SUBQ CAR RHYTHM MNTR: CPT | Performed by: INTERNAL MEDICINE

## 2025-02-26 DEVICE — EL+ INSERTABLE CARDIAC MONITOR
Type: IMPLANTABLE DEVICE | Status: FUNCTIONAL
Brand: ASSERT-IQ™

## 2025-02-26 RX ORDER — LIDOCAINE HYDROCHLORIDE 10 MG/ML
INJECTION, SOLUTION EPIDURAL; INFILTRATION; INTRACAUDAL; PERINEURAL CODE/TRAUMA/SEDATION MEDICATION
Status: DISCONTINUED | OUTPATIENT
Start: 2025-02-26 | End: 2025-02-26 | Stop reason: HOSPADM

## 2025-02-26 NOTE — H&P
Patient presents to B for ILR placement.  She has brief history of atrial fibrillation and is unable to tolerate Zio monitor due to skin reaction from adhesive.  Loop monitor will be placed for long-term monitoring.

## 2025-02-26 NOTE — DISCHARGE INSTR - AVS FIRST PAGE
Post Procedure Care instructions:     Pain management   Tylenol (acetaminophen) and ice        First 7 days:  Bandage must remain on wound for first 48 hours then you may take it off  Do not use lotions, powders, creams, or ointments on incision  Bathing:    Place waterproof bandage over top when showering   Avoid water directly hitting bandage   Do not soak incision   After 7 days:  If glue is present:  Avoid picking at the glue or peeling it off, the glue will come off on its own  If stiches present:   Leave in place, they will be removed at your 2 week follow up appointment      When to call clinic:    Redness or swelling at incision site   Opening and/or drainage from the incision   Temperature >100.4 F     If you have any questions:   [phone] 8:30am-5:30pm  [phone] After hours  [phone] Appointments     Information about your device:     WHAT YOU SHOULD KNOW:    A cardiac loop recorder is a device used to diagnose heart rhythm problems, such as a fast or irregular heartbeat. It is implanted in your left chest, just under the skin. The device records a pattern of your heart's rhythm, called an EKG. Your device records automatic EKGs, depending on how your caregiver programs it. You may also receive a handheld controller. You press a button on the controller when you have symptoms, such as dizziness, lightheadedness, or palpitations. The device will record an EKG at that moment. The recording can help your caregiver see if your symptoms may be caused by heart rhythm problems. Your caregiver will remove the device after it has collected enough data. You may need the device for up to 3 years. The procedure to remove the device is similar to the procedure used to implant it.

## 2025-02-26 NOTE — LETTER
[facility] CARDIAC CATH LAB  [address]  [place] [address]  Dept: [phone]    February 26, 2025     Patient: Vernell Darby   YOB: 1975   Date of Visit: 2/26/2025       To Whom it May Concern:    Vernell Darby is under my professional care. She was seen in the hospital from 2/26/2025 to 02/26/25. She may return to work on 3/1/25 without limitations.    If you have any questions or concerns, please don't hesitate to call.         Sincerely,          Jw Schmidt PA-C

## 2025-02-28 ENCOUNTER — RESULTS FOLLOW-UP (OUTPATIENT)
Dept: CARDIOLOGY CLINIC | Facility: CLINIC | Age: 50
End: 2025-02-28

## 2025-02-28 ENCOUNTER — TELEPHONE (OUTPATIENT)
Dept: CARDIOLOGY CLINIC | Facility: CLINIC | Age: 50
End: 2025-02-28

## 2025-02-28 NOTE — TELEPHONE ENCOUNTER
Left message- advised patient to call and discuss any symptoms she may have been experiencing with the symptom loop transmission sent to device clinic.     Normal rate/rhythm on recording.     Transmission may have been done in error when checking angel?

## 2025-03-12 ENCOUNTER — IN-CLINIC DEVICE VISIT (OUTPATIENT)
Dept: CARDIOLOGY CLINIC | Facility: CLINIC | Age: 50
End: 2025-03-12
Payer: COMMERCIAL

## 2025-03-12 ENCOUNTER — RESULTS FOLLOW-UP (OUTPATIENT)
Dept: NON INVASIVE DIAGNOSTICS | Facility: HOSPITAL | Age: 50
End: 2025-03-12

## 2025-03-12 DIAGNOSIS — I48.0 PAF (PAROXYSMAL ATRIAL FIBRILLATION) (HCC): Primary | ICD-10-CM

## 2025-03-12 PROCEDURE — 93291 INTERROG DEV EVAL SCRMS IP: CPT | Performed by: STUDENT IN AN ORGANIZED HEALTH CARE EDUCATION/TRAINING PROGRAM

## 2025-03-12 NOTE — PROGRESS NOTES
Results for orders placed or performed in visit on 03/12/25   Cardiac EP device report    Narrative    MOSES [id number] ILR/ACTIVE SYSTEM IS MRI CONDITIONAL  [facility]. PRESENTING RHYTHM NSR @ 70 BPM. R WAVES MEASURE 0.55-0.62 mV. NO PATIENT OR DEVICE ACTIVATED EPISODES. WOUND CHECK: INCISION CLEAN AND DRY WITH EDGES APPROXIMATED; 3 SUTURES REMOVED; WOUND CARE AND RESTRICTIONS REVIEWED WITH PATIENT & FAMILY (PIC IN MEDIA). NORMAL DEVICE FUNCTION. CJC/DEY

## 2025-03-22 DIAGNOSIS — E10.65 TYPE 1 DIABETES MELLITUS WITH HYPERGLYCEMIA (HCC): ICD-10-CM

## 2025-03-24 RX ORDER — INSULIN GLARGINE 300 U/ML
INJECTION, SOLUTION SUBCUTANEOUS
Qty: 13.5 ML | Refills: 5 | Status: SHIPPED | OUTPATIENT
Start: 2025-03-24

## 2025-04-05 DIAGNOSIS — I48.92 ATRIAL FIB/FLUTTER, TRANSIENT (HCC): ICD-10-CM

## 2025-04-05 DIAGNOSIS — I48.91 ATRIAL FIB/FLUTTER, TRANSIENT (HCC): ICD-10-CM

## 2025-04-07 RX ORDER — APIXABAN 5 MG/1
5 TABLET, FILM COATED ORAL 2 TIMES DAILY
Qty: 60 TABLET | Refills: 5 | Status: SHIPPED | OUTPATIENT
Start: 2025-04-07

## 2025-04-08 DIAGNOSIS — E10.65 TYPE 1 DIABETES MELLITUS WITH HYPERGLYCEMIA (HCC): ICD-10-CM

## 2025-04-08 RX ORDER — ACYCLOVIR 400 MG/1
1 TABLET ORAL
Qty: 3 EACH | Refills: 5 | Status: SHIPPED | OUTPATIENT
Start: 2025-04-08

## 2025-04-09 ENCOUNTER — APPOINTMENT (OUTPATIENT)
Dept: RADIOLOGY | Facility: CLINIC | Age: 50
End: 2025-04-09
Payer: COMMERCIAL

## 2025-04-09 ENCOUNTER — OFFICE VISIT (OUTPATIENT)
Dept: OBGYN CLINIC | Facility: CLINIC | Age: 50
End: 2025-04-09
Payer: COMMERCIAL

## 2025-04-09 VITALS — WEIGHT: 185 LBS | BODY MASS INDEX: 41.61 KG/M2 | HEIGHT: 56 IN

## 2025-04-09 DIAGNOSIS — E66.01 MORBID OBESITY WITH BODY MASS INDEX (BMI) OF 40.0 TO 44.9 IN ADULT (HCC): ICD-10-CM

## 2025-04-09 DIAGNOSIS — M17.11 PRIMARY OSTEOARTHRITIS OF RIGHT KNEE: Primary | ICD-10-CM

## 2025-04-09 DIAGNOSIS — M17.11 PRIMARY OSTEOARTHRITIS OF RIGHT KNEE: ICD-10-CM

## 2025-04-09 PROCEDURE — 99214 OFFICE O/P EST MOD 30 MIN: CPT | Performed by: ORTHOPAEDIC SURGERY

## 2025-04-09 PROCEDURE — 20610 DRAIN/INJ JOINT/BURSA W/O US: CPT | Performed by: ORTHOPAEDIC SURGERY

## 2025-04-09 PROCEDURE — 73560 X-RAY EXAM OF KNEE 1 OR 2: CPT

## 2025-04-09 RX ORDER — LIDOCAINE HYDROCHLORIDE 10 MG/ML
7 INJECTION, SOLUTION EPIDURAL; INFILTRATION; INTRACAUDAL; PERINEURAL
Status: COMPLETED | OUTPATIENT
Start: 2025-04-09 | End: 2025-04-09

## 2025-04-09 RX ORDER — BETAMETHASONE SODIUM PHOSPHATE AND BETAMETHASONE ACETATE 3; 3 MG/ML; MG/ML
6 INJECTION, SUSPENSION INTRA-ARTICULAR; INTRALESIONAL; INTRAMUSCULAR; SOFT TISSUE
Status: COMPLETED | OUTPATIENT
Start: 2025-04-09 | End: 2025-04-09

## 2025-04-09 RX ADMIN — BETAMETHASONE SODIUM PHOSPHATE AND BETAMETHASONE ACETATE 6 MG: 3; 3 INJECTION, SUSPENSION INTRA-ARTICULAR; INTRALESIONAL; INTRAMUSCULAR; SOFT TISSUE at 08:00

## 2025-04-09 RX ADMIN — LIDOCAINE HYDROCHLORIDE 7 ML: 10 INJECTION, SOLUTION EPIDURAL; INFILTRATION; INTRACAUDAL; PERINEURAL at 08:00

## 2025-04-09 NOTE — PROGRESS NOTES
Assessment:     1. Primary osteoarthritis of right knee    2. Morbid obesity with body mass index (BMI) of 40.0 to 44.9 in adult ([facility])        Plan:     Problem List Items Addressed This Visit          Musculoskeletal and Integument    Primary osteoarthritis of right knee - Primary    Findings consistent with right knee moderate to severe osteoarthritis.  Right knee x-ray with radiologist report reviewed in office with patient today shows progression of arthritis. Prognosis and treatment reviewed with patient today. Start with cortisone injection right knee, tolerated procedure well, cold compress today. Repeat in 3-4 months if effective. Maintain HEP and recommend stationary bike, ellitpical for low impact exercise. Tylenol and topical voltaren gel for pain control. On eliquis unable to use NSAID. Continue with endocrinologist to try to get blood sugars under control. She will have referral to weight management. Last resort is total knee replacement but BMI needs to be 40 or below and A1c need to be less than 7.5.  Advised patient to contact us if the cortisone injection does not provide her with good pain relief in a month then we could try joint supplement injections.  See patient back as needed. All patient's questions were answered to her satisfaction.  This note is created using dictation transcription.  It may contain typographical errors, grammatical errors, improperly dictated words, background noise and other errors.         Relevant Medications    lidocaine (PF) (XYLOCAINE-MPF) 1 % injection 7 mL (Completed)    betamethasone acetate-betamethasone sodium phosphate (CELESTONE) injection 6 mg (Completed)    Other Relevant Orders    XR knee 1 or 2 vw right    Large joint arthrocentesis: R knee (Completed)       Other    Morbid obesity with body mass index (BMI) of 40.0 to 44.9 in adult ([facility])    We will refer patient to weight management program.         Relevant Orders    Ambulatory Referral to Weight  Management      Subjective:     Patient ID: Vernell Darby is a 49 y.o. female.  Chief Complaint:  49 yr old female in for evaluation of right knee pain. Last seen in 2023 and was given CSI for osteoarthritis with benefit. She has had gel injections 2022. 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 She denies any injury or trauma. Pain mild ache, throbbing to more severe depending on activity. Noted stiffness with prolonged sitting which makes it hard to initially walk from standing position. She will limp initially. Denies any locking or instability. Pain start anterior and radiate throughout knee. Her knee pops, cracks with motion. She maintain HEP given to her at last office appt. On eliquis not able to use nsaids. Last A1C 9.5. endocrinologist follows diabetes.     Allergy:  No Known Allergies  Medications:  all current active meds have been reviewed  Past Medical History:  Past Medical History:   Diagnosis Date    Anemia 2017?    Arthritis     Asthma     w/acute exacerbation. Last assessed: 10/26/12    Chest pain 02/03/2016    CHF (congestive heart failure) ([facility]) 08/2020    Coronary artery disease 2021    Depression     Diabetes mellitus ([facility]) 10/1992    Diabetic nephropathy ([facility])     Diabetic nephropathy associated with type 1 diabetes mellitus ([facility]) 08/12/2020    Diabetic retinopathy ([facility]) 02/03/2016    Endometriosis     Gastroenteritis 02/03/2016    GERD (gastroesophageal reflux disease)     History of COVID-19 02/26/2021    Hyperlipidemia 02/03/2016    Hypertension 20+yrs.    Obesity     Oligomenorrhea     Polycystic ovaries 02/03/2016    Retinal hemorrhage 02/03/2016    Retinopathy     Thrombocytosis     Type 1 diabetes mellitus with ophthalmic manifestation ([facility]) 02/03/2016     Past Surgical History:  Past Surgical History:   Procedure Laterality Date    CARDIAC ELECTROPHYSIOLOGY PROCEDURE N/A 2/26/2025    Procedure: Cardiac loop recorder  implant;  Surgeon: Capo Saab MD;  Location:  CARDIAC CATH LAB;  Service: Cardiology    CATARACT EXTRACTION Left 10/2020    CATARACT EXTRACTION Right     RETINAL LASER PROCEDURE       Family History:  Family History   Problem Relation Age of Onset    Heart murmur Mother     Diabetes Mother         Type 2    Thyroid disease Mother         Disorder    Diabetes type II Mother     Mental illness Mother         Chemical imbalance    Diabetes Father         Type 2    Hypertension Father     Diabetes type II Father     No Known Problems Maternal Grandmother     Diabetes Maternal Grandfather         Mellitus    Breast cancer Paternal Grandmother 70 - 79    Leukemia Paternal Grandfather 48    Cancer Paternal Grandfather         Leaukemia    Diabetes Maternal Aunt         Type 2    Diabetes Maternal Uncle         Mellitus    Diabetes Paternal Uncle         Type 2    Substance Abuse Neg Hx     Alcohol abuse Neg Hx      Social History:  Social History     Substance and Sexual Activity   Alcohol Use Yes    Alcohol/week: 2.0 standard drinks of alcohol    Types: 1 Glasses of wine, 1 Standard drinks or equivalent per week    Comment: Occasionally. Several weeks can go by with no alcohol.     Social History     Substance and Sexual Activity   Drug Use No     Social History     Tobacco Use   Smoking Status Never   Smokeless Tobacco Never   Tobacco Comments    Per [employer]: Former smoker. Quit in 1994     Review of Systems   Constitutional:  Negative for chills and fever.   HENT:  Negative for ear pain and sore throat.    Eyes:  Negative for pain and visual disturbance.   Respiratory:  Negative for cough and shortness of breath.    Cardiovascular:  Negative for chest pain and palpitations.   Gastrointestinal:  Negative for abdominal pain and vomiting.   Genitourinary:  Negative for dysuria and hematuria.   Musculoskeletal:  Positive for arthralgias (right knee) and joint swelling (Right knee). Negative for back pain.   Skin:   "Negative for color change and rash.   Neurological:  Negative for seizures and syncope.   Psychiatric/Behavioral: Negative.     All other systems reviewed and are negative.        Objective:  BP Readings from Last 1 Encounters:   02/26/25 112/54      Wt Readings from Last 1 Encounters:   04/09/25 83.9 kg (185 lb)      BMI:   Estimated body mass index is 41.48 kg/m² as calculated from the following:    Height as of this encounter: 4' 8\" (1.422 m).    Weight as of this encounter: 83.9 kg (185 lb).  BSA:   Estimated body surface area is 1.72 meters squared as calculated from the following:    Height as of this encounter: 4' 8\" (1.422 m).    Weight as of this encounter: 83.9 kg (185 lb).   Physical Exam  Vitals and nursing note reviewed.   Constitutional:       Appearance: Normal appearance. She is well-developed.   HENT:      Head: Normocephalic and atraumatic.      Right Ear: External ear normal.      Left Ear: External ear normal.   Eyes:      Extraocular Movements: Extraocular movements intact.      Conjunctiva/sclera: Conjunctivae normal.   Pulmonary:      Effort: Pulmonary effort is normal.   Musculoskeletal:         General: Tenderness (right knee arthralgia) present.      Cervical back: Neck supple.      Right knee: No effusion.      Instability Tests: Medial David test negative and lateral David test negative.   Skin:     General: Skin is warm and dry.   Neurological:      Mental Status: She is alert and oriented to person, place, and time.      Deep Tendon Reflexes: Reflexes are normal and symmetric.   Psychiatric:         Mood and Affect: Mood normal.         Behavior: Behavior normal.       Right Knee Exam     Muscle Strength   The patient has normal right knee strength.    Tenderness   The patient is experiencing tenderness in the lateral joint line and medial joint line.    Range of Motion   Extension:  normal   Flexion:  normal     Tests   David:  Medial - negative Lateral - negative  Varus: " negative Valgus: negative  Patellar apprehension: negative    Other   Erythema: absent  Scars: absent  Sensation: normal  Pulse: present  Swelling: none  Effusion: no effusion present    Comments:  Crepitation with motion of patella  Varus alignment             I have personally reviewed pertinent films in PACS and my interpretation is x-ray right knee moderate to severe patellofemoral and medial compartment arthritis with marginal osteophytes throughout, close to bone-on-bone medial compartment, no soft tissue calcifications.    Large joint arthrocentesis: R knee  Universal Protocol:  Consent: Verbal consent obtained.  Risks and benefits: risks, benefits and alternatives were discussed  Consent given by: patient  Patient understanding: patient states understanding of the procedure being performed  Site marked: the operative site was marked  Patient identity confirmed: verbally with patient  Supporting Documentation  Indications: pain   Procedure Details  Location: knee - R knee  Preparation: Patient was prepped and draped in the usual sterile fashion  Needle size: 22 G  Ultrasound guidance: no  Approach: anterolateral  Medications administered: 7 mL lidocaine (PF) 1 %; 6 mg betamethasone acetate-betamethasone sodium phosphate 6 (3-3) mg/mL    Patient tolerance: patient tolerated the procedure well with no immediate complications  Dressing:  Sterile dressing applied        Scribe Attestation      I,:  Wil Hagan am acting as a scribe while in the presence of the attending physician.:       I,:  Romina Rivera MD personally performed the services described in this documentation    as scribed in my presence.:

## 2025-04-09 NOTE — ASSESSMENT & PLAN NOTE
Findings consistent with right knee moderate to severe osteoarthritis.  Right knee x-ray with radiologist report reviewed in office with patient today shows progression of arthritis. Prognosis and treatment reviewed with patient today. Start with cortisone injection right knee, tolerated procedure well, cold compress today. Repeat in 3-4 months if effective. Maintain HEP and recommend stationary bike, ellitpical for low impact exercise. Tylenol and topical voltaren gel for pain control. On eliquis unable to use NSAID. Continue with endocrinologist to try to get blood sugars under control. She will have referral to weight management. Last resort is total knee replacement but BMI needs to be 40 or below and A1c need to be less than 7.5.  Advised patient to contact us if the cortisone injection does not provide her with good pain relief in a month then we could try joint supplement injections.  See patient back as needed. All patient's questions were answered to her satisfaction.  This note is created using dictation transcription.  It may contain typographical errors, grammatical errors, improperly dictated words, background noise and other errors.

## 2025-04-15 DIAGNOSIS — I50.32 CHRONIC HEART FAILURE WITH PRESERVED EJECTION FRACTION (HCC): ICD-10-CM

## 2025-04-15 RX ORDER — TORSEMIDE 20 MG/1
20 TABLET ORAL AS NEEDED
Qty: 90 TABLET | Refills: 1 | Status: SHIPPED | OUTPATIENT
Start: 2025-04-15

## 2025-04-15 NOTE — TELEPHONE ENCOUNTER
Reason for call:   [x] Refill   [] Prior Auth  [] Other:     Office:   [] PCP/Provider -   [x] Specialty/Provider - Cardio    Medication: Torsemide    Dose/Frequency: 20 mg    Quantity:     [id number] [place] - [address] [phone]     Local Pharmacy   Does the patient have enough for 3 days?   [] Yes   [x] No - Send as HP to POD    Mail Away Pharmacy   Does the patient have enough for 10 days?   [] Yes   [] No - Send as HP to POD

## 2025-04-21 ENCOUNTER — NURSE TRIAGE (OUTPATIENT)
Dept: OTHER | Facility: OTHER | Age: 50
End: 2025-04-21

## 2025-04-21 ENCOUNTER — OFFICE VISIT (OUTPATIENT)
Dept: FAMILY MEDICINE CLINIC | Facility: CLINIC | Age: 50
End: 2025-04-21
Payer: COMMERCIAL

## 2025-04-21 VITALS
RESPIRATION RATE: 16 BRPM | SYSTOLIC BLOOD PRESSURE: 130 MMHG | TEMPERATURE: 97.5 F | HEART RATE: 68 BPM | HEIGHT: 56 IN | BODY MASS INDEX: 40.49 KG/M2 | DIASTOLIC BLOOD PRESSURE: 80 MMHG | OXYGEN SATURATION: 98 % | WEIGHT: 180 LBS

## 2025-04-21 DIAGNOSIS — J02.9 SORE THROAT: ICD-10-CM

## 2025-04-21 DIAGNOSIS — J06.9 VIRAL URI: Primary | ICD-10-CM

## 2025-04-21 LAB
S PYO AG THROAT QL: NEGATIVE
SARS-COV-2 AG UPPER RESP QL IA: NEGATIVE
VALID CONTROL: NORMAL

## 2025-04-21 PROCEDURE — 87070 CULTURE OTHR SPECIMN AEROBIC: CPT | Performed by: PHYSICIAN ASSISTANT

## 2025-04-21 PROCEDURE — 87880 STREP A ASSAY W/OPTIC: CPT | Performed by: PHYSICIAN ASSISTANT

## 2025-04-21 PROCEDURE — 87811 SARS-COV-2 COVID19 W/OPTIC: CPT | Performed by: PHYSICIAN ASSISTANT

## 2025-04-21 PROCEDURE — 99213 OFFICE O/P EST LOW 20 MIN: CPT | Performed by: PHYSICIAN ASSISTANT

## 2025-04-21 NOTE — TELEPHONE ENCOUNTER
"FOLLOW UP: NA    REASON FOR CONVERSATION: Sore Throat    SYMPTOMS: Throat feels raw, runny nose, chest congestion, hoarse voice    OTHER: Has been taking Mucinex, \"only helping a little\"    DISPOSITION: See PCP Within 24 Hours / Appointment scheduled for today 4.21.25 at 1000 with Rhona Lopez PA-C.        Reason for Disposition   Diabetes mellitus or weak immune system (e.g., HIV positive, cancer chemo, splenectomy, organ transplant, chronic steroids)    Answer Assessment - Initial Assessment Questions  1. ONSET: \"When did the throat start hurting?\" (Hours or days ago)       Started 2 days ago  2. SEVERITY: \"How bad is the sore throat?\" (Scale 1-10; mild, moderate or severe)      Moderate  3. STREP EXPOSURE: \"Has there been any exposure to strep within the past week?\" If Yes, ask: \"What type of contact occurred?\"       Unsure  4.  VIRAL SYMPTOMS: \"Are there any symptoms of a cold, such as a runny nose, cough, hoarse voice or red eyes?\"       Runny nose, hoarse voice, chest congestion,   5. FEVER: \"Do you have a fever?\" If Yes, ask: \"What is your temperature, how was it measured, and when did it start?\"      Does not feel feverish  6. PUS ON THE TONSILS: \"Is there pus on the tonsils in the back of your throat?\"      Did not look at throat  7. OTHER SYMPTOMS: \"Do you have any other symptoms?\" (e.g., difficulty breathing, headache, rash)      Over the weekend had mild headache,   8. PREGNANCY: \"Is there any chance you are pregnant?\" \"When was your last menstrual period?\"      Denies. Irregular    Has been taking Mucinex, with little relief.    Protocols used: Sore Throat-Adult-    "

## 2025-04-21 NOTE — ASSESSMENT & PLAN NOTE
Negative cultures,will send out out to confirm  Orders:    POCT Rapid Covid Ag    POCT rapid ANTIGEN strepA

## 2025-04-21 NOTE — PROGRESS NOTES
"Name: Vernell Darby      : 1975      MRN: [id number]  Encounter Provider: Rhona Lopez PA-C  Encounter Date: 2025   Encounter department: [facility]  :  Assessment & Plan  Viral URI    Reassurance, viral in nature, fludis, rest, continue OTC as needed       Sore throat    Negative cultures,will send out out to confirm  Orders:    POCT Rapid Covid Ag    POCT rapid ANTIGEN strepA    F/u as needed and as scheduled       History of Present Illness   Patient here c/o starting with cold on Saturday. Had a  runny nose, clear mucus, sore throat. No cough, fever, chills, ear pain, n/v/d. Taking Mucienx, cough drops, tylenol.     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       Review of Systems    Objective   /80   Pulse 68   Temp 97.5 °F (36.4 °C) (Temporal)   Resp 16   Ht 4' 8\" (1.422 m)   Wt 81.6 kg (180 lb)   LMP 2023 (Approximate) Comment: ncop per pt  SpO2 98%   BMI 40.36 kg/m²      Physical Exam  Constitutional:       General: She is not in acute distress.     Appearance: Normal appearance. She is well-developed. She is not toxic-appearing.   HENT:      Head: Normocephalic and atraumatic.      Right Ear: Tympanic membrane, ear canal and external ear normal.      Left Ear: Tympanic membrane, ear canal and external ear normal.      Nose: Mucosal edema, congestion and rhinorrhea present.      Mouth/Throat:      Mouth: Mucous membranes are moist.      Pharynx: Oropharynx is clear. No oropharyngeal exudate or posterior oropharyngeal erythema.   Eyes:      Extraocular Movements: Extraocular movements intact.      Conjunctiva/sclera: Conjunctivae normal.   Cardiovascular:      Rate and Rhythm: Normal rate and regular rhythm.      Pulses: Normal pulses.      Heart sounds: Normal heart sounds.   Pulmonary:      Effort: Pulmonary effort is normal.      Breath sounds: Normal breath sounds.   Musculoskeletal:         General: " Normal range of motion.      Cervical back: Normal range of motion and neck supple. No rigidity or tenderness.   Lymphadenopathy:      Cervical: No cervical adenopathy.   Skin:     General: Skin is warm.   Neurological:      General: No focal deficit present.      Mental Status: She is alert and oriented to person, place, and time.   Psychiatric:         Mood and Affect: Mood normal.         Behavior: Behavior normal.         Thought Content: Thought content normal.         Judgment: Judgment normal.

## 2025-04-22 ENCOUNTER — TELEPHONE (OUTPATIENT)
Age: 50
End: 2025-04-22

## 2025-04-22 DIAGNOSIS — J01.00 ACUTE NON-RECURRENT MAXILLARY SINUSITIS: Primary | ICD-10-CM

## 2025-04-22 RX ORDER — CEFUROXIME AXETIL 500 MG/1
500 TABLET ORAL EVERY 12 HOURS SCHEDULED
Qty: 20 TABLET | Refills: 0 | Status: SHIPPED | OUTPATIENT
Start: 2025-04-22 | End: 2025-04-26

## 2025-04-22 NOTE — TELEPHONE ENCOUNTER
Vernell called to update Rhona on her symptoms as she's now experiencing yellow phlegm. She stated she's followed all of her directions and is still not feeling well.    She is requesting an updated note for work to cover her for tomorrow as well and stated that if something will be sent to the pharmacy she'd like it sent to the [facility] on file.    Please contact Vernell to advise once an update becomes available.

## 2025-04-23 ENCOUNTER — OFFICE VISIT (OUTPATIENT)
Dept: URGENT CARE | Facility: CLINIC | Age: 50
End: 2025-04-23
Payer: COMMERCIAL

## 2025-04-23 ENCOUNTER — APPOINTMENT (EMERGENCY)
Dept: RADIOLOGY | Facility: HOSPITAL | Age: 50
DRG: 872 | End: 2025-04-23
Payer: COMMERCIAL

## 2025-04-23 ENCOUNTER — HOSPITAL ENCOUNTER (INPATIENT)
Facility: HOSPITAL | Age: 50
LOS: 3 days | Discharge: HOME | DRG: 872 | End: 2025-04-26
Attending: EMERGENCY MEDICINE | Admitting: INTERNAL MEDICINE
Payer: COMMERCIAL

## 2025-04-23 VITALS
DIASTOLIC BLOOD PRESSURE: 64 MMHG | SYSTOLIC BLOOD PRESSURE: 136 MMHG | TEMPERATURE: 100.5 F | OXYGEN SATURATION: 94 % | RESPIRATION RATE: 18 BRPM | HEART RATE: 98 BPM

## 2025-04-23 DIAGNOSIS — J45.21 INTERMITTENT ASTHMA WITH ACUTE EXACERBATION, UNSPECIFIED ASTHMA SEVERITY: ICD-10-CM

## 2025-04-23 DIAGNOSIS — J45.51 SEVERE PERSISTENT ASTHMA WITH ACUTE EXACERBATION: ICD-10-CM

## 2025-04-23 DIAGNOSIS — E10.65 TYPE 1 DIABETES MELLITUS WITH HYPERGLYCEMIA (HCC): ICD-10-CM

## 2025-04-23 DIAGNOSIS — R50.9 FEVER: ICD-10-CM

## 2025-04-23 DIAGNOSIS — J45.901 ASTHMA EXACERBATION: ICD-10-CM

## 2025-04-23 DIAGNOSIS — R05.9 COUGH: ICD-10-CM

## 2025-04-23 DIAGNOSIS — J45.21 INTERMITTENT ASTHMA WITH ACUTE EXACERBATION, UNSPECIFIED ASTHMA SEVERITY: Primary | ICD-10-CM

## 2025-04-23 DIAGNOSIS — J45.30 MILD PERSISTENT ASTHMA WITHOUT COMPLICATION: ICD-10-CM

## 2025-04-23 DIAGNOSIS — J18.9 PNEUMONIA: Primary | ICD-10-CM

## 2025-04-23 PROBLEM — A41.9 SEPSIS WITHOUT ACUTE ORGAN DYSFUNCTION (HCC): Status: ACTIVE | Noted: 2025-04-23

## 2025-04-23 PROBLEM — R06.89 ACUTE RESPIRATORY INSUFFICIENCY: Status: ACTIVE | Noted: 2025-04-23

## 2025-04-23 LAB
2HR DELTA HS TROPONIN: -3 NG/L
ALBUMIN SERPL BCG-MCNC: 4.1 G/DL (ref 3.5–5)
ALP SERPL-CCNC: 84 U/L (ref 34–104)
ALT SERPL W P-5'-P-CCNC: 18 U/L (ref 7–52)
ANION GAP SERPL CALCULATED.3IONS-SCNC: 9 MMOL/L (ref 4–13)
APTT PPP: 33 SECONDS (ref 23–34)
AST SERPL W P-5'-P-CCNC: 17 U/L (ref 13–39)
ATRIAL RATE: 90 BPM
BACTERIA THROAT CULT: NORMAL
BACTERIA UR QL AUTO: NORMAL /HPF
BASOPHILS # BLD AUTO: 0.05 THOUSANDS/ÂΜL (ref 0–0.1)
BASOPHILS NFR BLD AUTO: 1 % (ref 0–1)
BILIRUB SERPL-MCNC: 0.3 MG/DL (ref 0.2–1)
BILIRUB UR QL STRIP: NEGATIVE
BNP SERPL-MCNC: 77 PG/ML (ref 0–100)
BUN SERPL-MCNC: 36 MG/DL (ref 5–25)
CALCIUM SERPL-MCNC: 9.2 MG/DL (ref 8.4–10.2)
CARDIAC TROPONIN I PNL SERPL HS: 10 NG/L (ref ?–50)
CARDIAC TROPONIN I PNL SERPL HS: 13 NG/L (ref ?–50)
CHLORIDE SERPL-SCNC: 102 MMOL/L (ref 96–108)
CLARITY UR: CLEAR
CO2 SERPL-SCNC: 25 MMOL/L (ref 21–32)
COLOR UR: YELLOW
CREAT SERPL-MCNC: 0.91 MG/DL (ref 0.6–1.3)
EOSINOPHIL # BLD AUTO: 0.02 THOUSAND/ÂΜL (ref 0–0.61)
EOSINOPHIL NFR BLD AUTO: 0 % (ref 0–6)
ERYTHROCYTE [DISTWIDTH] IN BLOOD BY AUTOMATED COUNT: 14.6 % (ref 11.6–15.1)
EXT PREGNANCY TEST URINE: NEGATIVE
EXT. CONTROL: NORMAL
FLUAV AG UPPER RESP QL IA.RAPID: NEGATIVE
FLUBV AG UPPER RESP QL IA.RAPID: NEGATIVE
GFR SERPL CREATININE-BSD FRML MDRD: 74 ML/MIN/1.73SQ M
GLUCOSE SERPL-MCNC: 255 MG/DL (ref 65–140)
GLUCOSE SERPL-MCNC: 342 MG/DL (ref 65–140)
GLUCOSE SERPL-MCNC: 84 MG/DL (ref 65–140)
GLUCOSE UR STRIP-MCNC: NEGATIVE MG/DL
HCT VFR BLD AUTO: 36.6 % (ref 34.8–46.1)
HGB BLD-MCNC: 11.7 G/DL (ref 11.5–15.4)
HGB UR QL STRIP.AUTO: ABNORMAL
IMM GRANULOCYTES # BLD AUTO: 0.07 THOUSAND/UL (ref 0–0.2)
IMM GRANULOCYTES NFR BLD AUTO: 1 % (ref 0–2)
INR PPP: 1.14 (ref 0.85–1.19)
KETONES UR STRIP-MCNC: NEGATIVE MG/DL
LACTATE SERPL-SCNC: 0.9 MMOL/L (ref 0.5–2)
LEUKOCYTE ESTERASE UR QL STRIP: NEGATIVE
LYMPHOCYTES # BLD AUTO: 0.57 THOUSANDS/ÂΜL (ref 0.6–4.47)
LYMPHOCYTES NFR BLD AUTO: 6 % (ref 14–44)
MCH RBC QN AUTO: 28.5 PG (ref 26.8–34.3)
MCHC RBC AUTO-ENTMCNC: 32 G/DL (ref 31.4–37.4)
MCV RBC AUTO: 89 FL (ref 82–98)
MONOCYTES # BLD AUTO: 0.26 THOUSAND/ÂΜL (ref 0.17–1.22)
MONOCYTES NFR BLD AUTO: 3 % (ref 4–12)
NEUTROPHILS # BLD AUTO: 9.1 THOUSANDS/ÂΜL (ref 1.85–7.62)
NEUTS SEG NFR BLD AUTO: 89 % (ref 43–75)
NITRITE UR QL STRIP: NEGATIVE
NON-SQ EPI CELLS URNS QL MICRO: NORMAL /HPF
NRBC BLD AUTO-RTO: 0 /100 WBCS
P AXIS: 68 DEGREES
PH UR STRIP.AUTO: 6 [PH]
PLATELET # BLD AUTO: 314 THOUSANDS/UL (ref 149–390)
PMV BLD AUTO: 10 FL (ref 8.9–12.7)
POTASSIUM SERPL-SCNC: 3.9 MMOL/L (ref 3.5–5.3)
PR INTERVAL: 138 MS
PROCALCITONIN SERPL-MCNC: 0.12 NG/ML
PROT SERPL-MCNC: 7.2 G/DL (ref 6.4–8.4)
PROT UR STRIP-MCNC: ABNORMAL MG/DL
PROTHROMBIN TIME: 15.1 SECONDS (ref 12.3–15)
QRS AXIS: 84 DEGREES
QRSD INTERVAL: 84 MS
QT INTERVAL: 350 MS
QTC INTERVAL: 428 MS
RBC # BLD AUTO: 4.11 MILLION/UL (ref 3.81–5.12)
RBC #/AREA URNS AUTO: NORMAL /HPF
SARS-COV+SARS-COV-2 AG RESP QL IA.RAPID: NEGATIVE
SODIUM SERPL-SCNC: 136 MMOL/L (ref 135–147)
SP GR UR STRIP.AUTO: 1.01 (ref 1–1.03)
T WAVE AXIS: 2 DEGREES
UROBILINOGEN UR STRIP-ACNC: <2 MG/DL
VENTRICULAR RATE: 90 BPM
WBC # BLD AUTO: 10.07 THOUSAND/UL (ref 4.31–10.16)
WBC #/AREA URNS AUTO: NORMAL /HPF

## 2025-04-23 PROCEDURE — 87811 SARS-COV-2 COVID19 W/OPTIC: CPT

## 2025-04-23 PROCEDURE — 81025 URINE PREGNANCY TEST: CPT

## 2025-04-23 PROCEDURE — 82948 REAGENT STRIP/BLOOD GLUCOSE: CPT

## 2025-04-23 PROCEDURE — 87804 INFLUENZA ASSAY W/OPTIC: CPT

## 2025-04-23 PROCEDURE — S9083 URGENT CARE CENTER GLOBAL: HCPCS

## 2025-04-23 PROCEDURE — 83605 ASSAY OF LACTIC ACID: CPT

## 2025-04-23 PROCEDURE — 84145 PROCALCITONIN (PCT): CPT

## 2025-04-23 PROCEDURE — 99291 CRITICAL CARE FIRST HOUR: CPT

## 2025-04-23 PROCEDURE — 36415 COLL VENOUS BLD VENIPUNCTURE: CPT

## 2025-04-23 PROCEDURE — 85610 PROTHROMBIN TIME: CPT

## 2025-04-23 PROCEDURE — 96365 THER/PROPH/DIAG IV INF INIT: CPT

## 2025-04-23 PROCEDURE — 99223 1ST HOSP IP/OBS HIGH 75: CPT | Performed by: PHYSICIAN ASSISTANT

## 2025-04-23 PROCEDURE — 81001 URINALYSIS AUTO W/SCOPE: CPT

## 2025-04-23 PROCEDURE — 80053 COMPREHEN METABOLIC PANEL: CPT

## 2025-04-23 PROCEDURE — 94640 AIRWAY INHALATION TREATMENT: CPT

## 2025-04-23 PROCEDURE — 93005 ELECTROCARDIOGRAM TRACING: CPT

## 2025-04-23 PROCEDURE — 83880 ASSAY OF NATRIURETIC PEPTIDE: CPT

## 2025-04-23 PROCEDURE — 94644 CONT INHLJ TX 1ST HOUR: CPT

## 2025-04-23 PROCEDURE — 87040 BLOOD CULTURE FOR BACTERIA: CPT

## 2025-04-23 PROCEDURE — 85730 THROMBOPLASTIN TIME PARTIAL: CPT

## 2025-04-23 PROCEDURE — 96367 TX/PROPH/DG ADDL SEQ IV INF: CPT

## 2025-04-23 PROCEDURE — G0383 LEV 4 HOSP TYPE B ED VISIT: HCPCS

## 2025-04-23 PROCEDURE — 84484 ASSAY OF TROPONIN QUANT: CPT

## 2025-04-23 PROCEDURE — 94760 N-INVAS EAR/PLS OXIMETRY 1: CPT

## 2025-04-23 PROCEDURE — 85025 COMPLETE CBC W/AUTO DIFF WBC: CPT

## 2025-04-23 PROCEDURE — 99285 EMERGENCY DEPT VISIT HI MDM: CPT

## 2025-04-23 PROCEDURE — 71046 X-RAY EXAM CHEST 2 VIEWS: CPT

## 2025-04-23 RX ORDER — ALBUTEROL SULFATE 0.83 MG/ML
5 SOLUTION RESPIRATORY (INHALATION) ONCE
Status: COMPLETED | OUTPATIENT
Start: 2025-04-23 | End: 2025-04-23

## 2025-04-23 RX ORDER — IPRATROPIUM BROMIDE AND ALBUTEROL SULFATE 2.5; .5 MG/3ML; MG/3ML
3 SOLUTION RESPIRATORY (INHALATION) ONCE
Status: DISCONTINUED | OUTPATIENT
Start: 2025-04-23 | End: 2025-04-23

## 2025-04-23 RX ORDER — CEFTRIAXONE 2 G/50ML
2000 INJECTION, SOLUTION INTRAVENOUS ONCE
Status: COMPLETED | OUTPATIENT
Start: 2025-04-23 | End: 2025-04-23

## 2025-04-23 RX ORDER — INSULIN LISPRO 100 [IU]/ML
25 INJECTION, SOLUTION INTRAVENOUS; SUBCUTANEOUS ONCE
Status: COMPLETED | OUTPATIENT
Start: 2025-04-24 | End: 2025-04-23

## 2025-04-23 RX ORDER — INSULIN LISPRO 100 [IU]/ML
1-6 INJECTION, SOLUTION INTRAVENOUS; SUBCUTANEOUS
Status: DISCONTINUED | OUTPATIENT
Start: 2025-04-24 | End: 2025-04-24

## 2025-04-23 RX ORDER — INSULIN LISPRO 100 [IU]/ML
1-5 INJECTION, SOLUTION INTRAVENOUS; SUBCUTANEOUS
Status: DISCONTINUED | OUTPATIENT
Start: 2025-04-24 | End: 2025-04-24

## 2025-04-23 RX ORDER — ALBUTEROL SULFATE 5 MG/ML
10 SOLUTION RESPIRATORY (INHALATION) ONCE
Status: COMPLETED | OUTPATIENT
Start: 2025-04-23 | End: 2025-04-23

## 2025-04-23 RX ORDER — ACETAMINOPHEN 325 MG/1
650 TABLET ORAL ONCE
Status: COMPLETED | OUTPATIENT
Start: 2025-04-23 | End: 2025-04-23

## 2025-04-23 RX ORDER — SODIUM CHLORIDE FOR INHALATION 0.9 %
12 VIAL, NEBULIZER (ML) INHALATION ONCE
Status: COMPLETED | OUTPATIENT
Start: 2025-04-23 | End: 2025-04-23

## 2025-04-23 RX ORDER — MAGNESIUM SULFATE HEPTAHYDRATE 40 MG/ML
2 INJECTION, SOLUTION INTRAVENOUS ONCE
Status: COMPLETED | OUTPATIENT
Start: 2025-04-23 | End: 2025-04-23

## 2025-04-23 RX ORDER — INSULIN GLARGINE 100 [IU]/ML
95 INJECTION, SOLUTION SUBCUTANEOUS EVERY MORNING
Status: DISCONTINUED | OUTPATIENT
Start: 2025-04-24 | End: 2025-04-26 | Stop reason: HOSPADM

## 2025-04-23 RX ADMIN — ACETAMINOPHEN 650 MG: 325 TABLET, FILM COATED ORAL at 19:58

## 2025-04-23 RX ADMIN — INSULIN LISPRO 25 UNITS: 100 INJECTION, SOLUTION INTRAVENOUS; SUBCUTANEOUS at 23:37

## 2025-04-23 RX ADMIN — SODIUM CHLORIDE 500 ML: 0.9 INJECTION, SOLUTION INTRAVENOUS at 19:45

## 2025-04-23 RX ADMIN — ISODIUM CHLORIDE 12 ML: 0.03 SOLUTION RESPIRATORY (INHALATION) at 20:46

## 2025-04-23 RX ADMIN — CEFTRIAXONE 2000 MG: 2 INJECTION, SOLUTION INTRAVENOUS at 20:31

## 2025-04-23 RX ADMIN — IPRATROPIUM BROMIDE 0.5 MG: 0.5 SOLUTION RESPIRATORY (INHALATION) at 19:58

## 2025-04-23 RX ADMIN — ALBUTEROL SULFATE 10 MG: 2.5 SOLUTION RESPIRATORY (INHALATION) at 20:46

## 2025-04-23 RX ADMIN — ALBUTEROL SULFATE 5 MG: 2.5 SOLUTION RESPIRATORY (INHALATION) at 19:58

## 2025-04-23 RX ADMIN — IPRATROPIUM BROMIDE 1 MG: 0.5 SOLUTION RESPIRATORY (INHALATION) at 20:46

## 2025-04-23 RX ADMIN — IPRATROPIUM BROMIDE AND ALBUTEROL SULFATE 3 ML: 2.5; .5 SOLUTION RESPIRATORY (INHALATION) at 16:32

## 2025-04-23 RX ADMIN — MAGNESIUM SULFATE HEPTAHYDRATE 2 G: 2 INJECTION, SOLUTION INTRAVENOUS at 19:48

## 2025-04-23 RX ADMIN — AZITHROMYCIN MONOHYDRATE 500 MG: 500 INJECTION, POWDER, LYOPHILIZED, FOR SOLUTION INTRAVENOUS at 21:10

## 2025-04-23 NOTE — TELEPHONE ENCOUNTER
Patient called back to schedule an appointment, but we have nothing left for today.  I offered patient an appointment tomorrow with Rhona at 10, but she did not want to wait that long.      Patient is going to go to urgent care today she feels to tight to wait till tomorrow.

## 2025-04-23 NOTE — ED PROVIDER NOTES
Time reflects when diagnosis was documented in both MDM as applicable and the Disposition within this note       Time User Action Codes Description Comment    4/23/2025  9:35 PM Jennifer Aragon Add [J18.9] Pneumonia     4/23/2025  9:35 PM Jennifer Aragon Add [J45.901] Asthma exacerbation     4/23/2025  9:35 PM Jennifer Aragon Add [R05.9] Cough     4/23/2025  9:35 PM Jennifer Aragon Add [R50.9] Fever           ED Disposition       ED Disposition   Admit    Condition   Stable    Date/Time   Wed Apr 23, 2025  9:34 PM    Comment   Case was discussed with FELICITAS Carson and the patient's admission status was agreed to be Admission Status: inpatient status to the service of Dr. Alfred .               Assessment & Plan       Medical Decision Making  DDx: pneumonia, flu, asthma exacerbation, electrolyte abnormality  Patient arriving with tachycardia, fever, wheezes.  Reports that she has been sick since Saturday.  Reports productive cough with sputum.  Plan to attempt DuoNeb.  Received dexamethasone at urgent care.  Will administer mag, fluids judiciously given heart failure history.  Following patient's DuoNeb, patient continues to have coarse wheezing.  Will administer heart neb.  Per my interpretation of x-ray there is a concerning right lower lobe infiltrate.  Given asthma exacerbation will administer antibiotics.  Blood work has no leukocytosis, no anemia, no SABRINA, no gross electrolyte abnormality.  Heart score of 4.  Shortness of breath consistent with asthma exacerbation.   Patient had hypoxic reading that did improve after breathing treatments but continues to have increased work of breathing with ambulation, requiring heart neb.  Patient continues to have coarse expiratory wheezes, with fever, concern for pneumonia clinically, plan for admission.  Critical Care Time Statement: Upon my evaluation, this patient had a high probability of imminent or life-threatening deterioration due to asthma exacerbation,  which required my direct attention, intervention, and personal management.  I spent a total of 33 minutes directly providing critical care services, including interpretation of complex medical databases, evaluating for the presence of life-threatening injuries or illnesses, management of organ system failure(s) , complex medical decision making (to support/prevent further life-threatening deterioration)., and interpretation of hemodynamic data. This time is exclusive of procedures, teaching, treating other patients, family meetings, and any prior time recorded by providers other than myself.         Amount and/or Complexity of Data Reviewed  Labs: ordered. Decision-making details documented in ED Course.  Radiology: ordered and independent interpretation performed.    Risk  OTC drugs.  Prescription drug management.  Decision regarding hospitalization.        ED Course as of 04/23/25 2312 Wed Apr 23, 2025 1952 PREGNANCY TEST URINE: Negative   2035 Reassessed.  There are more coarse breath sounds, wheezes are persisting, plan for SANDOVAL neb.        Medications   sodium chloride 0.9 % bolus 500 mL (0 mL Intravenous Stopped 4/23/25 2129)   albuterol inhalation solution 5 mg (5 mg Nebulization Given 4/23/25 1958)   ipratropium (ATROVENT) 0.02 % inhalation solution 0.5 mg (0.5 mg Nebulization Given 4/23/25 1958)   magnesium sulfate 2 g/50 mL IVPB (premix) 2 g (0 g Intravenous Stopped 4/23/25 2018)   acetaminophen (TYLENOL) tablet 650 mg (650 mg Oral Given 4/23/25 1958)   cefTRIAXone (ROCEPHIN) IVPB (premix in dextrose) 2,000 mg 50 mL (0 mg Intravenous Stopped 4/23/25 2101)   azithromycin (ZITHROMAX) 500 mg in sodium chloride 0.9% 250mL IVPB 500 mg (500 mg Intravenous New Bag 4/23/25 2110)   albuterol inhalation solution 10 mg (10 mg Nebulization Given 4/23/25 2046)   ipratropium (ATROVENT) 0.02 % inhalation solution 1 mg (1 mg Nebulization Given 4/23/25 2046)   sodium chloride 0.9 % inhalation solution 12 mL (12 mL  Nebulization Given 4/23/25 2046)       ED Risk Strat Scores   HEART Risk Score      Flowsheet Row Most Recent Value   Heart Score Risk Calculator    History 0 Filed at: 04/23/2025 2030   ECG 1 Filed at: 04/23/2025 2030   Age 1 Filed at: 04/23/2025 2030   Risk Factors 1 Filed at: 04/23/2025 2030   Troponin 1 Filed at: 04/23/2025 2030   HEART Score 4 Filed at: 04/23/2025 2030          HEART Risk Score      Flowsheet Row Most Recent Value   Heart Score Risk Calculator    History 0 Filed at: 04/23/2025 2030   ECG 1 Filed at: 04/23/2025 2030   Age 1 Filed at: 04/23/2025 2030   Risk Factors 1 Filed at: 04/23/2025 2030   Troponin 1 Filed at: 04/23/2025 2030   HEART Score 4 Filed at: 04/23/2025 2030                      No data recorded        SBIRT 20yo+      Flowsheet Row Most Recent Value   Initial Alcohol Screen: US AUDIT-C     1. How often do you have a drink containing alcohol? 0 Filed at: 04/23/2025 1735   2. How many drinks containing alcohol do you have on a typical day you are drinking?  0 Filed at: 04/23/2025 1735   3a. Male UNDER 65: How often do you have five or more drinks on one occasion? 0 Filed at: 04/23/2025 1735   3b. FEMALE Any Age, or MALE 65+: How often do you have 4 or more drinks on one occassion? 0 Filed at: 04/23/2025 1735   Audit-C Score 0 Filed at: 04/23/2025 1735   MITALI: How many times in the past year have you...    Used an illegal drug or used a prescription medication for non-medical reasons? Never Filed at: 04/23/2025 1735                            History of Present Illness       Chief Complaint   Patient presents with    Cold Like Symptoms     Pt reports sore throat and a cough for the past few days. Went to [facility] last week, and  today due to increased wheezing from her asthma. Had a duoneb treatment at        Past Medical History:   Diagnosis Date    Anemia 2017?    Arthritis     Asthma     w/acute exacerbation. Last assessed: 10/26/12    Chest pain 02/03/2016    CHF (congestive heart  failure) ([facility]) 08/2020    Coronary artery disease 2021    Depression     Diabetes mellitus ([facility]) 10/1992    Diabetic nephropathy ([facility])     Diabetic nephropathy associated with type 1 diabetes mellitus ([facility]) 08/12/2020    Diabetic retinopathy ([facility]) 02/03/2016    Endometriosis     Gastroenteritis 02/03/2016    GERD (gastroesophageal reflux disease)     History of COVID-19 02/26/2021    Hyperlipidemia 02/03/2016    Hypertension 20+yrs.    Obesity     Oligomenorrhea     Polycystic ovaries 02/03/2016    Retinal hemorrhage 02/03/2016    Retinopathy     Thrombocytosis     Type 1 diabetes mellitus with ophthalmic manifestation ([facility]) 02/03/2016      Past Surgical History:   Procedure Laterality Date    CARDIAC ELECTROPHYSIOLOGY PROCEDURE N/A 2/26/2025    Procedure: Cardiac loop recorder implant;  Surgeon: Capo Saab MD;  Location:  CARDIAC CATH LAB;  Service: Cardiology    CATARACT EXTRACTION Left 10/2020    CATARACT EXTRACTION Right     RETINAL LASER PROCEDURE        Family History   Problem Relation Age of Onset    Heart murmur Mother     Diabetes Mother         Type 2    Thyroid disease Mother         Disorder    Diabetes type II Mother     Mental illness Mother         Chemical imbalance    Diabetes Father         Type 2    Hypertension Father     Diabetes type II Father     No Known Problems Maternal Grandmother     Diabetes Maternal Grandfather         Mellitus    Breast cancer Paternal Grandmother 70 - 79    Leukemia Paternal Grandfather 48    Cancer Paternal Grandfather         Leaukemia    Diabetes Maternal Aunt         Type 2    Diabetes Maternal Uncle         Mellitus    Diabetes Paternal Uncle         Type 2    Substance Abuse Neg Hx     Alcohol abuse Neg Hx       Social History     Tobacco Use    Smoking status: Never    Smokeless tobacco: Never    Tobacco comments:     Per [employer]: Former smoker. Quit in 1994   Vaping Use    Vaping status: Never Used   Substance Use Topics    Alcohol use: Yes      "Alcohol/week: 2.0 standard drinks of alcohol     Types: 1 Glasses of wine, 1 Standard drinks or equivalent per week     Comment: Occasionally. Several weeks can go by with no alcohol.    Drug use: No      E-Cigarette/Vaping    E-Cigarette Use Never User       E-Cigarette/Vaping Substances    Nicotine No     THC No     CBD No     Flavoring No       I have reviewed and agree with the history as documented.     Patient is a 49-year-old female past medical history of asthma, CHF arriving for evaluation, to the emergency room for further evaluation from urgent care.  Patient states that over the weekend on Saturday she noted that she was having a sore throat and some chest congestion.  Patient states by Monday she was felt ill enough to go to her family doctor.  Patient was states that was \"just a cold.\"  Was encouraged to continue doing her albuterol nebulizer 3 times daily.  Patient reports that today she felt even worse.  Patient states that she continued to be seen was told by urgent care.  Patient reports that while urgent care she got DuoNeb and steroids.  Patient states that she has been doing albuterol treatments at home as directed with no improvement to her chest congestion.  Patien states that she was rechecked while at urgent care after receiving steroids and DuoNeb and was told that her pulse ox was too low to the emergency department.  Patient denies any fevers or chills.  Patient reports sore throat has  since subsided.         Review of Systems   Constitutional:  Positive for fever.   HENT: Negative.     Eyes: Negative.    Respiratory:  Positive for shortness of breath.    Cardiovascular:  Negative for chest pain.   Gastrointestinal: Negative.    Endocrine: Negative.    Genitourinary: Negative.    Musculoskeletal: Negative.    Allergic/Immunologic: Negative.    Neurological: Negative.    Hematological: Negative.    Psychiatric/Behavioral: Negative.     All other systems reviewed and are " negative.          Objective       ED Triage Vitals   Temperature Pulse Blood Pressure Respirations SpO2 Patient Position - Orthostatic VS   04/23/25 1734 04/23/25 1734 04/23/25 1734 04/23/25 1734 04/23/25 1734 04/23/25 1734   (!) 101.6 °F (38.7 °C) 97 (!) 176/77 18 (!) 88 % Sitting      Temp Source Heart Rate Source BP Location FiO2 (%) Pain Score    04/23/25 1734 04/23/25 1734 04/23/25 1734 -- 04/23/25 1927    Oral Monitor Left arm  4      Vitals      Date and Time Temp Pulse SpO2 Resp BP Pain Score FACES Pain Rating User   04/23/25 2238 -- -- 91 % -- -- -- -- KD   04/23/25 2226 -- -- -- -- -- No Pain -- KD   04/23/25 2224 -- 106 94 % -- 100/51 -- -- DII   04/23/25 2200 -- 105 100 % 16 149/65 -- -- KD   04/23/25 2115 99.1 °F (37.3 °C) 99 99 % 22 156/70 -- -- DM   04/23/25 2046 -- -- 94 % -- -- -- -- CT   04/23/25 1927 -- -- -- -- -- 4 -- TH   04/23/25 1736 -- -- 91 % -- -- -- -- HR   04/23/25 1734 101.6 °F (38.7 °C) 97 88 % 18 176/77 -- -- HR            Physical Exam  Vitals and nursing note reviewed.   Constitutional:       Appearance: Normal appearance. She is normal weight.   HENT:      Head: Normocephalic.      Right Ear: External ear normal.      Left Ear: External ear normal.      Nose: Nose normal.      Mouth/Throat:      Mouth: Mucous membranes are moist.      Pharynx: Oropharynx is clear.   Eyes:      Extraocular Movements: Extraocular movements intact.      Conjunctiva/sclera: Conjunctivae normal.      Pupils: Pupils are equal, round, and reactive to light.   Cardiovascular:      Rate and Rhythm: Regular rhythm. Tachycardia present.      Pulses: Normal pulses.      Heart sounds: Normal heart sounds.   Pulmonary:      Effort: No respiratory distress.      Breath sounds: Decreased breath sounds, wheezing and rhonchi present.   Abdominal:      General: Abdomen is flat. Bowel sounds are normal.   Musculoskeletal:         General: Normal range of motion.      Cervical back: Normal range of motion and neck  supple.   Skin:     General: Skin is warm.      Capillary Refill: Capillary refill takes less than 2 seconds.   Neurological:      General: No focal deficit present.      Mental Status: She is alert and oriented to person, place, and time.   Psychiatric:         Mood and Affect: Mood normal.         Behavior: Behavior normal.         Thought Content: Thought content normal.         Judgment: Judgment normal.         Results Reviewed       Procedure Component Value Units Date/Time    HS Troponin I 2hr [[id number]]  (Normal) Collected: 04/23/25 2150    Lab Status: Final result Specimen: Blood from Arm, Right Updated: 04/23/25 2220     hs TnI 2hr 10 ng/L      Delta 2hr hsTnI -3 ng/L     Procalcitonin [[id number]]  (Normal) Collected: 04/23/25 1943    Lab Status: Final result Specimen: Blood from Arm, Right Updated: 04/23/25 2028     Procalcitonin 0.12 ng/ml     B-Type Natriuretic Peptide(BNP) [[id number]]  (Normal) Collected: 04/23/25 1943    Lab Status: Final result Specimen: Blood from Arm, Right Updated: 04/23/25 2025     BNP 77 pg/mL     Urine Microscopic [[id number]]  (Normal) Collected: 04/23/25 1945    Lab Status: Final result Specimen: Urine, Clean Catch Updated: 04/23/25 2021     RBC, UA 0-1 /hpf      WBC, UA 0-1 /hpf      Epithelial Cells Occasional /hpf      Bacteria, UA Occasional /hpf     HS Troponin 0hr (reflex protocol) [[id number]]  (Normal) Collected: 04/23/25 1943    Lab Status: Final result Specimen: Blood from Arm, Right Updated: 04/23/25 2020     hs TnI 0hr 13 ng/L     HS Troponin I 4hr [[id number]]     Lab Status: No result Specimen: Blood     FLU/COVID Rapid Antigen (30 min. TAT) - Preferred screening test in ED [[id number]]  (Normal) Collected: 04/23/25 1943    Lab Status: Final result Specimen: Nares from Nose Updated: 04/23/25 2014     SARS COV Rapid Antigen Negative     Influenza A Rapid Antigen Negative     Influenza B Rapid Antigen Negative    Narrative:      This test has been performed using the  Quidel Jocelynn 2 FLU+SARS Antigen test under the Emergency Use Authorization (EUA). This test has been validated by the  and verified by the performing laboratory. The Jocelynn uses lateral flow immunofluorescent sandwich assay to detect SARS-COV, Influenza A and Influenza B Antigen.     The Quidel Jocelynn 2 SARS Antigen test does not differentiate between SARS-CoV and SARS-CoV-2.     Negative results are presumptive and may be confirmed with a molecular assay, if necessary, for patient management. Negative results do not rule out SARS-CoV-2 or influenza infection and should not be used as the sole basis for treatment or patient management decisions. A negative test result may occur if the level of antigen in a sample is below the limit of detection of this test.     Positive results are indicative of the presence of viral antigens, but do not rule out bacterial infection or co-infection with other viruses.     All test results should be used as an adjunct to clinical observations and other information available to the provider.    FOR PEDIATRIC PATIENTS - copy/paste COVID Guidelines URL to browser: [email or web address]    Comprehensive metabolic panel [[id number]]  (Abnormal) Collected: 04/23/25 1943    Lab Status: Final result Specimen: Blood from Arm, Right Updated: 04/23/25 2014     Sodium 136 mmol/L      Potassium 3.9 mmol/L      Chloride 102 mmol/L      CO2 25 mmol/L      ANION GAP 9 mmol/L      BUN 36 mg/dL      Creatinine 0.91 mg/dL      Glucose 84 mg/dL      Calcium 9.2 mg/dL      AST 17 U/L      ALT 18 U/L      Alkaline Phosphatase 84 U/L      Total Protein 7.2 g/dL      Albumin 4.1 g/dL      Total Bilirubin 0.30 mg/dL      eGFR 74 ml/min/1.73sq m     Narrative:      National Kidney Disease Foundation guidelines for Chronic Kidney Disease (CKD):     Stage 1 with normal or high GFR (GFR > 90 mL/min/1.73 square meters)    Stage 2 Mild CKD (GFR = 60-89  mL/min/1.73 square meters)    Stage 3A Moderate CKD (GFR = 45-59 mL/min/1.73 square meters)    Stage 3B Moderate CKD (GFR = 30-44 mL/min/1.73 square meters)    Stage 4 Severe CKD (GFR = 15-29 mL/min/1.73 square meters)    Stage 5 End Stage CKD (GFR <15 mL/min/1.73 square meters)  Note: GFR calculation is accurate only with a steady state creatinine    Lactic acid [[id number]]  (Normal) Collected: 04/23/25 1943    Lab Status: Final result Specimen: Blood from Arm, Right Updated: 04/23/25 2014     LACTIC ACID 0.9 mmol/L     Narrative:      Result may be elevated if tourniquet was used during collection.    APTT [[id number]]  (Normal) Collected: 04/23/25 1943    Lab Status: Final result Specimen: Blood from Arm, Right Updated: 04/23/25 2014     PTT 33 seconds     Protime-INR [[id number]]  (Abnormal) Collected: 04/23/25 1943    Lab Status: Final result Specimen: Blood from Arm, Right Updated: 04/23/25 2014     Protime 15.1 seconds      INR 1.14    Narrative:      INR Therapeutic Range    Indication                                             INR Range      Atrial Fibrillation                                               2.0-3.0  Hypercoagulable State                                    2.0.2.3  Left Ventricular Asist Device                            2.0-3.0  Mechanical Heart Valve                                  -    Aortic(with afib, MI, embolism, HF, LA enlargement,    and/or coagulopathy)                                     2.0-3.0 (2.5-3.5)     Mitral                                                             2.5-3.5  Prosthetic/Bioprosthetic Heart Valve               2.0-3.0  Venous thromboembolism (VTE: VT, PE        2.0-3.0    UA w Reflex to Microscopic w Reflex to Culture [[id number]]  (Abnormal) Collected: 04/23/25 1945    Lab Status: Final result Specimen: Urine, Clean Catch Updated: 04/23/25 2012     Color, UA Yellow     Clarity, UA Clear     Specific Gravity, UA 1.015     pH, UA 6.0     Leukocytes, UA  Negative     Nitrite, UA Negative     Protein,  (2+) mg/dl      Glucose, UA Negative mg/dl      Ketones, UA Negative mg/dl      Urobilinogen, UA <2.0 mg/dl      Bilirubin, UA Negative     Occult Blood, UA Small    Blood culture #1 [[id number]] Collected: 04/23/25 2007    Lab Status: In process Specimen: Blood from Arm, Left Updated: 04/23/25 2011    CBC and differential [[id number]]  (Abnormal) Collected: 04/23/25 1943    Lab Status: Final result Specimen: Blood from Arm, Right Updated: 04/23/25 1959     WBC 10.07 Thousand/uL      RBC 4.11 Million/uL      Hemoglobin 11.7 g/dL      Hematocrit 36.6 %      MCV 89 fL      MCH 28.5 pg      MCHC 32.0 g/dL      RDW 14.6 %      MPV 10.0 fL      Platelets 314 Thousands/uL      nRBC 0 /100 WBCs      Segmented % 89 %      Immature Grans % 1 %      Lymphocytes % 6 %      Monocytes % 3 %      Eosinophils Relative 0 %      Basophils Relative 1 %      Absolute Neutrophils 9.10 Thousands/µL      Absolute Immature Grans 0.07 Thousand/uL      Absolute Lymphocytes 0.57 Thousands/µL      Absolute Monocytes 0.26 Thousand/µL      Eosinophils Absolute 0.02 Thousand/µL      Basophils Absolute 0.05 Thousands/µL     Blood culture #2 [[id number]] Collected: 04/23/25 1943    Lab Status: In process Specimen: Blood from Arm, Right Updated: 04/23/25 1956    POCT pregnancy, urine [[id number]]  (Normal) Collected: 04/23/25 1945    Lab Status: Final result Updated: 04/23/25 1945     EXT Preg Test, Ur Negative     Control Valid            XR chest pa & lateral   ED Interpretation by LELAND Woodard (04/23 1954)   Right lower lobe infiltrate          ECG 12 Lead Documentation Only    Date/Time: 4/23/2025 7:43 PM    Performed by: LELAND Woodard  Authorized by: LELAND Woodard    Indications / Diagnosis:  Tachycardia  ECG reviewed by me, the ED Provider: yes    Patient location:  ED  Interpretation:     Interpretation: normal    Rate:     ECG rate:  90    ECG rate assessment:  normal    Rhythm:     Rhythm: sinus rhythm    Ectopy:     Ectopy: none    QRS:     QRS axis:  Normal  Conduction:     Conduction: normal    ST segments:     ST segments:  Normal  T waves:     T waves: normal        ED Medication and Procedure Management   Prior to Admission Medications   Prescriptions Last Dose Informant Patient Reported? Taking?   Ascorbic Acid (VITAMIN C) 500 MG CAPS 4/23/2025 Self Yes Yes   Sig: Take 1 capsule by mouth 2 (two) times a day   Continuous Glucose Sensor (Dexcom G7 Sensor)   No No   Sig: USE 1 DEVICE EVERY 10 DAYS   Eliquis 5 MG 4/23/2025  No Yes   Sig: TAKE 1 TABLET BY MOUTH TWO TIMES DAILY   Fluticasone-Salmeterol (Advair) 250-50 mcg/dose inhaler 4/23/2025 Self No Yes   Sig: INHALE 1 PUFF BY MOUTH TWO TIMES DAILY RINSE MOUTH AFTER USE   Patient taking differently: Inhale 1 puff in the morning   Glucagon (Gvoke HypoPen 2-Pack) 1 MG/0.2ML SOAJ  Self No No   Sig: Use if hypoglycemia prn   Insulin Pen Needle 33G X 4 MM MISC  Self No No   Sig: Use to inject insulin 4 times a day   PARoxetine (PAXIL) 20 mg tablet 4/22/2025 Evening  No Yes   Sig: TAKE 1 TABLET BY MOUTH EVERY DAY   Probiotic Product (ALIGN PO)  Self Yes No   Sig: Take by mouth   acetone, urine, test strip  Self No No   Sig: Use to test urine ketones for high blood sugars.   albuterol (2.5 mg/3 mL) 0.083 % nebulizer solution 4/23/2025  No Yes   Sig: INHALE THE CONTENTS OF 1 VIAL VIA NEBULIZER EVERY 6 HOURS AS NEEDED FOR WHEEZING OR SHORTNESS OF BREATH   albuterol (PROVENTIL HFA,VENTOLIN HFA) 90 mcg/act inhaler 4/23/2025  No Yes   Sig: INHALE 2 PUFFS BY MOUTH EVERY 4 HOURS AS NEEDED FOR WHEEZING OR FOR SHORTNESS OF BREATH   atorvastatin (LIPITOR) 80 mg tablet 4/22/2025 Self No Yes   Sig: TAKE 1 TABLET BY MOUTH EVERY DAY   benzonatate (TESSALON PERLES) 100 mg capsule   No No   Sig: Take 1 capsule (100 mg total) by mouth 3 (three) times a day as needed for cough   cefuroxime (CEFTIN) 500 mg tablet 4/22/2025  No Yes   Sig: Take  1 tablet (500 mg total) by mouth every 12 (twelve) hours for 10 days   cyanocobalamin (VITAMIN B-12) 100 mcg tablet 2025 Self Yes Yes   Sig: Take by mouth daily   glucagon (Glucagon Emergency) 1 MG injection  Self No No   Sig: Inject 1 mg under the skin once as needed for low blood sugar for up to 1 dose   insulin aspart, w/niacinamide, (Fiasp FlexTouch) 100 Units/mL injection pen 2025 Self No Yes   Sig: INJECT 1 UNIT OF INSULIN ASPART FOR EVERY 1 GRAM OF CARBS UP  UNITS DAILY   insulin glargine (Toujeo SoloStar) 300 units/mL CONCENTRATED U-300 injection pen (1-unit dial) 2025  No Yes   Sig: INJECT 100 UNITS SUBCUTANEOUSLY (UNDER THE SKIN) DAILY.   Patient taking differently: Inject 95 Units under the skin daily   metFORMIN (GLUCOPHAGE-XR) 500 mg 24 hr tablet 2025  No Yes   Sig: TAKE 1 TABLET BY MOUTH TWO TIMES DAILY WITH MEALS   metoprolol tartrate (LOPRESSOR) 25 mg tablet 2025 Self No Yes   Sig: TAKE 1/2 TABLET BY MOUTH EVERY 12 HOURS   montelukast (SINGULAIR) 10 mg tablet 2025 Evening Self No Yes   Sig: TAKE 1 TABLET BY MOUTH AT BEDTIME   omeprazole (PriLOSEC) 20 mg delayed release capsule 2025 Morning Self Yes Yes   Sig: Take 20 mg by mouth daily.   predniSONE 10 mg tablet   No No   Si tabs po qd x 3 days then 1 tab po qd x 3 days   torsemide (DEMADEX) 20 mg tablet Past Week  No Yes   Sig: Take 1 tablet (20 mg total) by mouth if needed (rapid weight gain (3+ lbs overnight or 5+ lbs in 5-7 days))      Facility-Administered Medications Last Administration Doses Remaining   dexamethasone oral liquid 10 mg 1 mL 2025  4:53 PM 0   ipratropium-albuterol (DUO-NEB) 0.5-2.5 mg/3 mL inhalation solution 3 mL 2025  4:32 PM 0        Current Discharge Medication List        CONTINUE these medications which have NOT CHANGED    Details   albuterol (2.5 mg/3 mL) 0.083 % nebulizer solution INHALE THE CONTENTS OF 1 VIAL VIA NEBULIZER EVERY 6 HOURS AS NEEDED FOR WHEEZING OR  SHORTNESS OF BREATH  Qty: 75 mL, Refills: 3    Associated Diagnoses: Mild persistent asthma with acute exacerbation      albuterol (PROVENTIL HFA,VENTOLIN HFA) 90 mcg/act inhaler INHALE 2 PUFFS BY MOUTH EVERY 4 HOURS AS NEEDED FOR WHEEZING OR FOR SHORTNESS OF BREATH  Qty: 25.5 g, Refills: 1    Comments: Substitution to a formulary equivalent within the same pharmaceutical class is authorized.  Associated Diagnoses: Uncomplicated asthma, unspecified asthma severity, unspecified whether persistent      Ascorbic Acid (VITAMIN C) 500 MG CAPS Take 1 capsule by mouth 2 (two) times a day      atorvastatin (LIPITOR) 80 mg tablet TAKE 1 TABLET BY MOUTH EVERY DAY  Qty: 30 tablet, Refills: 5    Associated Diagnoses: Mixed hyperlipidemia      cefuroxime (CEFTIN) 500 mg tablet Take 1 tablet (500 mg total) by mouth every 12 (twelve) hours for 10 days  Qty: 20 tablet, Refills: 0    Associated Diagnoses: Acute non-recurrent maxillary sinusitis      cyanocobalamin (VITAMIN B-12) 100 mcg tablet Take by mouth daily      Eliquis 5 MG TAKE 1 TABLET BY MOUTH TWO TIMES DAILY  Qty: 60 tablet, Refills: 5    Associated Diagnoses: Atrial fib/flutter, transient (HCC)      Fluticasone-Salmeterol (Advair) 250-50 mcg/dose inhaler INHALE 1 PUFF BY MOUTH TWO TIMES DAILY RINSE MOUTH AFTER USE  Qty: 60 blister, Refills: 5    Comments: Substitution to a formulary equivalent within the same pharmaceutical class is authorized.  Associated Diagnoses: Mild persistent asthma without complication      insulin aspart, w/niacinamide, (Fiasp FlexTouch) 100 Units/mL injection pen INJECT 1 UNIT OF INSULIN ASPART FOR EVERY 1 GRAM OF CARBS UP  UNITS DAILY  Qty: 60 mL, Refills: 2    Associated Diagnoses: Type 1 diabetes mellitus with hyperglycemia (HCC)      insulin glargine (Toujeo SoloStar) 300 units/mL CONCENTRATED U-300 injection pen (1-unit dial) INJECT 100 UNITS SUBCUTANEOUSLY (UNDER THE SKIN) DAILY.  Qty: 13.5 mL, Refills: 5    Associated Diagnoses:  Type 1 diabetes mellitus with hyperglycemia (HCC)      metFORMIN (GLUCOPHAGE-XR) 500 mg 24 hr tablet TAKE 1 TABLET BY MOUTH TWO TIMES DAILY WITH MEALS  Qty: 180 tablet, Refills: 3    Associated Diagnoses: Type 1 diabetes mellitus with hyperglycemia (HCC)      metoprolol tartrate (LOPRESSOR) 25 mg tablet TAKE 1/2 TABLET BY MOUTH EVERY 12 HOURS  Qty: 90 tablet, Refills: 1    Associated Diagnoses: Atrial fib/flutter, transient (HCC)      montelukast (SINGULAIR) 10 mg tablet TAKE 1 TABLET BY MOUTH AT BEDTIME  Qty: 90 tablet, Refills: 1    Associated Diagnoses: Mild intermittent asthma without complication      omeprazole (PriLOSEC) 20 mg delayed release capsule Take 20 mg by mouth daily.      PARoxetine (PAXIL) 20 mg tablet TAKE 1 TABLET BY MOUTH EVERY DAY  Qty: 90 tablet, Refills: 1    Associated Diagnoses: Depression, unspecified depression type      torsemide (DEMADEX) 20 mg tablet Take 1 tablet (20 mg total) by mouth if needed (rapid weight gain (3+ lbs overnight or 5+ lbs in 5-7 days))  Qty: 90 tablet, Refills: 1    Associated Diagnoses: Chronic heart failure with preserved ejection fraction (HCC)      acetone, urine, test strip Use to test urine ketones for high blood sugars.  Qty: 25 each, Refills: 6    Associated Diagnoses: Type 1 diabetes mellitus with hyperglycemia (HCC)      benzonatate (TESSALON PERLES) 100 mg capsule Take 1 capsule (100 mg total) by mouth 3 (three) times a day as needed for cough  Qty: 20 capsule, Refills: 0    Associated Diagnoses: Acute maxillary sinusitis, recurrence not specified; Bronchitis      Continuous Glucose Sensor (Dexcom G7 Sensor) USE 1 DEVICE EVERY 10 DAYS  Qty: 3 each, Refills: 5    Associated Diagnoses: Type 1 diabetes mellitus with hyperglycemia (HCC)      glucagon (Glucagon Emergency) 1 MG injection Inject 1 mg under the skin once as needed for low blood sugar for up to 1 dose  Qty: 1 kit, Refills: 3    Associated Diagnoses: Type 1 diabetes mellitus with hyperglycemia  (HCC)      Glucagon (Gvoke HypoPen 2-Pack) 1 MG/0.2ML SOAJ Use if hypoglycemia prn  Qty: 1 Syringe, Refills: 6    Associated Diagnoses: Type 1 diabetes mellitus with hyperglycemia (HCC)      Insulin Pen Needle 33G X 4 MM MISC Use to inject insulin 4 times a day  Qty: 400 each, Refills: 2    Associated Diagnoses: Type 1 diabetes mellitus with hyperglycemia (HCC)      predniSONE 10 mg tablet 2 tabs po qd x 3 days then 1 tab po qd x 3 days  Qty: 9 tablet, Refills: 0    Associated Diagnoses: Acute maxillary sinusitis, recurrence not specified; Bronchitis      Probiotic Product (ALIGN PO) Take by mouth           No discharge procedures on file.  ED SEPSIS DOCUMENTATION   Time reflects when diagnosis was documented in both MDM as applicable and the Disposition within this note       Time User Action Codes Description Comment    4/23/2025  9:35 PM Jennifer Aragon [J18.9] Pneumonia     4/23/2025  9:35 PM Jennifer Aragon [J45.901] Asthma exacerbation     4/23/2025  9:35 PM Jennifer Araogn [R05.9] Cough     4/23/2025  9:35 PM Jennifer Aragon [R50.9] Fever                  LELAND Woodard  04/24/25 [id number]

## 2025-04-23 NOTE — LETTER
[facility] MED SURG UNIT  [address]  [place] [id number]  Dept: [phone]    April 26, 2025     Patient: Vernell Darby   YOB: 1975   Date of Visit: 4/23/2025       To Whom it May Concern:    Vernell Darby is under my professional care. She was seen in the hospital from 4/23/2025 to 04/26/25. She may return to work on 04/30/25 without limitations or as otherwise designated by PCP.    If you have any questions or concerns, please don't hesitate to call.         Sincerely,          Alexandra Campos PA-C

## 2025-04-23 NOTE — TELEPHONE ENCOUNTER
Patient is calling to update provider on her symptoms    She states that she is not having severe wheezing and some chest tightness, she is requesting to be prescribed prednizone    Please review and advise

## 2025-04-23 NOTE — PROGRESS NOTES
[facility]        NAME: Vernell Darby is a 49 y.o. female  : 1975    MRN: [id number]  DATE: 2025  TIME: 5:08 PM    Assessment and Plan   Intermittent asthma with acute exacerbation, unspecified asthma severity [J45.21]  1. Intermittent asthma with acute exacerbation, unspecified asthma severity  ipratropium-albuterol (DUO-NEB) 0.5-2.5 mg/3 mL inhalation solution 3 mL    dexamethasone oral liquid 10 mg 1 mL    Transfer to other facility        Patient report to ED via private vehicle for further care    Patient Instructions       Follow up with PCP in 3-5 days.  Proceed to  ER if symptoms worsen.    If tests have been performed at [facility], our office will contact you with results if changes need to be made to the care plan discussed with you at the visit.  [facility]    Chief Complaint   No chief complaint on file.        History of Present Illness       49-year-old female with past medical history of type 1 diabetes, asthma, CHF, presents for worsening chest tightness and wheezing.  Patient seen by PCP twice this week.  Diagnosed with viral URI and then sinusitis.  Given Ceftin.  Taking as directed.  This morning woke up with worsening chest tightness and wheezing.  Last used albuterol Hailer this morning.        Review of Systems   Review of Systems   Constitutional:  Negative for chills and fever.         Current Medications       Current Outpatient Medications:     acetone, urine, test strip, Use to test urine ketones for high blood sugars., Disp: 25 each, Rfl: 6    albuterol (2.5 mg/3 mL) 0.083 % nebulizer solution, INHALE THE CONTENTS OF 1 VIAL VIA NEBULIZER EVERY 6 HOURS AS NEEDED FOR WHEEZING OR SHORTNESS OF BREATH, Disp: 75 mL, Rfl: 3    albuterol (PROVENTIL HFA,VENTOLIN HFA) 90 mcg/act inhaler, INHALE 2 PUFFS BY MOUTH EVERY 4 HOURS AS NEEDED FOR WHEEZING OR FOR SHORTNESS OF BREATH, Disp: 25.5 g, Rfl: 1    Ascorbic Acid (VITAMIN C)  500 MG CAPS, Take 2 capsules by mouth daily, Disp: , Rfl:     atorvastatin (LIPITOR) 80 mg tablet, TAKE 1 TABLET BY MOUTH EVERY DAY, Disp: 30 tablet, Rfl: 5    benzonatate (TESSALON PERLES) 100 mg capsule, Take 1 capsule (100 mg total) by mouth 3 (three) times a day as needed for cough, Disp: 20 capsule, Rfl: 0    cefuroxime (CEFTIN) 500 mg tablet, Take 1 tablet (500 mg total) by mouth every 12 (twelve) hours for 10 days, Disp: 20 tablet, Rfl: 0    Continuous Glucose Sensor (Dexcom G7 Sensor), USE 1 DEVICE EVERY 10 DAYS, Disp: 3 each, Rfl: 5    cyanocobalamin (VITAMIN B-12) 100 mcg tablet, Take by mouth daily, Disp: , Rfl:     Eliquis 5 MG, TAKE 1 TABLET BY MOUTH TWO TIMES DAILY, Disp: 60 tablet, Rfl: 5    Fluticasone-Salmeterol (Advair) 250-50 mcg/dose inhaler, INHALE 1 PUFF BY MOUTH TWO TIMES DAILY RINSE MOUTH AFTER USE, Disp: 60 blister, Rfl: 5    glucagon (Glucagon Emergency) 1 MG injection, Inject 1 mg under the skin once as needed for low blood sugar for up to 1 dose, Disp: 1 kit, Rfl: 3    Glucagon (Gvoke HypoPen 2-Pack) 1 MG/0.2ML SOAJ, Use if hypoglycemia prn, Disp: 1 Syringe, Rfl: 6    insulin aspart, w/niacinamide, (Fiasp FlexTouch) 100 Units/mL injection pen, INJECT 1 UNIT OF INSULIN ASPART FOR EVERY 1 GRAM OF CARBS UP  UNITS DAILY, Disp: 60 mL, Rfl: 2    insulin glargine (Toujeo SoloStar) 300 units/mL CONCENTRATED U-300 injection pen (1-unit dial), INJECT 100 UNITS SUBCUTANEOUSLY (UNDER THE SKIN) DAILY., Disp: 13.5 mL, Rfl: 5    Insulin Pen Needle 33G X 4 MM MISC, Use to inject insulin 4 times a day, Disp: 400 each, Rfl: 2    metFORMIN (GLUCOPHAGE-XR) 500 mg 24 hr tablet, TAKE 1 TABLET BY MOUTH TWO TIMES DAILY WITH MEALS, Disp: 180 tablet, Rfl: 3    metoprolol tartrate (LOPRESSOR) 25 mg tablet, TAKE 1/2 TABLET BY MOUTH EVERY 12 HOURS, Disp: 90 tablet, Rfl: 1    montelukast (SINGULAIR) 10 mg tablet, TAKE 1 TABLET BY MOUTH AT BEDTIME, Disp: 90 tablet, Rfl: 1    omeprazole (PriLOSEC) 20 mg delayed  release capsule, Take 20 mg by mouth daily., Disp: , Rfl:     PARoxetine (PAXIL) 20 mg tablet, TAKE 1 TABLET BY MOUTH EVERY DAY, Disp: 90 tablet, Rfl: 1    predniSONE 10 mg tablet, 2 tabs po qd x 3 days then 1 tab po qd x 3 days, Disp: 9 tablet, Rfl: 0    Probiotic Product (ALIGN PO), Take by mouth, Disp: , Rfl:     torsemide (DEMADEX) 20 mg tablet, Take 1 tablet (20 mg total) by mouth if needed (rapid weight gain (3+ lbs overnight or 5+ lbs in 5-7 days)), Disp: 90 tablet, Rfl: 1  No current facility-administered medications for this visit.    Current Allergies     Allergies as of 04/23/2025    (No Known Allergies)            The following portions of the patient's history were reviewed and updated as appropriate: allergies, current medications, past family history, past medical history, past social history, past surgical history and problem list.     Past Medical History:   Diagnosis Date    Anemia 2017?    Arthritis     Asthma     w/acute exacerbation. Last assessed: 10/26/12    Chest pain 02/03/2016    CHF (congestive heart failure) (HCC) 08/2020    Coronary artery disease 2021    Depression     Diabetes mellitus (HCC) 10/1992    Diabetic nephropathy (HCC)     Diabetic nephropathy associated with type 1 diabetes mellitus (HCC) 08/12/2020    Diabetic retinopathy (HCC) 02/03/2016    Endometriosis     Gastroenteritis 02/03/2016    GERD (gastroesophageal reflux disease)     History of COVID-19 02/26/2021    Hyperlipidemia 02/03/2016    Hypertension 20+yrs.    Obesity     Oligomenorrhea     Polycystic ovaries 02/03/2016    Retinal hemorrhage 02/03/2016    Retinopathy     Thrombocytosis     Type 1 diabetes mellitus with ophthalmic manifestation ([facility]) 02/03/2016       Past Surgical History:   Procedure Laterality Date    CARDIAC ELECTROPHYSIOLOGY PROCEDURE N/A 2/26/2025    Procedure: Cardiac loop recorder implant;  Surgeon: Capo Saab MD;  Location: [facility];  Service: Cardiology    CATARACT EXTRACTION  Left 10/2020    CATARACT EXTRACTION Right     RETINAL LASER PROCEDURE         Family History   Problem Relation Age of Onset    Heart murmur Mother     Diabetes Mother         Type 2    Thyroid disease Mother         Disorder    Diabetes type II Mother     Mental illness Mother         Chemical imbalance    Diabetes Father         Type 2    Hypertension Father     Diabetes type II Father     No Known Problems Maternal Grandmother     Diabetes Maternal Grandfather         Mellitus    Breast cancer Paternal Grandmother 70 - 79    Leukemia Paternal Grandfather 48    Cancer Paternal Grandfather         Leaukemia    Diabetes Maternal Aunt         Type 2    Diabetes Maternal Uncle         Mellitus    Diabetes Paternal Uncle         Type 2    Substance Abuse Neg Hx     Alcohol abuse Neg Hx          Medications have been verified.        Objective   /64   Pulse 98   Temp 100.5 °F (38.1 °C)   Resp 18   LMP 05/03/2023 (Approximate) Comment: ncop per pt  SpO2 94%   Patient's last menstrual period was 05/03/2023 (approximate).       Physical Exam     Physical Exam  Vitals and nursing note reviewed.   Constitutional:       General: She is not in acute distress.     Appearance: Normal appearance. She is not ill-appearing, toxic-appearing or diaphoretic.   HENT:      Head: Normocephalic and atraumatic.      Right Ear: Tympanic membrane, ear canal and external ear normal.      Left Ear: Tympanic membrane, ear canal and external ear normal.      Nose: Rhinorrhea present.      Mouth/Throat:      Mouth: Mucous membranes are moist.      Pharynx: No oropharyngeal exudate or posterior oropharyngeal erythema.   Eyes:      Conjunctiva/sclera: Conjunctivae normal.   Cardiovascular:      Rate and Rhythm: Normal rate and regular rhythm.   Pulmonary:      Effort: Pulmonary effort is normal.      Breath sounds: Wheezing present.   Lymphadenopathy:      Cervical: No cervical adenopathy.   Neurological:      Mental Status: She is  "alert.   Psychiatric:         Mood and Affect: Mood normal.         Behavior: Behavior normal.             Mini neb    Performed by: Murray Sifuentes PA-C  Authorized by: Murray Sifuentes PA-C  Universal Protocol:  procedure performed by consultantConsent: Verbal consent obtained.  Risks and benefits: risks, benefits and alternatives were discussed  Consent given by: patient  Time out: Immediately prior to procedure a \"time out\" was called to verify the correct patient, procedure, equipment, support staff and site/side marked as required.  Patient understanding: patient states understanding of the procedure being performed  Patient identity confirmed: verbally with patient    Number of treatments:  1  Treatment 1:   Pre-Procedure     Symptoms:  Wheezing and shortness of breath    Medication Administered:  Duoneb - Albuterol 2.5 mg/Atrovent 0.5 mg  Post-Procedure     Symptoms:  Wheezing and cough    Lung sounds:  Wheezing in all fields    SP02:  94            "

## 2025-04-24 LAB
ANION GAP SERPL CALCULATED.3IONS-SCNC: 10 MMOL/L (ref 4–13)
B PARAP IS1001 DNA NPH QL NAA+NON-PROBE: NOT DETECTED
B PERT.PT PRMT NPH QL NAA+NON-PROBE: NOT DETECTED
B-OH-BUTYR SERPL-MCNC: 0.2 MMOL/L (ref 0.02–0.27)
BASE EX.OXY STD BLDV CALC-SCNC: 96.2 % (ref 60–80)
BASE EXCESS BLDV CALC-SCNC: -3 MMOL/L
BUN SERPL-MCNC: 39 MG/DL (ref 5–25)
C PNEUM DNA NPH QL NAA+NON-PROBE: NOT DETECTED
CALCIUM SERPL-MCNC: 8.6 MG/DL (ref 8.4–10.2)
CHLORIDE SERPL-SCNC: 102 MMOL/L (ref 96–108)
CO2 SERPL-SCNC: 22 MMOL/L (ref 21–32)
CREAT SERPL-MCNC: 0.95 MG/DL (ref 0.6–1.3)
ERYTHROCYTE [DISTWIDTH] IN BLOOD BY AUTOMATED COUNT: 14.5 % (ref 11.6–15.1)
FLUAV RNA NPH QL NAA+NON-PROBE: NOT DETECTED
FLUBV RNA NPH QL NAA+NON-PROBE: NOT DETECTED
GFR SERPL CREATININE-BSD FRML MDRD: 70 ML/MIN/1.73SQ M
GLUCOSE SERPL-MCNC: 319 MG/DL (ref 65–140)
GLUCOSE SERPL-MCNC: 320 MG/DL (ref 65–140)
GLUCOSE SERPL-MCNC: 326 MG/DL (ref 65–140)
GLUCOSE SERPL-MCNC: 349 MG/DL (ref 65–140)
GLUCOSE SERPL-MCNC: 357 MG/DL (ref 65–140)
GLUCOSE SERPL-MCNC: 358 MG/DL (ref 65–140)
GLUCOSE SERPL-MCNC: 390 MG/DL (ref 65–140)
GLUCOSE SERPL-MCNC: 395 MG/DL (ref 65–140)
GLUCOSE SERPL-MCNC: 470 MG/DL (ref 65–140)
HADV DNA NPH QL NAA+NON-PROBE: NOT DETECTED
HCO3 BLDV-SCNC: 20.2 MMOL/L (ref 24–30)
HCOV 229E RNA NPH QL NAA+NON-PROBE: NOT DETECTED
HCOV HKU1 RNA NPH QL NAA+NON-PROBE: NOT DETECTED
HCOV NL63 RNA NPH QL NAA+NON-PROBE: NOT DETECTED
HCOV OC43 RNA NPH QL NAA+NON-PROBE: NOT DETECTED
HCT VFR BLD AUTO: 33.5 % (ref 34.8–46.1)
HGB BLD-MCNC: 10.6 G/DL (ref 11.5–15.4)
HMPV RNA NPH QL NAA+NON-PROBE: NOT DETECTED
HPIV1 RNA NPH QL NAA+NON-PROBE: NOT DETECTED
HPIV2 RNA NPH QL NAA+NON-PROBE: NOT DETECTED
HPIV3 RNA NPH QL NAA+NON-PROBE: NOT DETECTED
HPIV4 RNA NPH QL NAA+NON-PROBE: NOT DETECTED
L PNEUMO1 AG UR QL IA.RAPID: NEGATIVE
M PNEUMO DNA NPH QL NAA+NON-PROBE: NOT DETECTED
MAGNESIUM SERPL-MCNC: 1.8 MG/DL (ref 1.9–2.7)
MCH RBC QN AUTO: 28.4 PG (ref 26.8–34.3)
MCHC RBC AUTO-ENTMCNC: 31.6 G/DL (ref 31.4–37.4)
MCV RBC AUTO: 90 FL (ref 82–98)
O2 CT BLDV-SCNC: 16.1 ML/DL
PCO2 BLDV: 30.3 MM HG (ref 42–50)
PH BLDV: 7.44 [PH] (ref 7.3–7.4)
PHOSPHATE SERPL-MCNC: 4 MG/DL (ref 2.7–4.5)
PLATELET # BLD AUTO: 304 THOUSANDS/UL (ref 149–390)
PMV BLD AUTO: 9.9 FL (ref 8.9–12.7)
PO2 BLDV: 134 MM HG (ref 35–45)
POTASSIUM SERPL-SCNC: 4.2 MMOL/L (ref 3.5–5.3)
PROCALCITONIN SERPL-MCNC: 0.11 NG/ML
RBC # BLD AUTO: 3.73 MILLION/UL (ref 3.81–5.12)
RSV RNA NPH QL NAA+NON-PROBE: NOT DETECTED
RV+EV RNA NPH QL NAA+NON-PROBE: NOT DETECTED
S PNEUM AG UR QL: NEGATIVE
SARS-COV-2 RNA NPH QL NAA+NON-PROBE: NOT DETECTED
SODIUM SERPL-SCNC: 134 MMOL/L (ref 135–147)
WBC # BLD AUTO: 9.93 THOUSAND/UL (ref 4.31–10.16)

## 2025-04-24 PROCEDURE — 94760 N-INVAS EAR/PLS OXIMETRY 1: CPT

## 2025-04-24 PROCEDURE — 80048 BASIC METABOLIC PNL TOTAL CA: CPT | Performed by: PHYSICIAN ASSISTANT

## 2025-04-24 PROCEDURE — 83735 ASSAY OF MAGNESIUM: CPT | Performed by: PHYSICIAN ASSISTANT

## 2025-04-24 PROCEDURE — 84100 ASSAY OF PHOSPHORUS: CPT | Performed by: PHYSICIAN ASSISTANT

## 2025-04-24 PROCEDURE — 85027 COMPLETE CBC AUTOMATED: CPT | Performed by: PHYSICIAN ASSISTANT

## 2025-04-24 PROCEDURE — 82948 REAGENT STRIP/BLOOD GLUCOSE: CPT

## 2025-04-24 PROCEDURE — 87449 NOS EACH ORGANISM AG IA: CPT | Performed by: PHYSICIAN ASSISTANT

## 2025-04-24 PROCEDURE — 99223 1ST HOSP IP/OBS HIGH 75: CPT | Performed by: INTERNAL MEDICINE

## 2025-04-24 PROCEDURE — 94640 AIRWAY INHALATION TREATMENT: CPT

## 2025-04-24 PROCEDURE — 99232 SBSQ HOSP IP/OBS MODERATE 35: CPT | Performed by: FAMILY MEDICINE

## 2025-04-24 PROCEDURE — 84145 PROCALCITONIN (PCT): CPT | Performed by: PHYSICIAN ASSISTANT

## 2025-04-24 PROCEDURE — 82010 KETONE BODYS QUAN: CPT | Performed by: PHYSICIAN ASSISTANT

## 2025-04-24 PROCEDURE — 0202U NFCT DS 22 TRGT SARS-COV-2: CPT

## 2025-04-24 PROCEDURE — 82805 BLOOD GASES W/O2 SATURATION: CPT | Performed by: PHYSICIAN ASSISTANT

## 2025-04-24 RX ORDER — SENNOSIDES 8.6 MG
1 TABLET ORAL
Status: DISCONTINUED | OUTPATIENT
Start: 2025-04-24 | End: 2025-04-26 | Stop reason: HOSPADM

## 2025-04-24 RX ORDER — INSULIN LISPRO 100 [IU]/ML
4-20 INJECTION, SOLUTION INTRAVENOUS; SUBCUTANEOUS
Status: DISCONTINUED | OUTPATIENT
Start: 2025-04-24 | End: 2025-04-26 | Stop reason: HOSPADM

## 2025-04-24 RX ORDER — INSULIN LISPRO 100 [IU]/ML
30 INJECTION, SOLUTION INTRAVENOUS; SUBCUTANEOUS
Status: DISCONTINUED | OUTPATIENT
Start: 2025-04-25 | End: 2025-04-26 | Stop reason: HOSPADM

## 2025-04-24 RX ORDER — PAROXETINE 20 MG/1
20 TABLET, FILM COATED ORAL
Status: DISCONTINUED | OUTPATIENT
Start: 2025-04-24 | End: 2025-04-26 | Stop reason: HOSPADM

## 2025-04-24 RX ORDER — ASCORBIC ACID 500 MG
500 TABLET ORAL 2 TIMES DAILY
Status: DISCONTINUED | OUTPATIENT
Start: 2025-04-24 | End: 2025-04-26 | Stop reason: HOSPADM

## 2025-04-24 RX ORDER — GUAIFENESIN 600 MG/1
600 TABLET, EXTENDED RELEASE ORAL 2 TIMES DAILY
Status: DISCONTINUED | OUTPATIENT
Start: 2025-04-24 | End: 2025-04-26 | Stop reason: HOSPADM

## 2025-04-24 RX ORDER — PANTOPRAZOLE SODIUM 40 MG/1
40 TABLET, DELAYED RELEASE ORAL
Status: DISCONTINUED | OUTPATIENT
Start: 2025-04-24 | End: 2025-04-26 | Stop reason: HOSPADM

## 2025-04-24 RX ORDER — LEVALBUTEROL INHALATION SOLUTION 1.25 MG/3ML
1.25 SOLUTION RESPIRATORY (INHALATION)
Status: DISCONTINUED | OUTPATIENT
Start: 2025-04-24 | End: 2025-04-24

## 2025-04-24 RX ORDER — INSULIN LISPRO 100 [IU]/ML
10 INJECTION, SOLUTION INTRAVENOUS; SUBCUTANEOUS ONCE
Status: COMPLETED | OUTPATIENT
Start: 2025-04-24 | End: 2025-04-24

## 2025-04-24 RX ORDER — INSULIN LISPRO 100 [IU]/ML
25 INJECTION, SOLUTION INTRAVENOUS; SUBCUTANEOUS
Status: DISCONTINUED | OUTPATIENT
Start: 2025-04-24 | End: 2025-04-24

## 2025-04-24 RX ORDER — IPRATROPIUM BROMIDE AND ALBUTEROL SULFATE 2.5; .5 MG/3ML; MG/3ML
3 SOLUTION RESPIRATORY (INHALATION) ONCE
Status: COMPLETED | OUTPATIENT
Start: 2025-04-24 | End: 2025-04-24

## 2025-04-24 RX ORDER — AZITHROMYCIN 500 MG/1
500 TABLET, FILM COATED ORAL EVERY 24 HOURS
Status: DISCONTINUED | OUTPATIENT
Start: 2025-04-24 | End: 2025-04-24

## 2025-04-24 RX ORDER — MONTELUKAST SODIUM 10 MG/1
10 TABLET ORAL
Status: DISCONTINUED | OUTPATIENT
Start: 2025-04-24 | End: 2025-04-26 | Stop reason: HOSPADM

## 2025-04-24 RX ORDER — INSULIN LISPRO 100 [IU]/ML
20 INJECTION, SOLUTION INTRAVENOUS; SUBCUTANEOUS ONCE
Status: COMPLETED | OUTPATIENT
Start: 2025-04-24 | End: 2025-04-24

## 2025-04-24 RX ORDER — MAGNESIUM HYDROXIDE/ALUMINUM HYDROXICE/SIMETHICONE 120; 1200; 1200 MG/30ML; MG/30ML; MG/30ML
30 SUSPENSION ORAL EVERY 6 HOURS PRN
Status: DISCONTINUED | OUTPATIENT
Start: 2025-04-24 | End: 2025-04-26 | Stop reason: HOSPADM

## 2025-04-24 RX ORDER — ATORVASTATIN CALCIUM 40 MG/1
80 TABLET, FILM COATED ORAL
Status: DISCONTINUED | OUTPATIENT
Start: 2025-04-24 | End: 2025-04-26 | Stop reason: HOSPADM

## 2025-04-24 RX ORDER — MAGNESIUM SULFATE HEPTAHYDRATE 40 MG/ML
2 INJECTION, SOLUTION INTRAVENOUS ONCE
Status: COMPLETED | OUTPATIENT
Start: 2025-04-24 | End: 2025-04-24

## 2025-04-24 RX ORDER — BUDESONIDE 0.5 MG/2ML
0.5 INHALANT ORAL
Status: DISCONTINUED | OUTPATIENT
Start: 2025-04-24 | End: 2025-04-25

## 2025-04-24 RX ORDER — CEFTRIAXONE 1 G/50ML
1000 INJECTION, SOLUTION INTRAVENOUS EVERY 24 HOURS
Status: DISCONTINUED | OUTPATIENT
Start: 2025-04-24 | End: 2025-04-24

## 2025-04-24 RX ORDER — INSULIN LISPRO 100 [IU]/ML
20 INJECTION, SOLUTION INTRAVENOUS; SUBCUTANEOUS
Status: DISCONTINUED | OUTPATIENT
Start: 2025-04-24 | End: 2025-04-24

## 2025-04-24 RX ORDER — ACETAMINOPHEN 325 MG/1
650 TABLET ORAL EVERY 6 HOURS PRN
Status: DISCONTINUED | OUTPATIENT
Start: 2025-04-24 | End: 2025-04-26 | Stop reason: HOSPADM

## 2025-04-24 RX ORDER — INSULIN LISPRO 100 [IU]/ML
2-12 INJECTION, SOLUTION INTRAVENOUS; SUBCUTANEOUS
Status: COMPLETED | OUTPATIENT
Start: 2025-04-25 | End: 2025-04-25

## 2025-04-24 RX ORDER — METHYLPREDNISOLONE SODIUM SUCCINATE 40 MG/ML
40 INJECTION, POWDER, LYOPHILIZED, FOR SOLUTION INTRAMUSCULAR; INTRAVENOUS EVERY 12 HOURS SCHEDULED
Status: DISCONTINUED | OUTPATIENT
Start: 2025-04-24 | End: 2025-04-26

## 2025-04-24 RX ORDER — INSULIN LISPRO 100 [IU]/ML
4-20 INJECTION, SOLUTION INTRAVENOUS; SUBCUTANEOUS
Status: DISCONTINUED | OUTPATIENT
Start: 2025-04-25 | End: 2025-04-26 | Stop reason: HOSPADM

## 2025-04-24 RX ORDER — METHYLPREDNISOLONE SODIUM SUCCINATE 40 MG/ML
40 INJECTION, POWDER, LYOPHILIZED, FOR SOLUTION INTRAMUSCULAR; INTRAVENOUS EVERY 8 HOURS
Status: DISCONTINUED | OUTPATIENT
Start: 2025-04-24 | End: 2025-04-24

## 2025-04-24 RX ORDER — ENOXAPARIN SODIUM 100 MG/ML
40 INJECTION SUBCUTANEOUS DAILY
Status: DISCONTINUED | OUTPATIENT
Start: 2025-04-24 | End: 2025-04-24 | Stop reason: SDUPTHER

## 2025-04-24 RX ORDER — ONDANSETRON 2 MG/ML
4 INJECTION INTRAMUSCULAR; INTRAVENOUS EVERY 6 HOURS PRN
Status: DISCONTINUED | OUTPATIENT
Start: 2025-04-24 | End: 2025-04-26 | Stop reason: HOSPADM

## 2025-04-24 RX ORDER — LEVALBUTEROL INHALATION SOLUTION 1.25 MG/3ML
1.25 SOLUTION RESPIRATORY (INHALATION)
Status: DISCONTINUED | OUTPATIENT
Start: 2025-04-24 | End: 2025-04-26 | Stop reason: HOSPADM

## 2025-04-24 RX ORDER — INSULIN LISPRO 100 [IU]/ML
1-5 INJECTION, SOLUTION INTRAVENOUS; SUBCUTANEOUS
Status: DISCONTINUED | OUTPATIENT
Start: 2025-04-24 | End: 2025-04-24

## 2025-04-24 RX ADMIN — APIXABAN 5 MG: 5 TABLET, FILM COATED ORAL at 01:16

## 2025-04-24 RX ADMIN — OXYCODONE HYDROCHLORIDE AND ACETAMINOPHEN 500 MG: 500 TABLET ORAL at 17:12

## 2025-04-24 RX ADMIN — OXYCODONE HYDROCHLORIDE AND ACETAMINOPHEN 500 MG: 500 TABLET ORAL at 08:01

## 2025-04-24 RX ADMIN — Medication 12.5 MG: at 21:03

## 2025-04-24 RX ADMIN — MONTELUKAST 10 MG: 10 TABLET, FILM COATED ORAL at 01:15

## 2025-04-24 RX ADMIN — INSULIN LISPRO 10 UNITS: 100 INJECTION, SOLUTION INTRAVENOUS; SUBCUTANEOUS at 09:44

## 2025-04-24 RX ADMIN — METHYLPREDNISOLONE SODIUM SUCCINATE 40 MG: 40 INJECTION, POWDER, FOR SOLUTION INTRAMUSCULAR; INTRAVENOUS at 08:00

## 2025-04-24 RX ADMIN — MAGNESIUM SULFATE HEPTAHYDRATE 2 G: 40 INJECTION, SOLUTION INTRAVENOUS at 07:56

## 2025-04-24 RX ADMIN — INSULIN GLARGINE 95 UNITS: 100 INJECTION, SOLUTION SUBCUTANEOUS at 08:00

## 2025-04-24 RX ADMIN — INSULIN LISPRO 25 UNITS: 100 INJECTION, SOLUTION INTRAVENOUS; SUBCUTANEOUS at 11:50

## 2025-04-24 RX ADMIN — INSULIN LISPRO 10 UNITS: 100 INJECTION, SOLUTION INTRAVENOUS; SUBCUTANEOUS at 04:29

## 2025-04-24 RX ADMIN — PAROXETINE HYDROCHLORIDE 20 MG: 20 TABLET, FILM COATED ORAL at 17:21

## 2025-04-24 RX ADMIN — INSULIN LISPRO 5 UNITS: 100 INJECTION, SOLUTION INTRAVENOUS; SUBCUTANEOUS at 17:12

## 2025-04-24 RX ADMIN — INSULIN LISPRO 10 UNITS: 100 INJECTION, SOLUTION INTRAVENOUS; SUBCUTANEOUS at 15:10

## 2025-04-24 RX ADMIN — INSULIN LISPRO 20 UNITS: 100 INJECTION, SOLUTION INTRAVENOUS; SUBCUTANEOUS at 21:08

## 2025-04-24 RX ADMIN — BUDESONIDE 0.5 MG: 0.5 INHALANT RESPIRATORY (INHALATION) at 19:29

## 2025-04-24 RX ADMIN — VITAM B12 100 MCG: 100 TAB at 08:01

## 2025-04-24 RX ADMIN — Medication 12.5 MG: at 01:16

## 2025-04-24 RX ADMIN — IPRATROPIUM BROMIDE 0.5 MG: 0.5 SOLUTION RESPIRATORY (INHALATION) at 19:29

## 2025-04-24 RX ADMIN — PAROXETINE HYDROCHLORIDE 20 MG: 20 TABLET, FILM COATED ORAL at 01:16

## 2025-04-24 RX ADMIN — IPRATROPIUM BROMIDE 0.5 MG: 0.5 SOLUTION RESPIRATORY (INHALATION) at 07:13

## 2025-04-24 RX ADMIN — APIXABAN 5 MG: 5 TABLET, FILM COATED ORAL at 08:16

## 2025-04-24 RX ADMIN — INSULIN LISPRO 5 UNITS: 100 INJECTION, SOLUTION INTRAVENOUS; SUBCUTANEOUS at 07:53

## 2025-04-24 RX ADMIN — PANTOPRAZOLE SODIUM 40 MG: 40 TABLET, DELAYED RELEASE ORAL at 05:15

## 2025-04-24 RX ADMIN — BUDESONIDE 0.5 MG: 0.5 INHALANT RESPIRATORY (INHALATION) at 07:13

## 2025-04-24 RX ADMIN — INSULIN LISPRO 20 UNITS: 100 INJECTION, SOLUTION INTRAVENOUS; SUBCUTANEOUS at 07:53

## 2025-04-24 RX ADMIN — ATORVASTATIN CALCIUM 80 MG: 40 TABLET, FILM COATED ORAL at 01:16

## 2025-04-24 RX ADMIN — GUAIFENESIN 600 MG: 600 TABLET ORAL at 17:12

## 2025-04-24 RX ADMIN — APIXABAN 5 MG: 5 TABLET, FILM COATED ORAL at 17:12

## 2025-04-24 RX ADMIN — IPRATROPIUM BROMIDE AND ALBUTEROL SULFATE 3 ML: 2.5; .5 SOLUTION RESPIRATORY (INHALATION) at 15:46

## 2025-04-24 RX ADMIN — ATORVASTATIN CALCIUM 80 MG: 40 TABLET, FILM COATED ORAL at 17:21

## 2025-04-24 RX ADMIN — INSULIN LISPRO 5 UNITS: 100 INJECTION, SOLUTION INTRAVENOUS; SUBCUTANEOUS at 11:49

## 2025-04-24 RX ADMIN — MONTELUKAST 10 MG: 10 TABLET, FILM COATED ORAL at 21:03

## 2025-04-24 RX ADMIN — INSULIN LISPRO 20 UNITS: 100 INJECTION, SOLUTION INTRAVENOUS; SUBCUTANEOUS at 02:25

## 2025-04-24 RX ADMIN — LEVALBUTEROL HYDROCHLORIDE 1.25 MG: 1.25 SOLUTION RESPIRATORY (INHALATION) at 07:13

## 2025-04-24 RX ADMIN — GUAIFENESIN 600 MG: 600 TABLET ORAL at 08:01

## 2025-04-24 RX ADMIN — INSULIN LISPRO 25 UNITS: 100 INJECTION, SOLUTION INTRAVENOUS; SUBCUTANEOUS at 17:13

## 2025-04-24 RX ADMIN — LEVALBUTEROL HYDROCHLORIDE 1.25 MG: 1.25 SOLUTION RESPIRATORY (INHALATION) at 19:29

## 2025-04-24 RX ADMIN — METHYLPREDNISOLONE SODIUM SUCCINATE 40 MG: 40 INJECTION, POWDER, FOR SOLUTION INTRAMUSCULAR; INTRAVENOUS at 21:04

## 2025-04-24 RX ADMIN — Medication 12.5 MG: at 11:53

## 2025-04-24 NOTE — UTILIZATION REVIEW
"Initial Clinical Review    Admission: Date/Time/Statement:   Admission Orders (From admission, onward)       Ordered        04/23/25 2135  INPATIENT ADMISSION  Once                          Orders Placed This Encounter   Procedures    INPATIENT ADMISSION     Standing Status:   Standing     Number of Occurrences:   1     Level of Care:   Med Surg [16]     Estimated length of stay:   More than 2 Midnights     Certification:   I certify that inpatient services are medically necessary for this patient for a duration of greater than two midnights. See H&P and MD Progress Notes for additional information about the patient's course of treatment.     ED Arrival Information       Expected   4/23/2025     Arrival   4/23/2025 17:22    Acuity   Emergent              Means of arrival   Walk-In    Escorted by   Family Member    Service   Hospitalist    Admission type   Emergency              Arrival complaint   Intermittent asthma with acute exacerbation, unspecified asthma severity             Chief Complaint   Patient presents with    Cold Like Symptoms     Pt reports sore throat and a cough for the past few days. Went to [facility] last week, and  today due to increased wheezing from her asthma. Had a duoneb treatment at        Initial Presentation: 49 y.o. female to ED via walk in from home.    Admitted to inpatient with Dx: Severe persistent asthma with acute exacerbation / CAP.  Presented to ED:   Patient states that over the weekend on Saturday she noted that she was having a sore throat and some chest congestion. Patient states by Monday she was felt ill enough to go to her family doctor. Patient was states that was \"just a cold.\" Was encouraged to continue doing her albuterol nebulizer 3 times daily. Patient reports that today she felt even worse. Patient states that she continued to be seen was told by urgent care. Patient reports that while urgent care she got DuoNeb and steroids. Patient states that she has been doing " albuterol treatments at home as directed with no improvement to her chest congestion. Mariel states that she was rechecked while at urgent care after receiving steroids and DuoNeb and was told that her pulse ox was too low to the emergency department. Patient denies any fevers or chills. Patient reports sore throat has since subsided.    PMHx:Asthma.  CHF.   Date: 04/23/2025   On exam: + for fever, + for SOB.   Imaging shows ChestX:  Right lower lobe infiltrate. ED treatment Labs.  Radiology.  ECG:  NSR.  Plan includes: Xopenex/Atrovent TID.  IV solumedrol q8h.  Consult Pulmonary.  Pulse Ox 88% RA.   O2 @ 2L via NC, wean as able.      Anticipated Length of Stay/Certification Statement: Patient will be admitted on an inpatient basis with an anticipated length of stay of greater than 2 midnights secondary to asthma exacerbation, pneumonia, acute respiratory insufficiency.     Day 2: 04/24/2025  Cardio-Pulmonary monitoring.   IV Abx-Zithromax.  IV solumedrol.  CV diet.  Activity as tolerated.  OOB to chair.  Daily weight.  I&O.  Oral Care.    Nebulization Therapy q12h.      04/24/2025  Consult Pulmonary:  Severe persistent asthma with acute exacerbation  Acute exacerbation secondary to suspected viral URI, possible RLL pneumonia  CXR not convincing of pneumonia, unremarkable imaging   Urinary antigens negative, serial procal negative, covid/flu negative, WBC WNL  Follow up cultures  Check RP2  Currently on azithromycin and ceftriaxone. Would recommend d/c  Continue atrovent/xopenex nebs TID, pulmicort nebs BID  Decrease IV solumedrol 40mg q12 today  Home regimen: Advair 250mcg one puff twice daily, singulair daily, albuterol HFA prn  Patient will need nebulizer machine and duonebs upon discharge. CM referral placed for nebulizer   Does previously have elevated eosinophils (530, 490, 450) could consider biologic as outpatient   She will need OP pulmonary follow up with PFTs       ED Treatment-Medication Administration  from 04/23/2025 1712 to 04/23/2025 2217         Date/Time Order Dose Route Action     04/23/2025 1945 sodium chloride 0.9 % bolus 500 mL 500 mL Intravenous New Bag     04/23/2025 1958 albuterol inhalation solution 5 mg 5 mg Nebulization Given     04/23/2025 1958 ipratropium (ATROVENT) 0.02 % inhalation solution 0.5 mg 0.5 mg Nebulization Given     04/23/2025 1948 magnesium sulfate 2 g/50 mL IVPB (premix) 2 g 2 g Intravenous New Bag     04/23/2025 1958 acetaminophen (TYLENOL) tablet 650 mg 650 mg Oral Given     04/23/2025 2031 cefTRIAXone (ROCEPHIN) IVPB (premix in dextrose) 2,000 mg 50 mL 2,000 mg Intravenous New Bag     04/23/2025 2110 azithromycin (ZITHROMAX) 500 mg in sodium chloride 0.9% 250mL IVPB 500 mg 500 mg Intravenous New Bag     04/23/2025 2046 albuterol inhalation solution 10 mg 10 mg Nebulization Given     04/23/2025 2046 ipratropium (ATROVENT) 0.02 % inhalation solution 1 mg 1 mg Nebulization Given     04/23/2025 2046 sodium chloride 0.9 % inhalation solution 12 mL 12 mL Nebulization Given            Scheduled Medications:  apixaban, 5 mg, Oral, BID  ascorbic acid, 500 mg, Oral, BID  atorvastatin, 80 mg, Oral, Daily With Dinner  azithromycin, 500 mg, Oral, Q24H  budesonide, 0.5 mg, Nebulization, Q12H  cefTRIAXone, 1,000 mg, Intravenous, Q24H  cyanocobalamin, 100 mcg, Oral, Daily  guaiFENesin, 600 mg, Oral, BID  insulin glargine, 95 Units, Subcutaneous, QAM  insulin lispro, 1-5 Units, Subcutaneous, HS  insulin lispro, 1-6 Units, Subcutaneous, TID AC  insulin lispro, 25 Units, Subcutaneous, TID With Meals  ipratropium, 0.5 mg, Nebulization, TID  levalbuterol, 1.25 mg, Nebulization, TID  methylPREDNISolone sodium succinate, 40 mg, Intravenous, Q12H KENNY  metoprolol tartrate, 12.5 mg, Oral, Q12H  montelukast, 10 mg, Oral, HS  pantoprazole, 40 mg, Oral, Early Morning  PARoxetine, 20 mg, Oral, Daily With Dinner      Continuous IV Infusions:     PRN Meds:  acetaminophen, 650 mg, Oral, Q6H  PRN  aluminum-magnesium hydroxide-simethicone, 30 mL, Oral, Q6H PRN  ondansetron, 4 mg, Intravenous, Q6H PRN  senna, 1 tablet, Oral, HS PRN      ED Triage Vitals   Temperature Pulse Respirations Blood Pressure SpO2 Pain Score   04/23/25 1734 04/23/25 1734 04/23/25 1734 04/23/25 1734 04/23/25 1734 04/23/25 1927   (!) 101.6 °F (38.7 °C) 97 18 (!) 176/77 (!) 88 % 4     Weight (last 2 days)       Date/Time Weight    04/24/25 0500 81.8 (180.4)    04/24/25 0014 78.9 (174)    04/23/25 2226 78.9 (174)            Vital Signs (last 3 days)       Date/Time Temp Pulse Resp BP MAP (mmHg) SpO2 Calculated FIO2 (%) - Nasal Cannula Nasal Cannula O2 Flow Rate (L/min) O2 Device Patient Position - Orthostatic VS Lyndon Coma Scale Score Pain    04/24/25 0753 -- -- -- -- -- 93 % -- -- -- -- -- --    04/24/25 07:24:07 96.8 °F (36 °C) 84 16 149/85 106 98 % -- -- -- -- -- --    04/24/25 0713 -- -- -- -- -- 97 % 28 2 L/min Nasal cannula -- -- --    04/24/25 0014 98.7 °F (37.1 °C) 104 16 100/51 -- -- -- -- -- -- -- --    04/23/25 2330 -- 107 -- -- -- 96 % 28 2 L/min Nasal cannula -- 15 No Pain    04/23/25 2238 -- -- -- -- -- 91 % 28 2 L/min Nasal cannula -- -- --    04/23/25 2226 -- -- -- -- -- -- -- -- -- -- -- No Pain    04/23/25 22:24:39 -- 106 -- 100/51 67 94 % -- -- -- -- -- --    04/23/25 2200 -- 105 16 149/65 93 100 % -- -- -- -- -- --    04/23/25 2115 99.1 °F (37.3 °C) 99 22 156/70 101 99 % -- -- None (Room air) Lying -- --    04/23/25 2046 -- -- -- -- -- 94 % -- -- None (Room air) -- -- --    04/23/25 1927 -- -- -- -- -- -- -- -- -- -- 15 4    04/23/25 1736 -- -- -- -- -- 91 % -- -- None (Room air) -- -- --    04/23/25 1734 101.6 °F (38.7 °C) 97 18 176/77 -- 88 % -- -- None (Room air) Sitting -- --        Pertinent Labs/Diagnostic Test Results:   Radiology:  XR chest pa & lateral   ED Interpretation by LELAND Woodard (04/23 1954)   Right lower lobe infiltrate      Final Interpretation by Steve Linares MD (04/24 0526)       No acute cardiopulmonary disease.            Workstation performed: [id number]           Cardiology:  ECG 12 lead    by Interface, Ris Results In (04/23 1943)        GI:  No orders to display     Results from last 7 days   Lab Units 04/24/25  0500 04/23/25 1943   WBC Thousand/uL 9.93 10.07   HEMOGLOBIN g/dL 10.6* 11.7   HEMATOCRIT % 33.5* 36.6   PLATELETS Thousands/uL 304 314   TOTAL NEUT ABS Thousands/µL  --  9.10*     Results from last 7 days   Lab Units 04/24/25  0500 04/23/25 1943   SODIUM mmol/L 134* 136   POTASSIUM mmol/L 4.2 3.9   CHLORIDE mmol/L 102 102   CO2 mmol/L 22 25   ANION GAP mmol/L 10 9   BUN mg/dL 39* 36*   CREATININE mg/dL 0.95 0.91   EGFR ml/min/1.73sq m 70 74   CALCIUM mg/dL 8.6 9.2   MAGNESIUM mg/dL 1.8*  --    PHOSPHORUS mg/dL 4.0  --      Results from last 7 days   Lab Units 04/23/25 1943   AST U/L 17   ALT U/L 18   ALK PHOS U/L 84   TOTAL PROTEIN g/dL 7.2   ALBUMIN g/dL 4.1   TOTAL BILIRUBIN mg/dL 0.30     Results from last 7 days   Lab Units 04/24/25  0722 04/24/25  0425 04/24/25  0211 04/23/25  2341 04/23/25  2225   POC GLUCOSE mg/dl 326* 357* 470* 342* 255*     Results from last 7 days   Lab Units 04/24/25  0500 04/23/25 1943   GLUCOSE RANDOM mg/dL 395* 84     Beta- Hydroxybutyrate   Date Value Ref Range Status   04/24/2025 0.20 0.02 - 0.27 mmol/L Final   09/22/2024 1.33 (H) 0.02 - 0.27 mmol/L Final     BETA-HYDROXYBUTYRATE   Date Value Ref Range Status   12/08/2023 0.1 <0.6 mmol/L Final   07/21/2023 0.1 <0.6 mmol/L Final      Results from last 7 days   Lab Units 04/24/25  0500   PH CATHI  7.442*   PCO2 CATHI mm Hg 30.3*   PO2 CATHI mm Hg 134.0*   HCO3 CATHI mmol/L 20.2*   BASE EXC CATHI mmol/L -3.0   O2 CONTENT CATHI ml/dL 16.1   O2 HGB, VENOUS % 96.2*     Results from last 7 days   Lab Units 04/23/25 2150 04/23/25 1943   HS TNI 0HR ng/L  --  13   HS TNI 2HR ng/L 10  --    HSTNI D2 ng/L -3  --      Results from last 7 days   Lab Units 04/23/25 1943   PROTIME seconds 15.1*   INR  1.14    PTT seconds 33     Results from last 7 days   Lab Units 04/24/25  0500 04/23/25 1943   PROCALCITONIN ng/ml 0.11 0.12     Results from last 7 days   Lab Units 04/23/25 1943   LACTIC ACID mmol/L 0.9     Results from last 7 days   Lab Units 04/23/25 1943   BNP pg/mL 77     Results from last 7 days   Lab Units 04/23/25 1945   CLARITY UA  Clear   COLOR UA  Yellow   SPEC GRAV UA  1.015   PH UA  6.0   GLUCOSE UA mg/dl Negative   KETONES UA mg/dl Negative   BLOOD UA  Small*   PROTEIN UA mg/dl 100 (2+)*   NITRITE UA  Negative   BILIRUBIN UA  Negative   UROBILINOGEN UA (BE) mg/dl <2.0   LEUKOCYTES UA  Negative   WBC UA /hpf 0-1   RBC UA /hpf 0-1   BACTERIA UA /hpf Occasional   EPITHELIAL CELLS WET PREP /hpf Occasional     Results from last 7 days   Lab Units 04/24/25  0113   STREP PNEUMONIAE ANTIGEN, URINE  Negative   LEGIONELLA URINARY ANTIGEN  Negative     Results from last 7 days   Lab Units 04/23/25 2007 04/23/25 1943   BLOOD CULTURE  Received in Microbiology Lab. Culture in Progress. Received in Microbiology Lab. Culture in Progress.     Past Medical History:   Diagnosis Date    Anemia 2017?    Arthritis     Asthma     w/acute exacerbation. Last assessed: 10/26/12    Chest pain 02/03/2016    CHF (congestive heart failure) ([facility]) 08/2020    Coronary artery disease 2021    Depression     Diabetes mellitus ([facility]) 10/1992    Diabetic nephropathy ([facility])     Diabetic nephropathy associated with type 1 diabetes mellitus ([facility]) 08/12/2020    Diabetic retinopathy ([facility]) 02/03/2016    Endometriosis     Gastroenteritis 02/03/2016    GERD (gastroesophageal reflux disease)     History of COVID-19 02/26/2021    Hyperlipidemia 02/03/2016    Hypertension 20+yrs.    Obesity     Oligomenorrhea     Polycystic ovaries 02/03/2016    Retinal hemorrhage 02/03/2016    Retinopathy     Thrombocytosis     Type 1 diabetes mellitus with ophthalmic manifestation ([facility]) 02/03/2016     Present on Admission:   Type 1 diabetes mellitus with  hyperglycemia (HCC)   Chronic diastolic congestive heart failure (HCC)   Severe persistent asthma with acute exacerbation   Hypertension   PAF (paroxysmal atrial fibrillation) ([facility])      Admitting Diagnosis: Cough [R05.9]  Pneumonia [J18.9]  Asthma [J45.909]  Fever [R50.9]  Asthma exacerbation [J45.901]  Age/Sex: 49 y.o. female    Network Utilization Review Department  ATTENTION: Please call with any questions or concerns to [phone] and carefully listen to the prompts so that you are directed to the right person. All voicemails are confidential.   For Discharge needs, contact Care Management DC Support Team at [phone] opt. [phone]  Send all requests for admission clinical reviews, approved or denied determinations and any other requests to dedicated fax number below belonging to the campus where the patient is receiving treatment. List of dedicated fax numbers for the Facilities:  FACILITY NAME UR FAX NUMBER   ADMISSION DENIALS (Administrative/Medical Necessity) [phone]   DISCHARGE SUPPORT TEAM (NETWORK) [phone]   PARENT CHILD HEALTH (Maternity/NICU/Pediatrics) [phone]   [facility] [phone]   [facility] [phone]   [facility] [phone]   [facility] [phone]   [facility] [phone]   [facility] [phone]   [facility] [phone]   [facility] [phone]   [facility] [phone]   [facility] [phone]   [facility] [phone]   [facility] [phone]

## 2025-04-24 NOTE — CONSULTS
Consultation - Pulmonology   Name: Vernell Darby 49 y.o. female I MRN: [id number]  Unit/Bed#: -01 I Date of Admission: 4/23/2025   Date of Service: 4/24/2025 I Hospital Day: 1   Inpatient consult to Pulmonology  Consult performed by: LELAND Del Real  Consult ordered by: Yamileth Naik PA-C        Physician Requesting Evaluation: Shilpi Marquez MD   Reason for Evaluation / Principal Problem: pneumonia, asthma exacerbation    Assessment & Plan  Severe persistent asthma with acute exacerbation  Acute exacerbation secondary to suspected viral URI, possible RLL pneumonia  CXR not convincing of pneumonia, unremarkable imaging   Urinary antigens negative, serial procal negative, covid/flu negative, WBC WNL  Follow up cultures  Check RP2  Currently on azithromycin and ceftriaxone. Would recommend d/c  Continue atrovent/xopenex nebs TID, pulmicort nebs BID  Decrease IV solumedrol 40mg q12 today  Home regimen: Advair 250mcg one puff twice daily, singulair daily, albuterol HFA prn  Patient will need nebulizer machine and duonebs upon discharge. CM referral placed for nebulizer   Does previously have elevated eosinophils (530, 490, 450) could consider biologic as outpatient   She will need OP pulmonary follow up with PFTs   Acute respiratory insufficiency  Secondary to asthma exacerbation and suspected acute URI/ possible pneumonia  Saturations in 80s on room air, requiring 2L upon admission. Currently on room air  Not on oxygen at baseline  Titrate oxygen to maintain sats >89%  Type 1 diabetes mellitus with hyperglycemia (HCC)  Lab Results   Component Value Date    HGBA1C 9.5 (H) 10/04/2024       Recent Labs     04/23/25  2341 04/24/25  0211 04/24/25  0425 04/24/25  0722   POCGLU 342* 470* 357* 326*       Blood Sugar Average: Last 72 hrs:  (P) 350  Closely monitor blood sugars while on high dose steroids  Hypertension    Chronic diastolic congestive heart failure (HCC)  Wt Readings from Last 3 Encounters:    04/24/25 81.8 kg (180 lb 6.4 oz)   04/21/25 81.6 kg (180 lb)   04/09/25 83.9 kg (185 lb)     Examines euvolemic, management per primary team  PAF (paroxysmal atrial fibrillation) (HCC)  Current loop recorder placed  On eliquis 5mg twice daily, metoprolol 12.5mg twice daily  CAP (community acquired pneumonia)  As above  Sepsis without acute organ dysfunction (HCC)  As above  Pulmonology service will follow.    History of Present Illness   Vernell Darby is a 49 y.o. female with  PMH of severe persistent asthma, DM 1, diastolic HF, PAF, and HTN who presents with worsening dyspnea. She states URI symptoms started 5 days ago including sore throat, fevers, chest congestion, and wheezing. Symptoms persistently worsened. She went to her PCP and was started on ceftin for discolored yellow sputum. Then went to urgent care, was found to have hypoxia and sent to ED. 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     She denies any chest pain. States since admission, her breathing feels significantly better today. She still notes wheezing and chest congestion, but is able to bring up yellow sputum and no longer requiring oxygen.     From a pulmonary standpoint, has never been established with a pulmonologist. Her home regimen is as listed above, which she does report compliance with. She states she typically only uses albuterol inhaler/nebulizer when she is sick, however her nebulizer machine is very old and not working well. She notes since transitioning to generic advair, she does not feel her symptoms have been as well managed.     Review of Systems   Constitutional:  Negative for chills and fever.   HENT:  Positive for sore throat. Negative for ear pain and trouble swallowing.    Respiratory:  Positive for cough, chest tightness and wheezing.    Cardiovascular:  Negative for chest pain and leg swelling.   Gastrointestinal:   Negative for nausea and vomiting.   Musculoskeletal:  Negative for arthralgias and back pain.   Neurological:  Negative for dizziness and syncope.       Historical Information   Medical History Review: I have reviewed the patient's PMH, PSH, Social History, Family History, Meds, and Allergies   Historical Information   Past Medical History:   Diagnosis Date    Anemia 2017?    Arthritis     Asthma     w/acute exacerbation. Last assessed: 10/26/12    Chest pain 02/03/2016    CHF (congestive heart failure) ([facility]) 08/2020    Coronary artery disease 2021    Depression     Diabetes mellitus ([facility]) 10/1992    Diabetic nephropathy ([facility])     Diabetic nephropathy associated with type 1 diabetes mellitus ([facility]) 08/12/2020    Diabetic retinopathy ([facility]) 02/03/2016    Endometriosis     Gastroenteritis 02/03/2016    GERD (gastroesophageal reflux disease)     History of COVID-19 02/26/2021    Hyperlipidemia 02/03/2016    Hypertension 20+yrs.    Obesity     Oligomenorrhea     Polycystic ovaries 02/03/2016    Retinal hemorrhage 02/03/2016    Retinopathy     Thrombocytosis     Type 1 diabetes mellitus with ophthalmic manifestation ([facility]) 02/03/2016     Past Surgical History:   Procedure Laterality Date    CARDIAC ELECTROPHYSIOLOGY PROCEDURE N/A 2/26/2025    Procedure: Cardiac loop recorder implant;  Surgeon: Capo Saab MD;  Location: [facility];  Service: Cardiology    CATARACT EXTRACTION Left 10/2020    CATARACT EXTRACTION Right     RETINAL LASER PROCEDURE       Social History     Tobacco Use    Smoking status: Never    Smokeless tobacco: Never    Tobacco comments:     Per [employer]: Former smoker. Quit in 1994   Vaping Use    Vaping status: Never Used   Substance and Sexual Activity    Alcohol use: Yes     Alcohol/week: 2.0 standard drinks of alcohol     Types: 1 Glasses of wine, 1 Standard drinks or equivalent per week     Comment: Occasionally. Several weeks can go by with no alcohol.    Drug use: No    Sexual activity:  Yes     Partners: Male     Birth control/protection: None     E-Cigarette/Vaping    E-Cigarette Use Never User      E-Cigarette/Vaping Substances    Nicotine No     THC No     CBD No     Flavoring No      Family History   Problem Relation Age of Onset    Heart murmur Mother     Diabetes Mother         Type 2    Thyroid disease Mother         Disorder    Diabetes type II Mother     Mental illness Mother         Chemical imbalance    Diabetes Father         Type 2    Hypertension Father     Diabetes type II Father     No Known Problems Maternal Grandmother     Diabetes Maternal Grandfather         Mellitus    Breast cancer Paternal Grandmother 70 - 79    Leukemia Paternal Grandfather 48    Cancer Paternal Grandfather         Leaukemia    Diabetes Maternal Aunt         Type 2    Diabetes Maternal Uncle         Mellitus    Diabetes Paternal Uncle         Type 2    Substance Abuse Neg Hx     Alcohol abuse Neg Hx      Social History     Tobacco Use    Smoking status: Never    Smokeless tobacco: Never    Tobacco comments:     Per [employer]: Former smoker. Quit in 1994   Vaping Use    Vaping status: Never Used   Substance and Sexual Activity    Alcohol use: Yes     Alcohol/week: 2.0 standard drinks of alcohol     Types: 1 Glasses of wine, 1 Standard drinks or equivalent per week     Comment: Occasionally. Several weeks can go by with no alcohol.    Drug use: No    Sexual activity: Yes     Partners: Male     Birth control/protection: None       Current Facility-Administered Medications:     acetaminophen (TYLENOL) tablet 650 mg, Q6H PRN    aluminum-magnesium hydroxide-simethicone (MAALOX) oral suspension 30 mL, Q6H PRN    apixaban (ELIQUIS) tablet 5 mg, BID    ascorbic acid (VITAMIN C) tablet 500 mg, BID    atorvastatin (LIPITOR) tablet 80 mg, Daily With Dinner    azithromycin (ZITHROMAX) tablet 500 mg, Q24H    budesonide (PULMICORT) inhalation solution 0.5 mg, Q12H    cefTRIAXone (ROCEPHIN) IVPB (premix in dextrose) 1,000  mg 50 mL, Q24H    cyanocobalamin (VITAMIN B-12) tablet 100 mcg, Daily    guaiFENesin (MUCINEX) 12 hr tablet 600 mg, BID    insulin glargine (LANTUS) subcutaneous injection 95 Units 0.95 mL, QAM    insulin lispro (HumALOG/ADMELOG) 100 units/mL subcutaneous injection 1-5 Units, HS    insulin lispro (HumALOG/ADMELOG) 100 units/mL subcutaneous injection 1-6 Units, TID AC **AND** Fingerstick Glucose (POCT), TID AC    insulin lispro (HumALOG/ADMELOG) 100 units/mL subcutaneous injection 20 Units, TID With Meals    ipratropium (ATROVENT) 0.02 % inhalation solution 0.5 mg, TID    levalbuterol (XOPENEX) inhalation solution 1.25 mg, TID    magnesium sulfate 2 g/50 mL IVPB (premix) 2 g, Once, Last Rate: 2 g (04/24/25 0756)    methylPREDNISolone sodium succinate (Solu-MEDROL) injection 40 mg, Q8H    metoprolol tartrate (LOPRESSOR) partial tablet 12.5 mg, Q12H    montelukast (SINGULAIR) tablet 10 mg, HS    ondansetron (ZOFRAN) injection 4 mg, Q6H PRN    pantoprazole (PROTONIX) EC tablet 40 mg, Early Morning    PARoxetine (PAXIL) tablet 20 mg, Daily With Dinner    senna (SENOKOT) tablet 8.6 mg, HS PRN  Patient has no known allergies.    Objective :  Temp:  [96.8 °F (36 °C)-101.6 °F (38.7 °C)] 96.8 °F (36 °C)  HR:  [] 84  BP: (100-176)/(51-85) 149/85  Resp:  [16-22] 16  SpO2:  [88 %-100 %] 93 %  O2 Device: Nasal cannula  Nasal Cannula O2 Flow Rate (L/min):  [2 L/min] 2 L/min    Physical Exam  Vitals and nursing note reviewed.   Constitutional:       General: She is not in acute distress.     Appearance: She is well-developed. She is obese.   HENT:      Head: Normocephalic and atraumatic.      Nose: Nose normal.   Eyes:      Conjunctiva/sclera: Conjunctivae normal.   Cardiovascular:      Rate and Rhythm: Normal rate.   Pulmonary:      Effort: Pulmonary effort is normal. No respiratory distress.      Breath sounds: Stridor: bilateral mid and lower lung fields. Wheezing present.   Abdominal:      Palpations: Abdomen is soft.    Musculoskeletal:         General: No swelling. Normal range of motion.      Cervical back: Normal range of motion and neck supple.   Skin:     General: Skin is warm and dry.      Capillary Refill: Capillary refill takes less than 2 seconds.   Neurological:      General: No focal deficit present.      Mental Status: She is alert and oriented to person, place, and time.   Psychiatric:         Mood and Affect: Mood normal.         Behavior: Behavior normal.           Lab Results: I have reviewed the following results:  .     04/23/25  1943 04/23/25  2150 04/24/25  0500   WBC 10.07  --  9.93   HGB 11.7  --  10.6*   HCT 36.6  --  33.5*     --  304   SODIUM 136  --  134*   K 3.9  --  4.2     --  102   CO2 25  --  22   BUN 36*  --  39*   CREATININE 0.91  --  0.95   GLUC 84  --  395*   MG  --   --  1.8*   PHOS  --   --  4.0   AST 17  --   --    ALT 18  --   --    ALB 4.1  --   --    TBILI 0.30  --   --    ALKPHOS 84  --   --    PTT 33  --   --    INR 1.14  --   --    HSTNI0 13  --   --    HSTNI2  --  10  --    BNP 77  --   --    LACTICACID 0.9  --   --      ABG: No new results in last 24 hours.    Imaging Results Review: I reviewed radiology reports from this admission including: chest xray.  Other Study Results Review: No additional pertinent studies reviewed.  PFT Results Reviewed: N/A      Vanessa Hart, MSN RN FNP-BC  Nurse Practitioner  [facility]

## 2025-04-24 NOTE — ASSESSMENT & PLAN NOTE
Wt Readings from Last 3 Encounters:   04/24/25 78.9 kg (174 lb)   04/21/25 81.6 kg (180 lb)   04/09/25 83.9 kg (185 lb)   Last echo 10/2024: LVEF 60%.  G1 DD.  Only utilizes diuretic for weight gain and swelling as needed  Will allow for liberalized fluid and sodium restriction  Trend daily weights

## 2025-04-24 NOTE — UTILIZATION REVIEW
NOTIFICATION OF INPATIENT ADMISSION   AUTHORIZATION REQUEST   SERVICING FACILITY:   [place] [address]  Tax ID: [id number]  NPI: [id number] ATTENDING PROVIDER:  Attending Name and NPI#: Shilpi Marquez Md [[id number]]  Address: [address] [place] [address]  Phone: [phone]   ADMISSION INFORMATION:  Place of Service: Inpatient [facility]  Place of Service Code: 21  Inpatient Admission Date/Time: 4/23/25  9:35 PM  Discharge Date/Time: No discharge date for patient encounter.  Admitting Diagnosis Code/Description:  Cough [R05.9]  Pneumonia [J18.9]  Asthma [J45.909]  Fever [R50.9]  Asthma exacerbation [J45.901]     UTILIZATION REVIEW CONTACT:  Mariah Martínez, Utilization   Network Utilization Review Department  Phone: [phone]  Fax: [phone]  Email: [email or web address]  Contact for approvals/pending authorizations, clinical reviews, and discharge.     PHYSICIAN ADVISORY SERVICES:  Medical Necessity Denial & Ahjw-gn-Ergw Review  Phone: [phone]  Fax: [phone]  Email: [email or web address]     DISCHARGE SUPPORT TEAM:  For Patients Discharge Needs & Updates  Phone: [phone] opt. 2 Fax: [phone]  Email: [email or web address]

## 2025-04-24 NOTE — ASSESSMENT & PLAN NOTE
Wt Readings from Last 3 Encounters:   04/24/25 81.8 kg (180 lb 6.4 oz)   04/21/25 81.6 kg (180 lb)   04/09/25 83.9 kg (185 lb)   Last echo 10/2024: LVEF 60%.  G1 DD.  Only utilizes diuretic for weight gain and swelling as needed  Will allow for liberalized fluid and sodium restriction  Trend daily weights

## 2025-04-24 NOTE — ASSESSMENT & PLAN NOTE
Developed URI-like symptoms 4 days ago with progressively worsening dyspnea  CXR with concern for RLL pneumonia  Continue ceftriaxone and azithromycin  Urine strep and Legionella studies  Sputum culture and Gram stain  Incentive spirometer  Mucinex twice daily  Respiratory protocol  Pulmonology consulted due to concurrent asthma exacerbation and oxygen requirement

## 2025-04-24 NOTE — ASSESSMENT & PLAN NOTE
Acute exacerbation secondary to suspected viral URI, possible RLL pneumonia  CXR not convincing of pneumonia, unremarkable imaging   Urinary antigens negative, serial procal negative, covid/flu negative, WBC WNL  Follow up cultures  Check RP2  Currently on azithromycin and ceftriaxone. Would recommend d/c  Continue atrovent/xopenex nebs TID, pulmicort nebs BID  Decrease IV solumedrol 40mg q12 today  Home regimen: Advair 250mcg one puff twice daily, singulair daily, albuterol HFA prn  Patient will need nebulizer machine and duonebs upon discharge. CM referral placed for nebulizer   Does previously have elevated eosinophils (530, 490, 450) could consider biologic as outpatient   She will need OP pulmonary follow up with PFTs

## 2025-04-24 NOTE — ASSESSMENT & PLAN NOTE
SIRS criteria: Tachycardia, fever, and tachypnea  Suspect source is pneumonia as seen on CXR  No signs of endorgan dysfunction  Continue ceftriaxone and azithromycin  Follow-up blood cultures and sputum culture

## 2025-04-24 NOTE — RESPIRATORY THERAPY NOTE
RT Protocol Note  Vernell Darby 49 y.o. female MRN: [id number]  Unit/Bed#: -01 Encounter: [id number]    Assessment    Principal Problem:    Severe persistent asthma with acute exacerbation  Active Problems:    Type 1 diabetes mellitus with hyperglycemia ([facility])    Hypertension    Chronic diastolic congestive heart failure (HCC)    PAF (paroxysmal atrial fibrillation) ([facility])    Acute respiratory insufficiency    CAP (community acquired pneumonia)    Sepsis without acute organ dysfunction ([facility])      Home Pulmonary Medications:  Albuterol, Advair       Past Medical History:   Diagnosis Date    Anemia 2017?    Arthritis     Asthma     w/acute exacerbation. Last assessed: 10/26/12    Chest pain 02/03/2016    CHF (congestive heart failure) ([facility]) 08/2020    Coronary artery disease 2021    Depression     Diabetes mellitus ([facility]) 10/1992    Diabetic nephropathy ([facility])     Diabetic nephropathy associated with type 1 diabetes mellitus ([facility]) 08/12/2020    Diabetic retinopathy ([facility]) 02/03/2016    Endometriosis     Gastroenteritis 02/03/2016    GERD (gastroesophageal reflux disease)     History of COVID-19 02/26/2021    Hyperlipidemia 02/03/2016    Hypertension 20+yrs.    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 Quit in 1994   Vaping Use    Vaping status: Never Used   Substance and Sexual Activity    Alcohol use: Yes     Alcohol/week: 2.0 standard drinks of alcohol     Types: 1 Glasses of  wine, 1 Standard drinks or equivalent per week     Comment: Occasionally. Several weeks can go by with no alcohol.    Drug use: No    Sexual activity: Yes     Partners: Male     Birth control/protection: None   Other Topics Concern    None   Social History Narrative    Always uses seatbelt    Caffeine use: 1-2 cups coffee daily    Has smoke detectors    Judaism Affiliations: [employer]        Currently unemployed; was previously doing home care; continuing job search     Social Drivers of Health     Financial Resource Strain: Not on file   Food Insecurity: No Food Insecurity (2025)    Nursing - Inadequate Food Risk Classification     Worried About Running Out of Food in the Last Year: Never true     Ran Out of Food in the Last Year: Never true     Ran Out of Food in the Last Year: Never true   Transportation Needs: No Transportation Needs (2025)    Nursing - Transportation Risk Classification     Lack of Transportation: Not on file     Lack of Transportation: No   Physical Activity: Not on file   Stress: Not on file   Social Connections: Not on file   Intimate Partner Violence: Unknown (2025)    Nursing IPS     Feels Physically and Emotionally Safe: Not on file     Physically Hurt by Someone: Not on file     Humiliated or Emotionally Abused by Someone: Not on file     Physically Hurt by Someone: No     Hurt or Threatened by Someone: No   Housing Stability: Unknown (2025)    Nursing: Inadequate Housing Risk Classification     Has Housing: Not on file     Worried About Losing Housing: Not on file     Unable to Get Utilities: Not on file     Unable to Pay for Housing in the Last Year: No     Has Housin       Subjective         Objective    Physical Exam:   Assessment Type: During-treatment  General Appearance: Alert, Awake  Respiratory Pattern: Normal  Chest Assessment: Chest expansion symmetrical  Bilateral Breath Sounds: Expiratory wheezes    Vitals:  Blood pressure 100/51, pulse 104,  "temperature 98.7 °F (37.1 °C), temperature source Temporal, resp. rate 16, height 4' 8\" (1.422 m), weight 78.9 kg (174 lb), last menstrual period 05/03/2023, SpO2 91%, not currently breastfeeding.          Imaging and other studies:           Plan    Respiratory Plan: Mild Distress pathway. Given 1x Heart Neb in ED, on 2LNC, currently ordered TID Xopenex/Atrovent and BID Pulmicort.             "

## 2025-04-24 NOTE — ASSESSMENT & PLAN NOTE
SpO2 on room air was in the 80s  Requiring 2 L nasal cannula on admission  Likely secondary to number and asthma exacerbation  Wean oxygen as tolerated

## 2025-04-24 NOTE — ASSESSMENT & PLAN NOTE
Secondary to asthma exacerbation and suspected acute URI/ possible pneumonia  Saturations in 80s on room air, requiring 2L upon admission. Currently on room air  Not on oxygen at baseline  Titrate oxygen to maintain sats >89%

## 2025-04-24 NOTE — ASSESSMENT & PLAN NOTE
Resolved  SIRS criteria: Tachycardia, fever, and tachypnea  Suspect source is pneumonia as suspected on CXR, but official read was negative for acute pathology  No signs of endorgan dysfunction  Initially on ceftriaxone and azithromycin  blood cultures and sputum culture pending

## 2025-04-24 NOTE — ASSESSMENT & PLAN NOTE
Lab Results   Component Value Date    HGBA1C 9.5 (H) 10/04/2024       Recent Labs     04/23/25  2341 04/24/25  0211 04/24/25  0425 04/24/25  0722   POCGLU 342* 470* 357* 326*       Blood Sugar Average: Last 72 hrs:  (P) 350  Closely monitor blood sugars while on high dose steroids

## 2025-04-24 NOTE — ASSESSMENT & PLAN NOTE
Lab Results   Component Value Date    HGBA1C 9.5 (H) 10/04/2024       Recent Labs     04/24/25  0722 04/24/25  1131 04/24/25  1420 04/24/25  1637   POCGLU 326* 320* 358* 349*       Blood Sugar Average: Last 72 hrs:  (P) 347.125  Home regimen: Metformin 500 Mg twice daily, Lantus 95 units daily, and insulin aspart 1 unit per 1 carb ratio  Hold metformin and continue home Lantus-placed on Humalog 20 units 3 times daily with meals with the addition of SSI  Glucose continue to be elevated in 300s, Humalog changed to 25 units 3 times daily, ISS continued Lantus 95 units; additional dose 10 units given this morning  Discussed with the patient possibility of using insulin drip, she refuses insulin drip at that time and wants to continue with subcutaneous for now; she has Dexcom and monitors her glucose closely

## 2025-04-24 NOTE — ASSESSMENT & PLAN NOTE
First developed URI-like symptoms 4 days ago with progression of dyspnea and wheezing  Patient suspected to have RLL pneumonia likely causing asthma exacerbation as well  Will place on Xopenex/Atrovent 3 times daily  Pulmicort twice daily  Initiated Solu-Medrol 40 Mg every 8 hours  Hold home inhaler while on scheduled nebulizers  Pulmonology consulted due to concurrent pneumonia

## 2025-04-24 NOTE — ASSESSMENT & PLAN NOTE
Wt Readings from Last 3 Encounters:   04/24/25 81.8 kg (180 lb 6.4 oz)   04/21/25 81.6 kg (180 lb)   04/09/25 83.9 kg (185 lb)     Examines euvolemic, management per primary team

## 2025-04-24 NOTE — ASSESSMENT & PLAN NOTE
Had episode of A-fib when she was hospitalized for SABRINA in the past  Currently has loop recorder to determine need for ongoing anticoagulation  Continue home metoprolol 12.5 Mg twice daily and Eliquis 5 Mg twice daily

## 2025-04-24 NOTE — PROGRESS NOTES
Progress Note - Hospitalist   Name: Vernell Darby 49 y.o. female I MRN: [id number]  Unit/Bed#: -01 I Date of Admission: 4/23/2025   Date of Service: 4/24/2025 I Hospital Day: 1    Assessment & Plan  Severe persistent asthma with acute exacerbation  First developed URI-like symptoms 4 days ago with progression of dyspnea and wheezing  Patient suspected to have RLL pneumonia likely causing asthma exacerbation as well  Will place on Xopenex/Atrovent 3 times daily  Pulmicort twice daily  Initiated Solu-Medrol 40 Mg every 8 hours  Hold home inhaler while on scheduled nebulizers  Pulmonology consulted due to concurrent pneumonia  Acute respiratory insufficiency  SpO2 on room air was in the 80s  Requiring 2 L nasal cannula on admission, but now weaned down to r/a  Likely secondary to pneumonia and asthma exacerbation  Wean oxygen as tolerated  CAP (community acquired pneumonia)  Ruled out  Developed URI-like symptoms 4 days ago with progressively worsening dyspnea  CXR with concern for RLL pneumonia on wet read, but official report shows no acute cardiopulmonary disease  Initiated ceftriaxone and azithromycin  Urine strep and Legionella normal  Sputum culture and Gram stain pending  Procalcitonin normal  Incentive spirometer  Mucinex twice daily  Respiratory protocol  Pulmonology consulted due to concurrent asthma exacerbation and oxygen requirement  Will discontinue antibiotics  Sepsis without acute organ dysfunction (HCC)  Resolved  SIRS criteria: Tachycardia, fever, and tachypnea  Suspect source is pneumonia as suspected on CXR, but official read was negative for acute pathology  No signs of endorgan dysfunction  Initially on ceftriaxone and azithromycin  blood cultures and sputum culture pending  Type 1 diabetes mellitus with hyperglycemia (HCC)  Lab Results   Component Value Date    HGBA1C 9.5 (H) 10/04/2024       Recent Labs     04/24/25  0722 04/24/25  1131 04/24/25  1420 04/24/25  1637   POCGLU 326* 320* 358*  349*       Blood Sugar Average: Last 72 hrs:  (P) 347.125  Home regimen: Metformin 500 Mg twice daily, Lantus 95 units daily, and insulin aspart 1 unit per 1 carb ratio  Hold metformin and continue home Lantus-placed on Humalog 20 units 3 times daily with meals with the addition of SSI  Glucose continue to be elevated in 300s, Humalog changed to 25 units 3 times daily, ISS continued Lantus 95 units; additional dose 10 units given this morning  Discussed with the patient possibility of using insulin drip, she refuses insulin drip at that time and wants to continue with subcutaneous for now; she has Dexcom and monitors her glucose closely  PAF (paroxysmal atrial fibrillation) (Formerly [facility])  Had episode of A-fib when she was hospitalized for SABRINA in the past  Currently has loop recorder to determine need for ongoing anticoagulation  Continue home metoprolol 12.5 Mg twice daily and Eliquis 5 Mg twice daily   Chronic diastolic congestive heart failure (Formerly [facility])  Wt Readings from Last 3 Encounters:   04/24/25 81.8 kg (180 lb 6.4 oz)   04/21/25 81.6 kg (180 lb)   04/09/25 83.9 kg (185 lb)   Last echo 10/2024: LVEF 60%.  G1 DD.  Only utilizes diuretic for weight gain and swelling as needed  Will allow for liberalized fluid and sodium restriction  Trend daily weights  Hypertension  Continue home metoprolol tartrate 12 and half mg twice daily  Monitor blood pressure per unit protocol  Morbid obesity with body mass index (BMI) of 40.0 to 44.9 in adult (Formerly [facility])  Continue to encourage lifestyle and diet modification    VTE Pharmacologic Prophylaxis: VTE Score: 6 High Risk (Score >/= 5) - Pharmacological DVT Prophylaxis Ordered: apixaban (Eliquis). Sequential Compression Devices Ordered.    Mobility:   Basic Mobility Inpatient Raw Score: 24  JH-HLM Goal: 8: Walk 250 feet or more  JH-HLM Achieved: 8: Walk 250 feet ot more  JH-HLM Goal achieved. Continue to encourage appropriate mobility.    Patient Centered Rounds: I performed bedside rounds with  nursing staff today.   Discussions with Specialists or Other Care Team Provider: case management    Education and Discussions with Family / Patient: discussed with family    Current Length of Stay: 1 day(s)  Current Patient Status: Inpatient   Certification Statement: The patient will continue to require additional inpatient hospital stay due to  asthma exacerbation  Discharge Plan: Anticipate discharge in 48-72 hrs to home.    Code Status: Level 1 - Full Code    Subjective   Patient seen and examined ;she reports her breathing is little bit better today compared to presentation    Objective :  Temp:  [97.9 °F (36.6 °C)-99.1 °F (37.3 °C)] 97.9 °F (36.6 °C)  HR:  [] 77  BP: (100-156)/(51-85) 127/67  Resp:  [16-22] 18  SpO2:  [91 %-100 %] 95 %  O2 Device: None (Room air)  Nasal Cannula O2 Flow Rate (L/min):  [2 L/min] 2 L/min    Body mass index is 40.44 kg/m².     Input and Output Summary (last 24 hours):     Intake/Output Summary (Last 24 hours) at 4/24/2025 1904  Last data filed at 4/24/2025 1800  Gross per 24 hour   Intake 1440 ml   Output 1350 ml   Net 90 ml       Physical Exam  Vitals and nursing note reviewed.   Constitutional:       General: She is not in acute distress.     Appearance: Normal appearance. She is not ill-appearing.   HENT:      Head: Normocephalic.      Nose: Nose normal.   Cardiovascular:      Rate and Rhythm: Normal rate and regular rhythm.      Pulses: Normal pulses.      Heart sounds: No murmur heard.  Pulmonary:      Effort: Pulmonary effort is normal.      Breath sounds: Wheezing present.   Abdominal:      General: Abdomen is flat.      Palpations: Abdomen is soft.      Tenderness: There is no abdominal tenderness. There is no guarding or rebound.   Musculoskeletal:         General: Normal range of motion.      Cervical back: Normal range of motion.      Right lower leg: No edema.      Left lower leg: No edema.   Skin:     General: Skin is warm and dry.      Coloration: Skin is not  pale.   Neurological:      General: No focal deficit present.      Mental Status: She is alert and oriented to person, place, and time.           Lines/Drains:              Lab Results: I have reviewed the following results:   Results from last 7 days   Lab Units 04/24/25  0500 04/23/25 1943   WBC Thousand/uL 9.93 10.07   HEMOGLOBIN g/dL 10.6* 11.7   HEMATOCRIT % 33.5* 36.6   PLATELETS Thousands/uL 304 314   SEGS PCT %  --  89*   LYMPHO PCT %  --  6*   MONO PCT %  --  3*   EOS PCT %  --  0     Results from last 7 days   Lab Units 04/24/25  0500 04/23/25  1943   SODIUM mmol/L 134* 136   POTASSIUM mmol/L 4.2 3.9   CHLORIDE mmol/L 102 102   CO2 mmol/L 22 25   BUN mg/dL 39* 36*   CREATININE mg/dL 0.95 0.91   ANION GAP mmol/L 10 9   CALCIUM mg/dL 8.6 9.2   ALBUMIN g/dL  --  4.1   TOTAL BILIRUBIN mg/dL  --  0.30   ALK PHOS U/L  --  84   ALT U/L  --  18   AST U/L  --  17   GLUCOSE RANDOM mg/dL 395* 84     Results from last 7 days   Lab Units 04/23/25  1943   INR  1.14     Results from last 7 days   Lab Units 04/24/25  1637 04/24/25  1420 04/24/25  1131 04/24/25  0722 04/24/25  0425 04/24/25  0211 04/23/25  2341 04/23/25  2225   POC GLUCOSE mg/dl 349* 358* 320* 326* 357* 470* 342* 255*         Results from last 7 days   Lab Units 04/24/25  0500 04/23/25 1943   LACTIC ACID mmol/L  --  0.9   PROCALCITONIN ng/ml 0.11 0.12       Recent Cultures (last 7 days):   Results from last 7 days   Lab Units 04/24/25  0113 04/23/25  2007 04/23/25 1943   BLOOD CULTURE   --  Received in Microbiology Lab. Culture in Progress. Received in Microbiology Lab. Culture in Progress.   LEGIONELLA URINARY ANTIGEN  Negative  --   --        Imaging Results Review: I reviewed radiology reports from this admission including: chest xray.      Last 24 Hours Medication List:     Current Facility-Administered Medications:     acetaminophen (TYLENOL) tablet 650 mg, Q6H PRN    aluminum-magnesium hydroxide-simethicone (MAALOX) oral suspension 30 mL, Q6H  PRN    apixaban (ELIQUIS) tablet 5 mg, BID    ascorbic acid (VITAMIN C) tablet 500 mg, BID    atorvastatin (LIPITOR) tablet 80 mg, Daily With Dinner    azithromycin (ZITHROMAX) tablet 500 mg, Q24H    budesonide (PULMICORT) inhalation solution 0.5 mg, Q12H    cefTRIAXone (ROCEPHIN) IVPB (premix in dextrose) 1,000 mg 50 mL, Q24H    cyanocobalamin (VITAMIN B-12) tablet 100 mcg, Daily    guaiFENesin (MUCINEX) 12 hr tablet 600 mg, BID    insulin glargine (LANTUS) subcutaneous injection 95 Units 0.95 mL, QAM    insulin lispro (HumALOG/ADMELOG) 100 units/mL subcutaneous injection 1-5 Units, HS    insulin lispro (HumALOG/ADMELOG) 100 units/mL subcutaneous injection 1-6 Units, TID AC **AND** Fingerstick Glucose (POCT), TID AC    insulin lispro (HumALOG/ADMELOG) 100 units/mL subcutaneous injection 25 Units, TID With Meals    ipratropium (ATROVENT) 0.02 % inhalation solution 0.5 mg, Q12H    levalbuterol (XOPENEX) inhalation solution 1.25 mg, Q12H    methylPREDNISolone sodium succinate (Solu-MEDROL) injection 40 mg, Q12H KENNY    metoprolol tartrate (LOPRESSOR) partial tablet 12.5 mg, Q12H    montelukast (SINGULAIR) tablet 10 mg, HS    ondansetron (ZOFRAN) injection 4 mg, Q6H PRN    pantoprazole (PROTONIX) EC tablet 40 mg, Early Morning    PARoxetine (PAXIL) tablet 20 mg, Daily With Dinner    senna (SENOKOT) tablet 8.6 mg, HS PRN    Administrative Statements   Today, Patient Was Seen By: Shilpi Marquez MD      **Please Note: This note may have been constructed using a voice recognition system.**

## 2025-04-24 NOTE — ASSESSMENT & PLAN NOTE
First developed URI-like symptoms 4 days ago with progression of dyspnea and wheezing  Patient found to have RLL pneumonia likely causing asthma exacerbation as well  Will place on Xopenex/Atrovent 3 times daily  Pulmicort twice daily  Solu-Medrol 40 Mg every 8 hours  Hold home inhaler while on scheduled nebulizers  Pulmonology consulted due to concurrent pneumonia

## 2025-04-24 NOTE — PLAN OF CARE
Problem: INFECTION - ADULT  Goal: Absence or prevention of progression during hospitalization  Description: INTERVENTIONS:- Assess and monitor for signs and symptoms of infection- Monitor lab/diagnostic results- Monitor all insertion sites, i.e. indwelling lines, tubes, and drains- Monitor endotracheal if appropriate and nasal secretions for changes in amount and color- [place] appropriate cooling/warming therapies per order- Administer medications as ordered- Instruct and encourage patient and family to use good hand hygiene technique- Identify and instruct in appropriate isolation precautions for identified infection/condition  Outcome: Progressing  Goal: Absence of fever/infection during neutropenic period  Description: INTERVENTIONS:- Monitor WBC  Outcome: Progressing     Problem: SAFETY ADULT  Goal: Patient will remain free of falls  Description: INTERVENTIONS:- Educate patient/family on patient safety including physical limitations- Instruct patient to call for assistance with activity - Consult OT/PT to assist with strengthening/mobility - Keep Call bell within reach- Keep bed low and locked with side rails adjusted as appropriate- Keep care items and personal belongings within reach- Initiate and maintain comfort rounds- Make Fall Risk Sign visible to staff- Offer Toileting every 2 Hours, in advance of need- Initiate/Maintain no alarm- Obtain necessary fall risk management equipment: non skid socks- Apply yellow socks and bracelet for high fall risk patients- Consider moving patient to room near nurses station  INTERVENTIONS:- Educate patient/family on patient safety including physical limitations- Instruct patient to call for assistance with activity - Consult OT/PT to assist with strengthening/mobility - Keep Call bell within reach- Keep bed low and locked with side rails adjusted as appropriate- Keep care items and personal belongings within reach- Initiate and maintain comfort rounds- Make Fall  Risk Sign visible to staff- Offer Toileting every 2 Hours, in advance of need- Initiate/Maintain no alarm- Obtain necessary fall risk management equipment: non-skid socks - Apply yellow socks and bracelet for high fall risk patients- Consider moving patient to room near nurses station  Outcome: Progressing  Goal: Maintain or return to baseline ADL function  Description: INTERVENTIONS:-  Assess patient's ability to carry out ADLs; assess patient's baseline for ADL function and identify physical deficits which impact ability to perform ADLs (bathing, care of mouth/teeth, toileting, grooming, dressing, etc.)- Assess/evaluate cause of self-care deficits - Assess range of motion- Assess patient's mobility; develop plan if impaired- Assess patient's need for assistive devices and provide as appropriate- Encourage maximum independence but intervene and supervise when necessary- Involve family in performance of ADLs- Assess for home care needs following discharge - Consider OT consult to assist with ADL evaluation and planning for discharge- Provide patient education as appropriate  Outcome: Progressing  Goal: Maintains/Returns to pre admission functional level  Description: INTERVENTIONS:- Perform AM-PAC 6 Click Basic Mobility/ Daily Activity assessment daily.- Set and communicate daily mobility goal to care team and patient/family/caregiver. - Collaborate with rehabilitation services on mobility goals if consulted- Perform Range of Motion 3 times a day.- Reposition patient every 3 hours.- Dangle patient 3 times a day- Stand patient 3 times a day- Ambulate patient 3 times a day- Out of bed to chair 3 times a day - Out of bed for meals 3 times a day- Out of bed for toileting- Record patient progress and toleration of activity level   Outcome: Progressing     Problem: RESPIRATORY - ADULT  Goal: Achieves optimal ventilation and oxygenation  Description: INTERVENTIONS:- Assess for changes in respiratory status- Assess for  changes in mentation and behavior- Position to facilitate oxygenation and minimize respiratory effort- Oxygen administered by appropriate delivery if ordered- Initiate smoking cessation education as indicated- Encourage broncho-pulmonary hygiene including cough, deep breathe, Incentive Spirometry- Assess the need for suctioning and aspirate as needed- Assess and instruct to report SOB or any respiratory difficulty- Respiratory Therapy support as indicated  Outcome: Progressing

## 2025-04-24 NOTE — ASSESSMENT & PLAN NOTE
Lab Results   Component Value Date    HGBA1C 9.5 (H) 10/04/2024       Recent Labs     04/23/25  2225 04/23/25  2341   POCGLU 255* 342*       Blood Sugar Average: Last 72 hrs:  (P) 298.5  Home regimen: Metformin 500 Mg twice daily, Lantus 95 units daily, and insulin aspart 1 unit per 1 carb ratio  Hold metformin and continue home Lantus-will place on Humalog 20 units 3 times daily with meals with the addition of SSI  Discussed with patient that if she would take significantly different amount of insulin than what she is scheduled for to let nursing know and we can adjust order  Of note, patient may need insulin drip in setting of IV steroid use-trend glucose for now and attempt to control with subcu insulin

## 2025-04-24 NOTE — H&P
H&P - Hospitalist   Name: Vernell Darby 49 y.o. female I MRN: [id number]  Unit/Bed#: -01 I Date of Admission: 4/23/2025   Date of Service: 4/24/2025 I Hospital Day: 1     Assessment & Plan  Severe persistent asthma with acute exacerbation  First developed URI-like symptoms 4 days ago with progression of dyspnea and wheezing  Patient found to have RLL pneumonia likely causing asthma exacerbation as well  Will place on Xopenex/Atrovent 3 times daily  Pulmicort twice daily  Solu-Medrol 40 Mg every 8 hours  Hold home inhaler while on scheduled nebulizers  Pulmonology consulted due to concurrent pneumonia  Acute respiratory insufficiency  SpO2 on room air was in the 80s  Requiring 2 L nasal cannula on admission  Likely secondary to number and asthma exacerbation  Wean oxygen as tolerated  CAP (community acquired pneumonia)  Developed URI-like symptoms 4 days ago with progressively worsening dyspnea  CXR with concern for RLL pneumonia  Continue ceftriaxone and azithromycin  Urine strep and Legionella studies  Sputum culture and Gram stain  Incentive spirometer  Mucinex twice daily  Respiratory protocol  Pulmonology consulted due to concurrent asthma exacerbation and oxygen requirement  Sepsis without acute organ dysfunction (HCC)  SIRS criteria: Tachycardia, fever, and tachypnea  Suspect source is pneumonia as seen on CXR  No signs of endorgan dysfunction  Continue ceftriaxone and azithromycin  Follow-up blood cultures and sputum culture  Type 1 diabetes mellitus with hyperglycemia (HCC)  Lab Results   Component Value Date    HGBA1C 9.5 (H) 10/04/2024       Recent Labs     04/23/25  2225 04/23/25  2341   POCGLU 255* 342*       Blood Sugar Average: Last 72 hrs:  (P) 298.5  Home regimen: Metformin 500 Mg twice daily, Lantus 95 units daily, and insulin aspart 1 unit per 1 carb ratio  Hold metformin and continue home Lantus-will place on Humalog 20 units 3 times daily with meals with the addition of SSI  Discussed with  "patient that if she would take significantly different amount of insulin than what she is scheduled for to let nursing know and we can adjust order  Of note, patient may need insulin drip in setting of IV steroid use-trend glucose for now and attempt to control with subcu insulin  PAF (paroxysmal atrial fibrillation) (HCC)  Had episode of A-fib when she was hospitalized for SABRINA in the past  Currently has loop recorder to determine need for ongoing anticoagulation  Continue home metoprolol 12.5 Mg twice daily and Eliquis 5 Mg twice daily   Chronic diastolic congestive heart failure (HCC)  Wt Readings from Last 3 Encounters:   04/24/25 78.9 kg (174 lb)   04/21/25 81.6 kg (180 lb)   04/09/25 83.9 kg (185 lb)   Last echo 10/2024: LVEF 60%.  G1 DD.  Only utilizes diuretic for weight gain and swelling as needed  Will allow for liberalized fluid and sodium restriction  Trend daily weights  Hypertension  Continue home metoprolol tartrate 12 and half mg twice daily  Monitor blood pressure per unit protocol      VTE Pharmacologic Prophylaxis: VTE Score: 6 High Risk (Score >/= 5) - Pharmacological DVT Prophylaxis Ordered: apixaban (Eliquis). Sequential Compression Devices Ordered.  Code Status: Level 1 - Full Code   Discussion with family: Patient declined call to .     Anticipated Length of Stay: Patient will be admitted on an inpatient basis with an anticipated length of stay of greater than 2 midnights secondary to asthma exacerbation, pneumonia, acute respiratory insufficiency.    History of Present Illness   Chief Complaint: \" Ongoing shortness of breath\"    Vernell Darby is a 49 y.o. female with a PMH of asthma, DM 1, diastolic HF, PAF, and HTN who presents with ongoing dyspnea.  Patient reports she started with URI-like symptoms of sore throat on Saturday.  She had chest congestion and wheezing that then progressively worsened throughout the week.  Initially was believed to be viral in nature but symptoms " persisted and she had ongoing fever, so she presented to urgent care.  Patient was found to have hypoxia on room air, so she was referred to the ED.  She denies any chest pain, abdominal pain, NVD, or urinary symptoms..    Review of Systems   Constitutional:  Positive for chills and fever.   HENT:  Positive for congestion and sore throat.    Respiratory:  Positive for cough and shortness of breath.    Cardiovascular:  Negative for chest pain and leg swelling.   Gastrointestinal:  Negative for abdominal pain, constipation, diarrhea, nausea and vomiting.   Genitourinary:  Negative for difficulty urinating, dysuria and hematuria.   Musculoskeletal:  Negative for gait problem.   Neurological:  Negative for weakness and numbness.   All other systems reviewed and are negative.      Historical Information   Past Medical History:   Diagnosis Date    Anemia 2017?    Arthritis     Asthma     w/acute exacerbation. Last assessed: 10/26/12    Chest pain 02/03/2016    CHF (congestive heart failure) ([facility]) 08/2020    Coronary artery disease 2021    Depression     Diabetes mellitus ([facility]) 10/1992    Diabetic nephropathy ([facility])     Diabetic nephropathy associated with type 1 diabetes mellitus ([facility]) 08/12/2020    Diabetic retinopathy ([facility]) 02/03/2016    Endometriosis     Gastroenteritis 02/03/2016    GERD (gastroesophageal reflux disease)     History of COVID-19 02/26/2021    Hyperlipidemia 02/03/2016    Hypertension 20+yrs.    Obesity     Oligomenorrhea     Polycystic ovaries 02/03/2016    Retinal hemorrhage 02/03/2016    Retinopathy     Thrombocytosis     Type 1 diabetes mellitus with ophthalmic manifestation ([facility]) 02/03/2016     Past Surgical History:   Procedure Laterality Date    CARDIAC ELECTROPHYSIOLOGY PROCEDURE N/A 2/26/2025    Procedure: Cardiac loop recorder implant;  Surgeon: Capo Saab MD;  Location: [facility];  Service: Cardiology    CATARACT EXTRACTION Left 10/2020    CATARACT EXTRACTION Right     RETINAL  LASER PROCEDURE       Social History     Tobacco Use    Smoking status: Never    Smokeless tobacco: Never    Tobacco comments:     Per [employer]: Former smoker. Quit in 1994   Vaping Use    Vaping status: Never Used   Substance and Sexual Activity    Alcohol use: Yes     Alcohol/week: 2.0 standard drinks of alcohol     Types: 1 Glasses of wine, 1 Standard drinks or equivalent per week     Comment: Occasionally. Several weeks can go by with no alcohol.    Drug use: No    Sexual activity: Yes     Partners: Male     Birth control/protection: None     E-Cigarette/Vaping    E-Cigarette Use Never User      E-Cigarette/Vaping Substances    Nicotine No     THC No     CBD No     Flavoring No      Family History   Problem Relation Age of Onset    Heart murmur Mother     Diabetes Mother         Type 2    Thyroid disease Mother         Disorder    Diabetes type II Mother     Mental illness Mother         Chemical imbalance    Diabetes Father         Type 2    Hypertension Father     Diabetes type II Father     No Known Problems Maternal Grandmother     Diabetes Maternal Grandfather         Mellitus    Breast cancer Paternal Grandmother 70 - 79    Leukemia Paternal Grandfather 48    Cancer Paternal Grandfather         Leaukemia    Diabetes Maternal Aunt         Type 2    Diabetes Maternal Uncle         Mellitus    Diabetes Paternal Uncle         Type 2    Substance Abuse Neg Hx     Alcohol abuse Neg Hx      Social History:  Marital Status: /Civil Union   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  Prior to Admission medications    Medication Sig Start Date End Date Taking? Authorizing Provider   albuterol (2.5 mg/3 mL) 0.083 % nebulizer solution INHALE THE CONTENTS OF 1 VIAL VIA NEBULIZER EVERY  6 HOURS AS NEEDED FOR WHEEZING OR SHORTNESS OF BREATH 2/7/25  Yes Katie Laguna MD   albuterol (PROVENTIL HFA,VENTOLIN HFA) 90 mcg/act inhaler INHALE 2 PUFFS BY MOUTH EVERY 4 HOURS AS NEEDED FOR WHEEZING OR FOR SHORTNESS OF BREATH 1/21/25  Yes Rika Nugent MD   Ascorbic Acid (VITAMIN C) 500 MG CAPS Take 1 capsule by mouth 2 (two) times a day   Yes Historical Provider, MD   atorvastatin (LIPITOR) 80 mg tablet TAKE 1 TABLET BY MOUTH EVERY DAY 10/23/24  Yes Wayne Whitaker MD   cefuroxime (CEFTIN) 500 mg tablet Take 1 tablet (500 mg total) by mouth every 12 (twelve) hours for 10 days 4/22/25 5/2/25 Yes Rhona Lopez PA-C   cyanocobalamin (VITAMIN B-12) 100 mcg tablet Take by mouth daily   Yes Historical Provider, MD   Eliquis 5 MG TAKE 1 TABLET BY MOUTH TWO TIMES DAILY 4/7/25  Yes Cem Gamez PA-C   Fluticasone-Salmeterol (Advair) 250-50 mcg/dose inhaler INHALE 1 PUFF BY MOUTH TWO TIMES DAILY RINSE MOUTH AFTER USE  Patient taking differently: Inhale 1 puff in the morning 7/9/24  Yes Katie Laguna MD   insulin aspart, w/niacinamide, (Fiasp FlexTouch) 100 Units/mL injection pen INJECT 1 UNIT OF INSULIN ASPART FOR EVERY 1 GRAM OF CARBS UP  UNITS DAILY 12/27/24  Yes Edu Gonzales PA-C   insulin glargine (Toujeo SoloStar) 300 units/mL CONCENTRATED U-300 injection pen (1-unit dial) INJECT 100 UNITS SUBCUTANEOUSLY (UNDER THE SKIN) DAILY.  Patient taking differently: Inject 95 Units under the skin daily 3/24/25  Yes Stacia Ascencio MD   metFORMIN (GLUCOPHAGE-XR) 500 mg 24 hr tablet TAKE 1 TABLET BY MOUTH TWO TIMES DAILY WITH MEALS 2/7/25  Yes Edu Gonzales PA-C   metoprolol tartrate (LOPRESSOR) 25 mg tablet TAKE 1/2 TABLET BY MOUTH EVERY 12 HOURS 1/9/25  Yes Cem Gamez PA-C   montelukast (SINGULAIR) 10 mg tablet TAKE 1 TABLET BY MOUTH AT BEDTIME 11/7/24  Yes Rika Nugent MD   omeprazole (PriLOSEC) 20 mg delayed release capsule Take 20 mg by mouth daily.   Yes Historical  Provider, MD   PARoxetine (PAXIL) 20 mg tablet TAKE 1 TABLET BY MOUTH EVERY DAY 2/24/25  Yes Rika Nugent MD   torsemide (DEMADEX) 20 mg tablet Take 1 tablet (20 mg total) by mouth if needed (rapid weight gain (3+ lbs overnight or 5+ lbs in 5-7 days)) 4/15/25  Yes Demetra Gutierrez PA-C   acetone, urine, test strip Use to test urine ketones for high blood sugars. 12/9/20   Stacia Ascencio MD   Continuous Glucose Sensor (Dexcom G7 Sensor) USE 1 DEVICE EVERY 10 DAYS 4/8/25   Edu Gonzales PA-C   glucagon (Glucagon Emergency) 1 MG injection Inject 1 mg under the skin once as needed for low blood sugar for up to 1 dose 12/16/20   Stacia Ascencio MD   Glucagon (Gvoke HypoPen 2-Pack) 1 MG/0.2ML SOAJ Use if hypoglycemia prn 12/9/20   Stacia Ascencio MD   Insulin Pen Needle 33G X 4 MM MISC Use to inject insulin 4 times a day 7/18/24   Stacia Ascencio MD   Probiotic Product (ALIGN PO) Take by mouth 6/29/15   Historical Provider, MD   benzonatate (TESSALON PERLES) 100 mg capsule Take 1 capsule (100 mg total) by mouth 3 (three) times a day as needed for cough 2/8/25 4/24/25  Edu Olmedo Jr., PA-C   predniSONE 10 mg tablet 2 tabs po qd x 3 days then 1 tab po qd x 3 days 2/8/25 4/24/25  Edu Olmedo Jr., PA-C     No Known Allergies    Objective :  Temp:  [98.7 °F (37.1 °C)-101.6 °F (38.7 °C)] 98.7 °F (37.1 °C)  HR:  [] 104  BP: (100-176)/(51-77) 100/51  Resp:  [16-22] 16  SpO2:  [88 %-100 %] 91 %  O2 Device: Nasal cannula  Nasal Cannula O2 Flow Rate (L/min):  [2 L/min] 2 L/min    Physical Exam  Vitals and nursing note reviewed.   Constitutional:       General: She is not in acute distress.     Appearance: Normal appearance. She is not ill-appearing.      Comments: Pleasant and conversational   HENT:      Head: Normocephalic.      Nose: Nose normal.      Mouth/Throat:      Mouth: Mucous membranes are moist.   Eyes:      Conjunctiva/sclera: Conjunctivae normal.   Cardiovascular:      Rate and Rhythm:  Normal rate and regular rhythm.      Pulses: Normal pulses.      Heart sounds: No murmur heard.  Pulmonary:      Effort: Pulmonary effort is normal.      Comments: No conversational dyspnea.  No tachypnea or accessory muscle use.  Does have ongoing expiratory wheezing in bilateral lung fields.  On 2 L NC.  Patient with significant snoring when asleep and noted hypoxia  Abdominal:      General: Abdomen is flat.      Palpations: Abdomen is soft.      Tenderness: There is no abdominal tenderness. There is no guarding or rebound.   Musculoskeletal:         General: Normal range of motion.      Cervical back: Normal range of motion.      Right lower leg: No edema.      Left lower leg: No edema.   Skin:     General: Skin is warm and dry.      Coloration: Skin is not pale.   Neurological:      General: No focal deficit present.      Mental Status: She is alert and oriented to person, place, and time.   Psychiatric:         Mood and Affect: Mood normal.         Thought Content: Thought content normal.          Lines/Drains:            Lab Results: I have reviewed the following results:  Results from last 7 days   Lab Units 04/23/25 1943   WBC Thousand/uL 10.07   HEMOGLOBIN g/dL 11.7   HEMATOCRIT % 36.6   PLATELETS Thousands/uL 314   SEGS PCT % 89*   LYMPHO PCT % 6*   MONO PCT % 3*   EOS PCT % 0     Results from last 7 days   Lab Units 04/23/25 1943   SODIUM mmol/L 136   POTASSIUM mmol/L 3.9   CHLORIDE mmol/L 102   CO2 mmol/L 25   BUN mg/dL 36*   CREATININE mg/dL 0.91   ANION GAP mmol/L 9   CALCIUM mg/dL 9.2   ALBUMIN g/dL 4.1   TOTAL BILIRUBIN mg/dL 0.30   ALK PHOS U/L 84   ALT U/L 18   AST U/L 17   GLUCOSE RANDOM mg/dL 84     Results from last 7 days   Lab Units 04/23/25  1943   INR  1.14     Results from last 7 days   Lab Units 04/23/25  2341 04/23/25  2225   POC GLUCOSE mg/dl 342* 255*     Lab Results   Component Value Date    HGBA1C 9.5 (H) 10/04/2024    HGBA1C 9.5 (H) 09/22/2024    HGBA1C 8.7 (H) 07/13/2024      Results from last 7 days   Lab Units 04/23/25 1943   LACTIC ACID mmol/L 0.9   PROCALCITONIN ng/ml 0.12       CXR personally reviewed with evidence of consolidation in RLL  Other Study Results Review: EKG was personally reviewed and my interpretation is: NSR with slight ST depression inferiorly and V6.  No ST segment elevations.  Normal QTc...    Administrative Statements       ** Please Note: This note has been constructed using a voice recognition system. **

## 2025-04-24 NOTE — ASSESSMENT & PLAN NOTE
Ruled out  Developed URI-like symptoms 4 days ago with progressively worsening dyspnea  CXR with concern for RLL pneumonia on wet read, but official report shows no acute cardiopulmonary disease  Initiated ceftriaxone and azithromycin  Urine strep and Legionella normal  Sputum culture and Gram stain pending  Procalcitonin normal  Incentive spirometer  Mucinex twice daily  Respiratory protocol  Pulmonology consulted due to concurrent asthma exacerbation and oxygen requirement  Will discontinue antibiotics

## 2025-04-24 NOTE — ASSESSMENT & PLAN NOTE
SpO2 on room air was in the 80s  Requiring 2 L nasal cannula on admission, but now weaned down to r/a  Likely secondary to pneumonia and asthma exacerbation  Wean oxygen as tolerated

## 2025-04-24 NOTE — PLAN OF CARE
Problem: Potential for Falls  Goal: Patient will remain free of falls  Description: INTERVENTIONS:- Educate patient/family on patient safety including physical limitations- Instruct patient to call for assistance with activity - Consult OT/PT to assist with strengthening/mobility - Keep Call bell within reach- Keep bed low and locked with side rails adjusted as appropriate- Keep care items and personal belongings within reach- Initiate and maintain comfort rounds- Make Fall Risk Sign visible to staff- Offer Toileting every 2 Hours, in advance of need- Initiate/Maintain alarm- Obtain necessary fall risk management equipment: - Apply yellow socks and bracelet for high fall risk patients- Consider moving patient to room near nurses station  INTERVENTIONS:- Educate patient/family on patient safety including physical limitations- Instruct patient to call for assistance with activity - Consult OT/PT to assist with strengthening/mobility - Keep Call bell within reach- Keep bed low and locked with side rails adjusted as appropriate- Keep care items and personal belongings within reach- Initiate and maintain comfort rounds- Make Fall Risk Sign visible to staff- Offer Toileting every 2 Hours, in advance of need- Initiate/Maintain alarm- Obtain necessary fall risk management equipment: - Apply yellow socks and bracelet for high fall risk patients- Consider moving patient to room near nurses station  Outcome: Progressing     Problem: INFECTION - ADULT  Goal: Absence or prevention of progression during hospitalization  Description: INTERVENTIONS:- Assess and monitor for signs and symptoms of infection- Monitor lab/diagnostic results- Monitor all insertion sites, i.e. indwelling lines, tubes, and drains- Monitor endotracheal if appropriate and nasal secretions for changes in amount and color- Rupert appropriate cooling/warming therapies per order- Administer medications as ordered- Instruct and encourage patient and family  to use good hand hygiene technique- Identify and instruct in appropriate isolation precautions for identified infection/condition  Outcome: Progressing  Goal: Absence of fever/infection during neutropenic period  Description: INTERVENTIONS:- Monitor WBC  Outcome: Progressing     Problem: SAFETY ADULT  Goal: Patient will remain free of falls  Description: INTERVENTIONS:- Educate patient/family on patient safety including physical limitations- Instruct patient to call for assistance with activity - Consult OT/PT to assist with strengthening/mobility - Keep Call bell within reach- Keep bed low and locked with side rails adjusted as appropriate- Keep care items and personal belongings within reach- Initiate and maintain comfort rounds- Make Fall Risk Sign visible to staff- Offer Toileting every 2 Hours, in advance of need- Initiate/Maintain alarm- Obtain necessary fall risk management equipment: - Apply yellow socks and bracelet for high fall risk patients- Consider moving patient to room near nurses station  INTERVENTIONS:- Educate patient/family on patient safety including physical limitations- Instruct patient to call for assistance with activity - Consult OT/PT to assist with strengthening/mobility - Keep Call bell within reach- Keep bed low and locked with side rails adjusted as appropriate- Keep care items and personal belongings within reach- Initiate and maintain comfort rounds- Make Fall Risk Sign visible to staff- Offer Toileting every 2 Hours, in advance of need- Initiate/Maintain alarm- Obtain necessary fall risk management equipment: - Apply yellow socks and bracelet for high fall risk patients- Consider moving patient to room near nurses station  Outcome: Progressing  Goal: Maintain or return to baseline ADL function  Description: INTERVENTIONS:-  Assess patient's ability to carry out ADLs; assess patient's baseline for ADL function and identify physical deficits which impact ability to perform ADLs  (bathing, care of mouth/teeth, toileting, grooming, dressing, etc.)- Assess/evaluate cause of self-care deficits - Assess range of motion- Assess patient's mobility; develop plan if impaired- Assess patient's need for assistive devices and provide as appropriate- Encourage maximum independence but intervene and supervise when necessary- Involve family in performance of ADLs- Assess for home care needs following discharge - Consider OT consult to assist with ADL evaluation and planning for discharge- Provide patient education as appropriate  Outcome: Progressing  Goal: Maintains/Returns to pre admission functional level  Description: INTERVENTIONS:- Perform AM-PAC 6 Click Basic Mobility/ Daily Activity assessment daily.- Set and communicate daily mobility goal to care team and patient/family/caregiver. - Collaborate with rehabilitation services on mobility goals if consulted- Perform Range of Motion 3 times a day.- Reposition patient every 2 hours.- Dangle patient 3 times a day- Stand patient 3 times a day- Ambulate patient 3 times a day- Out of bed to chair 3 times a day - Out of bed for meals 3 times a day- Out of bed for toileting- Record patient progress and toleration of activity level   Outcome: Progressing     Problem: RESPIRATORY - ADULT  Goal: Achieves optimal ventilation and oxygenation  Description: INTERVENTIONS:- Assess for changes in respiratory status- Assess for changes in mentation and behavior- Position to facilitate oxygenation and minimize respiratory effort- Oxygen administered by appropriate delivery if ordered- Initiate smoking cessation education as indicated- Encourage broncho-pulmonary hygiene including cough, deep breathe, Incentive Spirometry- Assess the need for suctioning and aspirate as needed- Assess and instruct to report SOB or any respiratory difficulty- Respiratory Therapy support as indicated  Outcome: Progressing

## 2025-04-24 NOTE — ASSESSMENT & PLAN NOTE
Continue home metoprolol tartrate 12 and half mg twice daily  Monitor blood pressure per unit protocol

## 2025-04-25 LAB
ANION GAP SERPL CALCULATED.3IONS-SCNC: 9 MMOL/L (ref 4–13)
BASOPHILS # BLD MANUAL: 0 THOUSAND/UL (ref 0–0.1)
BASOPHILS NFR MAR MANUAL: 0 % (ref 0–1)
BUN SERPL-MCNC: 41 MG/DL (ref 5–25)
CALCIUM SERPL-MCNC: 8.7 MG/DL (ref 8.4–10.2)
CHLORIDE SERPL-SCNC: 99 MMOL/L (ref 96–108)
CO2 SERPL-SCNC: 24 MMOL/L (ref 21–32)
CREAT SERPL-MCNC: 0.87 MG/DL (ref 0.6–1.3)
DME PARACHUTE DELIVERY DATE REQUESTED: NORMAL
DME PARACHUTE ITEM DESCRIPTION: NORMAL
DME PARACHUTE ORDER STATUS: NORMAL
DME PARACHUTE SUPPLIER NAME: NORMAL
DME PARACHUTE SUPPLIER PHONE: NORMAL
EOSINOPHIL # BLD MANUAL: 0 THOUSAND/UL (ref 0–0.4)
EOSINOPHIL NFR BLD MANUAL: 0 % (ref 0–6)
ERYTHROCYTE [DISTWIDTH] IN BLOOD BY AUTOMATED COUNT: 14.3 % (ref 11.6–15.1)
GFR SERPL CREATININE-BSD FRML MDRD: 78 ML/MIN/1.73SQ M
GLUCOSE SERPL-MCNC: 256 MG/DL (ref 65–140)
GLUCOSE SERPL-MCNC: 261 MG/DL (ref 65–140)
GLUCOSE SERPL-MCNC: 295 MG/DL (ref 65–140)
GLUCOSE SERPL-MCNC: 300 MG/DL (ref 65–140)
GLUCOSE SERPL-MCNC: 326 MG/DL (ref 65–140)
GLUCOSE SERPL-MCNC: 370 MG/DL (ref 65–140)
HCT VFR BLD AUTO: 33.1 % (ref 34.8–46.1)
HGB BLD-MCNC: 10.9 G/DL (ref 11.5–15.4)
LYMPHOCYTES # BLD AUTO: 0.32 THOUSAND/UL (ref 0.6–4.47)
LYMPHOCYTES # BLD AUTO: 3 % (ref 14–44)
MCH RBC QN AUTO: 28.6 PG (ref 26.8–34.3)
MCHC RBC AUTO-ENTMCNC: 32.9 G/DL (ref 31.4–37.4)
MCV RBC AUTO: 87 FL (ref 82–98)
MONOCYTES # BLD AUTO: 0.11 THOUSAND/UL (ref 0–1.22)
MONOCYTES NFR BLD: 1 % (ref 4–12)
NEUTROPHILS # BLD MANUAL: 10.38 THOUSAND/UL (ref 1.85–7.62)
NEUTS SEG NFR BLD AUTO: 96 % (ref 43–75)
PLATELET # BLD AUTO: 309 THOUSANDS/UL (ref 149–390)
PLATELET BLD QL SMEAR: ADEQUATE
PMV BLD AUTO: 9.7 FL (ref 8.9–12.7)
POTASSIUM SERPL-SCNC: 4.5 MMOL/L (ref 3.5–5.3)
RBC # BLD AUTO: 3.81 MILLION/UL (ref 3.81–5.12)
RBC MORPH BLD: NORMAL
SODIUM SERPL-SCNC: 132 MMOL/L (ref 135–147)
WBC # BLD AUTO: 10.81 THOUSAND/UL (ref 4.31–10.16)

## 2025-04-25 PROCEDURE — 80048 BASIC METABOLIC PNL TOTAL CA: CPT | Performed by: FAMILY MEDICINE

## 2025-04-25 PROCEDURE — 99232 SBSQ HOSP IP/OBS MODERATE 35: CPT | Performed by: NURSE PRACTITIONER

## 2025-04-25 PROCEDURE — 94640 AIRWAY INHALATION TREATMENT: CPT

## 2025-04-25 PROCEDURE — 94760 N-INVAS EAR/PLS OXIMETRY 1: CPT

## 2025-04-25 PROCEDURE — 99232 SBSQ HOSP IP/OBS MODERATE 35: CPT | Performed by: FAMILY MEDICINE

## 2025-04-25 PROCEDURE — 82948 REAGENT STRIP/BLOOD GLUCOSE: CPT

## 2025-04-25 PROCEDURE — 85007 BL SMEAR W/DIFF WBC COUNT: CPT | Performed by: FAMILY MEDICINE

## 2025-04-25 PROCEDURE — 85027 COMPLETE CBC AUTOMATED: CPT | Performed by: FAMILY MEDICINE

## 2025-04-25 RX ORDER — LEVALBUTEROL INHALATION SOLUTION 1.25 MG/3ML
1.25 SOLUTION RESPIRATORY (INHALATION) ONCE
Status: COMPLETED | OUTPATIENT
Start: 2025-04-25 | End: 2025-04-25

## 2025-04-25 RX ORDER — FLUTICASONE FUROATE AND VILANTEROL 200; 25 UG/1; UG/1
1 POWDER RESPIRATORY (INHALATION) DAILY
Status: DISCONTINUED | OUTPATIENT
Start: 2025-04-25 | End: 2025-04-26 | Stop reason: HOSPADM

## 2025-04-25 RX ORDER — AZITHROMYCIN 500 MG/1
500 TABLET, FILM COATED ORAL EVERY 24 HOURS
Status: COMPLETED | OUTPATIENT
Start: 2025-04-25 | End: 2025-04-26

## 2025-04-25 RX ADMIN — INSULIN LISPRO 30 UNITS: 100 INJECTION, SOLUTION INTRAVENOUS; SUBCUTANEOUS at 16:50

## 2025-04-25 RX ADMIN — IPRATROPIUM BROMIDE 0.5 MG: 0.5 SOLUTION RESPIRATORY (INHALATION) at 02:14

## 2025-04-25 RX ADMIN — AZITHROMYCIN DIHYDRATE 500 MG: 500 TABLET ORAL at 12:48

## 2025-04-25 RX ADMIN — BUDESONIDE 0.5 MG: 0.5 INHALANT RESPIRATORY (INHALATION) at 07:06

## 2025-04-25 RX ADMIN — GUAIFENESIN 600 MG: 600 TABLET ORAL at 07:58

## 2025-04-25 RX ADMIN — IPRATROPIUM BROMIDE 0.5 MG: 0.5 SOLUTION RESPIRATORY (INHALATION) at 07:06

## 2025-04-25 RX ADMIN — LEVALBUTEROL HYDROCHLORIDE 1.25 MG: 1.25 SOLUTION RESPIRATORY (INHALATION) at 07:06

## 2025-04-25 RX ADMIN — METHYLPREDNISOLONE SODIUM SUCCINATE 40 MG: 40 INJECTION, POWDER, FOR SOLUTION INTRAMUSCULAR; INTRAVENOUS at 21:51

## 2025-04-25 RX ADMIN — INSULIN LISPRO 16 UNITS: 100 INJECTION, SOLUTION INTRAVENOUS; SUBCUTANEOUS at 11:41

## 2025-04-25 RX ADMIN — METHYLPREDNISOLONE SODIUM SUCCINATE 40 MG: 40 INJECTION, POWDER, FOR SOLUTION INTRAMUSCULAR; INTRAVENOUS at 07:57

## 2025-04-25 RX ADMIN — GUAIFENESIN 600 MG: 600 TABLET ORAL at 16:50

## 2025-04-25 RX ADMIN — PANTOPRAZOLE SODIUM 40 MG: 40 TABLET, DELAYED RELEASE ORAL at 05:18

## 2025-04-25 RX ADMIN — LEVALBUTEROL HYDROCHLORIDE 1.25 MG: 1.25 SOLUTION RESPIRATORY (INHALATION) at 02:14

## 2025-04-25 RX ADMIN — INSULIN LISPRO 30 UNITS: 100 INJECTION, SOLUTION INTRAVENOUS; SUBCUTANEOUS at 08:05

## 2025-04-25 RX ADMIN — ATORVASTATIN CALCIUM 80 MG: 40 TABLET, FILM COATED ORAL at 15:49

## 2025-04-25 RX ADMIN — INSULIN LISPRO 8 UNITS: 100 INJECTION, SOLUTION INTRAVENOUS; SUBCUTANEOUS at 21:53

## 2025-04-25 RX ADMIN — INSULIN LISPRO 6 UNITS: 100 INJECTION, SOLUTION INTRAVENOUS; SUBCUTANEOUS at 01:59

## 2025-04-25 RX ADMIN — Medication 12.5 MG: at 12:48

## 2025-04-25 RX ADMIN — IPRATROPIUM BROMIDE 0.5 MG: 0.5 SOLUTION RESPIRATORY (INHALATION) at 19:19

## 2025-04-25 RX ADMIN — FLUTICASONE FUROATE AND VILANTEROL TRIFENATATE 1 PUFF: 200; 25 POWDER RESPIRATORY (INHALATION) at 16:54

## 2025-04-25 RX ADMIN — INSULIN LISPRO 8 UNITS: 100 INJECTION, SOLUTION INTRAVENOUS; SUBCUTANEOUS at 15:49

## 2025-04-25 RX ADMIN — INSULIN GLARGINE 95 UNITS: 100 INJECTION, SOLUTION SUBCUTANEOUS at 08:05

## 2025-04-25 RX ADMIN — INSULIN LISPRO 30 UNITS: 100 INJECTION, SOLUTION INTRAVENOUS; SUBCUTANEOUS at 11:41

## 2025-04-25 RX ADMIN — APIXABAN 5 MG: 5 TABLET, FILM COATED ORAL at 07:57

## 2025-04-25 RX ADMIN — OXYCODONE HYDROCHLORIDE AND ACETAMINOPHEN 500 MG: 500 TABLET ORAL at 16:50

## 2025-04-25 RX ADMIN — OXYCODONE HYDROCHLORIDE AND ACETAMINOPHEN 500 MG: 500 TABLET ORAL at 07:58

## 2025-04-25 RX ADMIN — Medication 12.5 MG: at 23:53

## 2025-04-25 RX ADMIN — INSULIN LISPRO 12 UNITS: 100 INJECTION, SOLUTION INTRAVENOUS; SUBCUTANEOUS at 08:05

## 2025-04-25 RX ADMIN — PAROXETINE HYDROCHLORIDE 20 MG: 20 TABLET, FILM COATED ORAL at 15:49

## 2025-04-25 RX ADMIN — APIXABAN 5 MG: 5 TABLET, FILM COATED ORAL at 16:50

## 2025-04-25 RX ADMIN — MONTELUKAST 10 MG: 10 TABLET, FILM COATED ORAL at 21:53

## 2025-04-25 RX ADMIN — VITAM B12 100 MCG: 100 TAB at 07:58

## 2025-04-25 RX ADMIN — LEVALBUTEROL HYDROCHLORIDE 1.25 MG: 1.25 SOLUTION RESPIRATORY (INHALATION) at 19:19

## 2025-04-25 NOTE — ASSESSMENT & PLAN NOTE
Lab Results   Component Value Date    HGBA1C 9.5 (H) 10/04/2024       Recent Labs     04/25/25  0156 04/25/25  0713 04/25/25  1051 04/25/25  1533   POCGLU 295* 300* 370* 256*       Blood Sugar Average: Last 72 hrs:  (P) 336.365819754816  Home regimen: Metformin 500 Mg twice daily, Lantus 95 units daily, and insulin aspart 1 unit per 1 carb ratio  Hold metformin and continue home Lantus-placed on Humalog 20 units 3 times daily with meals with the addition of SSI  Glucose continue to be elevated, Humalog changed to 30 units 3 times daily, ISS continued Lantus 95 units; additional dose 10 units given this morning  Discussed with the patient possibility of using insulin drip, she refuses insulin drip at that time and wants to continue with subcutaneous for now; she has Dexcom and monitors her glucose closely

## 2025-04-25 NOTE — ASSESSMENT & PLAN NOTE
Wt Readings from Last 3 Encounters:   04/25/25 81.8 kg (180 lb 5 oz)   04/21/25 81.6 kg (180 lb)   04/09/25 83.9 kg (185 lb)   Last echo 10/2024: LVEF 60%.  G1 DD.  Only utilizes diuretic for weight gain and swelling as needed  Will allow for liberalized fluid and sodium restriction  Trend daily weights

## 2025-04-25 NOTE — ASSESSMENT & PLAN NOTE
Lab Results   Component Value Date    HGBA1C 9.5 (H) 10/04/2024       Recent Labs     04/24/25 2033 04/24/25  2256 04/25/25  0156 04/25/25  0713   POCGLU 390* 319* 295* 300*       Blood Sugar Average: Last 72 hrs:  (P) [id number]  Closely monitor blood sugars while on high dose steroids

## 2025-04-25 NOTE — ASSESSMENT & PLAN NOTE
First developed URI-like symptoms 4 days ago with progression of dyspnea and wheezing  Patient suspected to have RLL pneumonia likely causing asthma exacerbation as well  placed on Xopenex/Atrovent 3 times daily  Pulmicort twice daily  Initiated Solu-Medrol 40 Mg every 8 hours  Hold home inhaler while on scheduled nebulizers  Pulmonology consulted due to concurrent pneumonia and adjust regimen: Resume ICS/LABA, Solu-Medrol 40 mg every 12 hours, plan to start daily tomorrow  Order for nebulizer machine signed

## 2025-04-25 NOTE — ASSESSMENT & PLAN NOTE
Wt Readings from Last 3 Encounters:   04/25/25 81.8 kg (180 lb 5 oz)   04/21/25 81.6 kg (180 lb)   04/09/25 83.9 kg (185 lb)     Examines euvolemic, management per primary team

## 2025-04-25 NOTE — PROGRESS NOTES
Progress Note - Hospitalist   Name: Vernell Darby 49 y.o. female I MRN: [id number]  Unit/Bed#: -01 I Date of Admission: 4/23/2025   Date of Service: 4/25/2025 I Hospital Day: 2    Assessment & Plan  Severe persistent asthma with acute exacerbation  First developed URI-like symptoms 4 days ago with progression of dyspnea and wheezing  Patient suspected to have RLL pneumonia likely causing asthma exacerbation as well  placed on Xopenex/Atrovent 3 times daily  Pulmicort twice daily  Initiated Solu-Medrol 40 Mg every 8 hours  Hold home inhaler while on scheduled nebulizers  Pulmonology consulted due to concurrent pneumonia and adjust regimen: Resume ICS/LABA, Solu-Medrol 40 mg every 12 hours, plan to start daily tomorrow  Order for nebulizer machine signed  Acute respiratory insufficiency  resolved  SpO2 on room air was in the 80s  Requiring 2 L nasal cannula on admission, but now weaned down to r/a  Likely secondary to pneumonia and asthma exacerbation  Wean oxygen as tolerated  CAP (community acquired pneumonia)  Ruled out  Developed URI-like symptoms 4 days ago with progressively worsening dyspnea  CXR with concern for RLL pneumonia on wet read, but official report shows no acute cardiopulmonary disease  Initiated ceftriaxone and azithromycin  Urine strep and Legionella normal  Sputum culture and Gram stain pending  Procalcitonin normal  Incentive spirometer  Mucinex twice daily  Respiratory protocol  Pulmonology consulted due to concurrent asthma exacerbation and oxygen requirement  Will discontinue antibiotics  Sepsis without acute organ dysfunction (HCC)  Ruled out  SIRS criteria: Tachycardia, fever, and tachypnea, possible related to unspecified URI/asthma exacerbation  Suspect source is pneumonia as suspected on CXR, but official read was negative for acute pathology  No signs of endorgan dysfunction  Initially on ceftriaxone and azithromycin  blood cultures and sputum culture pending  Type 1 diabetes  mellitus with hyperglycemia ([facility])  Lab Results   Component Value Date    HGBA1C 9.5 (H) 10/04/2024       Recent Labs     04/25/25  0156 04/25/25  0713 04/25/25  1051 04/25/25  1533   POCGLU 295* 300* 370* 256*       Blood Sugar Average: Last 72 hrs:  (P) 336.3027735972091536  Home regimen: Metformin 500 Mg twice daily, Lantus 95 units daily, and insulin aspart 1 unit per 1 carb ratio  Hold metformin and continue home Lantus-placed on Humalog 20 units 3 times daily with meals with the addition of SSI  Glucose continue to be elevated, Humalog changed to 30 units 3 times daily, ISS continued Lantus 95 units; additional dose 10 units given this morning  Discussed with the patient possibility of using insulin drip, she refuses insulin drip at that time and wants to continue with subcutaneous for now; she has Dexcom and monitors her glucose closely  PAF (paroxysmal atrial fibrillation) ([facility])  Had episode of A-fib when she was hospitalized for SABRINA in the past  Currently has loop recorder to determine need for ongoing anticoagulation  Continue home metoprolol 12.5 Mg twice daily and Eliquis 5 Mg twice daily   Chronic diastolic congestive heart failure ([facility])  Wt Readings from Last 3 Encounters:   04/25/25 81.8 kg (180 lb 5 oz)   04/21/25 81.6 kg (180 lb)   04/09/25 83.9 kg (185 lb)   Last echo 10/2024: LVEF 60%.  G1 DD.  Only utilizes diuretic for weight gain and swelling as needed  Will allow for liberalized fluid and sodium restriction  Trend daily weights  Hypertension  Continue home metoprolol tartrate 12 and half mg twice daily  Monitor blood pressure per unit protocol  Morbid obesity with body mass index (BMI) of 40.0 to 44.9 in adult ([facility])  Continue to encourage lifestyle and diet modification    VTE Pharmacologic Prophylaxis: VTE Score: 6 High Risk (Score >/= 5) - Pharmacological DVT Prophylaxis Ordered: apixaban (Eliquis). Sequential Compression Devices Ordered.    Mobility:   Basic Mobility Inpatient Raw Score:  24  JH-HLM Goal: 8: Walk 250 feet or more  JH-HLM Achieved: 8: Walk 250 feet ot more  JH-HLM Goal achieved. Continue to encourage appropriate mobility.    Patient Centered Rounds: I performed bedside rounds with nursing staff today.   Discussions with Specialists or Other Care Team Provider: case management    Education and Discussions with Family / Patient:  discussed with the patient.     Current Length of Stay: 2 day(s)  Current Patient Status: Inpatient   Certification Statement: The patient will continue to require additional inpatient hospital stay due to management of asthma exacerbation  Discharge Plan: Anticipate discharge in 24-48 hrs to home.    Code Status: Level 1 - Full Code    Subjective   Patient seen and examined.  She reports feeling better but not completely back to her baseline    Objective :  Temp:  [96.7 °F (35.9 °C)-97.6 °F (36.4 °C)] 97.6 °F (36.4 °C)  HR:  [81-90] 88  BP: (121-174)/(75-89) 121/89  Resp:  [19-20] 19  SpO2:  [94 %-98 %] 95 %  O2 Device: None (Room air)    Body mass index is 40.43 kg/m².     Input and Output Summary (last 24 hours):     Intake/Output Summary (Last 24 hours) at 4/25/2025 1707  Last data filed at 4/25/2025 1416  Gross per 24 hour   Intake 1340 ml   Output --   Net 1340 ml       Physical Exam  Vitals and nursing note reviewed.   Constitutional:       General: She is not in acute distress.     Appearance: Normal appearance. She is not ill-appearing.   HENT:      Head: Normocephalic.      Nose: Nose normal.   Cardiovascular:      Rate and Rhythm: Normal rate and regular rhythm.      Pulses: Normal pulses.      Heart sounds: No murmur heard.  Pulmonary:      Effort: Pulmonary effort is normal.      Breath sounds: Wheezing present.   Abdominal:      General: Abdomen is flat.      Palpations: Abdomen is soft.      Tenderness: There is no abdominal tenderness. There is no guarding or rebound.   Musculoskeletal:         General: Normal range of motion.      Cervical  back: Normal range of motion.      Right lower leg: No edema.      Left lower leg: No edema.   Skin:     General: Skin is warm and dry.      Coloration: Skin is not pale.   Neurological:      General: No focal deficit present.      Mental Status: She is alert and oriented to person, place, and time.           Lines/Drains:              Lab Results: I have reviewed the following results:   Results from last 7 days   Lab Units 04/25/25  0517 04/24/25  0500 04/23/25 1943   WBC Thousand/uL 10.81*   < > 10.07   HEMOGLOBIN g/dL 10.9*   < > 11.7   HEMATOCRIT % 33.1*   < > 36.6   PLATELETS Thousands/uL 309   < > 314   SEGS PCT %  --   --  89*   LYMPHO PCT % 3*  --  6*   MONO PCT % 1*  --  3*   EOS PCT % 0  --  0    < > = values in this interval not displayed.     Results from last 7 days   Lab Units 04/25/25  0517 04/24/25  0500 04/23/25 1943   SODIUM mmol/L 132*   < > 136   POTASSIUM mmol/L 4.5   < > 3.9   CHLORIDE mmol/L 99   < > 102   CO2 mmol/L 24   < > 25   BUN mg/dL 41*   < > 36*   CREATININE mg/dL 0.87   < > 0.91   ANION GAP mmol/L 9   < > 9   CALCIUM mg/dL 8.7   < > 9.2   ALBUMIN g/dL  --   --  4.1   TOTAL BILIRUBIN mg/dL  --   --  0.30   ALK PHOS U/L  --   --  84   ALT U/L  --   --  18   AST U/L  --   --  17   GLUCOSE RANDOM mg/dL 326*   < > 84    < > = values in this interval not displayed.     Results from last 7 days   Lab Units 04/23/25  1943   INR  1.14     Results from last 7 days   Lab Units 04/25/25  1533 04/25/25  1051 04/25/25  0713 04/25/25  0156 04/24/25  2256 04/24/25  2033 04/24/25  1637 04/24/25  1420 04/24/25  1131 04/24/25  0722 04/24/25  0425 04/24/25  0211   POC GLUCOSE mg/dl 256* 370* 300* 295* 319* 390* 349* 358* 320* 326* 357* 470*         Results from last 7 days   Lab Units 04/24/25  0500 04/23/25 1943   LACTIC ACID mmol/L  --  0.9   PROCALCITONIN ng/ml 0.11 0.12       Recent Cultures (last 7 days):   Results from last 7 days   Lab Units 04/24/25  0113 04/23/25 2007 04/23/25 1943    BLOOD CULTURE   --  No Growth at 24 hrs. No Growth at 24 hrs.   LEGIONELLA URINARY ANTIGEN  Negative  --   --              Last 24 Hours Medication List:     Current Facility-Administered Medications:     acetaminophen (TYLENOL) tablet 650 mg, Q6H PRN    aluminum-magnesium hydroxide-simethicone (MAALOX) oral suspension 30 mL, Q6H PRN    apixaban (ELIQUIS) tablet 5 mg, BID    ascorbic acid (VITAMIN C) tablet 500 mg, BID    atorvastatin (LIPITOR) tablet 80 mg, Daily With Dinner    azithromycin (ZITHROMAX) tablet 500 mg, Q24H    cyanocobalamin (VITAMIN B-12) tablet 100 mcg, Daily    fluticasone-vilanterol 200-25 mcg/actuation 1 puff, Daily    guaiFENesin (MUCINEX) 12 hr tablet 600 mg, BID    insulin glargine (LANTUS) subcutaneous injection 95 Units 0.95 mL, QAM    insulin lispro (HumALOG/ADMELOG) 100 units/mL subcutaneous injection 30 Units, TID With Meals    insulin lispro (HumALOG/ADMELOG) 100 units/mL subcutaneous injection 4-20 Units, TID AC **AND** Fingerstick Glucose (POCT), TID AC    insulin lispro (HumALOG/ADMELOG) 100 units/mL subcutaneous injection 4-20 Units, HS    ipratropium (ATROVENT) 0.02 % inhalation solution 0.5 mg, Q12H    levalbuterol (XOPENEX) inhalation solution 1.25 mg, Q12H    methylPREDNISolone sodium succinate (Solu-MEDROL) injection 40 mg, Q12H KENNY    metoprolol tartrate (LOPRESSOR) partial tablet 12.5 mg, Q12H    montelukast (SINGULAIR) tablet 10 mg, HS    ondansetron (ZOFRAN) injection 4 mg, Q6H PRN    pantoprazole (PROTONIX) EC tablet 40 mg, Early Morning    PARoxetine (PAXIL) tablet 20 mg, Daily With Dinner    senna (SENOKOT) tablet 8.6 mg, HS PRN    Administrative Statements   Today, Patient Was Seen By: Shilpi Marquez MD      **Please Note: This note may have been constructed using a voice recognition system.**

## 2025-04-25 NOTE — ASSESSMENT & PLAN NOTE
Ruled out  SIRS criteria: Tachycardia, fever, and tachypnea, possible related to unspecified URI/asthma exacerbation  Suspect source is pneumonia as suspected on CXR, but official read was negative for acute pathology  No signs of endorgan dysfunction  Initially on ceftriaxone and azithromycin  blood cultures and sputum culture pending

## 2025-04-25 NOTE — QUICK NOTE
Patient with persistent hyperglycemia.  Standing mealtime insulin increased to 30 units 3 times daily.  Sliding scale insulin algorithms increased.

## 2025-04-25 NOTE — PROGRESS NOTES
Progress Note - Pulmonology   Name: Vernell Darby 49 y.o. female I MRN: [id number]  Unit/Bed#: -01 I Date of Admission: 4/23/2025   Date of Service: 4/25/2025 I Hospital Day: 2    Assessment & Plan  Severe persistent asthma with acute exacerbation  Acute exacerbation secondary to suspected viral URI, possible RLL pneumonia  CXR not convincing of pneumonia, unremarkable imaging   Urinary antigens negative, serial procal negative, covid/flu negative, WBC WNL  Blood cultures show no growth at 24 hours  RP2 negative  Will continue azithromycin to complete 3-day course  Continue atrovent/xopenex nebs-will discontinue Pulmicort nebulizer and resume ICS/LABA  Continue Solu-Medrol 40 mg IV every 12 hours today and can decrease to daily starting tomorrow  Discharge home on prednisone taper 40 mg decrease by 10 every 3 days  Home regimen: Advair 250mcg one puff twice daily, singulair daily, albuterol HFA prn  Patient will need nebulizer machine and duonebs upon discharge. CM referral placed for nebulizer   Does previously have elevated eosinophils (530, 490, 450) could consider biologic as outpatient   She will need OP pulmonary follow up with PFTs   Acute respiratory insufficiency  Secondary to asthma exacerbation and suspected acute URI/ possible pneumonia  Saturations in 80s on room air, requiring 2L upon admission. Currently on room air  Not on oxygen at baseline  Currently 92% on RA  Titrate oxygen to maintain sats >89%  Type 1 diabetes mellitus with hyperglycemia (HCC)  Lab Results   Component Value Date    HGBA1C 9.5 (H) 10/04/2024       Recent Labs     04/24/25  2033 04/24/25  2256 04/25/25  0156 04/25/25  0713   POCGLU 390* 319* 295* 300*       Blood Sugar Average: Last 72 hrs:  (P) 340.4936833940351331  Closely monitor blood sugars while on high dose steroids  Chronic diastolic congestive heart failure (HCC)  Wt Readings from Last 3 Encounters:   04/25/25 81.8 kg (180 lb 5 oz)   04/21/25 81.6 kg (180 lb)    04/09/25 83.9 kg (185 lb)     Examines euvolemic, management per primary team  PAF (paroxysmal atrial fibrillation) (HCC)  Current loop recorder placed  On eliquis 5mg twice daily, metoprolol 12.5mg twice daily  CAP (community acquired pneumonia)  As above  Sepsis without acute organ dysfunction (HCC)  As above  Morbid obesity with body mass index (BMI) of 40.0 to 44.9 in adult ([facility])  Diet and lifestyle modification discussed  Weight loss encouraged    24 Hour Events : wheezing and SOB overnight  Subjective : Pt seen and examined at bedside.  Found resting comfortably in bed on RA saturations stable.  Diffuse wheezing present throughout all lung fields.  Patient denies any fever chills night sweats chest pain hemoptysis    Objective :  Temp:  [96.7 °F (35.9 °C)-97.6 °F (36.4 °C)] 97.6 °F (36.4 °C)  HR:  [77-90] 81  BP: (127-174)/(67-80) 154/80  Resp:  [18-20] 20  SpO2:  [94 %-99 %] 98 %  O2 Device: None (Room air)    Physical Exam  Vitals reviewed.   Constitutional:       General: She is not in acute distress.     Appearance: She is obese. She is not ill-appearing.   HENT:      Head: Normocephalic.      Right Ear: External ear normal.      Left Ear: External ear normal.      Nose: Nose normal.      Mouth/Throat:      Mouth: Mucous membranes are moist.      Pharynx: Oropharynx is clear.   Eyes:      Extraocular Movements: Extraocular movements intact.      Pupils: Pupils are equal, round, and reactive to light.   Cardiovascular:      Rate and Rhythm: Normal rate.      Pulses: Normal pulses.      Heart sounds: Normal heart sounds.   Pulmonary:      Effort: Pulmonary effort is normal.      Breath sounds: Wheezing present.      Comments: Diffuse wheezing bilaterally  Abdominal:      General: Bowel sounds are normal.      Tenderness: There is no abdominal tenderness.   Musculoskeletal:         General: Normal range of motion.      Cervical back: Normal range of motion and neck supple.      Right lower leg: No edema.       Left lower leg: No edema.   Skin:     General: Skin is warm and dry.   Neurological:      Mental Status: She is alert and oriented to person, place, and time.   Psychiatric:         Mood and Affect: Mood normal.         Behavior: Behavior normal.         Thought Content: Thought content normal.         Judgment: Judgment normal.         Lab Results: I have reviewed the following results:   .     04/25/25  0517   WBC 10.81*   HGB 10.9*   HCT 33.1*      SODIUM 132*   K 4.5   CL 99   CO2 24   BUN 41*   CREATININE 0.87   GLUC 326*     ABG: No new results in last 24 hours.    Imaging Results Review: I reviewed radiology reports from this admission including: chest xray.  Chest x-ray 04/23/2025 shows no acute cardiopulmonary disease  Other Study Results Review: Other studies reviewed include: ECHO  Echo September 2024 shows LVEF 60% systolic function normal wall motion normal diastolic mildly abnormal consistent with grade 1 relaxation  PFT Results Reviewed: No formal PFTs

## 2025-04-25 NOTE — ASSESSMENT & PLAN NOTE
Acute exacerbation secondary to suspected viral URI, possible RLL pneumonia  CXR not convincing of pneumonia, unremarkable imaging   Urinary antigens negative, serial procal negative, covid/flu negative, WBC WNL  Blood cultures show no growth at 24 hours  RP2 negative  Will continue azithromycin to complete 3-day course  Continue atrovent/xopenex nebs-will discontinue Pulmicort nebulizer and resume ICS/LABA  Continue Solu-Medrol 40 mg IV every 12 hours today and can decrease to daily starting tomorrow  Discharge home on prednisone taper 40 mg decrease by 10 every 3 days  Home regimen: Advair 250mcg one puff twice daily, singulair daily, albuterol HFA prn  Patient will need nebulizer machine and duonebs upon discharge. CM referral placed for nebulizer   Does previously have elevated eosinophils (530, 490, 450) could consider biologic as outpatient   She will need OP pulmonary follow up with PFTs

## 2025-04-25 NOTE — PLAN OF CARE
Problem: INFECTION - ADULT  Goal: Absence or prevention of progression during hospitalization  Description: INTERVENTIONS:- Assess and monitor for signs and symptoms of infection- Monitor lab/diagnostic results- Monitor all insertion sites, i.e. indwelling lines, tubes, and drains- Monitor endotracheal if appropriate and nasal secretions for changes in amount and color- [place] appropriate cooling/warming therapies per order- Administer medications as ordered- Instruct and encourage patient and family to use good hand hygiene technique- Identify and instruct in appropriate isolation precautions for identified infection/condition  Outcome: Progressing  Goal: Absence of fever/infection during neutropenic period  Description: INTERVENTIONS:- Monitor WBC  Outcome: Progressing     Problem: SAFETY ADULT  Goal: Maintain or return to baseline ADL function  Description: INTERVENTIONS:-  Assess patient's ability to carry out ADLs; assess patient's baseline for ADL function and identify physical deficits which impact ability to perform ADLs (bathing, care of mouth/teeth, toileting, grooming, dressing, etc.)- Assess/evaluate cause of self-care deficits - Assess range of motion- Assess patient's mobility; develop plan if impaired- Assess patient's need for assistive devices and provide as appropriate- Encourage maximum independence but intervene and supervise when necessary- Involve family in performance of ADLs- Assess for home care needs following discharge - Consider OT consult to assist with ADL evaluation and planning for discharge- Provide patient education as appropriate  Outcome: Progressing    Problem: RESPIRATORY - ADULT  Goal: Achieves optimal ventilation and oxygenation  Description: INTERVENTIONS:- Assess for changes in respiratory status- Assess for changes in mentation and behavior- Position to facilitate oxygenation and minimize respiratory effort- Oxygen administered by appropriate delivery if ordered-  Initiate smoking cessation education as indicated- Encourage broncho-pulmonary hygiene including cough, deep breathe, Incentive Spirometry- Assess the need for suctioning and aspirate as needed- Assess and instruct to report SOB or any respiratory difficulty- Respiratory Therapy support as indicated  Outcome: Progressing

## 2025-04-25 NOTE — ASSESSMENT & PLAN NOTE
resolved  SpO2 on room air was in the 80s  Requiring 2 L nasal cannula on admission, but now weaned down to r/a  Likely secondary to pneumonia and asthma exacerbation  Wean oxygen as tolerated

## 2025-04-25 NOTE — CASE MANAGEMENT
Case Management Discharge Planning Note    Patient name Vernell Darby  Location /-01 MRN [id number]  : 1975 Date 2025       Current Admission Date: 2025  Current Admission Diagnosis:Severe persistent asthma with acute exacerbation   Patient Active Problem List    Diagnosis Date Noted Date Diagnosed    Acute respiratory insufficiency 2025     CAP (community acquired pneumonia) 2025     Sepsis without acute organ dysfunction ([facility]) 2025     PAF (paroxysmal atrial fibrillation) ([facility]) 2024     N&V (nausea and vomiting) 2024     SIRS (systemic inflammatory response syndrome) ([facility]) 2024     ATN (acute tubular necrosis) ([facility]) 2024     Oliguria and anuria 2024     SABRINA (acute kidney injury) ([facility]) 2024     Sore throat 2024     Stage 2 chronic kidney disease 2024     Hyperphosphatemia 2024     Hyponatremia 2023     Primary osteoarthritis of right knee 10/27/2022     Chronic diastolic congestive heart failure ([facility]) 2021     B12 deficiency 2021     Nuclear sclerotic cataract of left eye 2020     Diabetic polyneuropathy associated with type 1 diabetes mellitus ([facility]) 2020     Diabetic nephropathy associated with type 1 diabetes mellitus ([facility]) 2020     Other proteinuria 2020     Morbid obesity with body mass index (BMI) of 40.0 to 44.9 in adult ([facility]) 08/10/2019     Gastroesophageal reflux disease without esophagitis 08/10/2019     Anemia 02/15/2017     Thrombocytosis 02/15/2017     Type 1 diabetes mellitus with hyperglycemia ([facility]) 2016     Hyperlipidemia 2016     Polycystic ovarian syndrome 2016     Retinal hemorrhage 2016     Severe persistent asthma with acute exacerbation      Hypertension      Moderate episode of recurrent major depressive disorder ([facility])      Nervousness 07/10/2014     Both eyes affected by mild nonproliferative diabetic retinopathy with  macular edema, associated with type 1 diabetes mellitus (HCC) 03/22/2012       LOS (days): 2  Geometric Mean LOS (GMLOS) (days): 4.9  Days to GMLOS:3.3     OBJECTIVE:  Risk of Unplanned Readmission Score: 21.28         Current admission status: Inpatient   Preferred Pharmacy:   [id number] - [place], [place] - [address] [address]  Phone: [phone] Fax: [phone]    Primary Care Provider: Rika Nugent MD    Primary Insurance: BLUE CROSS  Secondary Insurance:     DISCHARGE DETAILS:      DME Referral Provided  Referral made for DME?: Yes  DME referral completed for the following items:: Nebulizer  DME Supplier Name:: [employer]    Other Referral/Resources/Interventions Provided:  Interventions: DME  Referral Comments: Nebulizer ordered through [employer]    Additional Comments: Nebulizer approved with copay of $1.24. Pt is agreeable. Nebulizer delivered; delivery ticket signed.

## 2025-04-25 NOTE — PLAN OF CARE
Problem: Potential for Falls  Goal: Patient will remain free of falls  Description: INTERVENTIONS:- Educate patient/family on patient safety including physical limitations- Instruct patient to call for assistance with activity - Consult OT/PT to assist with strengthening/mobility - Keep Call bell within reach- Keep bed low and locked with side rails adjusted as appropriate- Keep care items and personal belongings within reach- Initiate and maintain comfort rounds- Make Fall Risk Sign visible to staff- Offer Toileting every x Hours, in advance of need- Initiate/Maintain xalarm- Obtain necessary fall risk management equipment: x- Apply yellow socks and bracelet for high fall risk patients- Consider moving patient to room near nurses station  INTERVENTIONS:- Educate patient/family on patient safety including physical limitations- Instruct patient to call for assistance with activity - Consult OT/PT to assist with strengthening/mobility - Keep Call bell within reach- Keep bed low and locked with side rails adjusted as appropriate- Keep care items and personal belongings within reach- Initiate and maintain comfort rounds- Make Fall Risk Sign visible to staff- Offer Toileting every x Hours, in advance of need- Initiate/Maintain xalarm- Obtain necessary fall risk management equipment: x- Apply yellow socks and bracelet for high fall risk patients- Consider moving patient to room near nurses station  Outcome: Progressing     Problem: INFECTION - ADULT  Goal: Absence or prevention of progression during hospitalization  Description: INTERVENTIONS:- Assess and monitor for signs and symptoms of infection- Monitor lab/diagnostic results- Monitor all insertion sites, i.e. indwelling lines, tubes, and drains- Monitor endotracheal if appropriate and nasal secretions for changes in amount and color- [place] appropriate cooling/warming therapies per order- Administer medications as ordered- Instruct and encourage patient and  family to use good hand hygiene technique- Identify and instruct in appropriate isolation precautions for identified infection/condition  Outcome: Progressing  Goal: Absence of fever/infection during neutropenic period  Description: INTERVENTIONS:- Monitor WBC  Outcome: Progressing     Problem: SAFETY ADULT  Goal: Patient will remain free of falls  Description: INTERVENTIONS:- Educate patient/family on patient safety including physical limitations- Instruct patient to call for assistance with activity - Consult OT/PT to assist with strengthening/mobility - Keep Call bell within reach- Keep bed low and locked with side rails adjusted as appropriate- Keep care items and personal belongings within reach- Initiate and maintain comfort rounds- Make Fall Risk Sign visible to staff- Offer Toileting every xx Hours, in advance of need- Initiate/Maintain xalarm- Obtain necessary fall risk management equipment: x- Apply yellow socks and bracelet for high fall risk patients- Consider moving patient to room near nurses station  INTERVENTIONS:- Educate patient/family on patient safety including physical limitations- Instruct patient to call for assistance with activity - Consult OT/PT to assist with strengthening/mobility - Keep Call bell within reach- Keep bed low and locked with side rails adjusted as appropriate- Keep care items and personal belongings within reach- Initiate and maintain comfort rounds- Make Fall Risk Sign visible to staff- Offer Toileting every x Hours, in advance of need- Initiate/Maintain xalarm- Obtain necessary fall risk management equipment: x- Apply yellow socks and bracelet for high fall risk patients- Consider moving patient to room near nurses station  Outcome: Progressing  Goal: Maintain or return to baseline ADL function  Description: INTERVENTIONS:-  Assess patient's ability to carry out ADLs; assess patient's baseline for ADL function and identify physical deficits which impact ability to perform  ADLs (bathing, care of mouth/teeth, toileting, grooming, dressing, etc.)- Assess/evaluate cause of self-care deficits - Assess range of motion- Assess patient's mobility; develop plan if impaired- Assess patient's need for assistive devices and provide as appropriate- Encourage maximum independence but intervene and supervise when necessary- Involve family in performance of ADLs- Assess for home care needs following discharge - Consider OT consult to assist with ADL evaluation and planning for discharge- Provide patient education as appropriate  Outcome: Progressing  Goal: Maintains/Returns to pre admission functional level  Description: INTERVENTIONS:- Perform AM-PAC 6 Click Basic Mobility/ Daily Activity assessment daily.- Set and communicate daily mobility goal to care team and patient/family/caregiver. - Collaborate with rehabilitation services on mobility goals if consulted- Perform Range of Motion x times a day.- Reposition patient every xx hours.- Dangle patient x times a day- Stand patient x times a day- Ambulate patient x times a day- Out of bed to chair x times a day - Out of bed for meals xx times a day- Out of bed for toileting- Record patient progress and toleration of activity level   Outcome: Progressing     Problem: RESPIRATORY - ADULT  Goal: Achieves optimal ventilation and oxygenation  Description: INTERVENTIONS:- Assess for changes in respiratory status- Assess for changes in mentation and behavior- Position to facilitate oxygenation and minimize respiratory effort- Oxygen administered by appropriate delivery if ordered- Initiate smoking cessation education as indicated- Encourage broncho-pulmonary hygiene including cough, deep breathe, Incentive Spirometry- Assess the need for suctioning and aspirate as needed- Assess and instruct to report SOB or any respiratory difficulty- Respiratory Therapy support as indicated  Outcome: Progressing

## 2025-04-25 NOTE — CASE MANAGEMENT
Case Management Assessment & Discharge Planning Note    Patient name Vernell Darby  Location /-01 MRN [id number]  : 1975 Date 2025       Current Admission Date: 2025  Current Admission Diagnosis:Severe persistent asthma with acute exacerbation   Patient Active Problem List    Diagnosis Date Noted Date Diagnosed    Acute respiratory insufficiency 2025     CAP (community acquired pneumonia) 2025     Sepsis without acute organ dysfunction ([facility]) 2025     PAF (paroxysmal atrial fibrillation) ([facility]) 2024     N&V (nausea and vomiting) 2024     SIRS (systemic inflammatory response syndrome) ([facility]) 2024     ATN (acute tubular necrosis) ([facility]) 2024     Oliguria and anuria 2024     SABRINA (acute kidney injury) ([facility]) 2024     Sore throat 2024     Stage 2 chronic kidney disease 2024     Hyperphosphatemia 2024     Hyponatremia 2023     Primary osteoarthritis of right knee 10/27/2022     Chronic diastolic congestive heart failure ([facility]) 2021     B12 deficiency 2021     Nuclear sclerotic cataract of left eye 2020     Diabetic polyneuropathy associated with type 1 diabetes mellitus ([facility]) 2020     Diabetic nephropathy associated with type 1 diabetes mellitus ([facility]) 2020     Other proteinuria 2020     Morbid obesity with body mass index (BMI) of 40.0 to 44.9 in adult ([facility]) 08/10/2019     Gastroesophageal reflux disease without esophagitis 08/10/2019     Anemia 02/15/2017     Thrombocytosis 02/15/2017     Type 1 diabetes mellitus with hyperglycemia ([facility]) 2016     Hyperlipidemia 2016     Polycystic ovarian syndrome 2016     Retinal hemorrhage 2016     Severe persistent asthma with acute exacerbation      Hypertension      Moderate episode of recurrent major depressive disorder ([facility])      Nervousness 07/10/2014     Both eyes affected by mild nonproliferative diabetic  retinopathy with macular edema, associated with type 1 diabetes mellitus ([facility]) 03/22/2012       LOS (days): 2  Geometric Mean LOS (GMLOS) (days): 4.9  Days to GMLOS:3.4     OBJECTIVE:    Risk of Unplanned Readmission Score: 21.66         Current admission status: Inpatient       [facility] Pharmacy:   [id number] - [place], [place] - [address] [address]  Phone: [phone] Fax: [phone]    Primary Care Provider: Rika Nugent MD    Primary Insurance: BLUE CROSS  Secondary Insurance:     ASSESSMENT:  Active Health Care Proxies       Wilner Krishna Health Care Representative - Spouse   Primary Phone: [phone] (Mobile)  Home Phone: [phone]                 Advance Directives  Does patient have a Health Care POA?: No  Was patient offered paperwork?: Yes (declined)  Does patient currently have a Health Care decision maker?: Yes, please see Health Care Proxy section  Does patient have Advance Directives?: No  Was patient offered paperwork?: Yes (declined)  Primary Contact: Krishna ()         Readmission Root Cause  30 Day Readmission: No    Patient Information  Admitted from:: Home  Mental Status: Alert  During Assessment patient was accompanied by: Not accompanied during assessment  Assessment information provided by:: Patient  Primary Caregiver: Self  Support Systems: Self, Spouse/significant other, Family members  County of Residence: [place]  What city do you live in?: [place]  Home entry access options. Select all that apply.: Stairs  Number of steps to enter home.: One Flight  Do the steps have railings?: Yes  Type of Current Residence: [facility]  Living Arrangements: Lives w/ Spouse/significant other  Is patient a ?: No    Activities of Daily Living Prior to Admission  Functional Status: Independent  Completes ADLs independently?: Yes  Ambulates independently?: Yes  Does patient use assisted devices?: No  Does  patient currently own DME?: Yes  What DME does the patient currently own?: Walker, Wheelchair, Crutches, Nebulizer  Does patient have a history of Outpatient Therapy (PT/OT)?: No  Does the patient have a history of Short-Term Rehab?: No  Does patient have a history of HHC?: No  Does patient currently have HHC?: No    Patient Information Continued  Does patient have prescription coverage?: Yes  Can the patient afford their medications and any related supplies (such as glucometers or test strips)?: Yes  Does patient receive dialysis treatments?: No  Does patient have a history of substance abuse?: No  Does patient have a history of Mental Health Diagnosis?: No    Means of Transportation  Means of Transport to \[facility]\"":: Drives Self    DISCHARGE DETAILS:    Discharge planning discussed with:: patient  Freedom of Choice: Yes  Comments - Freedom of Choice: patient is requesting a new nebulizer. Order sent to [facility]  CM contacted family/caregiver?: No- see comments (reports being in contact with family)  Were Treatment Team discharge recommendations reviewed with patient/caregiver?: Yes  Did patient/caregiver verbalize understanding of patient care needs?: Yes  Were patient/caregiver advised of the risks associated with not following Treatment Team discharge recommendations?: Yes    Requested Home Health Care         Is the patient interested in HHC at discharge?: No    DME Referral Provided  Referral made for DME?: No    Treatment Team Recommendation: Home  Discharge Destination Plan:: Home  Transport at Discharge : Family     Additional Comments: Met with pt to discuss the role of CM and to discuss any help pt may need prior to dc. Pt lives iwth her  in a ranch home with 12 [address]. Pt performed ADL's indptly pta, pt uses a nebulizer but states it is old and is requesting a new one. Pt has crutches, RW, and WC. No hx of HHC or rehab. No hx of mental health or D&A treatment. Pt's preferred pharmacy is [facility] in  [place]. Pt drives. Pt's  will transport home at [place]. CM sent parachute order to [facility] for a new nebulizer; await approval prior to delivering.

## 2025-04-25 NOTE — ASSESSMENT & PLAN NOTE
Secondary to asthma exacerbation and suspected acute URI/ possible pneumonia  Saturations in 80s on room air, requiring 2L upon admission. Currently on room air  Not on oxygen at baseline  Currently 92% on RA  Titrate oxygen to maintain sats >89%

## 2025-04-26 VITALS
TEMPERATURE: 98.3 F | HEART RATE: 81 BPM | DIASTOLIC BLOOD PRESSURE: 65 MMHG | HEIGHT: 56 IN | OXYGEN SATURATION: 95 % | SYSTOLIC BLOOD PRESSURE: 135 MMHG | WEIGHT: 182 LBS | BODY MASS INDEX: 40.94 KG/M2 | RESPIRATION RATE: 18 BRPM

## 2025-04-26 PROBLEM — R06.89 ACUTE RESPIRATORY INSUFFICIENCY: Status: RESOLVED | Noted: 2025-04-23 | Resolved: 2025-04-26

## 2025-04-26 LAB
ANION GAP SERPL CALCULATED.3IONS-SCNC: 9 MMOL/L (ref 4–13)
BUN SERPL-MCNC: 44 MG/DL (ref 5–25)
CALCIUM SERPL-MCNC: 9 MG/DL (ref 8.4–10.2)
CHLORIDE SERPL-SCNC: 104 MMOL/L (ref 96–108)
CO2 SERPL-SCNC: 24 MMOL/L (ref 21–32)
CREAT SERPL-MCNC: 0.77 MG/DL (ref 0.6–1.3)
ERYTHROCYTE [DISTWIDTH] IN BLOOD BY AUTOMATED COUNT: 14.5 % (ref 11.6–15.1)
GFR SERPL CREATININE-BSD FRML MDRD: 90 ML/MIN/1.73SQ M
GLUCOSE SERPL-MCNC: 146 MG/DL (ref 65–140)
GLUCOSE SERPL-MCNC: 148 MG/DL (ref 65–140)
GLUCOSE SERPL-MCNC: 330 MG/DL (ref 65–140)
HCT VFR BLD AUTO: 32.3 % (ref 34.8–46.1)
HGB BLD-MCNC: 10.6 G/DL (ref 11.5–15.4)
MCH RBC QN AUTO: 28.9 PG (ref 26.8–34.3)
MCHC RBC AUTO-ENTMCNC: 32.8 G/DL (ref 31.4–37.4)
MCV RBC AUTO: 88 FL (ref 82–98)
PLATELET # BLD AUTO: 343 THOUSANDS/UL (ref 149–390)
PMV BLD AUTO: 10.3 FL (ref 8.9–12.7)
POTASSIUM SERPL-SCNC: 4.5 MMOL/L (ref 3.5–5.3)
RBC # BLD AUTO: 3.67 MILLION/UL (ref 3.81–5.12)
SODIUM SERPL-SCNC: 137 MMOL/L (ref 135–147)
WBC # BLD AUTO: 14.17 THOUSAND/UL (ref 4.31–10.16)

## 2025-04-26 PROCEDURE — 94640 AIRWAY INHALATION TREATMENT: CPT

## 2025-04-26 PROCEDURE — 82948 REAGENT STRIP/BLOOD GLUCOSE: CPT

## 2025-04-26 PROCEDURE — 87070 CULTURE OTHR SPECIMN AEROBIC: CPT | Performed by: PHYSICIAN ASSISTANT

## 2025-04-26 PROCEDURE — 94760 N-INVAS EAR/PLS OXIMETRY 1: CPT

## 2025-04-26 PROCEDURE — 87205 SMEAR GRAM STAIN: CPT | Performed by: PHYSICIAN ASSISTANT

## 2025-04-26 PROCEDURE — 80048 BASIC METABOLIC PNL TOTAL CA: CPT | Performed by: FAMILY MEDICINE

## 2025-04-26 PROCEDURE — 99239 HOSP IP/OBS DSCHRG MGMT >30: CPT | Performed by: PHYSICIAN ASSISTANT

## 2025-04-26 PROCEDURE — 85027 COMPLETE CBC AUTOMATED: CPT | Performed by: FAMILY MEDICINE

## 2025-04-26 RX ORDER — IPRATROPIUM BROMIDE AND ALBUTEROL SULFATE 2.5; .5 MG/3ML; MG/3ML
3 SOLUTION RESPIRATORY (INHALATION) EVERY 8 HOURS PRN
Qty: 270 ML | Refills: 0 | Status: SHIPPED | OUTPATIENT
Start: 2025-04-26

## 2025-04-26 RX ORDER — PREDNISONE 20 MG/1
TABLET ORAL
Qty: 13 TABLET | Refills: 0 | Status: SHIPPED | OUTPATIENT
Start: 2025-04-27 | End: 2025-05-08

## 2025-04-26 RX ORDER — PREDNISONE 20 MG/1
40 TABLET ORAL DAILY
Status: DISCONTINUED | OUTPATIENT
Start: 2025-04-27 | End: 2025-04-26 | Stop reason: HOSPADM

## 2025-04-26 RX ORDER — INSULIN GLARGINE 300 U/ML
95 INJECTION, SOLUTION SUBCUTANEOUS DAILY
Start: 2025-04-26 | End: 2025-05-16

## 2025-04-26 RX ADMIN — GUAIFENESIN 600 MG: 600 TABLET ORAL at 08:07

## 2025-04-26 RX ADMIN — IPRATROPIUM BROMIDE 0.5 MG: 0.5 SOLUTION RESPIRATORY (INHALATION) at 08:15

## 2025-04-26 RX ADMIN — OXYCODONE HYDROCHLORIDE AND ACETAMINOPHEN 500 MG: 500 TABLET ORAL at 08:07

## 2025-04-26 RX ADMIN — AZITHROMYCIN DIHYDRATE 500 MG: 500 TABLET ORAL at 12:03

## 2025-04-26 RX ADMIN — LEVALBUTEROL HYDROCHLORIDE 1.25 MG: 1.25 SOLUTION RESPIRATORY (INHALATION) at 08:15

## 2025-04-26 RX ADMIN — METHYLPREDNISOLONE SODIUM SUCCINATE 40 MG: 40 INJECTION, POWDER, FOR SOLUTION INTRAMUSCULAR; INTRAVENOUS at 08:07

## 2025-04-26 RX ADMIN — INSULIN LISPRO 30 UNITS: 100 INJECTION, SOLUTION INTRAVENOUS; SUBCUTANEOUS at 11:59

## 2025-04-26 RX ADMIN — VITAM B12 100 MCG: 100 TAB at 08:07

## 2025-04-26 RX ADMIN — INSULIN LISPRO 30 UNITS: 100 INJECTION, SOLUTION INTRAVENOUS; SUBCUTANEOUS at 09:35

## 2025-04-26 RX ADMIN — PANTOPRAZOLE SODIUM 40 MG: 40 TABLET, DELAYED RELEASE ORAL at 05:02

## 2025-04-26 RX ADMIN — APIXABAN 5 MG: 5 TABLET, FILM COATED ORAL at 08:07

## 2025-04-26 RX ADMIN — FLUTICASONE FUROATE AND VILANTEROL TRIFENATATE 1 PUFF: 200; 25 POWDER RESPIRATORY (INHALATION) at 08:07

## 2025-04-26 RX ADMIN — INSULIN GLARGINE 95 UNITS: 100 INJECTION, SOLUTION SUBCUTANEOUS at 08:06

## 2025-04-26 RX ADMIN — INSULIN LISPRO 16 UNITS: 100 INJECTION, SOLUTION INTRAVENOUS; SUBCUTANEOUS at 11:59

## 2025-04-26 NOTE — DISCHARGE INSTR - AVS FIRST PAGE
Follow-up with PCP within 1 week for posthospitalization follow-up  Continue outpatient follow-up with endocrinology, hematology  Follow up with pulmonology as previously scheduled for May 9th    Return to the emergency department for further evaluation with any chest pain/palpitations, worsening shortness of breath, nausea/vomiting, abdominal pain, fever/chills.

## 2025-04-26 NOTE — ASSESSMENT & PLAN NOTE
Wt Readings from Last 3 Encounters:   04/26/25 82.6 kg (182 lb)   04/21/25 81.6 kg (180 lb)   04/09/25 83.9 kg (185 lb)   Last echo 10/2024: LVEF 60%.  G1 DD.  Only utilizes diuretic for weight gain and swelling as needed  Appears euvolemic

## 2025-04-26 NOTE — ASSESSMENT & PLAN NOTE
First developed URI-like symptoms 4 days prior to admission with progression of dyspnea and wheezing  CXR without evidence of acute cardiopulmonary disease  Initially had been started on IV Rocephin for possible pneumonia.  This has since been discontinued given no evidence of infiltrates and procalcitonin negative x 2.  Pneumonia ruled out  Started on scheduled nebulizer treatments and IV Solu-Medrol with improvement  Pulmonology had been consulted  IV Solu-Medrol was weaned to every 12 hours yesterday.  Symptomatically patient feeling greatly improved  Transition to daily dosing today.  Will discharge with prednisone taper starting with 40 mg daily and decrease by 10 mg every 3 days  Order for nebulizer machine signed  Stable for discharge home today  Follow-up with pulmonology as outpatient -has appointment scheduled for May 9th

## 2025-04-26 NOTE — ASSESSMENT & PLAN NOTE
Patient met SIRS criteria with tachycardia, tachypnea, fever  Suspect related to URI leading to asthma exacerbation  CXR without infiltrates  Procalcitonin negative x 2  IV Rocephin discontinued given no evidence of bacterial pneumonia  Completed 3 days azithromycin for asthma exacerbation  RP 2 panel negative  Blood cultures negative to date

## 2025-04-26 NOTE — ASSESSMENT & PLAN NOTE
SpO2 on room air was in the 80s on admission  Requiring 2 L nasal cannula on admission, but now weaned down to r/a  Likely secondary to asthma exacerbation  Resolved

## 2025-04-26 NOTE — PLAN OF CARE
Problem: Potential for Falls  Goal: Patient will remain free of falls  Description: INTERVENTIONS:- Educate patient/family on patient safety including physical limitations- Instruct patient to call for assistance with activity - Consult OT/PT to assist with strengthening/mobility - Keep Call bell within reach- Keep bed low and locked with side rails adjusted as appropriate- Keep care items and personal belongings within reach- Initiate and maintain comfort rounds- Make Fall Risk Sign visible to staff- Offer Toileting every x Hours, in advance of need- Initiate/Maintain xalarm- Obtain necessary fall risk management equipment: x- Apply yellow socks and bracelet for high fall risk patients- Consider moving patient to room near nurses station  INTERVENTIONS:- Educate patient/family on patient safety including physical limitations- Instruct patient to call for assistance with activity - Consult OT/PT to assist with strengthening/mobility - Keep Call bell within reach- Keep bed low and locked with side rails adjusted as appropriate- Keep care items and personal belongings within reach- Initiate and maintain comfort rounds- Make Fall Risk Sign visible to staff- Offer Toileting every x Hours, in advance of need- Initiate/Maintain xalarm- Obtain necessary fall risk management equipment: x- Apply yellow socks and bracelet for high fall risk patients- Consider moving patient to room near nurses station  Outcome: Progressing     Problem: INFECTION - ADULT  Goal: Absence or prevention of progression during hospitalization  Description: INTERVENTIONS:- Assess and monitor for signs and symptoms of infection- Monitor lab/diagnostic results- Monitor all insertion sites, i.e. indwelling lines, tubes, and drains- Monitor endotracheal if appropriate and nasal secretions for changes in amount and color- [place] appropriate cooling/warming therapies per order- Administer medications as ordered- Instruct and encourage patient and  family to use good hand hygiene technique- Identify and instruct in appropriate isolation precautions for identified infection/condition  Outcome: Progressing  Goal: Absence of fever/infection during neutropenic period  Description: INTERVENTIONS:- Monitor WBC  Outcome: Progressing     Problem: SAFETY ADULT  Goal: Patient will remain free of falls  Description: INTERVENTIONS:- Educate patient/family on patient safety including physical limitations- Instruct patient to call for assistance with activity - Consult OT/PT to assist with strengthening/mobility - Keep Call bell within reach- Keep bed low and locked with side rails adjusted as appropriate- Keep care items and personal belongings within reach- Initiate and maintain comfort rounds- Make Fall Risk Sign visible to staff- Offer Toileting every x Hours, in advance of need- Initiate/Maintain xalarm- Obtain necessary fall risk management equipment: x- Apply yellow socks and bracelet for high fall risk patients- Consider moving patient to room near nurses station  INTERVENTIONS:- Educate patient/family on patient safety including physical limitations- Instruct patient to call for assistance with activity - Consult OT/PT to assist with strengthening/mobility - Keep Call bell within reach- Keep bed low and locked with side rails adjusted as appropriate- Keep care items and personal belongings within reach- Initiate and maintain comfort rounds- Make Fall Risk Sign visible to staff- Offer Toileting every x Hours, in advance of need- Initiate/Maintain xalarm- Obtain necessary fall risk management equipment: xx- Apply yellow socks and bracelet for high fall risk patients- Consider moving patient to room near nurses station  Outcome: Progressing  Goal: Maintain or return to baseline ADL function  Description: INTERVENTIONS:-  Assess patient's ability to carry out ADLs; assess patient's baseline for ADL function and identify physical deficits which impact ability to perform  ADLs (bathing, care of mouth/teeth, toileting, grooming, dressing, etc.)- Assess/evaluate cause of self-care deficits - Assess range of motion- Assess patient's mobility; develop plan if impaired- Assess patient's need for assistive devices and provide as appropriate- Encourage maximum independence but intervene and supervise when necessary- Involve family in performance of ADLs- Assess for home care needs following discharge - Consider OT consult to assist with ADL evaluation and planning for discharge- Provide patient education as appropriate  Outcome: Progressing  Goal: Maintains/Returns to pre admission functional level  Description: INTERVENTIONS:- Perform AM-PAC 6 Click Basic Mobility/ Daily Activity assessment daily.- Set and communicate daily mobility goal to care team and patient/family/caregiver. - Collaborate with rehabilitation services on mobility goals if consulted- Perform Range of Motion x times a day.- Reposition patient every x hours.- Dangle patient x times a day- Stand patient xxx times a day- Ambulate patient xx times a day- Out of bed to chair x times a day - Out of bed for meals x times a day- Out of bed for toileting- Record patient progress and toleration of activity level   Outcome: Progressing     Problem: RESPIRATORY - ADULT  Goal: Achieves optimal ventilation and oxygenation  Description: INTERVENTIONS:- Assess for changes in respiratory status- Assess for changes in mentation and behavior- Position to facilitate oxygenation and minimize respiratory effort- Oxygen administered by appropriate delivery if ordered- Initiate smoking cessation education as indicated- Encourage broncho-pulmonary hygiene including cough, deep breathe, Incentive Spirometry- Assess the need for suctioning and aspirate as needed- Assess and instruct to report SOB or any respiratory difficulty- Respiratory Therapy support as indicated  Outcome: Progressing

## 2025-04-26 NOTE — ASSESSMENT & PLAN NOTE
Lab Results   Component Value Date    HGBA1C 9.5 (H) 10/04/2024       Recent Labs     04/25/25  1533 04/25/25  2053 04/26/25  0656 04/26/25  1030   POCGLU 256* 261* 148* 330*       Blood Sugar Average: Last 72 hrs:  (P) [id number]  Home regimen: Metformin 500 Mg twice daily, Lantus 95 units daily, and insulin aspart 1 unit per 1 carb ratio  Resume home regimen on discharge.  Suspect some component of hyperglycemia due to steroids  Recommend close outpatient follow-up with endocrinology  Patient follows blood sugars at home with Dexcom

## 2025-04-26 NOTE — DISCHARGE SUMMARY
Discharge Summary - Hospitalist   Name: Vernell Darby 49 y.o. female I MRN: [id number]  Unit/Bed#: -01 I Date of Admission: 4/23/2025   Date of Service: 4/26/2025 I Hospital Day: 3     Assessment & Plan  Severe persistent asthma with acute exacerbation  First developed URI-like symptoms 4 days prior to admission with progression of dyspnea and wheezing  CXR without evidence of acute cardiopulmonary disease  Initially had been started on IV Rocephin for possible pneumonia.  This has since been discontinued given no evidence of infiltrates and procalcitonin negative x 2.  Pneumonia ruled out  Started on scheduled nebulizer treatments and IV Solu-Medrol with improvement  Pulmonology had been consulted  IV Solu-Medrol was weaned to every 12 hours yesterday.  Symptomatically patient feeling greatly improved  Transition to daily dosing today.  Will discharge with prednisone taper starting with 40 mg daily and decrease by 10 mg every 3 days  Order for nebulizer machine signed  Stable for discharge home today  Follow-up with pulmonology as outpatient -has appointment scheduled for May 9th  Sepsis without acute organ dysfunction (HCC)  Patient met SIRS criteria with tachycardia, tachypnea, fever  Suspect related to URI leading to asthma exacerbation  CXR without infiltrates  Procalcitonin negative x 2  IV Rocephin discontinued given no evidence of bacterial pneumonia  Completed 3 days azithromycin for asthma exacerbation  RP 2 panel negative  Blood cultures negative to date  Acute respiratory insufficiency (Resolved: 4/26/2025)  SpO2 on room air was in the 80s on admission  Requiring 2 L nasal cannula on admission, but now weaned down to r/a  Likely secondary to asthma exacerbation  Resolved  Type 1 diabetes mellitus with hyperglycemia (HCC)  Lab Results   Component Value Date    HGBA1C 9.5 (H) 10/04/2024       Recent Labs     04/25/25  1533 04/25/25  2053 04/26/25  0656 04/26/25  1030   POCGLU 256* 261* 148* 330*        Blood Sugar Average: Last 72 hrs:  (P) [id number]  Home regimen: Metformin 500 Mg twice daily, Lantus 95 units daily, and insulin aspart 1 unit per 1 carb ratio  Resume home regimen on discharge.  Suspect some component of hyperglycemia due to steroids  Recommend close outpatient follow-up with endocrinology  Patient follows blood sugars at home with Dexcom  PAF (paroxysmal atrial fibrillation) ([facility])  Had episode of A-fib when she was hospitalized for SABRINA in the past  Currently has loop recorder to determine need for ongoing anticoagulation  Continue home metoprolol 12.5 Mg twice daily and Eliquis 5 Mg twice daily   Chronic diastolic congestive heart failure ([facility])  Wt Readings from Last 3 Encounters:   04/26/25 82.6 kg (182 lb)   04/21/25 81.6 kg (180 lb)   04/09/25 83.9 kg (185 lb)   Last echo 10/2024: LVEF 60%.  G1 DD.  Only utilizes diuretic for weight gain and swelling as needed  Appears euvolemic  Hypertension  Continue metoprolol  Monitor blood pressure per unit protocol  Morbid obesity with body mass index (BMI) of 40.0 to 44.9 in adult ([facility])  Continue to encourage lifestyle and diet modification     Medical Problems       Resolved Problems  Date Reviewed: 1/13/2025          Resolved    Acute respiratory insufficiency 4/26/2025     Resolved by  Alexandra Campos PA-C        Discharging Physician / Practitioner: Alexandra Campos PA-C  PCP: Rika Nugent MD  Admission Date:   Admission Orders (From admission, onward)       Ordered        04/23/25 2135  INPATIENT ADMISSION  Once                          Discharge Date: 04/26/25    Consultations During Hospital Stay:  Pulmonology    Procedures Performed:   None    Significant Findings / Test Results:   CXR no acute cardiopulmonary disease  RP 2 panel negative  Blood cultures negative to date  Urine antigens negative    Incidental Findings:   None    Test Results Pending at Discharge (will require follow up):   Sputum cx     Outpatient Tests  "Requested:  None    Complications:  None    Reason for Admission: Asthma exacerbation    Hospital Course:   Vernell Darby is a 49 y.o. female patient who originally presented to the hospital on 4/23/2025 due to shortness of breath.    Past medical history significant for asthma, type 1 diabetes, diastolic heart failure, paroxysmal A-fib, hypertension, obesity.  Patient presented to the emergency department with gradually worsening shortness of breath following URI like symptoms.  Patient was started on scheduled nebulizer treatments and IV Solu-Medrol.  Pulmonology was consulted.  Patient was initially hypoxic requiring nasal cannula supplementation but this was weaned off over course of hospitalization.  Patient was initially started on IV Rocephin and azithromycin, however no evidence of bacterial pneumonia.  Suspect URI contributing.  Blood cultures negative to date.  IV Rocephin was discontinued and patient completed 3 days of azithromycin while inpatient.  IV Solu-Medrol was weaned down to daily dosing without difficulty.  Nebulizer machine was provided prior to discharge.  On day of discharge patient was afebrile, hemodynamically stable and verbalized understanding for requested outpatient follow-up.  Patient has office appointment scheduled with pulmonology for May 9.    Please see above list of diagnoses and related plan for additional information.     Condition at Discharge: stable    Discharge Day Visit / Exam:   Subjective: Feeling much improved from admission.  Shortness of breath resolved.  Was able to expectorate mucus earlier this morning.  Vitals: Blood Pressure: 135/65 (04/26/25 0927)  Pulse: 81 (04/26/25 0927)  Temperature: 98.3 °F (36.8 °C) (04/26/25 0701)  Temp Source: Oral (04/26/25 0701)  Respirations: 18 (04/26/25 0701)  Height: 4' 8\" (142.2 cm) (04/24/25 0014)  Weight - Scale: 82.6 kg (182 lb) (04/26/25 0554)  SpO2: 95 % (04/26/25 0957)  Physical Exam  Vitals and nursing note reviewed. "   Constitutional:       Appearance: Normal appearance.      Comments: No acute distress   HENT:      Head: Normocephalic.   Eyes:      General: No scleral icterus.     Extraocular Movements: Extraocular movements intact.      Conjunctiva/sclera: Conjunctivae normal.   Cardiovascular:      Rate and Rhythm: Normal rate and regular rhythm.   Pulmonary:      Effort: Pulmonary effort is normal.      Breath sounds: Normal breath sounds. No wheezing, rhonchi or rales.   Abdominal:      General: Bowel sounds are normal.      Palpations: Abdomen is soft.      Tenderness: There is no abdominal tenderness. There is no guarding or rebound.   Musculoskeletal:         General: No swelling, tenderness or deformity.      Cervical back: Normal range of motion.      Comments: Able to move upper/lower extremities bilaterally, no edema   Skin:     General: Skin is warm and dry.   Neurological:      Mental Status: She is alert and oriented to person, place, and time.   Psychiatric:         Mood and Affect: Mood normal.         Speech: Speech normal.         Behavior: Behavior normal.          Discussion with Family: Updated  () at bedside.    Discharge instructions/Information to patient and family:   See after visit summary for information provided to patient and family.      Provisions for Follow-Up Care:  See after visit summary for information related to follow-up care and any pertinent home health orders.      Mobility at time of Discharge:   Basic Mobility Inpatient Raw Score: 24  JH-HLM Goal: 8: Walk 250 feet or more  JH-HLM Achieved: 8: Walk 250 feet ot more  HLM Goal achieved. Continue to encourage appropriate mobility.     Disposition:   Home    Planned Readmission: None    Discharge Medications:  See after visit summary for reconciled discharge medications provided to patient and/or family.      Administrative Statements   Discharge Statement:  I have spent a total time of 60 minutes in caring for this  patient on the day of the visit/encounter. >30 minutes of time was spent on: Diagnostic results, Instructions for management, Patient and family education, Importance of tx compliance, Risk factor reductions, Impressions, Counseling / Coordination of care, Documenting in the medical record, and Reviewing / ordering tests, medicine, procedures  .    **Please Note: This note may have been constructed using a voice recognition system**

## 2025-04-26 NOTE — PLAN OF CARE
Problem: INFECTION - ADULT  Goal: Absence or prevention of progression during hospitalization  Description: INTERVENTIONS:- Assess and monitor for signs and symptoms of infection- Monitor lab/diagnostic results- Monitor all insertion sites, i.e. indwelling lines, tubes, and drains- Monitor endotracheal if appropriate and nasal secretions for changes in amount and color- [place] appropriate cooling/warming therapies per order- Administer medications as ordered- Instruct and encourage patient and family to use good hand hygiene technique- Identify and instruct in appropriate isolation precautions for identified infection/condition  Outcome: Progressing  Goal: Absence of fever/infection during neutropenic period  Description: INTERVENTIONS:- Monitor WBC  Outcome: Progressing     Problem: RESPIRATORY - ADULT  Goal: Achieves optimal ventilation and oxygenation  Description: INTERVENTIONS:- Assess for changes in respiratory status- Assess for changes in mentation and behavior- Position to facilitate oxygenation and minimize respiratory effort- Oxygen administered by appropriate delivery if ordered- Initiate smoking cessation education as indicated- Encourage broncho-pulmonary hygiene including cough, deep breathe, Incentive Spirometry- Assess the need for suctioning and aspirate as needed- Assess and instruct to report SOB or any respiratory difficulty- Respiratory Therapy support as indicated  Outcome: Progressing

## 2025-04-28 ENCOUNTER — PATIENT OUTREACH (OUTPATIENT)
Dept: CASE MANAGEMENT | Facility: OTHER | Age: 50
End: 2025-04-28

## 2025-04-28 ENCOUNTER — TRANSITIONAL CARE MANAGEMENT (OUTPATIENT)
Dept: FAMILY MEDICINE CLINIC | Facility: CLINIC | Age: 50
End: 2025-04-28

## 2025-04-28 LAB
ATRIAL RATE: 90 BPM
BACTERIA SPT RESP CULT: ABNORMAL
GRAM STN SPEC: ABNORMAL
P AXIS: 68 DEGREES
PR INTERVAL: 138 MS
QRS AXIS: 84 DEGREES
QRSD INTERVAL: 84 MS
QT INTERVAL: 350 MS
QTC INTERVAL: 428 MS
T WAVE AXIS: 2 DEGREES
VENTRICULAR RATE: 90 BPM

## 2025-04-28 PROCEDURE — 93010 ELECTROCARDIOGRAM REPORT: CPT | Performed by: INTERNAL MEDICINE

## 2025-04-28 NOTE — PROGRESS NOTES
Outpatient Care Management Note    HRR referral received. Chart review completed.    Patient's PMH includes: type 1 diabetes, asthma, diastolic heart failure, paroxysmal A-fib, hypertension, and obesity.    Patient was hospitalized at [facility] 4/23/25-4/26/25 for severe persistent asthma with acute exacerbation.  Per chart, patient presented due to shortness of breath following URI-like symptoms. Pulmonology consulted. Patient initially hypoxic requiring supplemental oxygen but was weaned over course of hospitalization.  No evidence of pneumonia. Blood cultures negative to date. Received nebulizer treatments, IV Solu-medrol, and antibiotics.  Nebulizer machine provided prior to discharge.  Patient to follow up with Pulmonology as outpatient.    RN CM will place outreach call to patient for follow up to this recent hospitalization and to assess for any care management needs.  Call placed. No patient answer received. Left VM message and included RN CM contact information and request for return call.  RN CM will schedule a second call attempt for later this week if no patient response received prior.

## 2025-04-28 NOTE — UTILIZATION REVIEW
NOTIFICATION OF ADMISSION DISCHARGE   This is a Notification of Discharge from [facility]. Please be advised that this patient has been discharge from our facility. Below you will find the admission and discharge date and time including the patient’s disposition.   UTILIZATION REVIEW CONTACT:  Utilization Review Assistants  Network Utilization Review Department  Phone: [phone] x carefully listen to the prompts. All voicemails are confidential.  Email: [email or web address]     ADMISSION INFORMATION  PRESENTATION DATE: 4/23/2025  6:39 PM  OBERVATION ADMISSION DATE: N/A  INPATIENT ADMISSION DATE: 4/23/25  9:35 PM   DISCHARGE DATE: 4/26/2025 12:59 PM   DISPOSITION:Home/Self Care    Network Utilization Review Department  ATTENTION: Please call with any questions or concerns to [phone] and carefully listen to the prompts so that you are directed to the right person. All voicemails are confidential.   For Discharge needs, contact Care Management DC Support Team at [phone] opt. 2  Send all requests for admission clinical reviews, approved or denied determinations and any other requests to dedicated fax number below belonging to the campus where the patient is receiving treatment. List of dedicated fax numbers for the Facilities:  FACILITY NAME UR FAX NUMBER   ADMISSION DENIALS (Administrative/Medical Necessity) [phone]   DISCHARGE SUPPORT TEAM ([facility]) [phone]   PARENT CHILD HEALTH (Maternity/NICU/Pediatrics) [phone]   [facility] [phone]   [facility] [phone]   [facility] [phone]   [facility] [phone]   [facility] [phone]   [facility] [phone]   [facility] [phone]   [facility] [phone]   [facility]  [facility] [phone]   [facility] [phone]   [facility] [phone]   [facility] [phone]

## 2025-04-29 LAB
BACTERIA BLD CULT: NORMAL
BACTERIA BLD CULT: NORMAL

## 2025-04-30 ENCOUNTER — NURSE TRIAGE (OUTPATIENT)
Dept: OTHER | Facility: OTHER | Age: 50
End: 2025-04-30

## 2025-04-30 ENCOUNTER — PATIENT OUTREACH (OUTPATIENT)
Dept: CASE MANAGEMENT | Facility: OTHER | Age: 50
End: 2025-04-30

## 2025-04-30 NOTE — TELEPHONE ENCOUNTER
LMOM informing patient to return our phone call to discuss her symptoms and to schedule for an appointment (TCM)

## 2025-04-30 NOTE — TELEPHONE ENCOUNTER
"Regarding: appointment request / congestion/ tightness in chest  ----- Message from Gely FERGUSON sent at 4/30/2025  7:22 AM EDT -----  \"I was hospitalized and I would like to make a follow up appointment, I don't feel like I have gotten any better. My asthma and coughing is still bad, it's hard to breath, I have tightness and congestion.\"    "

## 2025-04-30 NOTE — LETTER
Date: 04/30/25    Dear Vernell Darby,   My name is Nataly; I am a registered nurse care manager working with   [facility] [address] [address]  I have not been able to reach you and would like to set a time that I can talk with you over the phone.  My work is to help patients that have complex medical conditions get the care they need. This includes patients who may have been in the hospital or emergency room.    Please call me with any questions you may have. I look forward speaking with you.  Sincerely,  Nataly QUEVEDO, BSN, RN  [phone]  Outpatient Care Manager

## 2025-04-30 NOTE — TELEPHONE ENCOUNTER
Multiple attempts made to call patient back with VMs left for patient to return call if still in need of care advice or assistance.    Please call patient back to follow up on symptoms.

## 2025-04-30 NOTE — PROGRESS NOTES
Outpatient Care Management Note    CM outreach due reminder: HRR referral- second outreach attempt.  Chart reviewed.  Patient hospitalized at [facility] 4/23/25-4/26/25 for severe persistent asthma with acute exacerbation.    RN MAREK was unable to reach patient during first outreach attempt 4/28/25.  Second call attempt made now.  No patient answer received.  Left VM message and included RN CM contact information and request for return call.    UTR letter sent to patient now via [employer].    RN CM will remove self from care team and close referral at this time.

## 2025-05-06 ENCOUNTER — OFFICE VISIT (OUTPATIENT)
Dept: SLEEP CENTER | Facility: HOSPITAL | Age: 50
End: 2025-05-06
Payer: COMMERCIAL

## 2025-05-06 VITALS
HEIGHT: 56 IN | OXYGEN SATURATION: 98 % | DIASTOLIC BLOOD PRESSURE: 70 MMHG | SYSTOLIC BLOOD PRESSURE: 134 MMHG | WEIGHT: 178 LBS | HEART RATE: 98 BPM | BODY MASS INDEX: 40.04 KG/M2 | RESPIRATION RATE: 18 BRPM

## 2025-05-06 DIAGNOSIS — F33.1 MODERATE EPISODE OF RECURRENT MAJOR DEPRESSIVE DISORDER (HCC): ICD-10-CM

## 2025-05-06 DIAGNOSIS — E28.2 POLYCYSTIC OVARIAN SYNDROME: Chronic | ICD-10-CM

## 2025-05-06 DIAGNOSIS — I50.32 CHRONIC DIASTOLIC CONGESTIVE HEART FAILURE (HCC): Chronic | ICD-10-CM

## 2025-05-06 DIAGNOSIS — J45.51 SEVERE PERSISTENT ASTHMA WITH ACUTE EXACERBATION: ICD-10-CM

## 2025-05-06 DIAGNOSIS — R06.83 SNORING: Primary | ICD-10-CM

## 2025-05-06 DIAGNOSIS — E10.42 DIABETIC POLYNEUROPATHY ASSOCIATED WITH TYPE 1 DIABETES MELLITUS (HCC): ICD-10-CM

## 2025-05-06 DIAGNOSIS — K21.9 GASTROESOPHAGEAL REFLUX DISEASE WITHOUT ESOPHAGITIS: ICD-10-CM

## 2025-05-06 DIAGNOSIS — I48.0 PAF (PAROXYSMAL ATRIAL FIBRILLATION) (HCC): ICD-10-CM

## 2025-05-06 DIAGNOSIS — J45.20 MILD INTERMITTENT ASTHMA WITHOUT COMPLICATION: ICD-10-CM

## 2025-05-06 PROCEDURE — 99244 OFF/OP CNSLTJ NEW/EST MOD 40: CPT | Performed by: INTERNAL MEDICINE

## 2025-05-06 NOTE — PROGRESS NOTES
Name: Vernell Darby      : 1975      MRN: [id number]  Encounter Provider: Clay Cunningham MD  Encounter Date: 2025   Encounter department[facility]  :  Assessment & Plan  Snoring    Orders:    Diagnostic Polysomnogram; Future    PAF (paroxysmal atrial fibrillation) ([facility])    Orders:    Ambulatory Referral to Sleep Medicine    Diagnostic Polysomnogram; Future    Chronic diastolic congestive heart failure (HCC)  Wt Readings from Last 3 Encounters:   25 80.7 kg (178 lb)   25 82.6 kg (182 lb)   25 81.6 kg (180 lb)       Orders:    Diagnostic Polysomnogram; Future    Severe persistent asthma with acute exacerbation         Gastroesophageal reflux disease without esophagitis         Polycystic ovarian syndrome         Diabetic polyneuropathy associated with type 1 diabetes mellitus ([facility])    Lab Results   Component Value Date    HGBA1C 9.5 (H) 10/04/2024            Moderate episode of recurrent major depressive disorder ([facility])                  History of Present Illness   HPI           Consultation - Sleep Center   Vernell Darby  49 y.o. female  :1975  MRN:[id number]  DOS:2025    Physician Requesting Consult: Uriah Dubose PA-C             Reason for Consult : At your kind request I saw Vernell Darby for initial sleep evaluation today. The patient is here to evaluate for suspected Obstructive Sleep Apnea.      PFSH, Problem List, Medications & Allergies were reviewed in EMR.    Vernell  has a past medical history of Anemia (?), Arthritis, Asthma, Chest pain (2016), CHF (congestive heart failure) ([facility]) (2020), Coronary artery disease (), Depression, Diabetes mellitus ([facility]) (10/1992), Diabetic nephropathy ([facility]), Diabetic nephropathy associated with type 1 diabetes mellitus ([facility]) (2020), Diabetic retinopathy ([facility]) (2016), Endometriosis, Gastroenteritis (2016), GERD (gastroesophageal reflux disease), History of COVID-19  (02/26/2021), Hyperlipidemia (02/03/2016), Hypertension (20+yrs.), Obesity, Oligomenorrhea, Polycystic ovaries (02/03/2016), Retinal hemorrhage (02/03/2016), Retinopathy, Thrombocytosis, and Type 1 diabetes mellitus with ophthalmic manifestation (HCC) (02/03/2016).      She has a current medication list which includes the following prescription(s): acetone (urine) test, albuterol, vitamin c, atorvastatin, dexcom g7 sensor, cyanocobalamin, eliquis, fluticasone-salmeterol, glucagon emergency, gvoke hypopen 2-pack, fiasp flextouch, toujeo solostar, insulin pen needle, ipratropium-albuterol, metformin, metoprolol tartrate, montelukast, omeprazole, paroxetine, prednisone, probiotic product, and torsemide.      HPI: Patient's presents with new onset atrial fibrillation during hospitalization for multiple medical conditions in September 2024 and has cardioverted.  Vernell is unaware of snoring that is reported by her  and tends to occur when she sleeps supine.  Snoring is better when she sleeps in the lateral position.  Other complaints:(Patient-Rptd) Heartburn during sleeping, Chronic pain . Restless Leg Syndrome: Reports no suggestive symptoms.  .  Parasomnia:  reports acting out dream content -single episode around a week ago; there is no report of sleep walking or injury  Sleep Routine (averaged): Typical Bedtime: (Patient-Rptd) 11:00 pm.  Gets OOB: (Patient-Rptd) 6:30 am. TIB:7.5 hrs.   Sleep latency: (Patient-Rptd) 0-15 minutes; Sleep Interruptions: Infrequent,. Awakens: needing an alarm  Upon awakening: (Patient-Rptd) Usually feels refreshed.  She estimates getting (Patient-Rptd) 7hrs sleep.  Daytime Function:Vernell denies excessive daytime sleepiness. She rated herself at Total score: (Patient-Rptd) 5 /24 on the [place] Sleepiness Scale.     Habits:   reports that she has never smoked. She has never used smokeless tobacco.;  reports current alcohol use of about 2.0 standard drinks of alcohol per week.; Reports  "no history of drug use.;  E-Cigarette/Vaping  Never User; Caffeine use:limited until (Patient-Rptd) Before 3:00 pm; Exercise routine: regular.    Occupation: (Patient-Rptd) Work Full Time [employer] License: (Patient-Rptd) No  Family History: Negative for sleep disturbance.  ROS: Significant for over 20 pounds intentional weight reduction in the past year..  She is reporting no nasal symptoms.  She was recently hospitalized for exacerbation of asthma and reports some residual chest congestion.  Presently she is reporting no cardiac symptoms.  She feels mood is stable on paroxetine.    EXAM:  /70   Pulse 98   Resp 18   Ht 4' 8\" (1.422 m)   Wt 80.7 kg (178 lb)   LMP 05/03/2023 (Approximate) Comment: ncop per pt  SpO2 98%   BMI 39.91 kg/m²    General: Well groomed female, well appearing, in no apparent distress.   Neurological: Alert and orientated; cooperative; Cranial nerves intact;    Psychiatric: Speech: Clear and coherent; normal mood, affect & thought   Skin: Warm and dry; Color& Hydration good; no facial rashes or lesions   HEENT:  Craniofacial anatomy: normal Sinuses: Non-tender. TMJ: Normal    Eyes: EOM's intact; conjunctiva/corneas clear   Ears: Appear normal     Nasal Airway: is patent Septum: Intact; Turbinates: Normal; Rhinorrhea: None  Mouth: Lips: Normal posture; Dentition: normal . Mucosa: Moist; Hard Palate:normal    Oropharryx: crowded and AP narrowing Tongue: Mallampati:Class IV, Mobile, and ScallopedSoft Palate:  redundant  Tonsils: absent  Neck: Is thick and excess fatty tissue; neck Circumference: 15 \"; [no abnormal masses; Supple; no abnormal masses; Thyroid: Normal. Trachea: Central.    Heart: S1,S2 normal; RRR; no gallop; no murmur   Lungs: Respiratory Effort: Normal. Air entry good bilaterally.  No wheezes.  No rales  Abdomen: Obese, soft.   Extremities: No pedal edema.  No clubbing or cyanosis.    Musculoskeletal:  Motor normal; Gait: Normal.       Sincerely,      Authenticated " "electronically on 05/06/25   Board Certified Specialist     Portions of the record may have been created with voice recognition software. Occasional wrong word or \"sound a like\" substitutions may have occurred due to the inherent limitations of voice recognition software. There may also be notations and random deletions of words or characters from malfunctioning software. Read the chart carefully and recognize, using context, where substitutions/deletions have occurred.          Review of Systems           "

## 2025-05-06 NOTE — PATIENT INSTRUCTIONS
What is APRIL?   Obstructive sleep apnea is a common and serious sleep disorder that causes you to stop breathing during sleep. The airway repeatedly becomes blocked, limiting the amount of air that reaches your lungs. When this happens, you may snore loudly or making choking noises as you try to breathe. Your brain and body becomes oxygen deprived and you may wake up. This may happen a few times a night, or in more severe cases, several hundred times a night.   Sleep apnea can make you wake up in the morning feeling tired or unrefreshed even though you have had a full night of sleep. During the day, you may feel fatigued, have difficulty concentrating or you may even unintentionally fall asleep. This is because your body is waking up numerous times throughout the night, even though you might not be conscious of each awakening.  The lack of oxygen your body receives can have negative long-term consequences for your health. This includes:  High blood pressure  Heart disease  Irregular heart rhythms  Stroke  Pre-diabetes and diabetes  Depression  Testing  An objective evaluation of your sleep may be needed before your board certified sleep physician can make a diagnosis. Options include:   In-lab overnight sleep study  This type of sleep study requires you to stay overnight at a sleep center, in a bed that may resemble a hotel room. You will sleep with sensors hooked up to various parts of your body. These sensors record your brain waves, heartbeat, breathing and movement. An overnight sleep study also provides your doctor with the most complete information about your sleep. Learn more about an overnight sleep study.   Home sleep apnea test  Some patients with high risk factors for obstructive sleep apnea and no other medical disorders may be candidates for a home sleep apnea test. The testing equipment differs in that it is less complicated than what is used in an overnight sleep study. As such, does not give all the  data an in-lab will and if negative, may not mean you do not have the problem.  Treatment for sleep apnea includes using a continuous positive airway pressure (CPAP) machine to keep your airway open during sleep. A mask is placed over your nose and mouth, or just your nose. The mask is hooked to the CPAP machine that blows a gentle stream of air into the mask when you breathe. This helps keep your airway open so you can breathe more regularly. Extra oxygen may be given to you through the machine. You may be given a mouth device. It looks like a mouth guard or dental retainer and stops your tongue and mouth tissues from blocking your throat while you sleep. Surgery may be needed to remove extra tissues that block your mouth, throat, or nose.  Manage sleep apnea:   Do not smoke. Nicotine and other chemicals in cigarettes and cigars can cause lung damage. Ask your healthcare provider for information if you currently smoke and need help to quit. E-cigarettes or smokeless tobacco still contain nicotine. Talk to your healthcare provider before you use these products.  Do not drink alcohol or take sedative medicine before you go to sleep. Alcohol and sedatives can relax the muscles and tissues around your throat. This can block the airflow to your lungs.  Maintain a healthy weight. Excess tissue around your throat may restrict your breathing. Ask your healthcare provider for information if you need to lose weight.  Sleep on your side or use pillows designed to prevent sleep apnea. This prevents your tongue or other tissues from blocking your throat. You can also raise the head of your bed.  Driving Safety. Refrain from driving when drowsy.   Follow up with your healthcare provider as directed: Write down your questions so you remember to ask them during your visits.  Go to [employer] website for more information: Sleepeducation.org  What is APRIL?   Obstructive sleep apnea is a common and serious sleep disorder that causes you to  stop breathing during sleep. The airway repeatedly becomes blocked, limiting the amount of air that reaches your lungs. When this happens, you may snore loudly or making choking noises as you try to breathe. Your brain and body becomes oxygen deprived and you may wake up. This may happen a few times a night, or in more severe cases, several hundred times a night.   Sleep apnea can make you wake up in the morning feeling tired or unrefreshed even though you have had a full night of sleep. During the day, you may feel fatigued, have difficulty concentrating or you may even unintentionally fall asleep. This is because your body is waking up numerous times throughout the night, even though you might not be conscious of each awakening.  The lack of oxygen your body receives can have negative long-term consequences for your health. This includes:  High blood pressure  Heart disease  Irregular heart rhythms  Stroke  Pre-diabetes and diabetes  Depression  Testing  An objective evaluation of your sleep may be needed before your board certified sleep physician can make a diagnosis. Options include:   In-lab overnight sleep study  This type of sleep study requires you to stay overnight at a sleep center, in a bed that may resemble a hotel room. You will sleep with sensors hooked up to various parts of your body. These sensors record your brain waves, heartbeat, breathing and movement. An overnight sleep study also provides your doctor with the most complete information about your sleep. Learn more about an overnight sleep study.   Home sleep apnea test  Some patients with high risk factors for obstructive sleep apnea and no other medical disorders may be candidates for a home sleep apnea test. The testing equipment differs in that it is less complicated than what is used in an overnight sleep study. As such, does not give all the data an in-lab will and if negative, may not mean you do not have the problem.  Treatment for  sleep apnea includes using a continuous positive airway pressure (CPAP) machine to keep your airway open during sleep. A mask is placed over your nose and mouth, or just your nose. The mask is hooked to the CPAP machine that blows a gentle stream of air into the mask when you breathe. This helps keep your airway open so you can breathe more regularly. Extra oxygen may be given to you through the machine. You may be given a mouth device. It looks like a mouth guard or dental retainer and stops your tongue and mouth tissues from blocking your throat while you sleep. Surgery may be needed to remove extra tissues that block your mouth, throat, or nose.  Manage sleep apnea:   Do not smoke. Nicotine and other chemicals in cigarettes and cigars can cause lung damage. Ask your healthcare provider for information if you currently smoke and need help to quit. E-cigarettes or smokeless tobacco still contain nicotine. Talk to your healthcare provider before you use these products.  Do not drink alcohol or take sedative medicine before you go to sleep. Alcohol and sedatives can relax the muscles and tissues around your throat. This can block the airflow to your lungs.  Maintain a healthy weight. Excess tissue around your throat may restrict your breathing. Ask your healthcare provider for information if you need to lose weight.  Sleep on your side or use pillows designed to prevent sleep apnea. This prevents your tongue or other tissues from blocking your throat. You can also raise the head of your bed.  Driving Safety. Refrain from driving when drowsy.   Follow up with your healthcare provider as directed: Write down your questions so you remember to ask them during your visits.  Go to AASM website for more information: Sleepeducation.org    Nursing Support:  When: Monday through Friday 7:30A-4:30PM except holidays  Where: Our direct line is [phone]  *3  *1.      If you are having a true emergency please call 911.  In the  event that the line is busy or it is after hours please leave a voice message and we will return your call.  Please speak clearly, leaving your full name, birth date, best number to reach you and the reason for your call.   Medication refills: We will need the name of the medication, the dosage, the ordering provider, whether you get a 30 or 90 day refill, and the pharmacy name and address.  Medications will be ordered by the provider only.  Nurses cannot call in prescriptions.  Please allow 7 days for medication refills.  Physician requested updates: If your provider requested that you call with an update after starting medication, please be ready to provide us the medication and dosage, what time you take your medication, the time you attempt to fall asleep, time you fall asleep, when you wake up, and what time you get out of bed.  Sleep Study Results: We will contact you with sleep study results and/or next steps after the physician has reviewed your testing.

## 2025-05-06 NOTE — ASSESSMENT & PLAN NOTE
Wt Readings from Last 3 Encounters:   05/06/25 80.7 kg (178 lb)   04/26/25 82.6 kg (182 lb)   04/21/25 81.6 kg (180 lb)       Orders:    Diagnostic Polysomnogram; Future

## 2025-05-07 RX ORDER — MONTELUKAST SODIUM 10 MG/1
10 TABLET ORAL
Qty: 90 TABLET | Refills: 1 | Status: SHIPPED | OUTPATIENT
Start: 2025-05-07

## 2025-05-10 ENCOUNTER — APPOINTMENT (OUTPATIENT)
Dept: LAB | Facility: CLINIC | Age: 50
End: 2025-05-10
Payer: COMMERCIAL

## 2025-05-10 DIAGNOSIS — E83.39 HYPERPHOSPHATEMIA: ICD-10-CM

## 2025-05-10 DIAGNOSIS — N18.2 STAGE 2 CHRONIC KIDNEY DISEASE: ICD-10-CM

## 2025-05-10 DIAGNOSIS — R80.8 OTHER PROTEINURIA: ICD-10-CM

## 2025-05-10 DIAGNOSIS — E10.65 TYPE 1 DIABETES MELLITUS WITH HYPERGLYCEMIA (HCC): ICD-10-CM

## 2025-05-10 LAB
ALBUMIN SERPL BCG-MCNC: 3.7 G/DL (ref 3.5–5)
ALP SERPL-CCNC: 99 U/L (ref 34–104)
ALT SERPL W P-5'-P-CCNC: 27 U/L (ref 7–52)
ANION GAP SERPL CALCULATED.3IONS-SCNC: 11 MMOL/L (ref 4–13)
AST SERPL W P-5'-P-CCNC: 20 U/L (ref 13–39)
BILIRUB SERPL-MCNC: 0.28 MG/DL (ref 0.2–1)
BUN SERPL-MCNC: 69 MG/DL (ref 5–25)
CALCIUM SERPL-MCNC: 7.7 MG/DL (ref 8.4–10.2)
CHLORIDE SERPL-SCNC: 102 MMOL/L (ref 96–108)
CHOLEST SERPL-MCNC: 154 MG/DL (ref ?–200)
CO2 SERPL-SCNC: 29 MMOL/L (ref 21–32)
CREAT SERPL-MCNC: 1.09 MG/DL (ref 0.6–1.3)
EST. AVERAGE GLUCOSE BLD GHB EST-MCNC: 217 MG/DL
GFR SERPL CREATININE-BSD FRML MDRD: 59 ML/MIN/1.73SQ M
GLUCOSE P FAST SERPL-MCNC: 183 MG/DL (ref 65–99)
HBA1C MFR BLD: 9.2 %
HDLC SERPL-MCNC: 47 MG/DL
LDLC SERPL CALC-MCNC: 78 MG/DL (ref 0–100)
NONHDLC SERPL-MCNC: 107 MG/DL
POTASSIUM SERPL-SCNC: 4 MMOL/L (ref 3.5–5.3)
PROT SERPL-MCNC: 6.1 G/DL (ref 6.4–8.4)
SODIUM SERPL-SCNC: 142 MMOL/L (ref 135–147)
TRIGL SERPL-MCNC: 143 MG/DL (ref ?–150)

## 2025-05-10 PROCEDURE — 80061 LIPID PANEL: CPT

## 2025-05-10 PROCEDURE — 36415 COLL VENOUS BLD VENIPUNCTURE: CPT

## 2025-05-10 PROCEDURE — 80053 COMPREHEN METABOLIC PANEL: CPT

## 2025-05-10 PROCEDURE — 83036 HEMOGLOBIN GLYCOSYLATED A1C: CPT

## 2025-05-12 ENCOUNTER — RESULTS FOLLOW-UP (OUTPATIENT)
Dept: ENDOCRINOLOGY | Facility: HOSPITAL | Age: 50
End: 2025-05-12

## 2025-05-15 LAB
DME PARACHUTE DELIVERY DATE ACTUAL: NORMAL
DME PARACHUTE DELIVERY DATE REQUESTED: NORMAL
DME PARACHUTE ITEM DESCRIPTION: NORMAL
DME PARACHUTE ORDER STATUS: NORMAL
DME PARACHUTE SUPPLIER NAME: NORMAL
DME PARACHUTE SUPPLIER PHONE: NORMAL

## 2025-05-16 ENCOUNTER — OFFICE VISIT (OUTPATIENT)
Dept: ENDOCRINOLOGY | Facility: HOSPITAL | Age: 50
End: 2025-05-16
Payer: COMMERCIAL

## 2025-05-16 VITALS
HEIGHT: 56 IN | WEIGHT: 179.8 LBS | BODY MASS INDEX: 40.45 KG/M2 | DIASTOLIC BLOOD PRESSURE: 74 MMHG | HEART RATE: 84 BPM | SYSTOLIC BLOOD PRESSURE: 124 MMHG

## 2025-05-16 DIAGNOSIS — E10.65 TYPE 1 DIABETES MELLITUS WITH HYPERGLYCEMIA (HCC): Primary | ICD-10-CM

## 2025-05-16 DIAGNOSIS — E78.2 MIXED HYPERLIPIDEMIA: Chronic | ICD-10-CM

## 2025-05-16 DIAGNOSIS — R63.5 WEIGHT GAIN: ICD-10-CM

## 2025-05-16 DIAGNOSIS — I10 PRIMARY HYPERTENSION: Chronic | ICD-10-CM

## 2025-05-16 DIAGNOSIS — E83.51 HYPOCALCEMIA: ICD-10-CM

## 2025-05-16 PROCEDURE — 99215 OFFICE O/P EST HI 40 MIN: CPT | Performed by: PHYSICIAN ASSISTANT

## 2025-05-16 PROCEDURE — 95251 CONT GLUC MNTR ANALYSIS I&R: CPT | Performed by: PHYSICIAN ASSISTANT

## 2025-05-16 RX ORDER — INSULIN GLARGINE 300 U/ML
120 INJECTION, SOLUTION SUBCUTANEOUS DAILY
Qty: 18 ML | Refills: 3 | Status: SHIPPED | OUTPATIENT
Start: 2025-05-16

## 2025-05-16 NOTE — ASSESSMENT & PLAN NOTE
Normotensive in the office today.  Kidney function at this time is slightly off.  Does follow-up with nephrology.  Has been utilizing her torsemide more recently which could be a contributing factor.  I do want a repeat CMP at this time.  Continue with current medications.  May need her to follow-up with nephrology sooner.

## 2025-05-16 NOTE — ASSESSMENT & PLAN NOTE
Previous lipid panel was excellent.  Will continue to monitor at this time.  Continue with atorvastatin 80 mg daily.

## 2025-05-16 NOTE — PATIENT INSTRUCTIONS
Continue monitor diet, and maintain physical activity.  Make sure to drink plenty of water throughout the day.      Increase Toujeo to 120 units. Change Fiasp to 1:2 ratio.     Need to take more insulin with dinner to help bring blood sugars down.     Restart metformin 500 mg a day.     Call to have us download Dexcom in 2 weeks.     Follow up in 3 months with lab work prior to visit.

## 2025-05-16 NOTE — PROGRESS NOTES
Name: Vernell Darby      : 1975      MRN: [id number]  Encounter Provider: Edu Gonzales PA-C  Encounter Date: 2025   Encounter department: [facility]    No chief complaint on file.  :  Assessment & Plan  Type 1 diabetes mellitus with hyperglycemia (HCC)  A1c has decreased, but still above goal.  Review of her Dexcom I would expect her A1c to continue to decrease if glucose levels remain stable.  That being said she is running high overnight, and I would like to increase her Toujeo to 120 units daily.  I would like her to decrease her Fiasp to 1 unit for every 2 g carbohydrates.  Please keep me informed of how glucose levels are doing so we can continue to make adjustments and hopefully improve the overnight glucose levels.  Continue utilizing Dexcom to monitor glucose levels.  Contact the office with any concerns or questions.  Follow-up in 3 months with labwork completed prior to visit.  Lab Results   Component Value Date    HGBA1C 9.2 (H) 05/10/2025       Orders:  •  Anti-thyroglobulin antibody; Future  •  Anti-microsomal antibody; Future  •  Hemoglobin A1C; Future  •  Comprehensive metabolic panel; Future  •  Lipid panel; Future  •  insulin glargine (Toujeo SoloStar) 300 units/mL CONCENTRATED U-300 injection pen (1-unit dial); Inject 120 Units under the skin daily    Primary hypertension  Normotensive in the office today.  Kidney function at this time is slightly off.  Does follow-up with nephrology.  Has been utilizing her torsemide more recently which could be a contributing factor.  I do want a repeat CMP at this time.  Continue with current medications.  May need her to follow-up with nephrology sooner.       Mixed hyperlipidemia  Previous lipid panel was excellent.  Will continue to monitor at this time.  Continue with atorvastatin 80 mg daily.       Weight gain  Has had significant weight gain recently which may be related to her CHF.   She does not have any shortness of breath and lungs were clear to auscultation.  Will assess thyroid function as she is a type I diabetic with a TSH, free T4, and thyroid antibodies.  Although I have little concern, we will also rule out any adrenal causes.  Otherwise would recommend discussing with cardiology and PCP.  Orders:  •  TSH, 3rd generation; Future  •  T4, free; Future  •  Anti-thyroglobulin antibody; Future  •  Anti-microsomal antibody; Future  •  Cortisol Level,7-9 AM Specimen; Future  •  ACTH; Future    Hypocalcemia  Calcium levels recently came back low.  Repeat CMP and ordered PTH and vitamin D at this time to assess.  Orders:  •  Comprehensive metabolic panel; Future  •  Vitamin D 25 hydroxy; Future  •  PTH, intact; Future        History of Present Illness     Vernell Darby is a 49 y.o. female with type 1 diabetes with diabetic neuropathy and diabetic nephropathy, hypertension, hyperlipidemia for follow-up visit.   She was diagnosed with type 1 diabetes about 30 years ago.  She utilizes insulin therapy and takes Toujeo insulin 100 units at bedtime and Fiasp insulin 1 unit per 1 g carbohydrate with each meal. She denies polyuria, polydipsia, polyphagia, or nocturia.  She denies blurry vision.  She denies numbness or tingling of the feet.  She denies chest pain or shortness of breath.  Diabetic complications include diabetic neuropathy, diabetic nephropathy, diabetic retinopathy.  She denies heart attack, stroke, or claudication.  Overall she is doing well today.  Was admitted to the hospital end of April 2025 for pneumonia.  States she was placed on prednisone and glucose levels were running high at that time.  Seem to be doing better since her discharge.     Last diabetic foot exam was performed October 2024.  She reports her last eye exam was March 2024 and there was no change in her retinopathy.      Downloaded Dexcom from downloaded Dexcom from May 2 through May 15, 2025 reveals an average  glucose level 195 with a standard deviation of 74.  She is in target range 47% time, and above target range 53% time.  Glucose levels start trending down roughly 12 AM 1 AM and then significantly decline after breakfast.  Late morning early afternoon she is typically in normal range, but will increase with lunch and lead to postprandial hyperglycemia, but then come down late afternoon and is typically in normal range prior to dinner which afterwards she will have a significant increase leading once again to postprandial hyperglycemia.     She has hypertension and diabetic nephropathy and takes Toprol 12.5 mg twice a day and torsemide 20 mg as needed.   She has occasional headaches but no stroke-like symptoms.  Has been taking torsemide on a regular basis at this time due to concerns of weight gain.      She has hyperlipidemia and takes atorvastatin 80 mg daily.  She denies chest pain or shortness of breath.    Current regimen:   Toujeo 100 units daily, Fiasp 1 unit for every 1 g of carbohydrate      Last Eye Exam: 03/18/2024  Last Foot Exam: 10/24/2024  Health Maintenance   Topic Date Due   • Diabetic Eye Exam  03/18/2025   • Diabetic Foot Exam  10/24/2025           Review of Systems   Constitutional:  Positive for unexpected weight change. Negative for activity change, appetite change and fatigue.   HENT:  Negative for sore throat and trouble swallowing.    Eyes:  Negative for visual disturbance.   Respiratory:  Negative for chest tightness and shortness of breath.    Cardiovascular:  Negative for chest pain, palpitations and leg swelling.   Gastrointestinal:  Negative for abdominal pain, constipation, diarrhea, nausea and vomiting.   Endocrine: Negative for cold intolerance, heat intolerance, polydipsia, polyphagia and polyuria.   Genitourinary:  Negative for frequency.   Musculoskeletal:         Left foot pain   Skin:  Negative for wound.   Neurological:  Negative for dizziness, weakness, numbness and headaches.  "  Psychiatric/Behavioral:  Negative for dysphoric mood and sleep disturbance. The patient is not nervous/anxious.     as per HPI    Medical History Reviewed by provider this encounter:     .  Medications Ordered Prior to Encounter[1]   Social History     Tobacco Use   • Smoking status: Never   • Smokeless tobacco: Never   • Tobacco comments:     Per [employer]: Former smoker. Quit in 1994   Vaping Use   • Vaping status: Never Used   Substance and Sexual Activity   • Alcohol use: Yes     Alcohol/week: 2.0 standard drinks of alcohol     Types: 1 Glasses of wine, 1 Standard drinks or equivalent per week     Comment: Occasionally. Several weeks can go by with no alcohol.   • Drug use: No   • Sexual activity: Yes     Partners: Male     Birth control/protection: None        Medical History Reviewed by provider this encounter:     .    Objective   /74   Pulse 84   Ht 4' 8\" (1.422 m)   Wt 81.6 kg (179 lb 12.8 oz)   LMP 05/03/2023 (Approximate) Comment: ncop per pt  BMI 40.31 kg/m²      Body mass index is 40.31 kg/m².  Wt Readings from Last 3 Encounters:   05/16/25 81.6 kg (179 lb 12.8 oz)   05/06/25 80.7 kg (178 lb)   04/26/25 82.6 kg (182 lb)     Physical Exam  Vitals and nursing note reviewed.   Constitutional:       General: She is not in acute distress.     Appearance: Normal appearance. She is well-developed. She is obese. She is not diaphoretic.     Eyes:      General: No scleral icterus.     Extraocular Movements: Extraocular movements intact.      Conjunctiva/sclera: Conjunctivae normal.      Pupils: Pupils are equal, round, and reactive to light.       Cardiovascular:      Rate and Rhythm: Normal rate and regular rhythm.      Pulses: Normal pulses.      Heart sounds: Normal heart sounds. No murmur heard.     No friction rub. No gallop.   Pulmonary:      Effort: Pulmonary effort is normal. No tachypnea, bradypnea or respiratory distress.      Breath sounds: Normal breath sounds. No wheezing. "     Musculoskeletal:      Cervical back: Normal range of motion.      Right lower leg: No edema.      Left lower leg: No edema.   Lymphadenopathy:      Cervical: No cervical adenopathy.     Skin:     General: Skin is warm and dry.     Neurological:      Mental Status: She is alert and oriented to person, place, and time. Mental status is at baseline. She is not disoriented.      Motor: No abnormal muscle tone.      Gait: Gait abnormal (Antalgic).      Deep Tendon Reflexes: Reflexes are normal and symmetric.     Psychiatric:         Mood and Affect: Mood normal.         Behavior: Behavior normal.         Thought Content: Thought content normal.         Labs:   Lab Results   Component Value Date    HGBA1C 9.2 (H) 05/10/2025    HGBA1C 9.5 (H) 10/04/2024    HGBA1C 9.5 (H) 09/22/2024     Lab Results   Component Value Date    CREATININE 1.09 05/10/2025    CREATININE 0.77 04/26/2025    CREATININE 0.87 04/25/2025    BUN 69 (H) 05/10/2025     01/28/2017    K 4.0 05/10/2025     05/10/2025    CO2 29 05/10/2025     eGFR   Date Value Ref Range Status   05/10/2025 59 ml/min/1.73sq m Final     Lab Results   Component Value Date    CHOL 192 01/28/2017    HDL 47 (L) 05/10/2025    TRIG 143 05/10/2025     Lab Results   Component Value Date    ALT 27 05/10/2025    AST 20 05/10/2025    ALKPHOS 99 05/10/2025    BILITOT 0.3 01/28/2017     Lab Results   Component Value Date    FTR2ZDSSRIYE 2.573 04/10/2024    DCE1FMIJGUTC 2.710 02/21/2023    [id number] 3.794 (H) 08/06/2020       Patient Instructions   Continue monitor diet, and maintain physical activity.  Make sure to drink plenty of water throughout the day.      Increase Toujeo to 120 units. Change Fiasp to 1:2 ratio.     Need to take more insulin with dinner to help bring blood sugars down.     Restart metformin 500 mg a day.     Call to have us download Dexcom in 2 weeks.     Follow up in 3 months with lab work prior to visit.    Discussed with the patient and all  questioned fully answered. She will call me if any problems arise.    Administrative Statements   I have spent a total time of 45 minutes in caring for this patient on the day of the visit/encounter including Diagnostic results, Prognosis, Risks and benefits of tx options, Instructions for management, Patient and family education, Importance of tx compliance, Risk factor reductions, Impressions, Documenting in the medical record, Reviewing/placing orders in the medical record (including tests, medications, and/or procedures), and Obtaining or reviewing history  .         [1]  Current Outpatient Medications on File Prior to Visit   Medication Sig Dispense Refill   • acetone, urine, test strip Use to test urine ketones for high blood sugars. 25 each 6   • albuterol (PROVENTIL HFA,VENTOLIN HFA) 90 mcg/act inhaler INHALE 2 PUFFS BY MOUTH EVERY 4 HOURS AS NEEDED FOR WHEEZING OR FOR SHORTNESS OF BREATH 25.5 g 1   • Ascorbic Acid (VITAMIN C) 500 MG CAPS Take 1 capsule by mouth in the morning and 1 capsule in the evening.     • atorvastatin (LIPITOR) 80 mg tablet TAKE 1 TABLET BY MOUTH EVERY DAY 30 tablet 5   • Continuous Glucose Sensor (Dexcom G7 Sensor) USE 1 DEVICE EVERY 10 DAYS 3 each 5   • cyanocobalamin (VITAMIN B-12) 100 mcg tablet Take by mouth in the morning.     • Eliquis 5 MG TAKE 1 TABLET BY MOUTH TWO TIMES DAILY 60 tablet 5   • Fluticasone-Salmeterol (Advair) 250-50 mcg/dose inhaler INHALE 1 PUFF BY MOUTH TWO TIMES DAILY RINSE MOUTH AFTER USE 60 blister 5   • glucagon (Glucagon Emergency) 1 MG injection Inject 1 mg under the skin once as needed for low blood sugar for up to 1 dose 1 kit 3   • Glucagon (Gvoke HypoPen 2-Pack) 1 MG/0.2ML SOAJ Use if hypoglycemia prn 1 Syringe 6   • insulin aspart, w/niacinamide, (Fiasp FlexTouch) 100 Units/mL injection pen INJECT 1 UNIT OF INSULIN ASPART FOR EVERY 1 GRAM OF CARBS UP  UNITS DAILY 60 mL 2   • Insulin Pen Needle 33G X 4 MM MISC Use to inject insulin 4 times a  day 400 each 2   • ipratropium-albuterol (DUO-NEB) 0.5-2.5 mg/3 mL nebulizer solution Take 3 mL by nebulization every 8 (eight) hours as needed for wheezing or shortness of breath 270 mL 0   • metFORMIN (GLUCOPHAGE-XR) 500 mg 24 hr tablet TAKE 1 TABLET BY MOUTH TWO TIMES DAILY WITH MEALS 180 tablet 3   • metoprolol tartrate (LOPRESSOR) 25 mg tablet TAKE 1/2 TABLET BY MOUTH EVERY 12 HOURS 90 tablet 1   • montelukast (SINGULAIR) 10 mg tablet TAKE 1 TABLET BY MOUTH AT BEDTIME 90 tablet 1   • omeprazole (PriLOSEC) 20 mg delayed release capsule Take 20 mg by mouth in the morning.     • PARoxetine (PAXIL) 20 mg tablet TAKE 1 TABLET BY MOUTH EVERY DAY 90 tablet 1   • Probiotic Product (ALIGN PO) Take by mouth     • torsemide (DEMADEX) 20 mg tablet Take 1 tablet (20 mg total) by mouth if needed (rapid weight gain (3+ lbs overnight or 5+ lbs in 5-7 days)) 90 tablet 1   • [DISCONTINUED] insulin glargine (Toujeo SoloStar) 300 units/mL CONCENTRATED U-300 injection pen (1-unit dial) Inject 95 Units under the skin daily (Patient taking differently: Inject 100 Units under the skin in the morning.)       No current facility-administered medications on file prior to visit.

## 2025-05-16 NOTE — ASSESSMENT & PLAN NOTE
A1c has decreased, but still above goal.  Review of her Dexcom I would expect her A1c to continue to decrease if glucose levels remain stable.  That being said she is running high overnight, and I would like to increase her Toujeo to 120 units daily.  I would like her to decrease her Fiasp to 1 unit for every 2 g carbohydrates.  Please keep me informed of how glucose levels are doing so we can continue to make adjustments and hopefully improve the overnight glucose levels.  Continue utilizing Dexcom to monitor glucose levels.  Contact the office with any concerns or questions.  Follow-up in 3 months with labwork completed prior to visit.  Lab Results   Component Value Date    HGBA1C 9.2 (H) 05/10/2025       Orders:  •  Anti-thyroglobulin antibody; Future  •  Anti-microsomal antibody; Future  •  Hemoglobin A1C; Future  •  Comprehensive metabolic panel; Future  •  Lipid panel; Future  •  insulin glargine (Toujeo SoloStar) 300 units/mL CONCENTRATED U-300 injection pen (1-unit dial); Inject 120 Units under the skin daily

## 2025-05-19 LAB
LEFT EYE DIABETIC RETINOPATHY: POSITIVE
RIGHT EYE DIABETIC RETINOPATHY: POSITIVE

## 2025-05-23 PROBLEM — J18.9 CAP (COMMUNITY ACQUIRED PNEUMONIA): Status: RESOLVED | Noted: 2025-04-23 | Resolved: 2025-05-23

## 2025-05-24 ENCOUNTER — APPOINTMENT (OUTPATIENT)
Dept: LAB | Facility: CLINIC | Age: 50
End: 2025-05-24
Payer: COMMERCIAL

## 2025-05-24 DIAGNOSIS — E10.65 TYPE 1 DIABETES MELLITUS WITH HYPERGLYCEMIA (HCC): ICD-10-CM

## 2025-05-24 DIAGNOSIS — E83.51 HYPOCALCEMIA: ICD-10-CM

## 2025-05-24 DIAGNOSIS — R63.5 WEIGHT GAIN: ICD-10-CM

## 2025-05-24 LAB
25(OH)D3 SERPL-MCNC: 22.7 NG/ML (ref 30–100)
ALBUMIN SERPL BCG-MCNC: 3.6 G/DL (ref 3.5–5)
ALP SERPL-CCNC: 86 U/L (ref 34–104)
ALT SERPL W P-5'-P-CCNC: 16 U/L (ref 7–52)
ANION GAP SERPL CALCULATED.3IONS-SCNC: 14 MMOL/L (ref 4–13)
AST SERPL W P-5'-P-CCNC: 20 U/L (ref 13–39)
BILIRUB SERPL-MCNC: 0.25 MG/DL (ref 0.2–1)
BUN SERPL-MCNC: 26 MG/DL (ref 5–25)
CALCIUM SERPL-MCNC: 7 MG/DL (ref 8.4–10.2)
CHLORIDE SERPL-SCNC: 101 MMOL/L (ref 96–108)
CO2 SERPL-SCNC: 26 MMOL/L (ref 21–32)
CORTIS AM PEAK SERPL-MCNC: 15.9 UG/DL (ref 6.7–22.6)
CREAT SERPL-MCNC: 1.03 MG/DL (ref 0.6–1.3)
GFR SERPL CREATININE-BSD FRML MDRD: 63 ML/MIN/1.73SQ M
GLUCOSE P FAST SERPL-MCNC: 52 MG/DL (ref 65–99)
POTASSIUM SERPL-SCNC: 4.5 MMOL/L (ref 3.5–5.3)
PROT SERPL-MCNC: 6.4 G/DL (ref 6.4–8.4)
PTH-INTACT SERPL-MCNC: 142.1 PG/ML (ref 12–88)
SODIUM SERPL-SCNC: 141 MMOL/L (ref 135–147)
T4 FREE SERPL-MCNC: 0.8 NG/DL (ref 0.61–1.12)
TSH SERPL DL<=0.05 MIU/L-ACNC: 2.15 UIU/ML (ref 0.45–4.5)

## 2025-05-24 PROCEDURE — 82024 ASSAY OF ACTH: CPT

## 2025-05-24 PROCEDURE — 80053 COMPREHEN METABOLIC PANEL: CPT

## 2025-05-24 PROCEDURE — 82306 VITAMIN D 25 HYDROXY: CPT

## 2025-05-24 PROCEDURE — 82533 TOTAL CORTISOL: CPT

## 2025-05-24 PROCEDURE — 84443 ASSAY THYROID STIM HORMONE: CPT

## 2025-05-24 PROCEDURE — 86800 THYROGLOBULIN ANTIBODY: CPT

## 2025-05-24 PROCEDURE — 36415 COLL VENOUS BLD VENIPUNCTURE: CPT

## 2025-05-24 PROCEDURE — 86376 MICROSOMAL ANTIBODY EACH: CPT

## 2025-05-24 PROCEDURE — 83970 ASSAY OF PARATHORMONE: CPT

## 2025-05-24 PROCEDURE — 84439 ASSAY OF FREE THYROXINE: CPT

## 2025-05-25 LAB — THYROPEROXIDASE AB SERPL-ACNC: 13 IU/ML (ref 0–34)

## 2025-05-26 DIAGNOSIS — E10.65 TYPE 1 DIABETES MELLITUS WITH HYPERGLYCEMIA (HCC): ICD-10-CM

## 2025-05-28 LAB
ACTH PLAS-MCNC: 17.8 PG/ML (ref 7.2–63.3)
THYROGLOB AB SERPL-ACNC: <1 IU/ML (ref 0–0.9)

## 2025-05-28 RX ORDER — INSULIN ASPART INJECTION 100 [IU]/ML
INJECTION, SOLUTION SUBCUTANEOUS
Qty: 60 ML | Refills: 2 | Status: SHIPPED | OUTPATIENT
Start: 2025-05-28

## 2025-05-29 DIAGNOSIS — E78.2 MIXED HYPERLIPIDEMIA: ICD-10-CM

## 2025-05-29 DIAGNOSIS — J45.30 MILD PERSISTENT ASTHMA WITHOUT COMPLICATION: ICD-10-CM

## 2025-05-29 RX ORDER — ATORVASTATIN CALCIUM 80 MG/1
80 TABLET, FILM COATED ORAL DAILY
Qty: 30 TABLET | Refills: 5 | Status: SHIPPED | OUTPATIENT
Start: 2025-05-29

## 2025-05-30 RX ORDER — FLUTICASONE PROPIONATE AND SALMETEROL 250; 50 UG/1; UG/1
POWDER RESPIRATORY (INHALATION)
Qty: 60 BLISTER | Refills: 5 | Status: SHIPPED | OUTPATIENT
Start: 2025-05-30

## 2025-06-09 ENCOUNTER — RESULTS FOLLOW-UP (OUTPATIENT)
Dept: ENDOCRINOLOGY | Facility: HOSPITAL | Age: 50
End: 2025-06-09

## 2025-06-09 DIAGNOSIS — E55.9 VITAMIN D DEFICIENCY: ICD-10-CM

## 2025-06-09 DIAGNOSIS — E83.51 HYPOCALCEMIA: Primary | ICD-10-CM

## 2025-06-09 RX ORDER — ERGOCALCIFEROL 1.25 MG/1
50000 CAPSULE ORAL WEEKLY
Qty: 12 CAPSULE | Refills: 1 | Status: SHIPPED | OUTPATIENT
Start: 2025-06-09

## 2025-06-18 ENCOUNTER — REMOTE DEVICE CLINIC VISIT (OUTPATIENT)
Dept: CARDIOLOGY CLINIC | Facility: CLINIC | Age: 50
End: 2025-06-18
Payer: COMMERCIAL

## 2025-06-18 ENCOUNTER — RESULTS FOLLOW-UP (OUTPATIENT)
Dept: NON INVASIVE DIAGNOSTICS | Facility: HOSPITAL | Age: 50
End: 2025-06-18

## 2025-06-18 DIAGNOSIS — I48.0 PAROXYSMAL ATRIAL FIBRILLATION (HCC): Primary | ICD-10-CM

## 2025-06-18 PROCEDURE — 93298 REM INTERROG DEV EVAL SCRMS: CPT | Performed by: INTERNAL MEDICINE

## 2025-06-18 NOTE — PROGRESS NOTES
"[facility] [id number] ILR/ACTIVE SYSTEM IS MRI CONDITIONAL   MERLIN TRANSMISSION: LOOP RECORDER. PRESENTING RHYTHM NSR @ 69 BPM. BATTERY STATUS \"OK.\" 1 PT ACTIVATED EPISODE PREVIOUSLY ADDRESSED IN ALERT. NO NEW PATIENT OR DEVICE ACTIVATED EPISODES. NORMAL DEVICE FUNCTION. DL   "

## 2025-06-25 DIAGNOSIS — I48.92 ATRIAL FIB/FLUTTER, TRANSIENT (HCC): ICD-10-CM

## 2025-06-25 DIAGNOSIS — I48.91 ATRIAL FIB/FLUTTER, TRANSIENT (HCC): ICD-10-CM

## 2025-06-25 RX ORDER — METOPROLOL TARTRATE 25 MG/1
12.5 TABLET, FILM COATED ORAL EVERY 12 HOURS
Qty: 90 TABLET | Refills: 1 | Status: SHIPPED | OUTPATIENT
Start: 2025-06-25

## 2025-06-30 ENCOUNTER — TELEPHONE (OUTPATIENT)
Age: 50
End: 2025-06-30

## 2025-06-30 NOTE — TELEPHONE ENCOUNTER
Patient calling with questions about anticoagulation hold prior to dental cleaning. Advised patient clearance form was signed by Dr. Whitaker and sent to her dental office on 3/28/25. Patient verbalized understanding, had no further questions.

## 2025-07-19 NOTE — PROGRESS NOTES
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, unspecified laterality ([address] )      I refilled the humalog for you and called endocrine  They will pull and review your meds and contact you for reordering  Please call here if you do not here from therm    rto as scheduled for your regular willow  - HUMALOG 100 UNIT/ML injection; Follow sliding scale  Dispense: 10 mL; Refill: 2      Subjective:      Patient ID: Andres Ahmadi is a 43 y o  female  Here today with concern regarding her dm medications    Has been a patient of Dr Yoan Miller who is now transitioning  to [facility]  Has been trying to get her meds refilled but no one is responding to her calls  Did get some meds called in but then got a message form the pharmacy that these were no longer preferred by her insurance  Did get her meds refilled through them but  the meds that were prescribed were no longer covered by her insurance  Now is about to run out of her Humalog            The following portions of the patient's history were reviewed and updated as appropriate: allergies, current medications, past family history, past medical history, past social history, past surgical history and problem list     Past Medical History:   Diagnosis Date    Anemia     Asthma     Chest pain 2/3/2016    Depression     Diabetes mellitus ([address] )     Diabetic retinopathy ([address] ) 2/3/2016    Gastroenteritis 2/3/2016    HTN (hypertension)     Hyperlipidemia 2/3/2016    Oligomenorrhea     Polycystic ovaries 2/3/2016    Retinal hemorrhage 2/3/2016    Retinopathy     Thrombocytosis (HCC)     Type 1 diabetes mellitus with ophthalmic manifestation ([address] ) 2/3/2016     Past Surgical History:   Procedure Laterality Date    RETINAL LASER PROCEDURE       Family History   Problem Relation Age of Onset    Heart murmur Mother     No Known Problems Father      Social History Goals of Care Note    A discussion of the patient's Goals of Care has been completed with the Patient regarding the patient's current condition, prognosis, and future goals of care. The Patient has a good understanding of the patient’s current condition and overall disease process.     The Goals of Care include: Full Code  -Yes Vasopressors / Antiarrhythmics / Cardioversion    Additional discussion:   None    Monica Jones  Internal Medicine, PGY-1  7/19/2025     Social History     Social History    Marital status:      Spouse name: N/A    Number of children: N/A    Years of education: N/A     Occupational History    Not on file  Social History Main Topics    Smoking status: Never Smoker    Smokeless tobacco: Never Used    Alcohol use Yes      Comment: Occasional    Drug use: No    Sexual activity: Not on file     Other Topics Concern    Not on file     Social History Narrative    No narrative on file     Review of Systems   Constitutional: Negative  Respiratory: Negative  Cardiovascular: Negative  Neurological: Negative  Vitals:    02/19/18 1147   BP: 118/68   BP Location: Left arm   Patient Position: Sitting   Pulse: 88   Resp: 16   Weight: 68 kg (150 lb)   Height: 4' 9" (1 448 m)       Objective:         Physical Exam   Constitutional: She appears well-developed and well-nourished  Neck: Neck supple  Cardiovascular: Normal rate, regular rhythm, normal heart sounds and intact distal pulses  Musculoskeletal: Normal range of motion  Skin: Skin is warm and dry  Psychiatric: She has a normal mood and affect   Her behavior is normal  Judgment and thought content normal

## 2025-07-23 DIAGNOSIS — D64.9 ANEMIA, UNSPECIFIED TYPE: Primary | ICD-10-CM

## 2025-07-23 DIAGNOSIS — D75.839 THROMBOCYTOSIS: ICD-10-CM

## 2025-07-24 ENCOUNTER — TELEPHONE (OUTPATIENT)
Age: 50
End: 2025-07-24

## 2025-07-24 ENCOUNTER — APPOINTMENT (OUTPATIENT)
Dept: LAB | Facility: CLINIC | Age: 50
End: 2025-07-24
Payer: COMMERCIAL

## 2025-07-24 DIAGNOSIS — D75.839 THROMBOCYTOSIS: ICD-10-CM

## 2025-07-24 DIAGNOSIS — D64.9 ANEMIA, UNSPECIFIED TYPE: ICD-10-CM

## 2025-07-24 LAB
ALBUMIN SERPL BCG-MCNC: 3.8 G/DL (ref 3.5–5)
ALP SERPL-CCNC: 109 U/L (ref 34–104)
ALT SERPL W P-5'-P-CCNC: 18 U/L (ref 7–52)
ANION GAP SERPL CALCULATED.3IONS-SCNC: 7 MMOL/L (ref 4–13)
AST SERPL W P-5'-P-CCNC: 17 U/L (ref 13–39)
BASOPHILS # BLD AUTO: 0.08 THOUSANDS/ÂΜL (ref 0–0.1)
BASOPHILS NFR BLD AUTO: 1 % (ref 0–1)
BILIRUB SERPL-MCNC: 0.19 MG/DL (ref 0.2–1)
BUN SERPL-MCNC: 52 MG/DL (ref 5–25)
CALCIUM SERPL-MCNC: 8.2 MG/DL (ref 8.4–10.2)
CHLORIDE SERPL-SCNC: 104 MMOL/L (ref 96–108)
CHOLEST SERPL-MCNC: 135 MG/DL (ref ?–200)
CO2 SERPL-SCNC: 30 MMOL/L (ref 21–32)
CREAT SERPL-MCNC: 1 MG/DL (ref 0.6–1.3)
EOSINOPHIL # BLD AUTO: 0.42 THOUSAND/ÂΜL (ref 0–0.61)
EOSINOPHIL NFR BLD AUTO: 4 % (ref 0–6)
ERYTHROCYTE [DISTWIDTH] IN BLOOD BY AUTOMATED COUNT: 13.3 % (ref 11.6–15.1)
EST. AVERAGE GLUCOSE BLD GHB EST-MCNC: 194 MG/DL
FERRITIN SERPL-MCNC: 32 NG/ML (ref 30–307)
GFR SERPL CREATININE-BSD FRML MDRD: 66 ML/MIN/1.73SQ M
GLUCOSE P FAST SERPL-MCNC: 94 MG/DL (ref 65–99)
HBA1C MFR BLD: 8.4 %
HCT VFR BLD AUTO: 33.9 % (ref 34.8–46.1)
HDLC SERPL-MCNC: 39 MG/DL
HGB BLD-MCNC: 10.5 G/DL (ref 11.5–15.4)
IMM GRANULOCYTES # BLD AUTO: 0.03 THOUSAND/UL (ref 0–0.2)
IMM GRANULOCYTES NFR BLD AUTO: 0 % (ref 0–2)
IRON SATN MFR SERPL: 11 % (ref 15–50)
IRON SERPL-MCNC: 39 UG/DL (ref 50–212)
LDLC SERPL CALC-MCNC: 64 MG/DL (ref 0–100)
LYMPHOCYTES # BLD AUTO: 1.85 THOUSANDS/ÂΜL (ref 0.6–4.47)
LYMPHOCYTES NFR BLD AUTO: 20 % (ref 14–44)
MCH RBC QN AUTO: 29 PG (ref 26.8–34.3)
MCHC RBC AUTO-ENTMCNC: 31 G/DL (ref 31.4–37.4)
MCV RBC AUTO: 94 FL (ref 82–98)
MONOCYTES # BLD AUTO: 0.8 THOUSAND/ÂΜL (ref 0.17–1.22)
MONOCYTES NFR BLD AUTO: 8 % (ref 4–12)
NEUTROPHILS # BLD AUTO: 6.31 THOUSANDS/ÂΜL (ref 1.85–7.62)
NEUTS SEG NFR BLD AUTO: 67 % (ref 43–75)
NONHDLC SERPL-MCNC: 96 MG/DL
NRBC BLD AUTO-RTO: 0 /100 WBCS
PLATELET # BLD AUTO: 341 THOUSANDS/UL (ref 149–390)
PMV BLD AUTO: 10.5 FL (ref 8.9–12.7)
POTASSIUM SERPL-SCNC: 4.2 MMOL/L (ref 3.5–5.3)
PROT SERPL-MCNC: 6.4 G/DL (ref 6.4–8.4)
RBC # BLD AUTO: 3.62 MILLION/UL (ref 3.81–5.12)
SODIUM SERPL-SCNC: 141 MMOL/L (ref 135–147)
TIBC SERPL-MCNC: 361.2 UG/DL (ref 250–450)
TRANSFERRIN SERPL-MCNC: 258 MG/DL (ref 203–362)
TRIGL SERPL-MCNC: 161 MG/DL (ref ?–150)
UIBC SERPL-MCNC: 322 UG/DL (ref 155–355)
WBC # BLD AUTO: 9.49 THOUSAND/UL (ref 4.31–10.16)

## 2025-07-24 PROCEDURE — 83550 IRON BINDING TEST: CPT

## 2025-07-24 PROCEDURE — 85025 COMPLETE CBC W/AUTO DIFF WBC: CPT

## 2025-07-24 PROCEDURE — 83540 ASSAY OF IRON: CPT

## 2025-07-24 PROCEDURE — 82728 ASSAY OF FERRITIN: CPT

## 2025-07-24 NOTE — TELEPHONE ENCOUNTER
Left message for patient regarding rescheduling appointment today due to labs still processing. Our office will reach out to reschedule appointment. Provided call back number [phone].

## 2025-07-24 NOTE — TELEPHONE ENCOUNTER
Left message for patient regarding appointment 7/24/25 being rescheduled. Appointment has been scheduled for 7/30/25 at 9:00am. If this new date and time do not work please return call to the office to reschedule. Provided call back number [phone].

## 2025-07-30 PROBLEM — J02.9 SORE THROAT: Status: RESOLVED | Noted: 2024-09-22 | Resolved: 2025-07-30

## 2025-07-30 PROBLEM — R11.2 N&V (NAUSEA AND VOMITING): Status: RESOLVED | Noted: 2024-09-23 | Resolved: 2025-07-30

## 2025-08-11 ENCOUNTER — TELEPHONE (OUTPATIENT)
Dept: NEPHROLOGY | Facility: CLINIC | Age: 50
End: 2025-08-11